# Patient Record
Sex: MALE | Race: NATIVE HAWAIIAN OR OTHER PACIFIC ISLANDER | URBAN - METROPOLITAN AREA
[De-identification: names, ages, dates, MRNs, and addresses within clinical notes are randomized per-mention and may not be internally consistent; named-entity substitution may affect disease eponyms.]

---

## 2017-05-10 PROBLEM — R20.2 PARESTHESIA OF LEFT THUMB: Status: ACTIVE | Noted: 2017-05-10

## 2017-12-11 PROCEDURE — 88305 TISSUE EXAM BY PATHOLOGIST: CPT | Performed by: INTERNAL MEDICINE

## 2024-01-01 ENCOUNTER — APPOINTMENT (OUTPATIENT)
Dept: INTERVENTIONAL RADIOLOGY/VASCULAR | Facility: HOSPITAL | Age: 54
End: 2024-01-01
Attending: INTERNAL MEDICINE
Payer: COMMERCIAL

## 2024-01-01 ENCOUNTER — APPOINTMENT (OUTPATIENT)
Dept: GENERAL RADIOLOGY | Facility: HOSPITAL | Age: 54
End: 2024-01-01
Attending: EMERGENCY MEDICINE
Payer: COMMERCIAL

## 2024-01-01 ENCOUNTER — APPOINTMENT (OUTPATIENT)
Dept: INTERVENTIONAL RADIOLOGY/VASCULAR | Facility: HOSPITAL | Age: 54
End: 2024-01-01
Attending: CLINICAL NURSE SPECIALIST
Payer: COMMERCIAL

## 2024-01-01 ENCOUNTER — APPOINTMENT (OUTPATIENT)
Dept: CV DIAGNOSTICS | Facility: HOSPITAL | Age: 54
End: 2024-01-01
Attending: INTERNAL MEDICINE
Payer: COMMERCIAL

## 2024-01-01 ENCOUNTER — APPOINTMENT (OUTPATIENT)
Dept: ULTRASOUND IMAGING | Facility: HOSPITAL | Age: 54
End: 2024-01-01
Attending: NURSE PRACTITIONER
Payer: COMMERCIAL

## 2024-01-01 ENCOUNTER — APPOINTMENT (OUTPATIENT)
Dept: ULTRASOUND IMAGING | Facility: HOSPITAL | Age: 54
End: 2024-01-01
Attending: STUDENT IN AN ORGANIZED HEALTH CARE EDUCATION/TRAINING PROGRAM
Payer: COMMERCIAL

## 2024-01-01 ENCOUNTER — APPOINTMENT (OUTPATIENT)
Dept: GENERAL RADIOLOGY | Facility: HOSPITAL | Age: 54
End: 2024-01-01
Attending: INTERNAL MEDICINE
Payer: COMMERCIAL

## 2024-01-01 ENCOUNTER — APPOINTMENT (OUTPATIENT)
Dept: CT IMAGING | Facility: HOSPITAL | Age: 54
End: 2024-01-01
Attending: Other
Payer: COMMERCIAL

## 2024-01-01 ENCOUNTER — HOSPITAL ENCOUNTER (INPATIENT)
Facility: HOSPITAL | Age: 54
LOS: 26 days | End: 2024-03-31
Attending: STUDENT IN AN ORGANIZED HEALTH CARE EDUCATION/TRAINING PROGRAM | Admitting: INTERNAL MEDICINE
Payer: COMMERCIAL

## 2024-01-01 ENCOUNTER — APPOINTMENT (OUTPATIENT)
Dept: GENERAL RADIOLOGY | Facility: HOSPITAL | Age: 54
End: 2024-01-01
Attending: CLINICAL NURSE SPECIALIST
Payer: COMMERCIAL

## 2024-01-01 ENCOUNTER — APPOINTMENT (OUTPATIENT)
Dept: CT IMAGING | Facility: HOSPITAL | Age: 54
End: 2024-01-01
Attending: INTERNAL MEDICINE
Payer: COMMERCIAL

## 2024-01-01 ENCOUNTER — APPOINTMENT (OUTPATIENT)
Dept: MRI IMAGING | Facility: HOSPITAL | Age: 54
End: 2024-01-01
Attending: INTERNAL MEDICINE
Payer: COMMERCIAL

## 2024-01-01 ENCOUNTER — APPOINTMENT (OUTPATIENT)
Dept: GENERAL RADIOLOGY | Facility: HOSPITAL | Age: 54
End: 2024-01-01
Attending: NURSE PRACTITIONER
Payer: COMMERCIAL

## 2024-01-01 ENCOUNTER — APPOINTMENT (OUTPATIENT)
Dept: GENERAL RADIOLOGY | Facility: HOSPITAL | Age: 54
End: 2024-01-01
Attending: STUDENT IN AN ORGANIZED HEALTH CARE EDUCATION/TRAINING PROGRAM
Payer: COMMERCIAL

## 2024-01-01 ENCOUNTER — APPOINTMENT (OUTPATIENT)
Dept: GENERAL RADIOLOGY | Facility: HOSPITAL | Age: 54
End: 2024-01-01
Attending: RADIOLOGY
Payer: COMMERCIAL

## 2024-01-01 ENCOUNTER — APPOINTMENT (OUTPATIENT)
Dept: ULTRASOUND IMAGING | Facility: HOSPITAL | Age: 54
End: 2024-01-01
Attending: CLINICAL NURSE SPECIALIST
Payer: COMMERCIAL

## 2024-01-01 ENCOUNTER — APPOINTMENT (OUTPATIENT)
Dept: GENERAL RADIOLOGY | Facility: HOSPITAL | Age: 54
End: 2024-01-01
Payer: COMMERCIAL

## 2024-01-01 ENCOUNTER — APPOINTMENT (OUTPATIENT)
Dept: CV DIAGNOSTICS | Facility: HOSPITAL | Age: 54
End: 2024-01-01
Attending: CLINICAL NURSE SPECIALIST
Payer: COMMERCIAL

## 2024-01-01 ENCOUNTER — APPOINTMENT (OUTPATIENT)
Dept: GENERAL RADIOLOGY | Facility: HOSPITAL | Age: 54
End: 2024-01-01
Attending: SPECIALIST
Payer: COMMERCIAL

## 2024-01-01 ENCOUNTER — APPOINTMENT (OUTPATIENT)
Dept: MRI IMAGING | Facility: HOSPITAL | Age: 54
End: 2024-01-01
Attending: Other
Payer: COMMERCIAL

## 2024-01-01 DIAGNOSIS — D50.8 OTHER IRON DEFICIENCY ANEMIA: ICD-10-CM

## 2024-01-01 DIAGNOSIS — I21.4 NSTEMI (NON-ST ELEVATED MYOCARDIAL INFARCTION) (HCC): Primary | ICD-10-CM

## 2024-01-01 LAB
ALBUMIN SERPL-MCNC: 2.6 G/DL (ref 3.2–4.8)
ALBUMIN SERPL-MCNC: 2.6 G/DL (ref 3.2–4.8)
ALBUMIN SERPL-MCNC: 2.8 G/DL (ref 3.2–4.8)
ALBUMIN SERPL-MCNC: 2.9 G/DL (ref 3.2–4.8)
ALBUMIN SERPL-MCNC: 3 G/DL (ref 3.2–4.8)
ALBUMIN SERPL-MCNC: 3.1 G/DL (ref 3.2–4.8)
ALBUMIN SERPL-MCNC: 3.2 G/DL (ref 3.2–4.8)
ALBUMIN SERPL-MCNC: 3.2 G/DL (ref 3.2–4.8)
ALBUMIN SERPL-MCNC: 3.4 G/DL (ref 3.2–4.8)
ALBUMIN SERPL-MCNC: 3.9 G/DL (ref 3.2–4.8)
ALBUMIN/GLOB SERPL: 0.8 {RATIO} (ref 1–2)
ALBUMIN/GLOB SERPL: 1.3 {RATIO} (ref 1–2)
ALBUMIN/GLOB SERPL: 1.4 {RATIO} (ref 1–2)
ALBUMIN/GLOB SERPL: 1.5 {RATIO} (ref 1–2)
ALBUMIN/GLOB SERPL: 1.7 {RATIO} (ref 1–2)
ALK PHOSPHATASE: 91 IU/L
ALP LIVER SERPL-CCNC: 129 U/L
ALP LIVER SERPL-CCNC: 134 U/L
ALP LIVER SERPL-CCNC: 137 U/L
ALP LIVER SERPL-CCNC: 137 U/L
ALP LIVER SERPL-CCNC: 53 U/L
ALP LIVER SERPL-CCNC: 59 U/L
ALP LIVER SERPL-CCNC: 69 U/L
ALP LIVER SERPL-CCNC: 73 U/L
ALP LIVER SERPL-CCNC: 73 U/L
ALP LIVER SERPL-CCNC: 78 U/L
ALP LIVER SERPL-CCNC: 80 U/L
ALP LIVER SERPL-CCNC: 85 U/L
ALP LIVER SERPL-CCNC: 87 U/L
ALP LIVER SERPL-CCNC: 93 U/L
ALP LIVER SERPL-CCNC: 97 U/L
ALT SERPL-CCNC: 11 U/L
ALT SERPL-CCNC: 12 U/L
ALT SERPL-CCNC: 12 U/L
ALT SERPL-CCNC: 17 U/L
ALT SERPL-CCNC: 18 U/L
ALT SERPL-CCNC: 19 U/L
ALT SERPL-CCNC: 40 U/L
ALT SERPL-CCNC: 56 U/L
ALT SERPL-CCNC: 57 U/L
ALT SERPL-CCNC: 57 U/L
ALT SERPL-CCNC: 69 U/L
ALT SERPL-CCNC: 78 U/L
ALT SERPL-CCNC: <7 U/L
AMMONIA PLAS-MCNC: 20 UMOL/L (ref 11–32)
AMYLASE SERPL-CCNC: 61 U/L (ref 30–118)
AMYLASE SERPL-CCNC: 63 U/L (ref 30–118)
AMYLASE SERPL-CCNC: 66 U/L (ref 30–118)
ANION GAP SERPL CALC-SCNC: 0 MMOL/L (ref 0–18)
ANION GAP SERPL CALC-SCNC: 0 MMOL/L (ref 0–18)
ANION GAP SERPL CALC-SCNC: 1 MMOL/L (ref 0–18)
ANION GAP SERPL CALC-SCNC: 1 MMOL/L (ref 0–18)
ANION GAP SERPL CALC-SCNC: 10 MMOL/L (ref 0–18)
ANION GAP SERPL CALC-SCNC: 11 MMOL/L (ref 0–18)
ANION GAP SERPL CALC-SCNC: 14 MMOL/L (ref 0–18)
ANION GAP SERPL CALC-SCNC: 2 MMOL/L (ref 0–18)
ANION GAP SERPL CALC-SCNC: 3 MMOL/L (ref 0–18)
ANION GAP SERPL CALC-SCNC: 4 MMOL/L (ref 0–18)
ANION GAP SERPL CALC-SCNC: 5 MMOL/L (ref 0–18)
ANION GAP SERPL CALC-SCNC: 6 MMOL/L (ref 0–18)
ANION GAP SERPL CALC-SCNC: 7 MMOL/L (ref 0–18)
ANION GAP SERPL CALC-SCNC: 8 MMOL/L (ref 0–18)
ANION GAP SERPL CALC-SCNC: 9 MMOL/L (ref 0–18)
ANION GAP SERPL CALC-SCNC: <0 MMOL/L (ref 0–18)
ANTIBODY SCREEN: NEGATIVE
APTT PPP: 104.5 SECONDS (ref 23.3–35.6)
APTT PPP: 32.1 SECONDS (ref 23.3–35.6)
APTT PPP: 33.1 SECONDS (ref 23.3–35.6)
APTT PPP: 33.2 SECONDS (ref 23.3–35.6)
APTT PPP: 33.6 SECONDS (ref 23.3–35.6)
APTT PPP: 34.6 SECONDS (ref 23.3–35.6)
APTT PPP: 36.5 SECONDS (ref 23.3–35.6)
APTT PPP: 37 SECONDS (ref 23.3–35.6)
APTT PPP: 39.7 SECONDS (ref 23.3–35.6)
APTT PPP: 40.3 SECONDS (ref 23.3–35.6)
APTT PPP: 42.5 SECONDS (ref 23.3–35.6)
APTT PPP: 42.8 SECONDS (ref 23.3–35.6)
APTT PPP: 42.9 SECONDS (ref 23.3–35.6)
APTT PPP: 44.1 SECONDS (ref 23.3–35.6)
APTT PPP: 49 SECONDS (ref 23.3–35.6)
APTT PPP: 51.7 SECONDS (ref 23.3–35.6)
APTT PPP: 61 SECONDS (ref 23.3–35.6)
AST SERPL-CCNC: 103 U/L (ref ?–34)
AST SERPL-CCNC: 145 U/L (ref ?–34)
AST SERPL-CCNC: 20 U/L (ref ?–34)
AST SERPL-CCNC: 26 U/L (ref ?–34)
AST SERPL-CCNC: 27 U/L (ref ?–34)
AST SERPL-CCNC: 28 U/L (ref ?–34)
AST SERPL-CCNC: 30 U/L (ref ?–34)
AST SERPL-CCNC: 35 U/L (ref ?–34)
AST SERPL-CCNC: 35 U/L (ref ?–34)
AST SERPL-CCNC: 68 U/L (ref ?–34)
AST SERPL-CCNC: 68 U/L (ref ?–34)
AST SERPL-CCNC: 89 U/L (ref ?–34)
ATRIAL RATE: 110 BPM
ATRIAL RATE: 61 BPM
ATRIAL RATE: 81 BPM
BASE EXCESS BLD CALC-SCNC: -0.1 MMOL/L (ref ?–2)
BASE EXCESS BLD CALC-SCNC: -0.5 MMOL/L (ref ?–2)
BASE EXCESS BLD CALC-SCNC: -0.7 MMOL/L (ref ?–2)
BASE EXCESS BLD CALC-SCNC: -0.8 MMOL/L (ref ?–2)
BASE EXCESS BLD CALC-SCNC: -1 MMOL/L (ref ?–2)
BASE EXCESS BLD CALC-SCNC: -1.3 MMOL/L (ref ?–2)
BASE EXCESS BLD CALC-SCNC: -1.3 MMOL/L (ref ?–2)
BASE EXCESS BLD CALC-SCNC: -1.4 MMOL/L (ref ?–2)
BASE EXCESS BLD CALC-SCNC: -1.5 MMOL/L (ref ?–2)
BASE EXCESS BLD CALC-SCNC: -1.8 MMOL/L (ref ?–2)
BASE EXCESS BLD CALC-SCNC: -1.9 MMOL/L (ref ?–2)
BASE EXCESS BLD CALC-SCNC: -2.2 MMOL/L (ref ?–2)
BASE EXCESS BLD CALC-SCNC: -2.2 MMOL/L (ref ?–2)
BASE EXCESS BLD CALC-SCNC: -2.5 MMOL/L (ref ?–2)
BASE EXCESS BLD CALC-SCNC: -2.5 MMOL/L (ref ?–2)
BASE EXCESS BLD CALC-SCNC: -2.6 MMOL/L (ref ?–2)
BASE EXCESS BLD CALC-SCNC: -2.7 MMOL/L (ref ?–2)
BASE EXCESS BLD CALC-SCNC: -2.7 MMOL/L (ref ?–2)
BASE EXCESS BLD CALC-SCNC: -2.9 MMOL/L (ref ?–2)
BASE EXCESS BLD CALC-SCNC: -3 MMOL/L (ref ?–2)
BASE EXCESS BLD CALC-SCNC: -3.3 MMOL/L (ref ?–2)
BASE EXCESS BLD CALC-SCNC: -4.6 MMOL/L (ref ?–2)
BASE EXCESS BLD CALC-SCNC: -4.7 MMOL/L (ref ?–2)
BASE EXCESS BLD CALC-SCNC: -4.9 MMOL/L (ref ?–2)
BASE EXCESS BLD CALC-SCNC: -5.2 MMOL/L (ref ?–2)
BASE EXCESS BLD CALC-SCNC: -5.8 MMOL/L (ref ?–2)
BASE EXCESS BLD CALC-SCNC: -6 MMOL/L (ref ?–2)
BASE EXCESS BLD CALC-SCNC: -6.2 MMOL/L (ref ?–2)
BASE EXCESS BLD CALC-SCNC: -6.6 MMOL/L (ref ?–2)
BASE EXCESS BLD CALC-SCNC: -6.7 MMOL/L (ref ?–2)
BASE EXCESS BLD CALC-SCNC: -7.1 MMOL/L (ref ?–2)
BASE EXCESS BLD CALC-SCNC: -7.9 MMOL/L (ref ?–2)
BASE EXCESS BLD CALC-SCNC: -7.9 MMOL/L (ref ?–2)
BASE EXCESS BLD CALC-SCNC: -8 MMOL/L (ref ?–2)
BASE EXCESS BLD CALC-SCNC: -8.3 MMOL/L (ref ?–2)
BASE EXCESS BLD CALC-SCNC: -9.9 MMOL/L (ref ?–2)
BASE EXCESS BLD CALC-SCNC: 0 MMOL/L (ref ?–2)
BASE EXCESS BLD CALC-SCNC: 0.1 MMOL/L (ref ?–2)
BASE EXCESS BLD CALC-SCNC: 0.5 MMOL/L (ref ?–2)
BASE EXCESS BLD CALC-SCNC: 0.7 MMOL/L (ref ?–2)
BASE EXCESS BLD CALC-SCNC: 0.8 MMOL/L (ref ?–2)
BASE EXCESS BLD CALC-SCNC: 1 MMOL/L (ref ?–2)
BASE EXCESS BLD CALC-SCNC: 1.2 MMOL/L (ref ?–2)
BASE EXCESS BLD CALC-SCNC: 1.8 MMOL/L (ref ?–2)
BASE EXCESS BLD CALC-SCNC: 1.9 MMOL/L (ref ?–2)
BASE EXCESS BLD CALC-SCNC: 1.9 MMOL/L (ref ?–2)
BASE EXCESS BLD CALC-SCNC: 2 MMOL/L (ref ?–2)
BASE EXCESS BLD CALC-SCNC: 2 MMOL/L (ref ?–2)
BASE EXCESS BLD CALC-SCNC: 2.4 MMOL/L (ref ?–2)
BASE EXCESS BLD CALC-SCNC: 2.5 MMOL/L (ref ?–2)
BASE EXCESS BLD CALC-SCNC: 2.7 MMOL/L (ref ?–2)
BASE EXCESS BLD CALC-SCNC: 2.7 MMOL/L (ref ?–2)
BASE EXCESS BLD CALC-SCNC: 3.1 MMOL/L (ref ?–2)
BASE EXCESS BLD CALC-SCNC: 3.3 MMOL/L (ref ?–2)
BASE EXCESS BLD CALC-SCNC: 3.6 MMOL/L (ref ?–2)
BASE EXCESS BLD CALC-SCNC: 3.8 MMOL/L (ref ?–2)
BASE EXCESS BLD CALC-SCNC: 3.8 MMOL/L (ref ?–2)
BASE EXCESS BLD CALC-SCNC: 3.9 MMOL/L (ref ?–2)
BASE EXCESS BLD CALC-SCNC: 3.9 MMOL/L (ref ?–2)
BASE EXCESS BLD CALC-SCNC: 4.1 MMOL/L (ref ?–2)
BASE EXCESS BLD CALC-SCNC: 4.1 MMOL/L (ref ?–2)
BASE EXCESS BLD CALC-SCNC: 4.4 MMOL/L (ref ?–2)
BASE EXCESS BLD CALC-SCNC: 5 MMOL/L (ref ?–2)
BASE EXCESS BLD CALC-SCNC: 5.2 MMOL/L (ref ?–2)
BASE EXCESS BLDA CALC-SCNC: -1 MMOL/L (ref ?–30)
BASE EXCESS BLDA CALC-SCNC: -5 MMOL/L (ref ?–30)
BASOPHILS # BLD AUTO: 0 X10(3) UL (ref 0–0.2)
BASOPHILS # BLD AUTO: 0.01 X10(3) UL (ref 0–0.2)
BASOPHILS # BLD AUTO: 0.02 X10(3) UL (ref 0–0.2)
BASOPHILS # BLD AUTO: 0.03 X10(3) UL (ref 0–0.2)
BASOPHILS # BLD AUTO: 0.03 X10(3) UL (ref 0–0.2)
BASOPHILS # BLD AUTO: 0.05 X10(3) UL (ref 0–0.2)
BASOPHILS # BLD AUTO: 0.06 X10(3) UL (ref 0–0.2)
BASOPHILS # BLD AUTO: 0.07 X10(3) UL (ref 0–0.2)
BASOPHILS # BLD AUTO: 0.09 X10(3) UL (ref 0–0.2)
BASOPHILS # BLD AUTO: 0.11 X10(3) UL (ref 0–0.2)
BASOPHILS # BLD: 0 X10(3) UL (ref 0–0.2)
BASOPHILS # BLD: 0.09 X10(3) UL (ref 0–0.2)
BASOPHILS # BLD: 0.41 X10(3) UL (ref 0–0.2)
BASOPHILS NFR BLD AUTO: 0 %
BASOPHILS NFR BLD AUTO: 0.1 %
BASOPHILS NFR BLD AUTO: 0.2 %
BASOPHILS NFR BLD AUTO: 0.3 %
BASOPHILS NFR BLD AUTO: 0.4 %
BASOPHILS NFR BLD AUTO: 0.8 %
BASOPHILS NFR BLD: 0 %
BASOPHILS NFR BLD: 1 %
BASOPHILS NFR BLD: 2 %
BILIRUB DIRECT SERPL-MCNC: 0.2 MG/DL (ref ?–0.3)
BILIRUB DIRECT SERPL-MCNC: 0.3 MG/DL (ref ?–0.3)
BILIRUB DIRECT SERPL-MCNC: 0.4 MG/DL (ref ?–0.3)
BILIRUB DIRECT SERPL-MCNC: 0.4 MG/DL (ref ?–0.3)
BILIRUB DIRECT SERPL-MCNC: 0.5 MG/DL (ref ?–0.3)
BILIRUB SERPL-MCNC: 0.3 MG/DL (ref 0.3–1.2)
BILIRUB SERPL-MCNC: 0.5 MG/DL (ref 0.3–1.2)
BILIRUB SERPL-MCNC: 0.6 MG/DL (ref 0.3–1.2)
BILIRUB SERPL-MCNC: 0.8 MG/DL (ref 0.3–1.2)
BILIRUB SERPL-MCNC: 0.9 MG/DL (ref 0.3–1.2)
BILIRUB SERPL-MCNC: 0.9 MG/DL (ref 0.3–1.2)
BILIRUB SERPL-MCNC: 1 MG/DL (ref 0.3–1.2)
BILIRUB SERPL-MCNC: 1.1 MG/DL (ref 0.3–1.2)
BILIRUB SERPL-MCNC: 1.1 MG/DL (ref 0.3–1.2)
BILIRUB UR QL: NEGATIVE
BLOOD TYPE BARCODE: 600
BLOOD TYPE BARCODE: 6200
BLOOD TYPE BARCODE: 6200
BLOOD TYPE BARCODE: 7300
BLOOD TYPE BARCODE: 8400
BLOOD TYPE BARCODE: 9500
BONE FRAC: 40 %
BUN BLD-MCNC: 18 MG/DL (ref 9–23)
BUN BLD-MCNC: 21 MG/DL (ref 9–23)
BUN BLD-MCNC: 22 MG/DL (ref 9–23)
BUN BLD-MCNC: 23 MG/DL (ref 9–23)
BUN BLD-MCNC: 25 MG/DL (ref 9–23)
BUN BLD-MCNC: 28 MG/DL (ref 9–23)
BUN BLD-MCNC: 30 MG/DL (ref 9–23)
BUN BLD-MCNC: 30 MG/DL (ref 9–23)
BUN BLD-MCNC: 32 MG/DL (ref 9–23)
BUN BLD-MCNC: 32 MG/DL (ref 9–23)
BUN BLD-MCNC: 34 MG/DL (ref 9–23)
BUN BLD-MCNC: 35 MG/DL (ref 9–23)
BUN BLD-MCNC: 36 MG/DL (ref 9–23)
BUN BLD-MCNC: 38 MG/DL (ref 9–23)
BUN BLD-MCNC: 40 MG/DL (ref 9–23)
BUN BLD-MCNC: 40 MG/DL (ref 9–23)
BUN BLD-MCNC: 42 MG/DL (ref 9–23)
BUN BLD-MCNC: 43 MG/DL (ref 9–23)
BUN BLD-MCNC: 44 MG/DL (ref 9–23)
BUN BLD-MCNC: 45 MG/DL (ref 9–23)
BUN BLD-MCNC: 45 MG/DL (ref 9–23)
BUN BLD-MCNC: 46 MG/DL (ref 9–23)
BUN BLD-MCNC: 47 MG/DL (ref 9–23)
BUN BLD-MCNC: 47 MG/DL (ref 9–23)
BUN BLD-MCNC: 49 MG/DL (ref 9–23)
BUN BLD-MCNC: 50 MG/DL (ref 9–23)
BUN BLD-MCNC: 51 MG/DL (ref 9–23)
BUN BLD-MCNC: 51 MG/DL (ref 9–23)
BUN BLD-MCNC: 52 MG/DL (ref 9–23)
BUN BLD-MCNC: 52 MG/DL (ref 9–23)
BUN BLD-MCNC: 53 MG/DL (ref 9–23)
BUN BLD-MCNC: 56 MG/DL (ref 9–23)
BUN BLD-MCNC: 57 MG/DL (ref 9–23)
BUN BLD-MCNC: 61 MG/DL (ref 9–23)
BUN BLD-MCNC: 62 MG/DL (ref 9–23)
BUN BLD-MCNC: 65 MG/DL (ref 9–23)
BUN/CREAT SERPL: 10 (ref 10–20)
BUN/CREAT SERPL: 10.6 (ref 10–20)
BUN/CREAT SERPL: 10.6 (ref 10–20)
BUN/CREAT SERPL: 10.8 (ref 10–20)
BUN/CREAT SERPL: 11 (ref 10–20)
BUN/CREAT SERPL: 11.2 (ref 10–20)
BUN/CREAT SERPL: 11.3 (ref 10–20)
BUN/CREAT SERPL: 11.4 (ref 10–20)
BUN/CREAT SERPL: 11.6 (ref 10–20)
BUN/CREAT SERPL: 11.6 (ref 10–20)
BUN/CREAT SERPL: 11.7 (ref 10–20)
BUN/CREAT SERPL: 11.8 (ref 10–20)
BUN/CREAT SERPL: 11.9 (ref 10–20)
BUN/CREAT SERPL: 11.9 (ref 10–20)
BUN/CREAT SERPL: 12 (ref 10–20)
BUN/CREAT SERPL: 12.2 (ref 10–20)
BUN/CREAT SERPL: 12.3 (ref 10–20)
BUN/CREAT SERPL: 12.4 (ref 10–20)
BUN/CREAT SERPL: 12.7 (ref 10–20)
BUN/CREAT SERPL: 12.8 (ref 10–20)
BUN/CREAT SERPL: 12.8 (ref 10–20)
BUN/CREAT SERPL: 13.2 (ref 10–20)
BUN/CREAT SERPL: 13.2 (ref 10–20)
BUN/CREAT SERPL: 13.3 (ref 10–20)
BUN/CREAT SERPL: 13.4 (ref 10–20)
BUN/CREAT SERPL: 13.4 (ref 10–20)
BUN/CREAT SERPL: 13.8 (ref 10–20)
BUN/CREAT SERPL: 13.9 (ref 10–20)
BUN/CREAT SERPL: 14 (ref 10–20)
BUN/CREAT SERPL: 14.3 (ref 10–20)
BUN/CREAT SERPL: 14.5 (ref 10–20)
BUN/CREAT SERPL: 14.6 (ref 10–20)
BUN/CREAT SERPL: 14.6 (ref 10–20)
BUN/CREAT SERPL: 14.9 (ref 10–20)
BUN/CREAT SERPL: 6.1 (ref 10–20)
BUN/CREAT SERPL: 6.1 (ref 10–20)
BUN/CREAT SERPL: 6.4 (ref 10–20)
BUN/CREAT SERPL: 6.4 (ref 10–20)
BUN/CREAT SERPL: 6.8 (ref 10–20)
BUN/CREAT SERPL: 6.8 (ref 10–20)
BUN/CREAT SERPL: 6.9 (ref 10–20)
BUN/CREAT SERPL: 6.9 (ref 10–20)
BUN/CREAT SERPL: 7.2 (ref 10–20)
BUN/CREAT SERPL: 7.3 (ref 10–20)
BUN/CREAT SERPL: 8.2 (ref 10–20)
BUN/CREAT SERPL: 8.6 (ref 10–20)
BUN/CREAT SERPL: 8.9 (ref 10–20)
BUN/CREAT SERPL: 9.2 (ref 10–20)
BUN/CREAT SERPL: 9.2 (ref 10–20)
BUN/CREAT SERPL: 9.5 (ref 10–20)
BUN/CREAT SERPL: 9.7 (ref 10–20)
CA-I BLD-SCNC: 0.92 MMOL/L (ref 0.95–1.32)
CA-I BLD-SCNC: 1 MMOL/L (ref 0.95–1.32)
CA-I BLD-SCNC: 1.01 MMOL/L (ref 0.95–1.32)
CA-I BLD-SCNC: 1.04 MMOL/L (ref 0.95–1.32)
CA-I BLD-SCNC: 1.04 MMOL/L (ref 0.95–1.32)
CA-I BLD-SCNC: 1.05 MMOL/L (ref 0.95–1.32)
CA-I BLD-SCNC: 1.07 MMOL/L (ref 0.95–1.32)
CA-I BLD-SCNC: 1.08 MMOL/L (ref 0.95–1.32)
CA-I BLD-SCNC: 1.09 MMOL/L (ref 0.95–1.32)
CA-I BLD-SCNC: 1.1 MMOL/L (ref 0.95–1.32)
CA-I BLD-SCNC: 1.11 MMOL/L (ref 0.95–1.32)
CA-I BLD-SCNC: 1.12 MMOL/L (ref 0.95–1.32)
CA-I BLD-SCNC: 1.13 MMOL/L (ref 0.95–1.32)
CA-I BLD-SCNC: 1.14 MMOL/L (ref 0.95–1.32)
CA-I BLD-SCNC: 1.15 MMOL/L (ref 0.95–1.32)
CA-I BLD-SCNC: 1.16 MMOL/L (ref 0.95–1.32)
CA-I BLD-SCNC: 1.16 MMOL/L (ref 0.95–1.32)
CA-I BLD-SCNC: 1.17 MMOL/L (ref 0.95–1.32)
CA-I BLD-SCNC: 1.18 MMOL/L (ref 0.95–1.32)
CA-I BLD-SCNC: 1.2 MMOL/L (ref 0.95–1.32)
CA-I BLD-SCNC: 1.2 MMOL/L (ref 0.95–1.32)
CA-I BLD-SCNC: 1.21 MMOL/L (ref 0.95–1.32)
CA-I BLD-SCNC: 1.22 MMOL/L (ref 0.95–1.32)
CA-I BLD-SCNC: 1.24 MMOL/L (ref 0.95–1.32)
CA-I BLDA-SCNC: 0.98 MMOL/L (ref 1.12–1.32)
CA-I BLDA-SCNC: 1.04 MMOL/L (ref 1.12–1.32)
CA-I BLDA-SCNC: 1.05 MMOL/L (ref 1.12–1.32)
CA-I BLDA-SCNC: 1.09 MMOL/L (ref 1.12–1.32)
CA-I BLDA-SCNC: 1.17 MMOL/L (ref 1.12–1.32)
CA-I BLDA-SCNC: 1.2 MMOL/L (ref 1.12–1.32)
CALCIUM BLD-MCNC: 7.4 MG/DL (ref 8.7–10.4)
CALCIUM BLD-MCNC: 7.6 MG/DL (ref 8.7–10.4)
CALCIUM BLD-MCNC: 7.6 MG/DL (ref 8.7–10.4)
CALCIUM BLD-MCNC: 7.7 MG/DL (ref 8.7–10.4)
CALCIUM BLD-MCNC: 7.8 MG/DL (ref 8.7–10.4)
CALCIUM BLD-MCNC: 7.8 MG/DL (ref 8.7–10.4)
CALCIUM BLD-MCNC: 7.9 MG/DL (ref 8.7–10.4)
CALCIUM BLD-MCNC: 8 MG/DL (ref 8.7–10.4)
CALCIUM BLD-MCNC: 8.1 MG/DL (ref 8.7–10.4)
CALCIUM BLD-MCNC: 8.2 MG/DL (ref 8.7–10.4)
CALCIUM BLD-MCNC: 8.3 MG/DL (ref 8.7–10.4)
CALCIUM BLD-MCNC: 8.4 MG/DL (ref 8.7–10.4)
CALCIUM BLD-MCNC: 8.5 MG/DL (ref 8.7–10.4)
CALCIUM BLD-MCNC: 8.6 MG/DL (ref 8.7–10.4)
CALCIUM BLD-MCNC: 8.7 MG/DL (ref 8.7–10.4)
CALCIUM BLD-MCNC: 8.8 MG/DL (ref 8.7–10.4)
CALCIUM BLD-MCNC: 8.9 MG/DL (ref 8.7–10.4)
CALCIUM BLD-MCNC: 8.9 MG/DL (ref 8.7–10.4)
CHLORIDE SERPL-SCNC: 102 MMOL/L (ref 98–112)
CHLORIDE SERPL-SCNC: 103 MMOL/L (ref 98–112)
CHLORIDE SERPL-SCNC: 104 MMOL/L (ref 98–112)
CHLORIDE SERPL-SCNC: 105 MMOL/L (ref 98–112)
CHLORIDE SERPL-SCNC: 106 MMOL/L (ref 98–112)
CHLORIDE SERPL-SCNC: 107 MMOL/L (ref 98–112)
CHLORIDE SERPL-SCNC: 108 MMOL/L (ref 98–112)
CHLORIDE SERPL-SCNC: 109 MMOL/L (ref 98–112)
CHLORIDE SERPL-SCNC: 110 MMOL/L (ref 98–112)
CHLORIDE SERPL-SCNC: 111 MMOL/L (ref 98–112)
CHLORIDE SERPL-SCNC: 111 MMOL/L (ref 98–112)
CHLORIDE SERPL-SCNC: 112 MMOL/L (ref 98–112)
CHLORIDE SERPL-SCNC: 113 MMOL/L (ref 98–112)
CHLORIDE SERPL-SCNC: 114 MMOL/L (ref 98–112)
CHLORIDE SERPL-SCNC: 115 MMOL/L (ref 98–112)
CHLORIDE UR-SCNC: 108 MMOL/L
CHOLEST SERPL-MCNC: 121 MG/DL (ref ?–200)
CHOLEST SERPL-MCNC: 138 MG/DL (ref ?–200)
CHOLEST SERPL-MCNC: 149 MG/DL (ref ?–200)
CK SERPL-CCNC: 28 U/L
CK SERPL-CCNC: 40 U/L
CK SERPL-CCNC: 51 U/L
CO2 BLDA-SCNC: 21 MMOL/L (ref 22–32)
CO2 BLDA-SCNC: 23 MMOL/L (ref 22–32)
CO2 BLDA-SCNC: 24 MMOL/L (ref 22–32)
CO2 BLDA-SCNC: 25 MMOL/L (ref 22–32)
CO2 BLDA-SCNC: 25 MMOL/L (ref 22–32)
CO2 BLDA-SCNC: 26 MMOL/L (ref 22–32)
CO2 SERPL-SCNC: 18 MMOL/L (ref 21–32)
CO2 SERPL-SCNC: 18 MMOL/L (ref 21–32)
CO2 SERPL-SCNC: 19 MMOL/L (ref 21–32)
CO2 SERPL-SCNC: 20 MMOL/L (ref 21–32)
CO2 SERPL-SCNC: 20 MMOL/L (ref 21–32)
CO2 SERPL-SCNC: 21 MMOL/L (ref 21–32)
CO2 SERPL-SCNC: 22 MMOL/L (ref 21–32)
CO2 SERPL-SCNC: 23 MMOL/L (ref 21–32)
CO2 SERPL-SCNC: 24 MMOL/L (ref 21–32)
CO2 SERPL-SCNC: 25 MMOL/L (ref 21–32)
CO2 SERPL-SCNC: 26 MMOL/L (ref 21–32)
CO2 SERPL-SCNC: 27 MMOL/L (ref 21–32)
CO2 SERPL-SCNC: 28 MMOL/L (ref 21–32)
COHGB MFR BLD: 2 % (ref 0–3)
COHGB MFR BLD: 2 % (ref 0–3)
COHGB MFR BLD: 2.1 % (ref 0–3)
COHGB MFR BLD: 2.2 % (ref 0–3)
COHGB MFR BLD: 2.3 % (ref 0–3)
COHGB MFR BLD: 2.4 % (ref 0–3)
COHGB MFR BLD: 2.5 % (ref 0–3)
COHGB MFR BLD: 2.6 % (ref 0–3)
COHGB MFR BLD: 2.7 % (ref 0–3)
COHGB MFR BLD: 2.8 % (ref 0–3)
COHGB MFR BLD: 2.8 % (ref 0–3)
COHGB MFR BLD: 2.9 % (ref 0–3)
COHGB MFR BLD: 3 % (ref 0–3)
COHGB MFR BLD: 3.1 % (ref 0–3)
COHGB MFR BLD: 3.1 % (ref 0–3)
COHGB MFR BLD: 3.2 % (ref 0–3)
COLOR UR: YELLOW
CREAT BLD-MCNC: 1.23 MG/DL
CREAT BLD-MCNC: 2.68 MG/DL
CREAT BLD-MCNC: 2.77 MG/DL
CREAT BLD-MCNC: 2.81 MG/DL
CREAT BLD-MCNC: 2.81 MG/DL
CREAT BLD-MCNC: 2.93 MG/DL
CREAT BLD-MCNC: 2.95 MG/DL
CREAT BLD-MCNC: 2.99 MG/DL
CREAT BLD-MCNC: 3.01 MG/DL
CREAT BLD-MCNC: 3.03 MG/DL
CREAT BLD-MCNC: 3.03 MG/DL
CREAT BLD-MCNC: 3.04 MG/DL
CREAT BLD-MCNC: 3.06 MG/DL
CREAT BLD-MCNC: 3.06 MG/DL
CREAT BLD-MCNC: 3.15 MG/DL
CREAT BLD-MCNC: 3.15 MG/DL
CREAT BLD-MCNC: 3.18 MG/DL
CREAT BLD-MCNC: 3.21 MG/DL
CREAT BLD-MCNC: 3.21 MG/DL
CREAT BLD-MCNC: 3.24 MG/DL
CREAT BLD-MCNC: 3.27 MG/DL
CREAT BLD-MCNC: 3.29 MG/DL
CREAT BLD-MCNC: 3.34 MG/DL
CREAT BLD-MCNC: 3.41 MG/DL
CREAT BLD-MCNC: 3.44 MG/DL
CREAT BLD-MCNC: 3.45 MG/DL
CREAT BLD-MCNC: 3.45 MG/DL
CREAT BLD-MCNC: 3.49 MG/DL
CREAT BLD-MCNC: 3.52 MG/DL
CREAT BLD-MCNC: 3.53 MG/DL
CREAT BLD-MCNC: 3.61 MG/DL
CREAT BLD-MCNC: 3.62 MG/DL
CREAT BLD-MCNC: 3.69 MG/DL
CREAT BLD-MCNC: 3.7 MG/DL
CREAT BLD-MCNC: 3.7 MG/DL
CREAT BLD-MCNC: 3.71 MG/DL
CREAT BLD-MCNC: 3.78 MG/DL
CREAT BLD-MCNC: 3.95 MG/DL
CREAT BLD-MCNC: 3.97 MG/DL
CREAT BLD-MCNC: 4.07 MG/DL
CREAT BLD-MCNC: 4.1 MG/DL
CREAT BLD-MCNC: 4.11 MG/DL
CREAT BLD-MCNC: 4.12 MG/DL
CREAT BLD-MCNC: 4.16 MG/DL
CREAT BLD-MCNC: 4.17 MG/DL
CREAT BLD-MCNC: 4.36 MG/DL
CREAT BLD-MCNC: 4.65 MG/DL
CREAT BLD-MCNC: 4.69 MG/DL
CREAT BLD-MCNC: 4.83 MG/DL
CREAT BLD-MCNC: 4.84 MG/DL
CREAT BLD-MCNC: 4.87 MG/DL
CREAT BLD-MCNC: 5.16 MG/DL
CREAT BLD-MCNC: 5.45 MG/DL
CREAT BLD-MCNC: 5.5 MG/DL
CREAT BLD-MCNC: 6.67 MG/DL
CREAT BLD-MCNC: 7.19 MG/DL
CREAT UR-SCNC: 52 MG/DL
D DIMER PPP FEU-MCNC: 13.83 UG/ML FEU (ref ?–0.55)
D DIMER PPP FEU-MCNC: 16.34 UG/ML FEU (ref ?–0.55)
D DIMER PPP FEU-MCNC: 18.48 UG/ML FEU (ref ?–0.55)
D DIMER PPP FEU-MCNC: >20 UG/ML FEU (ref ?–0.55)
DEPRECATED HBV CORE AB SER IA-ACNC: 1472.5 NG/ML
DEPRECATED HBV CORE AB SER IA-ACNC: 913 NG/ML
DEPRECATED RDW RBC AUTO: 42.2 FL (ref 35.1–46.3)
DEPRECATED RDW RBC AUTO: 43.9 FL (ref 35.1–46.3)
DEPRECATED RDW RBC AUTO: 44.2 FL (ref 35.1–46.3)
DEPRECATED RDW RBC AUTO: 45.1 FL (ref 35.1–46.3)
DEPRECATED RDW RBC AUTO: 45.3 FL (ref 35.1–46.3)
DEPRECATED RDW RBC AUTO: 45.4 FL (ref 35.1–46.3)
DEPRECATED RDW RBC AUTO: 45.5 FL (ref 35.1–46.3)
DEPRECATED RDW RBC AUTO: 46.2 FL (ref 35.1–46.3)
DEPRECATED RDW RBC AUTO: 46.2 FL (ref 35.1–46.3)
DEPRECATED RDW RBC AUTO: 46.3 FL (ref 35.1–46.3)
DEPRECATED RDW RBC AUTO: 46.6 FL (ref 35.1–46.3)
DEPRECATED RDW RBC AUTO: 46.8 FL (ref 35.1–46.3)
DEPRECATED RDW RBC AUTO: 47.2 FL (ref 35.1–46.3)
DEPRECATED RDW RBC AUTO: 47.2 FL (ref 35.1–46.3)
DEPRECATED RDW RBC AUTO: 47.4 FL (ref 35.1–46.3)
DEPRECATED RDW RBC AUTO: 47.5 FL (ref 35.1–46.3)
DEPRECATED RDW RBC AUTO: 47.6 FL (ref 35.1–46.3)
DEPRECATED RDW RBC AUTO: 47.7 FL (ref 35.1–46.3)
DEPRECATED RDW RBC AUTO: 47.8 FL (ref 35.1–46.3)
DEPRECATED RDW RBC AUTO: 47.9 FL (ref 35.1–46.3)
DEPRECATED RDW RBC AUTO: 48 FL (ref 35.1–46.3)
DEPRECATED RDW RBC AUTO: 48.4 FL (ref 35.1–46.3)
DEPRECATED RDW RBC AUTO: 48.5 FL (ref 35.1–46.3)
DEPRECATED RDW RBC AUTO: 48.8 FL (ref 35.1–46.3)
DEPRECATED RDW RBC AUTO: 48.9 FL (ref 35.1–46.3)
DEPRECATED RDW RBC AUTO: 48.9 FL (ref 35.1–46.3)
DEPRECATED RDW RBC AUTO: 49.2 FL (ref 35.1–46.3)
DEPRECATED RDW RBC AUTO: 49.2 FL (ref 35.1–46.3)
DEPRECATED RDW RBC AUTO: 49.3 FL (ref 35.1–46.3)
DEPRECATED RDW RBC AUTO: 50.2 FL (ref 35.1–46.3)
DEPRECATED RDW RBC AUTO: 50.4 FL (ref 35.1–46.3)
DEPRECATED RDW RBC AUTO: 50.8 FL (ref 35.1–46.3)
DEPRECATED RDW RBC AUTO: 52 FL (ref 35.1–46.3)
DEPRECATED RDW RBC AUTO: 53.2 FL (ref 35.1–46.3)
DEPRECATED RDW RBC AUTO: 53.5 FL (ref 35.1–46.3)
DEPRECATED RDW RBC AUTO: 54.5 FL (ref 35.1–46.3)
DEPRECATED RDW RBC AUTO: 54.7 FL (ref 35.1–46.3)
DEPRECATED RDW RBC AUTO: 54.9 FL (ref 35.1–46.3)
DEPRECATED RDW RBC AUTO: 55 FL (ref 35.1–46.3)
EGFRCR SERPLBLD CKD-EPI 2021: 11 ML/MIN/1.73M2 (ref 60–?)
EGFRCR SERPLBLD CKD-EPI 2021: 12 ML/MIN/1.73M2 (ref 60–?)
EGFRCR SERPLBLD CKD-EPI 2021: 12 ML/MIN/1.73M2 (ref 60–?)
EGFRCR SERPLBLD CKD-EPI 2021: 13 ML/MIN/1.73M2 (ref 60–?)
EGFRCR SERPLBLD CKD-EPI 2021: 14 ML/MIN/1.73M2 (ref 60–?)
EGFRCR SERPLBLD CKD-EPI 2021: 14 ML/MIN/1.73M2 (ref 60–?)
EGFRCR SERPLBLD CKD-EPI 2021: 15 ML/MIN/1.73M2 (ref 60–?)
EGFRCR SERPLBLD CKD-EPI 2021: 16 ML/MIN/1.73M2 (ref 60–?)
EGFRCR SERPLBLD CKD-EPI 2021: 17 ML/MIN/1.73M2 (ref 60–?)
EGFRCR SERPLBLD CKD-EPI 2021: 17 ML/MIN/1.73M2 (ref 60–?)
EGFRCR SERPLBLD CKD-EPI 2021: 18 ML/MIN/1.73M2 (ref 60–?)
EGFRCR SERPLBLD CKD-EPI 2021: 19 ML/MIN/1.73M2 (ref 60–?)
EGFRCR SERPLBLD CKD-EPI 2021: 20 ML/MIN/1.73M2 (ref 60–?)
EGFRCR SERPLBLD CKD-EPI 2021: 21 ML/MIN/1.73M2 (ref 60–?)
EGFRCR SERPLBLD CKD-EPI 2021: 22 ML/MIN/1.73M2 (ref 60–?)
EGFRCR SERPLBLD CKD-EPI 2021: 23 ML/MIN/1.73M2 (ref 60–?)
EGFRCR SERPLBLD CKD-EPI 2021: 24 ML/MIN/1.73M2 (ref 60–?)
EGFRCR SERPLBLD CKD-EPI 2021: 26 ML/MIN/1.73M2 (ref 60–?)
EGFRCR SERPLBLD CKD-EPI 2021: 27 ML/MIN/1.73M2 (ref 60–?)
EGFRCR SERPLBLD CKD-EPI 2021: 69 ML/MIN/1.73M2 (ref 60–?)
EGFRCR SERPLBLD CKD-EPI 2021: 8 ML/MIN/1.73M2 (ref 60–?)
EGFRCR SERPLBLD CKD-EPI 2021: 9 ML/MIN/1.73M2 (ref 60–?)
EOSINOPHIL # BLD AUTO: 0.03 X10(3) UL (ref 0–0.7)
EOSINOPHIL # BLD AUTO: 0.11 X10(3) UL (ref 0–0.7)
EOSINOPHIL # BLD AUTO: 0.17 X10(3) UL (ref 0–0.7)
EOSINOPHIL # BLD AUTO: 0.17 X10(3) UL (ref 0–0.7)
EOSINOPHIL # BLD AUTO: 0.31 X10(3) UL (ref 0–0.7)
EOSINOPHIL # BLD AUTO: 0.31 X10(3) UL (ref 0–0.7)
EOSINOPHIL # BLD AUTO: 0.34 X10(3) UL (ref 0–0.7)
EOSINOPHIL # BLD AUTO: 0.35 X10(3) UL (ref 0–0.7)
EOSINOPHIL # BLD AUTO: 0.37 X10(3) UL (ref 0–0.7)
EOSINOPHIL # BLD AUTO: 0.38 X10(3) UL (ref 0–0.7)
EOSINOPHIL # BLD AUTO: 0.56 X10(3) UL (ref 0–0.7)
EOSINOPHIL # BLD AUTO: 0.6 X10(3) UL (ref 0–0.7)
EOSINOPHIL # BLD AUTO: 0.63 X10(3) UL (ref 0–0.7)
EOSINOPHIL # BLD AUTO: 0.66 X10(3) UL (ref 0–0.7)
EOSINOPHIL # BLD AUTO: 1.31 X10(3) UL (ref 0–0.7)
EOSINOPHIL # BLD AUTO: 2.5 X10(3) UL (ref 0–0.7)
EOSINOPHIL # BLD: 0 X10(3) UL (ref 0–0.7)
EOSINOPHIL # BLD: 0.17 X10(3) UL (ref 0–0.7)
EOSINOPHIL # BLD: 0.84 X10(3) UL (ref 0–0.7)
EOSINOPHIL # BLD: 1.02 X10(3) UL (ref 0–0.7)
EOSINOPHIL # BLD: 1.19 X10(3) UL (ref 0–0.7)
EOSINOPHIL NFR BLD AUTO: 0.3 %
EOSINOPHIL NFR BLD AUTO: 0.8 %
EOSINOPHIL NFR BLD AUTO: 1.2 %
EOSINOPHIL NFR BLD AUTO: 2 %
EOSINOPHIL NFR BLD AUTO: 2.4 %
EOSINOPHIL NFR BLD AUTO: 4.6 %
EOSINOPHIL NFR BLD AUTO: 4.8 %
EOSINOPHIL NFR BLD AUTO: 5.2 %
EOSINOPHIL NFR BLD AUTO: 5.8 %
EOSINOPHIL NFR BLD AUTO: 6 %
EOSINOPHIL NFR BLD AUTO: 6 %
EOSINOPHIL NFR BLD AUTO: 6.2 %
EOSINOPHIL NFR BLD AUTO: 6.4 %
EOSINOPHIL NFR BLD AUTO: 6.8 %
EOSINOPHIL NFR BLD AUTO: 7.1 %
EOSINOPHIL NFR BLD AUTO: 9.4 %
EOSINOPHIL NFR BLD: 0 %
EOSINOPHIL NFR BLD: 2 %
EOSINOPHIL NFR BLD: 4 %
EOSINOPHIL NFR BLD: 5 %
EOSINOPHIL NFR BLD: 5 %
ERYTHROCYTE [DISTWIDTH] IN BLOOD BY AUTOMATED COUNT: 13 % (ref 11–15)
ERYTHROCYTE [DISTWIDTH] IN BLOOD BY AUTOMATED COUNT: 13.6 % (ref 11–15)
ERYTHROCYTE [DISTWIDTH] IN BLOOD BY AUTOMATED COUNT: 13.7 % (ref 11–15)
ERYTHROCYTE [DISTWIDTH] IN BLOOD BY AUTOMATED COUNT: 13.9 % (ref 11–15)
ERYTHROCYTE [DISTWIDTH] IN BLOOD BY AUTOMATED COUNT: 14 % (ref 11–15)
ERYTHROCYTE [DISTWIDTH] IN BLOOD BY AUTOMATED COUNT: 14 % (ref 11–15)
ERYTHROCYTE [DISTWIDTH] IN BLOOD BY AUTOMATED COUNT: 14.1 % (ref 11–15)
ERYTHROCYTE [DISTWIDTH] IN BLOOD BY AUTOMATED COUNT: 14.3 % (ref 11–15)
ERYTHROCYTE [DISTWIDTH] IN BLOOD BY AUTOMATED COUNT: 14.4 % (ref 11–15)
ERYTHROCYTE [DISTWIDTH] IN BLOOD BY AUTOMATED COUNT: 14.5 % (ref 11–15)
ERYTHROCYTE [DISTWIDTH] IN BLOOD BY AUTOMATED COUNT: 14.6 % (ref 11–15)
ERYTHROCYTE [DISTWIDTH] IN BLOOD BY AUTOMATED COUNT: 14.6 % (ref 11–15)
ERYTHROCYTE [DISTWIDTH] IN BLOOD BY AUTOMATED COUNT: 15.2 % (ref 11–15)
ERYTHROCYTE [DISTWIDTH] IN BLOOD BY AUTOMATED COUNT: 15.3 % (ref 11–15)
ERYTHROCYTE [DISTWIDTH] IN BLOOD BY AUTOMATED COUNT: 15.6 % (ref 11–15)
ERYTHROCYTE [DISTWIDTH] IN BLOOD BY AUTOMATED COUNT: 15.7 % (ref 11–15)
ERYTHROCYTE [DISTWIDTH] IN BLOOD BY AUTOMATED COUNT: 15.8 % (ref 11–15)
ERYTHROCYTE [DISTWIDTH] IN BLOOD BY AUTOMATED COUNT: 15.9 % (ref 11–15)
ERYTHROCYTE [DISTWIDTH] IN BLOOD BY AUTOMATED COUNT: 16.1 % (ref 11–15)
ERYTHROCYTE [DISTWIDTH] IN BLOOD BY AUTOMATED COUNT: 16.2 % (ref 11–15)
ERYTHROCYTE [DISTWIDTH] IN BLOOD BY AUTOMATED COUNT: 16.3 % (ref 11–15)
ERYTHROCYTE [DISTWIDTH] IN BLOOD BY AUTOMATED COUNT: 16.3 % (ref 11–15)
ERYTHROCYTE [DISTWIDTH] IN BLOOD BY AUTOMATED COUNT: 16.4 % (ref 11–15)
ERYTHROCYTE [DISTWIDTH] IN BLOOD BY AUTOMATED COUNT: 16.5 % (ref 11–15)
ERYTHROCYTE [DISTWIDTH] IN BLOOD BY AUTOMATED COUNT: 16.8 % (ref 11–15)
ERYTHROCYTE [DISTWIDTH] IN BLOOD BY AUTOMATED COUNT: 16.9 % (ref 11–15)
EST. AVERAGE GLUCOSE BLD GHB EST-MCNC: 126 MG/DL (ref 68–126)
FIBRINOGEN PPP-MCNC: 235 MG/DL (ref 180–480)
FIBRINOGEN PPP-MCNC: 240 MG/DL (ref 180–480)
FIBRINOGEN PPP-MCNC: 243 MG/DL (ref 180–480)
FIBRINOGEN PPP-MCNC: 255 MG/DL (ref 180–480)
FIBRINOGEN PPP-MCNC: 261 MG/DL (ref 180–480)
FIBRINOGEN PPP-MCNC: 265 MG/DL (ref 180–480)
FIBRINOGEN PPP-MCNC: 289 MG/DL (ref 180–480)
FIBRINOGEN PPP-MCNC: 296 MG/DL (ref 180–480)
FIBRINOGEN PPP-MCNC: 316 MG/DL (ref 180–480)
FIBRINOGEN PPP-MCNC: 331 MG/DL (ref 180–480)
FIBRINOGEN PPP-MCNC: 528 MG/DL (ref 180–480)
GLOBULIN PLAS-MCNC: 2 G/DL (ref 2.8–4.4)
GLOBULIN PLAS-MCNC: 2 G/DL (ref 2.8–4.4)
GLOBULIN PLAS-MCNC: 2.1 G/DL (ref 2.8–4.4)
GLOBULIN PLAS-MCNC: 2.2 G/DL (ref 2.8–4.4)
GLOBULIN PLAS-MCNC: 2.3 G/DL (ref 2.8–4.4)
GLOBULIN PLAS-MCNC: 2.4 G/DL (ref 2.8–4.4)
GLOBULIN PLAS-MCNC: 3.6 G/DL (ref 2.8–4.4)
GLUCOSE BLD-MCNC: 102 MG/DL (ref 70–99)
GLUCOSE BLD-MCNC: 105 MG/DL (ref 70–99)
GLUCOSE BLD-MCNC: 105 MG/DL (ref 70–99)
GLUCOSE BLD-MCNC: 112 MG/DL (ref 70–99)
GLUCOSE BLD-MCNC: 112 MG/DL (ref 70–99)
GLUCOSE BLD-MCNC: 114 MG/DL (ref 70–99)
GLUCOSE BLD-MCNC: 116 MG/DL (ref 70–99)
GLUCOSE BLD-MCNC: 119 MG/DL (ref 70–99)
GLUCOSE BLD-MCNC: 123 MG/DL (ref 70–99)
GLUCOSE BLD-MCNC: 124 MG/DL (ref 70–99)
GLUCOSE BLD-MCNC: 126 MG/DL (ref 70–99)
GLUCOSE BLD-MCNC: 127 MG/DL (ref 70–99)
GLUCOSE BLD-MCNC: 128 MG/DL (ref 70–99)
GLUCOSE BLD-MCNC: 131 MG/DL (ref 70–99)
GLUCOSE BLD-MCNC: 132 MG/DL (ref 70–99)
GLUCOSE BLD-MCNC: 133 MG/DL (ref 70–99)
GLUCOSE BLD-MCNC: 135 MG/DL (ref 70–99)
GLUCOSE BLD-MCNC: 136 MG/DL (ref 70–99)
GLUCOSE BLD-MCNC: 138 MG/DL (ref 70–99)
GLUCOSE BLD-MCNC: 138 MG/DL (ref 70–99)
GLUCOSE BLD-MCNC: 139 MG/DL (ref 70–99)
GLUCOSE BLD-MCNC: 140 MG/DL (ref 70–99)
GLUCOSE BLD-MCNC: 140 MG/DL (ref 70–99)
GLUCOSE BLD-MCNC: 141 MG/DL (ref 70–99)
GLUCOSE BLD-MCNC: 142 MG/DL (ref 70–99)
GLUCOSE BLD-MCNC: 145 MG/DL (ref 70–99)
GLUCOSE BLD-MCNC: 146 MG/DL (ref 70–99)
GLUCOSE BLD-MCNC: 151 MG/DL (ref 70–99)
GLUCOSE BLD-MCNC: 152 MG/DL (ref 70–99)
GLUCOSE BLD-MCNC: 152 MG/DL (ref 70–99)
GLUCOSE BLD-MCNC: 154 MG/DL (ref 70–99)
GLUCOSE BLD-MCNC: 155 MG/DL (ref 70–99)
GLUCOSE BLD-MCNC: 157 MG/DL (ref 70–99)
GLUCOSE BLD-MCNC: 160 MG/DL (ref 70–99)
GLUCOSE BLD-MCNC: 163 MG/DL (ref 70–99)
GLUCOSE BLD-MCNC: 167 MG/DL (ref 70–99)
GLUCOSE BLD-MCNC: 169 MG/DL (ref 70–99)
GLUCOSE BLD-MCNC: 170 MG/DL (ref 70–99)
GLUCOSE BLD-MCNC: 171 MG/DL (ref 70–99)
GLUCOSE BLD-MCNC: 187 MG/DL (ref 70–99)
GLUCOSE BLD-MCNC: 224 MG/DL (ref 70–99)
GLUCOSE BLD-MCNC: 94 MG/DL (ref 70–99)
GLUCOSE BLD-MCNC: 95 MG/DL (ref 70–99)
GLUCOSE BLD-MCNC: 95 MG/DL (ref 70–99)
GLUCOSE BLDA-MCNC: 113 MG/DL (ref 70–99)
GLUCOSE BLDA-MCNC: 165 MG/DL (ref 70–99)
GLUCOSE BLDA-MCNC: 172 MG/DL (ref 70–99)
GLUCOSE BLDA-MCNC: 184 MG/DL (ref 70–99)
GLUCOSE BLDA-MCNC: 200 MG/DL (ref 70–99)
GLUCOSE BLDA-MCNC: 212 MG/DL (ref 70–99)
GLUCOSE BLDC GLUCOMTR-MCNC: 101 MG/DL (ref 70–99)
GLUCOSE BLDC GLUCOMTR-MCNC: 103 MG/DL (ref 70–99)
GLUCOSE BLDC GLUCOMTR-MCNC: 113 MG/DL (ref 70–99)
GLUCOSE BLDC GLUCOMTR-MCNC: 114 MG/DL (ref 70–99)
GLUCOSE BLDC GLUCOMTR-MCNC: 118 MG/DL (ref 70–99)
GLUCOSE BLDC GLUCOMTR-MCNC: 120 MG/DL (ref 70–99)
GLUCOSE BLDC GLUCOMTR-MCNC: 122 MG/DL (ref 70–99)
GLUCOSE BLDC GLUCOMTR-MCNC: 123 MG/DL (ref 70–99)
GLUCOSE BLDC GLUCOMTR-MCNC: 124 MG/DL (ref 70–99)
GLUCOSE BLDC GLUCOMTR-MCNC: 124 MG/DL (ref 70–99)
GLUCOSE BLDC GLUCOMTR-MCNC: 125 MG/DL (ref 70–99)
GLUCOSE BLDC GLUCOMTR-MCNC: 126 MG/DL (ref 70–99)
GLUCOSE BLDC GLUCOMTR-MCNC: 129 MG/DL (ref 70–99)
GLUCOSE BLDC GLUCOMTR-MCNC: 130 MG/DL (ref 70–99)
GLUCOSE BLDC GLUCOMTR-MCNC: 130 MG/DL (ref 70–99)
GLUCOSE BLDC GLUCOMTR-MCNC: 133 MG/DL (ref 70–99)
GLUCOSE BLDC GLUCOMTR-MCNC: 133 MG/DL (ref 70–99)
GLUCOSE BLDC GLUCOMTR-MCNC: 134 MG/DL (ref 70–99)
GLUCOSE BLDC GLUCOMTR-MCNC: 137 MG/DL (ref 70–99)
GLUCOSE BLDC GLUCOMTR-MCNC: 137 MG/DL (ref 70–99)
GLUCOSE BLDC GLUCOMTR-MCNC: 138 MG/DL (ref 70–99)
GLUCOSE BLDC GLUCOMTR-MCNC: 139 MG/DL (ref 70–99)
GLUCOSE BLDC GLUCOMTR-MCNC: 141 MG/DL (ref 70–99)
GLUCOSE BLDC GLUCOMTR-MCNC: 143 MG/DL (ref 70–99)
GLUCOSE BLDC GLUCOMTR-MCNC: 144 MG/DL (ref 70–99)
GLUCOSE BLDC GLUCOMTR-MCNC: 145 MG/DL (ref 70–99)
GLUCOSE BLDC GLUCOMTR-MCNC: 146 MG/DL (ref 70–99)
GLUCOSE BLDC GLUCOMTR-MCNC: 146 MG/DL (ref 70–99)
GLUCOSE BLDC GLUCOMTR-MCNC: 147 MG/DL (ref 70–99)
GLUCOSE BLDC GLUCOMTR-MCNC: 149 MG/DL (ref 70–99)
GLUCOSE BLDC GLUCOMTR-MCNC: 149 MG/DL (ref 70–99)
GLUCOSE BLDC GLUCOMTR-MCNC: 150 MG/DL (ref 70–99)
GLUCOSE BLDC GLUCOMTR-MCNC: 150 MG/DL (ref 70–99)
GLUCOSE BLDC GLUCOMTR-MCNC: 152 MG/DL (ref 70–99)
GLUCOSE BLDC GLUCOMTR-MCNC: 152 MG/DL (ref 70–99)
GLUCOSE BLDC GLUCOMTR-MCNC: 155 MG/DL (ref 70–99)
GLUCOSE BLDC GLUCOMTR-MCNC: 155 MG/DL (ref 70–99)
GLUCOSE BLDC GLUCOMTR-MCNC: 156 MG/DL (ref 70–99)
GLUCOSE BLDC GLUCOMTR-MCNC: 156 MG/DL (ref 70–99)
GLUCOSE BLDC GLUCOMTR-MCNC: 159 MG/DL (ref 70–99)
GLUCOSE BLDC GLUCOMTR-MCNC: 159 MG/DL (ref 70–99)
GLUCOSE BLDC GLUCOMTR-MCNC: 161 MG/DL (ref 70–99)
GLUCOSE BLDC GLUCOMTR-MCNC: 161 MG/DL (ref 70–99)
GLUCOSE BLDC GLUCOMTR-MCNC: 163 MG/DL (ref 70–99)
GLUCOSE BLDC GLUCOMTR-MCNC: 163 MG/DL (ref 70–99)
GLUCOSE BLDC GLUCOMTR-MCNC: 165 MG/DL (ref 70–99)
GLUCOSE BLDC GLUCOMTR-MCNC: 167 MG/DL (ref 70–99)
GLUCOSE BLDC GLUCOMTR-MCNC: 167 MG/DL (ref 70–99)
GLUCOSE BLDC GLUCOMTR-MCNC: 170 MG/DL (ref 70–99)
GLUCOSE BLDC GLUCOMTR-MCNC: 170 MG/DL (ref 70–99)
GLUCOSE BLDC GLUCOMTR-MCNC: 171 MG/DL (ref 70–99)
GLUCOSE BLDC GLUCOMTR-MCNC: 171 MG/DL (ref 70–99)
GLUCOSE BLDC GLUCOMTR-MCNC: 173 MG/DL (ref 70–99)
GLUCOSE BLDC GLUCOMTR-MCNC: 175 MG/DL (ref 70–99)
GLUCOSE BLDC GLUCOMTR-MCNC: 176 MG/DL (ref 70–99)
GLUCOSE BLDC GLUCOMTR-MCNC: 176 MG/DL (ref 70–99)
GLUCOSE BLDC GLUCOMTR-MCNC: 177 MG/DL (ref 70–99)
GLUCOSE BLDC GLUCOMTR-MCNC: 177 MG/DL (ref 70–99)
GLUCOSE BLDC GLUCOMTR-MCNC: 181 MG/DL (ref 70–99)
GLUCOSE BLDC GLUCOMTR-MCNC: 182 MG/DL (ref 70–99)
GLUCOSE BLDC GLUCOMTR-MCNC: 182 MG/DL (ref 70–99)
GLUCOSE BLDC GLUCOMTR-MCNC: 188 MG/DL (ref 70–99)
GLUCOSE BLDC GLUCOMTR-MCNC: 207 MG/DL (ref 70–99)
GLUCOSE BLDC GLUCOMTR-MCNC: 208 MG/DL (ref 70–99)
GLUCOSE BLDC GLUCOMTR-MCNC: 87 MG/DL (ref 70–99)
GLUCOSE UR-MCNC: 300 MG/DL
HAPTOGLOB SERPL-MCNC: 169 MG/DL (ref 40–280)
HBA1C MFR BLD: 6 % (ref ?–5.7)
HBV CORE AB SERPL QL IA: NONREACTIVE
HBV SURFACE AB SER QL: NONREACTIVE
HBV SURFACE AB SERPL IA-ACNC: 3.84 MIU/ML
HBV SURFACE AG SER-ACNC: 0.38 [IU]/L
HBV SURFACE AG SERPL QL IA: NONREACTIVE
HCO3 BLDA-SCNC: 17.2 MEQ/L (ref 21–27)
HCO3 BLDA-SCNC: 18.4 MEQ/L (ref 21–27)
HCO3 BLDA-SCNC: 18.7 MEQ/L (ref 21–27)
HCO3 BLDA-SCNC: 18.7 MEQ/L (ref 21–27)
HCO3 BLDA-SCNC: 19.4 MEQ/L (ref 21–27)
HCO3 BLDA-SCNC: 19.7 MEQ/L (ref 21–27)
HCO3 BLDA-SCNC: 19.7 MEQ/L (ref 21–27)
HCO3 BLDA-SCNC: 20.1 MEQ/L (ref 21–27)
HCO3 BLDA-SCNC: 20.1 MEQ/L (ref 22–26)
HCO3 BLDA-SCNC: 20.4 MEQ/L (ref 21–27)
HCO3 BLDA-SCNC: 21.1 MEQ/L (ref 21–27)
HCO3 BLDA-SCNC: 21.2 MEQ/L (ref 21–27)
HCO3 BLDA-SCNC: 21.5 MEQ/L (ref 22–26)
HCO3 BLDA-SCNC: 22.2 MEQ/L (ref 22–26)
HCO3 BLDA-SCNC: 22.3 MEQ/L (ref 21–27)
HCO3 BLDA-SCNC: 22.6 MEQ/L (ref 21–27)
HCO3 BLDA-SCNC: 22.6 MEQ/L (ref 21–27)
HCO3 BLDA-SCNC: 22.8 MEQ/L (ref 21–27)
HCO3 BLDA-SCNC: 22.8 MEQ/L (ref 21–27)
HCO3 BLDA-SCNC: 22.9 MEQ/L (ref 21–27)
HCO3 BLDA-SCNC: 23.2 MEQ/L (ref 21–27)
HCO3 BLDA-SCNC: 23.4 MEQ/L (ref 21–27)
HCO3 BLDA-SCNC: 23.5 MEQ/L (ref 21–27)
HCO3 BLDA-SCNC: 23.5 MEQ/L (ref 22–26)
HCO3 BLDA-SCNC: 23.7 MEQ/L (ref 21–27)
HCO3 BLDA-SCNC: 23.8 MEQ/L (ref 21–27)
HCO3 BLDA-SCNC: 23.9 MEQ/L (ref 21–27)
HCO3 BLDA-SCNC: 24.1 MEQ/L (ref 21–27)
HCO3 BLDA-SCNC: 24.1 MEQ/L (ref 22–26)
HCO3 BLDA-SCNC: 24.4 MEQ/L (ref 21–27)
HCO3 BLDA-SCNC: 24.5 MEQ/L (ref 21–27)
HCO3 BLDA-SCNC: 24.8 MEQ/L (ref 21–27)
HCO3 BLDA-SCNC: 24.8 MEQ/L (ref 22–26)
HCO3 BLDA-SCNC: 24.9 MEQ/L (ref 21–27)
HCO3 BLDA-SCNC: 25 MEQ/L (ref 21–27)
HCO3 BLDA-SCNC: 25 MEQ/L (ref 21–27)
HCO3 BLDA-SCNC: 25.4 MEQ/L (ref 21–27)
HCO3 BLDA-SCNC: 25.7 MEQ/L (ref 21–27)
HCO3 BLDA-SCNC: 25.8 MEQ/L (ref 21–27)
HCO3 BLDA-SCNC: 25.9 MEQ/L (ref 21–27)
HCO3 BLDA-SCNC: 26.3 MEQ/L (ref 21–27)
HCO3 BLDA-SCNC: 26.3 MEQ/L (ref 21–27)
HCO3 BLDA-SCNC: 26.4 MEQ/L (ref 21–27)
HCO3 BLDA-SCNC: 26.4 MEQ/L (ref 21–27)
HCO3 BLDA-SCNC: 26.8 MEQ/L (ref 21–27)
HCO3 BLDA-SCNC: 26.9 MEQ/L (ref 21–27)
HCO3 BLDA-SCNC: 27 MEQ/L (ref 21–27)
HCO3 BLDA-SCNC: 27 MEQ/L (ref 21–27)
HCO3 BLDA-SCNC: 27.3 MEQ/L (ref 21–27)
HCO3 BLDA-SCNC: 27.7 MEQ/L (ref 21–27)
HCO3 BLDA-SCNC: 27.8 MEQ/L (ref 21–27)
HCO3 BLDA-SCNC: 27.8 MEQ/L (ref 21–27)
HCO3 BLDA-SCNC: 27.9 MEQ/L (ref 21–27)
HCO3 BLDA-SCNC: 28 MEQ/L (ref 21–27)
HCO3 BLDA-SCNC: 28.1 MEQ/L (ref 21–27)
HCO3 BLDA-SCNC: 28.4 MEQ/L (ref 21–27)
HCO3 BLDA-SCNC: 28.8 MEQ/L (ref 21–27)
HCO3 BLDA-SCNC: 29 MEQ/L (ref 21–27)
HCO3 BLDV-SCNC: 18 MEQ/L (ref 22–26)
HCO3 BLDV-SCNC: 19.3 MEQ/L (ref 22–26)
HCO3 BLDV-SCNC: 19.9 MEQ/L (ref 22–26)
HCO3 BLDV-SCNC: 20.4 MEQ/L (ref 22–26)
HCO3 BLDV-SCNC: 22.5 MEQ/L (ref 22–26)
HCO3 BLDV-SCNC: 23 MEQ/L (ref 22–26)
HCO3 BLDV-SCNC: 23.4 MEQ/L (ref 22–26)
HCO3 BLDV-SCNC: 24 MEQ/L (ref 22–26)
HCO3 BLDV-SCNC: 24 MEQ/L (ref 22–26)
HCO3 BLDV-SCNC: 24.4 MEQ/L (ref 22–26)
HCO3 BLDV-SCNC: 24.9 MEQ/L (ref 22–26)
HCO3 BLDV-SCNC: 25 MEQ/L (ref 22–26)
HCO3 BLDV-SCNC: 25.6 MEQ/L (ref 22–26)
HCO3 BLDV-SCNC: 26.7 MEQ/L (ref 22–26)
HCO3 BLDV-SCNC: 27.6 MEQ/L (ref 22–26)
HCT VFR BLD AUTO: 20.3 %
HCT VFR BLD AUTO: 20.3 %
HCT VFR BLD AUTO: 20.9 %
HCT VFR BLD AUTO: 21.2 %
HCT VFR BLD AUTO: 21.4 %
HCT VFR BLD AUTO: 21.4 %
HCT VFR BLD AUTO: 21.5 %
HCT VFR BLD AUTO: 21.8 %
HCT VFR BLD AUTO: 22.2 %
HCT VFR BLD AUTO: 22.3 %
HCT VFR BLD AUTO: 22.5 %
HCT VFR BLD AUTO: 22.6 %
HCT VFR BLD AUTO: 22.7 %
HCT VFR BLD AUTO: 23.1 %
HCT VFR BLD AUTO: 23.2 %
HCT VFR BLD AUTO: 23.2 %
HCT VFR BLD AUTO: 23.4 %
HCT VFR BLD AUTO: 23.5 %
HCT VFR BLD AUTO: 23.6 %
HCT VFR BLD AUTO: 23.6 %
HCT VFR BLD AUTO: 23.8 %
HCT VFR BLD AUTO: 23.9 %
HCT VFR BLD AUTO: 24.1 %
HCT VFR BLD AUTO: 24.1 %
HCT VFR BLD AUTO: 24.4 %
HCT VFR BLD AUTO: 24.5 %
HCT VFR BLD AUTO: 24.8 %
HCT VFR BLD AUTO: 24.8 %
HCT VFR BLD AUTO: 24.9 %
HCT VFR BLD AUTO: 25 %
HCT VFR BLD AUTO: 25.1 %
HCT VFR BLD AUTO: 25.1 %
HCT VFR BLD AUTO: 25.2 %
HCT VFR BLD AUTO: 25.2 %
HCT VFR BLD AUTO: 25.4 %
HCT VFR BLD AUTO: 25.6 %
HCT VFR BLD AUTO: 25.6 %
HCT VFR BLD AUTO: 25.8 %
HCT VFR BLD AUTO: 26.2 %
HCT VFR BLD AUTO: 26.3 %
HCT VFR BLD AUTO: 26.9 %
HCT VFR BLD AUTO: 27.4 %
HCT VFR BLD AUTO: 27.4 %
HCT VFR BLD AUTO: 27.6 %
HCT VFR BLD AUTO: 27.9 %
HCT VFR BLD AUTO: 28 %
HCT VFR BLD AUTO: 29.3 %
HCT VFR BLD AUTO: 29.8 %
HCT VFR BLD AUTO: 38.7 %
HCT VFR BLD AUTO: 42.8 %
HCT VFR BLDA CALC: 19 %
HCT VFR BLDA CALC: 21 %
HCT VFR BLDA CALC: 21 %
HCT VFR BLDA CALC: 22 %
HCT VFR BLDA CALC: 24 %
HCT VFR BLDA CALC: 24 %
HDLC SERPL-MCNC: 26 MG/DL (ref 40–59)
HDLC SERPL-MCNC: 32 MG/DL (ref 40–59)
HDLC SERPL-MCNC: 57 MG/DL (ref 40–59)
HELMET CELLS BLD QL SMEAR: PRESENT
HEPARIN AB PPP QL PL AGG: NEGATIVE
HEPARIN AB PPP QL PL AGG: NEGATIVE
HGB BLD-MCNC: 10 G/DL
HGB BLD-MCNC: 10 G/DL
HGB BLD-MCNC: 10.1 G/DL
HGB BLD-MCNC: 10.2 G/DL
HGB BLD-MCNC: 10.2 G/DL
HGB BLD-MCNC: 10.3 G/DL
HGB BLD-MCNC: 10.3 G/DL
HGB BLD-MCNC: 10.4 G/DL
HGB BLD-MCNC: 10.6 G/DL
HGB BLD-MCNC: 10.7 G/DL
HGB BLD-MCNC: 10.9 G/DL
HGB BLD-MCNC: 11 G/DL
HGB BLD-MCNC: 11.1 G/DL
HGB BLD-MCNC: 11.8 G/DL
HGB BLD-MCNC: 11.9 G/DL
HGB BLD-MCNC: 11.9 G/DL
HGB BLD-MCNC: 12.1 G/DL
HGB BLD-MCNC: 12.4 G/DL
HGB BLD-MCNC: 12.6 G/DL
HGB BLD-MCNC: 12.7 G/DL
HGB BLD-MCNC: 13 G/DL
HGB BLD-MCNC: 13.1 G/DL
HGB BLD-MCNC: 13.6 G/DL
HGB BLD-MCNC: 13.9 G/DL
HGB BLD-MCNC: 14 G/DL
HGB BLD-MCNC: 14.8 G/DL
HGB BLD-MCNC: 14.9 G/DL
HGB BLD-MCNC: 15.1 G/DL
HGB BLD-MCNC: 6.3 G/DL
HGB BLD-MCNC: 6.7 G/DL
HGB BLD-MCNC: 6.7 G/DL
HGB BLD-MCNC: 6.8 G/DL
HGB BLD-MCNC: 6.9 G/DL
HGB BLD-MCNC: 7.1 G/DL
HGB BLD-MCNC: 7.2 G/DL
HGB BLD-MCNC: 7.2 G/DL
HGB BLD-MCNC: 7.4 G/DL
HGB BLD-MCNC: 7.4 G/DL
HGB BLD-MCNC: 7.5 G/DL
HGB BLD-MCNC: 7.6 G/DL
HGB BLD-MCNC: 7.7 G/DL
HGB BLD-MCNC: 7.8 G/DL
HGB BLD-MCNC: 7.9 G/DL
HGB BLD-MCNC: 8 G/DL
HGB BLD-MCNC: 8.1 G/DL
HGB BLD-MCNC: 8.1 G/DL
HGB BLD-MCNC: 8.2 G/DL
HGB BLD-MCNC: 8.3 G/DL
HGB BLD-MCNC: 8.4 G/DL
HGB BLD-MCNC: 8.4 G/DL
HGB BLD-MCNC: 8.5 G/DL
HGB BLD-MCNC: 8.6 G/DL
HGB BLD-MCNC: 8.7 G/DL
HGB BLD-MCNC: 8.8 G/DL
HGB BLD-MCNC: 8.9 G/DL
HGB BLD-MCNC: 9 G/DL
HGB BLD-MCNC: 9.1 G/DL
HGB BLD-MCNC: 9.1 G/DL
HGB BLD-MCNC: 9.2 G/DL
HGB BLD-MCNC: 9.3 G/DL
HGB BLD-MCNC: 9.4 G/DL
HGB BLD-MCNC: 9.4 G/DL
HGB BLD-MCNC: 9.7 G/DL
HGB BLD-MCNC: 9.8 G/DL
HGB BLD-MCNC: 9.8 G/DL
HGB RETIC QN AUTO: 32.1 PG (ref 28.2–36.6)
IMM GRANULOCYTES # BLD AUTO: 0.01 X10(3) UL (ref 0–1)
IMM GRANULOCYTES # BLD AUTO: 0.01 X10(3) UL (ref 0–1)
IMM GRANULOCYTES # BLD AUTO: 0.02 X10(3) UL (ref 0–1)
IMM GRANULOCYTES # BLD AUTO: 0.02 X10(3) UL (ref 0–1)
IMM GRANULOCYTES # BLD AUTO: 0.03 X10(3) UL (ref 0–1)
IMM GRANULOCYTES # BLD AUTO: 0.04 X10(3) UL (ref 0–1)
IMM GRANULOCYTES # BLD AUTO: 0.05 X10(3) UL (ref 0–1)
IMM GRANULOCYTES # BLD AUTO: 0.09 X10(3) UL (ref 0–1)
IMM GRANULOCYTES # BLD AUTO: 0.16 X10(3) UL (ref 0–1)
IMM GRANULOCYTES # BLD AUTO: 0.22 X10(3) UL (ref 0–1)
IMM GRANULOCYTES # BLD AUTO: 0.25 X10(3) UL (ref 0–1)
IMM GRANULOCYTES # BLD AUTO: 0.28 X10(3) UL (ref 0–1)
IMM GRANULOCYTES # BLD AUTO: 0.29 X10(3) UL (ref 0–1)
IMM GRANULOCYTES # BLD AUTO: 0.35 X10(3) UL (ref 0–1)
IMM GRANULOCYTES # BLD AUTO: 0.47 X10(3) UL (ref 0–1)
IMM GRANULOCYTES # BLD AUTO: 0.49 X10(3) UL (ref 0–1)
IMM GRANULOCYTES NFR BLD: 0.1 %
IMM GRANULOCYTES NFR BLD: 0.2 %
IMM GRANULOCYTES NFR BLD: 0.3 %
IMM GRANULOCYTES NFR BLD: 0.4 %
IMM GRANULOCYTES NFR BLD: 0.7 %
IMM GRANULOCYTES NFR BLD: 1.1 %
IMM GRANULOCYTES NFR BLD: 1.4 %
IMM GRANULOCYTES NFR BLD: 1.6 %
IMM GRANULOCYTES NFR BLD: 1.8 %
IMM GRANULOCYTES NFR BLD: 1.9 %
IMM GRANULOCYTES NFR BLD: 2.1 %
IMM GRANULOCYTES NFR BLD: 2.8 %
IMM GRANULOCYTES NFR BLD: 3 %
IMM GRANULOCYTES NFR BLD: 3.7 %
IMM RETICS NFR: 0.33 RATIO (ref 0.1–0.3)
INR BLD: 1.06 (ref 0.8–1.2)
INR BLD: 1.06 (ref 0.8–1.2)
INR BLD: 1.09 (ref 0.8–1.2)
INR BLD: 1.21 (ref 0.8–1.2)
INR BLD: 1.22 (ref 0.8–1.2)
INR BLD: 1.23 (ref 0.8–1.2)
INR BLD: 1.23 (ref 0.8–1.2)
INR BLD: 1.24 (ref 0.8–1.2)
INR BLD: 1.25 (ref 0.8–1.2)
INR BLD: 1.25 (ref 0.8–1.2)
INR BLD: 1.26 (ref 0.8–1.2)
INR BLD: 1.27 (ref 0.8–1.2)
INR BLD: 1.31 (ref 0.8–1.2)
INR BLD: 1.33 (ref 0.8–1.2)
INR BLD: 1.37 (ref 0.8–1.2)
INR BLD: 1.37 (ref 0.8–1.2)
INR BLD: 2 (ref 0.8–1.2)
INSPIRATION SETTING TIME VENT: 1 SEC (ref 0.1–5)
INTESTINAL FRAC: 5 %
IRON SATN MFR SERPL: 16 %
IRON SATN MFR SERPL: 22 %
IRON SERPL-MCNC: 30 UG/DL
IRON SERPL-MCNC: 42 UG/DL
ISTAT ACTIVATED CLOTTING TIME: 152 SECONDS (ref 125–137)
ISTAT ACTIVATED CLOTTING TIME: 157 SECONDS (ref 125–137)
ISTAT ACTIVATED CLOTTING TIME: 157 SECONDS (ref 125–137)
ISTAT ACTIVATED CLOTTING TIME: 158 SECONDS (ref 125–137)
ISTAT ACTIVATED CLOTTING TIME: 168 SECONDS (ref 125–137)
ISTAT ACTIVATED CLOTTING TIME: 169 SECONDS (ref 125–137)
ISTAT ACTIVATED CLOTTING TIME: 174 SECONDS (ref 125–137)
ISTAT ACTIVATED CLOTTING TIME: 179 SECONDS (ref 125–137)
ISTAT ACTIVATED CLOTTING TIME: 185 SECONDS (ref 125–137)
ISTAT ACTIVATED CLOTTING TIME: 190 SECONDS (ref 125–137)
ISTAT ACTIVATED CLOTTING TIME: 190 SECONDS (ref 125–137)
ISTAT ACTIVATED CLOTTING TIME: 201 SECONDS (ref 125–137)
ISTAT ACTIVATED CLOTTING TIME: 212 SECONDS (ref 125–137)
ISTAT ACTIVATED CLOTTING TIME: 287 SECONDS (ref 125–137)
ISTAT ACTIVATED CLOTTING TIME: 407 SECONDS (ref 125–137)
ISTAT ACTIVATED CLOTTING TIME: 596 SECONDS (ref 125–137)
ISTAT ACTIVATED CLOTTING TIME: 601 SECONDS (ref 125–137)
KETONES UR-MCNC: 100 MG/DL
LACTATE BLD-SCNC: 0.6 MMOL/L (ref 0.5–2)
LACTATE BLD-SCNC: 0.7 MMOL/L (ref 0.5–2)
LACTATE BLD-SCNC: 0.8 MMOL/L (ref 0.5–2)
LACTATE BLD-SCNC: 0.9 MMOL/L (ref 0.5–2)
LACTATE BLD-SCNC: 1 MMOL/L (ref 0.5–2)
LACTATE BLD-SCNC: 1.2 MMOL/L (ref 0.5–2)
LACTATE BLD-SCNC: 1.3 MMOL/L (ref 0.5–2)
LACTATE BLD-SCNC: 1.3 MMOL/L (ref 0.5–2)
LACTATE BLD-SCNC: 1.5 MMOL/L (ref 0.5–2)
LACTATE BLD-SCNC: 1.6 MMOL/L (ref 0.5–2)
LACTATE BLD-SCNC: 1.7 MMOL/L (ref 0.5–2)
LACTATE BLD-SCNC: 1.8 MMOL/L (ref 0.5–2)
LACTATE BLD-SCNC: 1.9 MMOL/L (ref 0.5–2)
LACTATE BLD-SCNC: 3.2 MMOL/L (ref 0.5–2)
LACTATE SERPL-SCNC: 0.8 MMOL/L (ref 0.5–2)
LD FRACTION 1: 25 %
LD FRACTION 1: 37 %
LD FRACTION 1: 39 %
LD FRACTION 1: 40 %
LD FRACTION 2: 30 %
LD FRACTION 2: 33 %
LD FRACTION 2: 33 %
LD FRACTION 2: 35 %
LD FRACTION 3: 15 %
LD FRACTION 3: 16 %
LD FRACTION 3: 18 %
LD FRACTION 3: 22 %
LD FRACTION 4: 4 %
LD FRACTION 4: 5 %
LD FRACTION 4: 5 %
LD FRACTION 4: 8 %
LD FRACTION 5: 15 %
LD FRACTION 5: 6 %
LD FRACTION 5: 7 %
LD FRACTION 5: 7 %
LDH SERPL L TO P-CCNC: 382 U/L
LDH SERPL L TO P-CCNC: 390 U/L
LDH SERPL L TO P-CCNC: 393 U/L
LDH SERPL L TO P-CCNC: 414 U/L
LDH SERPL L TO P-CCNC: 498 U/L
LDH: 1209 IU/L
LDH: 158 IU/L
LDH: 2149 IU/L
LDH: 2245 IU/L
LDLC SERPL CALC-MCNC: 61 MG/DL (ref ?–100)
LDLC SERPL CALC-MCNC: 64 MG/DL (ref ?–100)
LDLC SERPL CALC-MCNC: 80 MG/DL (ref ?–100)
LEUKOCYTE ESTERASE UR QL STRIP.AUTO: NEGATIVE
LIPASE SERPL-CCNC: 141 U/L (ref 12–53)
LIPASE SERPL-CCNC: 23 U/L (ref 12–53)
LIPASE SERPL-CCNC: 30 U/L (ref 12–53)
LIPASE SERPL-CCNC: 34 U/L (ref 12–53)
LIVER FRAC: 56 %
LYMPHOCYTES # BLD AUTO: 0.56 X10(3) UL (ref 1–4)
LYMPHOCYTES # BLD AUTO: 0.59 X10(3) UL (ref 1–4)
LYMPHOCYTES # BLD AUTO: 0.6 X10(3) UL (ref 1–4)
LYMPHOCYTES # BLD AUTO: 0.65 X10(3) UL (ref 1–4)
LYMPHOCYTES # BLD AUTO: 0.67 X10(3) UL (ref 1–4)
LYMPHOCYTES # BLD AUTO: 0.81 X10(3) UL (ref 1–4)
LYMPHOCYTES # BLD AUTO: 0.84 X10(3) UL (ref 1–4)
LYMPHOCYTES # BLD AUTO: 0.86 X10(3) UL (ref 1–4)
LYMPHOCYTES # BLD AUTO: 0.94 X10(3) UL (ref 1–4)
LYMPHOCYTES # BLD AUTO: 1 X10(3) UL (ref 1–4)
LYMPHOCYTES # BLD AUTO: 1.04 X10(3) UL (ref 1–4)
LYMPHOCYTES # BLD AUTO: 1.06 X10(3) UL (ref 1–4)
LYMPHOCYTES # BLD AUTO: 1.19 X10(3) UL (ref 1–4)
LYMPHOCYTES # BLD AUTO: 1.31 X10(3) UL (ref 1–4)
LYMPHOCYTES # BLD AUTO: 1.66 X10(3) UL (ref 1–4)
LYMPHOCYTES # BLD AUTO: 1.95 X10(3) UL (ref 1–4)
LYMPHOCYTES NFR BLD AUTO: 11.1 %
LYMPHOCYTES NFR BLD AUTO: 11.1 %
LYMPHOCYTES NFR BLD AUTO: 11.3 %
LYMPHOCYTES NFR BLD AUTO: 11.3 %
LYMPHOCYTES NFR BLD AUTO: 11.8 %
LYMPHOCYTES NFR BLD AUTO: 27.2 %
LYMPHOCYTES NFR BLD AUTO: 4.7 %
LYMPHOCYTES NFR BLD AUTO: 6.2 %
LYMPHOCYTES NFR BLD AUTO: 6.4 %
LYMPHOCYTES NFR BLD AUTO: 7 %
LYMPHOCYTES NFR BLD AUTO: 7.3 %
LYMPHOCYTES NFR BLD AUTO: 8.1 %
LYMPHOCYTES NFR BLD AUTO: 8.2 %
LYMPHOCYTES NFR BLD AUTO: 8.3 %
LYMPHOCYTES NFR BLD AUTO: 8.7 %
LYMPHOCYTES NFR BLD AUTO: 9.4 %
LYMPHOCYTES NFR BLD: 0.22 X10(3) UL (ref 1–4)
LYMPHOCYTES NFR BLD: 0.63 X10(3) UL (ref 1–4)
LYMPHOCYTES NFR BLD: 0.77 X10(3) UL (ref 1–4)
LYMPHOCYTES NFR BLD: 0.81 X10(3) UL (ref 1–4)
LYMPHOCYTES NFR BLD: 1.19 X10(3) UL (ref 1–4)
LYMPHOCYTES NFR BLD: 2 %
LYMPHOCYTES NFR BLD: 3 %
LYMPHOCYTES NFR BLD: 4 %
LYMPHOCYTES NFR BLD: 5 %
LYMPHOCYTES NFR BLD: 9 %
MAGNESIUM SERPL-MCNC: 1.7 MG/DL (ref 1.6–2.6)
MAGNESIUM SERPL-MCNC: 1.8 MG/DL (ref 1.6–2.6)
MAGNESIUM SERPL-MCNC: 1.9 MG/DL (ref 1.6–2.6)
MAGNESIUM SERPL-MCNC: 2 MG/DL (ref 1.6–2.6)
MAGNESIUM SERPL-MCNC: 2.1 MG/DL (ref 1.6–2.6)
MAGNESIUM SERPL-MCNC: 2.2 MG/DL (ref 1.6–2.6)
MAGNESIUM SERPL-MCNC: 2.3 MG/DL (ref 1.6–2.6)
MAGNESIUM SERPL-MCNC: 2.5 MG/DL (ref 1.6–2.6)
MCH RBC QN AUTO: 28.3 PG (ref 26–34)
MCH RBC QN AUTO: 28.5 PG (ref 26–34)
MCH RBC QN AUTO: 28.6 PG (ref 26–34)
MCH RBC QN AUTO: 28.6 PG (ref 26–34)
MCH RBC QN AUTO: 28.8 PG (ref 26–34)
MCH RBC QN AUTO: 28.9 PG (ref 26–34)
MCH RBC QN AUTO: 28.9 PG (ref 26–34)
MCH RBC QN AUTO: 29 PG (ref 26–34)
MCH RBC QN AUTO: 29.1 PG (ref 26–34)
MCH RBC QN AUTO: 29.2 PG (ref 26–34)
MCH RBC QN AUTO: 29.3 PG (ref 26–34)
MCH RBC QN AUTO: 29.4 PG (ref 26–34)
MCH RBC QN AUTO: 29.4 PG (ref 26–34)
MCH RBC QN AUTO: 29.5 PG (ref 26–34)
MCH RBC QN AUTO: 29.6 PG (ref 26–34)
MCH RBC QN AUTO: 29.7 PG (ref 26–34)
MCH RBC QN AUTO: 29.8 PG (ref 26–34)
MCH RBC QN AUTO: 29.9 PG (ref 26–34)
MCH RBC QN AUTO: 30 PG (ref 26–34)
MCH RBC QN AUTO: 30.1 PG (ref 26–34)
MCH RBC QN AUTO: 30.2 PG (ref 26–34)
MCH RBC QN AUTO: 30.2 PG (ref 26–34)
MCH RBC QN AUTO: 30.3 PG (ref 26–34)
MCH RBC QN AUTO: 30.6 PG (ref 26–34)
MCH RBC QN AUTO: 30.7 PG (ref 26–34)
MCH RBC QN AUTO: 30.9 PG (ref 26–34)
MCH RBC QN AUTO: 31.1 PG (ref 26–34)
MCHC RBC AUTO-ENTMCNC: 32.1 G/DL (ref 31–37)
MCHC RBC AUTO-ENTMCNC: 32.1 G/DL (ref 31–37)
MCHC RBC AUTO-ENTMCNC: 32.3 G/DL (ref 31–37)
MCHC RBC AUTO-ENTMCNC: 32.4 G/DL (ref 31–37)
MCHC RBC AUTO-ENTMCNC: 32.4 G/DL (ref 31–37)
MCHC RBC AUTO-ENTMCNC: 32.5 G/DL (ref 31–37)
MCHC RBC AUTO-ENTMCNC: 32.6 G/DL (ref 31–37)
MCHC RBC AUTO-ENTMCNC: 32.8 G/DL (ref 31–37)
MCHC RBC AUTO-ENTMCNC: 32.8 G/DL (ref 31–37)
MCHC RBC AUTO-ENTMCNC: 32.9 G/DL (ref 31–37)
MCHC RBC AUTO-ENTMCNC: 33.1 G/DL (ref 31–37)
MCHC RBC AUTO-ENTMCNC: 33.1 G/DL (ref 31–37)
MCHC RBC AUTO-ENTMCNC: 33.2 G/DL (ref 31–37)
MCHC RBC AUTO-ENTMCNC: 33.3 G/DL (ref 31–37)
MCHC RBC AUTO-ENTMCNC: 33.3 G/DL (ref 31–37)
MCHC RBC AUTO-ENTMCNC: 33.6 G/DL (ref 31–37)
MCHC RBC AUTO-ENTMCNC: 33.7 G/DL (ref 31–37)
MCHC RBC AUTO-ENTMCNC: 33.8 G/DL (ref 31–37)
MCHC RBC AUTO-ENTMCNC: 33.8 G/DL (ref 31–37)
MCHC RBC AUTO-ENTMCNC: 33.9 G/DL (ref 31–37)
MCHC RBC AUTO-ENTMCNC: 34.1 G/DL (ref 31–37)
MCHC RBC AUTO-ENTMCNC: 34.2 G/DL (ref 31–37)
MCHC RBC AUTO-ENTMCNC: 34.2 G/DL (ref 31–37)
MCHC RBC AUTO-ENTMCNC: 34.3 G/DL (ref 31–37)
MCHC RBC AUTO-ENTMCNC: 34.4 G/DL (ref 31–37)
MCHC RBC AUTO-ENTMCNC: 34.5 G/DL (ref 31–37)
MCHC RBC AUTO-ENTMCNC: 34.5 G/DL (ref 31–37)
MCHC RBC AUTO-ENTMCNC: 34.6 G/DL (ref 31–37)
MCHC RBC AUTO-ENTMCNC: 34.8 G/DL (ref 31–37)
MCHC RBC AUTO-ENTMCNC: 34.8 G/DL (ref 31–37)
MCHC RBC AUTO-ENTMCNC: 34.9 G/DL (ref 31–37)
MCHC RBC AUTO-ENTMCNC: 34.9 G/DL (ref 31–37)
MCHC RBC AUTO-ENTMCNC: 35 G/DL (ref 31–37)
MCHC RBC AUTO-ENTMCNC: 35.5 G/DL (ref 31–37)
MCHC RBC AUTO-ENTMCNC: 35.5 G/DL (ref 31–37)
MCV RBC AUTO: 84.3 FL
MCV RBC AUTO: 84.8 FL
MCV RBC AUTO: 84.8 FL
MCV RBC AUTO: 84.9 FL
MCV RBC AUTO: 85.1 FL
MCV RBC AUTO: 85.2 FL
MCV RBC AUTO: 85.2 FL
MCV RBC AUTO: 85.4 FL
MCV RBC AUTO: 85.4 FL
MCV RBC AUTO: 85.7 FL
MCV RBC AUTO: 85.9 FL
MCV RBC AUTO: 86.1 FL
MCV RBC AUTO: 86.1 FL
MCV RBC AUTO: 86.4 FL
MCV RBC AUTO: 87 FL
MCV RBC AUTO: 87.3 FL
MCV RBC AUTO: 87.3 FL
MCV RBC AUTO: 87.5 FL
MCV RBC AUTO: 87.5 FL
MCV RBC AUTO: 88 FL
MCV RBC AUTO: 88.1 FL
MCV RBC AUTO: 88.2 FL
MCV RBC AUTO: 88.8 FL
MCV RBC AUTO: 88.9 FL
MCV RBC AUTO: 88.9 FL
MCV RBC AUTO: 89 FL
MCV RBC AUTO: 89.4 FL
MCV RBC AUTO: 89.6 FL
MCV RBC AUTO: 89.7 FL
MCV RBC AUTO: 90.1 FL
MCV RBC AUTO: 90.3 FL
MCV RBC AUTO: 90.4 FL
MCV RBC AUTO: 90.5 FL
MCV RBC AUTO: 90.7 FL
MCV RBC AUTO: 91 FL
MCV RBC AUTO: 91.2 FL
MCV RBC AUTO: 91.4 FL
MCV RBC AUTO: 91.8 FL
MCV RBC AUTO: 91.9 FL
METAMYELOCYTES # BLD: 0.17 X10(3) UL
METAMYELOCYTES # BLD: 0.33 X10(3) UL
METAMYELOCYTES NFR BLD: 2 %
METAMYELOCYTES NFR BLD: 3 %
METHGB MFR BLD: 0.6 % SAT (ref 0.4–1.5)
METHGB MFR BLD: 0.8 % SAT (ref 0.4–1.5)
METHGB MFR BLD: 0.9 % SAT (ref 0.4–1.5)
METHGB MFR BLD: 1 % SAT (ref 0.4–1.5)
METHGB MFR BLD: 1 % SAT (ref 0.4–1.5)
METHGB MFR BLD: 1.1 % SAT (ref 0.4–1.5)
METHGB MFR BLD: 1.2 % SAT (ref 0.4–1.5)
METHGB MFR BLD: 1.2 % SAT (ref 0.4–1.5)
METHGB MFR BLD: 1.3 % SAT (ref 0.4–1.5)
METHGB MFR BLD: 1.4 % SAT (ref 0.4–1.5)
METHGB MFR BLD: 1.5 % SAT (ref 0.4–1.5)
METHGB MFR BLD: 1.6 % SAT (ref 0.4–1.5)
METHGB MFR BLD: 1.7 % SAT (ref 0.4–1.5)
METHGB MFR BLD: 1.8 % SAT (ref 0.4–1.5)
METHGB MFR BLD: 1.9 % SAT (ref 0.4–1.5)
METHGB MFR BLD: 1.9 % SAT (ref 0.4–1.5)
METHGB MFR BLD: 2 % SAT (ref 0.4–1.5)
MODIFIED ALLEN TEST: POSITIVE
MODIFIED ALLEN TEST: POSITIVE
MONOCYTES # BLD AUTO: 0.48 X10(3) UL (ref 0.1–1)
MONOCYTES # BLD AUTO: 0.68 X10(3) UL (ref 0.1–1)
MONOCYTES # BLD AUTO: 0.71 X10(3) UL (ref 0.1–1)
MONOCYTES # BLD AUTO: 0.71 X10(3) UL (ref 0.1–1)
MONOCYTES # BLD AUTO: 0.81 X10(3) UL (ref 0.1–1)
MONOCYTES # BLD AUTO: 0.94 X10(3) UL (ref 0.1–1)
MONOCYTES # BLD AUTO: 1.05 X10(3) UL (ref 0.1–1)
MONOCYTES # BLD AUTO: 1.09 X10(3) UL (ref 0.1–1)
MONOCYTES # BLD AUTO: 1.12 X10(3) UL (ref 0.1–1)
MONOCYTES # BLD AUTO: 1.14 X10(3) UL (ref 0.1–1)
MONOCYTES # BLD AUTO: 1.21 X10(3) UL (ref 0.1–1)
MONOCYTES # BLD AUTO: 1.29 X10(3) UL (ref 0.1–1)
MONOCYTES # BLD AUTO: 1.32 X10(3) UL (ref 0.1–1)
MONOCYTES # BLD AUTO: 1.38 X10(3) UL (ref 0.1–1)
MONOCYTES # BLD AUTO: 1.6 X10(3) UL (ref 0.1–1)
MONOCYTES # BLD AUTO: 1.86 X10(3) UL (ref 0.1–1)
MONOCYTES # BLD: 0.2 X10(3) UL (ref 0.1–1)
MONOCYTES # BLD: 0.24 X10(3) UL (ref 0.1–1)
MONOCYTES # BLD: 0.51 X10(3) UL (ref 0.1–1)
MONOCYTES # BLD: 0.63 X10(3) UL (ref 0.1–1)
MONOCYTES # BLD: 0.66 X10(3) UL (ref 0.1–1)
MONOCYTES NFR BLD AUTO: 10.2 %
MONOCYTES NFR BLD AUTO: 10.9 %
MONOCYTES NFR BLD AUTO: 11.6 %
MONOCYTES NFR BLD AUTO: 11.6 %
MONOCYTES NFR BLD AUTO: 12 %
MONOCYTES NFR BLD AUTO: 12.5 %
MONOCYTES NFR BLD AUTO: 12.6 %
MONOCYTES NFR BLD AUTO: 13.2 %
MONOCYTES NFR BLD AUTO: 14.6 %
MONOCYTES NFR BLD AUTO: 15 %
MONOCYTES NFR BLD AUTO: 5.6 %
MONOCYTES NFR BLD AUTO: 6.4 %
MONOCYTES NFR BLD AUTO: 6.7 %
MONOCYTES NFR BLD AUTO: 7 %
MONOCYTES NFR BLD AUTO: 9.6 %
MONOCYTES NFR BLD AUTO: 9.8 %
MONOCYTES NFR BLD: 1 %
MONOCYTES NFR BLD: 1 %
MONOCYTES NFR BLD: 3 %
MONOCYTES NFR BLD: 6 %
MONOCYTES NFR BLD: 6 %
MORPHOLOGY: NORMAL
MYELOCYTES # BLD: 0.11 X10(3) UL
MYELOCYTES NFR BLD: 1 %
NEUTROPHILS # BLD AUTO: 10.99 X10 (3) UL (ref 1.5–7.7)
NEUTROPHILS # BLD AUTO: 10.99 X10(3) UL (ref 1.5–7.7)
NEUTROPHILS # BLD AUTO: 16.18 X10 (3) UL (ref 1.5–7.7)
NEUTROPHILS # BLD AUTO: 16.18 X10 (3) UL (ref 1.5–7.7)
NEUTROPHILS # BLD AUTO: 16.18 X10(3) UL (ref 1.5–7.7)
NEUTROPHILS # BLD AUTO: 17.07 X10 (3) UL (ref 1.5–7.7)
NEUTROPHILS # BLD AUTO: 18.68 X10 (3) UL (ref 1.5–7.7)
NEUTROPHILS # BLD AUTO: 20 X10 (3) UL (ref 1.5–7.7)
NEUTROPHILS # BLD AUTO: 20 X10(3) UL (ref 1.5–7.7)
NEUTROPHILS # BLD AUTO: 21.53 X10 (3) UL (ref 1.5–7.7)
NEUTROPHILS # BLD AUTO: 21.53 X10(3) UL (ref 1.5–7.7)
NEUTROPHILS # BLD AUTO: 3.93 X10 (3) UL (ref 1.5–7.7)
NEUTROPHILS # BLD AUTO: 3.93 X10(3) UL (ref 1.5–7.7)
NEUTROPHILS # BLD AUTO: 4.13 X10 (3) UL (ref 1.5–7.7)
NEUTROPHILS # BLD AUTO: 4.13 X10(3) UL (ref 1.5–7.7)
NEUTROPHILS # BLD AUTO: 4.3 X10 (3) UL (ref 1.5–7.7)
NEUTROPHILS # BLD AUTO: 4.3 X10(3) UL (ref 1.5–7.7)
NEUTROPHILS # BLD AUTO: 4.75 X10 (3) UL (ref 1.5–7.7)
NEUTROPHILS # BLD AUTO: 4.75 X10(3) UL (ref 1.5–7.7)
NEUTROPHILS # BLD AUTO: 5.72 X10 (3) UL (ref 1.5–7.7)
NEUTROPHILS # BLD AUTO: 5.72 X10(3) UL (ref 1.5–7.7)
NEUTROPHILS # BLD AUTO: 5.86 X10 (3) UL (ref 1.5–7.7)
NEUTROPHILS # BLD AUTO: 5.86 X10 (3) UL (ref 1.5–7.7)
NEUTROPHILS # BLD AUTO: 5.86 X10(3) UL (ref 1.5–7.7)
NEUTROPHILS # BLD AUTO: 5.86 X10(3) UL (ref 1.5–7.7)
NEUTROPHILS # BLD AUTO: 6.09 X10 (3) UL (ref 1.5–7.7)
NEUTROPHILS # BLD AUTO: 6.09 X10(3) UL (ref 1.5–7.7)
NEUTROPHILS # BLD AUTO: 6.14 X10 (3) UL (ref 1.5–7.7)
NEUTROPHILS # BLD AUTO: 6.14 X10(3) UL (ref 1.5–7.7)
NEUTROPHILS # BLD AUTO: 6.62 X10 (3) UL (ref 1.5–7.7)
NEUTROPHILS # BLD AUTO: 6.62 X10(3) UL (ref 1.5–7.7)
NEUTROPHILS # BLD AUTO: 6.88 X10 (3) UL (ref 1.5–7.7)
NEUTROPHILS # BLD AUTO: 7.53 X10 (3) UL (ref 1.5–7.7)
NEUTROPHILS # BLD AUTO: 7.53 X10(3) UL (ref 1.5–7.7)
NEUTROPHILS # BLD AUTO: 8.17 X10 (3) UL (ref 1.5–7.7)
NEUTROPHILS # BLD AUTO: 8.17 X10(3) UL (ref 1.5–7.7)
NEUTROPHILS # BLD AUTO: 9.11 X10 (3) UL (ref 1.5–7.7)
NEUTROPHILS NFR BLD AUTO: 60 %
NEUTROPHILS NFR BLD AUTO: 63.7 %
NEUTROPHILS NFR BLD AUTO: 65 %
NEUTROPHILS NFR BLD AUTO: 65.8 %
NEUTROPHILS NFR BLD AUTO: 69 %
NEUTROPHILS NFR BLD AUTO: 70.7 %
NEUTROPHILS NFR BLD AUTO: 72.3 %
NEUTROPHILS NFR BLD AUTO: 73.5 %
NEUTROPHILS NFR BLD AUTO: 73.7 %
NEUTROPHILS NFR BLD AUTO: 75.2 %
NEUTROPHILS NFR BLD AUTO: 77.2 %
NEUTROPHILS NFR BLD AUTO: 79.2 %
NEUTROPHILS NFR BLD AUTO: 79.3 %
NEUTROPHILS NFR BLD AUTO: 79.8 %
NEUTROPHILS NFR BLD AUTO: 81.8 %
NEUTROPHILS NFR BLD AUTO: 85.5 %
NEUTROPHILS NFR BLD: 59 %
NEUTROPHILS NFR BLD: 86 %
NEUTROPHILS NFR BLD: 87 %
NEUTROPHILS NFR BLD: 88 %
NEUTROPHILS NFR BLD: 90 %
NEUTS BAND NFR BLD: 1 %
NEUTS BAND NFR BLD: 1 %
NEUTS BAND NFR BLD: 2 %
NEUTS BAND NFR BLD: 21 %
NEUTS HYPERSEG # BLD: 17.86 X10(3) UL (ref 1.5–7.7)
NEUTS HYPERSEG # BLD: 18.99 X10(3) UL (ref 1.5–7.7)
NEUTS HYPERSEG # BLD: 21.09 X10(3) UL (ref 1.5–7.7)
NEUTS HYPERSEG # BLD: 6.8 X10(3) UL (ref 1.5–7.7)
NEUTS HYPERSEG # BLD: 9.68 X10(3) UL (ref 1.5–7.7)
NITRITE UR QL STRIP.AUTO: NEGATIVE
NONHDLC SERPL-MCNC: 123 MG/DL (ref ?–130)
NONHDLC SERPL-MCNC: 81 MG/DL (ref ?–130)
NONHDLC SERPL-MCNC: 89 MG/DL (ref ?–130)
O2 CT BLD-SCNC: 10.4 VOL% (ref 15–23)
O2 CT BLD-SCNC: 10.8 VOL% (ref 15–23)
O2 CT BLD-SCNC: 10.9 VOL% (ref 15–23)
O2 CT BLD-SCNC: 11.2 VOL% (ref 15–23)
O2 CT BLD-SCNC: 11.3 VOL% (ref 15–23)
O2 CT BLD-SCNC: 11.3 VOL% (ref 15–23)
O2 CT BLD-SCNC: 11.5 VOL% (ref 15–23)
O2 CT BLD-SCNC: 11.6 VOL% (ref 15–23)
O2 CT BLD-SCNC: 11.9 VOL% (ref 15–23)
O2 CT BLD-SCNC: 11.9 VOL% (ref 15–23)
O2 CT BLD-SCNC: 12.2 VOL% (ref 15–23)
O2 CT BLD-SCNC: 12.4 VOL% (ref 15–23)
O2 CT BLD-SCNC: 12.5 VOL% (ref 15–23)
O2 CT BLD-SCNC: 12.5 VOL% (ref 15–23)
O2 CT BLD-SCNC: 12.6 VOL% (ref 15–23)
O2 CT BLD-SCNC: 12.6 VOL% (ref 15–23)
O2 CT BLD-SCNC: 12.9 VOL% (ref 15–23)
O2 CT BLD-SCNC: 13 VOL% (ref 15–23)
O2 CT BLD-SCNC: 13.1 VOL% (ref 15–23)
O2 CT BLD-SCNC: 13.3 VOL% (ref 15–23)
O2 CT BLD-SCNC: 13.3 VOL% (ref 15–23)
O2 CT BLD-SCNC: 13.7 VOL% (ref 15–23)
O2 CT BLD-SCNC: 13.8 VOL% (ref 15–23)
O2 CT BLD-SCNC: 13.9 VOL% (ref 15–23)
O2 CT BLD-SCNC: 14 VOL% (ref 15–23)
O2 CT BLD-SCNC: 14.2 VOL% (ref 15–23)
O2 CT BLD-SCNC: 14.7 VOL% (ref 15–23)
O2 CT BLD-SCNC: 15 VOL% (ref 15–23)
O2 CT BLD-SCNC: 15.2 VOL% (ref 15–23)
O2 CT BLD-SCNC: 15.2 VOL% (ref 15–23)
O2 CT BLD-SCNC: 15.8 VOL% (ref 15–23)
O2 CT BLD-SCNC: 16.1 VOL% (ref 15–23)
O2 CT BLD-SCNC: 16.2 VOL% (ref 15–23)
O2 CT BLD-SCNC: 16.6 VOL% (ref 15–23)
O2 CT BLD-SCNC: 16.8 VOL% (ref 15–23)
O2 CT BLD-SCNC: 17.1 VOL% (ref 15–23)
O2 CT BLD-SCNC: 17.3 VOL% (ref 15–23)
O2 CT BLD-SCNC: 17.3 VOL% (ref 15–23)
O2 CT BLD-SCNC: 17.8 VOL% (ref 15–23)
O2 CT BLD-SCNC: 18.5 VOL% (ref 15–23)
O2 CT BLD-SCNC: 18.9 VOL% (ref 15–23)
O2 CT BLD-SCNC: 19 VOL% (ref 15–23)
O2 CT BLD-SCNC: 19.5 VOL% (ref 15–23)
O2 CT BLD-SCNC: 20.1 VOL% (ref 15–23)
O2 CT BLD-SCNC: 20.3 VOL% (ref 15–23)
O2/TOTAL GAS SETTING VFR VENT: 100 %
O2/TOTAL GAS SETTING VFR VENT: 30 %
O2/TOTAL GAS SETTING VFR VENT: 40 %
O2/TOTAL GAS SETTING VFR VENT: 50 %
OSMOLALITY SERPL CALC.SUM OF ELEC: 281 MOSM/KG (ref 275–295)
OSMOLALITY SERPL CALC.SUM OF ELEC: 282 MOSM/KG (ref 275–295)
OSMOLALITY SERPL CALC.SUM OF ELEC: 282 MOSM/KG (ref 275–295)
OSMOLALITY SERPL CALC.SUM OF ELEC: 283 MOSM/KG (ref 275–295)
OSMOLALITY SERPL CALC.SUM OF ELEC: 283 MOSM/KG (ref 275–295)
OSMOLALITY SERPL CALC.SUM OF ELEC: 284 MOSM/KG (ref 275–295)
OSMOLALITY SERPL CALC.SUM OF ELEC: 284 MOSM/KG (ref 275–295)
OSMOLALITY SERPL CALC.SUM OF ELEC: 286 MOSM/KG (ref 275–295)
OSMOLALITY SERPL CALC.SUM OF ELEC: 288 MOSM/KG (ref 275–295)
OSMOLALITY SERPL CALC.SUM OF ELEC: 289 MOSM/KG (ref 275–295)
OSMOLALITY SERPL CALC.SUM OF ELEC: 290 MOSM/KG (ref 275–295)
OSMOLALITY SERPL CALC.SUM OF ELEC: 291 MOSM/KG (ref 275–295)
OSMOLALITY SERPL CALC.SUM OF ELEC: 292 MOSM/KG (ref 275–295)
OSMOLALITY SERPL CALC.SUM OF ELEC: 293 MOSM/KG (ref 275–295)
OSMOLALITY SERPL CALC.SUM OF ELEC: 293 MOSM/KG (ref 275–295)
OSMOLALITY SERPL CALC.SUM OF ELEC: 294 MOSM/KG (ref 275–295)
OSMOLALITY SERPL CALC.SUM OF ELEC: 295 MOSM/KG (ref 275–295)
OSMOLALITY SERPL CALC.SUM OF ELEC: 296 MOSM/KG (ref 275–295)
OSMOLALITY SERPL CALC.SUM OF ELEC: 297 MOSM/KG (ref 275–295)
OSMOLALITY SERPL CALC.SUM OF ELEC: 298 MOSM/KG (ref 275–295)
OSMOLALITY SERPL CALC.SUM OF ELEC: 299 MOSM/KG (ref 275–295)
OSMOLALITY SERPL CALC.SUM OF ELEC: 300 MOSM/KG (ref 275–295)
OSMOLALITY SERPL CALC.SUM OF ELEC: 300 MOSM/KG (ref 275–295)
OSMOLALITY SERPL CALC.SUM OF ELEC: 301 MOSM/KG (ref 275–295)
OSMOLALITY SERPL CALC.SUM OF ELEC: 302 MOSM/KG (ref 275–295)
OSMOLALITY SERPL CALC.SUM OF ELEC: 303 MOSM/KG (ref 275–295)
OSMOLALITY SERPL CALC.SUM OF ELEC: 304 MOSM/KG (ref 275–295)
OSMOLALITY SERPL CALC.SUM OF ELEC: 305 MOSM/KG (ref 275–295)
OSMOLALITY SERPL CALC.SUM OF ELEC: 308 MOSM/KG (ref 275–295)
OXYGEN LITERS/MINUTE: 15 L/MIN
P AXIS: 57 DEGREES
P AXIS: 69 DEGREES
P AXIS: 96 DEGREES
P-R INTERVAL: 140 MS
P-R INTERVAL: 144 MS
P-R INTERVAL: 154 MS
PAPPENHEIMER BOD BLD QL SMEAR: PRESENT
PCO2 BLDA: 27 MM HG (ref 35–45)
PCO2 BLDA: 29 MM HG (ref 35–45)
PCO2 BLDA: 30 MM HG (ref 35–45)
PCO2 BLDA: 30 MM HG (ref 35–45)
PCO2 BLDA: 32 MM HG (ref 35–45)
PCO2 BLDA: 33 MM HG (ref 35–45)
PCO2 BLDA: 34 MM HG (ref 35–45)
PCO2 BLDA: 35 MM HG (ref 35–45)
PCO2 BLDA: 35.4 MMHG (ref 35–45)
PCO2 BLDA: 36 MM HG (ref 35–45)
PCO2 BLDA: 37.7 MMHG (ref 35–45)
PCO2 BLDA: 38 MM HG (ref 35–45)
PCO2 BLDA: 38.1 MMHG (ref 35–45)
PCO2 BLDA: 39 MM HG (ref 35–45)
PCO2 BLDA: 40 MM HG (ref 35–45)
PCO2 BLDA: 41 MM HG (ref 35–45)
PCO2 BLDA: 41.1 MMHG (ref 35–45)
PCO2 BLDA: 42 MM HG (ref 35–45)
PCO2 BLDA: 43 MM HG (ref 35–45)
PCO2 BLDA: 44 MM HG (ref 35–45)
PCO2 BLDA: 45 MM HG (ref 35–45)
PCO2 BLDA: 45.9 MMHG (ref 35–45)
PCO2 BLDA: 47.4 MMHG (ref 35–45)
PCO2 BLDA: 57 MM HG (ref 35–45)
PCO2 BLDA: 67 MM HG (ref 35–45)
PCO2 BLDV: 39 MM HG (ref 38–50)
PCO2 BLDV: 40 MM HG (ref 38–50)
PCO2 BLDV: 41 MM HG (ref 38–50)
PCO2 BLDV: 42 MM HG (ref 38–50)
PCO2 BLDV: 44 MM HG (ref 38–50)
PCO2 BLDV: 46 MM HG (ref 38–50)
PCO2 BLDV: 47 MM HG (ref 38–50)
PEEP SETTING VENT: 5 CM H2O
PH BLDA: 7.27 [PH] (ref 7.35–7.45)
PH BLDA: 7.28 [PH] (ref 7.35–7.45)
PH BLDA: 7.3 [PH] (ref 7.35–7.45)
PH BLDA: 7.3 [PH] (ref 7.35–7.45)
PH BLDA: 7.31 [PH] (ref 7.35–7.45)
PH BLDA: 7.32 [PH] (ref 7.35–7.45)
PH BLDA: 7.32 [PH] (ref 7.35–7.45)
PH BLDA: 7.33 [PH] (ref 7.35–7.45)
PH BLDA: 7.33 [PH] (ref 7.35–7.45)
PH BLDA: 7.34 [PH] (ref 7.35–7.45)
PH BLDA: 7.36 [PH] (ref 7.35–7.45)
PH BLDA: 7.37 [PH] (ref 7.35–7.45)
PH BLDA: 7.38 [PH] (ref 7.35–7.45)
PH BLDA: 7.4 [PH] (ref 7.35–7.45)
PH BLDA: 7.41 [PH] (ref 7.35–7.45)
PH BLDA: 7.42 [PH] (ref 7.35–7.45)
PH BLDA: 7.43 [PH] (ref 7.35–7.45)
PH BLDA: 7.43 [PH] (ref 7.35–7.45)
PH BLDA: 7.44 [PH] (ref 7.35–7.45)
PH BLDA: 7.45 [PH] (ref 7.35–7.45)
PH BLDA: 7.47 [PH] (ref 7.35–7.45)
PH BLDA: 7.47 [PH] (ref 7.35–7.45)
PH BLDA: 7.49 [PH] (ref 7.35–7.45)
PH BLDA: 7.5 [PH] (ref 7.35–7.45)
PH BLDA: 7.51 [PH] (ref 7.35–7.45)
PH BLDA: 7.53 [PH] (ref 7.35–7.45)
PH BLDV: 7.26 [PH] (ref 7.32–7.43)
PH BLDV: 7.28 [PH] (ref 7.32–7.43)
PH BLDV: 7.29 [PH] (ref 7.32–7.43)
PH BLDV: 7.32 [PH] (ref 7.32–7.43)
PH BLDV: 7.34 [PH] (ref 7.32–7.43)
PH BLDV: 7.36 [PH] (ref 7.32–7.43)
PH BLDV: 7.37 [PH] (ref 7.32–7.43)
PH BLDV: 7.38 [PH] (ref 7.32–7.43)
PH BLDV: 7.38 [PH] (ref 7.32–7.43)
PH BLDV: 7.39 [PH] (ref 7.32–7.43)
PH BLDV: 7.41 [PH] (ref 7.32–7.43)
PH BLDV: 7.44 [PH] (ref 7.32–7.43)
PH BLDV: 7.45 [PH] (ref 7.32–7.43)
PH UR: 6 [PH] (ref 5–8)
PHOSPHATE SERPL-MCNC: 2 MG/DL (ref 2.4–5.1)
PHOSPHATE SERPL-MCNC: 2.4 MG/DL (ref 2.4–5.1)
PHOSPHATE SERPL-MCNC: 2.4 MG/DL (ref 2.4–5.1)
PHOSPHATE SERPL-MCNC: 2.5 MG/DL (ref 2.4–5.1)
PHOSPHATE SERPL-MCNC: 2.5 MG/DL (ref 2.4–5.1)
PHOSPHATE SERPL-MCNC: 2.7 MG/DL (ref 2.4–5.1)
PHOSPHATE SERPL-MCNC: 2.8 MG/DL (ref 2.4–5.1)
PHOSPHATE SERPL-MCNC: 2.9 MG/DL (ref 2.4–5.1)
PHOSPHATE SERPL-MCNC: 3 MG/DL (ref 2.4–5.1)
PHOSPHATE SERPL-MCNC: 3.1 MG/DL (ref 2.4–5.1)
PHOSPHATE SERPL-MCNC: 3.2 MG/DL (ref 2.4–5.1)
PHOSPHATE SERPL-MCNC: 3.2 MG/DL (ref 2.4–5.1)
PHOSPHATE SERPL-MCNC: 3.3 MG/DL (ref 2.4–5.1)
PHOSPHATE SERPL-MCNC: 3.4 MG/DL (ref 2.4–5.1)
PHOSPHATE SERPL-MCNC: 3.5 MG/DL (ref 2.4–5.1)
PHOSPHATE SERPL-MCNC: 3.6 MG/DL (ref 2.4–5.1)
PHOSPHATE SERPL-MCNC: 3.7 MG/DL (ref 2.4–5.1)
PHOSPHATE SERPL-MCNC: 3.8 MG/DL (ref 2.4–5.1)
PHOSPHATE SERPL-MCNC: 3.8 MG/DL (ref 2.4–5.1)
PHOSPHATE SERPL-MCNC: 3.9 MG/DL (ref 2.4–5.1)
PHOSPHATE SERPL-MCNC: 4.1 MG/DL (ref 2.4–5.1)
PHOSPHATE SERPL-MCNC: 4.3 MG/DL (ref 2.4–5.1)
PHOSPHATE SERPL-MCNC: 4.4 MG/DL (ref 2.4–5.1)
PHOSPHATE SERPL-MCNC: 4.5 MG/DL (ref 2.4–5.1)
PHOSPHATE SERPL-MCNC: 4.7 MG/DL (ref 2.4–5.1)
PHOSPHATE SERPL-MCNC: 4.7 MG/DL (ref 2.4–5.1)
PHOSPHATE SERPL-MCNC: 4.8 MG/DL (ref 2.4–5.1)
PHOSPHATE SERPL-MCNC: 4.9 MG/DL (ref 2.4–5.1)
PHOSPHATE SERPL-MCNC: 5.6 MG/DL (ref 2.4–5.1)
PLATELET # BLD AUTO: 100 10(3)UL (ref 150–450)
PLATELET # BLD AUTO: 105 10(3)UL (ref 150–450)
PLATELET # BLD AUTO: 105 10(3)UL (ref 150–450)
PLATELET # BLD AUTO: 107 10(3)UL (ref 150–450)
PLATELET # BLD AUTO: 112 10(3)UL (ref 150–450)
PLATELET # BLD AUTO: 119 10(3)UL (ref 150–450)
PLATELET # BLD AUTO: 121 10(3)UL (ref 150–450)
PLATELET # BLD AUTO: 124 10(3)UL (ref 150–450)
PLATELET # BLD AUTO: 124 10(3)UL (ref 150–450)
PLATELET # BLD AUTO: 126 10(3)UL (ref 150–450)
PLATELET # BLD AUTO: 128 10(3)UL (ref 150–450)
PLATELET # BLD AUTO: 131 10(3)UL (ref 150–450)
PLATELET # BLD AUTO: 135 10(3)UL (ref 150–450)
PLATELET # BLD AUTO: 137 10(3)UL (ref 150–450)
PLATELET # BLD AUTO: 141 10(3)UL (ref 150–450)
PLATELET # BLD AUTO: 142 10(3)UL (ref 150–450)
PLATELET # BLD AUTO: 142 10(3)UL (ref 150–450)
PLATELET # BLD AUTO: 146 10(3)UL (ref 150–450)
PLATELET # BLD AUTO: 147 10(3)UL (ref 150–450)
PLATELET # BLD AUTO: 148 10(3)UL (ref 150–450)
PLATELET # BLD AUTO: 149 10(3)UL (ref 150–450)
PLATELET # BLD AUTO: 150 10(3)UL (ref 150–450)
PLATELET # BLD AUTO: 152 10(3)UL (ref 150–450)
PLATELET # BLD AUTO: 152 10(3)UL (ref 150–450)
PLATELET # BLD AUTO: 154 10(3)UL (ref 150–450)
PLATELET # BLD AUTO: 155 10(3)UL (ref 150–450)
PLATELET # BLD AUTO: 157 10(3)UL (ref 150–450)
PLATELET # BLD AUTO: 163 10(3)UL (ref 150–450)
PLATELET # BLD AUTO: 164 10(3)UL (ref 150–450)
PLATELET # BLD AUTO: 167 10(3)UL (ref 150–450)
PLATELET # BLD AUTO: 170 10(3)UL (ref 150–450)
PLATELET # BLD AUTO: 170 10(3)UL (ref 150–450)
PLATELET # BLD AUTO: 175 10(3)UL (ref 150–450)
PLATELET # BLD AUTO: 176 10(3)UL (ref 150–450)
PLATELET # BLD AUTO: 178 10(3)UL (ref 150–450)
PLATELET # BLD AUTO: 190 10(3)UL (ref 150–450)
PLATELET # BLD AUTO: 45 10(3)UL (ref 150–450)
PLATELET # BLD AUTO: 53 10(3)UL (ref 150–450)
PLATELET # BLD AUTO: 57 10(3)UL (ref 150–450)
PLATELET # BLD AUTO: 60 10(3)UL (ref 150–450)
PLATELET # BLD AUTO: 64 10(3)UL (ref 150–450)
PLATELET # BLD AUTO: 71 10(3)UL (ref 150–450)
PLATELET # BLD AUTO: 79 10(3)UL (ref 150–450)
PLATELET # BLD AUTO: 79 10(3)UL (ref 150–450)
PLATELET # BLD AUTO: 90 10(3)UL (ref 150–450)
PLATELET # BLD AUTO: 96 10(3)UL (ref 150–450)
PLATELET MORPHOLOGY: NORMAL
PLATELETS.RETICULATED NFR BLD AUTO: 10 % (ref 0–7)
PLATELETS.RETICULATED NFR BLD AUTO: 10 % (ref 0–7)
PLATELETS.RETICULATED NFR BLD AUTO: 10.4 % (ref 0–7)
PLATELETS.RETICULATED NFR BLD AUTO: 10.5 % (ref 0–7)
PLATELETS.RETICULATED NFR BLD AUTO: 10.8 % (ref 0–7)
PLATELETS.RETICULATED NFR BLD AUTO: 11 % (ref 0–7)
PLATELETS.RETICULATED NFR BLD AUTO: 11.4 % (ref 0–7)
PLATELETS.RETICULATED NFR BLD AUTO: 11.4 % (ref 0–7)
PLATELETS.RETICULATED NFR BLD AUTO: 12.3 % (ref 0–7)
PLATELETS.RETICULATED NFR BLD AUTO: 12.4 % (ref 0–7)
PLATELETS.RETICULATED NFR BLD AUTO: 13.4 % (ref 0–7)
PLATELETS.RETICULATED NFR BLD AUTO: 13.7 % (ref 0–7)
PLATELETS.RETICULATED NFR BLD AUTO: 14.2 % (ref 0–7)
PLATELETS.RETICULATED NFR BLD AUTO: 14.2 % (ref 0–7)
PLATELETS.RETICULATED NFR BLD AUTO: 8.6 % (ref 0–7)
PO2 BLDA: 100 MM HG (ref 80–100)
PO2 BLDA: 102 MM HG (ref 80–100)
PO2 BLDA: 102 MM HG (ref 80–100)
PO2 BLDA: 104 MM HG (ref 80–100)
PO2 BLDA: 104 MM HG (ref 80–100)
PO2 BLDA: 107 MM HG (ref 80–100)
PO2 BLDA: 108 MM HG (ref 80–100)
PO2 BLDA: 108 MM HG (ref 80–100)
PO2 BLDA: 114 MM HG (ref 80–100)
PO2 BLDA: 116 MM HG (ref 80–100)
PO2 BLDA: 118 MM HG (ref 80–100)
PO2 BLDA: 119 MM HG (ref 80–100)
PO2 BLDA: 119 MM HG (ref 80–100)
PO2 BLDA: 120 MM HG (ref 80–100)
PO2 BLDA: 123 MM HG (ref 80–100)
PO2 BLDA: 125 MM HG (ref 80–100)
PO2 BLDA: 128 MM HG (ref 80–100)
PO2 BLDA: 129 MM HG (ref 80–100)
PO2 BLDA: 130 MM HG (ref 80–100)
PO2 BLDA: 133 MM HG (ref 80–100)
PO2 BLDA: 136 MM HG (ref 80–100)
PO2 BLDA: 136 MM HG (ref 80–100)
PO2 BLDA: 137 MM HG (ref 80–100)
PO2 BLDA: 138 MM HG (ref 80–100)
PO2 BLDA: 139 MM HG (ref 80–100)
PO2 BLDA: 141 MM HG (ref 80–100)
PO2 BLDA: 162 MMHG (ref 80–105)
PO2 BLDA: 170 MM HG (ref 80–100)
PO2 BLDA: 204 MMHG (ref 80–105)
PO2 BLDA: 207 MM HG (ref 80–100)
PO2 BLDA: 234 MM HG (ref 80–100)
PO2 BLDA: 251 MMHG (ref 80–105)
PO2 BLDA: 306 MMHG (ref 80–105)
PO2 BLDA: 309 MM HG (ref 80–100)
PO2 BLDA: 377 MM HG (ref 80–100)
PO2 BLDA: 417 MM HG (ref 80–100)
PO2 BLDA: 418 MM HG (ref 80–100)
PO2 BLDA: 465 MM HG (ref 80–100)
PO2 BLDA: 502 MM HG (ref 80–100)
PO2 BLDA: 60 MM HG (ref 80–100)
PO2 BLDA: 61 MM HG (ref 80–100)
PO2 BLDA: 66 MM HG (ref 80–100)
PO2 BLDA: 66 MM HG (ref 80–100)
PO2 BLDA: 71 MM HG (ref 80–100)
PO2 BLDA: 72 MM HG (ref 80–100)
PO2 BLDA: 73 MM HG (ref 80–100)
PO2 BLDA: 73 MM HG (ref 80–100)
PO2 BLDA: 80 MMHG (ref 80–105)
PO2 BLDA: 81 MM HG (ref 80–100)
PO2 BLDA: 85 MM HG (ref 80–100)
PO2 BLDA: 89 MM HG (ref 80–100)
PO2 BLDA: 89 MM HG (ref 80–100)
PO2 BLDA: 91 MM HG (ref 80–100)
PO2 BLDA: 91 MMHG (ref 80–105)
PO2 BLDA: 92 MM HG (ref 80–100)
PO2 BLDA: 92 MM HG (ref 80–100)
PO2 BLDA: 93 MM HG (ref 80–100)
PO2 BLDA: 95 MM HG (ref 80–100)
PO2 BLDV: 31 MM HG (ref 35–40)
PO2 BLDV: 32 MM HG (ref 35–40)
PO2 BLDV: 33 MM HG (ref 35–40)
PO2 BLDV: 34 MM HG (ref 35–40)
PO2 BLDV: 35 MM HG (ref 35–40)
PO2 BLDV: 35 MM HG (ref 35–40)
PO2 BLDV: 36 MM HG (ref 35–40)
PO2 BLDV: 36 MM HG (ref 35–40)
PO2 BLDV: 38 MM HG (ref 35–40)
PO2 BLDV: 38 MM HG (ref 35–40)
PO2 BLDV: 39 MM HG (ref 35–40)
PO2 BLDV: 42 MM HG (ref 35–40)
PO2 BLDV: 42 MM HG (ref 35–40)
POTASSIUM BLD-SCNC: 2.7 MMOL/L (ref 3.6–5.1)
POTASSIUM BLD-SCNC: 3.4 MMOL/L (ref 3.6–5.1)
POTASSIUM BLD-SCNC: 3.5 MMOL/L (ref 3.6–5.1)
POTASSIUM BLD-SCNC: 3.5 MMOL/L (ref 3.6–5.1)
POTASSIUM BLD-SCNC: 3.6 MMOL/L (ref 3.6–5.1)
POTASSIUM BLD-SCNC: 3.7 MMOL/L (ref 3.6–5.1)
POTASSIUM BLD-SCNC: 3.7 MMOL/L (ref 3.6–5.1)
POTASSIUM BLD-SCNC: 3.8 MMOL/L (ref 3.6–5.1)
POTASSIUM BLD-SCNC: 3.8 MMOL/L (ref 3.6–5.1)
POTASSIUM BLD-SCNC: 3.9 MMOL/L (ref 3.6–5.1)
POTASSIUM BLD-SCNC: 4.1 MMOL/L (ref 3.6–5.1)
POTASSIUM BLD-SCNC: 4.2 MMOL/L (ref 3.6–5.1)
POTASSIUM BLD-SCNC: 4.3 MMOL/L (ref 3.6–5.1)
POTASSIUM BLD-SCNC: 4.4 MMOL/L (ref 3.6–5.1)
POTASSIUM BLD-SCNC: 4.5 MMOL/L (ref 3.6–5.1)
POTASSIUM BLD-SCNC: 4.5 MMOL/L (ref 3.6–5.1)
POTASSIUM BLD-SCNC: 4.6 MMOL/L (ref 3.6–5.1)
POTASSIUM BLD-SCNC: 4.6 MMOL/L (ref 3.6–5.1)
POTASSIUM BLD-SCNC: 4.7 MMOL/L (ref 3.6–5.1)
POTASSIUM BLD-SCNC: 4.9 MMOL/L (ref 3.6–5.1)
POTASSIUM BLD-SCNC: 4.9 MMOL/L (ref 3.6–5.1)
POTASSIUM BLD-SCNC: 5.1 MMOL/L (ref 3.6–5.1)
POTASSIUM BLD-SCNC: 5.2 MMOL/L (ref 3.6–5.1)
POTASSIUM BLD-SCNC: 5.2 MMOL/L (ref 3.6–5.1)
POTASSIUM BLD-SCNC: 5.5 MMOL/L (ref 3.6–5.1)
POTASSIUM BLD-SCNC: 5.8 MMOL/L (ref 3.6–5.1)
POTASSIUM BLD-SCNC: 5.8 MMOL/L (ref 3.6–5.1)
POTASSIUM SERPL-SCNC: 3.3 MMOL/L (ref 3.5–5.1)
POTASSIUM SERPL-SCNC: 3.5 MMOL/L (ref 3.5–5.1)
POTASSIUM SERPL-SCNC: 3.6 MMOL/L (ref 3.5–5.1)
POTASSIUM SERPL-SCNC: 3.7 MMOL/L (ref 3.5–5.1)
POTASSIUM SERPL-SCNC: 3.8 MMOL/L (ref 3.5–5.1)
POTASSIUM SERPL-SCNC: 3.9 MMOL/L (ref 3.5–5.1)
POTASSIUM SERPL-SCNC: 4 MMOL/L (ref 3.5–5.1)
POTASSIUM SERPL-SCNC: 4.1 MMOL/L (ref 3.5–5.1)
POTASSIUM SERPL-SCNC: 4.3 MMOL/L (ref 3.5–5.1)
POTASSIUM SERPL-SCNC: 4.4 MMOL/L (ref 3.5–5.1)
POTASSIUM SERPL-SCNC: 4.5 MMOL/L (ref 3.5–5.1)
POTASSIUM SERPL-SCNC: 4.6 MMOL/L (ref 3.5–5.1)
POTASSIUM SERPL-SCNC: 4.7 MMOL/L (ref 3.5–5.1)
POTASSIUM SERPL-SCNC: 4.8 MMOL/L (ref 3.5–5.1)
POTASSIUM SERPL-SCNC: 4.9 MMOL/L (ref 3.5–5.1)
POTASSIUM SERPL-SCNC: 4.9 MMOL/L (ref 3.5–5.1)
POTASSIUM SERPL-SCNC: 5 MMOL/L (ref 3.5–5.1)
POTASSIUM SERPL-SCNC: 5.1 MMOL/L (ref 3.5–5.1)
POTASSIUM SERPL-SCNC: 5.2 MMOL/L (ref 3.5–5.1)
POTASSIUM SERPL-SCNC: 5.3 MMOL/L (ref 3.5–5.1)
POTASSIUM SERPL-SCNC: 5.4 MMOL/L (ref 3.5–5.1)
PRESSURE SUPPORT SETTING VENT: 8 CM H2O
PROT SERPL-MCNC: 4.6 G/DL (ref 5.7–8.2)
PROT SERPL-MCNC: 5 G/DL (ref 5.7–8.2)
PROT SERPL-MCNC: 5 G/DL (ref 5.7–8.2)
PROT SERPL-MCNC: 5.1 G/DL (ref 5.7–8.2)
PROT SERPL-MCNC: 5.2 G/DL (ref 5.7–8.2)
PROT SERPL-MCNC: 5.3 G/DL (ref 5.7–8.2)
PROT SERPL-MCNC: 5.3 G/DL (ref 5.7–8.2)
PROT SERPL-MCNC: 5.5 G/DL (ref 5.7–8.2)
PROT SERPL-MCNC: 5.6 G/DL (ref 5.7–8.2)
PROT SERPL-MCNC: 5.7 G/DL (ref 5.7–8.2)
PROT SERPL-MCNC: 6.2 G/DL (ref 5.7–8.2)
PROT SERPL-MCNC: 6.5 G/DL (ref 5.7–8.2)
PROT SERPL-MCNC: 6.5 G/DL (ref 5.7–8.2)
PROT UR-MCNC: 100 MG/DL
PROT UR-MCNC: 215.2 MG/DL (ref ?–14)
PROTHROMBIN TIME: 14.4 SECONDS (ref 11.6–14.8)
PROTHROMBIN TIME: 14.5 SECONDS (ref 11.6–14.8)
PROTHROMBIN TIME: 14.8 SECONDS (ref 11.6–14.8)
PROTHROMBIN TIME: 16.1 SECONDS (ref 11.6–14.8)
PROTHROMBIN TIME: 16.2 SECONDS (ref 11.6–14.8)
PROTHROMBIN TIME: 16.3 SECONDS (ref 11.6–14.8)
PROTHROMBIN TIME: 16.3 SECONDS (ref 11.6–14.8)
PROTHROMBIN TIME: 16.4 SECONDS (ref 11.6–14.8)
PROTHROMBIN TIME: 16.5 SECONDS (ref 11.6–14.8)
PROTHROMBIN TIME: 16.5 SECONDS (ref 11.6–14.8)
PROTHROMBIN TIME: 16.7 SECONDS (ref 11.6–14.8)
PROTHROMBIN TIME: 17.1 SECONDS (ref 11.6–14.8)
PROTHROMBIN TIME: 17.3 SECONDS (ref 11.6–14.8)
PROTHROMBIN TIME: 17.7 SECONDS (ref 11.6–14.8)
PROTHROMBIN TIME: 17.7 SECONDS (ref 11.6–14.8)
PROTHROMBIN TIME: 23.9 SECONDS (ref 11.6–14.8)
PUNCTURE CHARGE: NO
PUNCTURE CHARGE: YES
Q-T INTERVAL: 338 MS
Q-T INTERVAL: 370 MS
Q-T INTERVAL: 370 MS
Q-T INTERVAL: 436 MS
Q-T INTERVAL: 448 MS
QRS DURATION: 108 MS
QRS DURATION: 114 MS
QRS DURATION: 118 MS
QRS DURATION: 124 MS
QRS DURATION: 86 MS
QTC CALCULATION (BEZET): 447 MS
QTC CALCULATION (BEZET): 450 MS
QTC CALCULATION (BEZET): 450 MS
QTC CALCULATION (BEZET): 457 MS
QTC CALCULATION (BEZET): 506 MS
R AXIS: 114 DEGREES
R AXIS: 23 DEGREES
R AXIS: 75 DEGREES
R AXIS: 83 DEGREES
R AXIS: 95 DEGREES
RBC # BLD AUTO: 2.32 X10(6)UL
RBC # BLD AUTO: 2.43 X10(6)UL
RBC # BLD AUTO: 2.44 X10(6)UL
RBC # BLD AUTO: 2.45 X10(6)UL
RBC # BLD AUTO: 2.46 X10(6)UL
RBC # BLD AUTO: 2.46 X10(6)UL
RBC # BLD AUTO: 2.49 X10(6)UL
RBC # BLD AUTO: 2.5 X10(6)UL
RBC # BLD AUTO: 2.55 X10(6)UL
RBC # BLD AUTO: 2.55 X10(6)UL
RBC # BLD AUTO: 2.57 X10(6)UL
RBC # BLD AUTO: 2.59 X10(6)UL
RBC # BLD AUTO: 2.61 X10(6)UL
RBC # BLD AUTO: 2.64 X10(6)UL
RBC # BLD AUTO: 2.66 X10(6)UL
RBC # BLD AUTO: 2.7 X10(6)UL
RBC # BLD AUTO: 2.71 X10(6)UL
RBC # BLD AUTO: 2.73 X10(6)UL
RBC # BLD AUTO: 2.74 X10(6)UL
RBC # BLD AUTO: 2.77 X10(6)UL
RBC # BLD AUTO: 2.8 X10(6)UL
RBC # BLD AUTO: 2.8 X10(6)UL
RBC # BLD AUTO: 2.81 X10(6)UL
RBC # BLD AUTO: 2.84 X10(6)UL
RBC # BLD AUTO: 2.85 X10(6)UL
RBC # BLD AUTO: 2.86 X10(6)UL
RBC # BLD AUTO: 2.87 X10(6)UL
RBC # BLD AUTO: 2.88 X10(6)UL
RBC # BLD AUTO: 2.88 X10(6)UL
RBC # BLD AUTO: 2.93 X10(6)UL
RBC # BLD AUTO: 2.96 X10(6)UL
RBC # BLD AUTO: 2.96 X10(6)UL
RBC # BLD AUTO: 3.08 X10(6)UL
RBC # BLD AUTO: 3.08 X10(6)UL
RBC # BLD AUTO: 3.11 X10(6)UL
RBC # BLD AUTO: 3.25 X10(6)UL
RBC # BLD AUTO: 3.26 X10(6)UL
RBC # BLD AUTO: 3.3 X10(6)UL
RBC # BLD AUTO: 3.44 X10(6)UL
RBC # BLD AUTO: 4.36 X10(6)UL
RBC # BLD AUTO: 4.85 X10(6)UL
RESP RATE: 10 BPM
RESP RATE: 12 BPM
RESP RATE: 14 BPM
RESP RATE: 15 BPM
RESP RATE: 19 BPM
RETICS # AUTO: 90.6 X10(3) UL (ref 22.5–147.5)
RETICS/RBC NFR AUTO: 3.6 %
RH BLOOD TYPE: POSITIVE
SAO2 % BLDA: 100 % (ref 92–100)
SAO2 % BLDA: 95 % (ref 92–100)
SAO2 % BLDA: 95.9 % (ref 94–100)
SAO2 % BLDA: 96.4 % (ref 94–100)
SAO2 % BLDA: 96.5 % (ref 94–100)
SAO2 % BLDA: 96.6 % (ref 94–100)
SAO2 % BLDA: 97 % (ref 92–100)
SAO2 % BLDA: 97.9 % (ref 94–100)
SAO2 % BLDA: 98 % (ref 94–100)
SAO2 % BLDA: 98.2 % (ref 94–100)
SAO2 % BLDA: 98.4 % (ref 94–100)
SAO2 % BLDA: 98.4 % (ref 94–100)
SAO2 % BLDA: 98.6 % (ref 94–100)
SAO2 % BLDA: 98.7 % (ref 94–100)
SAO2 % BLDA: 98.8 % (ref 94–100)
SAO2 % BLDA: 98.9 % (ref 94–100)
SAO2 % BLDA: 99 % (ref 92–100)
SAO2 % BLDA: >99 % (ref 94–100)
SAO2 % BLDV: 62.1 % (ref 60–85)
SAO2 % BLDV: 65.6 % (ref 60–85)
SAO2 % BLDV: 66.1 % (ref 60–85)
SAO2 % BLDV: 67 % (ref 60–85)
SAO2 % BLDV: 67.6 % (ref 60–85)
SAO2 % BLDV: 68.2 % (ref 60–85)
SAO2 % BLDV: 69.4 % (ref 60–85)
SAO2 % BLDV: 69.5 % (ref 60–85)
SAO2 % BLDV: 70.8 % (ref 60–85)
SAO2 % BLDV: 73.8 % (ref 60–85)
SAO2 % BLDV: 76.4 % (ref 60–85)
SAO2 % BLDV: 77 % (ref 60–85)
SAO2 % BLDV: 78.4 % (ref 60–85)
SAO2 % BLDV: 79.9 % (ref 60–85)
SAO2 % BLDV: 81.1 % (ref 60–85)
SARS-COV-2 RNA RESP QL NAA+PROBE: NOT DETECTED
SODIUM BLD-SCNC: 130 MMOL/L (ref 135–145)
SODIUM BLD-SCNC: 130 MMOL/L (ref 135–145)
SODIUM BLD-SCNC: 131 MMOL/L (ref 135–145)
SODIUM BLD-SCNC: 132 MMOL/L (ref 135–145)
SODIUM BLD-SCNC: 133 MMOL/L (ref 135–145)
SODIUM BLD-SCNC: 134 MMOL/L (ref 135–145)
SODIUM BLD-SCNC: 135 MMOL/L (ref 135–145)
SODIUM BLD-SCNC: 136 MMOL/L (ref 135–145)
SODIUM BLD-SCNC: 136 MMOL/L (ref 135–145)
SODIUM BLD-SCNC: 137 MMOL/L (ref 135–145)
SODIUM BLD-SCNC: 140 MMOL/L (ref 135–145)
SODIUM BLD-SCNC: 141 MMOL/L (ref 135–145)
SODIUM BLDA-SCNC: 131 MMOL/L (ref 136–145)
SODIUM BLDA-SCNC: 132 MMOL/L (ref 136–145)
SODIUM BLDA-SCNC: 133 MMOL/L (ref 136–145)
SODIUM BLDA-SCNC: 134 MMOL/L (ref 136–145)
SODIUM BLDA-SCNC: 136 MMOL/L (ref 136–145)
SODIUM BLDA-SCNC: 136 MMOL/L (ref 136–145)
SODIUM BLDA-SCNC: 4.2 MMOL/L (ref 3.6–5.1)
SODIUM BLDA-SCNC: 4.4 MMOL/L (ref 3.6–5.1)
SODIUM BLDA-SCNC: 5 MMOL/L (ref 3.6–5.1)
SODIUM BLDA-SCNC: 5.6 MMOL/L (ref 3.6–5.1)
SODIUM BLDA-SCNC: 6.3 MMOL/L (ref 3.6–5.1)
SODIUM BLDA-SCNC: 6.5 MMOL/L (ref 3.6–5.1)
SODIUM SERPL-SCNC: 133 MMOL/L (ref 136–145)
SODIUM SERPL-SCNC: 134 MMOL/L (ref 136–145)
SODIUM SERPL-SCNC: 135 MMOL/L (ref 136–145)
SODIUM SERPL-SCNC: 136 MMOL/L (ref 136–145)
SODIUM SERPL-SCNC: 137 MMOL/L (ref 136–145)
SODIUM SERPL-SCNC: 138 MMOL/L (ref 136–145)
SODIUM SERPL-SCNC: 139 MMOL/L (ref 136–145)
SODIUM SERPL-SCNC: 140 MMOL/L (ref 136–145)
SODIUM SERPL-SCNC: 91 MMOL/L
SP GR UR STRIP: >1.03 (ref 1–1.03)
SPECIMEN VOL 24H UR: 400 ML
SPECIMEN VOL 24H UR: 500 ML
SPECIMEN VOL 24H UR: 520 ML
SPECIMEN VOL 24H UR: 530 ML
SPECIMEN VOL 24H UR: 550 ML
SPECIMEN VOL 24H UR: 600 ML
SPECIMEN VOL 24H UR: 600 ML
SPECIMEN VOL 24H UR: 650 ML
SPECIMEN VOL 24H UR: 750 ML
T AXIS: -25 DEGREES
T AXIS: 14 DEGREES
T AXIS: 167 DEGREES
T AXIS: 44 DEGREES
T AXIS: 83 DEGREES
T4 SERPL-MCNC: 4.6 UG/DL
TIBC SERPL-MCNC: 191 UG/DL (ref 250–425)
TIBC SERPL-MCNC: 192 UG/DL (ref 250–425)
TOTAL CELLS COUNTED BLD: 100
TRANSFERRIN SERPL-MCNC: 128 MG/DL (ref 215–365)
TRANSFERRIN SERPL-MCNC: 129 MG/DL (ref 215–365)
TRIGL SERPL-MCNC: 163 MG/DL (ref 30–149)
TRIGL SERPL-MCNC: 181 MG/DL (ref 30–149)
TRIGL SERPL-MCNC: 233 MG/DL (ref 30–149)
TRIGL SERPL-MCNC: 253 MG/DL (ref 30–149)
TRIGL SERPL-MCNC: 261 MG/DL (ref 30–149)
TRIGL SERPL-MCNC: 82 MG/DL (ref 30–149)
TRIGL SERPL-MCNC: 92 MG/DL (ref 30–149)
TRIGL SERPL-MCNC: 98 MG/DL (ref 30–149)
TROPONIN I SERPL HS-MCNC: 116 NG/L
TROPONIN I SERPL HS-MCNC: 121 NG/L
TROPONIN I SERPL HS-MCNC: 219 NG/L
TROPONIN I SERPL HS-MCNC: ABNORMAL NG/L
UNIT VOLUME: 102 ML
UNIT VOLUME: 185 ML
UNIT VOLUME: 203 ML
UNIT VOLUME: 206 ML
UNIT VOLUME: 206 ML
UNIT VOLUME: 284 ML
UNIT VOLUME: 284 ML
UNIT VOLUME: 312 ML
UNIT VOLUME: 318 ML
UNIT VOLUME: 322 ML
UNIT VOLUME: 350 ML
URATE SERPL-MCNC: 3.3 MG/DL
UROBILINOGEN UR STRIP-ACNC: NORMAL
VENTRICULAR RATE: 110 BPM
VENTRICULAR RATE: 61 BPM
VENTRICULAR RATE: 81 BPM
VENTRICULAR RATE: 88 BPM
VENTRICULAR RATE: 89 BPM
VLDLC SERPL CALC-MCNC: 14 MG/DL (ref 0–30)
VLDLC SERPL CALC-MCNC: 24 MG/DL (ref 0–30)
VLDLC SERPL CALC-MCNC: 41 MG/DL (ref 0–30)
WBC # BLD AUTO: 10 X10(3) UL (ref 4–11)
WBC # BLD AUTO: 10.3 X10(3) UL (ref 4–11)
WBC # BLD AUTO: 10.4 X10(3) UL (ref 4–11)
WBC # BLD AUTO: 11 X10(3) UL (ref 4–11)
WBC # BLD AUTO: 13 X10(3) UL (ref 4–11)
WBC # BLD AUTO: 13.4 X10(3) UL (ref 4–11)
WBC # BLD AUTO: 14.1 X10(3) UL (ref 4–11)
WBC # BLD AUTO: 15.1 X10(3) UL (ref 4–11)
WBC # BLD AUTO: 15.6 X10(3) UL (ref 4–11)
WBC # BLD AUTO: 16.1 X10(3) UL (ref 4–11)
WBC # BLD AUTO: 16.6 X10(3) UL (ref 4–11)
WBC # BLD AUTO: 16.7 X10(3) UL (ref 4–11)
WBC # BLD AUTO: 16.8 X10(3) UL (ref 4–11)
WBC # BLD AUTO: 17.1 X10(3) UL (ref 4–11)
WBC # BLD AUTO: 17.4 X10(3) UL (ref 4–11)
WBC # BLD AUTO: 17.5 X10(3) UL (ref 4–11)
WBC # BLD AUTO: 17.8 X10(3) UL (ref 4–11)
WBC # BLD AUTO: 18.5 X10(3) UL (ref 4–11)
WBC # BLD AUTO: 18.5 X10(3) UL (ref 4–11)
WBC # BLD AUTO: 18.8 X10(3) UL (ref 4–11)
WBC # BLD AUTO: 18.8 X10(3) UL (ref 4–11)
WBC # BLD AUTO: 18.9 X10(3) UL (ref 4–11)
WBC # BLD AUTO: 20.3 X10(3) UL (ref 4–11)
WBC # BLD AUTO: 20.3 X10(3) UL (ref 4–11)
WBC # BLD AUTO: 20.4 X10(3) UL (ref 4–11)
WBC # BLD AUTO: 20.9 X10(3) UL (ref 4–11)
WBC # BLD AUTO: 21.1 X10(3) UL (ref 4–11)
WBC # BLD AUTO: 21.2 X10(3) UL (ref 4–11)
WBC # BLD AUTO: 21.4 X10(3) UL (ref 4–11)
WBC # BLD AUTO: 23.7 X10(3) UL (ref 4–11)
WBC # BLD AUTO: 25.2 X10(3) UL (ref 4–11)
WBC # BLD AUTO: 26.6 X10(3) UL (ref 4–11)
WBC # BLD AUTO: 5.7 X10(3) UL (ref 4–11)
WBC # BLD AUTO: 5.9 X10(3) UL (ref 4–11)
WBC # BLD AUTO: 6.5 X10(3) UL (ref 4–11)
WBC # BLD AUTO: 7.2 X10(3) UL (ref 4–11)
WBC # BLD AUTO: 7.2 X10(3) UL (ref 4–11)
WBC # BLD AUTO: 7.5 X10(3) UL (ref 4–11)
WBC # BLD AUTO: 8.3 X10(3) UL (ref 4–11)
WBC # BLD AUTO: 8.5 X10(3) UL (ref 4–11)
WBC # BLD AUTO: 8.6 X10(3) UL (ref 4–11)
WBC # BLD AUTO: 9 X10(3) UL (ref 4–11)
WBC # BLD AUTO: 9.2 X10(3) UL (ref 4–11)
WBC # BLD AUTO: 9.3 X10(3) UL (ref 4–11)

## 2024-01-01 PROCEDURE — 4A023N7 MEASUREMENT OF CARDIAC SAMPLING AND PRESSURE, LEFT HEART, PERCUTANEOUS APPROACH: ICD-10-PCS | Performed by: INTERNAL MEDICINE

## 2024-01-01 PROCEDURE — BD12ZZZ FLUOROSCOPY OF STOMACH: ICD-10-PCS | Performed by: STUDENT IN AN ORGANIZED HEALTH CARE EDUCATION/TRAINING PROGRAM

## 2024-01-01 PROCEDURE — 4A03353 MEASUREMENT OF ARTERIAL FLOW, PULMONARY, PERCUTANEOUS APPROACH: ICD-10-PCS | Performed by: INTERNAL MEDICINE

## 2024-01-01 PROCEDURE — 02723ZZ DILATION OF CORONARY ARTERY, THREE ARTERIES, PERCUTANEOUS APPROACH: ICD-10-PCS | Performed by: INTERNAL MEDICINE

## 2024-01-01 PROCEDURE — 4A023N6 MEASUREMENT OF CARDIAC SAMPLING AND PRESSURE, RIGHT HEART, PERCUTANEOUS APPROACH: ICD-10-PCS | Performed by: INTERNAL MEDICINE

## 2024-01-01 PROCEDURE — 93307 TTE W/O DOPPLER COMPLETE: CPT | Performed by: INTERNAL MEDICINE

## 2024-01-01 PROCEDURE — 02Q40ZZ REPAIR CORONARY VEIN, OPEN APPROACH: ICD-10-PCS | Performed by: THORACIC SURGERY (CARDIOTHORACIC VASCULAR SURGERY)

## 2024-01-01 PROCEDURE — 93308 TTE F-UP OR LMTD: CPT | Performed by: INTERNAL MEDICINE

## 2024-01-01 PROCEDURE — 71045 X-RAY EXAM CHEST 1 VIEW: CPT | Performed by: INTERNAL MEDICINE

## 2024-01-01 PROCEDURE — 30233M1 TRANSFUSION OF NONAUTOLOGOUS PLASMA CRYOPRECIPITATE INTO PERIPHERAL VEIN, PERCUTANEOUS APPROACH: ICD-10-PCS | Performed by: HOSPITALIST

## 2024-01-01 PROCEDURE — 70450 CT HEAD/BRAIN W/O DYE: CPT | Performed by: OTHER

## 2024-01-01 PROCEDURE — 70450 CT HEAD/BRAIN W/O DYE: CPT | Performed by: INTERNAL MEDICINE

## 2024-01-01 PROCEDURE — 93880 EXTRACRANIAL BILAT STUDY: CPT | Performed by: CLINICAL NURSE SPECIALIST

## 2024-01-01 PROCEDURE — 0JH63XZ INSERTION OF TUNNELED VASCULAR ACCESS DEVICE INTO CHEST SUBCUTANEOUS TISSUE AND FASCIA, PERCUTANEOUS APPROACH: ICD-10-PCS | Performed by: STUDENT IN AN ORGANIZED HEALTH CARE EDUCATION/TRAINING PROGRAM

## 2024-01-01 PROCEDURE — 02PYX3Z REMOVAL OF INFUSION DEVICE FROM GREAT VESSEL, EXTERNAL APPROACH: ICD-10-PCS | Performed by: STUDENT IN AN ORGANIZED HEALTH CARE EDUCATION/TRAINING PROGRAM

## 2024-01-01 PROCEDURE — 76775 US EXAM ABDO BACK WALL LIM: CPT | Performed by: STUDENT IN AN ORGANIZED HEALTH CARE EDUCATION/TRAINING PROGRAM

## 2024-01-01 PROCEDURE — 5A1221Z PERFORMANCE OF CARDIAC OUTPUT, CONTINUOUS: ICD-10-PCS | Performed by: THORACIC SURGERY (CARDIOTHORACIC VASCULAR SURGERY)

## 2024-01-01 PROCEDURE — 71045 X-RAY EXAM CHEST 1 VIEW: CPT | Performed by: CLINICAL NURSE SPECIALIST

## 2024-01-01 PROCEDURE — B2111ZZ FLUOROSCOPY OF MULTIPLE CORONARY ARTERIES USING LOW OSMOLAR CONTRAST: ICD-10-PCS | Performed by: INTERNAL MEDICINE

## 2024-01-01 PROCEDURE — 70547 MR ANGIOGRAPHY NECK W/O DYE: CPT | Performed by: INTERNAL MEDICINE

## 2024-01-01 PROCEDURE — 99233 SBSQ HOSP IP/OBS HIGH 50: CPT | Performed by: OTHER

## 2024-01-01 PROCEDURE — 02WAXRZ REVISION OF SHORT-TERM EXTERNAL HEART ASSIST SYSTEM IN HEART, EXTERNAL APPROACH: ICD-10-PCS | Performed by: INTERNAL MEDICINE

## 2024-01-01 PROCEDURE — 02100Z9 BYPASS CORONARY ARTERY, ONE ARTERY FROM LEFT INTERNAL MAMMARY, OPEN APPROACH: ICD-10-PCS | Performed by: THORACIC SURGERY (CARDIOTHORACIC VASCULAR SURGERY)

## 2024-01-01 PROCEDURE — 06BQ4ZZ EXCISION OF LEFT SAPHENOUS VEIN, PERCUTANEOUS ENDOSCOPIC APPROACH: ICD-10-PCS | Performed by: THORACIC SURGERY (CARDIOTHORACIC VASCULAR SURGERY)

## 2024-01-01 PROCEDURE — 021109W BYPASS CORONARY ARTERY, TWO ARTERIES FROM AORTA WITH AUTOLOGOUS VENOUS TISSUE, OPEN APPROACH: ICD-10-PCS | Performed by: THORACIC SURGERY (CARDIOTHORACIC VASCULAR SURGERY)

## 2024-01-01 PROCEDURE — 02HV33Z INSERTION OF INFUSION DEVICE INTO SUPERIOR VENA CAVA, PERCUTANEOUS APPROACH: ICD-10-PCS | Performed by: STUDENT IN AN ORGANIZED HEALTH CARE EDUCATION/TRAINING PROGRAM

## 2024-01-01 PROCEDURE — 93931 UPPER EXTREMITY STUDY: CPT | Performed by: NURSE PRACTITIONER

## 2024-01-01 PROCEDURE — 02HV33Z INSERTION OF INFUSION DEVICE INTO SUPERIOR VENA CAVA, PERCUTANEOUS APPROACH: ICD-10-PCS | Performed by: RADIOLOGY

## 2024-01-01 PROCEDURE — 0B110F4 BYPASS TRACHEA TO CUTANEOUS WITH TRACHEOSTOMY DEVICE, OPEN APPROACH: ICD-10-PCS | Performed by: SPECIALIST

## 2024-01-01 PROCEDURE — B246ZZ4 ULTRASONOGRAPHY OF RIGHT AND LEFT HEART, TRANSESOPHAGEAL: ICD-10-PCS | Performed by: THORACIC SURGERY (CARDIOTHORACIC VASCULAR SURGERY)

## 2024-01-01 PROCEDURE — 99232 SBSQ HOSP IP/OBS MODERATE 35: CPT | Performed by: OTHER

## 2024-01-01 PROCEDURE — 71045 X-RAY EXAM CHEST 1 VIEW: CPT

## 2024-01-01 PROCEDURE — 71045 X-RAY EXAM CHEST 1 VIEW: CPT | Performed by: SPECIALIST

## 2024-01-01 PROCEDURE — 02HA3RZ INSERTION OF SHORT-TERM EXTERNAL HEART ASSIST SYSTEM INTO HEART, PERCUTANEOUS APPROACH: ICD-10-PCS | Performed by: INTERNAL MEDICINE

## 2024-01-01 PROCEDURE — 99291 CRITICAL CARE FIRST HOUR: CPT | Performed by: INTERNAL MEDICINE

## 2024-01-01 PROCEDURE — 99232 SBSQ HOSP IP/OBS MODERATE 35: CPT | Performed by: STUDENT IN AN ORGANIZED HEALTH CARE EDUCATION/TRAINING PROGRAM

## 2024-01-01 PROCEDURE — 71045 X-RAY EXAM CHEST 1 VIEW: CPT | Performed by: NURSE PRACTITIONER

## 2024-01-01 PROCEDURE — 5A2204Z RESTORATION OF CARDIAC RHYTHM, SINGLE: ICD-10-PCS | Performed by: THORACIC SURGERY (CARDIOTHORACIC VASCULAR SURGERY)

## 2024-01-01 PROCEDURE — 5A1D90Z PERFORMANCE OF URINARY FILTRATION, CONTINUOUS, GREATER THAN 18 HOURS PER DAY: ICD-10-PCS | Performed by: INTERNAL MEDICINE

## 2024-01-01 PROCEDURE — 30233R1 TRANSFUSION OF NONAUTOLOGOUS PLATELETS INTO PERIPHERAL VEIN, PERCUTANEOUS APPROACH: ICD-10-PCS | Performed by: HOSPITALIST

## 2024-01-01 PROCEDURE — B518ZZA FLUOROSCOPY OF SUPERIOR VENA CAVA, GUIDANCE: ICD-10-PCS | Performed by: STUDENT IN AN ORGANIZED HEALTH CARE EDUCATION/TRAINING PROGRAM

## 2024-01-01 PROCEDURE — 71045 X-RAY EXAM CHEST 1 VIEW: CPT | Performed by: EMERGENCY MEDICINE

## 2024-01-01 PROCEDURE — 71045 X-RAY EXAM CHEST 1 VIEW: CPT | Performed by: STUDENT IN AN ORGANIZED HEALTH CARE EDUCATION/TRAINING PROGRAM

## 2024-01-01 PROCEDURE — 36620 INSERTION CATHETER ARTERY: CPT

## 2024-01-01 PROCEDURE — B548ZZA ULTRASONOGRAPHY OF SUPERIOR VENA CAVA, GUIDANCE: ICD-10-PCS | Performed by: STUDENT IN AN ORGANIZED HEALTH CARE EDUCATION/TRAINING PROGRAM

## 2024-01-01 PROCEDURE — 30233N1 TRANSFUSION OF NONAUTOLOGOUS RED BLOOD CELLS INTO PERIPHERAL VEIN, PERCUTANEOUS APPROACH: ICD-10-PCS | Performed by: HOSPITALIST

## 2024-01-01 PROCEDURE — B547ZZA ULTRASONOGRAPHY OF LEFT SUBCLAVIAN VEIN, GUIDANCE: ICD-10-PCS | Performed by: HOSPITALIST

## 2024-01-01 PROCEDURE — 99223 1ST HOSP IP/OBS HIGH 75: CPT | Performed by: STUDENT IN AN ORGANIZED HEALTH CARE EDUCATION/TRAINING PROGRAM

## 2024-01-01 PROCEDURE — 99291 CRITICAL CARE FIRST HOUR: CPT | Performed by: OTHER

## 2024-01-01 PROCEDURE — 70551 MRI BRAIN STEM W/O DYE: CPT | Performed by: INTERNAL MEDICINE

## 2024-01-01 PROCEDURE — 93306 TTE W/DOPPLER COMPLETE: CPT | Performed by: INTERNAL MEDICINE

## 2024-01-01 PROCEDURE — 4A033BC MEASUREMENT OF ARTERIAL PRESSURE, CORONARY, PERCUTANEOUS APPROACH: ICD-10-PCS | Performed by: INTERNAL MEDICINE

## 2024-01-01 PROCEDURE — 71045 X-RAY EXAM CHEST 1 VIEW: CPT | Performed by: RADIOLOGY

## 2024-01-01 PROCEDURE — B2151ZZ FLUOROSCOPY OF LEFT HEART USING LOW OSMOLAR CONTRAST: ICD-10-PCS | Performed by: INTERNAL MEDICINE

## 2024-01-01 PROCEDURE — 02PA3RZ REMOVAL OF SHORT-TERM EXTERNAL HEART ASSIST SYSTEM FROM HEART, PERCUTANEOUS APPROACH: ICD-10-PCS | Performed by: INTERNAL MEDICINE

## 2024-01-01 PROCEDURE — 72141 MRI NECK SPINE W/O DYE: CPT | Performed by: INTERNAL MEDICINE

## 2024-01-01 PROCEDURE — 0BH17EZ INSERTION OF ENDOTRACHEAL AIRWAY INTO TRACHEA, VIA NATURAL OR ARTIFICIAL OPENING: ICD-10-PCS | Performed by: STUDENT IN AN ORGANIZED HEALTH CARE EDUCATION/TRAINING PROGRAM

## 2024-01-01 PROCEDURE — 70544 MR ANGIOGRAPHY HEAD W/O DYE: CPT | Performed by: INTERNAL MEDICINE

## 2024-01-01 PROCEDURE — B548ZZA ULTRASONOGRAPHY OF SUPERIOR VENA CAVA, GUIDANCE: ICD-10-PCS | Performed by: RADIOLOGY

## 2024-01-01 PROCEDURE — 74150 CT ABDOMEN W/O CONTRAST: CPT | Performed by: INTERNAL MEDICINE

## 2024-01-01 PROCEDURE — 0DH63UZ INSERTION OF FEEDING DEVICE INTO STOMACH, PERCUTANEOUS APPROACH: ICD-10-PCS | Performed by: STUDENT IN AN ORGANIZED HEALTH CARE EDUCATION/TRAINING PROGRAM

## 2024-01-01 PROCEDURE — 05H633Z INSERTION OF INFUSION DEVICE INTO LEFT SUBCLAVIAN VEIN, PERCUTANEOUS APPROACH: ICD-10-PCS | Performed by: HOSPITALIST

## 2024-01-01 PROCEDURE — 30233K1 TRANSFUSION OF NONAUTOLOGOUS FROZEN PLASMA INTO PERIPHERAL VEIN, PERCUTANEOUS APPROACH: ICD-10-PCS | Performed by: HOSPITALIST

## 2024-01-01 PROCEDURE — 5A1955Z RESPIRATORY VENTILATION, GREATER THAN 96 CONSECUTIVE HOURS: ICD-10-PCS | Performed by: STUDENT IN AN ORGANIZED HEALTH CARE EDUCATION/TRAINING PROGRAM

## 2024-01-01 PROCEDURE — 99233 SBSQ HOSP IP/OBS HIGH 50: CPT | Performed by: STUDENT IN AN ORGANIZED HEALTH CARE EDUCATION/TRAINING PROGRAM

## 2024-01-01 PROCEDURE — 99223 1ST HOSP IP/OBS HIGH 75: CPT | Performed by: INTERNAL MEDICINE

## 2024-01-01 PROCEDURE — 5A0221D ASSISTANCE WITH CARDIAC OUTPUT USING IMPELLER PUMP, CONTINUOUS: ICD-10-PCS | Performed by: INTERNAL MEDICINE

## 2024-01-01 PROCEDURE — 72146 MRI CHEST SPINE W/O DYE: CPT | Performed by: INTERNAL MEDICINE

## 2024-01-01 RX ORDER — HEPARIN SODIUM AND DEXTROSE 10000; 5 [USP'U]/100ML; G/100ML
500 INJECTION INTRAVENOUS CONTINUOUS
Status: DISCONTINUED | OUTPATIENT
Start: 2024-01-01 | End: 2024-01-01

## 2024-01-01 RX ORDER — FLUCONAZOLE 2 MG/ML
400 INJECTION, SOLUTION INTRAVENOUS EVERY 24 HOURS
Status: DISCONTINUED | OUTPATIENT
Start: 2024-01-01 | End: 2024-01-01

## 2024-01-01 RX ORDER — METOPROLOL TARTRATE 1 MG/ML
5 INJECTION, SOLUTION INTRAVENOUS ONCE
Status: COMPLETED | OUTPATIENT
Start: 2024-01-01 | End: 2024-01-01

## 2024-01-01 RX ORDER — FUROSEMIDE 10 MG/ML
80 INJECTION INTRAMUSCULAR; INTRAVENOUS ONCE
Status: COMPLETED | OUTPATIENT
Start: 2024-01-01 | End: 2024-01-01

## 2024-01-01 RX ORDER — LORAZEPAM 2 MG/ML
1 INJECTION INTRAMUSCULAR EVERY 4 HOURS PRN
Status: DISCONTINUED | OUTPATIENT
Start: 2024-01-01 | End: 2024-01-01

## 2024-01-01 RX ORDER — KETOROLAC TROMETHAMINE 30 MG/ML
30 INJECTION, SOLUTION INTRAMUSCULAR; INTRAVENOUS EVERY 6 HOURS PRN
Status: DISCONTINUED | OUTPATIENT
Start: 2024-01-01 | End: 2024-01-01

## 2024-01-01 RX ORDER — ALBUTEROL SULFATE 2.5 MG/3ML
2.5 SOLUTION RESPIRATORY (INHALATION) EVERY 4 HOURS
Status: DISCONTINUED | OUTPATIENT
Start: 2024-01-01 | End: 2024-04-02

## 2024-01-01 RX ORDER — PROCHLORPERAZINE EDISYLATE 5 MG/ML
5 INJECTION INTRAMUSCULAR; INTRAVENOUS EVERY 8 HOURS PRN
Status: DISCONTINUED | OUTPATIENT
Start: 2024-01-01 | End: 2024-01-01 | Stop reason: ALTCHOICE

## 2024-01-01 RX ORDER — ASPIRIN 81 MG/1
324 TABLET, CHEWABLE ORAL ONCE
Status: DISCONTINUED | OUTPATIENT
Start: 2024-01-01 | End: 2024-01-01

## 2024-01-01 RX ORDER — FAMOTIDINE 10 MG/ML
20 INJECTION, SOLUTION INTRAVENOUS DAILY
Status: DISCONTINUED | OUTPATIENT
Start: 2024-01-01 | End: 2024-01-01

## 2024-01-01 RX ORDER — ATORVASTATIN CALCIUM 80 MG/1
80 TABLET, FILM COATED ORAL ONCE
Status: COMPLETED | OUTPATIENT
Start: 2024-01-01 | End: 2024-01-01

## 2024-01-01 RX ORDER — LABETALOL HYDROCHLORIDE 5 MG/ML
10 INJECTION, SOLUTION INTRAVENOUS ONCE
Status: COMPLETED | OUTPATIENT
Start: 2024-01-01 | End: 2024-01-01

## 2024-01-01 RX ORDER — HEPARIN SODIUM 1000 [USP'U]/ML
10 INJECTION, SOLUTION INTRAVENOUS; SUBCUTANEOUS ONCE
Status: COMPLETED | OUTPATIENT
Start: 2024-01-01 | End: 2024-01-01

## 2024-01-01 RX ORDER — ONDANSETRON 4 MG/1
4 TABLET, ORALLY DISINTEGRATING ORAL EVERY 6 HOURS PRN
Status: DISCONTINUED | OUTPATIENT
Start: 2024-01-01 | End: 2024-01-01

## 2024-01-01 RX ORDER — MAGNESIUM SULFATE HEPTAHYDRATE 40 MG/ML
2 INJECTION, SOLUTION INTRAVENOUS ONCE
Status: COMPLETED | OUTPATIENT
Start: 2024-01-01 | End: 2024-01-01

## 2024-01-01 RX ORDER — MINERAL OIL AND PETROLATUM 150; 830 MG/G; MG/G
OINTMENT OPHTHALMIC 3 TIMES DAILY
Status: DISCONTINUED | OUTPATIENT
Start: 2024-01-01 | End: 2024-01-01

## 2024-01-01 RX ORDER — MAGNESIUM SULFATE HEPTAHYDRATE 40 MG/ML
2 INJECTION, SOLUTION INTRAVENOUS AS NEEDED
Status: DISCONTINUED | OUTPATIENT
Start: 2024-01-01 | End: 2024-01-01

## 2024-01-01 RX ORDER — FUROSEMIDE 10 MG/ML
20 INJECTION INTRAMUSCULAR; INTRAVENOUS ONCE
Status: COMPLETED | OUTPATIENT
Start: 2024-01-01 | End: 2024-01-01

## 2024-01-01 RX ORDER — SODIUM BICARBONATE 650 MG/1
325 TABLET ORAL AS NEEDED
Status: DISCONTINUED | OUTPATIENT
Start: 2024-01-01 | End: 2024-01-01

## 2024-01-01 RX ORDER — FUROSEMIDE 10 MG/ML
40 INJECTION INTRAMUSCULAR; INTRAVENOUS EVERY 8 HOURS PRN
Status: DISCONTINUED | OUTPATIENT
Start: 2024-01-01 | End: 2024-01-01

## 2024-01-01 RX ORDER — AMIODARONE HYDROCHLORIDE 200 MG/1
200 TABLET ORAL 2 TIMES DAILY WITH MEALS
Status: DISCONTINUED | OUTPATIENT
Start: 2024-01-01 | End: 2024-01-01

## 2024-01-01 RX ORDER — CEFAZOLIN SODIUM/WATER 2 G/20 ML
2 SYRINGE (ML) INTRAVENOUS
Status: COMPLETED | OUTPATIENT
Start: 2024-01-01 | End: 2024-01-01

## 2024-01-01 RX ORDER — LORAZEPAM 2 MG/ML
1 INJECTION INTRAMUSCULAR ONCE
Status: COMPLETED | OUTPATIENT
Start: 2024-01-01 | End: 2024-01-01

## 2024-01-01 RX ORDER — DEXMEDETOMIDINE HYDROCHLORIDE 4 UG/ML
INJECTION, SOLUTION INTRAVENOUS CONTINUOUS
Status: DISCONTINUED | OUTPATIENT
Start: 2024-01-01 | End: 2024-01-01

## 2024-01-01 RX ORDER — PROCHLORPERAZINE EDISYLATE 5 MG/ML
5 INJECTION INTRAMUSCULAR; INTRAVENOUS EVERY 8 HOURS PRN
Status: DISCONTINUED | OUTPATIENT
Start: 2024-01-01 | End: 2024-01-01

## 2024-01-01 RX ORDER — FAMOTIDINE 10 MG/ML
20 INJECTION, SOLUTION INTRAVENOUS 2 TIMES DAILY
Status: DISCONTINUED | OUTPATIENT
Start: 2024-01-01 | End: 2024-01-01

## 2024-01-01 RX ORDER — ACETAMINOPHEN 10 MG/ML
1000 INJECTION, SOLUTION INTRAVENOUS EVERY 6 HOURS PRN
Status: DISCONTINUED | OUTPATIENT
Start: 2024-01-01 | End: 2024-01-01

## 2024-01-01 RX ORDER — CEFAZOLIN SODIUM/WATER 2 G/20 ML
2 SYRINGE (ML) INTRAVENOUS EVERY 8 HOURS
Status: DISCONTINUED | OUTPATIENT
Start: 2024-01-01 | End: 2024-01-01

## 2024-01-01 RX ORDER — HYDRALAZINE HYDROCHLORIDE 20 MG/ML
10 INJECTION INTRAMUSCULAR; INTRAVENOUS EVERY 2 HOUR PRN
Status: DISCONTINUED | OUTPATIENT
Start: 2024-01-01 | End: 2024-01-01

## 2024-01-01 RX ORDER — SODIUM CHLORIDE 9 MG/ML
INJECTION, SOLUTION INTRAVENOUS ONCE
Status: COMPLETED | OUTPATIENT
Start: 2024-01-01 | End: 2024-01-01

## 2024-01-01 RX ORDER — SENNOSIDES 8.8 MG/5ML
10 LIQUID ORAL NIGHTLY PRN
Status: DISCONTINUED | OUTPATIENT
Start: 2024-01-01 | End: 2024-01-01

## 2024-01-01 RX ORDER — VANCOMYCIN HYDROCHLORIDE 1 G/20ML
INJECTION, POWDER, LYOPHILIZED, FOR SOLUTION INTRAVENOUS AS NEEDED
Status: DISCONTINUED | OUTPATIENT
Start: 2024-01-01 | End: 2024-01-01 | Stop reason: HOSPADM

## 2024-01-01 RX ORDER — PROCHLORPERAZINE 25 MG
25 SUPPOSITORY, RECTAL RECTAL EVERY 6 HOURS PRN
Status: DISCONTINUED | OUTPATIENT
Start: 2024-01-01 | End: 2024-01-01

## 2024-01-01 RX ORDER — POLYETHYLENE GLYCOL 3350 17 G/17G
17 POWDER, FOR SOLUTION ORAL DAILY PRN
Status: DISCONTINUED | OUTPATIENT
Start: 2024-01-01 | End: 2024-01-01

## 2024-01-01 RX ORDER — SODIUM CHLORIDE 9 MG/ML
INJECTION, SOLUTION INTRAVENOUS CONTINUOUS
Status: DISCONTINUED | OUTPATIENT
Start: 2024-01-01 | End: 2024-01-01

## 2024-01-01 RX ORDER — HYDROCODONE BITARTRATE AND ACETAMINOPHEN 5; 325 MG/1; MG/1
2 TABLET ORAL EVERY 4 HOURS PRN
Status: DISCONTINUED | OUTPATIENT
Start: 2024-01-01 | End: 2024-01-01

## 2024-01-01 RX ORDER — ASPIRIN 81 MG/1
81 TABLET, CHEWABLE ORAL DAILY
Status: DISCONTINUED | OUTPATIENT
Start: 2024-01-01 | End: 2024-01-01

## 2024-01-01 RX ORDER — LEVOFLOXACIN 5 MG/ML
750 INJECTION, SOLUTION INTRAVENOUS EVERY 24 HOURS
Status: DISCONTINUED | OUTPATIENT
Start: 2024-01-01 | End: 2024-04-02

## 2024-01-01 RX ORDER — AMIODARONE HYDROCHLORIDE 150 MG/3ML
INJECTION, SOLUTION INTRAVENOUS
Status: COMPLETED
Start: 2024-01-01 | End: 2024-01-01

## 2024-01-01 RX ORDER — SODIUM CHLORIDE 0.9 % (FLUSH) 0.9 %
10 SYRINGE (ML) INJECTION AS NEEDED
Status: DISCONTINUED | OUTPATIENT
Start: 2024-01-01 | End: 2024-04-02

## 2024-01-01 RX ORDER — DIAZEPAM 5 MG/ML
INJECTION, SOLUTION INTRAMUSCULAR; INTRAVENOUS
Status: COMPLETED
Start: 2024-01-01 | End: 2024-01-01

## 2024-01-01 RX ORDER — ACETAMINOPHEN 325 MG/1
650 TABLET ORAL EVERY 4 HOURS PRN
Status: DISCONTINUED | OUTPATIENT
Start: 2024-01-01 | End: 2024-01-01

## 2024-01-01 RX ORDER — BUPIVACAINE HYDROCHLORIDE 5 MG/ML
INJECTION, SOLUTION EPIDURAL; INTRACAUDAL AS NEEDED
Status: DISCONTINUED | OUTPATIENT
Start: 2024-01-01 | End: 2024-01-01 | Stop reason: HOSPADM

## 2024-01-01 RX ORDER — LIDOCAINE HYDROCHLORIDE 20 MG/ML
INJECTION, SOLUTION EPIDURAL; INFILTRATION; INTRACAUDAL; PERINEURAL
Status: DISCONTINUED
Start: 2024-01-01 | End: 2024-01-01 | Stop reason: WASHOUT

## 2024-01-01 RX ORDER — ALBUMIN, HUMAN INJ 5% 5 %
12.5 SOLUTION INTRAVENOUS ONCE AS NEEDED
Status: DISCONTINUED | OUTPATIENT
Start: 2024-01-01 | End: 2024-01-01

## 2024-01-01 RX ORDER — POTASSIUM CHLORIDE 29.8 MG/ML
40 INJECTION INTRAVENOUS AS NEEDED
Status: DISCONTINUED | OUTPATIENT
Start: 2024-01-01 | End: 2024-01-01

## 2024-01-01 RX ORDER — CHLORHEXIDINE GLUCONATE ORAL RINSE 1.2 MG/ML
15 SOLUTION DENTAL
Status: DISCONTINUED | OUTPATIENT
Start: 2024-01-01 | End: 2024-01-01

## 2024-01-01 RX ORDER — LIDOCAINE HYDROCHLORIDE 20 MG/ML
INJECTION, SOLUTION EPIDURAL; INFILTRATION; INTRACAUDAL; PERINEURAL
Status: COMPLETED
Start: 2024-01-01 | End: 2024-01-01

## 2024-01-01 RX ORDER — HYDRALAZINE HYDROCHLORIDE 20 MG/ML
10 INJECTION INTRAMUSCULAR; INTRAVENOUS ONCE
Status: COMPLETED | OUTPATIENT
Start: 2024-01-01 | End: 2024-01-01

## 2024-01-01 RX ORDER — ONDANSETRON 2 MG/ML
4 INJECTION INTRAMUSCULAR; INTRAVENOUS EVERY 6 HOURS PRN
Status: DISCONTINUED | OUTPATIENT
Start: 2024-01-01 | End: 2024-01-01

## 2024-01-01 RX ORDER — DIAZEPAM 5 MG/ML
5 INJECTION, SOLUTION INTRAMUSCULAR; INTRAVENOUS EVERY 6 HOURS PRN
Status: DISCONTINUED | OUTPATIENT
Start: 2024-01-01 | End: 2024-01-01

## 2024-01-01 RX ORDER — LIDOCAINE HYDROCHLORIDE 10 MG/ML
10 INJECTION, SOLUTION EPIDURAL; INFILTRATION; INTRACAUDAL; PERINEURAL ONCE
Status: COMPLETED | OUTPATIENT
Start: 2024-01-01 | End: 2024-01-01

## 2024-01-01 RX ORDER — PROCHLORPERAZINE MALEATE 10 MG
10 TABLET ORAL EVERY 4 HOURS PRN
Status: DISCONTINUED | OUTPATIENT
Start: 2024-01-01 | End: 2024-01-01

## 2024-01-01 RX ORDER — HEPARIN SODIUM (PORCINE) LOCK FLUSH IV SOLN 100 UNIT/ML 100 UNIT/ML
1.5 SOLUTION INTRAVENOUS ONCE
Status: COMPLETED | OUTPATIENT
Start: 2024-01-01 | End: 2024-01-01

## 2024-01-01 RX ORDER — AMIODARONE HYDROCHLORIDE 200 MG/1
200 TABLET ORAL DAILY
Status: DISCONTINUED | OUTPATIENT
Start: 2024-04-01 | End: 2024-01-01

## 2024-01-01 RX ORDER — CLOPIDOGREL BISULFATE 75 MG/1
75 TABLET ORAL DAILY
Status: DISCONTINUED | OUTPATIENT
Start: 2024-01-01 | End: 2024-01-01

## 2024-01-01 RX ORDER — HEPARIN SODIUM AND DEXTROSE 10000; 5 [USP'U]/100ML; G/100ML
INJECTION INTRAVENOUS
Status: COMPLETED
Start: 2024-01-01 | End: 2024-01-01

## 2024-01-01 RX ORDER — DIAZEPAM 5 MG/ML
10 INJECTION, SOLUTION INTRAMUSCULAR; INTRAVENOUS ONCE
Status: COMPLETED | OUTPATIENT
Start: 2024-01-01 | End: 2024-01-01

## 2024-01-01 RX ORDER — SODIUM CHLORIDE 9 MG/ML
INJECTION, SOLUTION INTRAVENOUS ONCE
Status: DISCONTINUED | OUTPATIENT
Start: 2024-01-01 | End: 2024-01-01

## 2024-01-01 RX ORDER — DEXTROSE AND SODIUM CHLORIDE 5; .9 G/100ML; G/100ML
INJECTION, SOLUTION INTRAVENOUS CONTINUOUS
Status: DISCONTINUED | OUTPATIENT
Start: 2024-01-01 | End: 2024-01-01

## 2024-01-01 RX ORDER — VANCOMYCIN HYDROCHLORIDE
1500 EVERY 12 HOURS
Status: DISCONTINUED | OUTPATIENT
Start: 2024-01-01 | End: 2024-04-02

## 2024-01-01 RX ORDER — ASPIRIN 300 MG/1
300 SUPPOSITORY RECTAL DAILY
Status: DISCONTINUED | OUTPATIENT
Start: 2024-01-01 | End: 2024-01-01

## 2024-01-01 RX ORDER — HEPARIN SODIUM 5000 [USP'U]/ML
5000 INJECTION, SOLUTION INTRAVENOUS; SUBCUTANEOUS EVERY 12 HOURS SCHEDULED
Status: DISCONTINUED | OUTPATIENT
Start: 2024-01-01 | End: 2024-01-01

## 2024-01-01 RX ORDER — DEXTROSE MONOHYDRATE 25 G/50ML
50 INJECTION, SOLUTION INTRAVENOUS
Status: DISCONTINUED | OUTPATIENT
Start: 2024-01-01 | End: 2024-01-01

## 2024-01-01 RX ORDER — MORPHINE SULFATE 2 MG/ML
2 INJECTION, SOLUTION INTRAMUSCULAR; INTRAVENOUS EVERY 2 HOUR PRN
Status: DISCONTINUED | OUTPATIENT
Start: 2024-01-01 | End: 2024-01-01

## 2024-01-01 RX ORDER — HEPARIN SODIUM 1000 [USP'U]/ML
1.5 INJECTION, SOLUTION INTRAVENOUS; SUBCUTANEOUS AS NEEDED
Status: DISCONTINUED | OUTPATIENT
Start: 2024-01-01 | End: 2024-01-01

## 2024-01-01 RX ORDER — CEFAZOLIN SODIUM/WATER 2 G/20 ML
SYRINGE (ML) INTRAVENOUS
Status: DISCONTINUED
Start: 2024-01-01 | End: 2024-01-01 | Stop reason: WASHOUT

## 2024-01-01 RX ORDER — ACETAMINOPHEN 160 MG/5ML
650 SOLUTION ORAL EVERY 6 HOURS SCHEDULED
Status: DISCONTINUED | OUTPATIENT
Start: 2024-01-01 | End: 2024-01-01

## 2024-01-01 RX ORDER — BISACODYL 10 MG
10 SUPPOSITORY, RECTAL RECTAL
Status: DISCONTINUED | OUTPATIENT
Start: 2024-01-01 | End: 2024-01-01

## 2024-01-01 RX ORDER — MAGNESIUM SULFATE 1 G/100ML
1 INJECTION INTRAVENOUS ONCE
Status: COMPLETED | OUTPATIENT
Start: 2024-01-01 | End: 2024-01-01

## 2024-01-01 RX ORDER — ASCORBIC ACID 500 MG
500 TABLET ORAL DAILY
Status: DISCONTINUED | OUTPATIENT
Start: 2024-01-01 | End: 2024-01-01

## 2024-01-01 RX ORDER — LABETALOL HYDROCHLORIDE 5 MG/ML
10 INJECTION, SOLUTION INTRAVENOUS EVERY 10 MIN PRN
Status: DISCONTINUED | OUTPATIENT
Start: 2024-01-01 | End: 2024-01-01

## 2024-01-01 RX ORDER — POTASSIUM CHLORIDE 14.9 MG/ML
20 INJECTION INTRAVENOUS ONCE
Status: COMPLETED | OUTPATIENT
Start: 2024-01-01 | End: 2024-01-01

## 2024-01-01 RX ORDER — ENEMA 19; 7 G/133ML; G/133ML
1 ENEMA RECTAL ONCE AS NEEDED
Status: DISCONTINUED | OUTPATIENT
Start: 2024-01-01 | End: 2024-01-01

## 2024-01-01 RX ORDER — DOBUTAMINE HYDROCHLORIDE 200 MG/100ML
INJECTION INTRAVENOUS CONTINUOUS PRN
Status: DISCONTINUED | OUTPATIENT
Start: 2024-01-01 | End: 2024-01-01

## 2024-01-01 RX ORDER — SENNOSIDES 8.8 MG/5ML
5 LIQUID ORAL 2 TIMES DAILY
Status: DISCONTINUED | OUTPATIENT
Start: 2024-01-01 | End: 2024-01-01

## 2024-01-01 RX ORDER — HEPARIN SODIUM 1000 [USP'U]/ML
INJECTION, SOLUTION INTRAVENOUS; SUBCUTANEOUS
Status: COMPLETED
Start: 2024-01-01 | End: 2024-01-01

## 2024-01-01 RX ORDER — ACETAMINOPHEN 650 MG/1
650 SUPPOSITORY RECTAL EVERY 4 HOURS PRN
Status: DISCONTINUED | OUTPATIENT
Start: 2024-01-01 | End: 2024-01-01

## 2024-01-01 RX ORDER — ATORVASTATIN CALCIUM 40 MG/1
40 TABLET, FILM COATED ORAL NIGHTLY
Status: DISCONTINUED | OUTPATIENT
Start: 2024-01-01 | End: 2024-01-01

## 2024-01-01 RX ORDER — HYDROCODONE BITARTRATE AND ACETAMINOPHEN 5; 325 MG/1; MG/1
1 TABLET ORAL EVERY 4 HOURS PRN
Status: DISCONTINUED | OUTPATIENT
Start: 2024-01-01 | End: 2024-01-01

## 2024-01-01 RX ORDER — GLYCOPYRROLATE 0.2 MG/ML
0.2 INJECTION, SOLUTION INTRAMUSCULAR; INTRAVENOUS
Status: DISCONTINUED | OUTPATIENT
Start: 2024-01-01 | End: 2024-01-01

## 2024-01-01 RX ORDER — ALBUMIN, HUMAN INJ 5% 5 %
12.5 SOLUTION INTRAVENOUS ONCE
Status: COMPLETED | OUTPATIENT
Start: 2024-01-01 | End: 2024-01-01

## 2024-01-01 RX ORDER — MILRINONE LACTATE 0.2 MG/ML
INJECTION, SOLUTION INTRAVENOUS AS NEEDED
Status: DISCONTINUED | OUTPATIENT
Start: 2024-01-01 | End: 2024-01-01

## 2024-01-01 RX ORDER — METOCLOPRAMIDE HYDROCHLORIDE 5 MG/ML
5 INJECTION INTRAMUSCULAR; INTRAVENOUS EVERY 6 HOURS
Status: DISCONTINUED | OUTPATIENT
Start: 2024-01-01 | End: 2024-01-01

## 2024-01-01 RX ORDER — DIAZEPAM 5 MG/ML
2.5 INJECTION, SOLUTION INTRAMUSCULAR; INTRAVENOUS EVERY 4 HOURS PRN
Status: DISCONTINUED | OUTPATIENT
Start: 2024-01-01 | End: 2024-01-01

## 2024-01-01 RX ORDER — ASCORBIC ACID 500 MG
500 TABLET ORAL 3 TIMES DAILY
Status: DISCONTINUED | OUTPATIENT
Start: 2024-01-01 | End: 2024-01-01

## 2024-01-01 RX ORDER — ACETAMINOPHEN 160 MG/5ML
650 SOLUTION ORAL EVERY 4 HOURS PRN
Status: DISCONTINUED | OUTPATIENT
Start: 2024-01-01 | End: 2024-01-01

## 2024-01-01 RX ORDER — DOXEPIN HYDROCHLORIDE 50 MG/1
1 CAPSULE ORAL DAILY
Status: DISCONTINUED | OUTPATIENT
Start: 2024-01-01 | End: 2024-01-01

## 2024-01-01 RX ORDER — MORPHINE SULFATE 4 MG/ML
8 INJECTION, SOLUTION INTRAMUSCULAR; INTRAVENOUS EVERY 2 HOUR PRN
Status: DISCONTINUED | OUTPATIENT
Start: 2024-01-01 | End: 2024-01-01

## 2024-01-01 RX ORDER — MORPHINE SULFATE 2 MG/ML
1 INJECTION, SOLUTION INTRAMUSCULAR; INTRAVENOUS ONCE
Status: COMPLETED | OUTPATIENT
Start: 2024-01-01 | End: 2024-01-01

## 2024-01-01 RX ORDER — AMIODARONE HYDROCHLORIDE 200 MG/1
200 TABLET ORAL 3 TIMES DAILY
Status: DISCONTINUED | OUTPATIENT
Start: 2024-01-01 | End: 2024-01-01

## 2024-01-01 RX ORDER — LORAZEPAM 2 MG/ML
0.5 INJECTION INTRAMUSCULAR EVERY 4 HOURS PRN
Status: DISCONTINUED | OUTPATIENT
Start: 2024-01-01 | End: 2024-01-01

## 2024-01-01 RX ORDER — ASPIRIN 300 MG/1
300 SUPPOSITORY RECTAL ONCE
Status: DISCONTINUED | OUTPATIENT
Start: 2024-01-01 | End: 2024-01-01

## 2024-01-01 RX ORDER — ATROPINE SULFATE 10 MG/ML
2 SOLUTION/ DROPS OPHTHALMIC EVERY 2 HOUR PRN
Status: DISCONTINUED | OUTPATIENT
Start: 2024-01-01 | End: 2024-01-01

## 2024-01-01 RX ORDER — MAGNESIUM SULFATE 1 G/100ML
1 INJECTION INTRAVENOUS AS NEEDED
Status: DISCONTINUED | OUTPATIENT
Start: 2024-01-01 | End: 2024-01-01

## 2024-01-01 RX ORDER — MORPHINE SULFATE 2 MG/ML
INJECTION, SOLUTION INTRAMUSCULAR; INTRAVENOUS
Status: COMPLETED
Start: 2024-01-01 | End: 2024-01-01

## 2024-01-01 RX ORDER — LIDOCAINE HYDROCHLORIDE 20 MG/ML
INJECTION, SOLUTION INFILTRATION; PERINEURAL
Status: COMPLETED
Start: 2024-01-01 | End: 2024-01-01

## 2024-01-01 RX ORDER — MIDAZOLAM HYDROCHLORIDE 1 MG/ML
4 INJECTION INTRAMUSCULAR; INTRAVENOUS ONCE
Status: COMPLETED | OUTPATIENT
Start: 2024-01-01 | End: 2024-01-01

## 2024-01-01 RX ORDER — MIDAZOLAM HYDROCHLORIDE 1 MG/ML
INJECTION INTRAMUSCULAR; INTRAVENOUS
Status: COMPLETED
Start: 2024-01-01 | End: 2024-01-01

## 2024-01-01 RX ORDER — DIAZEPAM 5 MG/ML
5 INJECTION, SOLUTION INTRAMUSCULAR; INTRAVENOUS ONCE
Status: COMPLETED | OUTPATIENT
Start: 2024-01-01 | End: 2024-01-01

## 2024-01-01 RX ORDER — HYDRALAZINE HYDROCHLORIDE 25 MG/1
25 TABLET, FILM COATED ORAL EVERY 8 HOURS SCHEDULED
Status: DISCONTINUED | OUTPATIENT
Start: 2024-01-01 | End: 2024-01-01

## 2024-01-01 RX ORDER — ASPIRIN 81 MG/1
81 TABLET ORAL DAILY
Status: DISCONTINUED | OUTPATIENT
Start: 2024-01-01 | End: 2024-01-01

## 2024-01-01 RX ORDER — ACETAMINOPHEN 160 MG/5ML
650 SOLUTION ORAL EVERY 6 HOURS PRN
Status: DISCONTINUED | OUTPATIENT
Start: 2024-01-01 | End: 2024-01-01

## 2024-01-01 RX ORDER — CEFAZOLIN SODIUM 1 G/3ML
INJECTION, POWDER, FOR SOLUTION INTRAMUSCULAR; INTRAVENOUS AS NEEDED
Status: DISCONTINUED | OUTPATIENT
Start: 2024-01-01 | End: 2024-01-01 | Stop reason: HOSPADM

## 2024-01-01 RX ORDER — NICOTINE POLACRILEX 4 MG
15 LOZENGE BUCCAL
Status: DISCONTINUED | OUTPATIENT
Start: 2024-01-01 | End: 2024-01-01

## 2024-01-01 RX ORDER — MORPHINE SULFATE 4 MG/ML
4 INJECTION, SOLUTION INTRAMUSCULAR; INTRAVENOUS EVERY 2 HOUR PRN
Status: DISCONTINUED | OUTPATIENT
Start: 2024-01-01 | End: 2024-01-01

## 2024-01-01 RX ORDER — LORAZEPAM 2 MG/ML
2 INJECTION INTRAMUSCULAR EVERY 4 HOURS PRN
Status: DISCONTINUED | OUTPATIENT
Start: 2024-01-01 | End: 2024-01-01

## 2024-01-01 RX ORDER — METOCLOPRAMIDE HYDROCHLORIDE 5 MG/ML
10 INJECTION INTRAMUSCULAR; INTRAVENOUS EVERY 6 HOURS
Status: DISCONTINUED | OUTPATIENT
Start: 2024-01-01 | End: 2024-01-01

## 2024-01-01 RX ORDER — POTASSIUM CHLORIDE 14.9 MG/ML
20 INJECTION INTRAVENOUS AS NEEDED
Status: DISCONTINUED | OUTPATIENT
Start: 2024-01-01 | End: 2024-01-01

## 2024-01-01 RX ORDER — HYDRALAZINE HYDROCHLORIDE 20 MG/ML
10 INJECTION INTRAMUSCULAR; INTRAVENOUS ONCE
Status: DISCONTINUED | OUTPATIENT
Start: 2024-01-01 | End: 2024-01-01

## 2024-01-01 RX ORDER — MELATONIN
3 NIGHTLY PRN
Status: DISCONTINUED | OUTPATIENT
Start: 2024-01-01 | End: 2024-01-01

## 2024-01-01 RX ORDER — FUROSEMIDE 40 MG/1
40 TABLET ORAL EVERY 8 HOURS PRN
Status: DISCONTINUED | OUTPATIENT
Start: 2024-01-01 | End: 2024-01-01

## 2024-01-01 RX ORDER — NICOTINE POLACRILEX 4 MG
30 LOZENGE BUCCAL
Status: DISCONTINUED | OUTPATIENT
Start: 2024-01-01 | End: 2024-01-01

## 2024-01-01 RX ORDER — FUROSEMIDE 10 MG/ML
20 INJECTION INTRAMUSCULAR; INTRAVENOUS ONCE AS NEEDED
Status: ACTIVE | OUTPATIENT
Start: 2024-01-01 | End: 2024-01-01

## 2024-01-01 RX ORDER — CHLORHEXIDINE GLUCONATE ORAL RINSE 1.2 MG/ML
15 SOLUTION DENTAL 2 TIMES DAILY
Status: DISCONTINUED | OUTPATIENT
Start: 2024-01-01 | End: 2024-01-01

## 2024-01-01 RX ORDER — NITROGLYCERIN 20 MG/100ML
INJECTION INTRAVENOUS CONTINUOUS PRN
Status: DISCONTINUED | OUTPATIENT
Start: 2024-01-01 | End: 2024-01-01

## 2024-01-01 RX ORDER — MORPHINE SULFATE 2 MG/ML
1 INJECTION, SOLUTION INTRAMUSCULAR; INTRAVENOUS EVERY 4 HOURS PRN
Status: DISCONTINUED | OUTPATIENT
Start: 2024-01-01 | End: 2024-01-01

## 2024-01-01 RX ORDER — MIDAZOLAM HYDROCHLORIDE 1 MG/ML
1 INJECTION INTRAMUSCULAR; INTRAVENOUS ONCE
Status: COMPLETED | OUTPATIENT
Start: 2024-01-01 | End: 2024-01-01

## 2024-01-01 RX ORDER — ASPIRIN 81 MG/1
324 TABLET, CHEWABLE ORAL ONCE
Status: COMPLETED | OUTPATIENT
Start: 2024-01-01 | End: 2024-01-01

## 2024-01-01 RX ORDER — METOPROLOL TARTRATE 50 MG/1
50 TABLET, FILM COATED ORAL
Status: DISCONTINUED | OUTPATIENT
Start: 2024-01-01 | End: 2024-01-01

## 2024-01-01 RX ORDER — HEPARIN SODIUM AND DEXTROSE 10000; 5 [USP'U]/100ML; G/100ML
INJECTION INTRAVENOUS CONTINUOUS
Status: DISCONTINUED | OUTPATIENT
Start: 2024-01-01 | End: 2024-01-01

## 2024-01-01 RX ORDER — DIGOXIN 0.25 MG/ML
125 INJECTION INTRAMUSCULAR; INTRAVENOUS ONCE
Status: COMPLETED | OUTPATIENT
Start: 2024-01-01 | End: 2024-01-01

## 2024-01-01 RX ORDER — ACETAMINOPHEN 10 MG/ML
1000 INJECTION, SOLUTION INTRAVENOUS EVERY 8 HOURS
Qty: 300 ML | Refills: 0 | Status: COMPLETED | OUTPATIENT
Start: 2024-01-01 | End: 2024-01-01

## 2024-01-01 RX ORDER — HEPARIN SODIUM (PORCINE) LOCK FLUSH IV SOLN 100 UNIT/ML 100 UNIT/ML
1.5 SOLUTION INTRAVENOUS ONCE
Status: DISCONTINUED | OUTPATIENT
Start: 2024-01-01 | End: 2024-01-01

## 2024-01-01 RX ORDER — LABETALOL HYDROCHLORIDE 5 MG/ML
INJECTION, SOLUTION INTRAVENOUS
Status: COMPLETED
Start: 2024-01-01 | End: 2024-01-01

## 2024-01-01 RX ORDER — FUROSEMIDE 10 MG/ML
80 INJECTION INTRAMUSCULAR; INTRAVENOUS ONCE
Status: CANCELLED | OUTPATIENT
Start: 2024-01-01 | End: 2024-01-01

## 2024-01-01 RX ORDER — ACETAMINOPHEN 10 MG/ML
1000 INJECTION, SOLUTION INTRAVENOUS EVERY 8 HOURS
Status: DISPENSED | OUTPATIENT
Start: 2024-01-01 | End: 2024-01-01

## 2024-01-01 RX ORDER — FAMOTIDINE 20 MG/1
20 TABLET, FILM COATED ORAL DAILY
Status: DISCONTINUED | OUTPATIENT
Start: 2024-01-01 | End: 2024-01-01

## 2024-01-01 RX ORDER — MORPHINE SULFATE 2 MG/ML
2 INJECTION, SOLUTION INTRAMUSCULAR; INTRAVENOUS
Status: DISCONTINUED | OUTPATIENT
Start: 2024-01-01 | End: 2024-01-01

## 2024-01-01 RX ORDER — DIAZEPAM 5 MG/ML
INJECTION, SOLUTION INTRAMUSCULAR; INTRAVENOUS
Status: DISPENSED
Start: 2024-01-01 | End: 2024-01-01

## 2024-01-01 RX ORDER — SENNOSIDES 8.6 MG
17.2 TABLET ORAL NIGHTLY PRN
Status: DISCONTINUED | OUTPATIENT
Start: 2024-01-01 | End: 2024-01-01

## 2024-03-05 ENCOUNTER — APPOINTMENT (OUTPATIENT)
Dept: CV DIAGNOSTICS | Facility: HOSPITAL | Age: 54
End: 2024-03-05
Attending: INTERNAL MEDICINE
Payer: COMMERCIAL

## 2024-03-05 ENCOUNTER — HOSPITAL ENCOUNTER (INPATIENT)
Facility: HOSPITAL | Age: 54
End: 2024-03-05
Attending: STUDENT IN AN ORGANIZED HEALTH CARE EDUCATION/TRAINING PROGRAM
Payer: COMMERCIAL

## 2024-03-05 ENCOUNTER — APPOINTMENT (OUTPATIENT)
Dept: GENERAL RADIOLOGY | Facility: HOSPITAL | Age: 54
End: 2024-03-05
Attending: INTERNAL MEDICINE
Payer: COMMERCIAL

## 2024-03-05 ENCOUNTER — APPOINTMENT (OUTPATIENT)
Dept: ULTRASOUND IMAGING | Facility: HOSPITAL | Age: 54
End: 2024-03-05
Attending: CLINICAL NURSE SPECIALIST
Payer: COMMERCIAL

## 2024-03-05 ENCOUNTER — APPOINTMENT (OUTPATIENT)
Dept: INTERVENTIONAL RADIOLOGY/VASCULAR | Facility: HOSPITAL | Age: 54
End: 2024-03-05
Attending: INTERNAL MEDICINE
Payer: COMMERCIAL

## 2024-03-05 PROBLEM — I21.4 NSTEMI (NON-ST ELEVATED MYOCARDIAL INFARCTION) (HCC): Status: ACTIVE | Noted: 2024-03-05

## 2024-03-05 PROBLEM — R07.9 CHEST PAIN: Status: ACTIVE | Noted: 2024-03-05

## 2024-03-05 PROBLEM — R07.9 CHEST PAIN: Status: ACTIVE | Noted: 2024-01-01

## 2024-03-05 PROBLEM — I21.4 NSTEMI (NON-ST ELEVATED MYOCARDIAL INFARCTION) (HCC): Status: ACTIVE | Noted: 2024-01-01

## 2024-03-05 NOTE — CONSULTS
Critical Care H&P/Consult     NAME: Cristiano Obregon - [facility]: 216[facility] - MRN: [id number] - Age: 53 year old - :  1970    Date of Admission: 3/5/2024  7:14 AM  Admission Diagnosis: NSTEMI (non-ST elevated myocardial infarction) (HCC) [I21.4]      Assessment/Plan:  Acute respiratory failure - intubated for airway protection following impella placement  - Cont mechanical ventilation  - Fent/propofol for sedation  - ABG q4h   NSTEMI - with multivessel CAD and poor peripheral perfusion c/b severely elevated intracardiac pressures, placed on impella for mortality reduction  - Heparin gtt  - Mgmt per cardiology  - Plan for thoracic sx consult and CABG in next 24-48 hours  - TTE pending now  Metabolic acidosis - d/t cardiac issues above and poor perfusion  - Bicarb gtt  - CMP tomorrow AM  FEN/GI  - TF  Tobacco abuse  - Cessation strongly advised  Proph  - Heparin gtt  - Pepcid  Dispo  - Critically ill; full code  - ICU level of care; we will follow with you    Discussed at length with patient / family.  Discussed with hospitalist, cardiology    Critical Care Time: 45 Minutes    DO [facility]  Pulmonary and Critical Care Medicine      History of Present Illness:   Cristiano Obregon is a 53 year old with history of hyperlipidemia, tobacco abuse presents to hospital after acute chest pain during exercise.  Found to have anterior ST depressions and diagnosed with NSTEMI.  Angiogram shows multivessel disease and during angiogram noted to have poor forward perfusion - impella pump was placed and decision made to evaluate for possible CABG in next 24-48 hours.  He was intubated during angiogram and remains hemodynamically stable.    History reviewed. No pertinent past medical history.  Past Surgical History:   Procedure Laterality Date    COLONOSCOPY N/A 2017    Procedure: COLONOSCOPY, POSSIBLE BIOPSY, POSSIBLE POLYPECTOMY 89395;  Surgeon: Byron Plaza MD;  [place] [facility] [place]  [facility]     Social History:  Social History     Tobacco Use    Smoking status: Every Day     Types: Cigarettes    Smokeless tobacco: Never    Tobacco comments:     occ/5 cigs daily\   Substance Use Topics    Alcohol use: No     Alcohol/week: 0.0 standard drinks of alcohol     Family History:  has no family status information on file.       Allergies:No Known Allergies  No current outpatient medications on file.   [COMPLETED] midazolam (Versed) 2 MG/2ML injection        [COMPLETED] fentaNYL (Sublimaze) 50 mcg/mL injection        [COMPLETED] aspirin chewable tab 324 mg  324 mg Oral Once    [COMPLETED] cangrelor (Kengreal) 50 MG injection        [COMPLETED] amiodarone (Cordarone) 150 MG/3ML injection        [COMPLETED] propofol (Diprivan) 10 MG/ML injection        [COMPLETED] heparin (Porcine) 25,000 Units/250mL infusion premix        [COMPLETED] midazolam (Versed) 2 MG/2ML injection        [COMPLETED] midazolam (Versed) 2 MG/2ML injection        [COMPLETED] heparin (Porcine) 1000 UNIT/ML injection        [COMPLETED] iohexol (Omnipaque) 300 MG/ML injection 150 mL  150 mL Intravenous ONCE PRN    cangrelor (Kengreal) 50 mg in sodium chloride 0.9% 250 mL IVPB - BRIDGING/MAINTENANCE dose  0.75 mcg/kg/min Intravenous Continuous    [COMPLETED] midazolam (Versed) 2 MG/2ML injection        [COMPLETED] lidocaine PF (Xylocaine-MPF) 2 % injection        [COMPLETED] heparin in sodium chloride 0.9% (Porcine) 2 Units/mL flush bag premix        [COMPLETED] heparin in sodium chloride 0.9% (Porcine) 2 Units/mL flush bag premix        sodium bicarbonate 25mEq in dextrose 5% 1000mL IMPELLA purge solution  3-30 mL/hr Intravenous Continuous    heparin (Porcine) 25,000 Units/250mL infusion premix  50-1,000 Units/hr Intravenous Continuous    fentaNYL (Sublimaze) 50 mcg/mL injection 25 mcg  25 mcg Intravenous Q30 Min PRN    Or    fentaNYL (Sublimaze) 50 mcg/mL injection 50 mcg  50 mcg Intravenous Q30 Min PRN    acetaminophen (Tylenol) tab  650 mg  650 mg Oral Q4H PRN    Or    acetaminophen (Tylenol) 160 MG/5ML oral liquid 650 mg  650 mg Oral Q4H PRN    Or    acetaminophen (Tylenol) rectal suppository 650 mg  650 mg Rectal Q4H PRN    Or    acetaminophen (Ofirmev) 10 mg/mL infusion premix 1,000 mg  1,000 mg Intravenous Q6H PRN    polyethylene glycol (PEG 3350) (Miralax) 17 g oral packet 17 g  17 g Oral Daily PRN    senna (Senokot) 8.8 MG/5ML oral syrup 17.6 mg  10 mL Oral Nightly PRN    bisacodyl (Dulcolax) 10 MG rectal suppository 10 mg  10 mg Rectal Daily PRN    fleet enema (Fleet) 7-19 GM/118ML rectal enema 133 mL  1 enema Rectal Once PRN    chlorhexidine gluconate (Peridex) 0.12 % oral solution 15 mL  15 mL Mouth/Throat BID@0800,2000    propofol (Diprivan) 10 mg/mL infusion premix  5-50 mcg/kg/min Intravenous Continuous    famotidine (Pepcid) 20 mg/2mL injection 20 mg  20 mg Intravenous BID      cangrelor (Kengreal) 50 mg in sodium chloride 0.9% 250 mL IVPB - BRIDGING/MAINTENANCE dose 0.75 mcg/kg/min (03/05/24 1156)    sodium bicarbonate 25mEq in dextrose 5% 1000mL IMPELLA purge solution 10 mL/hr (03/05/24 1117)    heparin 700 Units/hr (03/05/24 1318)    propofol 50 mcg/kg/min (03/05/24 1142)       Review of systems:   12 point review of systems performed and is negative aside from what is noted above    Objective:  Intake/Output Summary (Last 24 hours) at 3/5/2024 1341  Last data filed at 3/5/2024 1214  Gross per 24 hour   Intake --   Output 10 ml   Net -10 ml   Vent Mode: VC/AC  FiO2 (%):  [40 %-50 %] 40 %  S RR:  [12] 12  S VT:  [600 mL] 600 mL  PEEP/CPAP (cm H2O):  [5 cm H20] 5 cm H20  MAP (cm H2O):  [8-9] 8  BP (!) 127/107 (BP Location: Right arm)   Pulse 60   Temp 96.8 °F (36 °C) (Temporal)   Resp 12   Ht 5' 7\" (1.702 m)   Wt 175 lb 7.8 oz (79.6 kg)   SpO2 100%   BMI 27.49 kg/m²   Physical Exam:   General: intubated, sedated   HEENT: Normocephalic, without obvious abnormality, atraumatic. Moist oral mucosa   Neck: supple without  mass   Lungs: clear b/l, intubated   Chest wall: No tenderness or deformity.   Heart: Regular rate and rhythm, normal S1S2, no murmur.   Abdomen: soft, non-tender, non-distended, positive BS.   Extremity: No clubbing or cyanosis. no edema, pulses 2+ bilateral LE   Skin: No new rashes or lesions.   Neuro: intact, no focal deficits    Recent Labs   Lab 03/05/24  0723   RBC 4.85   HGB 14.9   HCT 42.8   MCV 88.2   MCH 30.7   MCHC 34.8   RDW 13.0   NEPRELIM 4.30   WBC 7.2   .0     Recent Labs   Lab 03/05/24  0723   *   BUN 18   CREATSERUM 1.23   CA 8.6*   ALB 3.9      K 3.6      CO2 18.0*   ALKPHO 53   AST 35*   ALT 40   BILT 0.6   TP 6.2     Recent Labs   Lab 03/05/24  1225   ABGPHT 7.45   QPCGIC3Q 32*   ATXKQ8M 207*   ABGHCO3 24.1   SITE Arterial Line   THGB 14.0     Recent Labs   Lab 03/05/24  0723   *   BUN 18   CREATSERUM 1.23   CA 8.6*      K 3.6      CO2 18.0*     No results for input(s): \"TROP\", \"CK\" in the last 168 hours.    Imaging: I independently visualized all relevant chest imaging in PACS, agree with radiology interpretation except where noted.

## 2024-03-05 NOTE — PROCEDURES
[facility]  part of [facility]    Cardiac Cath Procedure Note    Cristiano CHOCO Moralezmesfin Patient Status:  Emergency    1970 MRN [id number]   Location [facility] EMERGENCY DEPARTMENT Attending Guerrero Hyman*   Hosp Day # 0 PCP Isauro Pina MD       Cardiologist: Carlitos Sandoval MD  Primary Proceduralist: Carlitos Sandoval MD  Procedure Performed: LHC, COR, LV, and angioplasty circumflex, OM, Impella placement  Date of Procedure: 3/5/2024   Indication: ST elevation MI    Summary of procedure:    Successful restoration of flow down large heavily thrombosed circumflex and OM1, culprit for presentation.  Severe multivessel disease including chronically occluded large RCA, 95% sequential LAD stenoses.    Severely elevated LVEDP, severe refractory chest pain therefore Impella placed, intubated by anesthesia.    CV surgery consulted while on the table, we decided together to restore flow, support hemodynamically and consider bypass surgery in the next 48 hours.      Recommendations:  Q 4 expanded abg  Place radial a-line  Formal echo in the ICU  CV surgery to formally evaluate this afternoon      Left Ventriculography and hemodynamics:   LV EF not injected due to elevated EDP  LV EDP 45 mmHg  No gradient across aortic valve      Coronary Angiography  RCA: Codominant, 100% occluded in the proximal segment just after a large marginal to the free wall of the RV.  Marginal supplies collateral to the AV groove circumflex.    Left main:  Free of obstructive disease    Left anterior descendin% proximal and mid vessel stenoses, remainder the LAD is free of obstructive disease, supplies multiple diagonals which are non-obstructive    Circumflex: 100% occluded in the proximal segment.        Circumflex intervention  Lesion Characteristics-not torturous, not calcified.  Type non-C lesion.  Pre-intervention stenosis 100%, Post intervention stenosis 0%.  Pre SMOOTH 0, Post SMOOTH 3.      Guide  Catheter: EBU 3.75  Wire: Maryam dumas via 2 mm balloon catheter  Pre-dil: 2 x 12 mm balloon throughout the AV groove circumflex and OM1 with restoration of SMOOTH-3 flow  Cangrelor bolus and drip given, heparin given      Impella Placement: Severely elevated EDP, refractory chest pain therefore Impella placed for LV decompression, asked anesthesia to intubate for deeper sedation.  Right CFA angiogram to assess vessel size, adequate for CP impella  6 fr sheath used to place two perclose in 'preclose' technique  Upsized to 14 fr  Pigtail placed across LV  0.18 impella wire placed into LV  ACT confirmed >250  Impella placed into LV under fluoroscopic guidance  Excellent flow and impella wave form  Placed 23 cm 6 Barbadian sheath for single access and reassessment of coronaries.    RCA intervention  JR4 guide  Maryam black probe proximal cap without any success confirming       Description of Procedure:   After written informed consent was obtained from the patient, patient was brought to the cardiac catheterization laboratory.  Patient was prepped and draped in the usual sterile fashion. Lidocaine 1% was used to infiltrate the right groin for local anesthesia and a 6 Barbadian introducer sheath was inserted into the right femoral artery via ultrasound guidance and micropuncture kit.      Selective coronary angiography performed with JR4 catheter for RCA and JL4 catheter for LCA.  Angiography performed in standard projections.      6 Ukrainian JR4 catheter placed in LV for hemodynamics.    Selective right femoral angiogram done assess anatomy for closure.      Specimen sent to: No specimen collected  Estimated blood loss: 10 cc  Closure: Impella repositioning sheath sutured in place      IV was maintained by RN and moderate conscious sedation of versed and fentanyl was given.  Patient was assessed and monitoring of oxygen, heart rate and blood pressure by nurse and myself during the exam 90 minutes.      Carlitos Sandoval,  MD  03/05/24

## 2024-03-05 NOTE — ED PROVIDER NOTES
Patient Seen in: [facility] Emergency Department      History     Chief Complaint   Patient presents with    Chest Pain Angina     Stated Complaint: CP    Subjective:   HPI    53M hx of HLD, Tobacco use presenting with chest pain. Brought in by EMS. Was at the gym using the rowing machine, then developed severe left sided chest pain. EMS was called, pre-hospital ECG with evidence of possible inferior MI. Was hypotensive, relatively bradycardic - improved with IVF. On arrival he is lethargic, arousable to voice, endorsing ongoing chest pain.      Objective:   History reviewed. No pertinent past medical history.           Past Surgical History:   Procedure Laterality Date    COLONOSCOPY N/A 12/9/2017    Procedure: COLONOSCOPY, POSSIBLE BIOPSY, POSSIBLE POLYPECTOMY 97352;  Surgeon: Byron Plaza MD;  [place] [facility] [place], [facility]                Social History     Socioeconomic History    Marital status:    Tobacco Use    Smoking status: Every Day     Types: Cigarettes    Smokeless tobacco: Never    Tobacco comments:     occ/5 cigs daily\   Vaping Use    Vaping Use: Never used   Substance and Sexual Activity    Alcohol use: No     Alcohol/week: 0.0 standard drinks of alcohol    Drug use: No              Review of Systems    Positive for stated complaint: CP  Other systems are as noted in HPI.  Constitutional and vital signs reviewed.      All other systems reviewed and negative except as noted above.    Physical Exam     ED Triage Vitals [03/05/24 0715]   /83   Pulse 63   Resp 20   Temp    Temp src    SpO2 99 %   O2 Device None (Room air)       Current:/83   Pulse 63   Resp 20   Ht 170.2 cm (5' 7\")   Wt 72.6 kg   SpO2 99%   BMI 25.06 kg/m²         Physical Exam    Constitutional: pale, ill-appearing  HENT: mmm, no lesions,  Neck: normal range of motion, no tenderness, supple.  Eyes: PERRL, EOMI, conjunctiva normal, no discharge. Sclera anicteric.  Cardiovascular: rr no  murmur  -1+ pulses in b/l DP/Pts and b/l radials  Respiratory: Normal breath sounds, no respiratory distress, no wheezing, no chest tenderness.  GI: Bowel sounds normal, Soft, no tenderness, no masses, no pulsatile masses.  : No CVA tenderness.  Skin: cool peripheries  Musculoskeletal: Intact distal pulses, no edema, no tenderness, no cyanosis, no clubbing. Good range of motion in all major joints. No tenderness to palpation or major deformities noted. Back- No tenderness.  Neurologic: lethargic, arousable to voice, moving all four ext  Psych: Calm, cooperative, nl affect        ED Course     Labs Reviewed   POCT GLUCOSE - Abnormal; Notable for the following components:       Result Value    POC Glucose  208 (*)     All other components within normal limits   CBC WITH DIFFERENTIAL WITH PLATELET    Narrative:     The following orders were created for panel order CBC With Differential With Platelet.  Procedure                               Abnormality         Status                     ---------                               -----------         ------                     CBC W/ DIFFERENTIAL[[id number]]                              In process                   Please view results for these tests on the individual orders.   COMP METABOLIC PANEL (14)   TROPONIN I HIGH SENSITIVITY   RAINBOW DRAW LAVENDER   RAINBOW DRAW LIGHT GREEN   RAINBOW DRAW BLUE   RAINBOW DRAW GOLD   RAINBOW DRAW LAVENDER   RAINBOW DRAW LIGHT GREEN   RAINBOW DRAW BLUE   RAINBOW DRAW GOLD   CBC W/ DIFFERENTIAL     EKG    Rate, intervals and axes as noted on EKG Report.  Rate: 61  Rhythm: Sinus Rhythm  Reading: NSR, normal axis Anterior ST depressions V3-V5, TWI AVL, no clear inferior STEVO.                           MDM      53M hx as above, presenting with chest pain. On arrival he is ill-appearing, with borderline hemodynamics. ECG w/ anterior ST changes, not meeting strict stemi criteria.  -Suspicion is for early inferior MI - patient is  ill-appearing with poor peripheral perfusion, and in need of emergent catheterization. Cardiology evaluated patient at the bedside and he was dispositioned to the Cath lab. Hospitalist made aware.     -Bedside echocardiogram w/o obvious RWMA, appearance of preserved EF, no pericardial effusion, no enlargement of aortic outflow tract    I spent a total of 39 minutes of critical care time in obtaining history, performing a physical exam, bedside monitoring of interventions, collecting and interpreting tests and discussion with consultants but not including time spent performing procedures.                             Medical Decision Making      Disposition and Plan     Clinical Impression:  1. NSTEMI (non-ST elevated myocardial infarction) (HCC)         Disposition:  Send to or/cath/gi  3/5/2024  7:35 am    Follow-up:  No follow-up provider specified.        Medications Prescribed:  Current Discharge Medication List

## 2024-03-05 NOTE — ED INITIAL ASSESSMENT (HPI)
Pt arrived via EMS from the gym, per EMS pt was working out at the gym and started to have chest pain, Pt then pass out.  Pt is very lethargic and diatoric.

## 2024-03-05 NOTE — CONSULTS
[facility]  Cardiology Consultation    Cristiano Obregon Patient Status:  Emergency    1970 MRN [id number]   Location [facility] EMERGENCY DEPARTMENT Attending Guerrero Hyman*   Hosp Day # 0 PCP Isauro Pina MD     Date of Admission:  3/5/2024  Date of Consult:  3/5/2024  Reason for Consultation:   Chest discomfort    History of Present Illness:   Patient is a 53-year-old male with a history of hyperlipidemia and tobacco use who presents with acute onset chest discomfort.  Earlier this morning he was using his rowing machine when he developed chest pain, subsequently brought to the hospital where symptoms continue to persist.  No other associated symptoms however difficult to obtain full history as patient not fully responsive.    ECG-sinus rhythm, IVCD, LVH, anterolateral ST depression    Cardiac history:  HLD  Prediabetes, A1c 6.2  Tobacco use    Past Medical History  History reviewed. No pertinent past medical history.    Past Surgical History  Past Surgical History:   Procedure Laterality Date    COLONOSCOPY N/A 2017    Procedure: COLONOSCOPY, POSSIBLE BIOPSY, POSSIBLE POLYPECTOMY 37929;  Surgeon: Byron Plaza MD;  [place] [facility] [place] [facility]     Family History  No family history of heart disease.    Social History  He currently works in human resources department.  He smokes half pack per day.  No alcohol use.  Pediatric History   Patient Parents    Not on file     Other Topics Concern    Not on file   Social History Narrative    Not on file         Current Medications:  Current Facility-Administered Medications   Medication Dose Route Frequency    aspirin chewable tab 324 mg  324 mg Oral Once     (Not in a hospital admission)    Allergies  No Known Allergies    Review of Systems:     14 point review of systems completed and negative except as noted.    Physical Exam:   Patient Vitals for the past 24 hrs:   BP Pulse Resp SpO2 Height Weight    03/05/24 0722 -- -- -- -- 5' 7\" (1.702 m) 160 lb (72.6 kg)   03/05/24 0715 108/83 63 20 99 % -- --     Scheduled Meds:    aspirin  324 mg Oral Once     General: Alert and oriented x 3.  Moderate distress.  HEENT: Normocephalic, anicteric sclera, neck supple, no thyromegaly or adenopathy.  Neck: No JVD, carotids 2+, no bruits.  Cardiac: Regular rate and rhythm. S1, S2 normal. No murmur, pericardial rub, S3, or extra cardiac sounds.  Lungs: Clear without wheezes, rales, rhonchi or dullness.  Normal excursions and effort.  Abdomen: Soft, non-tender. No organosplenomegally, mass or rebound, BS-present.  Extremities: Without clubbing or cyanosis. No edema.    Neurologic: Alert and oriented, normal affect. No focal defects  Skin: Warm and dry.     Results:   Laboratory Data:  Lab Results   Component Value Date    CREATSERUM 0.87 10/06/2021    BUN 18.0 10/06/2021     10/06/2021    K 4.87 10/06/2021     10/06/2021    CO2 26.9 10/06/2021     (H) 10/06/2021    CA 9.8 10/06/2021    ALT 41 10/06/2021    PSA 1.95 10/06/2021         No results for input(s): \"GLU\", \"BUN\", \"CREATSERUM\", \"GFRAA\", \"GFRNAA\", \"CA\", \"NA\", \"K\", \"CL\", \"CO2\" in the last 168 hours.  No results for input(s): \"RBC\", \"HGB\", \"HCT\", \"MCV\", \"MCH\", \"MCHC\", \"RDW\", \"NEPRELIM\", \"WBC\", \"PLT\" in the last 168 hours.    No results for input(s): \"BNPML\" in the last 168 hours.    No results for input(s): \"TROP\", \"CK\" in the last 168 hours.    Imaging:  No results found.    Impression:   NSTEMI  - Acute onset chest discomfort while using the rowing machine, with persistent symptoms at least moderate distress on exam  - Twelve-lead ECG with significant anterolateral ST depression, full blood panel pending  - Received aspirin 325 mg once, will need to increase atorvastatin to high intensity  - Recommend invasive coronary angiogram +/- PCI, patient was agreeable to proceed today and going to cath lab now  - Bedside ultrasound demonstrates normal LV  function, full transthoracic echocardiogram after catheterization  - Further recommendations pending above testing    HLD  - LDL 81, triglycerides 82 on 12/22/2023  - Currently taking atorvastatin 20 mg daily, will increase to high intensity    Thank you for allowing me to participate in the care of your patient.    Gurjit Elliott MD  [facility]  3/5/2024

## 2024-03-05 NOTE — DIETARY NOTE
ADULT NUTRITION INITIAL ASSESSMENT    Pt is at high nutrition risk.  Pt does not meet malnutrition criteria.      RECOMMENDATIONS TO MD: See Nutrition Intervention for EN specifics . Received MD consult to initiate and manage tube feedings. See below for recommendations.    ADMITTING DIAGNOSIS:  NSTEMI (non-ST elevated myocardial infarction) (HCC) [I21.4]  PERTINENT PAST MEDICAL HISTORY: History reviewed. No pertinent past medical history.    PATIENT STATUS: Initial 03/05/24: Pt admit for NSTEMI. No PMH. Received consult to start TF. Discussed with RN via phone call and secure chat. Pt was brought to ED after developing chest pain while working out at the gym. Cardiac alert was called and pt was emergently taken to cath lab s/p balloon. Pending CV sx eval with possible need for CABG. Per RN earliest sx time would be Thursday. No pressors at this time. Sedated on Propofol at 21.8ml/hr (providing 575 kcals from lipids). RN states OG tube is placed, awaiting for CXR placement confirmation. RN states pt will likely remain intubated and sedated until time of CABG. Chart reviewed, no weight loss. Stable since 2016. NPO since arriving today. Will order appropriate labs for tomorrow.     FOOD/NUTRITION RELATED HISTORY:  Appetite: NPO  Intake: NPO  Intake Meeting Needs: NPO and TF will meet needs once at goal rate  Percent Meals Eaten (last 6 days)       None            Food Allergies: No Known Food Allergies (NKFA)  Cultural/Ethnic/Synagogue Preferences: Not Obtained    GASTROINTESTINAL: no GI issues reported per pt/chart; OGT    MEDICATIONS: reviewed   chlorhexidine gluconate  15 mL Mouth/Throat BID@0800,2000    famotidine  20 mg Intravenous BID      cangrelor (Kengreal) 50 mg in sodium chloride 0.9% 250 mL IVPB - BRIDGING/MAINTENANCE dose 0.75 mcg/kg/min (03/05/24 1156)    sodium bicarbonate 25mEq in dextrose 5% 1000mL IMPELLA purge solution 10 mL/hr (03/05/24 1117)    heparin 600 Units/hr (03/05/24 1410)    propofol 50  mcg/kg/min (03/05/24 1142)    fentanyl 25 mcg/hr (03/05/24 1444)       LABS: reviewed- labs ordered for tomorrow  Recent Labs     03/05/24  0723   *   BUN 18   CREATSERUM 1.23   CA 8.6*      K 3.6      CO2 18.0*   OSMOCALC 297*     NUTRITION RELATED PHYSICAL FINDINGS:  - Nutrition Focused Physical Exam (NFPE):  unable to properly assess today as pt intubated and sedated, just back from procedure today. Will re-attempt as able.  - Fluid Accumulation: none  See RN documentation for details  - Skin Integrity: intact See RN documentation for details    ANTHROPOMETRICS:  HT: 170.2 cm (5' 7\")  WT: 79.6 kg (175 lb 7.8 oz)   BMI: Body mass index is 27.49 kg/m².  BMI CLASSIFICATION: 25-29.9 kg/m2 - overweight  IBW: 148 lbs        118% IBW  Usual Body Wt: ~175 lbs per EMR      100% UBW    WEIGHT HISTORY: Weight stable since 2016 per EMR  Patient Weight(s) for the past 336 hrs:   Weight   03/05/24 1324 79.6 kg (175 lb 7.8 oz)   03/05/24 0722 72.6 kg (160 lb)     Wt Readings from Last 10 Encounters:   03/05/24 79.6 kg (175 lb 7.8 oz)   10/06/21 80.5 kg (177 lb 6.4 oz)   09/09/20 78.3 kg (172 lb 9.6 oz)   12/19/19 78.4 kg (172 lb 12.8 oz)   03/04/19 81.2 kg (179 lb)   12/18/17 79 kg (174 lb 3.2 oz)   12/09/17 77.1 kg (170 lb)   10/02/17 80.6 kg (177 lb 12.8 oz)   09/15/17 79.4 kg (175 lb)   08/28/17 80.6 kg (177 lb 12.8 oz)     NUTRITION DIAGNOSIS/PROBLEM:   Inadequate oral intake related to Decreased ability to consume sufficient energy  as evidenced by NPO status    NUTRITION INTERVENTION:     NUTRITION PRESCRIPTION:   Estimated Nutrition needs: --dosing wt of 79.6 kg - wt taken on 3/5  Calories: ~9505-7523 calories/day (22-26 calories per kg Dosing wt) [place] = 1533 kcal/day  Protein:  g protein/day (1.2-1.4 g protein/kg Dosing wt)  Fluid Needs: ~1ml/kcal    - Diet:       Procedures    NPO      - Enteral Nutrition: Promote at 20 ml/hr advance by 10ml q 10 hours to goal rate of 40ml/hr via OG tube  (880 kcal). Based on average 22 hour infusion time. Give prosource 1 pkt TID (120 kcal, 33g protein 135ml fluid),  Goal rate + Prosource + Propofol provides 1575 kcal, 88 grams protein, 873ml total free water.   Water flushes 50ml q 4 hours. With propofol this meets ~90% of minimal energy needs and ~92% of minimum protein needs.   Total free fluid daily = 1173ml    - RD Care Plan:  initiated EN support  - Meals and snacks: NPO  - Medical Food Supplements- NPO. Rational/use of oral supplements discussed.  - Vitamin and mineral supplements: none  - Feeding assistance: NPO  - Nutrition education: not appropriate at this time     - Coordination of nutrition care: collaboration with other providers  - Discharge and transfer of nutrition care to new setting or provider: monitor plans    MONITOR AND EVALUATE/NUTRITION GOALS:  - Food and Nutrient Intake:      Monitor: for PO initiation  - Food and Nutrient Administration:      Monitor: enteral nutrition initiation, tolerance to enteral nutrition, for enteral nutrition adjustment, and propofol rate  - Anthropometric Measurement:    Monitor weight  - Nutrition Goals:      maintain wt within 5%, TF meet >80% of goal within first week , labs within acceptable limits, and support body systems    DIETITIAN FOLLOW UP: RD to follow and monitor nutrition status       Mayela Hallman RD, LDN  Clinical Dietitian  P: [phone]

## 2024-03-05 NOTE — H&P
[facility] Hospitalist H&P       CC: chest pain     PCP: Isauro Pina MD    History of Present Illness:   This is a 53-year-old male with a history of hyperlipidemia, tobacco use, who presents to the hospital for evaluation of acute chest pain.  Majority of history is taken by chart review as patient is currently intubated.  It appears that earlier this morning the patient was exercising on his rowing machine when he developed chest pain.  In the emergency room his symptoms persisted.  He had an EKG which showed signs of acute MI.  Cardiac alert was called and patient was emergently taken to the cardiac Cath Lab.  Postcardiac cath he is now in the ICU on the ventilator, with balloon pump, and pending CV surgery evaluation.  He did have his RCA ballooned, see cath report for further details.  Currently he is not requiring vasopressors.    Review of Systems  Comprehensive ROS reviewed and negative except for what's stated above.     PMH  Hyperlipidemia    PSH  Past Surgical History:   Procedure Laterality Date    COLONOSCOPY N/A 12/9/2017    Procedure: COLONOSCOPY, POSSIBLE BIOPSY, POSSIBLE POLYPECTOMY [id number];  Surgeon: Byron Plaza MD;  Location: [facility]      ALL:  No Known Allergies     Home Medications:  Reviewed     Soc Hx  Social History     Tobacco Use    Smoking status: Every Day     Types: Cigarettes    Smokeless tobacco: Never    Tobacco comments:     occ/5 cigs daily\   Substance Use Topics    Alcohol use: No     Alcohol/week: 0.0 standard drinks of alcohol      Fam Hx  History reviewed. No pertinent family history.    OBJECTIVE:  /83   Pulse 63   Resp 20   Ht 5' 7\" (1.702 m)   Wt 160 lb (72.6 kg)   SpO2 99%   BMI 25.06 kg/m²   General: intubated, sedated  HEENT: ET tube intact  Lungs: clear to ausculation bilaterally  Heart: Regular rate and rhythm  Abdomen: soft  Extremities: No edema  Skin: normal color    Diagnostic Data:    CBC/Chem  Recent Labs   Lab  03/05/24  0723   WBC 7.2   HGB 14.9   MCV 88.2   .0       Recent Labs   Lab 03/05/24  0723      K 3.6      CO2 18.0*   BUN 18   CREATSERUM 1.23   *   CA 8.6*       Recent Labs   Lab 03/05/24  0723   ALT 40   AST 35*   ALB 3.9     ASSESSMENT / PLAN:   This is a 53-year-old male with a history of hyperlipidemia, tobacco use, who presents to the hospital for evaluation of acute chest pain.     STEMI  -taken to cardiac cath this AM  -Multivessel disease including chronically occluded large RCA, 95% sequential LAD stenosis  -flow restored to heavily thrombosed circumflex and OM1  -obtain TTE  -balloon pump per cardiology  -CV surgery consult  -heparin drip   -vent management and sedation per critical care, consulted     Acute hypoxic respiratory failure  -currently on vent, monitor oxygenation   -critical care consulted    DVT prophylaxis: on heparin drip   Code status: FULL    Disposition: ICU    Cayetano Hernandez DO  [facility]

## 2024-03-05 NOTE — PROGRESS NOTES
Dr. Kirk of Pul modified ventilator settings, verbally notified RN. RN notified RT who will modify orders.

## 2024-03-05 NOTE — RESPIRATORY THERAPY NOTE
Pt was intubated in Cath lab by Anesthesiologist. Pt was then placed on the following vent settings per Anesthesiologist:   03/05/24 0805   Vent Information   Vent Mode VC/AC   Settings   FiO2 (%) 50 %   Resp Rate (Set) 12   Vt (Set, mL) 600 mL   Waveform Decelerating ramp   PEEP/CPAP (cm H2O) 5 cm H20     FiO2 weaned to 40% per abg results. Pt was transported to ICU from Cath lab, pt tolerated well, no complications observed. ETT remains secured at 22cm at the lip. RT to continue to monitor.

## 2024-03-06 ENCOUNTER — APPOINTMENT (OUTPATIENT)
Dept: INTERVENTIONAL RADIOLOGY/VASCULAR | Facility: HOSPITAL | Age: 54
End: 2024-03-06
Attending: INTERNAL MEDICINE
Payer: COMMERCIAL

## 2024-03-06 ENCOUNTER — APPOINTMENT (OUTPATIENT)
Dept: ULTRASOUND IMAGING | Facility: HOSPITAL | Age: 54
End: 2024-03-06
Attending: STUDENT IN AN ORGANIZED HEALTH CARE EDUCATION/TRAINING PROGRAM
Payer: COMMERCIAL

## 2024-03-06 ENCOUNTER — APPOINTMENT (OUTPATIENT)
Dept: CV DIAGNOSTICS | Facility: HOSPITAL | Age: 54
End: 2024-03-06
Attending: INTERNAL MEDICINE
Payer: COMMERCIAL

## 2024-03-06 ENCOUNTER — APPOINTMENT (OUTPATIENT)
Dept: GENERAL RADIOLOGY | Facility: HOSPITAL | Age: 54
End: 2024-03-06
Attending: INTERNAL MEDICINE
Payer: COMMERCIAL

## 2024-03-06 ENCOUNTER — APPOINTMENT (OUTPATIENT)
Dept: CV DIAGNOSTICS | Facility: HOSPITAL | Age: 54
End: 2024-03-06
Attending: CLINICAL NURSE SPECIALIST
Payer: COMMERCIAL

## 2024-03-06 NOTE — PLAN OF CARE
To ED via EMS from gym, Pt developed chest pain and passed out, lethargic and diaphoretic in ED. EKG showed NSTEMI     3/5: To cath lab, no stent placement, but Angioplasty performed on suspected culprit artery. CTS consulted for MVD while in procedure, agree w/ restoring flow and supporting hemodynamically first, potential CABG Thursday 3/7 (AM Echo ordered to assess LV function pre-op). Arrived to PCCU w/ Impella in place and intubated. Rt radial A-Line inserted & [place] placed. Cangrelor, Heparin, Propofol & Fentanyl gtt infusing. POCT ACT q2, ABG q4. At end of shift, Impella migrated into Aorta. MD notified, Cath team coming in to assess/replace device.     Maintained patient safety.   Problem: Safety Risk - Non-Violent Restraints  Goal: Patient will remain free from self-harm  Description: INTERVENTIONS:  - Apply the least restrictive restraint to prevent harm  - Notify patient and family of reasons restraints applied  - Assess for any contributing factors to confusion (electrolyte disturbances, delirium, medications)  - Discontinue any unnecessary medical devices as soon as possible  - Assess the patient's physical comfort, circulation, skin condition, hydration, nutrition and elimination needs   - Reorient and redirection as needed  - Assess for the need to continue restraints  Outcome: Progressing     Problem: Patient Centered Care  Goal: Patient preferences are identified and integrated in the patient's plan of care  Description: Interventions:  - What would you like us to know as we care for you?   - Provide timely, complete, and accurate information to patient/family  - Incorporate patient and family knowledge, values, beliefs, and cultural backgrounds into the planning and delivery of care  - Encourage patient/family to participate in care and decision-making at the level they choose  - Honor patient and family perspectives and choices  Outcome: Progressing     Problem: Patient/Family Goals  Goal:  Patient/Family Long Term Goal  Description: Patient's Long Term Goal:     Interventions:  -   - See additional Care Plan goals for specific interventions  Outcome: Progressing  Goal: Patient/Family Short Term Goal  Description: Patient's Short Term Goal:     Interventions:   -   - See additional Care Plan goals for specific interventions  Outcome: Progressing     Problem: RESPIRATORY - ADULT  Goal: Achieves optimal ventilation and oxygenation  Description: INTERVENTIONS:  - Assess for changes in respiratory status  - Assess for changes in mentation and behavior  - Position to facilitate oxygenation and minimize respiratory effort  - Oxygen supplementation based on oxygen saturation or ABGs  - Provide Smoking Cessation handout, if applicable  - Encourage broncho-pulmonary hygiene including cough, deep breathe, Incentive Spirometry  - Assess the need for suctioning and perform as needed  - Assess and instruct to report SOB or any respiratory difficulty  - Respiratory Therapy support as indicated  - Manage/alleviate anxiety  - Monitor for signs/symptoms of CO2 retention  Outcome: Progressing     Problem: CARDIOVASCULAR - ADULT  Goal: Maintains optimal cardiac output and hemodynamic stability  Description: INTERVENTIONS:  - Monitor vital signs, rhythm, and trends  - Monitor for bleeding, hypotension and signs of decreased cardiac output  - Evaluate effectiveness of vasoactive medications to optimize hemodynamic stability  - Monitor arterial and/or venous puncture sites for bleeding and/or hematoma  - Assess quality of pulses, skin color and temperature  - Assess for signs of decreased coronary artery perfusion - ex. Angina  - Evaluate fluid balance, assess for edema, trend weights  Outcome: Progressing  Goal: Absence of cardiac arrhythmias or at baseline  Description: INTERVENTIONS:  - Continuous cardiac monitoring, monitor vital signs, obtain 12 lead EKG if indicated  - Evaluate effectiveness of antiarrhythmic and  heart rate control medications as ordered  - Initiate emergency measures for life threatening arrhythmias  - Monitor electrolytes and administer replacement therapy as ordered  Outcome: Progressing

## 2024-03-06 NOTE — PLAN OF CARE
Problem: RESPIRATORY - ADULT  Goal: Achieves optimal ventilation and oxygenation  Description: INTERVENTIONS:  - Assess for changes in respiratory status  - Assess for changes in mentation and behavior  - Position to facilitate oxygenation and minimize respiratory effort  - Oxygen supplementation based on oxygen saturation or ABGs  - Provide Smoking Cessation handout, if applicable  - Encourage broncho-pulmonary hygiene including cough, deep breathe, Incentive Spirometry  - Assess the need for suctioning and perform as needed  - Assess and instruct to report SOB or any respiratory difficulty  - Respiratory Therapy support as indicated  - Manage/alleviate anxiety  - Monitor for signs/symptoms of CO2 retention  Outcome: Progressing     Pt received intubated with ETT size 7.0 at 22 cm at the lip. MD Kirk adjusted vent settings earlier today by weaning Vt to 400 ml and FiO2 to 30%. ABG Q4. Pt went to CathLab at 20:00, tolerated well during transport and procedure.   Vent settings and readings as follow:       03/05/24 2045   Vent Information   Interface Invasive   Vent Type AV   Vent plugged into main power? Yes   Vent ID    Vent Mode VC/AC   Settings   FiO2 (%) 30 %   Resp Rate (Set) 12   Vt (Set, mL) 400 mL   Waveform Decelerating ramp   PEEP/CPAP (cm H2O) 5 cm H20   Peak Flow LPM 60   Trigger Sensitivity Flow (L/min) 2 L/min   Trigger Sensitivity Pressure (cm H2O) 3 cm H2O   Humidification Heater   H2O Bag Level 1/2 Full   Readings   Total RR 13   Minute Ventilation (L/min) 5.3 L/min   Expiratory Tidal Volume 390 mL   PIP Observed (cm H2O) 18 cm H2O   MAP (cm H2O) 7   PEEP Low (cm H2O) 2 cm H2O   I/E Ratio 1:4.9   Plateau Pressure (cm H2O) 13 cm H2O   Static Compliance (L/cm H2O) 50   Dynamic Compliance (L/cm H2O) 27 L/cm H2O   Airway Resistance 10.9   Alarms   High RR 40   Insp Pressure High (cm H2O) 50 cm H2O   Insp Pressure Low (cm H2O) 8 cm H2O   MV High (L/min) 20 L/min   MV Low (L/min) 3 L/min   Apnea  Interval (sec) 20 seconds   ETT   Placement Date: 03/05/24   Airway Size: 7 mm  Placed By: Anesthesiologist   Secured at (cm) 22 cm   Suctioned? Y   Measured From Lips   Secured Location Right   Secured by Commercial tube gunderson   Req'd equipment at bedside Bag mask

## 2024-03-06 NOTE — RESPIRATORY THERAPY NOTE
Pt received on full support, tolerating well. Minimal secretions, Q4 ABG's done no changes made         03/06/24 0417   Vent Information   Interface Invasive   Vent Type AV   Vent plugged into main power? Yes   Vent ID    Vent Mode VC/AC   Settings   FiO2 (%) 30 %   Resp Rate (Set) 12   Vt (Set, mL) 400 mL   Waveform Decelerating ramp   PEEP/CPAP (cm H2O) 5 cm H20   Peak Flow LPM 60   Trigger Sensitivity Flow (L/min) 2 L/min   Humidification Heater   H2O Bag Level 1/2 Full   Heater Temperature 98.6 °F (37 °C)   Readings   Total RR 18   Minute Ventilation (L/min) 7 L/min   Expiratory Tidal Volume 400 mL   PIP Observed (cm H2O) 16 cm H2O   MAP (cm H2O) 7   I/E Ratio 1:4.6   Plateau Pressure (cm H2O) 12 cm H2O   Static Compliance (L/cm H2O) 48   Dynamic Compliance (L/cm H2O) 39 L/cm H2O

## 2024-03-06 NOTE — PROCEDURES
[facility]  part of [facility]    Cardiac Catheterization Note    Primary Proceduralist: Arsenio Diego MD  Procedure Performed: Impella repositioning   Date of Procedure: 3/5/2024   Indication: CAD  Pre Operative Diagnosis: CAD  Post Operative Diagnosis: CAD  Estimated blood loss: None  Specimens: None    Consent obtained from the patient and documented in the paper chart, unless verbally obtained in an emergency setting.  The benefits and risk of the procedure, including but not limited to infection, bleeding, myocardial infarction, stroke, vascular injury, emergency surgery, renal failure requiring dialysis and death were discussed with the patient. These complications occur in approximately 1-2% of elective procedures, but the risk may be significantly elevated in the setting of acute coronary syndrome, electrical or hemodynamic instability, multivessel disease, reduced LVEF or . The patient consented to any additional procedures performed emergently in order to address a complication or prevent medical deterioration. Viable alternatives were explained to the patient, including continuing medical therapy, with the risks incurred along that course.      Procedure/Technique:    Intervention:  Impella was repositioned under fluoroscopic guidance.     After several attempts, the Impella was able to be advanced into the LV    Hemodynamics were reviewed. CO of 3.5 L on P9. Good waveforms confirmed with pulsatility.    Monitored sedation administered by the cath lab RN, and supervised throughout the procedure by myself. Total time 5 minutes.         A/P:  Impella repositioned under fluorscopy.  TTE tomorrow       Arsenio Diego MD  Interventional Cardiology  [facility]

## 2024-03-06 NOTE — PLAN OF CARE
Impella remains in place after arrival from Cath Lab with no changes. Urine output decreased to 0 during the night. MELISSA LYNNE notified and orders rec'd as per her note. Total of 18cc urine output for my 12hrs. STAT CBC WNL. Awaiting AM BMP as it needed a redraw R/T hemolysis as per lab. BP remaining in the 90's-100's with MAPs 70's currently. Restraints remain in place for tube/line safety.    Problem: Safety Risk - Non-Violent Restraints  Goal: Patient will remain free from self-harm  Description: INTERVENTIONS:  - Apply the least restrictive restraint to prevent harm  - Notify patient and family of reasons restraints applied  - Assess for any contributing factors to confusion (electrolyte disturbances, delirium, medications)  - Discontinue any unnecessary medical devices as soon as possible  - Assess the patient's physical comfort, circulation, skin condition, hydration, nutrition and elimination needs   - Reorient and redirection as needed  - Assess for the need to continue restraints  Outcome: Not Progressing     Problem: RESPIRATORY - ADULT  Goal: Achieves optimal ventilation and oxygenation  Description: INTERVENTIONS:  - Assess for changes in respiratory status  - Assess for changes in mentation and behavior  - Position to facilitate oxygenation and minimize respiratory effort  - Oxygen supplementation based on oxygen saturation or ABGs  - Provide Smoking Cessation handout, if applicable  - Encourage broncho-pulmonary hygiene including cough, deep breathe, Incentive Spirometry  - Assess the need for suctioning and perform as needed  - Assess and instruct to report SOB or any respiratory difficulty  - Respiratory Therapy support as indicated  - Manage/alleviate anxiety  - Monitor for signs/symptoms of CO2 retention  Outcome: Progressing     Problem: CARDIOVASCULAR - ADULT  Goal: Maintains optimal cardiac output and hemodynamic stability  Description: INTERVENTIONS:  - Monitor vital signs, rhythm, and trends  -  Monitor for bleeding, hypotension and signs of decreased cardiac output  - Evaluate effectiveness of vasoactive medications to optimize hemodynamic stability  - Monitor arterial and/or venous puncture sites for bleeding and/or hematoma  - Assess quality of pulses, skin color and temperature  - Assess for signs of decreased coronary artery perfusion - ex. Angina  - Evaluate fluid balance, assess for edema, trend weights  Outcome: Progressing  Goal: Absence of cardiac arrhythmias or at baseline  Description: INTERVENTIONS:  - Continuous cardiac monitoring, monitor vital signs, obtain 12 lead EKG if indicated  - Evaluate effectiveness of antiarrhythmic and heart rate control medications as ordered  - Initiate emergency measures for life threatening arrhythmias  - Monitor electrolytes and administer replacement therapy as ordered  Outcome: Progressing

## 2024-03-06 NOTE — PROGRESS NOTES
Cristiano Estebanadrien Patient Status:  Inpatient    1970 MRN [id number]   Location [facility] 2W/SW Attending Cayetano Hernandez,    Hosp Day # 1 PCP Isauro Pina MD     Critical Care Progress Note      Assessment/Plan:  Acute respiratory failure - intubated in cath lab due to severity of cardiac status, no underlying respiratory disease by my read  - Continue mechanical ventilation until definitive surgical plan is determined  - Hold on weaning at this time  - Prop/Fent  NSTEMI - diagnosed with new multivessel CAD s/p angioplasty and cardiogenic shock requiring impella placement.  Impella repositioned overnight d/t migration to aorta  - Heparin gtt w/ bicarb purge solution  - TTE this AM to evaluate impella placement  - Thoracic surgery evaluation pending  Acute renal failure - suspect from ATN and cardiogenic shock with hypotension. No increase in UOP after 500cc bolus  - Appears dry on exam and with negative filling pressures; repeat 500cc bolus  - Start NS 0.9 @83/hr now  - Renal US  - Check urine lytes  - If UOP not improved later this AM will give larger lasix challenge   FEN/GI  - TF  Tobacco Abuse  - Strongly advise cessation  Proph  - Heparin gtt  - Pepcid  Dispo  - Criticall ill; at risk for worsening - continue ICU level of care  - We will follow along with you      Critical Care Time: 45 minutes    Danial Kirk DO  [facility]  Pulmonary & Critical Care Medicine      Subjective:  Taken back to cath lab overnight for repositioning of impella pump.  New hypotension and renal failure developing overnight    Objective:    Medications:   chlorhexidine gluconate  15 mL Mouth/Throat BID@0800,2000    famotidine  20 mg Intravenous BID       Vent Mode: VC/AC  FiO2 (%):  [30 %-50 %] 30 %  S RR:  [12] 12  S VT:  [400 mL-600 mL] 400 mL  PEEP/CPAP (cm H2O):  [5 cm H20] 5 cm H20  MAP (cm H2O):  [7-9] 7    Intake/Output Summary (Last 24 hours) at 3/6/2024 0781  Last data filed at 3/6/2024  0720  Gross per 24 hour   Intake 1865.78 ml   Output 178 ml   Net 1687.78 ml       /87 (BP Location: Right arm)   Pulse 75   Temp 98.2 °F (36.8 °C) (Temporal)   Resp 18   Ht 5' 7\" (1.702 m)   Wt 180 lb 1.9 oz (81.7 kg)   SpO2 98%   BMI 28.21 kg/m²   Physical Exam:   General: intubated, sedated   HEENT: lips, mucosa, tongue normal. Mallampati 3   Lungs: clear b/l   Chest wall: No tenderness or deformity.   Heart: Regular rate and rhythm, normal S1S2, no murmur.   Abdomen: soft, non-tender, non-distended, positive BS.   Extremity: No clubbing or cyanosis no edema, impella pump in place   Skin: No rashes or lesions.    Recent Labs   Lab 03/06/24  0220   RBC 4.36   HGB 13.0   HCT 38.7*   MCV 88.8   MCH 29.8   MCHC 33.6   RDW 13.6   NEPRELIM 10.99*   WBC 13.4*   .0     Recent Labs   Lab 03/05/24  0723 03/05/24  2102 03/06/24  0555 03/06/24  0651   * 105* 112*  --    BUN 18 30*  --  40*   CREATSERUM 1.23 2.68* 4.07*  --    CA 8.6* 8.4* 8.5*  --    ALB 3.9  --   --   --     140 138  --    K 3.6 3.7  --  5.3*    111 110  --    CO2 18.0* 23.0 21.0  --    ALKPHO 53  --   --   --    AST 35*  --   --   --    ALT 40  --   --   --    BILT 0.6  --   --   --    TP 6.2  --   --   --      Recent Labs   Lab 03/06/24  0407   ABGPHT 7.37   NKBTLE5J 39   CQYHI5Z 137*   ABGHCO3 22.9   SITE Arterial Line   THGB 12.4*     No results for input(s): \"BNP\" in the last 168 hours.  No results for input(s): \"TROP\", \"CK\" in the last 168 hours.    Imaging: I independently visualized all relevant chest imaging in PACS, agree with radiology interpretation except where noted.     with patient/given to family

## 2024-03-06 NOTE — PROGRESS NOTES
[facility] Hospitalist Progress Note     CC: Hospital Follow up    PCP: Isauro Pina MD       Assessment/Plan:   This is a 53-year-old male with a history of hyperlipidemia, tobacco use, who presents to the hospital for evaluation of acute chest pain.      STEMI  -Multivessel disease including chronically occluded large RCA, 95% sequential LAD stenosis on cath 3/5  -flow restored to heavily thrombosed circumflex and OM1  -balloon pump per cardiology  -taken to cath again emergently this AM for right heart cath to accurately obtain hemodynamics. Worsening renal function noted.   -plan for IV hydration with normal saline infusion   -monitor I/O very closely   -heparin drip   -cardiology, nephrology, CV surgery and critical care on consult   -vent management and sedation per critical care    Acute kidney injury  -suspect cardiogenic shock  -component of ATI  -obtain urine studies  -monitor I/O closely  -at high risk of requiring dialysis  -consulted nephrology this AM  -renal ultrasound  -repeat BMP this afternoon     Hematuria  -monitor coags  -monitor hematuria  -could be from trauma from lujan itself  -on heparin drip, would still continue      Acute hypoxic respiratory failure  -currently on vent, monitor oxygenation   -critical care consulted  -continue full vent support today      DVT prophylaxis: on heparin drip   Code status: FULL     Disposition: ICU    Critical care time 35 minutes     Cayetano Hernandez DO  [facility] Hospitalist        Subjective:     Intubated, sedated     OBJECTIVE:    Blood pressure 109/89, pulse 69, temperature 97.3 °F (36.3 °C), temperature source Temporal, resp. rate 17, height 5' 7\" (1.702 m), weight 180 lb 1.9 oz (81.7 kg), SpO2 100%.    Temp:  [96.8 °F (36 °C)-98.4 °F (36.9 °C)] 97.3 °F (36.3 °C)  Pulse:  [55-77] 69  Resp:  [0-20] 17  BP: (100-136)/() 109/89  SpO2:  [98 %-100 %] 100 %  AO: ()/() 116/66  FiO2 (%):  [30 %-40 %] 30  %      Intake/Output:    Intake/Output Summary (Last 24 hours) at 3/6/2024 1054  Last data filed at 3/6/2024 0822  Gross per 24 hour   Intake 2365.78 ml   Output 178 ml   Net 2187.78 ml       Last 3 Weights   03/06/24 0500 180 lb 1.9 oz (81.7 kg)   03/05/24 1324 175 lb 7.8 oz (79.6 kg)   03/05/24 0722 160 lb (72.6 kg)   10/06/21 1541 177 lb 6.4 oz (80.5 kg)   09/09/20 0929 172 lb 9.6 oz (78.3 kg)       /89 (BP Location: Left arm)   Pulse 69   Temp 97.3 °F (36.3 °C) (Temporal)   Resp 17   Ht 5' 7\" (1.702 m)   Wt 180 lb 1.9 oz (81.7 kg)   SpO2 100%   BMI 28.21 kg/m²   General: intubated, sedated  HEENT: ET tube intact  Lungs: clear to ausculation bilaterally  Heart: Regular rate and rhythm  Abdomen: soft  Extremities: No edema  Skin: normal color    Data Review:       Labs:     Recent Labs   Lab 03/05/24  0723 03/06/24  0220   RBC 4.85 4.36   HGB 14.9 13.0   HCT 42.8 38.7*   MCV 88.2 88.8   MCH 30.7 29.8   MCHC 34.8 33.6   RDW 13.0 13.6   NEPRELIM 4.30 10.99*   WBC 7.2 13.4*   .0 155.0         Recent Labs   Lab 03/05/24 0723 03/05/24 2102 03/06/24  0555 03/06/24  0651   * 105* 112*  --    BUN 18 30*  --  40*   CREATSERUM 1.23 2.68* 4.07*  --    EGFRCR 69 27* 16*  --    CA 8.6* 8.4* 8.5*  --     140 138  --    K 3.6 3.7  --  5.3*    111 110  --    CO2 18.0* 23.0 21.0  --        Recent Labs   Lab 03/05/24 0723   ALT 40   AST 35*   ALB 3.9         Imaging:  XR CHEST AP PORTABLE  (CPT=71045)    Addendum Date: 3/6/2024    ADDENDUM:  Enteric tube remains positioned in the distal esophagus approximately 3 cm above the expected location of the GE junction   Dictated by (CST): Reyes Rodriguez MD on 3/06/2024 at 7:45 AM     Finalized by (CST): Reyes Rodriguez MD on 3/06/2024 at 7:46 AM             Result Date: 3/6/2024  PROCEDURE: XR CHEST AP PORTABLE  (CPT=71045) TIME: 613  COMPARISON: [facility], XR CHEST AP PORTABLE (CPT=71045), 3/05/2024, 9:26 AM.  [facility]  [facility], XR CHEST AP PORTABLE (CPT=71045), 3/05/2024, 6:37 PM.  INDICATIONS: Follow up chest pain.  TECHNIQUE:   Single view.   Findings and impression:  Impella was advanced further into the left ventricle  ETT 5.5 cm above the pham  Stable normal heart size.  Stable borderline vascular congestion but there is no edema  Minimal basilar atelectasis.  No consolidation  No pneumothorax or significant effusion     Dictated by (CST): Reyes Rodriguez MD on 3/06/2024 at 7:41 AM     Finalized by (CST): Reyes Rodriguez MD on 3/06/2024 at 7:44 AM          XR CHEST AP PORTABLE  (CPT=71045)    Result Date: 3/5/2024  CONCLUSION:   Nasogastric tube with tip within the distal esophagus and side hole within the mid esophagus.  Advancement recommended.  Remainder of the lines and tubes are otherwise unchanged.    Dictated by (CST): Henri Melgoza MD on 3/05/2024 at 7:30 PM     Finalized by (CST): Henri Melgoza MD on 3/05/2024 at 7:32 PM          XR CHEST AP PORTABLE  (CPT=71045)    Result Date: 3/5/2024  CONCLUSION:  1. Pigtail of Impella device is probably in the LVOT, and the outlet pump is probably in the aortic arch. 2. High position of ET tube approximately 6 cm above pham. 3. High position of nasogastric tube with tip in the distal esophagus just above GE junction.     Dictated by (CST): Fabian Gay MD on 3/05/2024 at 6:54 PM     Finalized by (CST): Fabian Gay MD on 3/05/2024 at 7:07 PM          XR CHEST AP PORTABLE  (CPT=71045)    Result Date: 3/5/2024  CONCLUSION:  1. NG tube terminates at the GE junction should be advanced further into the stomach by at least 10 cm. 2. Normal heart size with mild interstitial edema but improved somewhat since previous study.  No large airspace consolidation or pleural effusion.  No pneumothorax.  ET tube in the trachea the level the clavicles.    Dictated by (CST): Moisés Delgadillo MD on 3/05/2024 at 3:48 PM     Finalized by (CST): Moisés Delgadillo MD on 3/05/2024 at 3:50 PM          US  CAROTID DOPPLER BILAT - DIAG IMG (CPT=93880)    Result Date: 3/5/2024  CONCLUSION:   Distal left internal carotid artery not visualized.  Otherwise no evidence of bilateral hemodynamically significant stenosis.  Nonvisualization right vertebral artery.  Antegrade flow left vertebral artery.    Dictated by (CST): Jcarlos Harmon MD on 3/05/2024 at 2:31 PM     Finalized by (CST): Jcarlos Harmon MD on 3/05/2024 at 2:35 PM          XR CHEST AP PORTABLE  (CPT=71045)    Result Date: 3/5/2024  CONCLUSION:  1. Normal heart size with mild to moderate pulmonary congestive change suggesting acute cardiac failure/fluid overload.  Correlate clinically.  ET tube in the trachea the level of the clavicles.  No pneumothorax or large airspace consolidation.    Dictated by (CST): Moisés Delgadillo MD on 3/05/2024 at 9:47 AM     Finalized by (CST): Moisés Delgadillo MD on 3/05/2024 at 9:50 AM             Meds:      chlorhexidine gluconate  15 mL Mouth/Throat BID@0800,2000    famotidine  20 mg Intravenous BID      norepinephrine      sodium chloride 83 mL/hr at 03/06/24 0854    cangrelor (Kengreal) 50 mg in sodium chloride 0.9% 250 mL IVPB - BRIDGING/MAINTENANCE dose 0.75 mcg/kg/min (03/06/24 0400)    sodium bicarbonate 25mEq in dextrose 5% 1000mL IMPELLA purge solution 10 mL/hr (03/05/24 1117)    heparin 600 Units/hr (03/06/24 0400)    propofol 50 mcg/kg/min (03/06/24 0643)    fentanyl 50 mcg/hr (03/06/24 0400)    dextrose 10%       fentaNYL **OR** fentaNYL, acetaminophen **OR** acetaminophen **OR** acetaminophen **OR** acetaminophen, polyethylene glycol (PEG 3350), senna, bisacodyl, fleet enema, fentaNYL (Sublimaze) **OR** fentaNYL (Sublimaze), fentanyl, dextrose 10%, lipase-protease-amylase (Lip-Prot-Amyl) **AND** sodium bicarbonate, furosemide

## 2024-03-06 NOTE — CONSULTS
[facility]  part of [facility]    Report of Consultation    Cristiano Obregon Patient Status:  Inpatient    1970 MRN [id number]   Location [facility] 2W/SW Attending Cayetano Hernandez, DO   Hosp Day # 1 PCP Isauro Pina MD     Date of Admission:  3/5/2024  Date of Consult:  3/5/2024    Reason for Consultation:  ST elevation myocardial infarction    History of Present Illness:  Cristiano Obregon is a a(n) 53 year old male.  History from chart review and discussion with family.  Patient was at [facility] this morning when after part of the workout, he became obtunded, short of breath and slightly unresponsive.  Brought to the emergency room and found to have a ST ovation myocardial infarction cardiac alert was activated.  Patient is a cardiac catheterization lab found to have a flush occlusion 100% of the circumflex artery as well as some disease in his left anterior descending artery and right coronary artery.  Balloon angioplasty and thrombectomy was performed of the circumflex artery improving to flow, Impella was placed and we were asked evaluate for surgical revascularization options.    Per the patient's , he takes approximately 16 over-the-counter capsules of fish oil a day.  He smokes marijuana daily and smokes about a half a pack of cigarettes per day.  Otherwise no significant medical history.  No significant surgical history.    At time my evaluation, he is intubated with an Impella in place through the right femoral artery.  He is on no inotropes or vasopressors.  Heart rate is in the 80s, systolic blood pressures in the 110s to 120s.    History:  History reviewed. No pertinent past medical history.  Past Surgical History:   Procedure Laterality Date    COLONOSCOPY N/A 2017    Procedure: COLONOSCOPY, POSSIBLE BIOPSY, POSSIBLE POLYPECTOMY 85388;  Surgeon: Byron Plaza MD;  Location: [facility]     History reviewed. No pertinent family  history.   reports that he has been smoking cigarettes. He has never used smokeless tobacco. He reports that he does not drink alcohol and does not use drugs.    Allergies:  No Known Allergies    Medications:    Current Facility-Administered Medications:     norepinephrine (Levophed) 4 mg/250mL infusion premix, 0.5-30 mcg/min, Intravenous, Continuous    cangrelor (Kengreal) 50 mg in sodium chloride 0.9% 250 mL IVPB - BRIDGING/MAINTENANCE dose, 0.75 mcg/kg/min, Intravenous, Continuous    sodium bicarbonate 25mEq in dextrose 5% 1000mL IMPELLA purge solution, 3-30 mL/hr, Intravenous, Continuous    heparin (Porcine) 25,000 Units/250mL infusion premix, 50-1,000 Units/hr, Intravenous, Continuous    fentaNYL (Sublimaze) 50 mcg/mL injection 25 mcg, 25 mcg, Intravenous, Q30 Min PRN **OR** fentaNYL (Sublimaze) 50 mcg/mL injection 50 mcg, 50 mcg, Intravenous, Q30 Min PRN    acetaminophen (Tylenol) tab 650 mg, 650 mg, Oral, Q4H PRN **OR** acetaminophen (Tylenol) 160 MG/5ML oral liquid 650 mg, 650 mg, Oral, Q4H PRN **OR** acetaminophen (Tylenol) rectal suppository 650 mg, 650 mg, Rectal, Q4H PRN **OR** acetaminophen (Ofirmev) 10 mg/mL infusion premix 1,000 mg, 1,000 mg, Intravenous, Q6H PRN    polyethylene glycol (PEG 3350) (Miralax) 17 g oral packet 17 g, 17 g, Oral, Daily PRN    senna (Senokot) 8.8 MG/5ML oral syrup 17.6 mg, 10 mL, Oral, Nightly PRN    bisacodyl (Dulcolax) 10 MG rectal suppository 10 mg, 10 mg, Rectal, Daily PRN    fleet enema (Fleet) 7-19 GM/118ML rectal enema 133 mL, 1 enema, Rectal, Once PRN    chlorhexidine gluconate (Peridex) 0.12 % oral solution 15 mL, 15 mL, Mouth/Throat, BID@0800,2000    propofol (Diprivan) 10 mg/mL infusion premix, 5-50 mcg/kg/min, Intravenous, Continuous    famotidine (Pepcid) 20 mg/2mL injection 20 mg, 20 mg, Intravenous, BID    fentaNYL (Sublimaze) 25 mcg BOLUS FROM BAG infusion, 25 mcg, Intravenous, Q30 Min PRN **OR** fentaNYL (Sublimaze) 50 mcg BOLUS FROM BAG infusion, 50 mcg,  Intravenous, Q30 Min PRN    fentaNYL in sodium chloride 0.9% (Sublimaze) 1000 mcg/100mL infusion premix,  mcg/hr, Intravenous, Continuous PRN    dextrose 10% infusion (TPN no rate), , Intravenous, Continuous PRN    pancrelipase (Lip-Prot-Amyl) (Zenpep) DR particles cap 10,000 Units, 10,000 Units, Per G Tube, PRN **AND** sodium bicarbonate tab 325 mg, 325 mg, Oral, PRN    furosemide (Lasix) 10 mg/mL injection 20 mg, 20 mg, Intravenous, Once PRN    Review of Systems:  Review of systems not obtained due to patient factors.  Intubated, Impella in place    Physical Exam:  Blood pressure 110/87, pulse 75, temperature 98.2 °F (36.8 °C), temperature source Temporal, resp. rate 18, height 5' 7\" (1.702 m), weight 180 lb 1.9 oz (81.7 kg), SpO2 98%.    GENERAL: No acute distress.  Debated, sedated  VITAL SIGNS: See above.  HEENT: Trachea midline.  No jugular venous distention.  No carotid bruit.  HEART: Regular rate and rhythm, Impella hum auscultated  LUNGS: Clear to auscultation bilaterally, mechanical breath sounds   ABDOMEN: Soft, nontender nondistended.  VASCULAR: Palpable radial artery pulses 2+ bilateral.  SKIN: No rash, no excessive bruising, petechiae, or purpura.  NEUROLOGIC: Sedated      Laboratory Data:  Angiography personally reviewed  Echocardiogram reviewed, ejection fraction 50 to 55%, Impella in place no wall motion normality.  Troponin less than 200 on admission.  Repeat is pending.    Impression and Plan:  Patient Active Problem List   Diagnosis    Hypercholesteremia    Floaters in visual field, bilateral    Acne, unspecified acne type    Eczema, unspecified type    Paresthesia of left thumb    Chest pain    NSTEMI (non-ST elevated myocardial infarction) (HCC)       53-year-old male with STEMI status post balloon angioplasty and Impella placement    Will follow clinical course closely determine timing of revascularization, onset of symptoms was approximately 6 AM and flow was reestablished around 9 to  10 AM today.  Plan to repeat echocardiogram tomorrow to determine function as well as monitor his troponin and other laboratory studies.      I will discussion with the family and the patient's  at the bedside.  Using pictures, I explained the nature his heart disease.  I explained his current disease.  I explained treatement options of medical management, high risk PCI and surgical revascularization.  I explained the operation, median sternotomy, use of cardiopulmonary bypass machine, and cardiac arrest.  I explained the bypass operation, conduit selection, long term patency rates of mammary artery and reverse saphenous vein.  We discussed benefits. We discussed risks including but not limited to: bleeding, coagulopathy, transfusion (to which he agrees to accept after discussing risks of transfusion), infection, sternal dehiscence, prolonged ventilation, myocardial infarction, stroke, arrhythmia, atrial fibrillation, kidney injury, dialysis, multisystem organ dysfunction, imponderables and death.      Richie Llanes MD  Cardiothoracic Surgery  [facility]      Time spent on counseling/coordination of care:  77741- 80 min    Total time spent with patient:  1 Hour    Richie Llanes MD  3/6/2024  7:54 AM

## 2024-03-06 NOTE — PLAN OF CARE
Problem: Safety Risk - Non-Violent Restraints  Goal: Patient will remain free from self-harm  Description: INTERVENTIONS:  - Apply the least restrictive restraint to prevent harm  - Notify patient and family of reasons restraints applied  - Assess for any contributing factors to confusion (electrolyte disturbances, delirium, medications)  - Discontinue any unnecessary medical devices as soon as possible  - Assess the patient's physical comfort, circulation, skin condition, hydration, nutrition and elimination needs   - Reorient and redirection as needed  - Assess for the need to continue restraints  3/6/2024 0120 by Mikayla Workman, RN  Outcome: Not Progressing  3/6/2024 0119 by Mikayla Workman, RN  Outcome: Not Progressing

## 2024-03-06 NOTE — PROGRESS NOTES
CV Surgery    Patient seen and examined.  Impella remains in place, became an uric overnight with no urine output throughout the evening.  Creatinine today is 4.  Troponin is over 125,000, unmeasurable.    Discussed with cardiology.  At this time appears to be in cardiogenic shock with evidence of end organ dysfunction and acute renal failure.  Not a candidate for surgical intervention due to extremely high risk of mortality with open heart surgery given active issues.  Recommend evaluation for advanced PCI and cardiac monitoring.  Discussed with cardiology team this morning.  Will follow closely.    Richie Llanes MD  Cardiothoracic Surgery  [facility]

## 2024-03-06 NOTE — CM/SW NOTE
Cristiano is currently intubated with and impalla heart pump.  Plan will be revascularization with CABG vs PCI once renal function improves.    CM/SW will follow and assist with dc planning needs.    / to remain available for support and/or discharge planning.      Mary Jane Infante MBA BSN RN CRRN   RN Case Manager  [phone]

## 2024-03-06 NOTE — DIETARY NOTE
ADULT NUTRITION INITIAL BRIEF UPDATE    Pt is at high nutrition risk.  Pt does not meet malnutrition criteria.      RECOMMENDATIONS TO MD: See Nutrition Intervention for EN specifics . Received MD consult to initiate and manage tube feedings yesterday 3/5. Pt with new developments, TF not appropriate at this time. Discussed with RN. Hold off until more metabolically/hemodynamically stable.     ADMITTING DIAGNOSIS:  NSTEMI (non-ST elevated myocardial infarction) ([facility]) [I21.4]  PERTINENT PAST MEDICAL HISTORY: History reviewed. No pertinent past medical history.    PATIENT STATUS: Initial 03/05/24: Pt admit for NSTEMI. No PMH. Received consult to start TF. Discussed with RN via phone call and secure chat. Pt was brought to ED after developing chest pain while working out at the gym. Cardiac alert was called and pt was emergently taken to cath lab s/p balloon. Pending CV sx eval with possible need for CABG. Per RN earliest sx time would be Thursday. No pressors at this time. Sedated on Propofol at 21.8ml/hr (providing 575 kcals from lipids). RN states OG tube is placed, awaiting for CXR placement confirmation. RN states pt will likely remain intubated and sedated until time of CABG. Chart reviewed, no weight loss. Stable since 2016. NPO since arriving today. Will order appropriate labs for tomorrow.    3/6 Brief update: RN reports issues with placing OG yesterday. TF never started. Plans for swan and cath today. Went back to cath lab for impella repositioning last night. Pt is anuric now. New labs ordered d/t hemolysis. New hypotension and renal failure developing overnight per MD chart notes. K elevated. Pt not appropriate for TF at this time. Will hold off and DC orders until more metabolically stable. Discussed with RN. Propofol running at 21.8ml/hr providing 575 kcal from lipids. IVF started. No pressors running.     GASTROINTESTINAL: no GI issues reported per pt/chart, anuric    MEDICATIONS: reviewed   sodium  chloride  500 mL Intravenous Once    chlorhexidine gluconate  15 mL Mouth/Throat BID@0800,2000    famotidine  20 mg Intravenous BID      norepinephrine      sodium chloride 83 mL/hr at 03/06/24 0854    cangrelor (Kengreal) 50 mg in sodium chloride 0.9% 250 mL IVPB - BRIDGING/MAINTENANCE dose 0.75 mcg/kg/min (03/06/24 0400)    sodium bicarbonate 25mEq in dextrose 5% 1000mL IMPELLA purge solution 10 mL/hr (03/05/24 1117)    heparin 600 Units/hr (03/06/24 0400)    propofol 50 mcg/kg/min (03/06/24 0643)    fentanyl 50 mcg/hr (03/06/24 0400)    dextrose 10%         LABS: reviewed- labs ordered. Awaiting new draw d/t hemolysis  Recent Labs     03/05/24  0723 03/05/24 2102 03/06/24  0555 03/06/24  0651   * 105* 112*  --    BUN 18 30*  --  40*   CREATSERUM 1.23 2.68* 4.07*  --    CA 8.6* 8.4* 8.5*  --    MG  --   --  1.8  --     140 138  --    K 3.6 3.7  --  5.3*    111 110  --    CO2 18.0* 23.0 21.0  --    PHOS  --   --  4.3  --    OSMOCALC 297* 297*  --   --      NUTRITION RELATED PHYSICAL FINDINGS:  - Nutrition Focused Physical Exam (NFPE): unable to properly assess today as pt intubated and sedated, just back from procedure today. Will re-attempt as able.  - Fluid Accumulation: none  See RN documentation for details  - Skin Integrity: intact See RN documentation for details    ANTHROPOMETRICS:  HT: 170.2 cm (5' 7\")  WT: 81.7 kg (180 lb 1.9 oz)   BMI: Body mass index is 28.21 kg/m².  BMI CLASSIFICATION: 25-29.9 kg/m2 - overweight  IBW: 148 lbs        118% IBW  Usual Body Wt: ~175 lbs per EMR      100% UBW    MONITOR AND EVALUATE/NUTRITION GOALS:  - Food and Nutrient Intake:      Monitor: for PO initiation  - Food and Nutrient Administration:      Monitor: need for temporary enteral nutrition and propofol rate  - Anthropometric Measurement:    Monitor weight  - Nutrition Goals:      maintain wt within 5%, labs within acceptable limits, and support body systems, and need for temporary EN support      DIETITIAN FOLLOW UP: RD to follow and monitor nutrition status       Mayela Hallman RD, LDN  Clinical Dietitian  P: [phone]

## 2024-03-06 NOTE — PLAN OF CARE
Notified by RN around approx 1800 of diminished UOP with impella. Assessed at bedside, UOP 5mL over 90 minute period. Previously adequate UOP. Impella with stable waveforms, no alarms per RN, vitals stable.     Discussed with Dr. Cano at bedside. Ordered IV lasix 20mg x 1 dose with additional PRN dose to be given 6 hours later if UOP diminished again. Repeat BMP 2200.     Notified by [facility] APN that a page was received from RN stating that impella rep confirmed with RN that impella is located in the aorta. Returned to bedside where it was noticed that impella waveform is now dampened and alarming. Discussed with Dr. Sandoval via phone, stat CXR ordered which confirms in aorta. Picture sent to Lori.     Plan:  -stat return to cath lab to attempt to advance the impella or replace if needed  -Nursing supervisor notified, cath lab team contacted by her  -Discussed with Dr. Sandoval and Dr. Brandie Garza, APRN  03/05/24  7:09 PM  [phone] [place]  [phone] rosalba

## 2024-03-06 NOTE — PLAN OF CARE
Problem: RESPIRATORY - ADULT  Goal: Achieves optimal ventilation and oxygenation  Description: INTERVENTIONS:  - Assess for changes in respiratory status  - Assess for changes in mentation and behavior  - Position to facilitate oxygenation and minimize respiratory effort  - Oxygen supplementation based on oxygen saturation or ABGs  - Provide Smoking Cessation handout, if applicable  - Encourage broncho-pulmonary hygiene including cough, deep breathe, Incentive Spirometry  - Assess the need for suctioning and perform as needed  - Assess and instruct to report SOB or any respiratory difficulty  - Respiratory Therapy support as indicated  - Manage/alleviate anxiety  - Monitor for signs/symptoms of CO2 retention  Outcome: Progressing     Patient remains on Full vent support. Transported to and from cath lab for RHC. No SBT was done this today d/t cardiac instability. Moderate amount of thick secretions noted via ETT. No other changes made this shift.    ETT: 7.0 @ 22  AC/VC RR 12 /  / +5 / 30%

## 2024-03-06 NOTE — PLAN OF CARE
CVP 3-4 on return back to the room with marginal bp. Intermittent suction alarms, although appears to have resoled. Scant UO. Reduce Impella to P8. Give 500ml NS over 30 minutes and then continue NS at 100ml/hr. Reviewed with RN and Dr. Cano.     Nichole Randhawa, APRN  3/6/2024  12:44 PM  [phone]  [facility] [phone]

## 2024-03-06 NOTE — PROCEDURES
[facility]  part of [facility]    Cardiac Cath Procedure Note  Cristiano Obregon Patient Status:  Inpatient    1970 MRN [id number]   Location [facility] 2W/SW Attending Cayetano Hernandez DO   Hosp Day # 1 PCP Isauro Pina MD       Cardiologist: Zander Cano DO  Primary Proceduralist: Zander Cano DO  Procedure Performed: RHC  Date of Procedure: 3/6/2024   Indication:  cardiogenic shock, acute myocardial infarction    Summary of findings: Normal right heart catheterization      Hemodynamics:   RIGHT HEART CATHETERIZATION HEMODYNAMICS:  Right Atrial Pressure: 9/7/6 mmHg  Right Ventricular Pressure: 27/5/7 mmHg  Pulmonary Artery Pressure: 26/10/17  mmHg  Wedge Pressure: 13/13/11 mmHg  Cardiac output   Caitlyn CO: 4.5 L/min; Caitlyn CI: 2.3 L/min/m²  Transpulmonary Gradient (PAmean - Wedge mean): 6 mmHg  Pulmonary Vascular Resistance (PA-wedge/CO): 1.3 Wood units  Ao saturation 98%  PA saturation 65%  Hgb 12.6  Heart Rate 69      Assessment:  Normal filling pressures    Recommendations:  - Transfer patient back to ICU with [place]-Ophelia catheter in place  - Give IV 250cc fluid bolus and continue normal saline infusion  - monitor renal function, revascularization with CABG vs PCI once renal function improves       Description of Procedure:   After written informed consent was obtained from the patient, patient was brought to the cardiac catheterization laboratory.  Patient was prepped and draped in the usual sterile fashion. Lidocaine 1% was used to infiltrate the right IJ.  Under ultrasound guidance, micropuncture needle and 5 fr sheath was introduced into the right IJ vein.  8.5 fr sheath placed, CCO swan advanced to RA, RV, PA and wedge position obtaining pressures as described above. Catheter was locked in place at 55cm      Specimen sent to: No specimen collected  Estimated blood loss: 10 cc  Closure:  [place] sutured at 55cm      IV was maintained by RN and sedation with propofol and  fentanyl was continued from ICU.      Zander Cano,   03/06/24

## 2024-03-06 NOTE — PLAN OF CARE
Progress Note  Cristianosneha Estebanadrien Patient Status:  Inpatient    1970 MRN [id number]   Location [facility] 2W/SW Attending Cayetano Hernandez, DO   Hosp Day # 1 PCP Isauro Pina MD       Paged by Primary RN-Ronnie    Primary RN stated pt with only 4 ml of urine output after receiving a 500 ml bolus of 0.9 NS. RN reported urine is brown and hazy. Creatinine also rising-was 1.23 with am labs and is now 2.68.       Assessment/Plan:    - Plan of care discussed with on call cardiologist (Dr. Elliott)  - No change to plan of care for now. Pt to be reassessed by cardiology in am.       ADRIENNE Harrell  [facility] [place]  3/6/2024  12:56 AM      0215:  RNRonnie reported pt becoming hypotensive. RN reported SBP was 120s and is dropping in the mid 90s with MAP in the 70s. Stat CBC ordered. Advised RN to start pressors if SBP < 90.

## 2024-03-06 NOTE — CONSULTS
NEPHROLOGY CONSULT NOTE     DATE: 3/6/2024    Requesting Physician: Dr. Hernandez     Reason for Consult: NORMA     HISTORY OF PRESENT ILLNESS: Cristiano Obregon is a 53 year old male with PMH sig for HLD, tobacco use. Presented with chest pain and was admitted for NSTEMI.  Patient underwent cardiac cath 3/5/24 and found to have severe multivessel disease. Impella was placed. CV surgery evaluated the patient and was deemed not a surgical candidate at this time time due to concerns for cardiogenic shock and NORMA. This AM patient underwent RHC, which showed normal filling pressures.  Patient was started IVFs. Nephrology is consulted for NORMA with creatinine worsening from 1.23 to 4.07 today. UOP has also been decreased. Patient remains in the ICU, intubated and sedated.     MEDICAL HISTORY:   HLD  Tobacco use       SURGICAL HISTORY:   Past Surgical History:   Procedure Laterality Date    COLONOSCOPY N/A 12/9/2017    Procedure: COLONOSCOPY, POSSIBLE BIOPSY, POSSIBLE POLYPECTOMY 69235;  Surgeon: Byron Plaza MD;  [place] [facility]       FAMILY HISTORY: History reviewed. No pertinent family history.    SOCIAL HISTORY:   Social History     Socioeconomic History    Marital status:      Spouse name: Not on file    Number of children: Not on file    Years of education: Not on file    Highest education level: Not on file   Occupational History    Not on file   Tobacco Use    Smoking status: Every Day     Types: Cigarettes    Smokeless tobacco: Never    Tobacco comments:     occ/5 cigs daily\   Vaping Use    Vaping Use: Never used   Substance and Sexual Activity    Alcohol use: No     Alcohol/week: 0.0 standard drinks of alcohol    Drug use: No    Sexual activity: Not on file   Other Topics Concern    Not on file   Social History Narrative    Not on file     Social Determinants of Health     Financial Resource Strain: Not on file   Food Insecurity: Unknown (3/5/2024)    Food Insecurity     Food  Insecurity: Patient unable to answer   Transportation Needs: Unknown (3/5/2024)    Transportation Needs     Lack of Transportation: Patient unable to answer   Physical Activity: Not on file   Stress: Not on file   Social Connections: Not on file   Housing Stability: Unknown (3/5/2024)    Housing Stability     Housing Instability: Patient unable to answer     Housing Instability Emergency: Not on file       MEDICATIONS:   Current Facility-Administered Medications   Medication Dose Route Frequency    norepinephrine (Levophed) 4 mg/250mL infusion premix  0.5-30 mcg/min Intravenous Continuous    sodium chloride 0.9% infusion   Intravenous Continuous    sodium chloride 0.9 % IV bolus 500 mL  500 mL Intravenous Once    cangrelor (Kengreal) 50 mg in sodium chloride 0.9% 250 mL IVPB - BRIDGING/MAINTENANCE dose  0.75 mcg/kg/min Intravenous Continuous    sodium bicarbonate 25mEq in dextrose 5% 1000mL IMPELLA purge solution  3-30 mL/hr Intravenous Continuous    heparin (Porcine) 25,000 Units/250mL infusion premix  50-1,000 Units/hr Intravenous Continuous    fentaNYL (Sublimaze) 50 mcg/mL injection 25 mcg  25 mcg Intravenous Q30 Min PRN    Or    fentaNYL (Sublimaze) 50 mcg/mL injection 50 mcg  50 mcg Intravenous Q30 Min PRN    acetaminophen (Tylenol) tab 650 mg  650 mg Oral Q4H PRN    Or    acetaminophen (Tylenol) 160 MG/5ML oral liquid 650 mg  650 mg Oral Q4H PRN    Or    acetaminophen (Tylenol) rectal suppository 650 mg  650 mg Rectal Q4H PRN    Or    acetaminophen (Ofirmev) 10 mg/mL infusion premix 1,000 mg  1,000 mg Intravenous Q6H PRN    polyethylene glycol (PEG 3350) (Miralax) 17 g oral packet 17 g  17 g Oral Daily PRN    senna (Senokot) 8.8 MG/5ML oral syrup 17.6 mg  10 mL Oral Nightly PRN    bisacodyl (Dulcolax) 10 MG rectal suppository 10 mg  10 mg Rectal Daily PRN    fleet enema (Fleet) 7-19 GM/118ML rectal enema 133 mL  1 enema Rectal Once PRN    chlorhexidine gluconate (Peridex) 0.12 % oral solution 15 mL  15 mL  Mouth/Throat BID@0800,2000    propofol (Diprivan) 10 mg/mL infusion premix  5-50 mcg/kg/min Intravenous Continuous    famotidine (Pepcid) 20 mg/2mL injection 20 mg  20 mg Intravenous BID    fentaNYL (Sublimaze) 25 mcg BOLUS FROM BAG infusion  25 mcg Intravenous Q30 Min PRN    Or    fentaNYL (Sublimaze) 50 mcg BOLUS FROM BAG infusion  50 mcg Intravenous Q30 Min PRN    fentaNYL in sodium chloride 0.9% (Sublimaze) 1000 mcg/100mL infusion premix   mcg/hr Intravenous Continuous PRN    dextrose 10% infusion (TPN no rate)   Intravenous Continuous PRN    pancrelipase (Lip-Prot-Amyl) (Zenpep) DR particles cap 10,000 Units  10,000 Units Per G Tube PRN    And    sodium bicarbonate tab 325 mg  325 mg Oral PRN    furosemide (Lasix) 10 mg/mL injection 20 mg  20 mg Intravenous Once PRN         ALLERGIES: No Known Allergies    REVIEW OF SYSTEMS:  Unable to obtain as patient currently intubated / sedated and critically ill      PHYSICAL EXAM:  /84 (BP Location: Left arm)   Pulse 73   Temp 97.3 °F (36.3 °C) (Temporal)   Resp 17   Ht 5' 7\" (1.702 m)   Wt 180 lb 1.9 oz (81.7 kg)   SpO2 98%   BMI 28.21 kg/m²   GEN: Intubated, sedated   HEENT: NCAT, ETT in place   CHEST: clear mechanical breath sounds   CARDIAC: S1S2 normal  ABD: soft, NT/ND  : lujan in place   EXT:  no lower ext edema  NEURO: sedated       LABS:  Recent Labs   Lab 03/05/24 0723 03/05/24 2102 03/06/24  0555 03/06/24  0651   * 105* 112*  --    BUN 18 30*  --  40*   CREATSERUM 1.23 2.68* 4.07*  --    EGFRCR 69 27* 16*  --    CA 8.6* 8.4* 8.5*  --     140 138  --    K 3.6 3.7  --  5.3*    111 110  --    CO2 18.0* 23.0 21.0  --      Recent Labs   Lab 03/05/24  0723 03/06/24  0220   RBC 4.85 4.36   HGB 14.9 13.0   HCT 42.8 38.7*   MCV 88.2 88.8   MCH 30.7 29.8   MCHC 34.8 33.6   RDW 13.0 13.6   NEPRELIM 4.30 10.99*   WBC 7.2 13.4*   .0 155.0           INTAKE/OUTPUT:    Intake/Output Summary (Last 24 hours) at 3/6/2024  0910  Last data filed at 3/6/2024 0822  Gross per 24 hour   Intake 2365.78 ml   Output 178 ml   Net 2187.78 ml         IMAGING:  XR CHEST AP PORTABLE  (CPT=71045)    Addendum Date: 3/6/2024    ADDENDUM:  Enteric tube remains positioned in the distal esophagus approximately 3 cm above the expected location of the GE junction   Dictated by (CST): Reyes Rodriguez MD on 3/06/2024 at 7:45 AM     Finalized by (CST): Reyes Rodriguez MD on 3/06/2024 at 7:46 AM             Result Date: 3/6/2024  PROCEDURE: XR CHEST AP PORTABLE  (CPT=71045) TIME: 613  COMPARISON: [facility], XR CHEST AP PORTABLE (CPT=71045), 3/05/2024, 9:26 AM.  [facility], XR CHEST AP PORTABLE (CPT=71045), 3/05/2024, 6:37 PM.  INDICATIONS: Follow up chest pain.  TECHNIQUE:   Single view.   Findings and impression:  Impella was advanced further into the left ventricle  ETT 5.5 cm above the pham  Stable normal heart size.  Stable borderline vascular congestion but there is no edema  Minimal basilar atelectasis.  No consolidation  No pneumothorax or significant effusion     Dictated by (CST): Reyes Rodriguez MD on 3/06/2024 at 7:41 AM     Finalized by (CST): Reyes Rodriguez MD on 3/06/2024 at 7:44 AM          XR CHEST AP PORTABLE  (CPT=71045)    Result Date: 3/5/2024  CONCLUSION:   Nasogastric tube with tip within the distal esophagus and side hole within the mid esophagus.  Advancement recommended.  Remainder of the lines and tubes are otherwise unchanged.    Dictated by (CST): Henri Melgoza MD on 3/05/2024 at 7:30 PM     Finalized by (CST): Henri Melgoza MD on 3/05/2024 at 7:32 PM          XR CHEST AP PORTABLE  (CPT=71045)    Result Date: 3/5/2024  CONCLUSION:  1. Pigtail of Impella device is probably in the LVOT, and the outlet pump is probably in the aortic arch. 2. High position of ET tube approximately 6 cm above pham. 3. High position of nasogastric tube with tip in the distal esophagus just above GE junction.     Dictated by  (CST): Fabian Gay MD on 3/05/2024 at 6:54 PM     Finalized by (CST): Fabian Gay MD on 3/05/2024 at 7:07 PM          XR CHEST AP PORTABLE  (CPT=71045)    Result Date: 3/5/2024  CONCLUSION:  1. NG tube terminates at the GE junction should be advanced further into the stomach by at least 10 cm. 2. Normal heart size with mild interstitial edema but improved somewhat since previous study.  No large airspace consolidation or pleural effusion.  No pneumothorax.  ET tube in the trachea the level the clavicles.    Dictated by (CST): Moisés Delgadillo MD on 3/05/2024 at 3:48 PM     Finalized by (CST): Moisés Delgadillo MD on 3/05/2024 at 3:50 PM          US CAROTID DOPPLER BILAT - DIAG IMG (CPT=93880)    Result Date: 3/5/2024  CONCLUSION:   Distal left internal carotid artery not visualized.  Otherwise no evidence of bilateral hemodynamically significant stenosis.  Nonvisualization right vertebral artery.  Antegrade flow left vertebral artery.    Dictated by (CST): Jcarlos Harmon MD on 3/05/2024 at 2:31 PM     Finalized by (CST): Jcarlos Harmon MD on 3/05/2024 at 2:35 PM          XR CHEST AP PORTABLE  (CPT=71045)    Result Date: 3/5/2024  CONCLUSION:  1. Normal heart size with mild to moderate pulmonary congestive change suggesting acute cardiac failure/fluid overload.  Correlate clinically.  ET tube in the trachea the level of the clavicles.  No pneumothorax or large airspace consolidation.    Dictated by (CST): Moisés Delgadillo MD on 3/05/2024 at 9:47 AM     Finalized by (CST): Moisés Delgadillo MD on 3/05/2024 at 9:50 AM              ASSESSMENT AND PLAN:   This is a 53 year old male with PMH sig for HLD, tobacco use. Presented with chest pain and was admitted for NSTEMI.  Nephrology is consulted for NORMA     Oliguric NORMA  - creatinine worsening from 1.23 to 4.07 today  - suspect due to cardiogenic shock / ATN   - obtain urine lytes   - await results of renal US   - RHC showed normal filling pressures - agree with IVFs.   Also received 80 IV lasix challenge per ICU given declining UOP.    - maintain adequate renal perfusion   - may need CRRT if renal fxn continues to decline / UOP does not improve.   - await repeat BMP results   - follow renal fxn and I/Os   - renally dose meds     Hyperkalemia   - due to NORMA, s/p IV lasix as above   - await repeat labs     NSTEMI   - per cards / CV surgery     Acute respiratory failure  - vent management per ICU      Called patient's  / POA -Reyes to provide update, however received busy signal. Will try to reach  at a later time.     ADDENDUM: spoke to  /POA - he is agreeable to CRRT when indicated.      Dw Dr. Hernandez and RN     Thank you for the consult and allowing us to participate in the care of Cristiano WILHELM Mindi.  We will continue to follow.    MD Estela Saini - Nephrology  3/6/2024

## 2024-03-07 ENCOUNTER — APPOINTMENT (OUTPATIENT)
Dept: INTERVENTIONAL RADIOLOGY/VASCULAR | Facility: HOSPITAL | Age: 54
End: 2024-03-07
Attending: INTERNAL MEDICINE
Payer: COMMERCIAL

## 2024-03-07 ENCOUNTER — APPOINTMENT (OUTPATIENT)
Dept: GENERAL RADIOLOGY | Facility: HOSPITAL | Age: 54
End: 2024-03-07
Attending: RADIOLOGY
Payer: COMMERCIAL

## 2024-03-07 ENCOUNTER — APPOINTMENT (OUTPATIENT)
Dept: CV DIAGNOSTICS | Facility: HOSPITAL | Age: 54
End: 2024-03-07
Attending: INTERNAL MEDICINE
Payer: COMMERCIAL

## 2024-03-07 NOTE — PROGRESS NOTES
[facility]  Cardiology Progress Note    Cristiano CHOCO Moralezmesfin Patient Status:  Inpatient    1970 MRN [id number]   Location [facility] 2W/SW Attending Cayetano Hernandez, DO   Hosp Day # 2 PCP Isauro Pina MD     Subjective     Intubated, Impella P8.  Sedated.    Objective:   Patient Vitals for the past 24 hrs:   BP Temp Temp src Pulse Resp SpO2 Weight   24 1000 -- 98.4 °F (36.9 °C) -- -- -- -- --   24 0900 105/68 98.1 °F (36.7 °C) -- 73 15 99 % --   24 0800 103/78 98.7 °F (37.1 °C) -- 75 15 99 % --   24 0700 107/73 99.1 °F (37.3 °C) Pulmonary Ar 76 15 98 % --   24 0600 107/70 99.3 °F (37.4 °C) Pulmonary Ar 80 15 99 % 183 lb 3.2 oz (83.1 kg)   24 0500 98/75 99.2 °F (37.3 °C) Pulmonary Ar 74 14 98 % --   24 0441 -- -- -- 74 15 99 % --   24 0400 95/73 99.1 °F (37.3 °C) Pulmonary Ar 74 13 98 % --   24 0300 97/68 99.1 °F (37.3 °C) Pulmonary Ar 72 14 99 % --   24 0200 99/70 99.1 °F (37.3 °C) Pulmonary Ar 72 14 99 % --   24 0100 93/70 99.2 °F (37.3 °C) Pulmonary Ar 75 14 99 % --   24 0039 -- -- -- 75 14 99 % --   24 0000 95/67 99.3 °F (37.4 °C) Pulmonary Ar 76 15 99 % --   24 2300 103/64 99.6 °F (37.6 °C) Pulmonary Ar 77 16 98 % --   24 2200 100/71 99.7 °F (37.6 °C) Pulmonary Ar 77 15 99 % --   24 2100 101/68 99.9 °F (37.7 °C) Pulmonary Ar 75 15 99 % --   24 101/75 99.9 °F (37.7 °C) Pulmonary Ar 72 15 99 % --   24 1900 107/75 -- -- 71 16 99 % --   24 1800 107/73 -- -- 74 15 99 % --   24 1700 97/78 99.4 °F (37.4 °C) Temporal 73 15 99 % --   24 1600 101/73 -- -- 71 15 99 % --   24 1500 110/74 -- -- 68 15 100 % --   24 1400 100/70 -- -- 70 15 100 % --   24 1300 122/78 -- -- 60 12 100 % --   24 1200 99/78 97.8 °F (36.6 °C) Temporal 63 14 100 % --     Intake/Output:   Last 3 shifts:   Intake/Output                   24 0700 - 24  0659 03/06/24 0700 - 03/07/24 0659 03/07/24 0700 - 03/08/24 0659       Intake    P.O.  0  0  --    P.O. 0 0 --    I.V.  1810  5162  53.3    I.V. 823.2 2537.6 --    Volume (mL) Cangrelor 328.1 563.7 --    Volume (mL) Heparin 73.4 221.7 --    Volume (mL) Propofol 445.7 530.6 --    Volume (mL) Fentanyl 12.6 100.4 --    Amount given (ml) 11 102.6 53.3    Volume (mL) Impella Non-Heparin   (sodium bicarbonate 25mEq in dextrose 5% 1000mL IMPELLA purge solution) 116 105.4 --    Volume (mL) (sodium chloride 0.9 % IV bolus 500 mL) -- 500 --    Volume (mL) (sodium chloride 0.9 % IV bolus 500 mL) -- 500 --    NG/GT  --  0  --    Intake (mL) (NG/OG Tube Nasogastric Right nostril) -- 0 --    Total Intake 1810 5162 53.3       Output    Urine  178  40  --    Output (mL) (Urethral Catheter Latex) 178 40 --    Emesis/NG output  --  0  --    Output (mL) (NG/OG Tube Nasogastric Right nostril) -- 0 --    Total Output 178 40 --       Net I/O     1632 5122 53.3             Vent Settings: Vent Mode: VC/AC  FiO2 (%):  [30 %] 30 %  S RR:  [12] 12  S VT:  [400 mL-500 mL] 400 mL  PEEP/CPAP (cm H2O):  [5 cm H20] 5 cm H20  MAP (cm H2O):  [6-7] 7    Hemodynamic parameters (last 24 hours): PAP: (24-34)/(11-16) 32/14  CO:  [4.5 L/min-7.1 L/min] 5.6 L/min  CI:  [2.3 L/min/m2-3.7 L/min/m2] 2.9 L/min/m2    Scheduled Meds:    famotidine  20 mg Intravenous Daily    chlorhexidine gluconate  15 mL Mouth/Throat BID@0800,2000       Continuous Infusions:    NxStage Pure Flow 400 CRRT/PIRRT fluid 5000mL      norepinephrine      sodium chloride 50 mL/hr at 03/07/24 0611    cangrelor (K[place]real) 50 mg in sodium chloride 0.9% 250 mL IVPB - BRIDGING/MAINTENANCE dose 0.75 mcg/kg/min (03/06/24 1950)    sodium bicarbonate 25mEq in dextrose 5% 1000mL IMPELLA purge solution 10 mL/hr (03/05/24 1117)    heparin 700 Units/hr (03/06/24 2309)    propofol 50 mcg/kg/min (03/07/24 0734)    fentanyl 50 mcg/hr (03/06/24 1450)    dextrose 10%         Results:     Lab Results    Component Value Date    WBC 8.6 03/07/2024    HGB 9.8 (L) 03/07/2024    HCT 29.8 (L) 03/07/2024    PLT 96.0 (L) 03/07/2024    CREATSERUM 6.67 (H) 03/07/2024    BUN 49 (H) 03/07/2024     03/07/2024    K 5.2 (H) 03/07/2024     (H) 03/07/2024    CO2 19.0 (L) 03/07/2024    GLU 94 03/07/2024    CA 7.7 (L) 03/07/2024    ALB 2.9 (L) 03/07/2024    ALKPHO 53 03/05/2024    BILT 0.6 03/05/2024    TP 6.2 03/05/2024    AST 35 (H) 03/05/2024    ALT 40 03/05/2024    PTT 61.0 (H) 03/07/2024    INR 1.06 03/07/2024    PSA 1.95 10/06/2021    MG 1.8 03/07/2024    PHOS 4.9 03/07/2024       Recent Labs   Lab 03/06/24  1204 03/06/24  1808 03/07/24  0458   * 95 94   BUN 43* 47* 49*   CREATSERUM 4.84* 5.45* 6.67*   CA 8.1* 7.8* 7.7*    140 140   K 5.1 5.1 5.2*    114* 115*   CO2 21.0 20.0* 19.0*     Recent Labs   Lab 03/05/24  0723 03/06/24  0220 03/07/24  0458   RBC 4.85 4.36 3.26*   HGB 14.9 13.0 9.8*   HCT 42.8 38.7* 29.8*   MCV 88.2 88.8 91.4   MCH 30.7 29.8 30.1   MCHC 34.8 33.6 32.9   RDW 13.0 13.6 14.1   NEPRELIM 4.30 10.99* 6.62   WBC 7.2 13.4* 8.6   .0 155.0 96.0*       No results for input(s): \"BNPML\" in the last 168 hours.    No results for input(s): \"TROP\", \"CK\" in the last 168 hours.    XR CHEST AP PORTABLE  (CPT=71045)    Addendum Date: 3/6/2024    ADDENDUM:  Enteric tube remains positioned in the distal esophagus approximately 3 cm above the expected location of the GE junction   Dictated by (CST): Reyes Rodriguez MD on 3/06/2024 at 7:45 AM     Finalized by (CST): Reyes Rodriguez MD on 3/06/2024 at 7:46 AM             Result Date: 3/6/2024  PROCEDURE: XR CHEST AP PORTABLE  (CPT=71045) TIME: 613  COMPARISON: [facility], XR CHEST AP PORTABLE (CPT=71045), 3/05/2024, 9:26 AM.  [facility], XR CHEST AP PORTABLE (CPT=71045), 3/05/2024, 6:37 PM.  INDICATIONS: Follow up chest pain.  TECHNIQUE:   Single view.   Findings and impression:  Impella was advanced further  into the left ventricle  ETT 5.5 cm above the pham  Stable normal heart size.  Stable borderline vascular congestion but there is no edema  Minimal basilar atelectasis.  No consolidation  No pneumothorax or significant effusion     Dictated by (CST): Reyes Rodriguez MD on 3/06/2024 at 7:41 AM     Finalized by (CST): Reyes Rodriguez MD on 3/06/2024 at 7:44 AM          XR CHEST AP PORTABLE  (CPT=71045)    Result Date: 3/5/2024  CONCLUSION:   Nasogastric tube with tip within the distal esophagus and side hole within the mid esophagus.  Advancement recommended.  Remainder of the lines and tubes are otherwise unchanged.    Dictated by (CST): Henri Melgoza MD on 3/05/2024 at 7:30 PM     Finalized by (CST): Henri Melgoza MD on 3/05/2024 at 7:32 PM          XR CHEST AP PORTABLE  (CPT=71045)    Result Date: 3/5/2024  CONCLUSION:  1. Pigtail of Impella device is probably in the LVOT, and the outlet pump is probably in the aortic arch. 2. High position of ET tube approximately 6 cm above pham. 3. High position of nasogastric tube with tip in the distal esophagus just above GE junction.     Dictated by (CST): Fabian Gay MD on 3/05/2024 at 6:54 PM     Finalized by (CST): Fabian Gay MD on 3/05/2024 at 7:07 PM          XR CHEST AP PORTABLE  (CPT=71045)    Result Date: 3/5/2024  CONCLUSION:  1. NG tube terminates at the GE junction should be advanced further into the stomach by at least 10 cm. 2. Normal heart size with mild interstitial edema but improved somewhat since previous study.  No large airspace consolidation or pleural effusion.  No pneumothorax.  ET tube in the trachea the level the clavicles.    Dictated by (CST): Moisés Delgadillo MD on 3/05/2024 at 3:48 PM     Finalized by (CST): Moisés Delgadillo MD on 3/05/2024 at 3:50 PM          US CAROTID DOPPLER BILAT - DIAG IMG (CPT=93880)    Result Date: 3/5/2024  CONCLUSION:   Distal left internal carotid artery not visualized.  Otherwise no evidence of bilateral  hemodynamically significant stenosis.  Nonvisualization right vertebral artery.  Antegrade flow left vertebral artery.    Dictated by (CST): Jcarlos Harmon MD on 3/05/2024 at 2:31 PM     Finalized by (CST): Jcarlos Harmon MD on 3/05/2024 at 2:35 PM          XR CHEST AP PORTABLE  (CPT=71045)    Result Date: 3/5/2024  CONCLUSION:  1. Normal heart size with mild to moderate pulmonary congestive change suggesting acute cardiac failure/fluid overload.  Correlate clinically.  ET tube in the trachea the level of the clavicles.  No pneumothorax or large airspace consolidation.    Dictated by (CST): Moisés Delgadillo MD on 3/05/2024 at 9:47 AM     Finalized by (CST): Moisés Delgadillo MD on 3/05/2024 at 9:50 AM             CARD ECHO 2D W/O DOPPLER (CPT=93307)    Result Date: 3/6/2024  Transthoracic Echocardiogram Name:Cristiano Obregon Date:    2024    :     1970    Ht:     (67in)     BP: MRN:     [id number]       Age:     53years       Wt:     (180lb)    HR: Loc:     EM          Gndr:    M             BSA:    1.93m^2 Sonographer: BELKIS Cedeno RDCS Ordering:    Carlitos Sandoval Consulting:  Janet Velazco ---------------------------------------------------------------------------- Procedure information:  A transthoracic echocardiogram, limited study was performed. Additional evaluation included M-mode and limited 2D.  Image quality was adequate. ECG rhythm:   Normal sinus ---------------------------------------------------------------------------- Conclusions: Left ventricle: The cavity size was normal. Wall thickness was normal. Systolic function was mildly reduced. The estimated ejection fraction was 40-45%, by biplane method of disks. Impella catheter noted in the left ventricle, placement measures 3.70cm from the aortic valve. * ---------------------------------------------------------------------------- * Findings: Left ventricle:  The cavity size was normal. Wall thickness was normal. Systolic function was  mildly reduced. The estimated ejection fraction was 40-45%, by biplane method of disks. Impella catheter noted in the left ventricle, placement measures 3.70cm from the aortic valve. Left atrium:  The atrium was normal in size. Right ventricle:  The cavity size was normal. Wall thickness was normal. Systolic function was normal. Right atrium:  The atrium was normal in size. Mitral valve:  The valve was structurally normal. Leaflet separation was normal. Aortic valve:  The valve was structurally normal. The valve was trileaflet. Cusp separation was normal. Tricuspid valve:  The valve is structurally normal. Leaflet separation was normal. Pulmonic valve:   The valve is structurally normal. Cusp separation was normal. Pericardium:   There was no pericardial effusion. Aorta: Aortic root: The aortic root was normal-sized. Pulmonary arteries: The main pulmonary artery was normal-sized. Systemic veins: Inferior vena cava: The IVC was normal-sized. ---------------------------------------------------------------------------- Measurements  Left ventricle                     Value        Ref  LV end-diastolic volume, 1-p       82    ml     69 - 185  A4C  LV ejection fraction, 1-p A4C  (L) 39    %      46 - 74  Stroke volume, 1-p A4C             32    ml     --------  LV end-diastolic volume/bsa,       43    ml/m^2 37 - 93  1-p A4C  Stroke volume/bsa, 1-p A4C         17    ml/m^2 --------  LV end-diastolic volume, 2-p       81    ml     62 - 150  LV end-systolic volume, 2-p        49    ml     21 - 61  LV ejection fraction, 2-p      (L) 40    %      52 - 72  Stroke volume, 2-p                 33    ml     --------  LV end-diastolic volume/bsa,       42    ml/m^2 34 - 74  2-p  LV end-systolic volume/bsa,        25    ml/m^2 11 - 31  2-p  Stroke volume/bsa, 2-p             16.9  ml/m^2 --------  Right ventricle                    Value        Ref  TAPSE, MM                          2.29  cm     >=1.70  RV s', lateral                      16.3  cm/sec >=9.5 Legend: (L)  and  (H)  liza values outside specified reference range. ---------------------------------------------------------------------------- Prepared and electronically signed by Andrea Villeda 2024 11:13     CARD ECHO 2D W/O DOPPLER (CPT=93307)    Result Date: 3/5/2024  Transthoracic Echocardiogram Name:Cristiano Obregon Date: 2024 :  1970 Ht:  (67in)  BP: 107 / 84 MRN:  [id number]    Age:  53years    Wt:  (160lb) HR: 75bpm Loc:  [facility]       Gndr: M          BSA: 1.84m^2 Sonographer: Angelica Ordering:    Carlitos Sandoval Consulting:  Danial Kirk ---------------------------------------------------------------------------- History/Indications:   Coronary artery disease. Impella placement. ---------------------------------------------------------------------------- Procedure information:  A transthoracic echocardiogram, limited study was performed. Additional evaluation included M-mode and limited 2D.  Patient status:  Inpatient.  Location:  Bedside.    Comparison was made to the study of 2024.    This was a STAT study. Transthoracic echocardiography for diagnosis, ventricular function evaluation, and post intervention evaluation. Image quality was adequate. ECG rhythm:   Frequent ectopies ---------------------------------------------------------------------------- Conclusions: 1. Left ventricle: The cavity size was normal. Wall thickness was normal.    Systolic function was at the lower limits of normal. The estimated    ejection fraction was 50-55%, by visual assessment. Unable to assess LV    diastolic function. Impella catheter noted in the left ventricle,    placement measures 3.50cm from the aortic valve. 2. Left atrium: The atrium was mildly dilated. 3. Right atrium: The atrium was mildly dilated. Impressions:  This study is compared with previous dated 2024: Compared to the previous study, these findings are new. Interval placement of impella. *  ---------------------------------------------------------------------------- * Findings: Left ventricle:  The cavity size was normal. Wall thickness was normal. Systolic function was at the lower limits of normal. The estimated ejection fraction was 50-55%, by visual assessment. Impella catheter noted in the left ventricle, placement measures 3.50cm from the aortic valve. Unable to assess LV diastolic function. Left atrium:  The atrium was mildly dilated. Right ventricle:  The cavity size was normal. Systolic function was normal. Right atrium:  The atrium was mildly dilated. Mitral valve:  The valve was structurally normal. Leaflet separation was normal. Aortic valve:   The valve was trileaflet.   Cusp separation was normal. Tricuspid valve:  The valve is structurally normal. Leaflet separation was normal. Pulmonic valve:   Not well visualized. Pericardium:   There was no pericardial effusion. Pulmonary arteries: Systolic pressure could not be accurately estimated. ---------------------------------------------------------------------------- Measurements  Left ventricle               Value    Ref  IVS thickness, ED, PLAX      1.0   cm 0.6 - 1.0  LV ID, ED, PLAX              4.7   cm 4.2 - 5.8  LV ID, ES, PLAX              3.2   cm 2.5 - 4.0  LV PW thickness, ED, PLAX    0.8   cm 0.6 - 1.0  IVS/LV PW ratio, ED, PLAX    1.25     ---------  LV PW/LV ID ratio, ED, PLAX  0.17     ---------  LV ejection fraction         60    %  52 - 72 Legend: (L)  and  (H)  liza values outside specified reference range. ---------------------------------------------------------------------------- Prepared and electronically signed by Carlitos Sandoval 2024 11:32     CARD ECHO LIMITED (CPT=93308)    Result Date: 3/5/2024  Transthoracic Echocardiogram Name:Cristiano Obregon Date:    2024    :     1970    Ht:     (67in)     BP: MRN:     [id number]       Age:     53years       Wt:     (160lb)    HR: Loc:     [facility]          Gndr:     M             BSA:    1.84m^2 Sonographer: ADEEL Wyman Ordering:    Carlitos Sandoval Consulting:  Richie Llanes ---------------------------------------------------------------------------- History/Indications:   Chest pain.  Coronary artery disease. ---------------------------------------------------------------------------- Procedure information:  A transthoracic echocardiogram, limited study was performed. Additional evaluation included M-mode and limited 2D.  Patient status:  Inpatient.  Location:  Catheterization laboratory.    This was a STAT study. Transthoracic echocardiography for diagnosis and ventricular function evaluation. Image quality was adequate. ---------------------------------------------------------------------------- Conclusions: 1. Left ventricle: Systolic function was mildly reduced. The estimated    ejection fraction was 45-50%, by visual assessment. Unable to assess LV    diastolic function. 2. Left atrium: The atrium was dilated. 3. Right atrium: The atrium was mildly dilated. 4. Aortic valve: There was mild regurgitation. 5. Mitral valve: There was mild regurgitation. 6. Limited study in cath lab, grossly ejection fraction 45-50%. Wire present    in the left ventricle. Impressions:  No previous study was available for comparison. * ---------------------------------------------------------------------------- * Findings: Left ventricle:  Systolic function was mildly reduced. The estimated ejection fraction was 45-50%, by visual assessment. Unable to assess LV diastolic function. Left atrium:  The atrium was dilated. Right ventricle:  The cavity size was normal. Systolic function was normal. Right atrium:  The atrium was mildly dilated. Mitral valve:  The valve was structurally normal. Leaflet separation was normal.  Doppler:  Transvalvular velocity was within the normal range. There was no evidence for stenosis. There was mild regurgitation. Aortic valve:  Not well visualized.  Doppler:  There  was mild regurgitation. Tricuspid valve:  The valve is structurally normal. Leaflet separation was normal.  Doppler:  Transvalvular velocity was within the normal range. There was no evidence for stenosis. There was mild regurgitation. Pulmonic valve:   Not visualized. Pericardium:   There was no pericardial effusion. Pulmonary arteries: Systolic pressure could not be accurately estimated. Systemic veins:  Not visualized. ---------------------------------------------------------------------------- Prepared and electronically signed by Carlitos Sandoval 03/05/2024 09:46        Exam:     General: Sedated.  HEENT: Normocephalic, anicteric sclera, neck supple, no thyromegaly or adenopathy.  Neck: No JVD, carotids 2+, no bruits.  Cardiac: Regular rate and rhythm. S1, S2 normal. No murmur, pericardial rub, S3, or extra cardiac sounds.  Lungs: Clear without wheezes, rales, rhonchi or dullness.  Normal excursions and effort.  Abdomen: Soft, non-tender. No organosplenomegally, mass or rebound, BS-present.  Extremities: Without clubbing or cyanosis. No edema.    Neurologic: Sedated.  Skin: Warm and dry.     Assessment and Plan:   NSTEMI, multivessel CAD s/p balloon angioplasty proximal circumflex 3/5/2024  - S/p placement of Impella, currently on P8  - S/p RHC on 3/6/2024 with normal hemodynamics, low to normal filling pressure; continue low-dose IV fluids  - TTE with EF 40-45%, no valvular issues  - Aspirin 81 mg per rectum, IV cangrelor, IV heparin  - Initial plan for CABG however currently on hold given renal failure, eventual plan for either multivessel PCI versus CABG depending on progress over the weekend    NORMA  - Creatinine up to 6.7, anuric, likely secondary to cardiogenic shock  - Plan for CRRT today for line placement    Gurjit Elliott MD  [facility] [place]  3/7/2024

## 2024-03-07 NOTE — PLAN OF CARE
HD cath placed, CRRT started, pt moving all extremities during sedation vacation and opens eyes, weaned prop to 40, planning to either do CABG or another stent Monday depending on patient condition      Problem: Safety Risk - Non-Violent Restraints  Goal: Patient will remain free from self-harm  Description: INTERVENTIONS:  - Apply the least restrictive restraint to prevent harm  - Notify patient and family of reasons restraints applied  - Assess for any contributing factors to confusion (electrolyte disturbances, delirium, medications)  - Discontinue any unnecessary medical devices as soon as possible  - Assess the patient's physical comfort, circulation, skin condition, hydration, nutrition and elimination needs   - Reorient and redirection as needed  - Assess for the need to continue restraints  Outcome: Progressing     Problem: Patient Centered Care  Goal: Patient preferences are identified and integrated in the patient's plan of care  Description: Interventions:  - What would you like us to know as we care for you?   - Provide timely, complete, and accurate information to patient/family  - Incorporate patient and family knowledge, values, beliefs, and cultural backgrounds into the planning and delivery of care  - Encourage patient/family to participate in care and decision-making at the level they choose  - Honor patient and family perspectives and choices  Outcome: Progressing    Problem: CARDIOVASCULAR - ADULT  Goal: Maintains optimal cardiac output and hemodynamic stability  Description: INTERVENTIONS:  - Monitor vital signs, rhythm, and trends  - Monitor for bleeding, hypotension and signs of decreased cardiac output  - Evaluate effectiveness of vasoactive medications to optimize hemodynamic stability  - Monitor arterial and/or venous puncture sites for bleeding and/or hematoma  - Assess quality of pulses, skin color and temperature  - Assess for signs of decreased coronary artery perfusion - ex. Angina  -  Evaluate fluid balance, assess for edema, trend weights  Outcome: Progressing

## 2024-03-07 NOTE — PROGRESS NOTES
Cristiano T Mindi Patient Status:  Inpatient    1970 MRN [id number]   Location [facility] 2W/SW Attending Cayetano Hernandez,    Hosp Day # 2 PCP Isauro Pina MD     Critical Care Progress Note      Assessment/Plan:  Acute respiratory failure - intubated in cath lab due to severity of cardiac status, no underlying respiratory disease by my read  - Continue mechanical ventilation until definitive surgical plan is determined  - Hold on weaning at this time  - Prop/Fent  NSTEMI - diagnosed with new multivessel CAD s/p angioplasty and cardiogenic shock requiring impella placement.  Impella repositioned overnight d/t migration to aorta  - Heparin gtt w/ bicarb purge solution  - TTE pending  - CABG vs PCI  Cardiogenic shock - d/t ACS. Echo with worsening EF  - RHC with normal filling pressures and low CVP  - BP stable; pressors as needed to maintain MAP >65  - Continue IVF  Acute renal failure - suspect from ATN and cardiogenic shock with hypotension. No increase in UOP after 500cc bolus  - Decrease IVF to 50  - Start CRRT today d/t worsening renal function  - Renal US normal  FEN/GI  - TF  Tobacco Abuse  - Strongly advise cessation  Proph  - Heparin gtt  - Pepcid  Dispo  - Criticall ill; at risk for worsening - continue ICU level of care  - We will follow along with you      Critical Care Time: 45 minutes    Danial Kirk DO  [facility]  Pulmonary & Critical Care Medicine      Subjective:  No overnight changes, remains on impella    Objective:    Medications:   famotidine  20 mg Intravenous Daily    chlorhexidine gluconate  15 mL Mouth/Throat BID@0800,2000       Vent Mode: VC/AC  FiO2 (%):  [30 %] 30 %  S RR:  [12] 12  S VT:  [400 mL-500 mL] 400 mL  PEEP/CPAP (cm H2O):  [5 cm H20] 5 cm H20  MAP (cm H2O):  [6-7] 6    Intake/Output Summary (Last 24 hours) at 3/7/2024 0759  Last data filed at 3/7/2024 0722  Gross per 24 hour   Intake 5159.5 ml   Output 40 ml   Net 5119.5 ml       /73  (BP Location: Left arm)   Pulse 76   Temp 99.1 °F (37.3 °C) (Pulmonary Artery)   Resp 15   Ht 5' 7\" (1.702 m)   Wt 183 lb 3.2 oz (83.1 kg)   SpO2 98%   BMI 28.69 kg/m²   Physical Exam:   General: intubated, sedated   HEENT: lips, mucosa, tongue normal. Mallampati 3   Lungs: clear b/l   Chest wall: No tenderness or deformity.   Heart: Regular rate and rhythm, normal S1S2, no murmur.   Abdomen: soft, non-tender, non-distended, positive BS.   Extremity: No clubbing or cyanosis no edema, impella pump in place   Skin: No rashes or lesions.    Recent Labs   Lab 03/07/24  0458   RBC 3.26*   HGB 9.8*   HCT 29.8*   MCV 91.4   MCH 30.1   MCHC 32.9   RDW 14.1   NEPRELIM 6.62   WBC 8.6   PLT 96.0*     Recent Labs   Lab 03/05/24  0723 03/05/24  2102 03/06/24  1204 03/06/24  1808 03/07/24  0458   *   < > 105* 95 94   BUN 18   < > 43* 47* 49*   CREATSERUM 1.23   < > 4.84* 5.45* 6.67*   CA 8.6*   < > 8.1* 7.8* 7.7*   ALB 3.9  --   --   --  2.9*      < > 139 140 140   K 3.6   < > 5.1 5.1 5.2*      < > 112 114* 115*   CO2 18.0*   < > 21.0 20.0* 19.0*   ALKPHO 53  --   --   --   --    AST 35*  --   --   --   --    ALT 40  --   --   --   --    BILT 0.6  --   --   --   --    TP 6.2  --   --   --   --     < > = values in this interval not displayed.     Recent Labs   Lab 03/06/24  2355 03/07/24  0359   ABGPHT 7.33* 7.31*   IBCEVO9C 39 39   LOKJD5U 129* 120*   ABGHCO3 21.1 20.1*   SITE Arterial Line Arterial Line   THGB 10.0*  --      No results for input(s): \"BNP\" in the last 168 hours.  No results for input(s): \"TROP\", \"CK\" in the last 168 hours.    Imaging: I independently visualized all relevant chest imaging in PACS, agree with radiology interpretation except where noted.

## 2024-03-07 NOTE — PROGRESS NOTES
NEPHROLOGY DAILY PROGRESS NOTE       SUBJECTIVE:  Remains intubated, sedated   Anuric overnight despite receiving IVFs       OBJECTIVE:    Total Intake/Output:    Intake/Output Summary (Last 24 hours) at 3/7/2024 0809  Last data filed at 3/7/2024 0722  Gross per 24 hour   Intake 5159.5 ml   Output 40 ml   Net 5119.5 ml       PHYSICAL EXAM:  /73 (BP Location: Left arm)   Pulse 76   Temp 99.1 °F (37.3 °C) (Pulmonary Artery)   Resp 15   Ht 5' 7\" (1.702 m)   Wt 183 lb 3.2 oz (83.1 kg)   SpO2 98%   BMI 28.69 kg/m²   GEN: Intubated, sedated   HEENT: ETT in place   CHEST: coarse mechanical breath sounds   CARDIAC: S1S2 normal  ABD: soft, NT/ND  : lujan in place   EXT:  no lower ext edema  NEURO: sedated       CURRENT MEDICATIONS:  Current Facility-Administered Medications   Medication Dose Route Frequency    norepinephrine (Levophed) 4 mg/250mL infusion premix  0.5-30 mcg/min Intravenous Continuous    sodium chloride 0.9% infusion   Intravenous Continuous    famotidine (Pepcid) 20 mg/2mL injection 20 mg  20 mg Intravenous Daily    cangrelor (Kengreal) 50 mg in sodium chloride 0.9% 250 mL IVPB - BRIDGING/MAINTENANCE dose  0.75 mcg/kg/min Intravenous Continuous    sodium bicarbonate 25mEq in dextrose 5% 1000mL IMPELLA purge solution  3-30 mL/hr Intravenous Continuous    heparin (Porcine) 25,000 Units/250mL infusion premix  50-1,000 Units/hr Intravenous Continuous    fentaNYL (Sublimaze) 50 mcg/mL injection 25 mcg  25 mcg Intravenous Q30 Min PRN    Or    fentaNYL (Sublimaze) 50 mcg/mL injection 50 mcg  50 mcg Intravenous Q30 Min PRN    acetaminophen (Tylenol) tab 650 mg  650 mg Oral Q4H PRN    Or    acetaminophen (Tylenol) 160 MG/5ML oral liquid 650 mg  650 mg Oral Q4H PRN    Or    acetaminophen (Tylenol) rectal suppository 650 mg  650 mg Rectal Q4H PRN    Or    acetaminophen (Ofirmev) 10 mg/mL infusion premix 1,000 mg  1,000 mg Intravenous Q6H PRN    polyethylene glycol (PEG 3350) (Miralax) 17 g oral packet 17  g  17 g Oral Daily PRN    senna (Senokot) 8.8 MG/5ML oral syrup 17.6 mg  10 mL Oral Nightly PRN    bisacodyl (Dulcolax) 10 MG rectal suppository 10 mg  10 mg Rectal Daily PRN    fleet enema (Fleet) 7-19 GM/118ML rectal enema 133 mL  1 enema Rectal Once PRN    chlorhexidine gluconate (Peridex) 0.12 % oral solution 15 mL  15 mL Mouth/Throat BID@0800,2000    propofol (Diprivan) 10 mg/mL infusion premix  5-50 mcg/kg/min Intravenous Continuous    fentaNYL (Sublimaze) 25 mcg BOLUS FROM BAG infusion  25 mcg Intravenous Q30 Min PRN    Or    fentaNYL (Sublimaze) 50 mcg BOLUS FROM BAG infusion  50 mcg Intravenous Q30 Min PRN    fentaNYL in sodium chloride 0.9% (Sublimaze) 1000 mcg/100mL infusion premix   mcg/hr Intravenous Continuous PRN    dextrose 10% infusion (TPN no rate)   Intravenous Continuous PRN    pancrelipase (Lip-Prot-Amyl) (Zenpep) DR particles cap 10,000 Units  10,000 Units Per G Tube PRN    And    sodium bicarbonate tab 325 mg  325 mg Oral PRN       LABS:  Patient Labs Reviewed in Detail. Pertinent Labs as follows:  Recent Labs   Lab 03/06/24  1204 03/06/24  1808 03/07/24  0458   * 95 94   BUN 43* 47* 49*   CREATSERUM 4.84* 5.45* 6.67*   EGFRCR 13* 12* 9*   CA 8.1* 7.8* 7.7*    140 140   K 5.1 5.1 5.2*    114* 115*   CO2 21.0 20.0* 19.0*     Recent Labs   Lab 03/05/24  0723 03/06/24  0220 03/07/24  0458   RBC 4.85 4.36 3.26*   HGB 14.9 13.0 9.8*   HCT 42.8 38.7* 29.8*   MCV 88.2 88.8 91.4   MCH 30.7 29.8 30.1   MCHC 34.8 33.6 32.9   RDW 13.0 13.6 14.1   NEPRELIM 4.30 10.99* 6.62   WBC 7.2 13.4* 8.6   .0 155.0 96.0*         IMAGING:  XR CHEST AP PORTABLE  (CPT=71045)    Addendum Date: 3/6/2024    ADDENDUM:  Enteric tube remains positioned in the distal esophagus approximately 3 cm above the expected location of the GE junction   Dictated by (CST): Reyes Rodriguez MD on 3/06/2024 at 7:45 AM     Finalized by (CST): Reyes Rodriguez MD on 3/06/2024 at 7:46 AM             Result Date:  3/6/2024  PROCEDURE: XR CHEST AP PORTABLE  (CPT=71045) TIME: 613  COMPARISON: [facility], XR CHEST AP PORTABLE (CPT=71045), 3/05/2024, 9:26 AM.  [facility], XR CHEST AP PORTABLE (CPT=71045), 3/05/2024, 6:37 PM.  INDICATIONS: Follow up chest pain.  TECHNIQUE:   Single view.   Findings and impression:  Impella was advanced further into the left ventricle  ETT 5.5 cm above the pham  Stable normal heart size.  Stable borderline vascular congestion but there is no edema  Minimal basilar atelectasis.  No consolidation  No pneumothorax or significant effusion     Dictated by (CST): Reyes Rodriguez MD on 3/06/2024 at 7:41 AM     Finalized by (CST): Reyes Rodriguez MD on 3/06/2024 at 7:44 AM          XR CHEST AP PORTABLE  (CPT=71045)    Result Date: 3/5/2024  CONCLUSION:   Nasogastric tube with tip within the distal esophagus and side hole within the mid esophagus.  Advancement recommended.  Remainder of the lines and tubes are otherwise unchanged.    Dictated by (CST): Henri Melgoza MD on 3/05/2024 at 7:30 PM     Finalized by (CST): Henri Melgoza MD on 3/05/2024 at 7:32 PM          XR CHEST AP PORTABLE  (CPT=71045)    Result Date: 3/5/2024  CONCLUSION:  1. Pigtail of Impella device is probably in the LVOT, and the outlet pump is probably in the aortic arch. 2. High position of ET tube approximately 6 cm above pham. 3. High position of nasogastric tube with tip in the distal esophagus just above GE junction.     Dictated by (CST): Fabian Gay MD on 3/05/2024 at 6:54 PM     Finalized by (CST): Fabian Gay MD on 3/05/2024 at 7:07 PM          XR CHEST AP PORTABLE  (CPT=71045)    Result Date: 3/5/2024  CONCLUSION:  1. NG tube terminates at the GE junction should be advanced further into the stomach by at least 10 cm. 2. Normal heart size with mild interstitial edema but improved somewhat since previous study.  No large airspace consolidation or pleural effusion.  No pneumothorax.  ET tube in  the trachea the level the clavicles.    Dictated by (CST): Moisés Delgadillo MD on 3/05/2024 at 3:48 PM     Finalized by (CST): Moisés Delgadillo MD on 3/05/2024 at 3:50 PM          US CAROTID DOPPLER BILAT - DIAG IMG (CPT=93880)    Result Date: 3/5/2024  CONCLUSION:   Distal left internal carotid artery not visualized.  Otherwise no evidence of bilateral hemodynamically significant stenosis.  Nonvisualization right vertebral artery.  Antegrade flow left vertebral artery.    Dictated by (CST): Jcarlos Harmon MD on 3/05/2024 at 2:31 PM     Finalized by (CST): Jcarlos Harmon MD on 3/05/2024 at 2:35 PM          XR CHEST AP PORTABLE  (CPT=71045)    Result Date: 3/5/2024  CONCLUSION:  1. Normal heart size with mild to moderate pulmonary congestive change suggesting acute cardiac failure/fluid overload.  Correlate clinically.  ET tube in the trachea the level of the clavicles.  No pneumothorax or large airspace consolidation.    Dictated by (CST): Moisés Delgadillo MD on 3/05/2024 at 9:47 AM     Finalized by (CST): Moisés Delgadillo MD on 3/05/2024 at 9:50 AM            ASSESSMENT AND PLAN:   This is a 53 year old male with PMH sig for HLD, tobacco use. Presented with chest pain and was admitted for NSTEMI.  Nephrology is consulted for NORAM      Anuric NORMA  - creatinine worsening from 1.23 on admission to 6.67 today  - renal US neg for hydronephrosis    - UA: + 100 protein, 6-10 RBCs, + ketones, few squamous epi cells  - unable to obtain urine lytes   - suspect due to cardiogenic shock / ATN.  No improvement with IVFs or lasix challenge.  - maintenance IVFs reduced to 50/hr.  Consider stopping IVFs if there is no longer concern for volume depletion   - Start CVVH today. Risks and benefits were discussed with CALVIN Chambers who is agreeable to CRRT.  Appreciate IR help with line placement.  - Keep patient net even today    - renal panel and Mg to be checked 2 hours after the start of CRRT, followed by every 12 hours   - monitor for  renal recovery.   - follow renal fxn and I/Os   - renally dose meds      Hyperkalemia   - 2K bags with CRRT  - trend K     Metabolic acidosis  - due to NORMA  - should improve with CRRT      NSTEMI   - per cards / CV surgery      Acute respiratory failure  - vent management per ICU      Dw Dr. Kirk and RN   CALVIN Chambers updated via telephone     We will continue to follow Cristiano Obregon    Critical care time > 35 mins     Janet Velazco MD  [facility] - Nephrology

## 2024-03-07 NOTE — PROGRESS NOTES
[facility] Hospitalist Progress Note     CC: Hospital Follow up    PCP: Isauro Pina MD       Assessment/Plan:   This is a 53-year-old male with a history of hyperlipidemia, tobacco use, who presents to the hospital for evaluation of acute chest pain.      STEMI  Cardiogenic shock   -Multivessel disease including chronically occluded large RCA, 95% sequential LAD stenosis on cath 3/5  -flow restored to heavily thrombosed circumflex and OM1  -balloon pump per cardiology  -taken to cath again emergently on 3/6/24, for right heart cath to accurately obtain hemodynamics. Worsening renal function noted.   -trialed IV hydration, renal function worsening  -plan to start CRRT today. IR to place dialysis line. Patient anuric.   -heparin drip   -IV cangrelor  -aspirin 81mg  -cardiology, nephrology, CV surgery and critical care on consult   -vent management and sedation per critical care    Acute kidney injury  -suspect cardiogenic shock  -component of ATI  -start CRRT today     Hematuria  -now anuric   -monitor coags  -monitor hematuria  -could be from trauma from lujan itself  -on heparin drip, would still continue      Acute hypoxic respiratory failure  -currently on vent, monitor oxygenation   -critical care consulted  -continue full vent support   -maintaining FiO2 30%     DVT prophylaxis: on heparin drip   Code status: FULL     Disposition: ICU     Asrar David SIMON  [facility] Hospitalist        Subjective:     Intubated, sedated     OBJECTIVE:    Blood pressure 114/84, pulse 79, temperature 98.8 °F (37.1 °C), resp. rate 15, height 5' 7\" (1.702 m), weight 183 lb 3.2 oz (83.1 kg), SpO2 98%.    Temp:  [98.1 °F (36.7 °C)-99.9 °F (37.7 °C)] 98.8 °F (37.1 °C)  Pulse:  [60-80] 79  Resp:  [12-16] 15  BP: ()/(64-84) 114/84  SpO2:  [98 %-100 %] 98 %  AO: ()/(56-76) 122/70  FiO2 (%):  [30 %] 30 %      Intake/Output:    Intake/Output Summary (Last 24 hours) at 3/7/2024 1214  Last data filed at  3/7/2024 0722  Gross per 24 hour   Intake 4659.5 ml   Output 40 ml   Net 4619.5 ml       Last 3 Weights   03/07/24 0600 183 lb 3.2 oz (83.1 kg)   03/06/24 0500 180 lb 1.9 oz (81.7 kg)   03/05/24 1324 175 lb 7.8 oz (79.6 kg)   03/05/24 0722 160 lb (72.6 kg)   10/06/21 1541 177 lb 6.4 oz (80.5 kg)   09/09/20 0929 172 lb 9.6 oz (78.3 kg)       /84   Pulse 79   Temp 98.8 °F (37.1 °C)   Resp 15   Ht 5' 7\" (1.702 m)   Wt 183 lb 3.2 oz (83.1 kg)   SpO2 98%   BMI 28.69 kg/m²   General: intubated, sedated  HEENT: ET tube intact  Lungs: clear to ausculation bilaterally  Heart: Regular rate and rhythm  Abdomen: soft  Extremities: No edema  Skin: normal color    Data Review:       Labs:     Recent Labs   Lab 03/05/24  0723 03/06/24  0220 03/07/24  0458   RBC 4.85 4.36 3.26*   HGB 14.9 13.0 9.8*   HCT 42.8 38.7* 29.8*   MCV 88.2 88.8 91.4   MCH 30.7 29.8 30.1   MCHC 34.8 33.6 32.9   RDW 13.0 13.6 14.1   NEPRELIM 4.30 10.99* 6.62   WBC 7.2 13.4* 8.6   .0 155.0 96.0*         Recent Labs   Lab 03/06/24  1204 03/06/24  1808 03/07/24  0458   * 95 94   BUN 43* 47* 49*   CREATSERUM 4.84* 5.45* 6.67*   EGFRCR 13* 12* 9*   CA 8.1* 7.8* 7.7*    140 140   K 5.1 5.1 5.2*    114* 115*   CO2 21.0 20.0* 19.0*       Recent Labs   Lab 03/05/24  0723 03/07/24  0458   ALT 40  --    AST 35*  --    ALB 3.9 2.9*         Imaging:  XR CHEST AP PORTABLE  (CPT=71045)    Result Date: 3/7/2024  PROCEDURE: XR CHEST AP PORTABLE  (CPT=71045) TIME: 1058  COMPARISON: [facility], XR CHEST AP PORTABLE (CPT=71045), 3/06/2024, 6:13 AM.  INDICATIONS: Verify Temporary Hemodialysis Catheter placement.  TECHNIQUE:   Single view.   Findings and impression:  Right IJ dialysis catheter in the lower SVC.  No pneumothorax  Interval PA catheter in the left inter lobar artery  Stable ETT and Impella.  Enteric tube remains in the lower esophagus  Increasing vascular congestion with mild basilar predominant edema      Dictated by (CST): Reyes Rodriguez MD on 3/07/2024 at 11:51 AM     Finalized by (CST): Reyes Rodriguez MD on 3/07/2024 at 11:52 AM          XR CHEST AP PORTABLE  (CPT=71045)    Addendum Date: 3/6/2024    ADDENDUM:  Enteric tube remains positioned in the distal esophagus approximately 3 cm above the expected location of the GE junction   Dictated by (CST): Reyes Rodriguez MD on 3/06/2024 at 7:45 AM     Finalized by (CST): Reyes Rodriguez MD on 3/06/2024 at 7:46 AM             Result Date: 3/6/2024  PROCEDURE: XR CHEST AP PORTABLE  (CPT=71045) TIME: 613  COMPARISON: [facility], XR CHEST AP PORTABLE (CPT=71045), 3/05/2024, 9:26 AM.  [facility], XR CHEST AP PORTABLE (CPT=71045), 3/05/2024, 6:37 PM.  INDICATIONS: Follow up chest pain.  TECHNIQUE:   Single view.   Findings and impression:  Impella was advanced further into the left ventricle  ETT 5.5 cm above the pham  Stable normal heart size.  Stable borderline vascular congestion but there is no edema  Minimal basilar atelectasis.  No consolidation  No pneumothorax or significant effusion     Dictated by (CST): Reyes Rodriguez MD on 3/06/2024 at 7:41 AM     Finalized by (CST): Reyes Rodriguez MD on 3/06/2024 at 7:44 AM          XR CHEST AP PORTABLE  (CPT=71045)    Result Date: 3/5/2024  CONCLUSION:   Nasogastric tube with tip within the distal esophagus and side hole within the mid esophagus.  Advancement recommended.  Remainder of the lines and tubes are otherwise unchanged.    Dictated by (CST): Henri Melgoza MD on 3/05/2024 at 7:30 PM     Finalized by (CST): Henri Melgoza MD on 3/05/2024 at 7:32 PM          XR CHEST AP PORTABLE  (CPT=71045)    Result Date: 3/5/2024  CONCLUSION:  1. Pigtail of Impella device is probably in the LVOT, and the outlet pump is probably in the aortic arch. 2. High position of ET tube approximately 6 cm above pham. 3. High position of nasogastric tube with tip in the distal esophagus just above GE junction.      Dictated by (CST): Fabian Gay MD on 3/05/2024 at 6:54 PM     Finalized by (CST): Fabian Gay MD on 3/05/2024 at 7:07 PM          XR CHEST AP PORTABLE  (CPT=71045)    Result Date: 3/5/2024  CONCLUSION:  1. NG tube terminates at the GE junction should be advanced further into the stomach by at least 10 cm. 2. Normal heart size with mild interstitial edema but improved somewhat since previous study.  No large airspace consolidation or pleural effusion.  No pneumothorax.  ET tube in the trachea the level the clavicles.    Dictated by (CST): Moisés Delgadillo MD on 3/05/2024 at 3:48 PM     Finalized by (CST): Moisés Delgadillo MD on 3/05/2024 at 3:50 PM          US CAROTID DOPPLER BILAT - DIAG IMG (CPT=93880)    Result Date: 3/5/2024  CONCLUSION:   Distal left internal carotid artery not visualized.  Otherwise no evidence of bilateral hemodynamically significant stenosis.  Nonvisualization right vertebral artery.  Antegrade flow left vertebral artery.    Dictated by (CST): Jcarlos Harmon MD on 3/05/2024 at 2:31 PM     Finalized by (CST): Jcarlos Harmon MD on 3/05/2024 at 2:35 PM          XR CHEST AP PORTABLE  (CPT=71045)    Result Date: 3/5/2024  CONCLUSION:  1. Normal heart size with mild to moderate pulmonary congestive change suggesting acute cardiac failure/fluid overload.  Correlate clinically.  ET tube in the trachea the level of the clavicles.  No pneumothorax or large airspace consolidation.    Dictated by (CST): Moisés Delgadillo MD on 3/05/2024 at 9:47 AM     Finalized by (CST): Moisés Delgadillo MD on 3/05/2024 at 9:50 AM             Meds:      aspirin  300 mg Rectal Daily    famotidine  20 mg Intravenous Daily    chlorhexidine gluconate  15 mL Mouth/Throat BID@0800,2000      NxStage Pure Flow 400 CRRT/PIRRT fluid 5000mL      norepinephrine      sodium chloride 50 mL/hr at 03/07/24 0611    cangrelor (Kengreal) 50 mg in sodium chloride 0.9% 250 mL IVPB - BRIDGING/MAINTENANCE dose 0.75 mcg/kg/min (03/07/24  1050)    sodium bicarbonate 25mEq in dextrose 5% 1000mL IMPELLA purge solution 10 mL/hr (03/07/24 1138)    heparin 700 Units/hr (03/06/24 2309)    propofol 50 mcg/kg/min (03/07/24 1149)    fentanyl 25 mcg/hr (03/07/24 1151)    dextrose 10%       heparin, fentaNYL **OR** fentaNYL, acetaminophen **OR** acetaminophen **OR** acetaminophen **OR** acetaminophen, polyethylene glycol (PEG 3350), senna, bisacodyl, fleet enema, fentaNYL (Sublimaze) **OR** fentaNYL (Sublimaze), fentanyl, dextrose 10%, lipase-protease-amylase (Lip-Prot-Amyl) **AND** sodium bicarbonate

## 2024-03-07 NOTE — PROGRESS NOTES
Temporary Hemodialysis Catheter placement preformed in Room 216. Consent signed and timeout preformed. Ultrasound guidance used. 8 ml of lidocaine injected into site of injection. Patient currently sedated with propofol and fentanyl gtt. Catheter inserted by MD. Vitals signs stable during and post procedure. Catheter sutured into place. Dressed with bio patch and sobravue dressing. STAT X-ray ordered. Report given to ICU PATEL Rojo.

## 2024-03-07 NOTE — PLAN OF CARE
Problem: RESPIRATORY - ADULT  Goal: Achieves optimal ventilation and oxygenation  Description: INTERVENTIONS:  - Assess for changes in respiratory status  - Assess for changes in mentation and behavior  - Position to facilitate oxygenation and minimize respiratory effort  - Oxygen supplementation based on oxygen saturation or ABGs  - Provide Smoking Cessation handout, if applicable  - Encourage broncho-pulmonary hygiene including cough, deep breathe, Incentive Spirometry  - Assess the need for suctioning and perform as needed  - Assess and instruct to report SOB or any respiratory difficulty  - Respiratory Therapy support as indicated  - Manage/alleviate anxiety  - Monitor for signs/symptoms of CO2 retention  Outcome: Progressing   Patient received intubated and on ventilator A/C 12/400/+5/30%. Bilateral breath sounds auscultated. ABG's drawn Q4, results are below. Patient is not a candidate for SBT due to hemodynamic instability. Suction provided when indicated. No acute events during the daytime. RT will continue to monitor.    Component      Latest Ref Rng 3/7/2024  7:53 AM 3/7/2024  10:17 AM 3/7/2024  12:23 PM 3/7/2024  3:45 PM   SAMPLE SITE Arterial Line   Arterial Line  Arterial Line    ABG PH      7.35 - 7.45  7.30 (L)   7.30 (L)  7.31 (L)    ABG PCO2      35 - 45 mm Hg 39   36  34 (L)    ABG PO2      80 - 100 mm Hg 130 (H)   128 (H)  102 (H)    ABG HCO3      21.0 - 27.0 mEq/L 19.7 (L)   18.7 (L)  18.4 (L)    Blood Gas Base Excess      -2.0 - 2.0 mmol/L -6.7 (L)  -6.0 (L)  -8.0 (L)  -8.3 (L)    POTASSIUM BLOOD GAS      3.6 - 5.1 mmol/L 5.1   5.5 (H)  5.2 (H)    SODIUM BLOOD GAS      135 - 145 mmol/L 135   134 (L)  135    Lactic Acid (Blood Gas)      0.5 - 2.0 mmol/L 0.6   0.6  0.6    IONIZED CALCIUM      0.95 - 1.32 mmol/L 1.15   1.15  1.12    TOTAL HEMOGLOBIN      13.0 - 17.5 g/dL 10.2 (L)   9.7 (L)  10.1 (L)    ABG O2 SATURATION      94.0 - 100.0 % >99.0   >99.0  >99.0    CARBOXYHEMOGLOBIN      0.0 - 3.0  % 2.6   2.4  2.4    METHEMOGLOBIN      0.4 - 1.5 % SAT 1.7 (H)   1.9 (H)  1.6 (H)    Oxygen Content      15.0 - 23.0 Vol% 13.9 (L)   13.3 (L)  13.7 (L)    Oxygen Delivery Device Vent  Vent  Vent  Vent    Blood Gas Vent Mode A/C  A/C  A/C  A/C    Blood Gas Respiration Rate      BPM 12  12  12  12    TIDAL VOLUME      mL 400.0  400.0  400.0  400.0    FiO2 30.00  30.00  30.00  40.00    PEEP      cm H2O 5.0  5.0  5.0  5.0       Legend:  (L) Low  (H) High

## 2024-03-07 NOTE — PLAN OF CARE
Pt remains intubated and sedated with no change in vent settings overnight. Restraints for safety. Impella remains in place with no alarms encountered overnight, CVP mean running at 4-7 mmHg during the shift which [facility] is aware with no new orders besides IVF maintenance. [place] in place- Hemodynamically stable with no pressors required. OG to LIS. Still has no urine output- Kidney function continues to decline-Recent Creatinine level relayed to Dr Velazco-repeat labs still pending.No s/sx of pain/discomfort. Bed is locked and in lowest position.      Problem: Safety Risk - Non-Violent Restraints  Goal: Patient will remain free from self-harm  Description: INTERVENTIONS:  - Apply the least restrictive restraint to prevent harm  - Notify patient and family of reasons restraints applied  - Assess for any contributing factors to confusion (electrolyte disturbances, delirium, medications)  - Discontinue any unnecessary medical devices as soon as possible  - Assess the patient's physical comfort, circulation, skin condition, hydration, nutrition and elimination needs   - Reorient and redirection as needed  - Assess for the need to continue restraints  Outcome: Progressing     Problem: Patient Centered Care  Goal: Patient preferences are identified and integrated in the patient's plan of care  Description: Interventions:  - What would you like us to know as we care for you?   - Provide timely, complete, and accurate information to patient/family  - Incorporate patient and family knowledge, values, beliefs, and cultural backgrounds into the planning and delivery of care  - Encourage patient/family to participate in care and decision-making at the level they choose  - Honor patient and family perspectives and choices  Outcome: Progressing     Problem: Patient/Family Goals  Goal: Patient/Family Long Term Goal  Description: Patient's Long Term Goal:     Interventions:  -   - See additional Care Plan goals for specific  interventions  Outcome: Not Progressing  Goal: Patient/Family Short Term Goal  Description: Patient's Short Term Goal:     Interventions:   -   - See additional Care Plan goals for specific interventions  Outcome: Progressing     Problem: RESPIRATORY - ADULT  Goal: Achieves optimal ventilation and oxygenation  Description: INTERVENTIONS:  - Assess for changes in respiratory status  - Assess for changes in mentation and behavior  - Position to facilitate oxygenation and minimize respiratory effort  - Oxygen supplementation based on oxygen saturation or ABGs  - Provide Smoking Cessation handout, if applicable  - Encourage broncho-pulmonary hygiene including cough, deep breathe, Incentive Spirometry  - Assess the need for suctioning and perform as needed  - Assess and instruct to report SOB or any respiratory difficulty  - Respiratory Therapy support as indicated  - Manage/alleviate anxiety  - Monitor for signs/symptoms of CO2 retention  Outcome: Progressing     Problem: CARDIOVASCULAR - ADULT  Goal: Maintains optimal cardiac output and hemodynamic stability  Description: INTERVENTIONS:  - Monitor vital signs, rhythm, and trends  - Monitor for bleeding, hypotension and signs of decreased cardiac output  - Evaluate effectiveness of vasoactive medications to optimize hemodynamic stability  - Monitor arterial and/or venous puncture sites for bleeding and/or hematoma  - Assess quality of pulses, skin color and temperature  - Assess for signs of decreased coronary artery perfusion - ex. Angina  - Evaluate fluid balance, assess for edema, trend weights  Outcome: Progressing  Goal: Absence of cardiac arrhythmias or at baseline  Description: INTERVENTIONS:  - Continuous cardiac monitoring, monitor vital signs, obtain 12 lead EKG if indicated  - Evaluate effectiveness of antiarrhythmic and heart rate control medications as ordered  - Initiate emergency measures for life threatening arrhythmias  - Monitor electrolytes and  administer replacement therapy as ordered  Outcome: Not Progressing

## 2024-03-07 NOTE — PLAN OF CARE
Renal function worsening, Nephrology consulted for NORMA, possibly due to cardiogenic shock w/ evidence of end organ dysfunction. Scant to no urine output despite Lasix challenge.. fluids adjusted, Kidney US completed. [place] & Single lumen introducer inserted for improved cardiac monitoring. No CABG tomorrow, CTS leaning towards advanced PCI. Remains sedated & intubated. Maintained patient safety.   Problem: Safety Risk - Non-Violent Restraints  Goal: Patient will remain free from self-harm  Description: INTERVENTIONS:  - Apply the least restrictive restraint to prevent harm  - Notify patient and family of reasons restraints applied  - Assess for any contributing factors to confusion (electrolyte disturbances, delirium, medications)  - Discontinue any unnecessary medical devices as soon as possible  - Assess the patient's physical comfort, circulation, skin condition, hydration, nutrition and elimination needs   - Reorient and redirection as needed  - Assess for the need to continue restraints  Outcome: Progressing     Problem: Patient Centered Care  Goal: Patient preferences are identified and integrated in the patient's plan of care  Description: Interventions:  - What would you like us to know as we care for you?   - Provide timely, complete, and accurate information to patient/family  - Incorporate patient and family knowledge, values, beliefs, and cultural backgrounds into the planning and delivery of care  - Encourage patient/family to participate in care and decision-making at the level they choose  - Honor patient and family perspectives and choices  Outcome: Progressing     Problem: Patient/Family Goals  Goal: Patient/Family Long Term Goal  Description: Patient's Long Term Goal:     Interventions:  -   - See additional Care Plan goals for specific interventions  Outcome: Progressing  Goal: Patient/Family Short Term Goal  Description: Patient's Short Term Goal:     Interventions:   -   - See additional Care  Plan goals for specific interventions  Outcome: Progressing     Problem: RESPIRATORY - ADULT  Goal: Achieves optimal ventilation and oxygenation  Description: INTERVENTIONS:  - Assess for changes in respiratory status  - Assess for changes in mentation and behavior  - Position to facilitate oxygenation and minimize respiratory effort  - Oxygen supplementation based on oxygen saturation or ABGs  - Provide Smoking Cessation handout, if applicable  - Encourage broncho-pulmonary hygiene including cough, deep breathe, Incentive Spirometry  - Assess the need for suctioning and perform as needed  - Assess and instruct to report SOB or any respiratory difficulty  - Respiratory Therapy support as indicated  - Manage/alleviate anxiety  - Monitor for signs/symptoms of CO2 retention  Outcome: Progressing     Problem: CARDIOVASCULAR - ADULT  Goal: Maintains optimal cardiac output and hemodynamic stability  Description: INTERVENTIONS:  - Monitor vital signs, rhythm, and trends  - Monitor for bleeding, hypotension and signs of decreased cardiac output  - Evaluate effectiveness of vasoactive medications to optimize hemodynamic stability  - Monitor arterial and/or venous puncture sites for bleeding and/or hematoma  - Assess quality of pulses, skin color and temperature  - Assess for signs of decreased coronary artery perfusion - ex. Angina  - Evaluate fluid balance, assess for edema, trend weights  Outcome: Progressing  Goal: Absence of cardiac arrhythmias or at baseline  Description: INTERVENTIONS:  - Continuous cardiac monitoring, monitor vital signs, obtain 12 lead EKG if indicated  - Evaluate effectiveness of antiarrhythmic and heart rate control medications as ordered  - Initiate emergency measures for life threatening arrhythmias  - Monitor electrolytes and administer replacement therapy as ordered  Outcome: Progressing

## 2024-03-07 NOTE — PROCEDURES
[facility]  part of [facility]  Procedure Note    Cristiano Obregon Patient Status:  Inpatient    1970 MRN [id number]   Location [facility] 2W/SW Attending Cayetano Hernandez, DO   Hosp Day # 2 PCP Isauro Pina MD     Procedure: Temporary dialysis catheter insertion    Pre-Procedure Diagnosis: NSTEMI (non-ST elevated myocardial infarction) (HCC) [I21.4]      Post-Procedure Diagnosis: NSTEMI (non-ST elevated myocardial infarction) (HCC) [I21.4]    Anesthesia:  Local    Findings:  Right internal jugular vein accessed under ultrasound guidance.  15 cm temporary hemodialysis catheter placed.    Specimens: None.    Blood Loss: 5 mL      Complications:  None    Plan: Chest x-ray to confirm catheter position.    NIKI TAPIA MD  3/7/2024

## 2024-03-07 NOTE — RESPIRATORY THERAPY NOTE
03/07/24 0631   Settings   FiO2 (%) 30 %   Resp Rate (Set) 12   Vt (Set, mL) 400 mL   Waveform Decelerating ramp   PEEP/CPAP (cm H2O) 5 cm H20      Pt received on full support on the above settings. ETT 7.0 secured 22 @ lips. Bilateral coarse breath sounds . Suction provided as needed. Patient sensitive to suctioning as patient went into A-fib during suctioning. Patient has blood tinged/brown secretions from left nare and thick yellow secretions from ETT in-line suction.  No acute respiratory events overnight.     -Riri Lowe RCP

## 2024-03-07 NOTE — PROGRESS NOTES
03/06/24 2024   Settings   FiO2 (%) 30 %   Resp Rate (Set) 12   Vt (Set, mL) 500 mL   Waveform Decelerating ramp   PEEP/CPAP (cm H2O) 5 cm H20     Pt received on full support on the above settings. ETT 7.0 secured 22 @ lips. Bilateral BS auscultated. Suction provided as needed. No acute events on shift.

## 2024-03-08 ENCOUNTER — APPOINTMENT (OUTPATIENT)
Dept: CV DIAGNOSTICS | Facility: HOSPITAL | Age: 54
End: 2024-03-08
Attending: INTERNAL MEDICINE
Payer: COMMERCIAL

## 2024-03-08 ENCOUNTER — APPOINTMENT (OUTPATIENT)
Dept: GENERAL RADIOLOGY | Facility: HOSPITAL | Age: 54
End: 2024-03-08
Attending: STUDENT IN AN ORGANIZED HEALTH CARE EDUCATION/TRAINING PROGRAM
Payer: COMMERCIAL

## 2024-03-08 NOTE — PROGRESS NOTES
Cristiano Moralezmesfin Patient Status:  Inpatient    1970 MRN [id number]   Location [facility] 2W/SW Attending Cayetano Hernandez DO   Hosp Day # 3 PCP Isauro Pina MD     Critical Care Progress Note      Assessment/Plan:  Acute respiratory failure - intubated in cath lab due to severity of cardiac status, no underlying respiratory disease by my read  - Continue mechanical ventilation until definitive surgical plan is determined  - Hold on weaning at this time  - Prop/Fent  NSTEMI - diagnosed with new multivessel CAD s/p angioplasty and cardiogenic shock requiring impella placement.  Impella repositioned overnight d/t migration to aorta  - Heparin gtt w/ bicarb purge solution  - Repeat TTE with EF 45-50% and proper placement of impella catheter  - CABG vs PCI; decision to be made based on progress over the weekend  Cardiogenic shock - d/t ACS. Echo with worsening EF  - RHC with normal filling pressures and low CVP  - BP stable; pressors as needed to maintain MAP >65  - Continue impella; presently on P8 w/ stable hemodynamics - can likely be weaned  - Continue IVF  - Check sputum culture given new thick yellow secretions  - Add zosyn for infection coverage  Acute renal failure - suspect from ATN and cardiogenic shock with hypotension. No increase in UOP after 500cc bolus  - Decrease IVF to 50  - Cont CRRT; nephrology following  - Renal US normal  Thrombocytopenia, anemia - d/t impella  - Transfuse for Plt <50  - Keep Hb >8  - No need for HIPA or ADEOLA  FEN/GI  - TF  Tobacco Abuse  - Strongly advise cessation  Proph  - Heparin gtt  - Pepcid  Dispo  - Criticall ill; at risk for worsening - continue ICU level of care  - We will follow along with you      Critical Care Time: 45 minutes    Danial Kirk DO  [facility]  Pulmonary & Critical Care Medicine      Subjective:  Remains on impella and CRRT.  No overnight changes    Objective:    Medications:   aspirin  300 mg Rectal Daily    pantoprazole   40 mg Intravenous Daily    chlorhexidine gluconate  15 mL Mouth/Throat BID@0800,2000       Vent Mode: VC/AC  FiO2 (%):  [30 %] 30 %  S RR:  [12-15] 15  S VT:  [400 mL] 400 mL  PEEP/CPAP (cm H2O):  [5 cm H20] 5 cm H20  MAP (cm H2O):  [7] 7    Intake/Output Summary (Last 24 hours) at 3/8/2024 0756  Last data filed at 3/8/2024 0721  Gross per 24 hour   Intake 2790.5 ml   Output 1553 ml   Net 1237.5 ml       BP (!) 132/97 (BP Location: Left arm)   Pulse 89   Temp 99.7 °F (37.6 °C) (Temporal)   Resp 17   Ht 5' 7\" (1.702 m)   Wt 183 lb 3.2 oz (83.1 kg)   SpO2 99%   BMI 28.69 kg/m²   Physical Exam:   General: intubated, sedated   HEENT: lips, mucosa, tongue normal. Mallampati 3   Lungs: clear b/l   Chest wall: No tenderness or deformity.   Heart: Regular rate and rhythm, normal S1S2, no murmur.   Abdomen: soft, non-tender, non-distended, positive BS.   Extremity: No clubbing or cyanosis no edema, impella pump in place   Skin: No rashes or lesions.    Recent Labs   Lab 03/08/24  0428   RBC 3.11*   HGB 9.3*   HCT 27.9*   MCV 89.7   MCH 29.9   MCHC 33.3   RDW 14.5   NEPRELIM 5.72   WBC 7.2   PLT 60.0*     Recent Labs   Lab 03/05/24  0723 03/05/24  2102 03/07/24  0458 03/07/24  1717 03/08/24  0428   *   < > 94 95 102*   BUN 18   < > 49* 52* 38*   CREATSERUM 1.23   < > 6.67* 7.19* 5.50*   CA 8.6*   < > 7.7* 8.0* 7.9*   ALB 3.9  --  2.9* 2.9* 2.8*      < > 140 138 140   K 3.6   < > 5.2* 5.0 4.4      < > 115* 113* 111   CO2 18.0*   < > 19.0* 18.0* 21.0   ALKPHO 53  --   --   --   --    AST 35*  --   --   --   --    ALT 40  --   --   --   --    BILT 0.6  --   --   --   --    TP 6.2  --   --   --   --     < > = values in this interval not displayed.     Recent Labs   Lab 03/08/24  0409   ABGPHT 7.34*   SXWGFE1P 27*   YFZYM4A 107*   ABGHCO3 17.2*   SITE Arterial Line   THGB 10.0*     No results for input(s): \"BNP\" in the last 168 hours.  No results for input(s): \"TROP\", \"CK\" in the last 168  hours.    Imaging: I independently visualized all relevant chest imaging in PACS, agree with radiology interpretation except where noted.

## 2024-03-08 NOTE — PLAN OF CARE
Pt remains intubated and sedated-recent ABG results relayed to Dr Banuelos with no new orders made. Restraints for safety. Impella remains in place with P8 flow control and with no alarms noted overnight, site with no bleeding noted. [place] in place with cardiac index above 2. VSS. OG to LIS with no output. On CRRT and tolerating fluid removal goal of net even with no pressors required. FC with diminished uop. Creatinine level went down to 5.5 from 7.19 this AM.     Problem: Safety Risk - Non-Violent Restraints  Goal: Patient will remain free from self-harm  Description: INTERVENTIONS:  - Apply the least restrictive restraint to prevent harm  - Notify patient and family of reasons restraints applied  - Assess for any contributing factors to confusion (electrolyte disturbances, delirium, medications)  - Discontinue any unnecessary medical devices as soon as possible  - Assess the patient's physical comfort, circulation, skin condition, hydration, nutrition and elimination needs   - Reorient and redirection as needed  - Assess for the need to continue restraints  Outcome: Progressing     Problem: Patient Centered Care  Goal: Patient preferences are identified and integrated in the patient's plan of care  Description: Interventions:  - What would you like us to know as we care for you?   - Provide timely, complete, and accurate information to patient/family  - Incorporate patient and family knowledge, values, beliefs, and cultural backgrounds into the planning and delivery of care  - Encourage patient/family to participate in care and decision-making at the level they choose  - Honor patient and family perspectives and choices  Outcome: Not Progressing     Problem: Patient/Family Goals  Goal: Patient/Family Long Term Goal  Description: Patient's Long Term Goal:     Interventions:  -   - See additional Care Plan goals for specific interventions  Outcome: Not Progressing  Goal: Patient/Family Short Term Goal  Description:  Patient's Short Term Goal:     Interventions:   -   - See additional Care Plan goals for specific interventions  Outcome: Progressing     Problem: RESPIRATORY - ADULT  Goal: Achieves optimal ventilation and oxygenation  Description: INTERVENTIONS:  - Assess for changes in respiratory status  - Assess for changes in mentation and behavior  - Position to facilitate oxygenation and minimize respiratory effort  - Oxygen supplementation based on oxygen saturation or ABGs  - Provide Smoking Cessation handout, if applicable  - Encourage broncho-pulmonary hygiene including cough, deep breathe, Incentive Spirometry  - Assess the need for suctioning and perform as needed  - Assess and instruct to report SOB or any respiratory difficulty  - Respiratory Therapy support as indicated  - Manage/alleviate anxiety  - Monitor for signs/symptoms of CO2 retention  Outcome: Progressing     Problem: CARDIOVASCULAR - ADULT  Goal: Maintains optimal cardiac output and hemodynamic stability  Description: INTERVENTIONS:  - Monitor vital signs, rhythm, and trends  - Monitor for bleeding, hypotension and signs of decreased cardiac output  - Evaluate effectiveness of vasoactive medications to optimize hemodynamic stability  - Monitor arterial and/or venous puncture sites for bleeding and/or hematoma  - Assess quality of pulses, skin color and temperature  - Assess for signs of decreased coronary artery perfusion - ex. Angina  - Evaluate fluid balance, assess for edema, trend weights  Outcome: Progressing  Goal: Absence of cardiac arrhythmias or at baseline  Description: INTERVENTIONS:  - Continuous cardiac monitoring, monitor vital signs, obtain 12 lead EKG if indicated  - Evaluate effectiveness of antiarrhythmic and heart rate control medications as ordered  - Initiate emergency measures for life threatening arrhythmias  - Monitor electrolytes and administer replacement therapy as ordered  Outcome: Progressing     Problem: Delirium  Goal:  Minimize duration of delirium  Description: Interventions:  - Encourage use of hearing aids, eye glasses  - Promote highest level of mobility daily  - Provide frequent reorientation  - Promote wakefulness i.e. lights on, blinds open  - Promote sleep, encourage patient's normal rest cycle i.e. lights off, TV off, minimize noise and interruptions  - Encourage family to assist in orientation and promotion of home routines  Outcome: Progressing

## 2024-03-08 NOTE — PROGRESS NOTES
[facility] Note:  Renal Dose Adjustment (CRRT)    Cristiano Obregon has been prescribed piperacillin/tazobactam (ZOSYN) every 8 hours.  Pharmacy has been asked to review medications for appropriateness for CRRT.    Renal Replacement fluid rate is 20-35 mL/kg/hr, or 2.5 L/hr.     Pharmacy will adjust the medications to the following doses:  piperacillin/tazobactam (ZOSYN) 3.375 g every 8 hours.  Pharmacy will also continue to assess the replacement rate and make any dose changes accordingly.    Thank you,  Kathy Banuelos, PharmD  3/8/2024 9:17 AM

## 2024-03-08 NOTE — PROGRESS NOTES
NEPHROLOGY DAILY PROGRESS NOTE       SUBJECTIVE:  Intubated, sedated   Tolerating CRRT          OBJECTIVE:    Total Intake/Output:    Intake/Output Summary (Last 24 hours) at 3/8/2024 0909  Last data filed at 3/8/2024 0800  Gross per 24 hour   Intake 2891 ml   Output 1699 ml   Net 1192 ml       PHYSICAL EXAM:  /86 (BP Location: Radial)   Pulse 82   Temp 99.7 °F (37.6 °C) (Pulmonary Artery)   Resp 11   Ht 5' 7\" (1.702 m)   Wt 183 lb 3.2 oz (83.1 kg)   SpO2 98%   BMI 28.69 kg/m²   GEN: Intubated, sedated   HEENT: ETT in place   CHEST: mechanical breath sounds   CARDIAC: S1S2 normal  ABD: soft, NT/ND  : lujan in place   EXT:  no lower ext edema  NEURO: sedated   ACCESS: RIJ temp HD cath (3/7)      CURRENT MEDICATIONS:     piperacillin-tazobactam  3.375 g Intravenous Q8H    aspirin  300 mg Rectal Daily    pantoprazole  40 mg Intravenous Daily    chlorhexidine gluconate  15 mL Mouth/Throat BID@0800,2000         LABS:  Patient Labs Reviewed in Detail. Pertinent Labs as follows:  Recent Labs   Lab 03/07/24 0458 03/07/24  1717 03/08/24  0428   GLU 94 95 102*   BUN 49* 52* 38*   CREATSERUM 6.67* 7.19* 5.50*   EGFRCR 9* 8* 11*   CA 7.7* 8.0* 7.9*    138 140   K 5.2* 5.0 4.4   * 113* 111   CO2 19.0* 18.0* 21.0     Recent Labs   Lab 03/06/24  0220 03/07/24 0458 03/08/24  0428   RBC 4.36 3.26* 3.11*   HGB 13.0 9.8* 9.3*   HCT 38.7* 29.8* 27.9*   MCV 88.8 91.4 89.7   MCH 29.8 30.1 29.9   MCHC 33.6 32.9 33.3   RDW 13.6 14.1 14.5   NEPRELIM 10.99* 6.62 5.72   WBC 13.4* 8.6 7.2   .0 96.0* 60.0*         IMAGING:  IR CENTRAL VENOUS ACCESS    Result Date: 3/7/2024  CONCLUSION: Placement of non-tunneled central venous catheter via right internal jugular vein at bedside.  The catheter is ready for use.    Dictated by (CST): Guille Chan MD on 3/07/2024 at 12:46 PM     Finalized by (CST): Guille Chan MD on 3/07/2024 at 12:47 PM          XR CHEST AP PORTABLE  (CPT=71045)    Result Date:  3/7/2024  PROCEDURE: XR CHEST AP PORTABLE  (CPT=71045) TIME: 1058  COMPARISON: [facility], XR CHEST AP PORTABLE (CPT=71045), 3/06/2024, 6:13 AM.  INDICATIONS: Verify Temporary Hemodialysis Catheter placement.  TECHNIQUE:   Single view.   Findings and impression:  Right IJ dialysis catheter in the lower SVC.  No pneumothorax  Interval PA catheter in the left inter lobar artery  Stable ETT and Impella.  Enteric tube remains in the lower esophagus  Increasing vascular congestion with mild basilar predominant edema     Dictated by (CST): Reyes Rodriguez MD on 3/07/2024 at 11:51 AM     Finalized by (CST): Reyes Rodriguez MD on 3/07/2024 at 11:52 AM            ASSESSMENT AND PLAN:   This is a 53 year old male with PMH sig for HLD, tobacco use. Presented with chest pain and was admitted for NSTEMI.  Nephrology is consulted for NORMA      Anuric NORMA  - creatinine 1.23 on admission, worsened to 7.19 (3/7)  - renal US neg for hydronephrosis    - UA: + 100 protein, 6-10 RBCs, + ketones, few squamous epi cells  - unable to obtain urine lytes   - suspect due to cardiogenic shock / ATN.  No improvement with IVFs or lasix challenge.  - maintenance IVFs reduced to 50/hr.  Consider stopping IVFs if there is no longer concern for volume depletion or if TF to be started.  - initiated on CVVH on 3/7 via RIJ temp HD cath   - continue CRRT, keep net even   - renal panel and Mg to be checked every 12 hours   - monitor for renal recovery.   - follow renal fxn and I/Os   - renally dose meds      Hyperkalemia   - resolved with CRRT   - change to 4K bags     Metabolic acidosis  - due to NORMA  - resolved with CRRT      NSTEMI   - per cards / CV surgery      Acute respiratory failure  - vent management per ICU      Dw PATEL Chambers updated at bedside     We will continue to follow Cristiano Obregon    Critical care time > 35 mins     Janet Velazco MD  [facility] - Nephrology

## 2024-03-08 NOTE — PROGRESS NOTES
[facility] Hospitalist Progress Note     CC: Hospital Follow up    PCP: Isauro Pina MD       Assessment/Plan:   This is a 53-year-old male with a history of hyperlipidemia, tobacco use, who presents to the hospital for evaluation of acute chest pain.      STEMI  Cardiogenic shock   -Multivessel disease including chronically occluded large RCA, 95% sequential LAD stenosis on cath 3/5  -flow restored to heavily thrombosed circumflex and OM1  -balloon pump per cardiology  -taken to cath again emergently on 3/6/24, for right heart cath to accurately obtain hemodynamics. Worsening renal function noted.   -trialed IV hydration, renal function worsening----started on CRRT on 3/7  -heparin drip   -IV cangrelor  -aspirin 81mg  -cardiology, nephrology, CV surgery and critical care on consult   -vent management and sedation per critical care    Acute kidney injury  -suspect cardiogenic shock  -component of ATI  -start CRRT on 3/7. Patient is toleratring     Hematuria, resolved  -yellow urine noted today, minimal, but not bloody      Acute hypoxic respiratory failure  -currently on vent, monitor oxygenation   -critical care consulted  -continue full vent support   -maintaining FiO2 30%     DVT prophylaxis: on heparin drip   Code status: FULL    Discussed with friend and  bedside      Disposition: ICU     [facility]   [facility] Hospitalist        Subjective:     Intubated, sedated     OBJECTIVE:    Blood pressure 127/78, pulse 94, temperature 100.1 °F (37.8 °C), temperature source Pulmonary Artery, resp. rate 22, height 5' 7\" (1.702 m), weight 185 lb 13.6 oz (84.3 kg), SpO2 94%.    Temp:  [98.3 °F (36.8 °C)-100.1 °F (37.8 °C)] 100.1 °F (37.8 °C)  Pulse:  [65-94] 94  Resp:  [11-23] 22  BP: (113-132)/(78-97) 127/78  SpO2:  [94 %-100 %] 94 %  AO: (120-154)/(68-87) 144/75  FiO2 (%):  [30 %] 30 %      Intake/Output:    Intake/Output Summary (Last 24 hours) at 3/8/2024 1232  Last data filed at  3/8/2024 1212  Gross per 24 hour   Intake 3454.85 ml   Output 2332 ml   Net 1122.85 ml       Last 3 Weights   03/08/24 0600 185 lb 13.6 oz (84.3 kg)   03/07/24 0600 183 lb 3.2 oz (83.1 kg)   03/06/24 0500 180 lb 1.9 oz (81.7 kg)   03/05/24 1324 175 lb 7.8 oz (79.6 kg)   03/05/24 0722 160 lb (72.6 kg)   10/06/21 1541 177 lb 6.4 oz (80.5 kg)   09/09/20 0929 172 lb 9.6 oz (78.3 kg)       /78 (BP Location: Radial)   Pulse 94   Temp 100.1 °F (37.8 °C) (Pulmonary Artery)   Resp 22   Ht 5' 7\" (1.702 m)   Wt 185 lb 13.6 oz (84.3 kg)   SpO2 94%   BMI 29.11 kg/m²   General: intubated, sedated  HEENT: ET tube intact  Lungs: clear to ausculation bilaterally  Heart: Regular rate and rhythm  Abdomen: soft  Extremities: No edema  Skin: normal color    Data Review:       Labs:     Recent Labs   Lab 03/06/24  0220 03/07/24 0458 03/08/24 0428   RBC 4.36 3.26* 3.11*   HGB 13.0 9.8* 9.3*   HCT 38.7* 29.8* 27.9*   MCV 88.8 91.4 89.7   MCH 29.8 30.1 29.9   MCHC 33.6 32.9 33.3   RDW 13.6 14.1 14.5   NEPRELIM 10.99* 6.62 5.72   WBC 13.4* 8.6 7.2   .0 96.0* 60.0*         Recent Labs   Lab 03/07/24 0458 03/07/24  1717 03/08/24  0428   GLU 94 95 102*   BUN 49* 52* 38*   CREATSERUM 6.67* 7.19* 5.50*   EGFRCR 9* 8* 11*   CA 7.7* 8.0* 7.9*    138 140   K 5.2* 5.0 4.4   * 113* 111   CO2 19.0* 18.0* 21.0       Recent Labs   Lab 03/05/24  0723 03/07/24  0458 03/07/24  1717 03/08/24  0428   ALT 40  --   --   --    AST 35*  --   --   --    ALB 3.9 2.9* 2.9* 2.8*         Imaging:  IR CENTRAL VENOUS ACCESS    Result Date: 3/7/2024  CONCLUSION: Placement of non-tunneled central venous catheter via right internal jugular vein at bedside.  The catheter is ready for use.    Dictated by (CST): Guille Chan MD on 3/07/2024 at 12:46 PM     Finalized by (CST): Guille Chan MD on 3/07/2024 at 12:47 PM          XR CHEST AP PORTABLE  (CPT=71045)    Result Date: 3/7/2024  PROCEDURE: XR CHEST AP PORTABLE  (CPT=71045)  TIME: 1058  COMPARISON: [facility], XR CHEST AP PORTABLE (CPT=71045), 3/06/2024, 6:13 AM.  INDICATIONS: Verify Temporary Hemodialysis Catheter placement.  TECHNIQUE:   Single view.   Findings and impression:  Right IJ dialysis catheter in the lower SVC.  No pneumothorax  Interval PA catheter in the left inter lobar artery  Stable ETT and Impella.  Enteric tube remains in the lower esophagus  Increasing vascular congestion with mild basilar predominant edema     Dictated by (CST): Reyes Rodriguez MD on 3/07/2024 at 11:51 AM     Finalized by (CST): Reyes Rodriguez MD on 3/07/2024 at 11:52 AM          XR CHEST AP PORTABLE  (CPT=71045)    Addendum Date: 3/6/2024    ADDENDUM:  Enteric tube remains positioned in the distal esophagus approximately 3 cm above the expected location of the GE junction   Dictated by (CST): Reyes Rodriguez MD on 3/06/2024 at 7:45 AM     Finalized by (CST): Reyes Rodriguez MD on 3/06/2024 at 7:46 AM             Result Date: 3/6/2024  PROCEDURE: XR CHEST AP PORTABLE  (CPT=71045) TIME: 613  COMPARISON: [facility], XR CHEST AP PORTABLE (CPT=71045), 3/05/2024, 9:26 AM.  [facility], XR CHEST AP PORTABLE (CPT=71045), 3/05/2024, 6:37 PM.  INDICATIONS: Follow up chest pain.  TECHNIQUE:   Single view.   Findings and impression:  Impella was advanced further into the left ventricle  ETT 5.5 cm above the pham  Stable normal heart size.  Stable borderline vascular congestion but there is no edema  Minimal basilar atelectasis.  No consolidation  No pneumothorax or significant effusion     Dictated by (CST): Reyes Rodriguez MD on 3/06/2024 at 7:41 AM     Finalized by (CST): Reyes Rodriguez MD on 3/06/2024 at 7:44 AM          XR CHEST AP PORTABLE  (CPT=71045)    Result Date: 3/5/2024  CONCLUSION:   Nasogastric tube with tip within the distal esophagus and side hole within the mid esophagus.  Advancement recommended.  Remainder of the lines and tubes are otherwise  unchanged.    Dictated by (CST): Henri Melgoza MD on 3/05/2024 at 7:30 PM     Finalized by (CST): Henri Melgoza MD on 3/05/2024 at 7:32 PM          XR CHEST AP PORTABLE  (CPT=71045)    Result Date: 3/5/2024  CONCLUSION:  1. Pigtail of Impella device is probably in the LVOT, and the outlet pump is probably in the aortic arch. 2. High position of ET tube approximately 6 cm above pham. 3. High position of nasogastric tube with tip in the distal esophagus just above GE junction.     Dictated by (CST): Fabian Gay MD on 3/05/2024 at 6:54 PM     Finalized by (CST): Fabian Gay MD on 3/05/2024 at 7:07 PM          XR CHEST AP PORTABLE  (CPT=71045)    Result Date: 3/5/2024  CONCLUSION:  1. NG tube terminates at the GE junction should be advanced further into the stomach by at least 10 cm. 2. Normal heart size with mild interstitial edema but improved somewhat since previous study.  No large airspace consolidation or pleural effusion.  No pneumothorax.  ET tube in the trachea the level the clavicles.    Dictated by (CST): Moisés Delgadillo MD on 3/05/2024 at 3:48 PM     Finalized by (CST): Moisés Delgadillo MD on 3/05/2024 at 3:50 PM          US CAROTID DOPPLER BILAT - DIAG IMG (CPT=93880)    Result Date: 3/5/2024  CONCLUSION:   Distal left internal carotid artery not visualized.  Otherwise no evidence of bilateral hemodynamically significant stenosis.  Nonvisualization right vertebral artery.  Antegrade flow left vertebral artery.    Dictated by (CST): Jcarlos Harmon MD on 3/05/2024 at 2:31 PM     Finalized by (CST): Jcarlos Harmon MD on 3/05/2024 at 2:35 PM             Meds:      piperacillin-tazobactam  3.375 g Intravenous Q8H    aspirin  300 mg Rectal Daily    pantoprazole  40 mg Intravenous Daily    chlorhexidine gluconate  15 mL Mouth/Throat BID@0800,2000      NxStage Pure Flow 401 CRRT/PIRRT fluid 5000mL 2.5 L/hr (03/08/24 1003)    norepinephrine      sodium chloride 50 mL/hr at 03/08/24 0249    cangrelor  ([place]) 50 mg in sodium chloride 0.9% 250 mL IVPB - BRIDGING/MAINTENANCE dose 0.75 mcg/kg/min (03/08/24 0051)    sodium bicarbonate 25mEq in dextrose 5% 1000mL IMPELLA purge solution 10 mL/hr (03/07/24 1138)    heparin 500 Units/hr (03/08/24 1212)    propofol 60 mcg/kg/min (03/08/24 1210)    fentanyl 50 mcg/hr (03/08/24 0507)    dextrose 10%       heparin, fentaNYL **OR** fentaNYL, acetaminophen **OR** acetaminophen **OR** acetaminophen **OR** acetaminophen, polyethylene glycol (PEG 3350), senna, bisacodyl, fleet enema, fentaNYL (Sublimaze) **OR** fentaNYL (Sublimaze), fentanyl, dextrose 10%, lipase-protease-amylase (Lip-Prot-Amyl) **AND** sodium bicarbonate

## 2024-03-08 NOTE — PROGRESS NOTES
[facility]  Cardiology Progress Note    Cristiano Obregon Patient Status:  Inpatient    1970 MRN [id number]   Location [facility] 2W/SW Attending Cayetano Hernandez, DO   Hosp Day # 3 PCP Isauro Pina MD     Subjective     No issues overnight.  Impella remains in, good hemodynamics.    Objective:   Patient Vitals for the past 24 hrs:   BP Temp Temp src Pulse Resp SpO2 Weight   24 0900 127/78 99.9 °F (37.7 °C) Pulmonary Ar 78 14 99 % --   24 0821 -- -- -- 77 16 99 % --   24 0800 127/86 99.7 °F (37.6 °C) Pulmonary Ar 82 11 98 % --   24 0700 (!) 132/97 99.7 °F (37.6 °C) Temporal 89 17 99 % --   24 0600 (!) 114/93 99.4 °F (37.4 °C) Temporal 73 16 100 % 185 lb 13.6 oz (84.3 kg)   24 0500 (!) 123/94 99 °F (37.2 °C) Temporal 65 16 100 % --   24 0400 (!) 114/91 98.5 °F (36.9 °C) Pulmonary Ar 74 15 99 % --   24 0300 (!) 116/91 98.5 °F (36.9 °C) Pulmonary Ar 74 15 99 % --   24 0200 (!) 115/92 98.3 °F (36.8 °C) Pulmonary Ar 73 15 100 % --   24 0100 115/89 98.3 °F (36.8 °C) Pulmonary Ar 76 15 99 % --   24 0000 124/90 98.5 °F (36.9 °C) Pulmonary Ar 77 15 100 % --   24 2300 113/90 98.5 °F (36.9 °C) Pulmonary Ar 77 16 99 % --   24 2200 116/84 98.4 °F (36.9 °C) Pulmonary Ar 79 15 99 % --   24 2100 120/83 98.9 °F (37.2 °C) Pulmonary Ar 79 15 99 % --   24 2000 123/88 99.3 °F (37.4 °C) Pulmonary Ar 75 17 99 % --   24 1900 116/87 99.3 °F (37.4 °C) -- 74 15 100 % --   24 1800 114/83 98.3 °F (36.8 °C) -- 76 16 100 % --   24 1700 119/84 98.3 °F (36.8 °C) -- 77 17 100 % --   24 1600 118/85 98.8 °F (37.1 °C) -- 76 16 99 % --   24 1539 -- -- -- 79 18 99 % --   24 1500 117/83 98.6 °F (37 °C) -- 79 19 98 % --   24 1400 120/80 98.7 °F (37.1 °C) -- 74 19 96 % --   24 1300 127/82 98.7 °F (37.1 °C) -- 87 23 98 % --   24 1200 115/82 99.1 °F (37.3 °C) -- 84 16 98 % --    03/07/24 1108 114/84 98.8 °F (37.1 °C) -- 79 15 98 % --   03/07/24 1000 108/79 98.4 °F (36.9 °C) -- 77 15 99 % --     Intake/Output:   Last 3 shifts:   Intake/Output                   03/06/24 0700 - 03/07/24 0659 03/07/24 0700 - 03/08/24 0659 03/08/24 0700 - 03/09/24 0659       Intake    P.O.  0  0  --    P.O. 0 0 --    I.V.  5162  2659.2  393.6    I.V. 2537.6 195.3 115.8    Volume (mL) Cangrelor 563.7 387 43.9    Volume (mL) Heparin 221.7 142.5 18.3    Volume (mL) Propofol 530.6 451.6 62.4    Volume (mL) Fentanyl 100.4 -- 8.8    Amount given (ml) 102.6 82.3 45.8    Volume (mL) Impella Non-Heparin   (sodium bicarbonate 25mEq in dextrose 5% 1000mL IMPELLA purge solution) 105.4 119.5 32.7    Volume (mL) (sodium chloride 0.9% infusion) -- 1281 66    Volume (mL) (sodium chloride 0.9 % IV bolus 500 mL) 500 -- --    Volume (mL) (sodium chloride 0.9 % IV bolus 500 mL) 500 -- --    NG/GT  0  0  0    Intake (mL) (NG/OG Tube Nasogastric Right nostril) 0 0 0    Total Intake 5162 2659.2 393.6       Output    Urine  40  130  5    Output (mL) (Urethral Catheter Latex) 40 130 5    Emesis/NG output  0  0  0    Output (mL) (NG/OG Tube Nasogastric Right nostril) 0 0 0    Other  --  1423  437    Actual UF removed (History Screen) -- 1423 437    Total Output 40 1553 442       Net I/O     5122 1106.2 -48.4             Vent Settings: Vent Mode: VC/AC  FiO2 (%):  [30 %] 30 %  S RR:  [12-15] 15  S VT:  [400 mL] 400 mL  PEEP/CPAP (cm H2O):  [5 cm H20] 5 cm H20  MAP (cm H2O):  [7-8] 8    Hemodynamic parameters (last 24 hours): PAP: (33-45)/(15-21) 42/19  CO:  [5.4 L/min-7.2 L/min] 6.8 L/min  CI:  [2.8 L/min/m2-3.7 L/min/m2] 3.5 L/min/m2    Scheduled Meds:    piperacillin-tazobactam  3.375 g Intravenous Q8H    aspirin  300 mg Rectal Daily    pantoprazole  40 mg Intravenous Daily    chlorhexidine gluconate  15 mL Mouth/Throat BID@0800,2000       Continuous Infusions:    NxStage Pure Flow 401 CRRT/PIRRT fluid 5000mL      norepinephrine       sodium chloride 50 mL/hr at 03/08/24 0249    cangrelor (Kengreal) 50 mg in sodium chloride 0.9% 250 mL IVPB - BRIDGING/MAINTENANCE dose 0.75 mcg/kg/min (03/08/24 0051)    sodium bicarbonate 25mEq in dextrose 5% 1000mL IMPELLA purge solution 10 mL/hr (03/07/24 1138)    heparin 600 Units/hr (03/07/24 1311)    propofol 40 mcg/kg/min (03/08/24 0645)    fentanyl 50 mcg/hr (03/08/24 0507)    dextrose 10%         Results:     Lab Results   Component Value Date    WBC 7.2 03/08/2024    HGB 9.3 (L) 03/08/2024    HCT 27.9 (L) 03/08/2024    PLT 60.0 (L) 03/08/2024    CREATSERUM 5.50 (H) 03/08/2024    BUN 38 (H) 03/08/2024     03/08/2024    K 4.4 03/08/2024     03/08/2024    CO2 21.0 03/08/2024     (H) 03/08/2024    CA 7.9 (L) 03/08/2024    ALB 2.8 (L) 03/08/2024    ALKPHO 53 03/05/2024    BILT 0.6 03/05/2024    TP 6.2 03/05/2024    AST 35 (H) 03/05/2024    ALT 40 03/05/2024    PTT 61.0 (H) 03/07/2024    INR 1.06 03/07/2024    PSA 1.95 10/06/2021    MG 2.0 03/08/2024    PHOS 4.8 03/08/2024       Recent Labs   Lab 03/07/24  0458 03/07/24  1717 03/08/24  0428   GLU 94 95 102*   BUN 49* 52* 38*   CREATSERUM 6.67* 7.19* 5.50*   CA 7.7* 8.0* 7.9*    138 140   K 5.2* 5.0 4.4   * 113* 111   CO2 19.0* 18.0* 21.0     Recent Labs   Lab 03/06/24  0220 03/07/24  0458 03/08/24  0428   RBC 4.36 3.26* 3.11*   HGB 13.0 9.8* 9.3*   HCT 38.7* 29.8* 27.9*   MCV 88.8 91.4 89.7   MCH 29.8 30.1 29.9   MCHC 33.6 32.9 33.3   RDW 13.6 14.1 14.5   NEPRELIM 10.99* 6.62 5.72   WBC 13.4* 8.6 7.2   .0 96.0* 60.0*       No results for input(s): \"BNPML\" in the last 168 hours.    No results for input(s): \"TROP\", \"CK\" in the last 168 hours.    IR CENTRAL VENOUS ACCESS    Result Date: 3/7/2024  CONCLUSION: Placement of non-tunneled central venous catheter via right internal jugular vein at bedside.  The catheter is ready for use.    Dictated by (CST): Guille Chan MD on 3/07/2024 at 12:46 PM     Finalized by (CST):  Guille Chan MD on 3/07/2024 at 12:47 PM          XR CHEST AP PORTABLE  (CPT=71045)    Result Date: 3/7/2024  PROCEDURE: XR CHEST AP PORTABLE  (CPT=71045) TIME: 1058  COMPARISON: [facility], XR CHEST AP PORTABLE (CPT=71045), 3/06/2024, 6:13 AM.  INDICATIONS: Verify Temporary Hemodialysis Catheter placement.  TECHNIQUE:   Single view.   Findings and impression:  Right IJ dialysis catheter in the lower SVC.  No pneumothorax  Interval PA catheter in the left inter lobar artery  Stable ETT and Impella.  Enteric tube remains in the lower esophagus  Increasing vascular congestion with mild basilar predominant edema     Dictated by (CST): Reyes Rodriguez MD on 3/07/2024 at 11:51 AM     Finalized by (CST): Reyes Rodriguez MD on 3/07/2024 at 11:52 AM          XR CHEST AP PORTABLE  (CPT=71045)    Addendum Date: 3/6/2024    ADDENDUM:  Enteric tube remains positioned in the distal esophagus approximately 3 cm above the expected location of the GE junction   Dictated by (CST): Reyes Rodriguez MD on 3/06/2024 at 7:45 AM     Finalized by (CST): Reyes Rodriguez MD on 3/06/2024 at 7:46 AM             Result Date: 3/6/2024  PROCEDURE: XR CHEST AP PORTABLE  (CPT=71045) TIME: 613  COMPARISON: [facility], XR CHEST AP PORTABLE (CPT=71045), 3/05/2024, 9:26 AM.  [facility], XR CHEST AP PORTABLE (CPT=71045), 3/05/2024, 6:37 PM.  INDICATIONS: Follow up chest pain.  TECHNIQUE:   Single view.   Findings and impression:  Impella was advanced further into the left ventricle  ETT 5.5 cm above the pham  Stable normal heart size.  Stable borderline vascular congestion but there is no edema  Minimal basilar atelectasis.  No consolidation  No pneumothorax or significant effusion     Dictated by (CST): Reyes Rodriguez MD on 3/06/2024 at 7:41 AM     Finalized by (CST): Reyes Rodriguez MD on 3/06/2024 at 7:44 AM          XR CHEST AP PORTABLE  (CPT=71045)    Result Date: 3/5/2024  CONCLUSION:   Nasogastric tube  with tip within the distal esophagus and side hole within the mid esophagus.  Advancement recommended.  Remainder of the lines and tubes are otherwise unchanged.    Dictated by (CST): Henri Melgoza MD on 3/05/2024 at 7:30 PM     Finalized by (CST): Henri Melgoza MD on 3/05/2024 at 7:32 PM          XR CHEST AP PORTABLE  (CPT=71045)    Result Date: 3/5/2024  CONCLUSION:  1. Pigtail of Impella device is probably in the LVOT, and the outlet pump is probably in the aortic arch. 2. High position of ET tube approximately 6 cm above pham. 3. High position of nasogastric tube with tip in the distal esophagus just above GE junction.     Dictated by (CST): Fabian Gay MD on 3/05/2024 at 6:54 PM     Finalized by (CST): Fabian Gay MD on 3/05/2024 at 7:07 PM          XR CHEST AP PORTABLE  (CPT=71045)    Result Date: 3/5/2024  CONCLUSION:  1. NG tube terminates at the GE junction should be advanced further into the stomach by at least 10 cm. 2. Normal heart size with mild interstitial edema but improved somewhat since previous study.  No large airspace consolidation or pleural effusion.  No pneumothorax.  ET tube in the trachea the level the clavicles.    Dictated by (CST): Moisés Delgadillo MD on 3/05/2024 at 3:48 PM     Finalized by (CST): Moisés Delgadillo MD on 3/05/2024 at 3:50 PM          US CAROTID DOPPLER BILAT - DIAG IMG (CPT=93880)    Result Date: 3/5/2024  CONCLUSION:   Distal left internal carotid artery not visualized.  Otherwise no evidence of bilateral hemodynamically significant stenosis.  Nonvisualization right vertebral artery.  Antegrade flow left vertebral artery.    Dictated by (CST): Jcarlos Harmon MD on 3/05/2024 at 2:31 PM     Finalized by (CST): Jcarlos Harmon MD on 3/05/2024 at 2:35 PM             CARD ECHO 2D W/O DOPPLER (CPT=93307)    Result Date: 3/7/2024  Transthoracic Echocardiogram Name:Cristiano Obregon Date: 2024 :  1970 Ht:  (67in)  BP: 105 /  MRN:  [id number]    Age:   53years    Wt:  (183lb) HR: Loc:  [facility]       Gndr: M          BSA: 1.95m^2 Sonographer: Glenna DENIS Ordering:    Carlitos Sandoval Consulting:  Janet Velazco ---------------------------------------------------------------------------- History/Indications:   Impella Placement. ---------------------------------------------------------------------------- Procedure information:  A transthoracic echocardiogram, limited study was performed. Additional evaluation included M-mode and limited 2D.  Patient status:  Inpatient.  Location:  Bedside.    Comparison was made to the study of 03/06/2024.    This was a routine study. Transthoracic echocardiography for diagnosis. Image quality was adequate. The study was technically limited due to restricted patient mobility and patient supine. ---------------------------------------------------------------------------- Conclusions: Left ventricle: The cavity size was normal. Wall thickness was mildly increased. Systolic function was mildly reduced. The estimated ejection fraction was 45-50%, by biplane method of disks. Unable to assess LV diastolic function. Impressions:  No significant change since prior study. * ---------------------------------------------------------------------------- * Findings: Left ventricle:  The cavity size was normal. Wall thickness was mildly increased. Systolic function was mildly reduced. The estimated ejection fraction was 45-50%, by biplane method of disks. Unable to assess LV diastolic function. Pericardium:   There was no pericardial effusion. Pleura:  No evidence of pleural fluid accumulation. ---------------------------------------------------------------------------- Measurements  Left ventricle         Value        Ref       03/06/2024  IVS thickness, ED, (H) 1.3   cm     0.6 - 1.0 ----------  PLAX  LV ID, ED, PLAX        4.4   cm     4.2 - 5.8 ----------  LV ID, ES, PLAX        3.3   cm     2.5 - 4.0 ----------  LV PW thickness,   (H) 1.4   cm     0.6  - 1.0 ----------  ED, PLAX  IVS/LV PW ratio,       0.93         --------- ----------  ED, PLAX  LV PW/LV ID ratio,     0.32         --------- ----------  ED, PLAX  LV ejection        (L) 50    %      52 - 72   ----------  fraction  LV end-diastolic       90    ml     69 - 185  82  volume, 1-p A4C  LV ejection            46    %      46 - 74   39  fraction, 1-p A4C  Stroke volume, 1-p     41    ml     --------- 32  A4C  LV end-diastolic       46    ml/m^2 37 - 93   43  volume/bsa, 1-p  A4C  Stroke volume/bsa,     21    ml/m^2 --------- 17  1-p A4C  LV end-diastolic       79    ml     62 - 150  81  volume, 2-p  LV end-systolic        45    ml     21 - 61   49  volume, 2-p  LV ejection        (L) 43    %      52 - 72   40  fraction, 2-p  Stroke volume, 2-p     34    ml     --------- 33  LV end-diastolic       40    ml/m^2 34 - 74   42  volume/bsa, 2-p  LV end-systolic        23    ml/m^2 11 - 31   25  volume/bsa, 2-p  Stroke volume/bsa,     17.2  ml/m^2 --------- 16.9  2-p Legend: (L)  and  (H)  liza values outside specified reference range. ---------------------------------------------------------------------------- Prepared and electronically signed by Carlitos Sandoval 2024 12:43     CARD ECHO 2D W/O DOPPLER (CPT=93307)    Result Date: 3/6/2024  Transthoracic Echocardiogram Name:Cristiano Obregon Date:    2024    :     1970    Ht:     (67in)     BP: MRN:     [id number]       Age:     53years       Wt:     (180lb)    HR: Loc:     [facility]          Gndr:    M             BSA:    1.93m^2 Sonographer: Pao DENIS [facility] Ordering:    Carlitos Sandoval Consulting:  Janet Velazco ---------------------------------------------------------------------------- Procedure information:  A transthoracic echocardiogram, limited study was performed. Additional evaluation included M-mode and limited 2D.  Image quality was adequate. ECG rhythm:   Normal sinus ----------------------------------------------------------------------------  Conclusions: Left ventricle: The cavity size was normal. Wall thickness was normal. Systolic function was mildly reduced. The estimated ejection fraction was 40-45%, by biplane method of disks. Impella catheter noted in the left ventricle, placement measures 3.70cm from the aortic valve. * ---------------------------------------------------------------------------- * Findings: Left ventricle:  The cavity size was normal. Wall thickness was normal. Systolic function was mildly reduced. The estimated ejection fraction was 40-45%, by biplane method of disks. Impella catheter noted in the left ventricle, placement measures 3.70cm from the aortic valve. Left atrium:  The atrium was normal in size. Right ventricle:  The cavity size was normal. Wall thickness was normal. Systolic function was normal. Right atrium:  The atrium was normal in size. Mitral valve:  The valve was structurally normal. Leaflet separation was normal. Aortic valve:  The valve was structurally normal. The valve was trileaflet. Cusp separation was normal. Tricuspid valve:  The valve is structurally normal. Leaflet separation was normal. Pulmonic valve:   The valve is structurally normal. Cusp separation was normal. Pericardium:   There was no pericardial effusion. Aorta: Aortic root: The aortic root was normal-sized. Pulmonary arteries: The main pulmonary artery was normal-sized. Systemic veins: Inferior vena cava: The IVC was normal-sized. ---------------------------------------------------------------------------- Measurements  Left ventricle                     Value        Ref  LV end-diastolic volume, 1-p       82    ml     69 - 185  A4C  LV ejection fraction, 1-p A4C  (L) 39    %      46 - 74  Stroke volume, 1-p A4C             32    ml     --------  LV end-diastolic volume/bsa,       43    ml/m^2 37 - 93  1-p A4C  Stroke volume/bsa, 1-p A4C         17    ml/m^2 --------  LV end-diastolic volume, 2-p       81    ml     62 - 150  LV end-systolic  volume, 2-p        49    ml     21 - 61  LV ejection fraction, 2-p      (L) 40    %      52 - 72  Stroke volume, 2-p                 33    ml     --------  LV end-diastolic volume/bsa,       42    ml/m^2 34 - 74  2-p  LV end-systolic volume/bsa,        25    ml/m^2 11 - 31  2-p  Stroke volume/bsa, 2-p             16.9  ml/m^2 --------  Right ventricle                    Value        Ref  TAPSE, MM                          2.29  cm     >=1.70  RV s', lateral                     16.3  cm/sec >=9.5 Legend: (L)  and  (H)  liza values outside specified reference range. ---------------------------------------------------------------------------- Prepared and electronically signed by Andrea Villeda 2024 11:13     CARD ECHO 2D W/O DOPPLER (CPT=93307)    Result Date: 3/5/2024  Transthoracic Echocardiogram Name:Cristiano Obregon Date: 2024 :  1970 Ht:  (67in)  BP: 107 / 84 MRN:  [id number]    Age:  53years    Wt:  (160lb) HR: 75bpm Loc:  [facility]       Gndr: M          BSA: 1.84m^2 Sonographer: Angelica Ordering:    Carlitos Sandoval Consulting:  Danial Kirk ---------------------------------------------------------------------------- History/Indications:   Coronary artery disease. Impella placement. ---------------------------------------------------------------------------- Procedure information:  A transthoracic echocardiogram, limited study was performed. Additional evaluation included M-mode and limited 2D.  Patient status:  Inpatient.  Location:  Bedside.    Comparison was made to the study of 2024.    This was a STAT study. Transthoracic echocardiography for diagnosis, ventricular function evaluation, and post intervention evaluation. Image quality was adequate. ECG rhythm:   Frequent ectopies ---------------------------------------------------------------------------- Conclusions: 1. Left ventricle: The cavity size was normal. Wall thickness was normal.    Systolic function was at the lower limits of  normal. The estimated    ejection fraction was 50-55%, by visual assessment. Unable to assess LV    diastolic function. Impella catheter noted in the left ventricle,    placement measures 3.50cm from the aortic valve. 2. Left atrium: The atrium was mildly dilated. 3. Right atrium: The atrium was mildly dilated. Impressions:  This study is compared with previous dated 03/05/2024: Compared to the previous study, these findings are new. Interval placement of impella. * ---------------------------------------------------------------------------- * Findings: Left ventricle:  The cavity size was normal. Wall thickness was normal. Systolic function was at the lower limits of normal. The estimated ejection fraction was 50-55%, by visual assessment. Impella catheter noted in the left ventricle, placement measures 3.50cm from the aortic valve. Unable to assess LV diastolic function. Left atrium:  The atrium was mildly dilated. Right ventricle:  The cavity size was normal. Systolic function was normal. Right atrium:  The atrium was mildly dilated. Mitral valve:  The valve was structurally normal. Leaflet separation was normal. Aortic valve:   The valve was trileaflet.   Cusp separation was normal. Tricuspid valve:  The valve is structurally normal. Leaflet separation was normal. Pulmonic valve:   Not well visualized. Pericardium:   There was no pericardial effusion. Pulmonary arteries: Systolic pressure could not be accurately estimated. ---------------------------------------------------------------------------- Measurements  Left ventricle               Value    Ref  IVS thickness, ED, PLAX      1.0   cm 0.6 - 1.0  LV ID, ED, PLAX              4.7   cm 4.2 - 5.8  LV ID, ES, PLAX              3.2   cm 2.5 - 4.0  LV PW thickness, ED, PLAX    0.8   cm 0.6 - 1.0  IVS/LV PW ratio, ED, PLAX    1.25     ---------  LV PW/LV ID ratio, ED, PLAX  0.17     ---------  LV ejection fraction         60    %  52 - 72 Legend: (L)  and  (H)   liza values outside specified reference range. ---------------------------------------------------------------------------- Prepared and electronically signed by Carlitos Sandoval 2024 11:32     CARD ECHO LIMITED (CPT=93308)    Result Date: 3/5/2024  Transthoracic Echocardiogram Name:Cristiano Obregon Date:    2024    :     1970    Ht:     (67in)     BP: MRN:     [id number]       Age:     53years       Wt:     (160lb)    HR: Loc:     [facility]          Gndr:    M             BSA:    1.84m^2 Sonographer: ADEEL Wyman Ordering:    Carlitos Sandoval Consulting:  Richie Llanes ---------------------------------------------------------------------------- History/Indications:   Chest pain.  Coronary artery disease. ---------------------------------------------------------------------------- Procedure information:  A transthoracic echocardiogram, limited study was performed. Additional evaluation included M-mode and limited 2D.  Patient status:  Inpatient.  Location:  Catheterization laboratory.    This was a STAT study. Transthoracic echocardiography for diagnosis and ventricular function evaluation. Image quality was adequate. ---------------------------------------------------------------------------- Conclusions: 1. Left ventricle: Systolic function was mildly reduced. The estimated    ejection fraction was 45-50%, by visual assessment. Unable to assess LV    diastolic function. 2. Left atrium: The atrium was dilated. 3. Right atrium: The atrium was mildly dilated. 4. Aortic valve: There was mild regurgitation. 5. Mitral valve: There was mild regurgitation. 6. Limited study in cath lab, grossly ejection fraction 45-50%. Wire present    in the left ventricle. Impressions:  No previous study was available for comparison. * ---------------------------------------------------------------------------- * Findings: Left ventricle:  Systolic function was mildly reduced. The estimated ejection fraction was 45-50%, by visual  assessment. Unable to assess LV diastolic function. Left atrium:  The atrium was dilated. Right ventricle:  The cavity size was normal. Systolic function was normal. Right atrium:  The atrium was mildly dilated. Mitral valve:  The valve was structurally normal. Leaflet separation was normal.  Doppler:  Transvalvular velocity was within the normal range. There was no evidence for stenosis. There was mild regurgitation. Aortic valve:  Not well visualized.  Doppler:  There was mild regurgitation. Tricuspid valve:  The valve is structurally normal. Leaflet separation was normal.  Doppler:  Transvalvular velocity was within the normal range. There was no evidence for stenosis. There was mild regurgitation. Pulmonic valve:   Not visualized. Pericardium:   There was no pericardial effusion. Pulmonary arteries: Systolic pressure could not be accurately estimated. Systemic veins:  Not visualized. ---------------------------------------------------------------------------- Prepared and electronically signed by Carlitos Sandoval 03/05/2024 09:46        Exam:     General: Sedated.  HEENT: Normocephalic, anicteric sclera, neck supple, no thyromegaly or adenopathy.  Neck: No JVD, carotids 2+, no bruits.  Cardiac: Regular rate and rhythm. S1, S2 normal. No murmur, pericardial rub, S3, or extra cardiac sounds.  Lungs: Clear without wheezes, rales, rhonchi or dullness.  Normal excursions and effort.  Abdomen: Soft, non-tender. No organosplenomegally, mass or rebound, BS-present.  Extremities: Without clubbing or cyanosis. No edema.    Neurologic: Sedated.  Skin: Warm and dry.     Assessment and Plan:    NSTEMI, multivessel CAD s/p balloon angioplasty proximal circumflex 3/5/2024  - S/p placement of Impella, currently on P8  - S/p RHC on 3/6/2024 with normal hemodynamics, low to normal filling pressure; on low-dose IV fluids  - TTE with EF 40-45%, no valvular issues  - Aspirin 81 mg per rectum, IV cangrelor, IV heparin  - Initial plan for  CABG however currently on hold given renal failure, eventual plan for either multivessel PCI versus CABG depending on progress over the weekend     NORMA  - Minimal urine output last 24 hours  - Continue CRRT, continue to monitor for improvement    Gurjit Elliott MD  [facility] [place]  3/8/2024

## 2024-03-08 NOTE — PLAN OF CARE
Problem: RESPIRATORY - ADULT  Goal: Achieves optimal ventilation and oxygenation  Description: INTERVENTIONS:  - Assess for changes in respiratory status  - Assess for changes in mentation and behavior  - Position to facilitate oxygenation and minimize respiratory effort  - Oxygen supplementation based on oxygen saturation or ABGs  - Provide Smoking Cessation handout, if applicable  - Encourage broncho-pulmonary hygiene including cough, deep breathe, Incentive Spirometry  - Assess the need for suctioning and perform as needed  - Assess and instruct to report SOB or any respiratory difficulty  - Respiratory Therapy support as indicated  - Manage/alleviate anxiety  - Monitor for signs/symptoms of CO2 retention  Outcome: Progressing   Patient received intubated and on ventilator A/C 15/400/+5/30%. Bilateral breath sounds auscultated. ABG's drawn Q4, results are below. Patient is not a candidate for SBT due to hemodynamic instability. Suction provided when indicated. No acute events during the daytime. RT will continue to monitor.      Component      Latest Ref Rng 3/8/2024  8:15 AM 3/8/2024  11:19 AM 3/8/2024  3:55 PM   SAMPLE SITE Arterial Line  Arterial Line  Arterial Line    ABG PH      7.35 - 7.45  7.36  7.37  7.41    ABG PCO2      35 - 45 mm Hg 40  41  39    ABG PO2      80 - 100 mm Hg 85  89  93    ABG HCO3      21.0 - 27.0 mEq/L 22.9  23.7  25.0    Blood Gas Base Excess      -2.0 - 2.0 mmol/L -2.6 (L)  -1.5  0.1    POTASSIUM BLOOD GAS      3.6 - 5.1 mmol/L 4.2  4.3  4.4    SODIUM BLOOD GAS      135 - 145 mmol/L 135  132 (L)  134 (L)    Lactic Acid (Blood Gas)      0.5 - 2.0 mmol/L 0.8  0.9  0.7    IONIZED CALCIUM      0.95 - 1.32 mmol/L 1.15  1.18  1.11    TOTAL HEMOGLOBIN      13.0 - 17.5 g/dL 9.3 (L)  10.3 (L)  8.3 (L)    ABG O2 SATURATION      94.0 - 100.0 % 98.4  98.4  >99.0    CARBOXYHEMOGLOBIN      0.0 - 3.0 % 2.3  2.1  2.2    METHEMOGLOBIN      0.4 - 1.5 % SAT 1.7 (H)  1.6 (H)  1.8 (H)    Oxygen  Content      15.0 - 23.0 Vol% 12.5 (L)  13.9 (L)  11.3 (L)       Legend:  (L) Low  (H) High

## 2024-03-08 NOTE — DIETARY NOTE
ADULT NUTRITION UPDATE NOTE:     Pt is at high nutrition risk.  Pt does not meet malnutrition criteria.      RECOMMENDATIONS TO MD: Unable to obtain safe feeding access- temporary parenteral nutrition to start tomorrow (RN call for PN-late order thus will need to start tomorrow)     ADMITTING DIAGNOSIS:  NSTEMI (non-ST elevated myocardial infarction) (HCC) [I21.4]  PERTINENT PAST MEDICAL HISTORY: History reviewed. No pertinent past medical history.    PATIENT STATUS: Initial 03/05/24: Pt admit for NSTEMI. No PMH. Received consult to start TF. Discussed with RN via phone call and secure chat. Pt was brought to ED after developing chest pain while working out at the gym. Cardiac alert was called and pt was emergently taken to cath lab s/p balloon. Pending CV sx eval with possible need for CABG. Per RN earliest sx time would be Thursday. No pressors at this time. Sedated on Propofol at 21.8ml/hr (providing 575 kcals from lipids). RN states OG tube is placed, awaiting for CXR placement confirmation. RN states pt will likely remain intubated and sedated until time of CABG. Chart reviewed, no weight loss. Stable since 2016. NPO since arriving today. Will order appropriate labs for tomorrow.    3/6 Brief update: RN reports issues with placing OG yesterday. TF never started. Plans for swan and cath today. Went back to cath lab for impella repositioning last night. Pt is anuric now. New labs ordered d/t hemolysis. New hypotension and renal failure developing overnight per MD chart notes. K elevated. Pt not appropriate for TF at this time. Will hold off and DC orders until more metabolically stable. Discussed with RN. Propofol running at 21.8ml/hr providing 575 kcal from lipids. IVF started. No pressors running.     3/8 UPDATE: Remains intubated and sedated on Propofol currently 24 ml/hr: 633 kcals/lipids. Discussed starting EN support at rounds and orders received.  RN attempted feeding tube placement  but repeated inability  to obtain appropriate placement. RN discussed with MD and decision to start Parenteral Nutrition - tomorrow d/t late orders. CRRT started 3/7. NPO day#3. Central line access available for CPN. Plan to provide CPN in minimal volume.     GASTROINTESTINAL:No BM documented. @ risk for constipation d/t opioid infusion. Bowel regime in place. OG tube removed this pm.     MEDICATIONS: reviewed. Continuous:    NxStage Pure Flow 401 CRRT/PIRRT fluid 5000mL 2.5 L/hr (03/08/24 1003)    norepinephrine      sodium chloride 50 mL/hr at 03/08/24 0249    cangrelor (Kengreal) 50 mg in sodium chloride 0.9% 250 mL IVPB - BRIDGING/MAINTENANCE dose 0.75 mcg/kg/min (03/08/24 0051)    sodium bicarbonate 25mEq in dextrose 5% 1000mL IMPELLA purge solution 10 mL/hr (03/07/24 1138)    heparin 500 Units/hr (03/08/24 1321)    propofol 55 mcg/kg/min (03/08/24 1308)    fentanyl 50 mcg/hr (03/08/24 1317)    dextrose 10%     Scheduled noted.   Prn: heparin, fentaNYL **OR** fentaNYL, acetaminophen **OR** acetaminophen **OR** acetaminophen **OR** acetaminophen, polyethylene glycol (PEG 3350), senna, bisacodyl, fleet enema, fentaNYL (Sublimaze) **OR** fentaNYL (Sublimaze), fentanyl, dextrose 10%, lipase-protease-amylase (Lip-Prot-Amyl) **AND** sodium bicarbonate      LABS: reviewed- NORMA on CRRT. Q 12 hr renal panel and Mg.   Recent Labs     03/07/24  0458 03/07/24  1717 03/08/24  0428   GLU 94 95 102*   BUN 49* 52* 38*   CREATSERUM 6.67* 7.19* 5.50*   CA 7.7* 8.0* 7.9*   MG 1.8 1.7 2.0    138 140   K 5.2* 5.0 4.4   * 113* 111   CO2 19.0* 18.0* 21.0   PHOS 4.9 5.6* 4.8   OSMOCALC 303* 300* 299*       NUTRITION RELATED PHYSICAL FINDINGS:  - Nutrition Focused Physical Exam (NFPE): to be obtained.   - Fluid Accumulation: generalized edema.  See RN documentation for details  - Skin Integrity: intact See RN documentation for details    ANTHROPOMETRICS:  HT: 170.2 cm (5' 7\")  WT: 180 lbs  BMI: Body mass index is 29.21 kg/m².  BMI CLASSIFICATION:  25-29.9 kg/m2 - overweight  IBW: 148 lbs        118% IBW on admit.   Usual Body Wt: ~175 lbs per EMR      100% UBW    NUTRITION PRESCRIPTION:   Estimated Nutrition needs: --dosing wt of 79.6 kg - wt taken on 3/5  Calories: ~8700-5931 calories/day (22-26 calories per kg Dosing wt) [place] = 1533 kcal/day  Protein: 100-167  g protein/day (>1.5-2.5 g protein/kg IBW while on CRRT)  Fluid Needs: ~1ml/kcal- adjust per oliguric NORMA now (Current IVF: 0.9NaCL @ 50 ml/hr) likely rx minimum volume/CPN.   MONITOR AND EVALUATE/NUTRITION GOALS:  - Food and Nutrient Intake:      Monitor: for PO initiation post extubation  - Food and Nutrient Administration:      Monitor: CPN start 3/9/24  - Anthropometric Measurement:    Monitor weight  - Nutrition Goals:      Allow weight loss d/t fluid loss, labs within acceptable limits,  support body systems, and CPN to meet >80% nutrition needs within first 5-7 days critical illness.     DIETITIAN FOLLOW UP: RD to follow and monitor nutrition status, daily CPN ordering.        Merari Burdick RD, LDN, [facility] ([id number])

## 2024-03-08 NOTE — RESPIRATORY THERAPY NOTE
Patient received on ventilator 15/400/+5/30%. Patient requires chronic ventilator support and is not a candidate for SBT. Bilateral breath sounds auscultated. Suction provided when indicated. No acute events. RT will continue to monitor.    RESPIRATORY THERAPY MECHANICAL VENTILATION PROGRESS NOTE    Ventilator Weaning:  Patient meets criteria for weaning? no Weaning was attempted no.

## 2024-03-09 NOTE — PLAN OF CARE
Cristiano follows simple commands with sedation lowered and becomes easily agitated - light sedation maintained for Impella/line safety and no weaning trial today. Maintaining O2 sats above 95% on 30% FiO2, ABG results improved today. Low grade temp and purulent secretions thru ETT, Zosyn added. Impella remains at P8 - trial on P6 midday - back up to P8 early evening due to decreasing BP, decreasing cardiac power output. Per cards plan for Impella to remain in place over the weekend with CRRT and plan for cardiac intervention to be decided by hopefully Monday. Plts below 50 at evening blood draw - gave 1u Plts. CRRT running well, fluid removal goal net zero attained this shift. Continued difficulty with placing feeding tube - will not advance past GE junction - plan for TPN through the weekend for nutrition. Slight position changes provided every 2 hrs, no new skin issues identified this shift.  and multiple friends/family members visited today - updated on condition and plan of care.    Problem: Safety Risk - Non-Violent Restraints  Goal: Patient will remain free from self-harm  Description: INTERVENTIONS:  - Apply the least restrictive restraint to prevent harm  - Notify patient and family of reasons restraints applied  - Assess for any contributing factors to confusion (electrolyte disturbances, delirium, medications)  - Discontinue any unnecessary medical devices as soon as possible  - Assess the patient's physical comfort, circulation, skin condition, hydration, nutrition and elimination needs   - Reorient and redirection as needed  - Assess for the need to continue restraints  3/8/2024 1840 by Mayela Ch, RN  Outcome: Progressing  3/8/2024 1840 by Mayela Ch, RN  Outcome: Progressing     Problem: RESPIRATORY - ADULT  Goal: Achieves optimal ventilation and oxygenation  Description: INTERVENTIONS:  - Assess for changes in respiratory status  - Assess for changes in mentation and behavior  -  Position to facilitate oxygenation and minimize respiratory effort  - Oxygen supplementation based on oxygen saturation or ABGs  - Provide Smoking Cessation handout, if applicable  - Encourage broncho-pulmonary hygiene including cough, deep breathe, Incentive Spirometry  - Assess the need for suctioning and perform as needed  - Assess and instruct to report SOB or any respiratory difficulty  - Respiratory Therapy support as indicated  - Manage/alleviate anxiety  - Monitor for signs/symptoms of CO2 retention  3/8/2024 1840 by Mayela Ch, RN  Outcome: Progressing  3/8/2024 1840 by Mayela Ch, RN  Outcome: Progressing     Problem: CARDIOVASCULAR - ADULT  Goal: Maintains optimal cardiac output and hemodynamic stability  Description: INTERVENTIONS:  - Monitor vital signs, rhythm, and trends  - Monitor for bleeding, hypotension and signs of decreased cardiac output  - Evaluate effectiveness of vasoactive medications to optimize hemodynamic stability  - Monitor arterial and/or venous puncture sites for bleeding and/or hematoma  - Assess quality of pulses, skin color and temperature  - Assess for signs of decreased coronary artery perfusion - ex. Angina  - Evaluate fluid balance, assess for edema, trend weights  3/8/2024 1840 by Mayela Ch, RN  Outcome: Progressing  3/8/2024 1840 by Mayela Ch, RN  Outcome: Progressing  Goal: Absence of cardiac arrhythmias or at baseline  Description: INTERVENTIONS:  - Continuous cardiac monitoring, monitor vital signs, obtain 12 lead EKG if indicated  - Evaluate effectiveness of antiarrhythmic and heart rate control medications as ordered  - Initiate emergency measures for life threatening arrhythmias  - Monitor electrolytes and administer replacement therapy as ordered  Outcome: Progressing     Problem: METABOLIC/FLUID AND ELECTROLYTES - ADULT  Goal: Hemodynamic stability and optimal renal function maintained  Description: INTERVENTIONS:  - Monitor labs and  assess for signs and symptoms of volume excess or deficit  - Monitor intake, output and patient weight  - Monitor urine specific gravity, serum osmolarity and serum sodium as indicated or ordered  - Monitor response to interventions for patient's volume status, including labs, urine output, blood pressure (other measures as available)  - Encourage oral intake as appropriate  - Instruct patient on fluid and nutrition restrictions as appropriate  Outcome: Progressing     Problem: SKIN/TISSUE INTEGRITY - ADULT  Goal: Skin integrity remains intact  Description: INTERVENTIONS  - Assess and document risk factors for pressure ulcer development  - Assess and document skin integrity  - Monitor for areas of redness and/or skin breakdown  - Initiate interventions, skin care algorithm/standards of care as needed  Outcome: Progressing  Goal: Incision(s), wounds(s) or drain site(s) healing without S/S of infection  Description: INTERVENTIONS:  - Assess and document risk factors for pressure ulcer development  - Assess and document skin integrity  - Assess and document dressing/incision, wound bed, drain sites and surrounding tissue  - Implement wound care per orders  - Initiate isolation precautions as appropriate  - Initiate Pressure Ulcer prevention bundle as indicated  Outcome: Progressing  Goal: Oral mucous membranes remain intact  Description: INTERVENTIONS  - Assess oral mucosa and hygiene practices  - Implement preventative oral hygiene regimen  - Implement oral medicated treatments as ordered  Outcome: Progressing     Problem: NEUROLOGICAL - ADULT  Goal: Achieves stable or improved neurological status  Description: INTERVENTIONS  - Assess for and report changes in neurological status  - Initiate measures to prevent increased intracranial pressure  - Maintain blood pressure and fluid volume within ordered parameters to optimize cerebral perfusion and minimize risk of hemorrhage  - Monitor temperature, glucose, and sodium.  Initiate appropriate interventions as ordered  Outcome: Progressing

## 2024-03-09 NOTE — PROGRESS NOTES
[facility]  Cardiology Progress Note    Cristiano Obregon Patient Status:  Inpatient    1970 MRN [id number]   Location [facility] 2W/SW Attending Rick Mendenhall MD   Hosp Day # 4 PCP Isauro Pina MD     Subjective     No issues overnight.    Objective:   Patient Vitals for the past 24 hrs:   BP Temp Temp src Pulse Resp SpO2 Weight   24 0600 92/70 98.7 °F (37.1 °C) Pulmonary Ar 69 15 100 % --   24 0500 102/71 99.4 °F (37.4 °C) Pulmonary Ar 71 15 100 % 188 lb 15 oz (85.7 kg)   24 0400 107/79 99.2 °F (37.3 °C) Pulmonary Ar 73 15 100 % --   24 0300 (!) 87/68 98.8 °F (37.1 °C) Pulmonary Ar 69 15 100 % --   24 0200 99/76 99 °F (37.2 °C) Pulmonary Ar 69 20 100 % --   24 0100 91/68 98.6 °F (37 °C) Pulmonary Ar 68 15 100 % --   24 0000 102/56 98.6 °F (37 °C) Pulmonary Ar 68 15 100 % --   24 2330 96/70 -- -- 67 15 100 % --   24 2300 (!) 88/70 98.6 °F (37 °C) Pulmonary Ar 64 15 100 % --   24 2200 90/66 99 °F (37.2 °C) Pulmonary Ar 70 15 100 % --   24 2100 -- 99.4 °F (37.4 °C) Pulmonary Ar 71 15 100 % --   24 2000 -- 99.5 °F (37.5 °C) Pulmonary Ar 70 15 100 % --   24 1900 -- 99.3 °F (37.4 °C) Temporal 68 15 100 % --   24 185 -- 99.1 °F (37.3 °C) Temporal 70 15 100 % --   24 180 -- 99.3 °F (37.4 °C) Temporal 71 15 100 % --   24 1800 -- 99.5 °F (37.5 °C) Temporal 73 15 100 % --   24 1752 -- 99.5 °F (37.5 °C) Temporal -- -- -- --   24 1700 -- 99.7 °F (37.6 °C) Temporal 74 14 100 % --   24 1628 -- -- -- 66 15 100 % --   24 1600 -- 99.3 °F (37.4 °C) Pulmonary Ar 77 15 100 % --   24 1500 -- 99.6 °F (37.6 °C) Pulmonary Ar 71 15 92 % --   24 1400 -- 99.7 °F (37.6 °C) Pulmonary Ar 81 15 100 % --   24 1300 -- 99.8 °F (37.7 °C) Pulmonary Ar 87 14 99 % --   24 1200 -- (!) 100.5 °F (38.1 °C) Pulmonary Ar 108 12 96 % --   24 1116 -- -- -- 94 22 94 %  --   03/08/24 1100 -- 100.1 °F (37.8 °C) Pulmonary Ar 84 16 99 % --   03/08/24 1000 -- 100 °F (37.8 °C) Pulmonary Ar 79 15 99 % --   03/08/24 0900 127/78 99.9 °F (37.7 °C) Pulmonary Ar 78 14 99 % --   03/08/24 0821 -- -- -- 77 16 99 % --   03/08/24 0800 127/86 99.7 °F (37.6 °C) Pulmonary Ar 82 11 98 % --   03/08/24 0700 (!) 132/97 99.7 °F (37.6 °C) Temporal 89 17 99 % --     Intake/Output:   Last 3 shifts:   Intake/Output                   03/07/24 0700 - 03/08/24 0659 03/08/24 0700 - 03/09/24 0659 03/09/24 0700 - 03/10/24 0659       Intake    P.O.  0  0  --    P.O. 0 0 --    I.V.  2659.2  3292.2  --    I.V. 195.3 2097.6 --    Volume (mL) Cangrelor 387 188.3 --    Volume (mL) Heparin 142.5 64.5 --    Volume (mL) Propofol 451.6 454.7 --    Volume (mL) Fentanyl -- 57.6 --    Amount given (ml) 82.3 111.8 --    Volume (mL) Impella Non-Heparin   (sodium bicarbonate 25mEq in dextrose 5% 1000mL IMPELLA purge solution) 119.5 251.7 --    Volume (mL) (sodium chloride 0.9% infusion) 1281 66 --    Blood  --  200  --    Volume (Transfuse platelets) -- 200 --    NG/GT  0  0  --    Intake (mL) ([REMOVED] NG/OG Tube Nasogastric Right nostril) 0 0 --    Total Intake 2659.2 3492.2 --       Output    Urine  130  13  --    Output (mL) (Urethral Catheter Latex) 130 13 --    Emesis/NG output  0  0  --    Output (mL) ([REMOVED] NG/OG Tube Nasogastric Right nostril) 0 0 --    Other  1423  3010  --    Actual UF removed (History Screen) 1423 3010 --    Total Output 1553 3023 --       Net I/O     1106.2 469.2 --             Vent Settings: Vent Mode: VC/AC  FiO2 (%):  [30 %] 30 %  S RR:  [15] 15  S VT:  [400 mL] 400 mL  PEEP/CPAP (cm H2O):  [5 cm H20] 5 cm H20  MAP (cm H2O):  [8-9] 9    Hemodynamic parameters (last 24 hours): PAP: (31-45)/(10-21) 33/11  CO:  [5.3 L/min-7.6 L/min] 6.4 L/min  CI:  [2.3 L/min/m2-3.9 L/min/m2] 2.3 L/min/m2    Scheduled Meds:    piperacillin-tazobactam  3.375 g Intravenous Q8H    aspirin  300 mg Rectal Daily     pantoprazole  40 mg Intravenous Daily    chlorhexidine gluconate  15 mL Mouth/Throat BID@0800,2000       Continuous Infusions:    NxStage Pure Flow 401 CRRT/PIRRT fluid 5000mL 2.5 L/hr (03/09/24 0422)    norepinephrine      sodium chloride 50 mL/hr at 03/09/24 0000    cangrelor ([place]) 50 mg in sodium chloride 0.9% 250 mL IVPB - BRIDGING/MAINTENANCE dose 0.75 mcg/kg/min (03/09/24 0400)    sodium bicarbonate 25mEq in dextrose 5% 1000mL IMPELLA purge solution 10 mL/hr (03/07/24 1138)    heparin 300 Units/hr (03/09/24 0405)    propofol 50 mcg/kg/min (03/09/24 0446)    fentanyl 50 mcg/hr (03/09/24 0400)    dextrose 10%         Results:     Lab Results   Component Value Date    WBC 6.5 03/09/2024    HGB 8.8 (L) 03/09/2024    HCT 25.6 (L) 03/09/2024    PLT 64.0 (L) 03/09/2024    CREATSERUM 4.10 (H) 03/09/2024    BUN 28 (H) 03/09/2024     (L) 03/09/2024    K 4.6 03/09/2024     03/09/2024    CO2 25.0 03/09/2024     (H) 03/09/2024    CA 8.1 (L) 03/09/2024    ALB 3.1 (L) 03/09/2024    ALKPHO 53 03/05/2024    BILT 0.6 03/05/2024    TP 6.2 03/05/2024    AST 35 (H) 03/05/2024    ALT 40 03/05/2024    PTT 61.0 (H) 03/07/2024    INR 1.06 03/07/2024    PSA 1.95 10/06/2021    MG 1.8 03/09/2024    PHOS 3.2 03/09/2024       Recent Labs   Lab 03/08/24  0428 03/08/24  1641 03/09/24  0450   * 112* 116*   BUN 38* 32* 28*   CREATSERUM 5.50* 4.69* 4.10*   CA 7.9* 7.8* 8.1*    136 135*   K 4.4 4.6 4.6    108 109   CO2 21.0 23.0 25.0     Recent Labs   Lab 03/07/24 0458 03/08/24 0428 03/08/24  1702 03/09/24  0450   RBC 3.26* 3.11* 3.08* 2.88*   HGB 9.8* 9.3* 9.3* 8.8*   HCT 29.8* 27.9* 27.6* 25.6*   MCV 91.4 89.7 89.6 88.9   MCH 30.1 29.9 30.2 30.6   MCHC 32.9 33.3 33.7 34.4   RDW 14.1 14.5 14.6 14.6   NEPRELIM 6.62 5.72  --  4.75   WBC 8.6 7.2 7.5 6.5   PLT 96.0* 60.0* 53.0* 64.0*       No results for input(s): \"BNPML\" in the last 168 hours.    No results for input(s): \"TROP\", \"CK\" in the last 168  hours.    XR CHEST AP PORTABLE  (CPT=71045)    Result Date: 3/8/2024  CONCLUSION:  1. New enteric tube tip appears to terminate about the gastroesophageal junction.  Recommend advancing at least 9.0 cm distally into the stomach. 2. Additional lines/tubes in stable customary positioning. 3. Development of subsegmental atelectasis in the lower right lung.   Results of this examination were discussed with the patient's nurse, Mayela Ch, by Dr. Edwards at 12:40 on 03/08/2024.  Dictated by (CST): Bc Edwards MD on 3/08/2024 at 12:36 PM     Finalized by (CST): Bc Edwards MD on 3/08/2024 at 12:42 PM          IR CENTRAL VENOUS ACCESS    Result Date: 3/7/2024  CONCLUSION: Placement of non-tunneled central venous catheter via right internal jugular vein at bedside.  The catheter is ready for use.    Dictated by (CST): Guille Chan MD on 3/07/2024 at 12:46 PM     Finalized by (CST): Guille Chan MD on 3/07/2024 at 12:47 PM          XR CHEST AP PORTABLE  (CPT=71045)    Result Date: 3/7/2024  PROCEDURE: XR CHEST AP PORTABLE  (CPT=71045) TIME: 1058  COMPARISON: [facility], XR CHEST AP PORTABLE (CPT=71045), 3/06/2024, 6:13 AM.  INDICATIONS: Verify Temporary Hemodialysis Catheter placement.  TECHNIQUE:   Single view.   Findings and impression:  Right IJ dialysis catheter in the lower SVC.  No pneumothorax  Interval PA catheter in the left inter lobar artery  Stable ETT and Impella.  Enteric tube remains in the lower esophagus  Increasing vascular congestion with mild basilar predominant edema     Dictated by (CST): Reyes Rodriguez MD on 3/07/2024 at 11:51 AM     Finalized by (CST): Reyes Rodriguez MD on 3/07/2024 at 11:52 AM          XR CHEST AP PORTABLE  (CPT=71045)    Addendum Date: 3/6/2024    ADDENDUM:  Enteric tube remains positioned in the distal esophagus approximately 3 cm above the expected location of the GE junction   Dictated by (CST): Reyes Rodriguez MD on 3/06/2024 at 7:45 AM      Finalized by (CST): Reyes Rodriguez MD on 3/06/2024 at 7:46 AM             Result Date: 3/6/2024  PROCEDURE: XR CHEST AP PORTABLE  (CPT=71045) TIME: 613  COMPARISON: [facility], XR CHEST AP PORTABLE (CPT=71045), 3/05/2024, 9:26 AM.  [facility], XR CHEST AP PORTABLE (CPT=71045), 3/05/2024, 6:37 PM.  INDICATIONS: Follow up chest pain.  TECHNIQUE:   Single view.   Findings and impression:  Impella was advanced further into the left ventricle  ETT 5.5 cm above the pham  Stable normal heart size.  Stable borderline vascular congestion but there is no edema  Minimal basilar atelectasis.  No consolidation  No pneumothorax or significant effusion     Dictated by (CST): Reyes Rodriguez MD on 3/06/2024 at 7:41 AM     Finalized by (CST): Reyes Rodriguez MD on 3/06/2024 at 7:44 AM             CARD ECHO 2D W/O DOPPLER (CPT=93307)    Result Date: 3/8/2024  Transthoracic Echocardiogram Name:Cristiano Obregon Date: 2024 :  1970 Ht:  (67in)  BP: 127 / 78 MRN:  [id number]    Age:  53years    Wt:  (185lb) HR: 75bpm Loc:  [facility]       Gndr: M          BSA: 1.96m^2 Sonographer: Manuel Ordering:    Carlitos Sandoval Consulting:  Janet Velazco ---------------------------------------------------------------------------- History/Indications:  Impella placement. ---------------------------------------------------------------------------- Procedure information:  A transthoracic echocardiogram, limited study was performed. Additional evaluation included M-mode and limited 2D.  Patient status:  Inpatient.  Location:  U    This was a routine study. Transthoracic echocardiography for diagnosis, ventricular function evaluation, and post closure device deployment evaluation. Image quality was adequate. ECG rhythm:   Normal sinus ---------------------------------------------------------------------------- Conclusions: 1. Left ventricle: The cavity size was normal. Wall thickness was normal.    Systolic  function was normal. The estimated ejection fraction was 50-55%,    by visual assessment. Hypokinesis of the anterolateral wall. Unable to    assess LV diastolic function. Impella catheter noted in the left    ventricle, placement measures 3.50cm from the aortic valve. 2. Right ventricle: Pacer wire noted in the right ventricle. 3. Right atrium: Pacer wire noted in right atrium. * ---------------------------------------------------------------------------- * Findings: Left ventricle:  The cavity size was normal. Wall thickness was normal. Systolic function was normal. The estimated ejection fraction was 50-55%, by visual assessment. Impella catheter noted in the left ventricle, placement measures 3.50cm from the aortic valve.  Regional wall motion abnormalities:  Hypokinesis of the anterolateral wall. Unable to assess LV diastolic function. Right ventricle:  The cavity size was normal. Pacer wire noted in the right ventricle. Systolic function was normal. Right atrium:  Pacer wire noted in right atrium. Mitral valve:  The valve was structurally normal. Leaflet separation was normal. Aortic valve:   The valve was trileaflet. Tricuspid valve:  The valve is structurally normal. Leaflet separation was normal. Pulmonic valve:   Not visualized. Pericardium:   There was no pericardial effusion. Pulmonary arteries: Systolic pressure could not be accurately estimated. Systemic veins:  Central venous respirophasic diameter changes are blunted (< 50%). Inferior vena cava: The IVC was dilated. ---------------------------------------------------------------------------- Measurements  Left ventricle         Value        Ref       03/07/2024  IVS thickness, ED,     1.0   cm     0.6 - 1.0 1.3  PLAX  LV ID, ED, PLAX        5.1   cm     4.2 - 5.8 4.4  LV ID, ES, PLAX        3.8   cm     2.5 - 4.0 3.3  LV PW thickness,       0.9   cm     0.6 - 1.0 1.4  ED, PLAX  IVS/LV PW ratio,       1.11         --------- 0.93  ED, PLAX  LV PW/LV ID  ratio,     0.18         --------- 0.32  ED, LUDMILAX  LV ejection        (L) 50    %      52 - 72   50  fraction  Inferior vena cava     Value        Ref       2024  ID                 (H) 2.5   cm     <=2.1     ----------  Right ventricle        Value        Ref       2024  TAPSE, 2D              2.39  cm     >=1.70    ----------  TAPSE, MM              2.39  cm     >=1.70    ----------  RV s', lateral         18.8  cm/sec >=9.5     ---------- Legend: (L)  and  (H)  liza values outside specified reference range. ---------------------------------------------------------------------------- Prepared and electronically signed by Gurjit Elliott 2024 17:42     CARD ECHO 2D W/O DOPPLER (CPT=93307)    Result Date: 3/7/2024  Transthoracic Echocardiogram Name:Cristiano Obregon Date: 2024 :  1970 Ht:  (67in)  BP: 105 / 68 MRN:  [id number]    Age:  53years    Wt:  (183lb) HR: Loc:  [facility]       Gndr: M          BSA: 1.95m^2 Sonographer: Glenna DENIS Ordering:    Carlitos Sandoval Consulting:  Janet Velazco ---------------------------------------------------------------------------- History/Indications:   Impella Placement. ---------------------------------------------------------------------------- Procedure information:  A transthoracic echocardiogram, limited study was performed. Additional evaluation included M-mode and limited 2D.  Patient status:  Inpatient.  Location:  Bedside.    Comparison was made to the study of 2024.    This was a routine study. Transthoracic echocardiography for diagnosis. Image quality was adequate. The study was technically limited due to restricted patient mobility and patient supine. ---------------------------------------------------------------------------- Conclusions: Left ventricle: The cavity size was normal. Wall thickness was mildly increased. Systolic function was mildly reduced. The estimated ejection fraction was 45-50%, by biplane method of disks. Unable  to assess LV diastolic function. Impressions:  No significant change since prior study. * ---------------------------------------------------------------------------- * Findings: Left ventricle:  The cavity size was normal. Wall thickness was mildly increased. Systolic function was mildly reduced. The estimated ejection fraction was 45-50%, by biplane method of disks. Unable to assess LV diastolic function. Pericardium:   There was no pericardial effusion. Pleura:  No evidence of pleural fluid accumulation. ---------------------------------------------------------------------------- Measurements  Left ventricle         Value        Ref       03/06/2024  IVS thickness, ED, (H) 1.3   cm     0.6 - 1.0 ----------  PLAX  LV ID, ED, PLAX        4.4   cm     4.2 - 5.8 ----------  LV ID, ES, PLAX        3.3   cm     2.5 - 4.0 ----------  LV PW thickness,   (H) 1.4   cm     0.6 - 1.0 ----------  ED, PLAX  IVS/LV PW ratio,       0.93         --------- ----------  ED, PLAX  LV PW/LV ID ratio,     0.32         --------- ----------  ED, PLAX  LV ejection        (L) 50    %      52 - 72   ----------  fraction  LV end-diastolic       90    ml     69 - 185  82  volume, 1-p A4C  LV ejection            46    %      46 - 74   39  fraction, 1-p A4C  Stroke volume, 1-p     41    ml     --------- 32  A4C  LV end-diastolic       46    ml/m^2 37 - 93   43  volume/bsa, 1-p  A4C  Stroke volume/bsa,     21    ml/m^2 --------- 17  1-p A4C  LV end-diastolic       79    ml     62 - 150  81  volume, 2-p  LV end-systolic        45    ml     21 - 61   49  volume, 2-p  LV ejection        (L) 43    %      52 - 72   40  fraction, 2-p  Stroke volume, 2-p     34    ml     --------- 33  LV end-diastolic       40    ml/m^2 34 - 74   42  volume/bsa, 2-p  LV end-systolic        23    ml/m^2 11 - 31   25  volume/bsa, 2-p  Stroke volume/bsa,     17.2  ml/m^2 --------- 16.9  2-p Legend: (L)  and  (H)  liza values outside specified reference range.  ---------------------------------------------------------------------------- Prepared and electronically signed by Carlitos Sandoval 2024 12:43     CARD ECHO 2D W/O DOPPLER (CPT=93307)    Result Date: 3/6/2024  Transthoracic Echocardiogram Name:Cristiano Obregon Date:    2024    :     1970    Ht:     (67in)     BP: MRN:     [id number]       Age:     53years       Wt:     (180lb)    HR: Loc:     [facility]          Gndr:    M             BSA:    1.93m^2 Sonographer: BELKIS Cedeno RDCS Ordering:    Carlitos Sandoval Consulting:  Janet Velazco ---------------------------------------------------------------------------- Procedure information:  A transthoracic echocardiogram, limited study was performed. Additional evaluation included M-mode and limited 2D.  Image quality was adequate. ECG rhythm:   Normal sinus ---------------------------------------------------------------------------- Conclusions: Left ventricle: The cavity size was normal. Wall thickness was normal. Systolic function was mildly reduced. The estimated ejection fraction was 40-45%, by biplane method of disks. Impella catheter noted in the left ventricle, placement measures 3.70cm from the aortic valve. * ---------------------------------------------------------------------------- * Findings: Left ventricle:  The cavity size was normal. Wall thickness was normal. Systolic function was mildly reduced. The estimated ejection fraction was 40-45%, by biplane method of disks. Impella catheter noted in the left ventricle, placement measures 3.70cm from the aortic valve. Left atrium:  The atrium was normal in size. Right ventricle:  The cavity size was normal. Wall thickness was normal. Systolic function was normal. Right atrium:  The atrium was normal in size. Mitral valve:  The valve was structurally normal. Leaflet separation was normal. Aortic valve:  The valve was structurally normal. The valve was trileaflet. Cusp separation was normal. Tricuspid valve:   The valve is structurally normal. Leaflet separation was normal. Pulmonic valve:   The valve is structurally normal. Cusp separation was normal. Pericardium:   There was no pericardial effusion. Aorta: Aortic root: The aortic root was normal-sized. Pulmonary arteries: The main pulmonary artery was normal-sized. Systemic veins: Inferior vena cava: The IVC was normal-sized. ---------------------------------------------------------------------------- Measurements  Left ventricle                     Value        Ref  LV end-diastolic volume, 1-p       82    ml     69 - 185  A4C  LV ejection fraction, 1-p A4C  (L) 39    %      46 - 74  Stroke volume, 1-p A4C             32    ml     --------  LV end-diastolic volume/bsa,       43    ml/m^2 37 - 93  1-p A4C  Stroke volume/bsa, 1-p A4C         17    ml/m^2 --------  LV end-diastolic volume, 2-p       81    ml     62 - 150  LV end-systolic volume, 2-p        49    ml     21 - 61  LV ejection fraction, 2-p      (L) 40    %      52 - 72  Stroke volume, 2-p                 33    ml     --------  LV end-diastolic volume/bsa,       42    ml/m^2 34 - 74  2-p  LV end-systolic volume/bsa,        25    ml/m^2 11 - 31  2-p  Stroke volume/bsa, 2-p             16.9  ml/m^2 --------  Right ventricle                    Value        Ref  TAPSE, MM                          2.29  cm     >=1.70  RV s', lateral                     16.3  cm/sec >=9.5 Legend: (L)  and  (H)  liza values outside specified reference range. ---------------------------------------------------------------------------- Prepared and electronically signed by Andrea Villeda 2024 11:13     CARD ECHO 2D W/O DOPPLER (CPT=93307)    Result Date: 3/5/2024  Transthoracic Echocardiogram Name:Cristiano Obregon Date: 2024 :  1970 Ht:  (67in)  BP: 107 / 84 MRN:  [id number]    Age:  53years    Wt:  (160lb) HR: 75bpm Loc:  [facility]       Gndr: M          BSA: 1.84m^2 Sonographer: Angelica Ordering:    Carlitos Sandoval  Consulting:  Danial Kirk ---------------------------------------------------------------------------- History/Indications:   Coronary artery disease. Impella placement. ---------------------------------------------------------------------------- Procedure information:  A transthoracic echocardiogram, limited study was performed. Additional evaluation included M-mode and limited 2D.  Patient status:  Inpatient.  Location:  Bedside.    Comparison was made to the study of 03/05/2024.    This was a STAT study. Transthoracic echocardiography for diagnosis, ventricular function evaluation, and post intervention evaluation. Image quality was adequate. ECG rhythm:   Frequent ectopies ---------------------------------------------------------------------------- Conclusions: 1. Left ventricle: The cavity size was normal. Wall thickness was normal.    Systolic function was at the lower limits of normal. The estimated    ejection fraction was 50-55%, by visual assessment. Unable to assess LV    diastolic function. Impella catheter noted in the left ventricle,    placement measures 3.50cm from the aortic valve. 2. Left atrium: The atrium was mildly dilated. 3. Right atrium: The atrium was mildly dilated. Impressions:  This study is compared with previous dated 03/05/2024: Compared to the previous study, these findings are new. Interval placement of impella. * ---------------------------------------------------------------------------- * Findings: Left ventricle:  The cavity size was normal. Wall thickness was normal. Systolic function was at the lower limits of normal. The estimated ejection fraction was 50-55%, by visual assessment. Impella catheter noted in the left ventricle, placement measures 3.50cm from the aortic valve. Unable to assess LV diastolic function. Left atrium:  The atrium was mildly dilated. Right ventricle:  The cavity size was normal. Systolic function was normal. Right atrium:  The atrium was mildly  dilated. Mitral valve:  The valve was structurally normal. Leaflet separation was normal. Aortic valve:   The valve was trileaflet.   Cusp separation was normal. Tricuspid valve:  The valve is structurally normal. Leaflet separation was normal. Pulmonic valve:   Not well visualized. Pericardium:   There was no pericardial effusion. Pulmonary arteries: Systolic pressure could not be accurately estimated. ---------------------------------------------------------------------------- Measurements  Left ventricle               Value    Ref  IVS thickness, ED, PLAX      1.0   cm 0.6 - 1.0  LV ID, ED, PLAX              4.7   cm 4.2 - 5.8  LV ID, ES, PLAX              3.2   cm 2.5 - 4.0  LV PW thickness, ED, PLAX    0.8   cm 0.6 - 1.0  IVS/LV PW ratio, ED, PLAX    1.25     ---------  LV PW/LV ID ratio, ED, PLAX  0.17     ---------  LV ejection fraction         60    %  52 - 72 Legend: (L)  and  (H)  liza values outside specified reference range. ---------------------------------------------------------------------------- Prepared and electronically signed by Carlitos Sandoval 2024 11:32     CARD ECHO LIMITED (CPT=93308)    Result Date: 3/5/2024  Transthoracic Echocardiogram Name:Cristiano Obregon Date:    2024    :     1970    Ht:     (67in)     BP: MRN:     [id number]       Age:     53years       Wt:     (160lb)    HR: Loc:     [facility]          Gndr:    M             BSA:    1.84m^2 Sonographer: ADEEL Wyman Ordering:    Carlitos Sandoval Consulting:  Richie Llanes ---------------------------------------------------------------------------- History/Indications:   Chest pain.  Coronary artery disease. ---------------------------------------------------------------------------- Procedure information:  A transthoracic echocardiogram, limited study was performed. Additional evaluation included M-mode and limited 2D.  Patient status:  Inpatient.  Location:  Catheterization laboratory.    This was a STAT study. Transthoracic  echocardiography for diagnosis and ventricular function evaluation. Image quality was adequate. ---------------------------------------------------------------------------- Conclusions: 1. Left ventricle: Systolic function was mildly reduced. The estimated    ejection fraction was 45-50%, by visual assessment. Unable to assess LV    diastolic function. 2. Left atrium: The atrium was dilated. 3. Right atrium: The atrium was mildly dilated. 4. Aortic valve: There was mild regurgitation. 5. Mitral valve: There was mild regurgitation. 6. Limited study in cath lab, grossly ejection fraction 45-50%. Wire present    in the left ventricle. Impressions:  No previous study was available for comparison. * ---------------------------------------------------------------------------- * Findings: Left ventricle:  Systolic function was mildly reduced. The estimated ejection fraction was 45-50%, by visual assessment. Unable to assess LV diastolic function. Left atrium:  The atrium was dilated. Right ventricle:  The cavity size was normal. Systolic function was normal. Right atrium:  The atrium was mildly dilated. Mitral valve:  The valve was structurally normal. Leaflet separation was normal.  Doppler:  Transvalvular velocity was within the normal range. There was no evidence for stenosis. There was mild regurgitation. Aortic valve:  Not well visualized.  Doppler:  There was mild regurgitation. Tricuspid valve:  The valve is structurally normal. Leaflet separation was normal.  Doppler:  Transvalvular velocity was within the normal range. There was no evidence for stenosis. There was mild regurgitation. Pulmonic valve:   Not visualized. Pericardium:   There was no pericardial effusion. Pulmonary arteries: Systolic pressure could not be accurately estimated. Systemic veins:  Not visualized. ---------------------------------------------------------------------------- Prepared and electronically signed by Carlitos Sandoval 03/05/2024 09:46         Exam:     General: Sedated.  HEENT: Normocephalic, anicteric sclera, neck supple, no thyromegaly or adenopathy.  Neck: No JVD, carotids 2+, no bruits.  Cardiac: Regular rate and rhythm. S1, S2 normal. No murmur, pericardial rub, S3, or extra cardiac sounds.  Lungs: Clear without wheezes, rales, rhonchi or dullness.  Normal excursions and effort.  Abdomen: Soft, non-tender. No organosplenomegally, mass or rebound, BS-present.  Extremities: Without clubbing or cyanosis. No edema.    Neurologic: Sedated.  Skin: Warm and dry    Assessment and Plan:   NSTEMI, multivessel CAD s/p balloon angioplasty proximal circumflex 3/5/2024  - S/p placement of Impella, currently on P8  - S/p RHC on 3/6/2024 with normal hemodynamics, low to normal filling pressure; on low-dose IV fluids  - TTE with EF 40-45%, no valvular issues  - Aspirin 81 mg per rectum, IV cangrelor, IV heparin  - Initial plan for CABG however currently on hold given renal failure, eventual plan for either multivessel PCI versus CABG depending on progress over the weekend     NORMA  - Minimal urine output last 24 hours  - Continue CRRT, continue to monitor for improvement    Gurjit Elliott MD  [facility]  3/9/2024

## 2024-03-09 NOTE — DIETARY NOTE
BRIEF DIETITIAN UPDATE NOTE     Consult for TPN received on 3/8. Discussed with RN-  no available central line to use. Discussed with Dr. Mendenhall via [employer]. Ok to start PPN via peripheral line today. Relayed to RN. Orders entered and labs ordered. Pt is oliguric. IVF will need to DC when PPN starts tonight at 2100. On CRRT. Volume of PPN kept minimal.     Patient Weight(s) for the past 336 hrs:   Weight   03/09/24 0500 85.7 kg (188 lb 15 oz)   03/08/24 0600 84.3 kg (185 lb 13.6 oz)   03/07/24 0600 83.1 kg (183 lb 3.2 oz)   03/06/24 0500 81.7 kg (180 lb 1.9 oz)   03/05/24 1324 79.6 kg (175 lb 7.8 oz)   03/05/24 0722 72.6 kg (160 lb)        Wt Readings from Last 6 Encounters:   03/09/24 85.7 kg (188 lb 15 oz)   10/06/21 80.5 kg (177 lb 6.4 oz)   09/09/20 78.3 kg (172 lb 9.6 oz)   12/19/19 78.4 kg (172 lb 12.8 oz)   03/04/19 81.2 kg (179 lb)   12/18/17 79 kg (174 lb 3.2 oz)      Meds: Reviewed. On levo  On propofol at 21.8ml/hr providing 575 kcal from lipids  IVF 0.9%NaCl running at 50ml/hr   dextrose 10%      adult 3 in 1 TPN      NxStage Pure Flow 401 CRRT/PIRRT fluid 5000mL 2.5 L/hr (03/09/24 0422)    norepinephrine 5 mcg/min (03/09/24 1300)    sodium chloride 50 mL/hr at 03/09/24 1300    cangrelor (Kengreal) 50 mg in sodium chloride 0.9% 250 mL IVPB - BRIDGING/MAINTENANCE dose 0.75 mcg/kg/min (03/09/24 1300)    sodium bicarbonate 25mEq in dextrose 5% 1000mL IMPELLA purge solution 10 mL/hr (03/07/24 1138)    heparin 300 Units/hr (03/09/24 1300)    propofol 50 mcg/kg/min (03/09/24 1310)    fentanyl 75 mcg/hr (03/09/24 1300)    dextrose 10%       NUTRITION PRESCRIPTION:   Estimated Nutrition needs: --dosing wt of 79.6 kg - wt taken on 3/5  Calories: ~7419-6231 calories/day (22-26 calories per kg Dosing wt) [place] = 1533 kcal/day  Protein: 100-167  g protein/day (>1.5-2.5 g protein/kg IBW while on CRRT)    -Parenteral Nutrition: 1200ml volume. 33g protein (132 kcal), ~58g dextrose (200 kcal). No lipids added  to bag as pt receiving adequate lipids from propofol (575 kcal). Provides a total of 907 kcal and meets ~59% of total estimated energy needs and 33% of estimated protein needs.     Will monitor need for adjustment as appropriate and transition to TPN via central line if/when able.       F/u per protocol or as appropriate.       Mayela Hallman RD, LDN  Clinical Dietitian  P: [phone]

## 2024-03-09 NOTE — RESPIRATORY THERAPY NOTE
Problem: RESPIRATORY - ADULT  Goal: Achieves optimal ventilation and oxygenation  Description: INTERVENTIONS:  - Assess for changes in respiratory status  - Assess for changes in mentation and behavior  - Position to facilitate oxygenation and minimize respiratory effort  - Oxygen supplementation based on oxygen saturation or ABGs  - Provide Smoking Cessation handout, if applicable  - Encourage broncho-pulmonary hygiene including cough, deep breathe, Incentive Spirometry  - Assess the need for suctioning and perform as needed  - Assess and instruct to report SOB or any respiratory difficulty  - Respiratory Therapy support as indicated  - Manage/alleviate anxiety  - Monitor for signs/symptoms of CO2 retention  Outcome: Progressing    Patient received on intubated on full support:    AC/VC 15/400/+5/30%    Bilateral BS auscultated, suction provided as needed.Q4 ABG and Q12 VBG done. No changes made overnight. Will continue to monitor.

## 2024-03-09 NOTE — PROGRESS NOTES
Pulmonary Progress Note      NAME: Cristiano Obregon - ROOM: 216/216-A - MRN: [id number] - Age: 53 year old - : 1970    Assessment/Plan:  Acute respiratory failure - intubated in cath lab due to severity of cardiac status, no underlying respiratory disease by my read  - On vent support.  Good airway pressures, minimal O2 needs  - Wean when clinically appropriate.  Still on Impella and CRRT  - Prop/Fent - daily sedation holidays  NSTEMI - diagnosed with new multivessel CAD s/p angioplasty and cardiogenic shock requiring impella placement.   - cangrelor  - heparin gtt  - Echo with good heart function and RHC with good filling pressures  - CABG vs PCI; decision to be made based on progress over the weekend  Cardiogenic shock - d/t ACS.  - EF was down with anterior wall hypokinesis.  Now improving EF on Echo and off pressors but impella still helping  - RHC with normal filling pressures and low CVP with Impella in place  - BP stable; pressors as needed to maintain MAP >65  - Continue impella; wean per cardiology  Pneumonia - HCAP at high risk for GNR.  Infiltrate in RML  - sputum cx pending  - Add zosyn for infection coverage  Acute renal failure - suspect from ATN and cardiogenic shock with hypotension. No increase in UOP after 500cc bolus  - IVF per renal  - Cont CRRT; nephrology following  Thrombocytopenia, anemia - d/t impella  - Transfuse for Plt <50  - Keep Hb >8  - No need for HIPA or ADEOLA  FEN/GI  - TF  Tobacco Abuse  - Strongly advise cessation  Proph  - Heparin gtt  - Pepcid  Dispo  - Criticall ill; at risk for worsening - continue ICU level of care  - Discussed with his  at bedside and ICU team  - crit care time 40 min      Arvin Blanco M.D.  Pulmonary and Critical Care Medicine  [facility]      Subjective:  No acute events overnight. Off pressors now but still needing all other support    Medications:   piperacillin-tazobactam  3.375 g Intravenous Q8H    aspirin  300 mg Rectal  Daily    pantoprazole  40 mg Intravenous Daily    chlorhexidine gluconate  15 mL Mouth/Throat BID@0800,2000     Intake/Output Summary (Last 24 hours) at 3/9/2024 0825  Last data filed at 3/9/2024 0802  Gross per 24 hour   Intake 3382.75 ml   Output 3089 ml   Net 293.75 ml       Physical Exam:  BP (!) 86/64 (BP Location: Left arm)   Pulse 60   Temp 98.7 °F (37.1 °C) (Pulmonary Artery)   Resp 15   Ht 5' 7\" (1.702 m)   Wt 188 lb 15 oz (85.7 kg)   SpO2 100%   BMI 29.59 kg/m²   Physical Exam:   General: alert, cooperative, no respiratory distress.   HEENT: Normocephalic atraumatic.Lips, mucosa, and tongue normal.  No thrush noted.   Neck: supple without mass   Lungs: clear   Chest wall: No tenderness or deformity.   Heart: Regular rate and rhythm, normal S1S2, no murmur.   Abdomen: soft, non-tender, non-distended, positive BS.   Extremity: no edema   Skin: no new rash   Neuro: sedated    Recent Labs   Lab 03/09/24  0450   RBC 2.88*   HGB 8.8*   HCT 25.6*   MCV 88.9   MCH 30.6   MCHC 34.4   RDW 14.6   NEPRELIM 4.75   WBC 6.5   PLT 64.0*     Recent Labs   Lab 03/05/24  0723 03/05/24  2102 03/08/24  0428 03/08/24  1641 03/09/24  0450   *   < > 102* 112* 116*   BUN 18   < > 38* 32* 28*   CREATSERUM 1.23   < > 5.50* 4.69* 4.10*   CA 8.6*   < > 7.9* 7.8* 8.1*   ALB 3.9   < > 2.8* 2.9* 3.1*      < > 140 136 135*   K 3.6   < > 4.4 4.6 4.6      < > 111 108 109   CO2 18.0*   < > 21.0 23.0 25.0   ALKPHO 53  --   --   --   --    AST 35*  --   --   --   --    ALT 40  --   --   --   --    BILT 0.6  --   --   --   --    TP 6.2  --   --   --   --     < > = values in this interval not displayed.       Imaging: I independently visualized all relevant chest imaging in PACS, agree with radiology interpretation except where noted.

## 2024-03-09 NOTE — PLAN OF CARE
Continues with CRRT and Impella @ P8.  between 0.8-1.1.  BP remains stable with Systolic 90's-100's.  Pulling to maintain Net 0 with CRRT. Total of 3cc urine output for 12hrs.  Restraints for Tube/Line safety.     Problem: RESPIRATORY - ADULT  Goal: Achieves optimal ventilation and oxygenation  Description: INTERVENTIONS:  - Assess for changes in respiratory status  - Assess for changes in mentation and behavior  - Position to facilitate oxygenation and minimize respiratory effort  - Oxygen supplementation based on oxygen saturation or ABGs  - Provide Smoking Cessation handout, if applicable  - Encourage broncho-pulmonary hygiene including cough, deep breathe, Incentive Spirometry  - Assess the need for suctioning and perform as needed  - Assess and instruct to report SOB or any respiratory difficulty  - Respiratory Therapy support as indicated  - Manage/alleviate anxiety  - Monitor for signs/symptoms of CO2 retention  Outcome: Progressing     Problem: CARDIOVASCULAR - ADULT  Goal: Absence of cardiac arrhythmias or at baseline  Description: INTERVENTIONS:  - Continuous cardiac monitoring, monitor vital signs, obtain 12 lead EKG if indicated  - Evaluate effectiveness of antiarrhythmic and heart rate control medications as ordered  - Initiate emergency measures for life threatening arrhythmias  - Monitor electrolytes and administer replacement therapy as ordered  Outcome: Progressing     Problem: SKIN/TISSUE INTEGRITY - ADULT  Goal: Skin integrity remains intact  Description: INTERVENTIONS  - Assess and document risk factors for pressure ulcer development  - Assess and document skin integrity  - Monitor for areas of redness and/or skin breakdown  - Initiate interventions, skin care algorithm/standards of care as needed  Outcome: Progressing  Goal: Incision(s), wounds(s) or drain site(s) healing without S/S of infection  Description: INTERVENTIONS:  - Assess and document risk factors for pressure ulcer  development  - Assess and document skin integrity  - Assess and document dressing/incision, wound bed, drain sites and surrounding tissue  - Implement wound care per orders  - Initiate isolation precautions as appropriate  - Initiate Pressure Ulcer prevention bundle as indicated  Outcome: Progressing  Goal: Oral mucous membranes remain intact  Description: INTERVENTIONS  - Assess oral mucosa and hygiene practices  - Implement preventative oral hygiene regimen  - Implement oral medicated treatments as ordered  Outcome: Progressing     Problem: Safety Risk - Non-Violent Restraints  Goal: Patient will remain free from self-harm  Description: INTERVENTIONS:  - Apply the least restrictive restraint to prevent harm  - Notify patient and family of reasons restraints applied  - Assess for any contributing factors to confusion (electrolyte disturbances, delirium, medications)  - Discontinue any unnecessary medical devices as soon as possible  - Assess the patient's physical comfort, circulation, skin condition, hydration, nutrition and elimination needs   - Reorient and redirection as needed  - Assess for the need to continue restraints  Outcome: Not Progressing     Problem: CARDIOVASCULAR - ADULT  Goal: Maintains optimal cardiac output and hemodynamic stability  Description: INTERVENTIONS:  - Monitor vital signs, rhythm, and trends  - Monitor for bleeding, hypotension and signs of decreased cardiac output  - Evaluate effectiveness of vasoactive medications to optimize hemodynamic stability  - Monitor arterial and/or venous puncture sites for bleeding and/or hematoma  - Assess quality of pulses, skin color and temperature  - Assess for signs of decreased coronary artery perfusion - ex. Angina  - Evaluate fluid balance, assess for edema, trend weights  Outcome: Not Progressing     Problem: METABOLIC/FLUID AND ELECTROLYTES - ADULT  Goal: Hemodynamic stability and optimal renal function maintained  Description:  INTERVENTIONS:  - Monitor labs and assess for signs and symptoms of volume excess or deficit  - Monitor intake, output and patient weight  - Monitor urine specific gravity, serum osmolarity and serum sodium as indicated or ordered  - Monitor response to interventions for patient's volume status, including labs, urine output, blood pressure (other measures as available)  - Encourage oral intake as appropriate  - Instruct patient on fluid and nutrition restrictions as appropriate  Outcome: Not Progressing     Problem: NEUROLOGICAL - ADULT  Goal: Achieves stable or improved neurological status  Description: INTERVENTIONS  - Assess for and report changes in neurological status  - Initiate measures to prevent increased intracranial pressure  - Maintain blood pressure and fluid volume within ordered parameters to optimize cerebral perfusion and minimize risk of hemorrhage  - Monitor temperature, glucose, and sodium. Initiate appropriate interventions as ordered  Outcome: Not Progressing

## 2024-03-09 NOTE — PROGRESS NOTES
[facility] Hospitalist Progress Note     CC: Hospital Follow up    PCP: Isauro Pina MD       Assessment/Plan:   This is a 53-year-old male with a history of hyperlipidemia, tobacco use, who presents to the hospital for evaluation of acute chest pain.      STEMI  Cardiogenic shock   -Multivessel disease including chronically occluded large RCA, 95% sequential LAD stenosis on cath 3/5  -flow restored to heavily thrombosed circumflex and OM1  -balloon pump per cardiology  -taken to cath again emergently on 3/6/24, for right heart cath to accurately obtain hemodynamics. Worsening renal function noted.   -trialed IV hydration, renal function worsening----started on CRRT on 3/7  -heparin drip   -IV cangrelor  -aspirin 81mg  -cardiology, nephrology, CV surgery and critical care on consult   -vent management and sedation per critical care    Acute kidney injury  -suspect cardiogenic shock  -component of ATI  -decreased urine output   -start CRRT on 3/7. Patient is toleratring     Hematuria, resolved  -yellow urine noted today, minimal, but not bloody      Acute hypoxic respiratory failure  -currently on vent, monitor oxygenation   -critical care consulted  -continue full vent support   -maintaining FiO2 30%     DVT prophylaxis: on heparin drip   Code status: FULL    Discussed with  bedside      Disposition: ICU     Rick Mendenhall MD  [facility] Hospitalist        Subjective:     Intubated, sedated    at bedside     OBJECTIVE:    Blood pressure 100/68, pulse 60, temperature 98.6 °F (37 °C), temperature source Pulmonary Artery, resp. rate 15, height 5' 7\" (1.702 m), weight 188 lb 15 oz (85.7 kg), SpO2 97%.    Temp:  [98.6 °F (37 °C)-100.2 °F (37.9 °C)] 98.6 °F (37 °C)  Pulse:  [60-87] 60  Resp:  [11-20] 15  BP: ()/(56-79) 100/68  SpO2:  [90 %-100 %] 97 %  AO: ()/(50-69) 110/63  FiO2 (%):  [30 %] 30 %      Intake/Output:    Intake/Output Summary (Last 24 hours) at 3/9/2024  1250  Last data filed at 3/9/2024 1200  Gross per 24 hour   Intake 3463.74 ml   Output 3080 ml   Net 383.74 ml       Last 3 Weights   03/09/24 0500 188 lb 15 oz (85.7 kg)   03/08/24 0600 185 lb 13.6 oz (84.3 kg)   03/07/24 0600 183 lb 3.2 oz (83.1 kg)   03/06/24 0500 180 lb 1.9 oz (81.7 kg)   03/05/24 1324 175 lb 7.8 oz (79.6 kg)   03/05/24 0722 160 lb (72.6 kg)   10/06/21 1541 177 lb 6.4 oz (80.5 kg)   09/09/20 0929 172 lb 9.6 oz (78.3 kg)       /68 (BP Location: Left arm)   Pulse 60   Temp 98.6 °F (37 °C) (Pulmonary Artery)   Resp 15   Ht 5' 7\" (1.702 m)   Wt 188 lb 15 oz (85.7 kg)   SpO2 97%   BMI 29.59 kg/m²   General: intubated, sedated  HEENT: ET tube intact  Lungs: mechanical breath sounds   Heart: Regular rate and rhythm  Abdomen: soft  Extremities: No edema  Skin: normal color    Data Review:       Labs:     Recent Labs   Lab 03/07/24 0458 03/08/24 0428 03/08/24  1702 03/09/24  0450   RBC 3.26* 3.11* 3.08* 2.88*   HGB 9.8* 9.3* 9.3* 8.8*   HCT 29.8* 27.9* 27.6* 25.6*   MCV 91.4 89.7 89.6 88.9   MCH 30.1 29.9 30.2 30.6   MCHC 32.9 33.3 33.7 34.4   RDW 14.1 14.5 14.6 14.6   NEPRELIM 6.62 5.72  --  4.75   WBC 8.6 7.2 7.5 6.5   PLT 96.0* 60.0* 53.0* 64.0*         Recent Labs   Lab 03/08/24  0428 03/08/24  1641 03/09/24  0450   * 112* 116*   BUN 38* 32* 28*   CREATSERUM 5.50* 4.69* 4.10*   EGFRCR 11* 14* 16*   CA 7.9* 7.8* 8.1*    136 135*   K 4.4 4.6 4.6    108 109   CO2 21.0 23.0 25.0       Recent Labs   Lab 03/05/24  0723 03/07/24  0458 03/07/24  1717 03/08/24  0428 03/08/24  1641 03/09/24  0450   ALT 40  --   --   --   --   --    AST 35*  --   --   --   --   --    ALB 3.9 2.9* 2.9* 2.8* 2.9* 3.1*         Imaging:  XR CHEST AP PORTABLE  (CPT=71045)    Result Date: 3/8/2024  CONCLUSION:  1. New enteric tube tip appears to terminate about the gastroesophageal junction.  Recommend advancing at least 9.0 cm distally into the stomach. 2. Additional lines/tubes in stable customary  positioning. 3. Development of subsegmental atelectasis in the lower right lung.   Results of this examination were discussed with the patient's nurse, Mayela Ch, by Dr. Edwards at 12:40 on 03/08/2024.  Dictated by (CST): Bc Edwards MD on 3/08/2024 at 12:36 PM     Finalized by (CST): Bc Edwards MD on 3/08/2024 at 12:42 PM          IR CENTRAL VENOUS ACCESS    Result Date: 3/7/2024  CONCLUSION: Placement of non-tunneled central venous catheter via right internal jugular vein at bedside.  The catheter is ready for use.    Dictated by (CST): Guille Chan MD on 3/07/2024 at 12:46 PM     Finalized by (CST): Guille Chan MD on 3/07/2024 at 12:47 PM          XR CHEST AP PORTABLE  (CPT=71045)    Result Date: 3/7/2024  PROCEDURE: XR CHEST AP PORTABLE  (CPT=71045) TIME: 1058  COMPARISON: [facility], XR CHEST AP PORTABLE (CPT=71045), 3/06/2024, 6:13 AM.  INDICATIONS: Verify Temporary Hemodialysis Catheter placement.  TECHNIQUE:   Single view.   Findings and impression:  Right IJ dialysis catheter in the lower SVC.  No pneumothorax  Interval PA catheter in the left inter lobar artery  Stable ETT and Impella.  Enteric tube remains in the lower esophagus  Increasing vascular congestion with mild basilar predominant edema     Dictated by (CST): Reyes Rodriguez MD on 3/07/2024 at 11:51 AM     Finalized by (CST): Reyes Rodriguez MD on 3/07/2024 at 11:52 AM             Meds:      piperacillin-tazobactam  3.375 g Intravenous Q8H    aspirin  300 mg Rectal Daily    pantoprazole  40 mg Intravenous Daily    chlorhexidine gluconate  15 mL Mouth/Throat BID@0800,2000      NxStage Pure Flow 401 CRRT/PIRRT fluid 5000mL 2.5 L/hr (03/09/24 0422)    norepinephrine 2.5 mcg/min (03/09/24 1200)    sodium chloride 50 mL/hr at 03/09/24 1200    cangrelor (Kengreal) 50 mg in sodium chloride 0.9% 250 mL IVPB - BRIDGING/MAINTENANCE dose 0.75 mcg/kg/min (03/09/24 1200)    sodium bicarbonate 25mEq in dextrose 5%  1000mL IMPELLA purge solution 10 mL/hr (03/07/24 1138)    heparin 300 Units/hr (03/09/24 1200)    propofol 50 mcg/kg/min (03/09/24 1200)    fentanyl 75 mcg/hr (03/09/24 1200)    dextrose 10%       heparin, fentaNYL **OR** fentaNYL, acetaminophen **OR** acetaminophen **OR** acetaminophen **OR** acetaminophen, polyethylene glycol (PEG 3350), senna, bisacodyl, fleet enema, fentaNYL (Sublimaze) **OR** fentaNYL (Sublimaze), fentanyl, dextrose 10%, lipase-protease-amylase (Lip-Prot-Amyl) **AND** sodium bicarbonate

## 2024-03-09 NOTE — RESPIRATORY THERAPY NOTE
Patient received on full vent support with the following settings  AC 15/400/30%/+5. Bilateral bs heard on auscultation. Patient not appropriate for SBT. Suction provided as needed.

## 2024-03-09 NOTE — PROGRESS NOTES
NEPHROLOGY DAILY PROGRESS NOTE       SUBJECTIVE:  Intubated, sedated   Tolerating CRRT, no acute issues.       OBJECTIVE:    Total Intake/Output:    Intake/Output Summary (Last 24 hours) at 3/9/2024 1140  Last data filed at 3/9/2024 1100  Gross per 24 hour   Intake 3500.2 ml   Output 3119 ml   Net 381.2 ml       PHYSICAL EXAM:  BP (!) 87/68 (BP Location: Left arm)   Pulse 70   Temp 99 °F (37.2 °C) (Pulmonary Artery)   Resp 15   Ht 5' 7\" (1.702 m)   Wt 188 lb 15 oz (85.7 kg)   SpO2 99%   BMI 29.59 kg/m²   GEN: Intubated, sedated   HEENT: ETT in place   CHEST: mechanical breath sounds   CARDIAC: S1S2 normal  ABD: soft, NT/ND  : lujan in place   EXT:  no lower ext edema  NEURO: sedated   ACCESS: RIJ temp HD cath (3/7)      CURRENT MEDICATIONS:     piperacillin-tazobactam  3.375 g Intravenous Q8H    aspirin  300 mg Rectal Daily    pantoprazole  40 mg Intravenous Daily    chlorhexidine gluconate  15 mL Mouth/Throat BID@0800,2000         LABS:  Patient Labs Reviewed in Detail. Pertinent Labs as follows:  Recent Labs   Lab 03/08/24  0428 03/08/24  1641 03/09/24  0450   * 112* 116*   BUN 38* 32* 28*   CREATSERUM 5.50* 4.69* 4.10*   EGFRCR 11* 14* 16*   CA 7.9* 7.8* 8.1*    136 135*   K 4.4 4.6 4.6    108 109   CO2 21.0 23.0 25.0     Recent Labs   Lab 03/07/24  0458 03/08/24  0428 03/08/24  1702 03/09/24  0450   RBC 3.26* 3.11* 3.08* 2.88*   HGB 9.8* 9.3* 9.3* 8.8*   HCT 29.8* 27.9* 27.6* 25.6*   MCV 91.4 89.7 89.6 88.9   MCH 30.1 29.9 30.2 30.6   MCHC 32.9 33.3 33.7 34.4   RDW 14.1 14.5 14.6 14.6   NEPRELIM 6.62 5.72  --  4.75   WBC 8.6 7.2 7.5 6.5   PLT 96.0* 60.0* 53.0* 64.0*         IMAGING:  XR CHEST AP PORTABLE  (CPT=71045)    Result Date: 3/8/2024  CONCLUSION:  1. New enteric tube tip appears to terminate about the gastroesophageal junction.  Recommend advancing at least 9.0 cm distally into the stomach. 2. Additional lines/tubes in stable customary positioning. 3. Development of  subsegmental atelectasis in the lower right lung.   Results of this examination were discussed with the patient's nurse, Mayela Ch, by Dr. Edwards at 12:40 on 03/08/2024.  Dictated by (CST): Bc Edwards MD on 3/08/2024 at 12:36 PM     Finalized by (CST): Bc Edwards MD on 3/08/2024 at 12:42 PM            ASSESSMENT AND PLAN:   This is a 53 year old male with PMH sig for HLD, tobacco use. Presented with chest pain and was admitted for NSTEMI.  Nephrology is consulted for NORMA      Anuric NORMA  - creatinine 1.23 on admission, worsened to 7.19 (3/7)  - renal US neg for hydronephrosis    - UA: + 100 protein, 6-10 RBCs, + ketones, few squamous epi cells  - unable to obtain urine lytes   - suspect due to cardiogenic shock / ATN. No improvement with IVFs or lasix challenge.  - initiated on CVVH on 3/7 via RIJ temp HD cath   - Continue CRRT, keep net even   - monitor for renal recovery.   - follow renal fxn and I/Os   - renally dose meds      Hyperkalemia   - manage with CRRT    Metabolic acidosis  - Manage with CRRT        Olayinka RN       We will continue to follow Cristiano Obregon    Critical care time > 35 mins     DO Estela Garcia [facility] - Nephrology

## 2024-03-10 NOTE — RESPIRATORY THERAPY NOTE
RESPIRATORY THERAPY MECHANICAL VENTILATION PROGRESS NOTE      Patient received on ventilator 15/400/+5/30%.  Patient requires chronic ventilator support and is not a candidate for SBT. Bilateral breath sounds auscultated. Suction provided when indicated. No acute events. RT will continue to monitor.    Problem: RESPIRATORY - ADULT  Goal: Achieves optimal ventilation and oxygenation  Description: INTERVENTIONS:  - Assess for changes in respiratory status  - Assess for changes in mentation and behavior  - Position to facilitate oxygenation and minimize respiratory effort  - Oxygen supplementation based on oxygen saturation or ABGs  - Provide Smoking Cessation handout, if applicable  - Encourage broncho-pulmonary hygiene including cough, deep breathe, Incentive Spirometry  - Assess the need for suctioning and perform as needed  - Assess and instruct to report SOB or any respiratory difficulty  - Respiratory Therapy support as indicated  - Manage/alleviate anxiety  - Monitor for signs/symptoms of CO2 retention  Outcome: Progressing

## 2024-03-10 NOTE — PLAN OF CARE
Patient intubated and sedated.  Tremors observed when agitating patient, PRN valium given with appropriate response.  Levophed still on for hemodynamic stability.  Unable to do weaning trial.  Patient makes small amounts of urine, 7cc this shift.  X1 unit PRBC's given.  ACT's high, heparin titrated off per impella protocol, cards aware.  Impella in place, no issues.  Plan for high risk PCI vs CABG tomorrow.  Family aware of plan of care, verbalized understanding.  Problem: Safety Risk - Non-Violent Restraints  Goal: Patient will remain free from self-harm  Description: INTERVENTIONS:  - Apply the least restrictive restraint to prevent harm  - Notify patient and family of reasons restraints applied  - Assess for any contributing factors to confusion (electrolyte disturbances, delirium, medications)  - Discontinue any unnecessary medical devices as soon as possible  - Assess the patient's physical comfort, circulation, skin condition, hydration, nutrition and elimination needs   - Reorient and redirection as needed  - Assess for the need to continue restraints  Outcome: Progressing     Problem: RESPIRATORY - ADULT  Goal: Achieves optimal ventilation and oxygenation  Description: INTERVENTIONS:  - Assess for changes in respiratory status  - Assess for changes in mentation and behavior  - Position to facilitate oxygenation and minimize respiratory effort  - Oxygen supplementation based on oxygen saturation or ABGs  - Provide Smoking Cessation handout, if applicable  - Encourage broncho-pulmonary hygiene including cough, deep breathe, Incentive Spirometry  - Assess the need for suctioning and perform as needed  - Assess and instruct to report SOB or any respiratory difficulty  - Respiratory Therapy support as indicated  - Manage/alleviate anxiety  - Monitor for signs/symptoms of CO2 retention  Outcome: Progressing     Problem: CARDIOVASCULAR - ADULT  Goal: Maintains optimal cardiac output and hemodynamic  stability  Description: INTERVENTIONS:  - Monitor vital signs, rhythm, and trends  - Monitor for bleeding, hypotension and signs of decreased cardiac output  - Evaluate effectiveness of vasoactive medications to optimize hemodynamic stability  - Monitor arterial and/or venous puncture sites for bleeding and/or hematoma  - Assess quality of pulses, skin color and temperature  - Assess for signs of decreased coronary artery perfusion - ex. Angina  - Evaluate fluid balance, assess for edema, trend weights  Outcome: Progressing  Goal: Absence of cardiac arrhythmias or at baseline  Description: INTERVENTIONS:  - Continuous cardiac monitoring, monitor vital signs, obtain 12 lead EKG if indicated  - Evaluate effectiveness of antiarrhythmic and heart rate control medications as ordered  - Initiate emergency measures for life threatening arrhythmias  - Monitor electrolytes and administer replacement therapy as ordered  Outcome: Progressing     Problem: METABOLIC/FLUID AND ELECTROLYTES - ADULT  Goal: Hemodynamic stability and optimal renal function maintained  Description: INTERVENTIONS:  - Monitor labs and assess for signs and symptoms of volume excess or deficit  - Monitor intake, output and patient weight  - Monitor urine specific gravity, serum osmolarity and serum sodium as indicated or ordered  - Monitor response to interventions for patient's volume status, including labs, urine output, blood pressure (other measures as available)  - Encourage oral intake as appropriate  - Instruct patient on fluid and nutrition restrictions as appropriate  Outcome: Progressing     Problem: SKIN/TISSUE INTEGRITY - ADULT  Goal: Skin integrity remains intact  Description: INTERVENTIONS  - Assess and document risk factors for pressure ulcer development  - Assess and document skin integrity  - Monitor for areas of redness and/or skin breakdown  - Initiate interventions, skin care algorithm/standards of care as needed  Outcome:  Progressing  Goal: Incision(s), wounds(s) or drain site(s) healing without S/S of infection  Description: INTERVENTIONS:  - Assess and document risk factors for pressure ulcer development  - Assess and document skin integrity  - Assess and document dressing/incision, wound bed, drain sites and surrounding tissue  - Implement wound care per orders  - Initiate isolation precautions as appropriate  - Initiate Pressure Ulcer prevention bundle as indicated  Outcome: Progressing  Goal: Oral mucous membranes remain intact  Description: INTERVENTIONS  - Assess oral mucosa and hygiene practices  - Implement preventative oral hygiene regimen  - Implement oral medicated treatments as ordered  Outcome: Progressing     Problem: NEUROLOGICAL - ADULT  Goal: Achieves stable or improved neurological status  Description: INTERVENTIONS  - Assess for and report changes in neurological status  - Initiate measures to prevent increased intracranial pressure  - Maintain blood pressure and fluid volume within ordered parameters to optimize cerebral perfusion and minimize risk of hemorrhage  - Monitor temperature, glucose, and sodium. Initiate appropriate interventions as ordered  Outcome: Progressing     Problem: Delirium  Goal: Minimize duration of delirium  Description: Interventions:  - Encourage use of hearing aids, eye glasses  - Promote highest level of mobility daily  - Provide frequent reorientation  - Promote wakefulness i.e. lights on, blinds open  - Promote sleep, encourage patient's normal rest cycle i.e. lights off, TV off, minimize noise and interruptions  - Encourage family to assist in orientation and promotion of home routines  Outcome: Progressing

## 2024-03-10 NOTE — PROGRESS NOTES
[facility]  Cardiology Progress Note    Cristiano Obregon Patient Status:  Inpatient    1970 MRN [id number]   Location [facility] 2W/SW Attending Rick Mendenhall MD   Hosp Day # 5 PCP Isauro Pina MD     Subjective     Vitals remain labile, on levo 5 and P8 Impella.    Objective:   Patient Vitals for the past 24 hrs:   BP Temp Temp src Pulse Resp SpO2   03/10/24 0600 102/69 98.6 °F (37 °C) Pulmonary Ar 56 15 100 %   03/10/24 0500 111/78 98.4 °F (36.9 °C) Pulmonary Ar 55 10 100 %   03/10/24 0400 95/68 98.1 °F (36.7 °C) Pulmonary Ar 52 15 100 %   03/10/24 0300 97/66 97.9 °F (36.6 °C) Pulmonary Ar 53 15 --   03/10/24 0100 106/67 98 °F (36.7 °C) Pulmonary Ar 52 15 100 %   03/10/24 0000 102/62 97.8 °F (36.6 °C) Pulmonary Ar 52 15 99 %   24 2300 93/57 97.9 °F (36.6 °C) Pulmonary Ar 53 15 98 %   24 2200 105/71 99.5 °F (37.5 °C) Pulmonary Ar 86 14 99 %   24 2100 96/61 98.5 °F (36.9 °C) Pulmonary Ar 54 14 100 %   24 2000 96/60 99 °F (37.2 °C) Pulmonary Ar 58 15 100 %   24 1900 108/73 99.2 °F (37.3 °C) Pulmonary Ar 78 15 99 %   24 1800 103/72 98.6 °F (37 °C) Pulmonary Ar 53 15 100 %   24 1700 103/68 98.7 °F (37.1 °C) Pulmonary Ar 53 15 100 %   24 1600 103/74 98.9 °F (37.2 °C) Pulmonary Ar 53 15 100 %   24 1500 105/69 98.5 °F (36.9 °C) Pulmonary Ar 54 15 100 %   24 1400 (!) 89/60 98.5 °F (36.9 °C) Pulmonary Ar 56 15 100 %   24 1300 109/77 98.3 °F (36.8 °C) Pulmonary Ar 54 15 100 %   24 1210 -- -- -- 60 15 98 %   24 1200 100/68 98.6 °F (37 °C) Pulmonary Ar 60 15 97 %   24 1100 (!) 87/68 99 °F (37.2 °C) Pulmonary Ar 70 15 99 %   24 1000 101/67 100 °F (37.8 °C) Pulmonary Ar 79 15 100 %   24 0900 107/77 100.2 °F (37.9 °C) Pulmonary Ar 77 11 90 %   24 0800 (!) 86/64 98.7 °F (37.1 °C) Pulmonary Ar 60 15 100 %   24 0700 (!) 87/68 98.6 °F (37 °C) Pulmonary Ar 65 15 100 %      Intake/Output:   Last 3 shifts:   Intake/Output                   03/08/24 0700 - 03/09/24 0659 03/09/24 0700 - 03/10/24 0659 03/10/24 0700 - 03/11/24 0659       Intake    P.O.  0  0  --    P.O. 0 0 --    I.V.  3292.2  2811.3  --    I.V. 2097.6 258.7 --    Volume (mL) Cangrelor 188.3 342.4 --    Volume (mL) Heparin 64.5 53.5 --    Volume (mL) Propofol 454.7 498.5 --    Volume (mL) Fentanyl 57.6 163.5 --    Volume (mL) Norepinephrine -- 274.7 --    Amount given (ml) 111.8 154 --    Volume (mL) Impella Non-Heparin   (sodium bicarbonate 25mEq in dextrose 5% 1000mL IMPELLA purge solution) 251.7 252.1 --    Volume (mL) (sodium chloride 0.9% infusion) 66 814 --    Blood  200  --  --    Volume (Transfuse platelets) 200 -- --    NG/GT  0  --  --    Intake (mL) ([REMOVED] NG/OG Tube Nasogastric Right nostril) 0 -- --    IV PIGGYBACK  --  347  --    Volume (mL) (piperacillin-tazobactam (Zosyn) 3.375 g in dextrose 5% 100 mL IVPB-ADDV) -- 297 --    Volume (mL) (magnesium sulfate in sterile water for injection 2 g/50mL IVPB premix 2 g) -- 50 --    TPN  --  355  --    Volume Infused  (mL) -- 355 --    Total Intake 3492.2 3513.3 --       Output    Urine  13  9  --    Output (mL) (Urethral Catheter Latex) 13 9 --    Emesis/NG output  0  100  --    Emesis -- 100 --    Output (mL) ([REMOVED] NG/OG Tube Nasogastric Right nostril) 0 -- --    Other  3370  2946  --    Actual UF removed (History Screen) 3370 2946 --    Total Output 3383 3055 --       Net I/O     109.2 458.3 --             Vent Settings: Vent Mode: VC/AC  FiO2 (%):  [30 %] 30 %  S RR:  [15] 15  S VT:  [370 mL-400 mL] 370 mL  PEEP/CPAP (cm H2O):  [5 cm H20] 5 cm H20  MAP (cm H2O):  [7-8] 7    Hemodynamic parameters (last 24 hours): PAP: (32-55)/(11-28) 34/13  CO:  [5.3 L/min-7.9 L/min] 6.1 L/min  CI:  [2.7 L/min/m2-4.1 L/min/m2] 3.2 L/min/m2    Scheduled Meds:    piperacillin-tazobactam  3.375 g Intravenous Q8H    aspirin  300 mg Rectal Daily    pantoprazole  40 mg  Intravenous Daily    chlorhexidine gluconate  15 mL Mouth/Throat BID@0800,2000       Continuous Infusions:    dextrose 10%      adult 3 in 1 TPN 50 mL/hr at 03/10/24 0600    NxStage Pure Flow 401 CRRT/PIRRT fluid 5000mL 2.5 L/hr (03/09/24 0422)    norepinephrine 3 mcg/min (03/10/24 0600)    cangrelor (Kengreal) 50 mg in sodium chloride 0.9% 250 mL IVPB - BRIDGING/MAINTENANCE dose 0.75 mcg/kg/min (03/10/24 0600)    sodium bicarbonate 25mEq in dextrose 5% 1000mL IMPELLA purge solution 10 mL/hr (03/07/24 1138)    heparin 200 Units/hr (03/10/24 0600)    propofol 50 mcg/kg/min (03/10/24 0600)    fentanyl 75 mcg/hr (03/10/24 0600)    dextrose 10%         Results:     Lab Results   Component Value Date    WBC 5.9 03/10/2024    HGB 7.7 (L) 03/10/2024    HCT 23.2 (L) 03/10/2024    PLT 57.0 (L) 03/10/2024    CREATSERUM 3.45 (H) 03/10/2024    CREATSERUM 3.45 (H) 03/10/2024    BUN 21 03/10/2024    BUN 21 03/10/2024     (L) 03/10/2024     (L) 03/10/2024    K 4.7 03/10/2024    K 4.7 03/10/2024     03/10/2024     03/10/2024    CO2 25.0 03/10/2024    CO2 25.0 03/10/2024     (H) 03/10/2024     (H) 03/10/2024    CA 8.4 (L) 03/10/2024    CA 8.4 (L) 03/10/2024    ALB 2.9 (L) 03/10/2024    ALB 2.9 (L) 03/10/2024    ALKPHO 87 03/10/2024    BILT 0.8 03/10/2024    TP 5.2 (L) 03/10/2024     (H) 03/10/2024    ALT 56 (H) 03/10/2024    PTT 61.0 (H) 03/07/2024    INR 1.06 03/07/2024    PSA 1.95 10/06/2021    MG 2.5 03/10/2024    PHOS 3.6 03/10/2024    PHOS 3.6 03/10/2024       Recent Labs   Lab 03/09/24  0450 03/09/24  1719 03/10/24  0405   * 123* 142*  142*   BUN 28* 23 21  21   CREATSERUM 4.10* 3.61* 3.45*  3.45*   CA 8.1* 8.0* 8.4*  8.4*   * 137 134*  134*   K 4.6 4.8 4.7  4.7    106 105  105   CO2 25.0 25.0 25.0  25.0     Recent Labs   Lab 03/08/24  0428 03/08/24  1702 03/09/24  0450 03/10/24  0405   RBC 3.11* 3.08* 2.88* 2.57*   HGB 9.3* 9.3* 8.8* 7.7*   HCT 27.9*  27.6* 25.6* 23.2*   MCV 89.7 89.6 88.9 90.3   MCH 29.9 30.2 30.6 30.0   MCHC 33.3 33.7 34.4 33.2   RDW 14.5 14.6 14.6 14.4   NEPRELIM 5.72  --  4.75 4.13   WBC 7.2 7.5 6.5 5.9   PLT 60.0* 53.0* 64.0* 57.0*       No results for input(s): \"BNPML\" in the last 168 hours.    No results for input(s): \"TROP\", \"CK\" in the last 168 hours.    XR CHEST AP PORTABLE  (CPT=71045)    Result Date: 3/8/2024  CONCLUSION:  1. New enteric tube tip appears to terminate about the gastroesophageal junction.  Recommend advancing at least 9.0 cm distally into the stomach. 2. Additional lines/tubes in stable customary positioning. 3. Development of subsegmental atelectasis in the lower right lung.   Results of this examination were discussed with the patient's nurse, Mayela Ch, by Dr. Edwards at 12:40 on 03/08/2024.  Dictated by (CST): Bc Edwards MD on 3/08/2024 at 12:36 PM     Finalized by (CST): Bc Edwards MD on 3/08/2024 at 12:42 PM          IR CENTRAL VENOUS ACCESS    Result Date: 3/7/2024  CONCLUSION: Placement of non-tunneled central venous catheter via right internal jugular vein at bedside.  The catheter is ready for use.    Dictated by (CST): Guille Chan MD on 3/07/2024 at 12:46 PM     Finalized by (CST): Guille Chan MD on 3/07/2024 at 12:47 PM          XR CHEST AP PORTABLE  (CPT=71045)    Result Date: 3/7/2024  PROCEDURE: XR CHEST AP PORTABLE  (CPT=71045) TIME: 1058  COMPARISON: [facility], XR CHEST AP PORTABLE (CPT=71045), 3/06/2024, 6:13 AM.  INDICATIONS: Verify Temporary Hemodialysis Catheter placement.  TECHNIQUE:   Single view.   Findings and impression:  Right IJ dialysis catheter in the lower SVC.  No pneumothorax  Interval PA catheter in the left inter lobar artery  Stable ETT and Impella.  Enteric tube remains in the lower esophagus  Increasing vascular congestion with mild basilar predominant edema     Dictated by (CST): Reyes Rodriguez MD on 3/07/2024 at 11:51 AM      Finalized by (CST): Reyes Rodriguez MD on 3/07/2024 at 11:52 AM             CARD ECHO 2D W/O DOPPLER (CPT=93307)    Result Date: 3/8/2024  Transthoracic Echocardiogram Name:Cristiano Obregon Date: 2024 :  1970 Ht:  (67in)  BP: 127 / 78 MRN:  [id number]    Age:  53years    Wt:  (185lb) HR: 75bpm Loc:  [facility]       Gndr: M          BSA: 1.96m^2 Sonographer: Manuel Ordering:    Carlitos Sandoval Consulting:  Janet Velazco ---------------------------------------------------------------------------- History/Indications:  Impella placement. ---------------------------------------------------------------------------- Procedure information:  A transthoracic echocardiogram, limited study was performed. Additional evaluation included M-mode and limited 2D.  Patient status:  Inpatient.  Location:  [facility]    This was a routine study. Transthoracic echocardiography for diagnosis, ventricular function evaluation, and post closure device deployment evaluation. Image quality was adequate. ECG rhythm:   Normal sinus ---------------------------------------------------------------------------- Conclusions: 1. Left ventricle: The cavity size was normal. Wall thickness was normal.    Systolic function was normal. The estimated ejection fraction was 50-55%,    by visual assessment. Hypokinesis of the anterolateral wall. Unable to    assess LV diastolic function. Impella catheter noted in the left    ventricle, placement measures 3.50cm from the aortic valve. 2. Right ventricle: Pacer wire noted in the right ventricle. 3. Right atrium: Pacer wire noted in right atrium. * ---------------------------------------------------------------------------- * Findings: Left ventricle:  The cavity size was normal. Wall thickness was normal. Systolic function was normal. The estimated ejection fraction was 50-55%, by visual assessment. Impella catheter noted in the left ventricle, placement measures 3.50cm from the aortic valve.  Regional wall  motion abnormalities:  Hypokinesis of the anterolateral wall. Unable to assess LV diastolic function. Right ventricle:  The cavity size was normal. Pacer wire noted in the right ventricle. Systolic function was normal. Right atrium:  Pacer wire noted in right atrium. Mitral valve:  The valve was structurally normal. Leaflet separation was normal. Aortic valve:   The valve was trileaflet. Tricuspid valve:  The valve is structurally normal. Leaflet separation was normal. Pulmonic valve:   Not visualized. Pericardium:   There was no pericardial effusion. Pulmonary arteries: Systolic pressure could not be accurately estimated. Systemic veins:  Central venous respirophasic diameter changes are blunted (< 50%). Inferior vena cava: The IVC was dilated. ---------------------------------------------------------------------------- Measurements  Left ventricle         Value        Ref       03/07/2024  IVS thickness, ED,     1.0   cm     0.6 - 1.0 1.3  PLAX  LV ID, ED, PLAX        5.1   cm     4.2 - 5.8 4.4  LV ID, ES, PLAX        3.8   cm     2.5 - 4.0 3.3  LV PW thickness,       0.9   cm     0.6 - 1.0 1.4  ED, PLAX  IVS/LV PW ratio,       1.11         --------- 0.93  ED, PLAX  LV PW/LV ID ratio,     0.18         --------- 0.32  ED, PLAX  LV ejection        (L) 50    %      52 - 72   50  fraction  Inferior vena cava     Value        Ref       03/07/2024  ID                 (H) 2.5   cm     <=2.1     ----------  Right ventricle        Value        Ref       03/07/2024  TAPSE, 2D              2.39  cm     >=1.70    ----------  TAPSE, MM              2.39  cm     >=1.70    ----------  RV s', lateral         18.8  cm/sec >=9.5     ---------- Legend: (L)  and  (H)  liza values outside specified reference range. ---------------------------------------------------------------------------- Prepared and electronically signed by Gurjit Elliott 03/08/2024 17:42     CARD ECHO 2D W/O DOPPLER (CPT=93307)    Result Date:  3/7/2024  Transthoracic Echocardiogram Name:Cristiano Obregon Date: 2024 :  1970 Ht:  (67in)  BP: 105 / 68 MRN:  [id number]    Age:  53years    Wt:  (183lb) HR: Loc:  [facility]       Gndr: M          BSA: 1.95m^2 Sonographer: Glenna DENIS Ordering:    Carlitos Sandoval Consulting:  Janet Velazco ---------------------------------------------------------------------------- History/Indications:   Impella Placement. ---------------------------------------------------------------------------- Procedure information:  A transthoracic echocardiogram, limited study was performed. Additional evaluation included M-mode and limited 2D.  Patient status:  Inpatient.  Location:  Bedside.    Comparison was made to the study of 2024.    This was a routine study. Transthoracic echocardiography for diagnosis. Image quality was adequate. The study was technically limited due to restricted patient mobility and patient supine. ---------------------------------------------------------------------------- Conclusions: Left ventricle: The cavity size was normal. Wall thickness was mildly increased. Systolic function was mildly reduced. The estimated ejection fraction was 45-50%, by biplane method of disks. Unable to assess LV diastolic function. Impressions:  No significant change since prior study. * ---------------------------------------------------------------------------- * Findings: Left ventricle:  The cavity size was normal. Wall thickness was mildly increased. Systolic function was mildly reduced. The estimated ejection fraction was 45-50%, by biplane method of disks. Unable to assess LV diastolic function. Pericardium:   There was no pericardial effusion. Pleura:  No evidence of pleural fluid accumulation. ---------------------------------------------------------------------------- Measurements  Left ventricle         Value        Ref       2024  IVS thickness, ED, (H) 1.3   cm     0.6 - 1.0 ----------  PLAX  LV ID,  ED, PLAX        4.4   cm     4.2 - 5.8 ----------  LV ID, ES, PLAX        3.3   cm     2.5 - 4.0 ----------  LV PW thickness,   (H) 1.4   cm     0.6 - 1.0 ----------  ED, PLAX  IVS/LV PW ratio,       0.93         --------- ----------  ED, PLAX  LV PW/LV ID ratio,     0.32         --------- ----------  ED, PLAX  LV ejection        (L) 50    %      52 - 72   ----------  fraction  LV end-diastolic       90    ml     69 - 185  82  volume, 1-p A4C  LV ejection            46    %      46 - 74   39  fraction, 1-p A4C  Stroke volume, 1-p     41    ml     --------- 32  A4C  LV end-diastolic       46    ml/m^2 37 - 93   43  volume/bsa, 1-p  A4C  Stroke volume/bsa,     21    ml/m^2 --------- 17  1-p A4C  LV end-diastolic       79    ml     62 - 150  81  volume, 2-p  LV end-systolic        45    ml     21 - 61   49  volume, 2-p  LV ejection        (L) 43    %      52 - 72   40  fraction, 2-p  Stroke volume, 2-p     34    ml     --------- 33  LV end-diastolic       40    ml/m^2 34 - 74   42  volume/bsa, 2-p  LV end-systolic        23    ml/m^2 11 - 31   25  volume/bsa, 2-p  Stroke volume/bsa,     17.2  ml/m^2 --------- 16.9  2-p Legend: (L)  and  (H)  liza values outside specified reference range. ---------------------------------------------------------------------------- Prepared and electronically signed by Carlitos Sandoval/07/2024 12:43     CARD ECHO 2D W/O DOPPLER (CPT=93307)    Result Date: 3/6/2024  Transthoracic Echocardiogram Name:Cristiano Obregon Date:    2024    :     1970    Ht:     (67in)     BP: MRN:     [id number]       Age:     53years       Wt:     (180lb)    HR: Loc:     [facility]          Gndr:    M             BSA:    1.93m^2 Sonographer: BELKIS Cedeno RDCS Ordering:    Carlitos Sandoval Consulting:  Janet Velazco ---------------------------------------------------------------------------- Procedure information:  A transthoracic echocardiogram, limited study was performed. Additional evaluation included M-mode  and limited 2D.  Image quality was adequate. ECG rhythm:   Normal sinus ---------------------------------------------------------------------------- Conclusions: Left ventricle: The cavity size was normal. Wall thickness was normal. Systolic function was mildly reduced. The estimated ejection fraction was 40-45%, by biplane method of disks. Impella catheter noted in the left ventricle, placement measures 3.70cm from the aortic valve. * ---------------------------------------------------------------------------- * Findings: Left ventricle:  The cavity size was normal. Wall thickness was normal. Systolic function was mildly reduced. The estimated ejection fraction was 40-45%, by biplane method of disks. Impella catheter noted in the left ventricle, placement measures 3.70cm from the aortic valve. Left atrium:  The atrium was normal in size. Right ventricle:  The cavity size was normal. Wall thickness was normal. Systolic function was normal. Right atrium:  The atrium was normal in size. Mitral valve:  The valve was structurally normal. Leaflet separation was normal. Aortic valve:  The valve was structurally normal. The valve was trileaflet. Cusp separation was normal. Tricuspid valve:  The valve is structurally normal. Leaflet separation was normal. Pulmonic valve:   The valve is structurally normal. Cusp separation was normal. Pericardium:   There was no pericardial effusion. Aorta: Aortic root: The aortic root was normal-sized. Pulmonary arteries: The main pulmonary artery was normal-sized. Systemic veins: Inferior vena cava: The IVC was normal-sized. ---------------------------------------------------------------------------- Measurements  Left ventricle                     Value        Ref  LV end-diastolic volume, 1-p       82    ml     69 - 185  A4C  LV ejection fraction, 1-p A4C  (L) 39    %      46 - 74  Stroke volume, 1-p A4C             32    ml     --------  LV end-diastolic volume/bsa,       43    ml/m^2  37 - 93  1-p A4C  Stroke volume/bsa, 1-p A4C         17    ml/m^2 --------  LV end-diastolic volume, 2-p       81    ml     62 - 150  LV end-systolic volume, 2-p        49    ml     21 - 61  LV ejection fraction, 2-p      (L) 40    %      52 - 72  Stroke volume, 2-p                 33    ml     --------  LV end-diastolic volume/bsa,       42    ml/m^2 34 - 74  2-p  LV end-systolic volume/bsa,        25    ml/m^2 11 - 31  2-p  Stroke volume/bsa, 2-p             16.9  ml/m^2 --------  Right ventricle                    Value        Ref  TAPSE, MM                          2.29  cm     >=1.70  RV s', lateral                     16.3  cm/sec >=9.5 Legend: (L)  and  (H)  liza values outside specified reference range. ---------------------------------------------------------------------------- Prepared and electronically signed by Andrea Villeda 2024 11:13     CARD ECHO 2D W/O DOPPLER (CPT=93307)    Result Date: 3/5/2024  Transthoracic Echocardiogram Name:Cristiano Obregon Date: 2024 :  1970 Ht:  (67in)  BP: 107 / 84 MRN:  [id number]    Age:  53years    Wt:  (160lb) HR: 75bpm Loc:  [facility]       Gndr: M          BSA: 1.84m^2 Sonographer: Angelica Ordering:    Carlitos Sandoval Consulting:  Danial Kirk ---------------------------------------------------------------------------- History/Indications:   Coronary artery disease. Impella placement. ---------------------------------------------------------------------------- Procedure information:  A transthoracic echocardiogram, limited study was performed. Additional evaluation included M-mode and limited 2D.  Patient status:  Inpatient.  Location:  Bedside.    Comparison was made to the study of 2024.    This was a STAT study. Transthoracic echocardiography for diagnosis, ventricular function evaluation, and post intervention evaluation. Image quality was adequate. ECG rhythm:   Frequent ectopies  ---------------------------------------------------------------------------- Conclusions: 1. Left ventricle: The cavity size was normal. Wall thickness was normal.    Systolic function was at the lower limits of normal. The estimated    ejection fraction was 50-55%, by visual assessment. Unable to assess LV    diastolic function. Impella catheter noted in the left ventricle,    placement measures 3.50cm from the aortic valve. 2. Left atrium: The atrium was mildly dilated. 3. Right atrium: The atrium was mildly dilated. Impressions:  This study is compared with previous dated 03/05/2024: Compared to the previous study, these findings are new. Interval placement of impella. * ---------------------------------------------------------------------------- * Findings: Left ventricle:  The cavity size was normal. Wall thickness was normal. Systolic function was at the lower limits of normal. The estimated ejection fraction was 50-55%, by visual assessment. Impella catheter noted in the left ventricle, placement measures 3.50cm from the aortic valve. Unable to assess LV diastolic function. Left atrium:  The atrium was mildly dilated. Right ventricle:  The cavity size was normal. Systolic function was normal. Right atrium:  The atrium was mildly dilated. Mitral valve:  The valve was structurally normal. Leaflet separation was normal. Aortic valve:   The valve was trileaflet.   Cusp separation was normal. Tricuspid valve:  The valve is structurally normal. Leaflet separation was normal. Pulmonic valve:   Not well visualized. Pericardium:   There was no pericardial effusion. Pulmonary arteries: Systolic pressure could not be accurately estimated. ---------------------------------------------------------------------------- Measurements  Left ventricle               Value    Ref  IVS thickness, ED, PLAX      1.0   cm 0.6 - 1.0  LV ID, ED, PLAX              4.7   cm 4.2 - 5.8  LV ID, ES, PLAX              3.2   cm 2.5 - 4.0  LV PW  thickness, ED, PLAX    0.8   cm 0.6 - 1.0  IVS/LV PW ratio, ED, PLAX    1.25     ---------  LV PW/LV ID ratio, ED, PLAX  0.17     ---------  LV ejection fraction         60    %  52 - 72 Legend: (L)  and  (H)  liza values outside specified reference range. ---------------------------------------------------------------------------- Prepared and electronically signed by Carlitos Sanodval 2024 11:32     CARD ECHO LIMITED (CPT=93308)    Result Date: 3/5/2024  Transthoracic Echocardiogram Name:Cristiano Obregon Date:    2024    :     1970    Ht:     (67in)     BP: MRN:     [id number]       Age:     53years       Wt:     (160lb)    HR: Loc:     [facility]          Gndr:    M             BSA:    1.84m^2 Sonographer: ADEEL Wyman Ordering:    Carlitos Sandoval Consulting:  Richie Llanes ---------------------------------------------------------------------------- History/Indications:   Chest pain.  Coronary artery disease. ---------------------------------------------------------------------------- Procedure information:  A transthoracic echocardiogram, limited study was performed. Additional evaluation included M-mode and limited 2D.  Patient status:  Inpatient.  Location:  Catheterization laboratory.    This was a STAT study. Transthoracic echocardiography for diagnosis and ventricular function evaluation. Image quality was adequate. ---------------------------------------------------------------------------- Conclusions: 1. Left ventricle: Systolic function was mildly reduced. The estimated    ejection fraction was 45-50%, by visual assessment. Unable to assess LV    diastolic function. 2. Left atrium: The atrium was dilated. 3. Right atrium: The atrium was mildly dilated. 4. Aortic valve: There was mild regurgitation. 5. Mitral valve: There was mild regurgitation. 6. Limited study in cath lab, grossly ejection fraction 45-50%. Wire present    in the left ventricle. Impressions:  No previous study was available for  comparison. * ---------------------------------------------------------------------------- * Findings: Left ventricle:  Systolic function was mildly reduced. The estimated ejection fraction was 45-50%, by visual assessment. Unable to assess LV diastolic function. Left atrium:  The atrium was dilated. Right ventricle:  The cavity size was normal. Systolic function was normal. Right atrium:  The atrium was mildly dilated. Mitral valve:  The valve was structurally normal. Leaflet separation was normal.  Doppler:  Transvalvular velocity was within the normal range. There was no evidence for stenosis. There was mild regurgitation. Aortic valve:  Not well visualized.  Doppler:  There was mild regurgitation. Tricuspid valve:  The valve is structurally normal. Leaflet separation was normal.  Doppler:  Transvalvular velocity was within the normal range. There was no evidence for stenosis. There was mild regurgitation. Pulmonic valve:   Not visualized. Pericardium:   There was no pericardial effusion. Pulmonary arteries: Systolic pressure could not be accurately estimated. Systemic veins:  Not visualized. ---------------------------------------------------------------------------- Prepared and electronically signed by Carlitos Sandoval 03/05/2024 09:46        Exam:     General: Sedated.  HEENT: Normocephalic, anicteric sclera, neck supple, no thyromegaly or adenopathy.  Neck: No JVD, carotids 2+, no bruits.  Cardiac: Regular rate and rhythm. S1, S2 normal. No murmur, pericardial rub, S3, or extra cardiac sounds.  Lungs: Clear without wheezes, rales, rhonchi or dullness.  Normal excursions and effort.  Abdomen: Soft, non-tender. No organosplenomegally, mass or rebound, BS-present.  Extremities: Without clubbing or cyanosis. No edema.    Neurologic: Sedated.  Skin: Warm and dry    Assessment and Plan:   NSTEMI, multivessel CAD s/p balloon angioplasty proximal circumflex 3/5/2024  - S/p placement of Impella, currently on P8  - S/p RHC  on 3/6/2024 with normal hemodynamics, low to normal filling pressure  - TTE with EF 40-45%, no valvular issues  - Aspirin 81 mg per rectum, IV cangrelor, IV heparin  - Initial plan for CABG however currently on hold given renal failure, eventual plan for either multivessel PCI versus CABG depending on progress over the weekend     NORMA  - Remains anuric  - Continue CRRT, continue to monitor for improvement    Gurjit Elliott MD  [facility]  3/10/2024

## 2024-03-10 NOTE — SPIRITUAL CARE NOTE
Spiritual Care Visit Note    Patient Name: Cristiano Obregon Date of Spiritual Care Visit: 03/10/24   : 1970 Primary Dx: NSTEMI (non-ST elevated myocardial infarction) (HCC)       Referred By: Referral From: Nurse    Spiritual Care Taxonomy:    Intended Effects: Build relationship of care and support    Methods: Collaborate with care team member;Demonstrate acceptance;Encourage sharing of feelings;Explore alexandro and values;Explore presence of God;Offer spiritual/Restorationism support;Offer emotional support    Interventions: Acknowledge current situation;Active listening;Ask guided questions;Ask guided questions about alexandro;Ask questions to bring forth feelings;Discuss concerns;Discuss coping mechanism with someone;Explain  role;Discuss plan of care;Identify supportive relationship(s);[place]    Visit Type/Summary:     - Spiritual Care: Responded to a request for spiritual care and assessed the patient for spiritual care needs. Responded to a request via the on call phone Consulted with RN prior to visit. Offered empathic listening and emotional support. Provided information regarding how to contact Spiritual Care and left a Spiritual Care information card. Provided support for Patient's spiritual/Restorationism requests. Offered prayer.  remains available for follow up.    Spiritual Care support can be requested via an Epic consult. For urgent/immediate needs, please contact the On Call  at: [place]: ext [phone]    Rev. Lee Ortiz MDiv

## 2024-03-10 NOTE — PROGRESS NOTES
Pt received on current settings, 15/400/+5/30%. No vent settings changed throughout shift, No SBT.Tolerating current settings.    03/10/24 1521   Vent Information   Interface Invasive   Vent Type AV   Vent plugged into main power? Yes   Vent Mode VC/AC   Settings   FiO2 (%) 30 %   Resp Rate (Set) 15   Vt (Set, mL) 400 mL   Waveform Decelerating ramp   PEEP/CPAP (cm H2O) 5 cm H20   PEEP Low (cm H2O) 2 cm H2O   Peak Flow LPM 60   Trigger Sensitivity Flow (L/min) 2 L/min

## 2024-03-10 NOTE — PLAN OF CARE
Unable to attempt sedation vacation of any amount, was difficult to sedate with periods of non-purposeful movement and fighting the ventilator. Utilized Propofol and Fentanyl infusions with x1 IV valium.    Labile BP/PAP/CVP to any stimulation, including touch. Stiff muscle tension with shivering like activity caused sudden spike in all numbers, sedation requirements as above, restraints maintained.    Continues on CRRT w/ net 0 goal, Impella at P8 with no suction alarms throughout shift but with low LV pressures (100/-40 's from [id number]),  0.6-1.1, no oozing from sites, and CCO via [place].    Current drips:  Levo (titrating to BP)  Heparin & Cangrelor (therapeutic ACT throughout shift)  TPN started  Propofol & Fentanyl    Total urine output for shift = 9 ml      Problem: Safety Risk - Non-Violent Restraints  Goal: Patient will remain free from self-harm  Description: INTERVENTIONS:  - Apply the least restrictive restraint to prevent harm  - Notify patient and family of reasons restraints applied  - Assess for any contributing factors to confusion (electrolyte disturbances, delirium, medications)  - Discontinue any unnecessary medical devices as soon as possible  - Assess the patient's physical comfort, circulation, skin condition, hydration, nutrition and elimination needs   - Reorient and redirection as needed  - Assess for the need to continue restraints  Outcome: Progressing     Problem: Patient Centered Care  Goal: Patient preferences are identified and integrated in the patient's plan of care  Description: Interventions:  - What would you like us to know as we care for you? From home with   - Provide timely, complete, and accurate information to patient/family  - Incorporate patient and family knowledge, values, beliefs, and cultural backgrounds into the planning and delivery of care  - Encourage patient/family to participate in care and decision-making at the level they choose  - [place] patient and  family perspectives and choices  Outcome: Progressing     Problem: Patient/Family Goals  Goal: Patient/Family Long Term Goal  Description: Patient's Long Term Goal: discharge home with family, full recovery    Interventions:  - continue treatment of heart and kidneys  - See additional Care Plan goals for specific interventions  Outcome: Progressing  Goal: Patient/Family Short Term Goal  Description: Patient's Short Term Goal: have angioplasty or open heart - per     Interventions:   - continue present management of Impella until Monday  - re-evaluate cardiac goals of care pending renal function/labs  - See additional Care Plan goals for specific interventions  Outcome: Progressing     Problem: RESPIRATORY - ADULT  Goal: Achieves optimal ventilation and oxygenation  Description: INTERVENTIONS:  - Assess for changes in respiratory status  - Assess for changes in mentation and behavior  - Position to facilitate oxygenation and minimize respiratory effort  - Oxygen supplementation based on oxygen saturation or ABGs  - Provide Smoking Cessation handout, if applicable  - Encourage broncho-pulmonary hygiene including cough, deep breathe, Incentive Spirometry  - Assess the need for suctioning and perform as needed  - Assess and instruct to report SOB or any respiratory difficulty  - Respiratory Therapy support as indicated  - Manage/alleviate anxiety  - Monitor for signs/symptoms of CO2 retention  Outcome: Progressing     Problem: CARDIOVASCULAR - ADULT  Goal: Maintains optimal cardiac output and hemodynamic stability  Description: INTERVENTIONS:  - Monitor vital signs, rhythm, and trends  - Monitor for bleeding, hypotension and signs of decreased cardiac output  - Evaluate effectiveness of vasoactive medications to optimize hemodynamic stability  - Monitor arterial and/or venous puncture sites for bleeding and/or hematoma  - Assess quality of pulses, skin color and temperature  - Assess for signs of decreased  coronary artery perfusion - ex. Angina  - Evaluate fluid balance, assess for edema, trend weights  Outcome: Progressing  Goal: Absence of cardiac arrhythmias or at baseline  Description: INTERVENTIONS:  - Continuous cardiac monitoring, monitor vital signs, obtain 12 lead EKG if indicated  - Evaluate effectiveness of antiarrhythmic and heart rate control medications as ordered  - Initiate emergency measures for life threatening arrhythmias  - Monitor electrolytes and administer replacement therapy as ordered  Outcome: Progressing     Problem: Delirium  Goal: Minimize duration of delirium  Description: Interventions:  - Encourage use of hearing aids, eye glasses  - Promote highest level of mobility daily  - Provide frequent reorientation  - Promote wakefulness i.e. lights on, blinds open  - Promote sleep, encourage patient's normal rest cycle i.e. lights off, TV off, minimize noise and interruptions  - Encourage family to assist in orientation and promotion of home routines  Outcome: Progressing     Problem: METABOLIC/FLUID AND ELECTROLYTES - ADULT  Goal: Hemodynamic stability and optimal renal function maintained  Description: INTERVENTIONS:  - Monitor labs and assess for signs and symptoms of volume excess or deficit  - Monitor intake, output and patient weight  - Monitor urine specific gravity, serum osmolarity and serum sodium as indicated or ordered  - Monitor response to interventions for patient's volume status, including labs, urine output, blood pressure (other measures as available)  - Encourage oral intake as appropriate  - Instruct patient on fluid and nutrition restrictions as appropriate  Outcome: Progressing     Problem: SKIN/TISSUE INTEGRITY - ADULT  Goal: Skin integrity remains intact  Description: INTERVENTIONS  - Assess and document risk factors for pressure ulcer development  - Assess and document skin integrity  - Monitor for areas of redness and/or skin breakdown  - Initiate interventions, skin  care algorithm/standards of care as needed  Outcome: Progressing  Goal: Incision(s), wounds(s) or drain site(s) healing without S/S of infection  Description: INTERVENTIONS:  - Assess and document risk factors for pressure ulcer development  - Assess and document skin integrity  - Assess and document dressing/incision, wound bed, drain sites and surrounding tissue  - Implement wound care per orders  - Initiate isolation precautions as appropriate  - Initiate Pressure Ulcer prevention bundle as indicated  Outcome: Progressing  Goal: Oral mucous membranes remain intact  Description: INTERVENTIONS  - Assess oral mucosa and hygiene practices  - Implement preventative oral hygiene regimen  - Implement oral medicated treatments as ordered  Outcome: Progressing     Problem: NEUROLOGICAL - ADULT  Goal: Achieves stable or improved neurological status  Description: INTERVENTIONS  - Assess for and report changes in neurological status  - Initiate measures to prevent increased intracranial pressure  - Maintain blood pressure and fluid volume within ordered parameters to optimize cerebral perfusion and minimize risk of hemorrhage  - Monitor temperature, glucose, and sodium. Initiate appropriate interventions as ordered  Outcome: Progressing

## 2024-03-10 NOTE — PROGRESS NOTES
[facility] Hospitalist Progress Note     CC: Hospital Follow up    PCP: Isauro Pina MD       Assessment/Plan:   This is a 53-year-old male with a history of hyperlipidemia, tobacco use, who presents to the hospital for evaluation of acute chest pain.      STEMI  Cardiogenic shock   -Multivessel disease including chronically occluded large RCA, 95% sequential LAD stenosis on cath 3/5  -flow restored to heavily thrombosed circumflex and OM1  -balloon pump per cardiology  -taken to cath again emergently on 3/6/24, for right heart cath to accurately obtain hemodynamics. Worsening renal function noted.   -trialed IV hydration, renal function worsening----started on CRRT on 3/7  -heparin drip   -IV cangrelor  -aspirin 81mg  -cardiology, nephrology, CV surgery and critical care on consult   -vent management and sedation per critical care    Acute kidney injury  Anuric   -suspect cardiogenic shock  -component of ATI  -decreased urine output   -start CRRT on 3/7. Patient is toleratring     Hematuria, resolved  -yellow urine noted today, minimal, but not bloody      Acute hypoxic respiratory failure  -currently on vent, monitor oxygenation   -critical care consulted  -continue full vent support   -maintaining FiO2 30%     Diet: PPN to TPN   DVT prophylaxis: on heparin drip   Code status: FULL    Discussed with  and brother at bedside      Disposition: ICU     Rick Mendenhall MD  [facility] Hospitalist        Subjective:     Intubated, sedated    and brother at bedside     OBJECTIVE:    Blood pressure 95/63, pulse 66, temperature 99.3 °F (37.4 °C), temperature source Pulmonary Artery, resp. rate 15, height 5' 7\" (1.702 m), weight 188 lb 15 oz (85.7 kg), SpO2 100%.    Temp:  [97.8 °F (36.6 °C)-99.5 °F (37.5 °C)] 99.3 °F (37.4 °C)  Pulse:  [52-86] 66  Resp:  [10-15] 15  BP: ()/(57-78) 95/63  SpO2:  [97 %-100 %] 100 %  AO: ()/(50-69) 105/58  FiO2 (%):  [30 %] 30  %      Intake/Output:    Intake/Output Summary (Last 24 hours) at 3/10/2024 1001  Last data filed at 3/10/2024 0901  Gross per 24 hour   Intake 3520.38 ml   Output 3167 ml   Net 353.38 ml       Last 3 Weights   03/09/24 0500 188 lb 15 oz (85.7 kg)   03/08/24 0600 185 lb 13.6 oz (84.3 kg)   03/07/24 0600 183 lb 3.2 oz (83.1 kg)   03/06/24 0500 180 lb 1.9 oz (81.7 kg)   03/05/24 1324 175 lb 7.8 oz (79.6 kg)   03/05/24 0722 160 lb (72.6 kg)   10/06/21 1541 177 lb 6.4 oz (80.5 kg)   09/09/20 0929 172 lb 9.6 oz (78.3 kg)       BP 95/63 (BP Location: Left arm)   Pulse 66   Temp 99.3 °F (37.4 °C) (Pulmonary Artery)   Resp 15   Ht 5' 7\" (1.702 m)   Wt 188 lb 15 oz (85.7 kg)   SpO2 100%   BMI 29.59 kg/m²   General: intubated, sedated  HEENT: ET tube intact  Lungs: mechanical breath sounds   Heart: Regular rate and rhythm  Abdomen: soft  Extremities: No edema  Skin: normal color    Data Review:       Labs:     Recent Labs   Lab 03/08/24  0428 03/08/24  1702 03/09/24  0450 03/10/24  0405   RBC 3.11* 3.08* 2.88* 2.57*   HGB 9.3* 9.3* 8.8* 7.7*   HCT 27.9* 27.6* 25.6* 23.2*   MCV 89.7 89.6 88.9 90.3   MCH 29.9 30.2 30.6 30.0   MCHC 33.3 33.7 34.4 33.2   RDW 14.5 14.6 14.6 14.4   NEPRELIM 5.72  --  4.75 4.13   WBC 7.2 7.5 6.5 5.9   PLT 60.0* 53.0* 64.0* 57.0*         Recent Labs   Lab 03/09/24  0450 03/09/24  1719 03/10/24  0405   * 123* 142*  142*   BUN 28* 23 21  21   CREATSERUM 4.10* 3.61* 3.45*  3.45*   EGFRCR 16* 19* 20*  20*   CA 8.1* 8.0* 8.4*  8.4*   * 137 134*  134*   K 4.6 4.8 4.7  4.7    106 105  105   CO2 25.0 25.0 25.0  25.0       Recent Labs   Lab 03/05/24  0723 03/07/24 0458 03/08/24  0428 03/08/24  1641 03/09/24  0450 03/09/24  1719 03/10/24  0405   ALT 40  --   --   --   --   --  56*   AST 35*  --   --   --   --   --  145*   ALB 3.9   < > 2.8* 2.9* 3.1* 3.0* 2.9*  2.9*    < > = values in this interval not displayed.         Imaging:  XR CHEST AP PORTABLE  (CPT=71045)    Result  Date: 3/8/2024  CONCLUSION:  1. New enteric tube tip appears to terminate about the gastroesophageal junction.  Recommend advancing at least 9.0 cm distally into the stomach. 2. Additional lines/tubes in stable customary positioning. 3. Development of subsegmental atelectasis in the lower right lung.   Results of this examination were discussed with the patient's nurse, Mayela Ch, by Dr. Edwards at 12:40 on 03/08/2024.  Dictated by (CST): Bc Edwards MD on 3/08/2024 at 12:36 PM     Finalized by (CST): Bc Edwards MD on 3/08/2024 at 12:42 PM          IR CENTRAL VENOUS ACCESS    Result Date: 3/7/2024  CONCLUSION: Placement of non-tunneled central venous catheter via right internal jugular vein at bedside.  The catheter is ready for use.    Dictated by (CST): Guille Chan MD on 3/07/2024 at 12:46 PM     Finalized by (CST): Guille Chan MD on 3/07/2024 at 12:47 PM          XR CHEST AP PORTABLE  (CPT=71045)    Result Date: 3/7/2024  PROCEDURE: XR CHEST AP PORTABLE  (CPT=71045) TIME: 1058  COMPARISON: [facility], XR CHEST AP PORTABLE (CPT=71045), 3/06/2024, 6:13 AM.  INDICATIONS: Verify Temporary Hemodialysis Catheter placement.  TECHNIQUE:   Single view.   Findings and impression:  Right IJ dialysis catheter in the lower SVC.  No pneumothorax  Interval PA catheter in the left inter lobar artery  Stable ETT and Impella.  Enteric tube remains in the lower esophagus  Increasing vascular congestion with mild basilar predominant edema     Dictated by (CST): Reyes Rodriguez MD on 3/07/2024 at 11:51 AM     Finalized by (CST): Reyes Rodriguez MD on 3/07/2024 at 11:52 AM             Meds:      piperacillin-tazobactam  3.375 g Intravenous Q8H    aspirin  300 mg Rectal Daily    pantoprazole  40 mg Intravenous Daily    chlorhexidine gluconate  15 mL Mouth/Throat BID@0800,2000      dextrose 10%      adult 3 in 1 TPN 50 mL/hr at 03/10/24 0900    NxStage Pure Flow 401 CRRT/PIRRT fluid 5000mL  2.5 L/hr (03/09/24 0422)    norepinephrine 3 mcg/min (03/10/24 0900)    cangrelor (Kengreal) 50 mg in sodium chloride 0.9% 250 mL IVPB - BRIDGING/MAINTENANCE dose 0.75 mcg/kg/min (03/10/24 0900)    sodium bicarbonate 25mEq in dextrose 5% 1000mL IMPELLA purge solution 10 mL/hr (03/07/24 1138)    heparin 200 Units/hr (03/10/24 0900)    propofol 50 mcg/kg/min (03/10/24 0900)    fentanyl 75 mcg/hr (03/10/24 0901)    dextrose 10%       diazepam, dextrose 10%, heparin, fentaNYL **OR** fentaNYL, acetaminophen **OR** acetaminophen **OR** acetaminophen **OR** acetaminophen, polyethylene glycol (PEG 3350), senna, bisacodyl, fleet enema, fentaNYL (Sublimaze) **OR** fentaNYL (Sublimaze), fentanyl, dextrose 10%, lipase-protease-amylase (Lip-Prot-Amyl) **AND** sodium bicarbonate

## 2024-03-10 NOTE — PROGRESS NOTES
NEPHROLOGY DAILY PROGRESS NOTE       SUBJECTIVE:  Intubated, sedated   Tolerating CRRT, net even currrently      OBJECTIVE:    Total Intake/Output:    Intake/Output Summary (Last 24 hours) at 3/10/2024 1126  Last data filed at 3/10/2024 1100  Gross per 24 hour   Intake 3732.35 ml   Output 3098 ml   Net 634.35 ml       PHYSICAL EXAM:  /78 (BP Location: Left arm)   Pulse 56   Temp 99 °F (37.2 °C) (Pulmonary Artery)   Resp 17   Ht 5' 7\" (1.702 m)   Wt 188 lb 15 oz (85.7 kg)   SpO2 100%   BMI 29.59 kg/m²   GEN: Intubated, sedated   HEENT: ETT in place   CHEST: mechanical breath sounds   CARDIAC: S1S2 normal  ABD: soft, NT/ND  : lujan in place   EXT:  no lower ext edema  NEURO: sedated   ACCESS: RIJ temp HD cath (3/7)      CURRENT MEDICATIONS:     piperacillin-tazobactam  3.375 g Intravenous Q8H    aspirin  300 mg Rectal Daily    pantoprazole  40 mg Intravenous Daily    chlorhexidine gluconate  15 mL Mouth/Throat BID@0800,2000         LABS:  Patient Labs Reviewed in Detail. Pertinent Labs as follows:  Recent Labs   Lab 03/09/24  0450 03/09/24  1719 03/10/24  0405   * 123* 142*  142*   BUN 28* 23 21  21   CREATSERUM 4.10* 3.61* 3.45*  3.45*   EGFRCR 16* 19* 20*  20*   CA 8.1* 8.0* 8.4*  8.4*   * 137 134*  134*   K 4.6 4.8 4.7  4.7    106 105  105   CO2 25.0 25.0 25.0  25.0     Recent Labs   Lab 03/08/24  0428 03/08/24  1702 03/09/24  0450 03/10/24  0405   RBC 3.11* 3.08* 2.88* 2.57*   HGB 9.3* 9.3* 8.8* 7.7*   HCT 27.9* 27.6* 25.6* 23.2*   MCV 89.7 89.6 88.9 90.3   MCH 29.9 30.2 30.6 30.0   MCHC 33.3 33.7 34.4 33.2   RDW 14.5 14.6 14.6 14.4   NEPRELIM 5.72  --  4.75 4.13   WBC 7.2 7.5 6.5 5.9   PLT 60.0* 53.0* 64.0* 57.0*         IMAGING:  XR CHEST AP PORTABLE  (CPT=71045)    Result Date: 3/8/2024  CONCLUSION:  1. New enteric tube tip appears to terminate about the gastroesophageal junction.  Recommend advancing at least 9.0 cm distally into the stomach. 2. Additional lines/tubes  in stable customary positioning. 3. Development of subsegmental atelectasis in the lower right lung.   Results of this examination were discussed with the patient's nurse, Mayela Ch, by Dr. Ewdards at 12:40 on 03/08/2024.  Dictated by (CST): Bc Edwards MD on 3/08/2024 at 12:36 PM     Finalized by (CST): Bc Edwards MD on 3/08/2024 at 12:42 PM            ASSESSMENT AND PLAN:   This is a 53 year old male with PMH sig for HLD, tobacco use. Presented with chest pain and was admitted for NSTEMI.  Nephrology is consulted for NORMA      Anuric NORMA  - creatinine 1.23 on admission, worsened to 7.19 (3/7)  - renal US neg for hydronephrosis    - UA: + 100 protein, 6-10 RBCs, + ketones, few squamous epi cells  - suspect due to cardiogenic shock / ATN  - initiated on CVVH on 3/7 via RIJ temp HD cath   - continue CRRT, keep net even   - monitor for renal recovery.   - follow renal fxn and I/Os   - renally dose meds      Hyperkalemia   - manage with CRRT    Metabolic acidosis  - Manage with CRRT        Dw RN       We will continue to follow Cristiano Obregon    Critical care time > 35 mins     DO Júnior GarciaLee's Summit [facility] - [facility]phrology

## 2024-03-10 NOTE — PROGRESS NOTES
Cristiano Estebanadrien Patient Status:  Inpatient    1970 MRN [id number]   Location [facility] 2W/SW Attending Rick Mendenhall MD   Hosp Day # 5 PCP Isauro Pina MD     Critical Care Progress Note      Assessment/Plan:  Acute respiratory failure - intubated in cath lab due to severity of cardiac status, no underlying respiratory disease by my read  - On vent support.  Good airway pressures, minimal O2 needs  - Wean when clinically appropriate.  Still on Impella and CRRT  - very agitated when lowering sedation - needingprn valium  - give degree of support - holding daily awakening trials  NSTEMI - diagnosed with new multivessel CAD s/p angioplasty and cardiogenic shock requiring impella placement.   - cangrelor  - heparin gtt  - Echo with good heart function and RHC with good filling pressures  - CABG vs PCI; decision to be made based on progress over the weekend  Cardiogenic shock - d/t ACS.  - EF was down with anterior wall hypokinesis.  Now improving EF on Echo and off pressors but impella still helping. On low dose of levo  - RHC with normal filling pressures and low CVP with Impella in place  - BP stable; pressors as needed to maintain MAP >65  - Continue impella; wean per cardiology  Pneumonia - HCAP at high risk for GNR.  Infiltrate in RML  - sputum cx pending  - Add zosyn for infection coverage  Acute renal failure - suspect from ATN and cardiogenic shock with hypotension. No increase in UOP after 500cc bolus  - IVF per renal  - Cont CRRT; nephrology following  Thrombocytopenia, anemia - d/t impella  - Transfuse for Plt <50  - Keep Hb >8  - No need for HIPA or ADEOLA  FEN/GI  - TF  Tobacco Abuse  - Strongly advise cessation  Proph  - Heparin gtt  - Pepcid  Dispo  - Criticall ill; at risk for worsening - continue ICU level of care  - Discussed with his  and brothers at bedside and ICU team  - crit care time 40 min    Arvin Blanco M.D.  Pulmonary and Critical Care Medicine  [facility]     Subjective:  Overall no chnges - still needing lots and lots of support    Objective:    Medications:   piperacillin-tazobactam  3.375 g Intravenous Q8H    aspirin  300 mg Rectal Daily    pantoprazole  40 mg Intravenous Daily    chlorhexidine gluconate  15 mL Mouth/Throat BID@0800,2000       Vent Mode: VC/AC  FiO2 (%):  [30 %] 30 %  S RR:  [15] 15  S VT:  [370 mL-400 mL] 400 mL  PEEP/CPAP (cm H2O):  [5 cm H20] 5 cm H20  MAP (cm H2O):  [7] 7    Intake/Output Summary (Last 24 hours) at 3/10/2024 0838  Last data filed at 3/10/2024 0800  Gross per 24 hour   Intake 3625.9 ml   Output 3296 ml   Net 329.9 ml       /76 (BP Location: Left arm)   Pulse 52   Temp 98.5 °F (36.9 °C) (Pulmonary Artery)   Resp 15   Ht 5' 7\" (1.702 m)   Wt 188 lb 15 oz (85.7 kg)   SpO2 100%   BMI 29.59 kg/m²   Physical Exam:   General: critical   HEENT: lips, mucosa, tongue normal.    Lungs: clear   Chest wall: No tenderness or deformity.   Heart: Regular rate and rhythm, normal S1S2, no  murmur.   Abdomen: soft, non-tender, non-distended, positive BS.   Extremity: No clubbing or cyanosis + edema.   Skin: No rashes or lesions.    Recent Labs   Lab 03/10/24  0405   RBC 2.57*   HGB 7.7*   HCT 23.2*   MCV 90.3   MCH 30.0   MCHC 33.2   RDW 14.4   NEPRELIM 4.13   WBC 5.9   PLT 57.0*     Recent Labs   Lab 03/05/24  0723 03/05/24  2102 03/09/24  0450 03/09/24  1719 03/10/24  0405   *   < > 116* 123* 142*  142*   BUN 18   < > 28* 23 21  21   CREATSERUM 1.23   < > 4.10* 3.61* 3.45*  3.45*   CA 8.6*   < > 8.1* 8.0* 8.4*  8.4*   ALB 3.9   < > 3.1* 3.0* 2.9*  2.9*      < > 135* 137 134*  134*   K 3.6   < > 4.6 4.8 4.7  4.7      < > 109 106 105  105   CO2 18.0*   < > 25.0 25.0 25.0  25.0   ALKPHO 53  --   --   --  87   AST 35*  --   --   --  145*   ALT 40  --   --   --  56*   BILT 0.6  --   --   --  0.8   TP 6.2  --   --   --  5.2*    < > = values in this interval not displayed.     Recent Labs   Lab  03/09/24  2352   ABGPHT 7.38   JHXRJU2J 43   EPBHR0O 118*   ABGHCO3 24.9   SITE Arterial Line   THGB 13.6     No results for input(s): \"BNP\" in the last 168 hours.  No results for input(s): \"TROP\", \"CK\" in the last 168 hours.    Imaging: I independently visualized all relevant chest imaging in PACS, agree with radiology interpretation except where noted.

## 2024-03-10 NOTE — PLAN OF CARE
Received patient intubated and sedated.  All lines and tubes in place, no complications so far.  Impella in place, Mode P8.  Red[place] in place, no urine output this shift.  Patient has bloody secretions orally with clots observed,  thick tan secretions noted, patient bites down hard whenever agitated.  Sedation medications up titrated to keep patient calm.  Plan to monitor patient until Monday.  Family at bedside made aware of plan of care.    Problem: Safety Risk - Non-Violent Restraints  Goal: Patient will remain free from self-harm  Description: INTERVENTIONS:  - Apply the least restrictive restraint to prevent harm  - Notify patient and family of reasons restraints applied  - Assess for any contributing factors to confusion (electrolyte disturbances, delirium, medications)  - Discontinue any unnecessary medical devices as soon as possible  - Assess the patient's physical comfort, circulation, skin condition, hydration, nutrition and elimination needs   - Reorient and redirection as needed  - Assess for the need to continue restraints  Outcome: Progressing     Problem: RESPIRATORY - ADULT  Goal: Achieves optimal ventilation and oxygenation  Description: INTERVENTIONS:  - Assess for changes in respiratory status  - Assess for changes in mentation and behavior  - Position to facilitate oxygenation and minimize respiratory effort  - Oxygen supplementation based on oxygen saturation or ABGs  - Provide Smoking Cessation handout, if applicable  - Encourage broncho-pulmonary hygiene including cough, deep breathe, Incentive Spirometry  - Assess the need for suctioning and perform as needed  - Assess and instruct to report SOB or any respiratory difficulty  - Respiratory Therapy support as indicated  - Manage/alleviate anxiety  - Monitor for signs/symptoms of CO2 retention  Outcome: Progressing     Problem: CARDIOVASCULAR - ADULT  Goal: Maintains optimal cardiac output and hemodynamic stability  Description:  INTERVENTIONS:  - Monitor vital signs, rhythm, and trends  - Monitor for bleeding, hypotension and signs of decreased cardiac output  - Evaluate effectiveness of vasoactive medications to optimize hemodynamic stability  - Monitor arterial and/or venous puncture sites for bleeding and/or hematoma  - Assess quality of pulses, skin color and temperature  - Assess for signs of decreased coronary artery perfusion - ex. Angina  - Evaluate fluid balance, assess for edema, trend weights  Outcome: Progressing  Goal: Absence of cardiac arrhythmias or at baseline  Description: INTERVENTIONS:  - Continuous cardiac monitoring, monitor vital signs, obtain 12 lead EKG if indicated  - Evaluate effectiveness of antiarrhythmic and heart rate control medications as ordered  - Initiate emergency measures for life threatening arrhythmias  - Monitor electrolytes and administer replacement therapy as ordered  Outcome: Progressing     Problem: METABOLIC/FLUID AND ELECTROLYTES - ADULT  Goal: Hemodynamic stability and optimal renal function maintained  Description: INTERVENTIONS:  - Monitor labs and assess for signs and symptoms of volume excess or deficit  - Monitor intake, output and patient weight  - Monitor urine specific gravity, serum osmolarity and serum sodium as indicated or ordered  - Monitor response to interventions for patient's volume status, including labs, urine output, blood pressure (other measures as available)  - Encourage oral intake as appropriate  - Instruct patient on fluid and nutrition restrictions as appropriate  Outcome: Progressing     Problem: SKIN/TISSUE INTEGRITY - ADULT  Goal: Skin integrity remains intact  Description: INTERVENTIONS  - Assess and document risk factors for pressure ulcer development  - Assess and document skin integrity  - Monitor for areas of redness and/or skin breakdown  - Initiate interventions, skin care algorithm/standards of care as needed  Outcome: Progressing  Goal: Incision(s),  wounds(s) or drain site(s) healing without S/S of infection  Description: INTERVENTIONS:  - Assess and document risk factors for pressure ulcer development  - Assess and document skin integrity  - Assess and document dressing/incision, wound bed, drain sites and surrounding tissue  - Implement wound care per orders  - Initiate isolation precautions as appropriate  - Initiate Pressure Ulcer prevention bundle as indicated  Outcome: Progressing  Goal: Oral mucous membranes remain intact  Description: INTERVENTIONS  - Assess oral mucosa and hygiene practices  - Implement preventative oral hygiene regimen  - Implement oral medicated treatments as ordered  Outcome: Progressing     Problem: NEUROLOGICAL - ADULT  Goal: Achieves stable or improved neurological status  Description: INTERVENTIONS  - Assess for and report changes in neurological status  - Initiate measures to prevent increased intracranial pressure  - Maintain blood pressure and fluid volume within ordered parameters to optimize cerebral perfusion and minimize risk of hemorrhage  - Monitor temperature, glucose, and sodium. Initiate appropriate interventions as ordered  Outcome: Progressing     Problem: Delirium  Goal: Minimize duration of delirium  Description: Interventions:  - Encourage use of hearing aids, eye glasses  - Promote highest level of mobility daily  - Provide frequent reorientation  - Promote wakefulness i.e. lights on, blinds open  - Promote sleep, encourage patient's normal rest cycle i.e. lights off, TV off, minimize noise and interruptions  - Encourage family to assist in orientation and promotion of home routines  Outcome: Progressing

## 2024-03-10 NOTE — PROGRESS NOTES
Notified by Aaron pt was given IV Valium for sedation. RN states pt hypotensive with last BP 96/55 with MAP of 67. Pt currently on Levophed at 7 mcg/min. Advised RN to monitor for now and titrate Levophed as appropriate.

## 2024-03-11 ENCOUNTER — APPOINTMENT (OUTPATIENT)
Dept: GENERAL RADIOLOGY | Facility: HOSPITAL | Age: 54
End: 2024-03-11
Attending: INTERNAL MEDICINE
Payer: COMMERCIAL

## 2024-03-11 ENCOUNTER — APPOINTMENT (OUTPATIENT)
Dept: CV DIAGNOSTICS | Facility: HOSPITAL | Age: 54
End: 2024-03-11
Attending: INTERNAL MEDICINE
Payer: COMMERCIAL

## 2024-03-11 NOTE — PROGRESS NOTES
[facility] - CARDIOLOGY PROGRESS NOTE  Cristiano Obregon Patient Status:  Inpatient    1970 MRN [id number]   Location [facility] 2W/SW Attending Rick Mendenhall MD   Hosp Day # 6 PCP Isauro Pina MD     Assessment:    NSTEMI, multivessel CAD s/p balloon angioplasty proximal circumflex 3/5/2024  -Severe three-vessel CAD.  - S/p placement of Impella, currently on P8  - S/p RHC on 3/6/2024 with normal hemodynamics, low to normal filling pressure  - TTE with EF now up to 50 to 55%, no valvular issues  - Aspirin 81 mg per rectum, IV cangrelor, IV heparin  -Still essentially an uric, receiving CRRT.      Plan:    Attempt to wean Impella today.  Monitor hemodynamics closely.  If patient tolerates this, we will plan on pulling the Impella tomorrow, and replacing it with a balloon pump.    If patient tolerates this hemodynamically, tentative plan is for CABG on 3/14/2024.  If patient does not tolerate withdrawal of hemodynamic support, would likely need transfer to a tertiary care center.      Subjective:  No chest pain or shortness of breath.    Objective:  /76 (BP Location: Radial)   Pulse 78   Temp 100.3 °F (37.9 °C) (Pulmonary Artery)   Resp 15   Ht 170.2 cm (5' 7\")   Wt 192 lb 10.9 oz (87.4 kg)   SpO2 100%   BMI 30.18 kg/m²     Temp (24hrs), Av.5 °F (37.5 °C), Min:98.3 °F (36.8 °C), Max:100.6 °F (38.1 °C)      Intake/Output:    Intake/Output Summary (Last 24 hours) at 3/11/2024 1411  Last data filed at 3/11/2024 1300  Gross per 24 hour   Intake 4071.75 ml   Output 4000 ml   Net 71.75 ml       Wt Readings from Last 2 Encounters:   24 192 lb 10.9 oz (87.4 kg)   10/06/21 177 lb 6.4 oz (80.5 kg)       Physical Exam:    General: Alert and oriented x 3,  No apparent distress.  HEENT: No focal deficits.  Neck: No JVD, carotids 2+ no bruits.  Cardiac: Regular rate and rhythm, S1, S2  normal, no murmur  Lungs: Clear without wheezes, rales, rhonchi.  Normal excursions and effort.  Abdomen: Soft, non-tender.   Extremities: Without clubbing, cyanosis or edema.  Peripheral pulses are 2+.  Skin: Warm and dry.     Labs:  Lab Results   Component Value Date    WBC 5.7 03/11/2024    HGB 8.1 03/11/2024    HCT 25.0 03/11/2024    PLT 45.0 03/11/2024     Lab Results   Component Value Date    INR 1.06 03/07/2024     Lab Results   Component Value Date     03/11/2024     03/11/2024    K 4.6 03/11/2024    K 4.6 03/11/2024     03/11/2024     03/11/2024    CO2 26.0 03/11/2024    CO2 26.0 03/11/2024    BUN 23 03/11/2024    BUN 23 03/11/2024    CREATSERUM 3.15 03/11/2024    CREATSERUM 3.15 03/11/2024     03/11/2024     03/11/2024    MG 2.0 03/11/2024    CA 8.5 03/11/2024    CA 8.5 03/11/2024    ALB 2.9 03/11/2024        No results found for: \"TROP\", \"CKMB\"     Medications:     cefepime  1 g Intravenous Q12H    aspirin  300 mg Rectal Daily    pantoprazole  40 mg Intravenous Daily    chlorhexidine gluconate  15 mL Mouth/Throat BID@0800,2000      adult 3 in 1 TPN      adult 3 in 1 TPN 41.7 mL/hr at 03/10/24 2112    dextrose 10%      NxStage Pure Flow 401 CRRT/PIRRT fluid 5000mL 2.5 L/hr (03/11/24 1338)    norepinephrine Stopped (03/11/24 0625)    cangrelor (Kengreal) 50 mg in sodium chloride 0.9% 250 mL IVPB - BRIDGING/MAINTENANCE dose 0.75 mcg/kg/min (03/11/24 0607)    sodium bicarbonate 25mEq in dextrose 5% 1000mL IMPELLA purge solution 10 mL/hr (03/10/24 1705)    heparin Stopped (03/10/24 1224)    propofol 50 mcg/kg/min (03/11/24 1114)    fentanyl 75 mcg/hr (03/11/24 0949)       Ruben Ramirez MD  3/11/2024  2:11 PM

## 2024-03-11 NOTE — PLAN OF CARE
Cristiano responds to voice and tactile stimuli with sedation lowered slightly. No weaning trial today given level of support. Synchronous with vent and maintaining O2 sats above 95% on 30% FiO2. Impella weaned to P4 at 1500, pt tolerating well with improvement in BP and stable hemodynamics thus far. TMax 100.6 today, BC x 2 sent per pulm. Tolerating CRRT well, able to meet net even goal this shift. Making scant amounts of brown, hazy urine (12cc this shift). Pending overnight course, plan is to exchange Impella for iABP in the AM, with tentative plans for CABG on Thursday.  and multiple family members present today during physician rounding, and were kept updated on condition and plan of care.    Problem: RESPIRATORY - ADULT  Goal: Achieves optimal ventilation and oxygenation  Description: INTERVENTIONS:  - Assess for changes in respiratory status  - Assess for changes in mentation and behavior  - Position to facilitate oxygenation and minimize respiratory effort  - Oxygen supplementation based on oxygen saturation or ABGs  - Provide Smoking Cessation handout, if applicable  - Encourage broncho-pulmonary hygiene including cough, deep breathe, Incentive Spirometry  - Assess the need for suctioning and perform as needed  - Assess and instruct to report SOB or any respiratory difficulty  - Respiratory Therapy support as indicated  - Manage/alleviate anxiety  - Monitor for signs/symptoms of CO2 retention  Outcome: Progressing     Problem: CARDIOVASCULAR - ADULT  Goal: Maintains optimal cardiac output and hemodynamic stability  Description: INTERVENTIONS:  - Monitor vital signs, rhythm, and trends  - Monitor for bleeding, hypotension and signs of decreased cardiac output  - Evaluate effectiveness of vasoactive medications to optimize hemodynamic stability  - Monitor arterial and/or venous puncture sites for bleeding and/or hematoma  - Assess quality of pulses, skin color and temperature  - Assess for signs of  decreased coronary artery perfusion - ex. Angina  - Evaluate fluid balance, assess for edema, trend weights  Outcome: Progressing  Goal: Absence of cardiac arrhythmias or at baseline  Description: INTERVENTIONS:  - Continuous cardiac monitoring, monitor vital signs, obtain 12 lead EKG if indicated  - Evaluate effectiveness of antiarrhythmic and heart rate control medications as ordered  - Initiate emergency measures for life threatening arrhythmias  - Monitor electrolytes and administer replacement therapy as ordered  Outcome: Progressing     Problem: METABOLIC/FLUID AND ELECTROLYTES - ADULT  Goal: Hemodynamic stability and optimal renal function maintained  Description: INTERVENTIONS:  - Monitor labs and assess for signs and symptoms of volume excess or deficit  - Monitor intake, output and patient weight  - Monitor urine specific gravity, serum osmolarity and serum sodium as indicated or ordered  - Monitor response to interventions for patient's volume status, including labs, urine output, blood pressure (other measures as available)  - Encourage oral intake as appropriate  - Instruct patient on fluid and nutrition restrictions as appropriate  Outcome: Progressing     Problem: SKIN/TISSUE INTEGRITY - ADULT  Goal: Skin integrity remains intact  Description: INTERVENTIONS  - Assess and document risk factors for pressure ulcer development  - Assess and document skin integrity  - Monitor for areas of redness and/or skin breakdown  - Initiate interventions, skin care algorithm/standards of care as needed  Outcome: Progressing  Goal: Incision(s), wounds(s) or drain site(s) healing without S/S of infection  Description: INTERVENTIONS:  - Assess and document risk factors for pressure ulcer development  - Assess and document skin integrity  - Assess and document dressing/incision, wound bed, drain sites and surrounding tissue  - Implement wound care per orders  - Initiate isolation precautions as appropriate  - Initiate  Pressure Ulcer prevention bundle as indicated  Outcome: Progressing  Goal: Oral mucous membranes remain intact  Description: INTERVENTIONS  - Assess oral mucosa and hygiene practices  - Implement preventative oral hygiene regimen  - Implement oral medicated treatments as ordered  Outcome: Progressing     Problem: NEUROLOGICAL - ADULT  Goal: Achieves stable or improved neurological status  Description: INTERVENTIONS  - Assess for and report changes in neurological status  - Initiate measures to prevent increased intracranial pressure  - Maintain blood pressure and fluid volume within ordered parameters to optimize cerebral perfusion and minimize risk of hemorrhage  - Monitor temperature, glucose, and sodium. Initiate appropriate interventions as ordered  Outcome: Progressing

## 2024-03-11 NOTE — PROGRESS NOTES
CV Surgery    Patient seen, examined  Discussed with Dr. Ramirez and patient seen with Dr. Ramirez  Results of turn down echo reviewed    PLAN:  Remove impella and transition to IABP  Tentatively planning for surgery Thursday    Richie Llanes MD  Cardiothoracic Surgery  [facility]

## 2024-03-11 NOTE — PLAN OF CARE
Pt is sedated and does not demonstrate reaching for ETT while restraints off for patient care. No evidence of injury to BUE as a result of restraint use.    Problem: Safety Risk - Non-Violent Restraints  Goal: Patient will remain free from self-harm  Description: INTERVENTIONS:  - Apply the least restrictive restraint to prevent harm  - Notify patient and family of reasons restraints applied  - Assess for any contributing factors to confusion (electrolyte disturbances, delirium, medications)  - Discontinue any unnecessary medical devices as soon as possible  - Assess the patient's physical comfort, circulation, skin condition, hydration, nutrition and elimination needs   - Reorient and redirection as needed  - Assess for the need to continue restraints  Outcome: Completed

## 2024-03-11 NOTE — PLAN OF CARE
Pt still remains on CRRT and impella. Goal of net zero, tolerating well. Pt is currently intubated and has moments of biting down on tongue, moderate bleeding noted through oral suctioning, moderate thick tan secretions are noted through inline endotracheal. Tolerating current ventilator settings @ 30% fio2. [place] and Impella are in within their respective measurements. TPN initiated overnight, remains on propofol, cangrelor, levophed, fentanyl, bicarb (impella purge). Minimal incidences of bleeding excluding oral. Hgb stable today. Remains off heparin due to ACTs theraputic with drip off. Bilateral upper extremity soft restraints are used for safety due to poor safety awareness. Pt is currently sedated at this time.  of pt has arrived and is aware of plan of care at this time.    UOP ([id number])  5cc    Problem: Patient Centered Care  Goal: Patient preferences are identified and integrated in the patient's plan of care  Description: Interventions:  - What would you like us to know as we care for you?  - Provide timely, complete, and accurate information to patient/family  - Incorporate patient and family knowledge, values, beliefs, and cultural backgrounds into the planning and delivery of care  - Encourage patient/family to participate in care and decision-making at the level they choose  - Honor patient and family perspectives and choices  Outcome: Progressing     Problem: Patient/Family Goals  Goal: Patient/Family Long Term Goal  Description: Patient's Long Term Goal: Improved cardiac function; Stent vs CABG    Interventions:  - Wean pressors as tolerated  - Remove impella as appropriate  - Trend labs; ACTs  - Coordinate interventions with CT and Cardio team  - See additional Care Plan goals for specific interventions  Outcome: Progressing  Goal: Patient/Family Short Term Goal  Description: Patient's Short Term Goal: Removal of CRRT    Interventions:   - Trend renal function  - Monitor urinary output  -  Communicate with primary and renal team  - See additional Care Plan goals for specific interventions  Outcome: Progressing     Problem: RESPIRATORY - ADULT  Goal: Achieves optimal ventilation and oxygenation  Description: INTERVENTIONS:  - Assess for changes in respiratory status  - Assess for changes in mentation and behavior  - Position to facilitate oxygenation and minimize respiratory effort  - Oxygen supplementation based on oxygen saturation or ABGs  - Provide Smoking Cessation handout, if applicable  - Encourage broncho-pulmonary hygiene including cough, deep breathe, Incentive Spirometry  - Assess the need for suctioning and perform as needed  - Assess and instruct to report SOB or any respiratory difficulty  - Respiratory Therapy support as indicated  - Manage/alleviate anxiety  - Monitor for signs/symptoms of CO2 retention  Outcome: Progressing     Problem: CARDIOVASCULAR - ADULT  Goal: Maintains optimal cardiac output and hemodynamic stability  Description: INTERVENTIONS:  - Monitor vital signs, rhythm, and trends  - Monitor for bleeding, hypotension and signs of decreased cardiac output  - Evaluate effectiveness of vasoactive medications to optimize hemodynamic stability  - Monitor arterial and/or venous puncture sites for bleeding and/or hematoma  - Assess quality of pulses, skin color and temperature  - Assess for signs of decreased coronary artery perfusion - ex. Angina  - Evaluate fluid balance, assess for edema, trend weights  Outcome: Progressing  Goal: Absence of cardiac arrhythmias or at baseline  Description: INTERVENTIONS:  - Continuous cardiac monitoring, monitor vital signs, obtain 12 lead EKG if indicated  - Evaluate effectiveness of antiarrhythmic and heart rate control medications as ordered  - Initiate emergency measures for life threatening arrhythmias  - Monitor electrolytes and administer replacement therapy as ordered  Outcome: Progressing     Problem: Delirium  Goal: Minimize  duration of delirium  Description: Interventions:  - Encourage use of hearing aids, eye glasses  - Promote highest level of mobility daily  - Provide frequent reorientation  - Promote wakefulness i.e. lights on, blinds open  - Promote sleep, encourage patient's normal rest cycle i.e. lights off, TV off, minimize noise and interruptions  - Encourage family to assist in orientation and promotion of home routines  Outcome: Progressing     Problem: METABOLIC/FLUID AND ELECTROLYTES - ADULT  Goal: Hemodynamic stability and optimal renal function maintained  Description: INTERVENTIONS:  - Monitor labs and assess for signs and symptoms of volume excess or deficit  - Monitor intake, output and patient weight  - Monitor urine specific gravity, serum osmolarity and serum sodium as indicated or ordered  - Monitor response to interventions for patient's volume status, including labs, urine output, blood pressure (other measures as available)  - Encourage oral intake as appropriate  - Instruct patient on fluid and nutrition restrictions as appropriate  Outcome: Progressing     Problem: SKIN/TISSUE INTEGRITY - ADULT  Goal: Skin integrity remains intact  Description: INTERVENTIONS  - Assess and document risk factors for pressure ulcer development  - Assess and document skin integrity  - Monitor for areas of redness and/or skin breakdown  - Initiate interventions, skin care algorithm/standards of care as needed  Outcome: Progressing  Goal: Incision(s), wounds(s) or drain site(s) healing without S/S of infection  Description: INTERVENTIONS:  - Assess and document risk factors for pressure ulcer development  - Assess and document skin integrity  - Assess and document dressing/incision, wound bed, drain sites and surrounding tissue  - Implement wound care per orders  - Initiate isolation precautions as appropriate  - Initiate Pressure Ulcer prevention bundle as indicated  Outcome: Progressing  Goal: Oral mucous membranes remain  intact  Description: INTERVENTIONS  - Assess oral mucosa and hygiene practices  - Implement preventative oral hygiene regimen  - Implement oral medicated treatments as ordered  Outcome: Progressing     Problem: NEUROLOGICAL - ADULT  Goal: Achieves stable or improved neurological status  Description: INTERVENTIONS  - Assess for and report changes in neurological status  - Initiate measures to prevent increased intracranial pressure  - Maintain blood pressure and fluid volume within ordered parameters to optimize cerebral perfusion and minimize risk of hemorrhage  - Monitor temperature, glucose, and sodium. Initiate appropriate interventions as ordered  Outcome: Progressing     Problem: Safety Risk - Non-Violent Restraints  Goal: Patient will remain free from self-harm  Description: INTERVENTIONS:  - Apply the least restrictive restraint to prevent harm  - Notify patient and family of reasons restraints applied  - Assess for any contributing factors to confusion (electrolyte disturbances, delirium, medications)  - Discontinue any unnecessary medical devices as soon as possible  - Assess the patient's physical comfort, circulation, skin condition, hydration, nutrition and elimination needs   - Reorient and redirection as needed  - Assess for the need to continue restraints  3/11/2024 0445 by Raul Schwab, RN  Outcome: Not Progressing  3/10/2024 2239 by Raul Schwab, RN  Outcome: Not Progressing

## 2024-03-11 NOTE — PROGRESS NOTES
Critical Care Progress Note     Assessment / Plan:  Acute respiratory failure - intubated in cath lab  - continue mechanical ventilation with full support, PEEP 5, FiO2 30%, mechanics are good  - fentanyl and propofol for analgesia and sedation, fentanyl pushes as needed  - hold on SAT/SBT while impella in place pending definitive management of multivessel cAD  NSTEMI - diagnosed with new multivessel CAD s/p angioplasty and cardiogenic shock requiring impella placement.   - ASA and cangrelor  - heparin gtt  - repeat echo with good LV function and RHC with good filling pressures  - CABG vs PCI per cardiology and cardiac surgery  Cardiogenic shock - due to acute coronary syndrome, EF was down and has now improved and repeat echo  - continue impella per cardiology  - vasopressors as needed, currently off  Pneumonia - HCAP at high risk for GNR, fever and RML infiltrate  - sputum cultures with klebsiella and MSSA  - continue cefepime (3/10-)  - follow up blood cultures  Acute renal failure - suspect from ATN and cardiogenic shock  - CRRT per nephrology  Thrombocytopenia, anemia - due to impella and CRRT, low 4T score  - transfuse for plt <50  - keep Hb >8  FEN/GI  - TPN  Tobacco Abuse  - Strongly advise cessation  Proph  - Heparin gtt  - Pepcid  Dispo  - ICU, will follow    Discussed with family at bedside and RN    Critical care time: 40 minutes    Farooq Olvera MD  Pulmonary & Critical Care Medicine  [facility]      Subjective:  No acute events overnight  Remains intubated with impella and CRRT ongoing    Objective:  Vitals:    03/11/24 0400 03/11/24 0500 03/11/24 0600 03/11/24 0700   BP: 101/76 101/70 101/69 101/73   BP Location: Left arm Left arm Left arm Left arm   Pulse: 58 57 60 74   Resp: 15 15 15 13   Temp: 98.7 °F (37.1 °C) 98.8 °F (37.1 °C) 98.9 °F (37.2 °C) 99.9 °F (37.7 °C)   TempSrc: Pulmonary Artery Pulmonary Artery Pulmonary Artery Pulmonary Artery   SpO2: 100% 100% 100% 100%   Weight:        Height:         Physical Exam:  General: laying in bed intubated  Respiratory: clear bilaterally, compliant with ventilator  Cardiovascular: regular rate and rhythm, impella hum, no appreciable murmurs  Abdomen: soft, NTND  Extremities: no LE edema  Mental status: sedated, arouses to touch    Medications:  Reviewed in EMR    Lab Data:  Reviewed in EMR    Imaging:  I independently visualized all relevant chest imaging in PACS and agree with radiology interpretation except where noted.

## 2024-03-11 NOTE — CARDIAC REHAB
Order received and Chart review completed, followed by assessment as to appropriateness of patient to receive Cardiac Rehab Education. Patient is not appropriate for rehab education intervention at this time.  Will continue to monitor patient needs regarding during this admission.

## 2024-03-11 NOTE — PLAN OF CARE
Problem: RESPIRATORY - ADULT  Goal: Achieves optimal ventilation and oxygenation  Description: INTERVENTIONS:  - Assess for changes in respiratory status  - Assess for changes in mentation and behavior  - Position to facilitate oxygenation and minimize respiratory effort  - Oxygen supplementation based on oxygen saturation or ABGs  - Provide Smoking Cessation handout, if applicable  - Encourage broncho-pulmonary hygiene including cough, deep breathe, Incentive Spirometry  - Assess the need for suctioning and perform as needed  - Assess and instruct to report SOB or any respiratory difficulty  - Respiratory Therapy support as indicated  - Manage/alleviate anxiety  - Monitor for signs/symptoms of CO2 retention  Outcome: Progressing     Patient received with full vent support of AC/VC 15, , PEEP +5, FIO2 30%, malini. Breath sound auscultated and suction as needed.    1200: ABG drawn, results are below,    ABG pH 7.43     ABG pCO2 45 mm Hg    ABG PO2 100 mm Hg    ABG HCO3 28.8 High  mEq/L    Blood Gas Base Excess 5.0 High  mmol/L    Potassium Blood Gas 4.6 mmol/L    Sodium Blood Gas 132 Low  mmol/L    Lactic Acid (Blood Gas) 0.6 mmol/L    Ionized Calcium 1.20 mmol/L    Total Hemoglobin 8.5 Low  g/dL    ABG O2 Saturation >99.0 %    Carboxyhemoglobin 2.7 %    Methemoglobin 1.4 % SAT    Oxygen Content 11.6 Low  Vol%    Oxygen Delivery Device Vent    Blood Gas Vent Mode A/C    Blood Gas Respiration Rate 15 BPM    Tidal Volume 400.0 mL    FIO2 30.00    PEEP 5.0      PT. Sedated, resting,no other events/changes on this shift, RT will continue to monitor.

## 2024-03-11 NOTE — RESPIRATORY THERAPY NOTE
Problem: RESPIRATORY - ADULT  Goal: Achieves optimal ventilation and oxygenation  Description: INTERVENTIONS:  - Assess for changes in respiratory status  - Assess for changes in mentation and behavior  - Position to facilitate oxygenation and minimize respiratory effort  - Oxygen supplementation based on oxygen saturation or ABGs  - Provide Smoking Cessation handout, if applicable  - Encourage broncho-pulmonary hygiene including cough, deep breathe, Incentive Spirometry  - Assess the need for suctioning and perform as needed  - Assess and instruct to report SOB or any respiratory difficulty  - Respiratory Therapy support as indicated  - Manage/alleviate anxiety  - Monitor for signs/symptoms of CO2 retention  Outcome: Progressing    Patient received intubated on full support:    AC/VC 15/400/+5/30%  Bilateral BS auscultated, suction provided as needed. ABG and VBG drawn (see results). No changes made overnight. Will continue to monitor.

## 2024-03-11 NOTE — PROGRESS NOTES
[facility] Hospitalist Progress Note     CC: Hospital Follow up    PCP: Isauro Pina MD       Assessment/Plan:   This is a 53-year-old male with a history of hyperlipidemia, tobacco use, who presents to the hospital for evaluation of acute chest pain.  STEMI with cardiogenic shock requiring Impella placement.  Continuing ventilation support with CRRT.  Awaiting timing for further intervention.     STEMI  Cardiogenic shock   -Multivessel disease including chronically occluded large RCA, 95% sequential LAD stenosis on cath 3/5  -flow restored to heavily thrombosed circumflex and OM1  -balloon pump per cardiology  -taken to cath again emergently on 3/6/24, for right heart cath to accurately obtain hemodynamics. Worsening renal function noted.   -trialed IV hydration, renal function worsening----started on CRRT on 3/7  -heparin drip   -IV cangrelor  -aspirin 81mg  -cardiology, nephrology, CV surgery and critical care on consult   -vent management and sedation per critical care    Acute kidney injury  Anuric   -suspect cardiogenic shock  -component of ATI  -decreased urine output   -start CRRT on 3/7. Patient is toleratring   -Off pressor support    Hematuria, resolved  -yellow urine noted, minimal, but not bloody      Acute hypoxic respiratory failure  -currently on vent, monitor oxygenation   -critical care consulted  -continue full vent support   -maintaining FiO2 30%    HCAP  -Low-grade fevers  -Sputum with Klebsiella and staph  -Continue cefepime     Diet: PPN to TPN   DVT prophylaxis: on heparin drip   Code status: FULL    Discussed with  at bedside   Discussed with RN     Disposition: ICU     Rick Mendenhall MD  [facility] Hospitalist        Subjective:     Intubated, sedated    and at bedside     OBJECTIVE:    Blood pressure 110/76, pulse 67, temperature 99.5 °F (37.5 °C), temperature source Pulmonary Artery, resp. rate 15, height 5' 7\" (1.702 m), weight 192 lb 10.9 oz (87.4  kg), SpO2 100%.    Temp:  [98.3 °F (36.8 °C)-100.6 °F (38.1 °C)] 99.5 °F (37.5 °C)  Pulse:  [54-83] 67  Resp:  [0-18] 15  BP: ()/(64-88) 110/76  SpO2:  [98 %-100 %] 100 %  AO: ()/(51-85) 102/57  FiO2 (%):  [30 %] 30 %      Intake/Output:    Intake/Output Summary (Last 24 hours) at 3/11/2024 1136  Last data filed at 3/11/2024 1100  Gross per 24 hour   Intake 4112.45 ml   Output 4243 ml   Net -130.55 ml       Last 3 Weights   03/11/24 1000 192 lb 10.9 oz (87.4 kg)   03/09/24 0500 188 lb 15 oz (85.7 kg)   03/08/24 0600 185 lb 13.6 oz (84.3 kg)   03/07/24 0600 183 lb 3.2 oz (83.1 kg)   03/06/24 0500 180 lb 1.9 oz (81.7 kg)   03/05/24 1324 175 lb 7.8 oz (79.6 kg)   03/05/24 0722 160 lb (72.6 kg)   10/06/21 1541 177 lb 6.4 oz (80.5 kg)   09/09/20 0929 172 lb 9.6 oz (78.3 kg)       /76 (BP Location: Radial)   Pulse 67   Temp 99.5 °F (37.5 °C) (Pulmonary Artery)   Resp 15   Ht 5' 7\" (1.702 m)   Wt 192 lb 10.9 oz (87.4 kg)   SpO2 100%   BMI 30.18 kg/m²   General: intubated, sedated  HEENT: Cortis, ET tube intact  Lungs: mechanical breath sounds   Heart: Regular rate and rhythm  Abdomen: soft  Extremities: mild upper ext edema  Skin: normal color    Data Review:       Labs:     Recent Labs   Lab 03/09/24  0450 03/10/24  0405 03/11/24  0405   RBC 2.88* 2.57* 2.74*   HGB 8.8* 7.7* 8.1*   HCT 25.6* 23.2* 25.0*   MCV 88.9 90.3 91.2   MCH 30.6 30.0 29.6   MCHC 34.4 33.2 32.4   RDW 14.6 14.4 14.0   NEPRELIM 4.75 4.13 3.93   WBC 6.5 5.9 5.7   PLT 64.0* 57.0* 45.0*         Recent Labs   Lab 03/10/24  1519 03/10/24  1802 03/11/24  0405   * 133* 142*  142*   BUN 22 21 23  23   CREATSERUM 3.21* 3.27* 3.15*  3.15*   EGFRCR 22* 21* 22*  22*   CA 8.2* 8.4* 8.5*  8.5*   * 133* 133*  133*   K 4.8 4.7 4.6  4.6    107 107  107   CO2 26.0 27.0 26.0  26.0       Recent Labs   Lab 03/05/24  0723 03/07/24  0458 03/09/24  0450 03/09/24  1719 03/10/24  0405 03/10/24  1802 03/11/24  0405   ALT 40   --   --   --  56*  --   --    AST 35*  --   --   --  145*  --   --    ALB 3.9   < > 3.1* 3.0* 2.9*  2.9* 2.9* 2.9*    < > = values in this interval not displayed.         Imaging:  XR CHEST AP PORTABLE  (CPT=71045)    Result Date: 3/11/2024  CONCLUSION:  1. Heart size upper limits of normal to mildly enlarged, and there is mild interstitial edema suggesting mild cardiac failure/fluid overload.  ET tube in the trachea at the level of the clavicles.  Impella device in profile with the left ventricle.  [place]-Ophelia catheter in the descending left pulmonary artery.  No pneumothorax.  Small right pleural effusion suggested.    Dictated by (CST): Moisés Delgadillo MD on 3/11/2024 at 8:29 AM     Finalized by (CST): Moisés Delgadillo MD on 3/11/2024 at 8:32 AM          XR CHEST AP PORTABLE  (CPT=71045)    Result Date: 3/8/2024  CONCLUSION:  1. New enteric tube tip appears to terminate about the gastroesophageal junction.  Recommend advancing at least 9.0 cm distally into the stomach. 2. Additional lines/tubes in stable customary positioning. 3. Development of subsegmental atelectasis in the lower right lung.   Results of this examination were discussed with the patient's nurse, Mayela Ch, by Dr. Edwards at 12:40 on 03/08/2024.  Dictated by (CST): Bc Edwards MD on 3/08/2024 at 12:36 PM     Finalized by (CST): Bc Edwards MD on 3/08/2024 at 12:42 PM             Meds:      cefepime  1 g Intravenous Q12H    aspirin  300 mg Rectal Daily    pantoprazole  40 mg Intravenous Daily    chlorhexidine gluconate  15 mL Mouth/Throat BID@0800,2000      adult 3 in 1 TPN      adult 3 in 1 TPN 41.7 mL/hr at 03/10/24 2112    dextrose 10%      NxStage Pure Flow 401 CRRT/PIRRT fluid 5000mL 2.5 L/hr (03/11/24 0236)    norepinephrine Stopped (03/11/24 0625)    cangrelor (Kengreal) 50 mg in sodium chloride 0.9% 250 mL IVPB - BRIDGING/MAINTENANCE dose 0.75 mcg/kg/min (03/11/24 0607)    sodium bicarbonate 25mEq in dextrose 5% 1000mL  IMPELLA purge solution 10 mL/hr (03/10/24 1705)    heparin Stopped (03/10/24 1224)    propofol 50 mcg/kg/min (03/11/24 1114)    fentanyl 75 mcg/hr (03/11/24 0949)     diazepam, dextrose 10%, heparin, fentaNYL **OR** fentaNYL, acetaminophen **OR** acetaminophen **OR** acetaminophen **OR** acetaminophen, polyethylene glycol (PEG 3350), senna, bisacodyl, fleet enema, fentaNYL (Sublimaze) **OR** fentaNYL (Sublimaze), fentanyl

## 2024-03-12 NOTE — SPIRITUAL CARE NOTE
Spiritual Care Visit Note    Patient Name: Cristiano Obregon Date of Spiritual Care Visit: 24   : 1970 Primary Dx: NSTEMI (non-ST elevated myocardial infarction) (HCC)       Referred By: Referral From: Nurse;Family    Spiritual Care Taxonomy:    Intended Effects: Demonstrate caring and concern    Methods: Encourage self care;Encourage story-telling;Offer support    Interventions: Acknowledge current situation;Active listening;Prayer for healing;Provide hospitality    Visit Type/Summary:    Writer met with pt  and close friend at bedside to introduce self and provide support.  shared about how the pt cared for him and the ways he is hoping to care for him. He shared about their support systems and how many people have been able to visit and who are praying for pt. , Reyes, requested prayer and additional support whenever it is possible. He shared about the care plan and the treatments expected for pt. Writer offered prayer for pt kidney function and the family's overall comfort and pt progress. Reyes and Harry were very receptive to support and welcome continued support.     Spiritual Care support can be requested via an Epic consult. For urgent/immediate needs, please contact the On Call  at: [place]: ext [phone]    Kyeona Buck MA, MA   Chaplain Resident  [facility], Spiritual Care Department   On-Call  via EPIC consult

## 2024-03-12 NOTE — CM/SW NOTE
CM reached out to spouse Reyes via phone and he was not available.  CM will follow-up after surgery to share home health protocol.    Will require both PT and OT evaluations after extubation due to period of time that Cristiano has been intubated.    CM/SW will follow and assist with dc planning needs.    Mary Jane Infante MBA BSN RN CRRN   RN Case Manager  [phone]

## 2024-03-12 NOTE — PLAN OF CARE
Increased Impella to p8, will keep impella in use until CABG Thursday, consent for CABG on chart, pt shivering maintained well with fentanyl boluses from bag, Tmax 100.6, IV tylenol given       Problem: RESPIRATORY - ADULT  Goal: Achieves optimal ventilation and oxygenation  Description: INTERVENTIONS:  - Assess for changes in respiratory status  - Assess for changes in mentation and behavior  - Position to facilitate oxygenation and minimize respiratory effort  - Oxygen supplementation based on oxygen saturation or ABGs  - Provide Smoking Cessation handout, if applicable  - Encourage broncho-pulmonary hygiene including cough, deep breathe, Incentive Spirometry  - Assess the need for suctioning and perform as needed  - Assess and instruct to report SOB or any respiratory difficulty  - Respiratory Therapy support as indicated  - Manage/alleviate anxiety  - Monitor for signs/symptoms of CO2 retention  Outcome: Progressing     Problem: CARDIOVASCULAR - ADULT  Goal: Maintains optimal cardiac output and hemodynamic stability  Description: INTERVENTIONS:  - Monitor vital signs, rhythm, and trends  - Monitor for bleeding, hypotension and signs of decreased cardiac output  - Evaluate effectiveness of vasoactive medications to optimize hemodynamic stability  - Monitor arterial and/or venous puncture sites for bleeding and/or hematoma  - Assess quality of pulses, skin color and temperature  - Assess for signs of decreased coronary artery perfusion - ex. Angina  - Evaluate fluid balance, assess for edema, trend weights  Outcome: Progressing     Problem: CARDIOVASCULAR - ADULT  Goal: Absence of cardiac arrhythmias or at baseline  Description: INTERVENTIONS:  - Continuous cardiac monitoring, monitor vital signs, obtain 12 lead EKG if indicated  - Evaluate effectiveness of antiarrhythmic and heart rate control medications as ordered  - Initiate emergency measures for life threatening arrhythmias  - Monitor electrolytes and  administer replacement therapy as ordered  Outcome: Progressing

## 2024-03-12 NOTE — PROGRESS NOTES
NEPHROLOGY DAILY PROGRESS NOTE       SUBJECTIVE:  CRRT on going    at bedside     OBJECTIVE:    Total Intake/Output:    Intake/Output Summary (Last 24 hours) at 3/12/2024 0925  Last data filed at 3/12/2024 0900  Gross per 24 hour   Intake 3408.82 ml   Output 3558 ml   Net -149.18 ml       PHYSICAL EXAM:  /74 (BP Location: Left arm)   Pulse 80   Temp 99.9 °F (37.7 °C) (Pulmonary Artery)   Resp 15   Ht 5' 7\" (1.702 m)   Wt 192 lb 10.9 oz (87.4 kg)   SpO2 100%   BMI 30.18 kg/m²   GEN: Intubated, sedated   HEENT: ETT in place   CHEST: mechanical breath sounds   CARDIAC: S1S2 normal  ABD: soft, NT/ND  : lujan in place   EXT:  trace lower ext edema  NEURO: sedated   ACCESS: RIJ temp HD cath (3/7)      CURRENT MEDICATIONS:     mineral oil-white petrolatum   Both Eyes TID    cefepime  1 g Intravenous Q12H    aspirin  300 mg Rectal Daily    pantoprazole  40 mg Intravenous Daily    chlorhexidine gluconate  15 mL Mouth/Throat BID@0800,2000         LABS:  Patient Labs Reviewed in Detail. Pertinent Labs as follows:  Recent Labs   Lab 03/11/24  0405 03/11/24  1700 03/12/24  0539   *  142* 128* 138*  138*   BUN 23  23 25* 28*  28*   CREATSERUM 3.15*  3.15* 3.04* 3.06*  3.06*   EGFRCR 22*  22* 23* 23*  23*   CA 8.5*  8.5* 8.5* 8.9  8.9   *  133* 136 133*  133*   K 4.6  4.6 4.8 5.0  5.0     107 105 108  108   CO2 26.0  26.0 26.0 26.0  26.0     Recent Labs   Lab 03/10/24  0405 03/11/24  0405 03/12/24  0539   RBC 2.57* 2.74* 2.81*   HGB 7.7* 8.1* 8.3*   HCT 23.2* 25.0* 25.2*   MCV 90.3 91.2 89.7   MCH 30.0 29.6 29.5   MCHC 33.2 32.4 32.9   RDW 14.4 14.0 14.1   NEPRELIM 4.13 3.93 6.09   WBC 5.9 5.7 8.3   PLT 57.0* 45.0* 71.0*         IMAGING:  XR CHEST AP PORTABLE  (CPT=71045)    Result Date: 3/12/2024  CONCLUSION:  1. Cardiomegaly pulmonary venous distention. 2. Support tubes and catheters are satisfactory.  No pneumothorax 3. Mild perihilar and basilar congestive changes  worse on the right has progressed.  4. Small right-sided effusion.    Dictated by (CST): Wily Stephenson MD on 3/12/2024 at 8:51 AM     Finalized by (CST): Wily Stephenson MD on 3/12/2024 at 8:56 AM          XR CHEST AP PORTABLE  (CPT=71045)    Result Date: 3/11/2024  CONCLUSION:  1. Heart size upper limits of normal to mildly enlarged, and there is mild interstitial edema suggesting mild cardiac failure/fluid overload.  ET tube in the trachea at the level of the clavicles.  Impella device in profile with the left ventricle.  [place]-Ophelia catheter in the descending left pulmonary artery.  No pneumothorax.  Small right pleural effusion suggested.    Dictated by (CST): Moisés Delgadillo MD on 3/11/2024 at 8:29 AM     Finalized by (CST): Moisés Delgadillo MD on 3/11/2024 at 8:32 AM            ASSESSMENT AND PLAN:   This is a 53 year old male with PMH sig for HLD, tobacco use. Presented with chest pain and was admitted for NSTEMI.  Nephrology is consulted for NORMA      Anuric NORMA  - creatinine 1.23 on admission, worsened to 7.19 (3/7)  - renal US neg for hydronephrosis    - UA: + 100 protein, 6-10 RBCs, + ketones, few squamous epi cells.   - suspect due to cardiogenic shock / ATN  - initiated on CVVH on 3/7 via RIJ temp HD cath   - continue CRRT, keep net even   - monitor for renal recovery.   - follow renal fxn and I/Os   - renally dose meds      Hyperkalemia   - K rising to 5.0 today, change to 2K bags  - Adjust K in TPN     Metabolic acidosis  - resolved with CRRT     Hyponatremia  - mildly low, will monitor      NSTEMI   - per cards / CV surgery      Acute respiratory failure  - vent management per ICU       Dw RN     We will continue to follow Cristiano Obregon    Critical care time > 35 mins     Janet Velazco MD  [facility] - Nephrology

## 2024-03-12 NOTE — PLAN OF CARE
Problem: CARDIOVASCULAR - ADULT  Goal: Maintains optimal cardiac output and hemodynamic stability  Description: INTERVENTIONS:  - Monitor vital signs, rhythm, and trends  - Monitor for bleeding, hypotension and signs of decreased cardiac output  - Evaluate effectiveness of vasoactive medications to optimize hemodynamic stability  - Monitor arterial and/or venous puncture sites for bleeding and/or hematoma  - Assess quality of pulses, skin color and temperature  - Assess for signs of decreased coronary artery perfusion - ex. Angina  - Evaluate fluid balance, assess for edema, trend weights  Outcome: Progressing  Goal: Absence of cardiac arrhythmias or at baseline  Description: INTERVENTIONS:  - Continuous cardiac monitoring, monitor vital signs, obtain 12 lead EKG if indicated  - Evaluate effectiveness of antiarrhythmic and heart rate control medications as ordered  - Initiate emergency measures for life threatening arrhythmias  - Monitor electrolytes and administer replacement therapy as ordered  Outcome: Progressing     Problem: METABOLIC/FLUID AND ELECTROLYTES - ADULT  Goal: Hemodynamic stability and optimal renal function maintained  Description: INTERVENTIONS:  - Monitor labs and assess for signs and symptoms of volume excess or deficit  - Monitor intake, output and patient weight  - Monitor urine specific gravity, serum osmolarity and serum sodium as indicated or ordered  - Monitor response to interventions for patient's volume status, including labs, urine output, blood pressure (other measures as available)  - Encourage oral intake as appropriate  - Instruct patient on fluid and nutrition restrictions as appropriate  Outcome: Not Progressing     Problem: SKIN/TISSUE INTEGRITY - ADULT  Goal: Skin integrity remains intact  Description: INTERVENTIONS  - Assess and document risk factors for pressure ulcer development  - Assess and document skin integrity  - Monitor for areas of redness and/or skin breakdown  -  Initiate interventions, skin care algorithm/standards of care as needed  Outcome: Progressing  Goal: Oral mucous membranes remain intact  Description: INTERVENTIONS  - Assess oral mucosa and hygiene practices  - Implement preventative oral hygiene regimen  - Implement oral medicated treatments as ordered  Outcome: Progressing

## 2024-03-12 NOTE — RESPIRATORY THERAPY NOTE
Pt received last night at 1900 on VC/AC ventilation, rate 15, Vt 400, PEEP +5, FiO2 30% tolerating well. ETT 7.0, 22 at the lips. Bilat BS auscultated, pt suctioned as needed with small amounts of thick white secretions overnight. No changes were made to the vent overnight. RT will continue to monitor.

## 2024-03-12 NOTE — PROGRESS NOTES
Critical Care Progress Note     Assessment / Plan:  Acute respiratory failure - intubated in cath lab  - continue mechanical ventilation with full support, PEEP 5, FiO2 30%, mechanics are good  - ABG q12 hours  - fentanyl and propofol for analgesia and sedation, fentanyl pushes as needed  - hold SBT pending IABP placement and CABG thursday  NSTEMI - diagnosed with new multivessel CAD s/p angioplasty and cardiogenic shock requiring impella placement.   - ASA and cangrelor  - heparin gtt  - repeat echo with good LV function and RHC with good filling pressures  - plans to exchange impella for IABP today to bridge to CABG Thursday  - cardiology and cardiac surgery following  Cardiogenic shock - due to acute coronary syndrome, EF was down and has now improved on repeat echo  - continue impella, weaning per cardiology  - vasopressors as needed, currently off  Pneumonia - HCAP at high risk for GNR, fever and RML infiltrate  - sputum cultures with klebsiella and MSSA  - MRSA swab was negative  - continue cefepime (3/10-)  - follow up blood cultures  Acute renal failure - suspect from ATN and cardiogenic shock  - CRRT per nephrology  Thrombocytopenia, anemia - due to impella and CRRT, low probability for HIT by 4T score  - transfuse for plt <50  - keep Hb >8  FEN/GI  - TPN  Tobacco Abuse  - strongly advise cessation  Proph  - heparin gtt  - pepcid  Dispo  - ICU, will follow    Discussed with family at bedside and RN    Critical care time: 38 minutes    Farooq Olvera MD  Pulmonary & Critical Care Medicine  [facility]      Subjective:  Low grade fever yesterday evening  No acute events overnight  Weaning impella    Objective:  Vitals:    03/12/24 0451 03/12/24 0500 03/12/24 0600 03/12/24 0700   BP:   102/69    BP Location:   Left arm    Pulse: 90 83 81 71   Resp: 20 16 16 16   Temp:   99.8 °F (37.7 °C) 99.9 °F (37.7 °C)   TempSrc:   Pulmonary Artery Pulmonary Artery   SpO2: 98% 100% 100% 100%   Weight:        Height:         Physical Exam:  General: laying in bed intubated  Respiratory: clear bilaterally, compliant with ventilator  Cardiovascular: regular rate and rhythm, impella hum, no appreciable murmurs  Abdomen: soft, NTND  Extremities: no LE edema  Mental status: sedated    Medications:  Reviewed in EMR    Lab Data:  Reviewed in EMR    Imaging:  I independently visualized all relevant chest imaging in PACS and agree with radiology interpretation except where noted.

## 2024-03-12 NOTE — PROGRESS NOTES
Misc. Note    Pt tentatively scheduled for CABG on 3/14 w/Dr. Llanes. Pt is currently intubated & sedated w/Impella and CRRT in progress. Met w/patient's , Reyes, at bedside. Written and verbal info provided to him regarding letitia op expectations. Discussed STS risk score. Consents obtained from Reyes. Will enter orders once surgery date confirmed; likely tomorrow. All questions answered.

## 2024-03-12 NOTE — PROGRESS NOTES
Despite earlier bolus of Fentanyl via the CADD pump, pt is stacking his breaths on the ventilator, trembling and moving his arms to center.SBP is 140's on the arterial line.Will give prn valium to help relax pt

## 2024-03-12 NOTE — PROGRESS NOTES
[facility] Hospitalist Progress Note     CC: Hospital Follow up    PCP: Isauro Pina MD       Assessment/Plan:   This is a 53-year-old male with a history of hyperlipidemia, tobacco use, who presents to the hospital for evaluation of acute chest pain.  STEMI with cardiogenic shock requiring Impella placement.  Continuing ventilation support with CRRT.  Awaiting timing for further intervention with CABG likely on Thursday.  Plans to transition from Impella to IABP today.  Will consider transfer to tertiary center if needed.     STEMI  Cardiogenic shock   -Multivessel disease including chronically occluded large RCA, 95% sequential LAD stenosis on cath 3/5  -flow restored to heavily thrombosed circumflex and OM1  -balloon pump per cardiology  -taken to cath again emergently on 3/6/24, for right heart cath to accurately obtain hemodynamics. Worsening renal function noted.   -trialed IV hydration, renal function worsening----started on CRRT on 3/7  -heparin drip   -IV cangrelor  -aspirin 81mg  -cardiology, nephrology, CV surgery and critical care on consult   -vent management and sedation per critical care    Acute kidney injury  Anuric   -suspect cardiogenic shock  -component of ATI  -decreased urine output   -start CRRT on 3/7. Patient is toleratring   -Off pressor support    Hematuria, resolved  -yellow urine noted, minimal, but not bloody      Acute hypoxic respiratory failure  -currently on vent, monitor oxygenation   -critical care consulted  -continue full vent support   -maintaining FiO2 30%    HCAP  -Low-grade fevers  -Sputum with Klebsiella and staph  -Continue cefepime     Diet: PPN to TPN   DVT prophylaxis: on heparin drip   Code status: FULL    Discussed with  at bedside   Discussed with RN     Disposition: ICU     Rick Mendenhall MD  [facility] Hospitalist        Subjective:     Intubated, sedated    and at bedside     OBJECTIVE:    Blood pressure 100/61, pulse 78,  temperature 99.7 °F (37.6 °C), resp. rate 16, height 5' 7\" (1.702 m), weight 186 lb 4.6 oz (84.5 kg), SpO2 100%.    Temp:  [98.6 °F (37 °C)-100.7 °F (38.2 °C)] 99.7 °F (37.6 °C)  Pulse:  [] 78  Resp:  [15-20] 16  BP: ()/(60-74) 100/61  SpO2:  [98 %-100 %] 100 %  AO: ()/(53-72) 113/62  FiO2 (%):  [30 %] 30 %      Intake/Output:    Intake/Output Summary (Last 24 hours) at 3/12/2024 1058  Last data filed at 3/12/2024 0900  Gross per 24 hour   Intake 3032.45 ml   Output 3415 ml   Net -382.55 ml       Last 3 Weights   03/12/24 1000 186 lb 4.6 oz (84.5 kg)   03/11/24 1000 192 lb 10.9 oz (87.4 kg)   03/09/24 0500 188 lb 15 oz (85.7 kg)   03/08/24 0600 185 lb 13.6 oz (84.3 kg)   03/07/24 0600 183 lb 3.2 oz (83.1 kg)   03/06/24 0500 180 lb 1.9 oz (81.7 kg)   03/05/24 1324 175 lb 7.8 oz (79.6 kg)   03/05/24 0722 160 lb (72.6 kg)   10/06/21 1541 177 lb 6.4 oz (80.5 kg)   09/09/20 0929 172 lb 9.6 oz (78.3 kg)       /61   Pulse 78   Temp 99.7 °F (37.6 °C)   Resp 16   Ht 5' 7\" (1.702 m)   Wt 186 lb 4.6 oz (84.5 kg)   SpO2 100%   BMI 29.18 kg/m²   General: intubated, sedated  HEENT: Cortis, ET tube intact  Lungs: mechanical breath sounds   Heart: Regular rate and rhythm, S1, S2   Abdomen: soft  Extremities: mild upper/lower ext edema  Skin: normal color    Data Review:       Labs:     Recent Labs   Lab 03/10/24  0405 03/11/24  0405 03/12/24  0539   RBC 2.57* 2.74* 2.81*   HGB 7.7* 8.1* 8.3*   HCT 23.2* 25.0* 25.2*   MCV 90.3 91.2 89.7   MCH 30.0 29.6 29.5   MCHC 33.2 32.4 32.9   RDW 14.4 14.0 14.1   NEPRELIM 4.13 3.93 6.09   WBC 5.9 5.7 8.3   PLT 57.0* 45.0* 71.0*         Recent Labs   Lab 03/11/24  0405 03/11/24  1700 03/12/24  0539   *  142* 128* 138*  138*   BUN 23  23 25* 28*  28*   CREATSERUM 3.15*  3.15* 3.04* 3.06*  3.06*   EGFRCR 22*  22* 23* 23*  23*   CA 8.5*  8.5* 8.5* 8.9  8.9   *  133* 136 133*  133*   K 4.6  4.6 4.8 5.0  5.0     107 105 108  108   CO2  26.0  26.0 26.0 26.0  26.0       Recent Labs   Lab 03/10/24  0405 03/10/24  1802 03/11/24  0405 03/11/24  1700 03/12/24  0539   ALT 56*  --   --   --  78*   *  --   --   --  103*   ALB 2.9*  2.9* 2.9* 2.9* 3.0* 3.1*  3.1*         Imaging:  XR CHEST AP PORTABLE  (CPT=71045)    Result Date: 3/12/2024  CONCLUSION:  1. Cardiomegaly pulmonary venous distention. 2. Support tubes and catheters are satisfactory.  No pneumothorax 3. Mild perihilar and basilar congestive changes worse on the right has progressed.  4. Small right-sided effusion.    Dictated by (CST): Wily Stephenson MD on 3/12/2024 at 8:51 AM     Finalized by (CST): Wily Stephenson MD on 3/12/2024 at 8:56 AM          XR CHEST AP PORTABLE  (CPT=71045)    Result Date: 3/11/2024  CONCLUSION:  1. Heart size upper limits of normal to mildly enlarged, and there is mild interstitial edema suggesting mild cardiac failure/fluid overload.  ET tube in the trachea at the level of the clavicles.  Impella device in profile with the left ventricle.  [place]-Ophelia catheter in the descending left pulmonary artery.  No pneumothorax.  Small right pleural effusion suggested.    Dictated by (CST): Moisés Delgadillo MD on 3/11/2024 at 8:29 AM     Finalized by (CST): Moisés Delgadillo MD on 3/11/2024 at 8:32 AM             Meds:      mineral oil-white petrolatum   Both Eyes TID    cefepime  1 g Intravenous Q12H    aspirin  300 mg Rectal Daily    pantoprazole  40 mg Intravenous Daily    chlorhexidine gluconate  15 mL Mouth/Throat BID@0800,2000      NxStage Pure Flow 400 CRRT/PIRRT fluid 5000mL      adult 3 in 1 TPN      adult 3 in 1 TPN 50 mL/hr at 03/11/24 2140    dextrose 10%      norepinephrine Stopped (03/11/24 0625)    cangrelor ([place]) 50 mg in sodium chloride 0.9% 250 mL IVPB - BRIDGING/MAINTENANCE dose 0.75 mcg/kg/min (03/11/24 2203)    sodium bicarbonate 25mEq in dextrose 5% 1000mL IMPELLA purge solution 10 mL/hr (03/10/24 1705)    heparin Stopped (03/10/24 1224)     propofol 50 mcg/kg/min (03/12/24 0823)    fentanyl 75 mcg/hr (03/12/24 0722)     diazepam, dextrose 10%, heparin, fentaNYL **OR** fentaNYL, acetaminophen **OR** acetaminophen **OR** acetaminophen **OR** acetaminophen, polyethylene glycol (PEG 3350), senna, bisacodyl, fleet enema, fentaNYL (Sublimaze) **OR** fentaNYL (Sublimaze), fentanyl

## 2024-03-12 NOTE — PLAN OF CARE
Problem: RESPIRATORY - ADULT  Goal: Achieves optimal ventilation and oxygenation  Description: INTERVENTIONS:  - Assess for changes in respiratory status  - Assess for changes in mentation and behavior  - Position to facilitate oxygenation and minimize respiratory effort  - Oxygen supplementation based on oxygen saturation or ABGs  - Provide Smoking Cessation handout, if applicable  - Encourage broncho-pulmonary hygiene including cough, deep breathe, Incentive Spirometry  - Assess the need for suctioning and perform as needed  - Assess and instruct to report SOB or any respiratory difficulty  - Respiratory Therapy support as indicated  - Manage/alleviate anxiety  - Monitor for signs/symptoms of CO2 retention  Outcome: Progressing       Received patient on vent settings of AC/VC 15, , PEEP +5, FIO2 30%, ETT 7/22 @ the lip, ABG drawn at noon, results in the computer, pt with CRRT and impella, suctioned small amount of white thick secretions through ET and little bloody secretions from his mouth. No acute events/changes on this shift.       03/12/24 1715   Vent Information   Vent Mode VC/AC   Settings   FiO2 (%) 30 %   Resp Rate (Set) 15   Vt (Set, mL) 400 mL   Waveform Decelerating ramp   PEEP/CPAP (cm H2O) 5 cm H20   Peak Flow LPM 70   Trigger Sensitivity Flow (L/min) 2 L/min   Trigger Sensitivity Pressure (cm H2O) 3 cm H2O   Humidification Heater   H2O Bag Level 1/2 Full   Heater Temperature 98.6 °F (37 °C)   Readings   Total RR 15   Minute Ventilation (L/min) 6.2 L/min   Expiratory Tidal Volume 410 mL   PIP Observed (cm H2O) 25 cm H2O   MAP (cm H2O) 8   PEEP Low (cm H2O) 2 cm H2O   I/E Ratio 1:6.1   Plateau Pressure (cm H2O) 15 cm H2O   Static Compliance (L/cm H2O) 41   Dynamic Compliance (L/cm H2O) 18 L/cm H2O   Airway Resistance 21.5   Alarms   High RR 40   Insp Pressure High (cm H2O) 40 cm H2O   Insp Pressure Low (cm H2O) 8 cm H2O   MV High (L/min) 20 L/min   MV Low (L/min) 3 L/min   Apnea Interval (sec)  20 seconds   Apnea Rate 15   Apnea Volume (mL) 400 mL

## 2024-03-13 NOTE — PLAN OF CARE
Performed a neurological exam on patient during sedation vacation. Turned off propofol for 3 hrs from 1400 to 1700. Family at bedside. Pt has a cough, gag, and pupillary response.  Moves all extremities. Will respond to voice and tracks voices but does not follow commands.     Problem: NEUROLOGICAL - ADULT  Goal: Achieves stable or improved neurological status  Description: INTERVENTIONS  - Assess for and report changes in neurological status  - Initiate measures to prevent increased intracranial pressure  - Maintain blood pressure and fluid volume within ordered parameters to optimize cerebral perfusion and minimize risk of hemorrhage  - Monitor temperature, glucose, and sodium. Initiate appropriate interventions as ordered  Outcome: Progressing

## 2024-03-13 NOTE — SPIRITUAL CARE NOTE
Spiritual Care Visit Note    Patient Name: Cristiano Obregon Date of Spiritual Care Visit: 24   : 1970 Primary Dx: NSTEMI (non-ST elevated myocardial infarction) (HCC)       Referred By: Referral From: Nurse;Family    Spiritual Care Taxonomy:    Intended Effects: Demonstrate caring and concern    Methods: Offer support    Interventions: Active listening;Provide hospitality    Visit Type/Summary:    Writer met with family (cousins) at bedside to offered continued support. They shared they were doing well and hoping to have him wake up more on the wakening trial. Writer offered continued support which they appreciated and welcomed.    Spiritual Care support can be requested via an Epic consult. For urgent/immediate needs, please contact the On Call  at: [place]: ext [phone]    Keyona Buck MA, MA   Chaplain Resident  [facility], Spiritual Care Department   On-Call  via EPIC consult

## 2024-03-13 NOTE — PLAN OF CARE
This shift Cristiano switched into a different heart rhythm, a-fib, but his rate stayed less than 100bpm except when he was \"irritated\". Coughing and oral suctioning \"irritate\" him.     The Impella was maintained overnight per orders to keep it at P8 and the CRRT was maintained overnight but balanced with Cristiano's BP's.      This a.m. Reyes (spouse) was at bedside to hear bedside shift report (BSSR) and hear from Dr. Llanes and Dr. Camarena. The artial fibrillation (a-fib) was explained at that time for Reyes.    Problem: RESPIRATORY - ADULT  Goal: Achieves optimal ventilation and oxygenation  Description: INTERVENTIONS:  - Assess for changes in respiratory status  - Assess for changes in mentation and behavior  - Position to facilitate oxygenation and minimize respiratory effort  - Oxygen supplementation based on oxygen saturation or ABGs  - Provide Smoking Cessation handout, if applicable  - Encourage broncho-pulmonary hygiene including cough, deep breathe, Incentive Spirometry  - Assess the need for suctioning and perform as needed  - Assess and instruct to report SOB or any respiratory difficulty  - Respiratory Therapy support as indicated  - Manage/alleviate anxiety  - Monitor for signs/symptoms of CO2 retention  Outcome: Not Progressing     Problem: CARDIOVASCULAR - ADULT  Goal: Maintains optimal cardiac output and hemodynamic stability  Description: INTERVENTIONS:  - Monitor vital signs, rhythm, and trends  - Monitor for bleeding, hypotension and signs of decreased cardiac output  - Evaluate effectiveness of vasoactive medications to optimize hemodynamic stability  - Monitor arterial and/or venous puncture sites for bleeding and/or hematoma  - Assess quality of pulses, skin color and temperature  - Assess for signs of decreased coronary artery perfusion - ex. Angina  - Evaluate fluid balance, assess for edema, trend weights  Outcome: Progressing  Goal: Absence of cardiac arrhythmias or at baseline  Description:  INTERVENTIONS:  - Continuous cardiac monitoring, monitor vital signs, obtain 12 lead EKG if indicated  - Evaluate effectiveness of antiarrhythmic and heart rate control medications as ordered  - Initiate emergency measures for life threatening arrhythmias  - Monitor electrolytes and administer replacement therapy as ordered  Outcome: Not Progressing     Problem: METABOLIC/FLUID AND ELECTROLYTES - ADULT  Goal: Hemodynamic stability and optimal renal function maintained  Description: INTERVENTIONS:  - Monitor labs and assess for signs and symptoms of volume excess or deficit  - Monitor intake, output and patient weight  - Monitor urine specific gravity, serum osmolarity and serum sodium as indicated or ordered  - Monitor response to interventions for patient's volume status, including labs, urine output, blood pressure (other measures as available)  - Encourage oral intake as appropriate  - Instruct patient on fluid and nutrition restrictions as appropriate  Outcome: Progressing     Problem: SKIN/TISSUE INTEGRITY - ADULT  Goal: Skin integrity remains intact  Description: INTERVENTIONS  - Assess and document risk factors for pressure ulcer development  - Assess and document skin integrity  - Monitor for areas of redness and/or skin breakdown  - Initiate interventions, skin care algorithm/standards of care as needed  Outcome: Progressing  Goal: Oral mucous membranes remain intact  Description: INTERVENTIONS  - Assess oral mucosa and hygiene practices  - Implement preventative oral hygiene regimen  - Implement oral medicated treatments as ordered  Outcome: Not Progressing

## 2024-03-13 NOTE — PLAN OF CARE
Cristiano CHOCO Mindi Patient Status:  Inpatient    1970 MRN [id number]   Location [facility] 2W/SW Attending Bud Camarena MD   Hosp Day # 7 PCP Isauro Pina MD     Cardiology Nocturnal APN Plan of Care     Page Received:  RN [place]    Patient went into controlled rate AF.      Assessment/Plan:   AF  - Rate controlled  - Heparin for impella on hold since 3/10/24 due to high ACT  - Epistaxis when on heparin IV along with oral bleeding  - On cangrelor gtt  - Reviewed case with Dr. Crenshaw who is on call. Will not start heparin for AF at this time. MD rounding in am to address.        ADRIENNE Becerra  [facility]  3/12/2024  9:12 PM

## 2024-03-13 NOTE — PLAN OF CARE
Orders to stop cangrelor at 5 am and stop crrt at 6 am before CABG, 1 unit PRBC, sedation vacation, pt moves all extremities and tracks voice, does not follow commands       Problem: RESPIRATORY - ADULT  Goal: Achieves optimal ventilation and oxygenation  Description: INTERVENTIONS:  - Assess for changes in respiratory status  - Assess for changes in mentation and behavior  - Position to facilitate oxygenation and minimize respiratory effort  - Oxygen supplementation based on oxygen saturation or ABGs  - Provide Smoking Cessation handout, if applicable  - Encourage broncho-pulmonary hygiene including cough, deep breathe, Incentive Spirometry  - Assess the need for suctioning and perform as needed  - Assess and instruct to report SOB or any respiratory difficulty  - Respiratory Therapy support as indicated  - Manage/alleviate anxiety  - Monitor for signs/symptoms of CO2 retention  Outcome: Progressing     Problem: NEUROLOGICAL - ADULT  Goal: Achieves stable or improved neurological status  Description: INTERVENTIONS  - Assess for and report changes in neurological status  - Initiate measures to prevent increased intracranial pressure  - Maintain blood pressure and fluid volume within ordered parameters to optimize cerebral perfusion and minimize risk of hemorrhage  - Monitor temperature, glucose, and sodium. Initiate appropriate interventions as ordered  3/13/2024 1858 by Darrel Wick, RN  Outcome: Progressing  3/13/2024 1801 by Darrel Wick, RN  Outcome: Progressing

## 2024-03-13 NOTE — PROGRESS NOTES
Progress Note  Cristiano [facility]COO Estebanadrien Patient Status:  Inpatient    1970 MRN [id number]   Location [facility] 2W/SW Attending Bud Camarena MD   Hosp Day # 8 PCP Isauro Pina MD     Subjective:  Intubated and sedated    Objective:  BP 90/59 (BP Location: Left arm)   Pulse 88   Temp 99.2 °F (37.3 °C) (Pulmonary Artery)   Resp 16   Ht 5' 7\" (1.702 m)   Wt 186 lb 4.6 oz (84.5 kg)   SpO2 100%   BMI 29.18 kg/m²     Telemetry: atrial fibrillation, HR 68      Intake/Output:    Intake/Output Summary (Last 24 hours) at 3/13/2024 1242  Last data filed at 3/13/2024 1200  Gross per 24 hour   Intake 3689.48 ml   Output 2531 ml   Net 1158.48 ml       Last 3 Weights   24 1000 186 lb 4.6 oz (84.5 kg)   24 1000 192 lb 10.9 oz (87.4 kg)   24 0500 188 lb 15 oz (85.7 kg)   24 0600 185 lb 13.6 oz (84.3 kg)   24 0600 183 lb 3.2 oz (83.1 kg)   24 0500 180 lb 1.9 oz (81.7 kg)   24 1324 175 lb 7.8 oz (79.6 kg)   24 0722 160 lb (72.6 kg)   10/06/21 1541 177 lb 6.4 oz (80.5 kg)   20 0929 172 lb 9.6 oz (78.3 kg)       Labs:  Recent Labs   Lab 24  0539 24  1639 24  0504   *  138* 124* 127*  127*   BUN 28*  28* 30* 34*  34*   CREATSERUM 3.06*  3.06* 3.01* 3.03*  3.03*   EGFRCR 23*  23* 24* 24*  24*   CA 8.9  8.9 8.8 8.6*  8.6*   *  133* 134* 138  138   K 5.0  5.0 4.3 4.5  4.5     108 106 105  105   CO2 26.0  26.0 25.0 26.0  26.0     Recent Labs   Lab 24  0405 24  0539 24  0504   RBC 2.74* 2.81* 2.66*   HGB 8.1* 8.3* 7.9*   HCT 25.0* 25.2* 23.4*   MCV 91.2 89.7 88.0   MCH 29.6 29.5 29.7   MCHC 32.4 32.9 33.8   RDW 14.0 14.1 14.1   NEPRELIM 3.93 6.09 5.86   WBC 5.7 8.3 9.0   PLT 45.0* 71.0* 79.0*         Recent Labs   Lab 24  1204   TROPHS 1,140,512*     Lab Results   Component Value Date    INR 1.09 2024    INR 1.06 2024       Diagnostics:       ROS  Unable to assess    Physical  Exam:    Physical Exam  Constitutional:       Comments: Sedated and interrogated   Cardiovascular:      Rate and Rhythm: Normal rate. Rhythm irregularly irregular.      Pulses:           Dorsalis pedis pulses are 2+ on the right side and 2+ on the left side.      Heart sounds: S1 normal and S2 normal.      No friction rub. No gallop.      Comments: Mild edema to BLE  Pulmonary:      Breath sounds: Normal breath sounds.      Comments: intubated  Abdominal:      General: Abdomen is flat.      Palpations: Abdomen is soft.         Medications:   sodium chloride   Intravenous Once    mupirocin  1 Application Nasal Now then every 0500    [START ON 3/14/2024] ceFAZolin  2 g Intravenous On Call    mineral oil-white petrolatum   Both Eyes TID    cefepime  1 g Intravenous Q12H    aspirin  300 mg Rectal Daily    pantoprazole  40 mg Intravenous Daily    chlorhexidine gluconate  15 mL Mouth/Throat BID@0800,2000      adult 3 in 1 TPN      NxStage Pure Flow 400 CRRT/PIRRT fluid 5000mL 2.5 L/hr (03/13/24 0734)    adult 3 in 1 TPN 50 mL/hr at 03/12/24 2155    dextrose 10%      cangrelor (Kengreal) 50 mg in sodium chloride 0.9% 250 mL IVPB - BRIDGING/MAINTENANCE dose 0.75 mcg/kg/min (03/13/24 0823)    sodium bicarbonate 25mEq in dextrose 5% 1000mL IMPELLA purge solution 13 mL/hr (03/12/24 1209)    propofol 45 mcg/kg/min (03/13/24 1200)    fentanyl 75 mcg/hr (03/13/24 0822)       Assessment/Plan:    NSTEMI  Multivessel CAD s/p balloon angioplasty proximal circumflex 3/5/24  -s/p placement of impella   - s/p RHC on 3/6/2024 with normal hemodynamics, low to normal filling pressure  - Echo from 3/11 with LVEF 50-55% with regional wall motion abnormalities   - on ASA rectal, IV cangrelor,   - heparin has been on hold with  and platelet count of 79  - CI of 3.5  - hgb of 7.9- received 1unit of bowel movement    NORMA  --anuric  - on CRRT since 3/7    Acute hypoxic respiratory failure  - currently on vent support  -Pulmonary managing      Plan:  - Continue on impella support at P8  - plan for CABG tomorrow    ADRIENNE Olmstead  [facility]  3/13/2024  12:42 PM    Cardiologist Addendum:  Cristiano Obregon was seen and examined independently and I agree with the above documentation provided by BALJIT Goff.  Stable hemodynamics, Hgb low with no obvious source of bleed. Need to consider hemolysis from impella vs DIC. Heparin off given thrombocytopenia, ACT at goal. Recommend DIC panel, HIT panel pending. Planning for CABG tomorrow. [place] and invasive BP reviewed.    L3    Zander Cano DO  [facility]   Interventional Cardiac and Vascular Services    03/13/24  2:07 PM

## 2024-03-13 NOTE — PROGRESS NOTES
Pt newly in A-fib, confirmed with EKG. Informed [place] APRN on-call. Pt's rate is controlled (when he isn't agitated) and ACT is 184. Discussing with on-call APRN best plan of care. Meanwhile will minimize stimulation to Cristiano.

## 2024-03-13 NOTE — PROGRESS NOTES
NEPHROLOGY DAILY PROGRESS NOTE       SUBJECTIVE:  Remains anuric  CRRT on going   Went into afib overnight   and cousin at bedside     OBJECTIVE:    Total Intake/Output:    Intake/Output Summary (Last 24 hours) at 3/13/2024 0831  Last data filed at 3/13/2024 0822  Gross per 24 hour   Intake 3208.3 ml   Output 2331 ml   Net 877.3 ml       PHYSICAL EXAM:  BP (!) 87/67 (BP Location: Left arm)   Pulse 94   Temp 99.5 °F (37.5 °C) (Pulmonary Artery)   Resp 15   Ht 5' 7\" (1.702 m)   Wt 186 lb 4.6 oz (84.5 kg)   SpO2 100%   BMI 29.18 kg/m²   GEN: Intubated, sedated   HEENT: ETT in place   CHEST: mechanical breath sounds   CARDIAC: S1S2 normal  ABD: soft, NT/ND  : lujan in place   EXT:  trace upper and lower ext edema noted   NEURO: sedated   ACCESS: RIJ temp HD cath (3/7)      CURRENT MEDICATIONS:     sodium chloride   Intravenous Once    mineral oil-white petrolatum   Both Eyes TID    cefepime  1 g Intravenous Q12H    aspirin  300 mg Rectal Daily    pantoprazole  40 mg Intravenous Daily    chlorhexidine gluconate  15 mL Mouth/Throat BID@0800,2000         LABS:  Patient Labs Reviewed in Detail. Pertinent Labs as follows:  Recent Labs   Lab 03/12/24  0539 03/12/24  1639 03/13/24  0504   *  138* 124* 127*  127*   BUN 28*  28* 30* 34*  34*   CREATSERUM 3.06*  3.06* 3.01* 3.03*  3.03*   EGFRCR 23*  23* 24* 24*  24*   CA 8.9  8.9 8.8 8.6*  8.6*   *  133* 134* 138  138   K 5.0  5.0 4.3 4.5  4.5     108 106 105  105   CO2 26.0  26.0 25.0 26.0  26.0     Recent Labs   Lab 03/11/24  0405 03/12/24  0539 03/13/24  0504   RBC 2.74* 2.81* 2.66*   HGB 8.1* 8.3* 7.9*   HCT 25.0* 25.2* 23.4*   MCV 91.2 89.7 88.0   MCH 29.6 29.5 29.7   MCHC 32.4 32.9 33.8   RDW 14.0 14.1 14.1   NEPRELIM 3.93 6.09 5.86   WBC 5.7 8.3 9.0   PLT 45.0* 71.0* 79.0*         IMAGING:  XR CHEST AP PORTABLE  (CPT=71045)    Result Date: 3/12/2024  CONCLUSION:  1. Cardiomegaly pulmonary venous distention. 2. Support  tubes and catheters are satisfactory.  No pneumothorax 3. Mild perihilar and basilar congestive changes worse on the right has progressed.  4. Small right-sided effusion.    Dictated by (CST): Wily Stephenson MD on 3/12/2024 at 8:51 AM     Finalized by (CST): Wily Stephenson MD on 3/12/2024 at 8:56 AM            ASSESSMENT AND PLAN:   This is a 53 year old male with PMH sig for HLD, tobacco use. Presented with chest pain and was admitted for NSTEMI.  Nephrology is consulted for NORMA      Anuric NORMA  - creatinine 1.23 on admission, worsened to 7.19 (3/7)  - renal US neg for hydronephrosis    - UA: + 100 protein, 6-10 RBCs, + ketones, few squamous epi cells.   - suspect due to cardiogenic shock / ATN  - initiated on CVVH on 3/7 via RIJ temp HD cath   - continue CRRT, keep net even   - renal panel and Mg every 12 hours    - CRRT to be stopped prior to OR tomorrow AM with plans to resume post OR    - monitor for renal recovery.   - follow renal fxn and I/Os   - renally dose meds      Hyperkalemia   - managed with CRRT      Metabolic acidosis  - resolved with CRRT     Hyponatremia  - resolved     NSTEMI   - per cards / CV surgery      Acute respiratory failure  - vent management per ICU      Anemia   - blood transfusions per primary / ICU       Olayinka RN    updated at bedside      ADDENDUM: evening CRRT labs reviewed. K 3.7, will change to 4K bags. Repeat labs in AM. Olayinka RN.    We will continue to follow Cristiano Obregon    Critical care time > 35 mins     Janet Velazco MD  [facility] - Nephrology

## 2024-03-13 NOTE — PROGRESS NOTES
Misc. Note    Misc. Note    Pt seen at bedside this AM w/CV Surgeon: Dr. Llanes. Patient's , Reyes, also at bedside. Pt converted to AF w/rate controlled early this morning. Including MAZE & KIN CLIP to consent: discussed w/Reyes-new consent signed. He has no further questions. Plan of care discussed w/PATEL and Reyes. Transfusing one unit PRBCs HIPA sent=negative. Cangrelor to be stopped at tomorrow at 5am and TPN at 6am. Orders entered. ACT=174.

## 2024-03-13 NOTE — PROGRESS NOTES
Received call from Representative from [facility], checking on Impella function and answering questions about Impella function. Was assured that the [phone] is staffed 24/7.

## 2024-03-13 NOTE — PROGRESS NOTES
Critical Care Progress Note     Assessment / Plan:  Acute respiratory failure - intubated in cath lab  - continue mechanical ventilation with full support, PEEP 5, FiO2 30%, mechanics are good  - ABG q12 hours  - fentanyl and propofol for analgesia and sedation, fentanyl pushes as needed  - hold SBT pending CABG thursday  NSTEMI - diagnosed with new multivessel CAD s/p angioplasty and cardiogenic shock requiring impella placement.   - ASA and cangrelor  - heparin gtt, has been held due to high ACT per cardiology  - repeat echo with good LV function and RHC with good filling pressures  - plan is now to continue impella to bridge to CABG tomorrow  - cardiology and cardiac surgery following  Cardiogenic shock - due to acute coronary syndrome, EF was down and has now improved on repeat echo  - continue impella, weaning per cardiology  - vasopressors as needed, currently off  Afib  - rates controlled  - per cardiology  Pneumonia - HCAP at high risk for GNR, fever and RML infiltrate  - fever curve improved  - sputum cultures with klebsiella and MSSA  - MRSA swab was negative  - continue cefepime (3/10-)  - blood cultures NGTD  Acute renal failure - suspect from ATN and cardiogenic shock  - CRRT per nephrology  Thrombocytopenia, anemia - due to impella and CRRT, low probability for HIT by 4T score  - transfuse for plt <50  - keep Hb >8  FEN/GI  - TPN  Tobacco Abuse  - strongly advise cessation  Proph  - heparin gtt  - pepcid  Dispo  - ICU, will follow    Discussed with family at bedside and RN    Critical care time: 36 minutes    Farooq Olvera MD  Pulmonary & Critical Care Medicine  [facility] beds [facility]      Subjective:  Afib with controlled rates overnight  Remains intubated with impella    Objective:  Vitals:    03/13/24 0500 03/13/24 0530 03/13/24 0600 03/13/24 0700   BP:   (!) 87/67    BP Location:   Left arm    Pulse: 99 92 94 94   Resp: 21 15 15 15   Temp:  99.9 °F (37.7 °C) 99.5 °F (37.5 °C) 99.5 °F (37.5 °C)    TempSrc:  Pulmonary Artery Pulmonary Artery Pulmonary Artery   SpO2: 100% 100% 100% 100%   Weight:       Height:         Physical Exam:  General: laying in bed intubated  Respiratory: clear bilaterally, compliant with ventilator  Cardiovascular: regular rate and rhythm, impella hum, no appreciable murmurs  Abdomen: soft, NTND  Extremities: no LE edema  Mental status: sedated    Medications:  Reviewed in EMR    Lab Data:  Reviewed in EMR    Imaging:  I independently visualized all relevant chest imaging in PACS and agree with radiology interpretation except where noted.

## 2024-03-13 NOTE — PROGRESS NOTES
[facility] Hospitalist Progress Note     CC: Hospital Follow up    PCP: Isauro Pina MD       Assessment/Plan:   Mr. Obregon is a 53-year-old male with a history of hyperlipidemia, tobacco use, who presents to the hospital for evaluation of acute chest pain.  STEMI with cardiogenic shock requiring Impella placement.  Continuing ventilation support with CRRT.  Planning CABG on Thursday      STEMI  Cardiogenic shock   -Multivessel disease including chronically occluded large RCA, 95% sequential LAD stenosis on cath 3/5  -flow restored to heavily thrombosed circumflex and OM1  -Impella/balloon pump per cardiology  -taken to cath again emergently on 3/6/24, for right heart cath to accurately obtain hemodynamics. Worsening renal function noted.   -trialed IV hydration, renal function worsening----started on CRRT on 3/7  -IV cangrelor  -aspirin 81mg  -cardiology, nephrology, CV surgery and critical care on consult   -vent management and sedation per critical care  -CABG planned for 3/14    Acute kidney injury  Anuric   -suspect cardiogenic shock  -component of ATI  -decreased urine output   -start CRRT on 3/7. Patient is toleratring   -Off pressor support    Hematuria, resolved  -yellow urine noted, minimal, but not bloody      Acute hypoxic respiratory failure  -currently on vent, monitor oxygenation   -critical care consulted  -continue full vent support   -maintaining FiO2 30%    HCAP  -Low-grade fevers  -Sputum with Klebsiella and staph  -Continue cefepime     Diet: PPN to TPN   DVT prophylaxis: on heparin drip   Code status: FULL    Discussed with  at bedside   Discussed with RN     Disposition: ICU     Bud Camarena MD  [facility] Hospitalist        Subjective:     Intubated, sedated    and at bedside     OBJECTIVE:    Blood pressure (!) 87/67, pulse 94, temperature 99.5 °F (37.5 °C), temperature source Pulmonary Artery, resp. rate 15, height 5' 7\" (1.702 m), weight 186 lb 4.6 oz  (84.5 kg), SpO2 100%.    Temp:  [98.9 °F (37.2 °C)-100.6 °F (38.1 °C)] 99.5 °F (37.5 °C)  Pulse:  [] 94  Resp:  [15-21] 15  BP: ()/(61-83) 87/67  SpO2:  [97 %-100 %] 100 %  AO: ()/(58-95) 105/66  FiO2 (%):  [30 %] 30 %      Intake/Output:    Intake/Output Summary (Last 24 hours) at 3/13/2024 0756  Last data filed at 3/13/2024 0752  Gross per 24 hour   Intake 3307.25 ml   Output 2467 ml   Net 840.25 ml       Last 3 Weights   03/12/24 1000 186 lb 4.6 oz (84.5 kg)   03/11/24 1000 192 lb 10.9 oz (87.4 kg)   03/09/24 0500 188 lb 15 oz (85.7 kg)   03/08/24 0600 185 lb 13.6 oz (84.3 kg)   03/07/24 0600 183 lb 3.2 oz (83.1 kg)   03/06/24 0500 180 lb 1.9 oz (81.7 kg)   03/05/24 1324 175 lb 7.8 oz (79.6 kg)   03/05/24 0722 160 lb (72.6 kg)   10/06/21 1541 177 lb 6.4 oz (80.5 kg)   09/09/20 0929 172 lb 9.6 oz (78.3 kg)       BP (!) 87/67 (BP Location: Left arm)   Pulse 94   Temp 99.5 °F (37.5 °C) (Pulmonary Artery)   Resp 15   Ht 5' 7\" (1.702 m)   Wt 186 lb 4.6 oz (84.5 kg)   SpO2 100%   BMI 29.18 kg/m²   General: intubated, sedated  HEENT: Cortis, ET tube intact  Lungs: mechanical breath sounds   Heart: Regular rate and rhythm, S1, S2   Abdomen: soft  Extremities: mild upper/lower ext edema  Skin: normal color    Data Review:       Labs:     Recent Labs   Lab 03/11/24  0405 03/12/24  0539 03/13/24  0504   RBC 2.74* 2.81* 2.66*   HGB 8.1* 8.3* 7.9*   HCT 25.0* 25.2* 23.4*   MCV 91.2 89.7 88.0   MCH 29.6 29.5 29.7   MCHC 32.4 32.9 33.8   RDW 14.0 14.1 14.1   NEPRELIM 3.93 6.09 5.86   WBC 5.7 8.3 9.0   PLT 45.0* 71.0* 79.0*         Recent Labs   Lab 03/12/24  0539 03/12/24  1639 03/13/24  0504   *  138* 124* 127*  127*   BUN 28*  28* 30* 34*  34*   CREATSERUM 3.06*  3.06* 3.01* 3.03*  3.03*   EGFRCR 23*  23* 24* 24*  24*   CA 8.9  8.9 8.8 8.6*  8.6*   *  133* 134* 138  138   K 5.0  5.0 4.3 4.5  4.5     108 106 105  105   CO2 26.0  26.0 25.0 26.0  26.0       Recent  Labs   Lab 03/10/24  0405 03/10/24  1802 03/11/24  0405 03/11/24  1700 03/12/24  0539 03/12/24  1639 03/13/24  0504   ALT 56*  --   --   --  78*  --  69*   *  --   --   --  103*  --  89*   ALB 2.9*  2.9*   < > 2.9* 3.0* 3.1*  3.1* 3.1* 2.9*  2.9*    < > = values in this interval not displayed.         Imaging:  XR CHEST AP PORTABLE  (CPT=71045)    Result Date: 3/12/2024  CONCLUSION:  1. Cardiomegaly pulmonary venous distention. 2. Support tubes and catheters are satisfactory.  No pneumothorax 3. Mild perihilar and basilar congestive changes worse on the right has progressed.  4. Small right-sided effusion.    Dictated by (CST): Wily Stephenson MD on 3/12/2024 at 8:51 AM     Finalized by (CST): Wily Stephenson MD on 3/12/2024 at 8:56 AM          XR CHEST AP PORTABLE  (CPT=71045)    Result Date: 3/11/2024  CONCLUSION:  1. Heart size upper limits of normal to mildly enlarged, and there is mild interstitial edema suggesting mild cardiac failure/fluid overload.  ET tube in the trachea at the level of the clavicles.  Impella device in profile with the left ventricle.  Ellsworth-Ophelia catheter in the descending left pulmonary artery.  No pneumothorax.  Small right pleural effusion suggested.    Dictated by (CST): Moisés Delgadillo MD on 3/11/2024 at 8:29 AM     Finalized by (CST): Moisés Delgadillo MD on 3/11/2024 at 8:32 AM             Meds:      sodium chloride   Intravenous Once    mineral oil-white petrolatum   Both Eyes TID    cefepime  1 g Intravenous Q12H    aspirin  300 mg Rectal Daily    pantoprazole  40 mg Intravenous Daily    chlorhexidine gluconate  15 mL Mouth/Throat BID@0800,2000      NxStage Pure Flow 400 CRRT/PIRRT fluid 5000mL 2.5 L/hr (03/13/24 0734)    adult 3 in 1 TPN 50 mL/hr at 03/12/24 2155    dextrose 10%      norepinephrine Stopped (03/11/24 0625)    cangrelor (Kengreal) 50 mg in sodium chloride 0.9% 250 mL IVPB - BRIDGING/MAINTENANCE dose 0.75 mcg/kg/min (03/13/24 4062)    sodium bicarbonate  25mEq in dextrose 5% 1000mL IMPELLA purge solution 13 mL/hr (03/12/24 1209)    propofol 50 mcg/kg/min (03/13/24 6393)    fentanyl 75 mcg/hr (03/12/24 4736)     diazepam, dextrose 10%, heparin, fentaNYL **OR** fentaNYL, acetaminophen **OR** acetaminophen **OR** acetaminophen **OR** acetaminophen, polyethylene glycol (PEG 3350), senna, bisacodyl, fleet enema, fentaNYL (Sublimaze) **OR** fentaNYL (Sublimaze), fentanyl

## 2024-03-13 NOTE — PROGRESS NOTES
CV surgery progress note    Patient seen and examined.  On slight sedation holiday today, patient moves all extremities and appears to track to voice, but per nursing, does not appear to be following commands reliably.    Impella remains at P8, hemodynamics appropriate with index over 2, PA pressures in the low 40s.  No inotropes or vasopressors.  CRRT continues    Plan:  Proceed with surgical coronary vascularization tomorrow at 7 AM.  Stop cangrelor 2 hours prior to surgery, 5 AM  Stop CVVH 6 AM, on-call to the operating room.  Will plan to resume postoperatively  1 unit of blood today given hemoglobin less than 8.  Will keep Impella and at this time.  Will reassess for Impella continuation versus discontinuation in the operating room after bypass versus transitioning to balloon pump versus no need for further mechanical support.    The patient's partner at bedside, I again discussed the operation, median sternotomy, use of cardiopulmonary bypass machine, and cardiac arrest.  I explained the bypass operation, conduit selection, long term patency rates of mammary artery and reverse saphenous vein.  We discussed benefits. We discussed risks including but not limited to: bleeding, coagulopathy, transfusion (to which he agrees to accept after discussing risks of transfusion), infection, sternal dehiscence, prolonged ventilation, myocardial infarction, stroke, arrhythmia, atrial fibrillation, kidney injury, dialysis, multisystem organ dysfunction, imponderables and death.  Patient is a high risk candidate given his presentation and hospital course however I believe this represents the best option for treatment of his acute disease.    Questions answered to the patient's partner's satisfaction.    Richie Llanes MD  Cardiothoracic Surgery  [facility]

## 2024-03-13 NOTE — PROGRESS NOTES
03/13/24 0348   Vent Information   $ RT Standby Charge (per 15 min) 1   $ Vent Care / Non-Invasive Subsequent Day Yes   Is this patient on Chronic Ventilation? No   Vent Initiation Date 03/05/24   Ventilation Day(s) 9   Interface Invasive   Vent Type AV   Vent plugged into main power? Yes   Vent ID [id number]   Vent Mode VC/AC   Settings   FiO2 (%) 30 %   Resp Rate (Set) 15   Vt (Set, mL) 400 mL   Waveform Decelerating ramp   PEEP/CPAP (cm H2O) 5 cm H20   Peak Flow LPM 60   Trigger Sensitivity Flow (L/min) 2 L/min   Humidification Heater   H2O Bag Level 1/4 Full   Heater Temperature 98.6 °F (37 °C)   Readings   Total RR 15   Minute Ventilation (L/min) 6.1 L/min   Expiratory Tidal Volume 410 mL   PIP Observed (cm H2O) 21 cm H2O   MAP (cm H2O) 8   I/E Ratio 1:6.2   Plateau Pressure (cm H2O) 13 cm H2O   Alarms   High RR 40   Insp Pressure High (cm H2O) 40 cm H2O   Insp Pressure Low (cm H2O) 8 cm H2O   MV High (L/min) 20 L/min   MV Low (L/min) 3 L/min   Apnea Interval (sec) 20 seconds   ETT   Placement Date: 03/05/24   Airway Size: 7 mm  Placed By: Anesthesiologist   Secured at (cm) 22 cm   Suctioned? N   Measured From Lips   Secured Location Center   Secured by Commercial tube gunderson   Site Condition Atraumatic   Req'd equipment at bedside Bag mask     ABGs done this shift and resulted in EPIC. No events this shift

## 2024-03-14 NOTE — PLAN OF CARE
Received patient intubated and sedated.  Moves all extremities, unable to follow commands.  Scheduled surgical procedure moved to tomorrow, family aware.  CRRT in place, net even for goal.  [place] in place, little to no urine this shift.  Plan for surgery tomorrow.  Problem: RESPIRATORY - ADULT  Goal: Achieves optimal ventilation and oxygenation  Description: INTERVENTIONS:  - Assess for changes in respiratory status  - Assess for changes in mentation and behavior  - Position to facilitate oxygenation and minimize respiratory effort  - Oxygen supplementation based on oxygen saturation or ABGs  - Provide Smoking Cessation handout, if applicable  - Encourage broncho-pulmonary hygiene including cough, deep breathe, Incentive Spirometry  - Assess the need for suctioning and perform as needed  - Assess and instruct to report SOB or any respiratory difficulty  - Respiratory Therapy support as indicated  - Manage/alleviate anxiety  - Monitor for signs/symptoms of CO2 retention  Outcome: Progressing     Problem: CARDIOVASCULAR - ADULT  Goal: Maintains optimal cardiac output and hemodynamic stability  Description: INTERVENTIONS:  - Monitor vital signs, rhythm, and trends  - Monitor for bleeding, hypotension and signs of decreased cardiac output  - Evaluate effectiveness of vasoactive medications to optimize hemodynamic stability  - Monitor arterial and/or venous puncture sites for bleeding and/or hematoma  - Assess quality of pulses, skin color and temperature  - Assess for signs of decreased coronary artery perfusion - ex. Angina  - Evaluate fluid balance, assess for edema, trend weights  Outcome: Progressing  Goal: Absence of cardiac arrhythmias or at baseline  Description: INTERVENTIONS:  - Continuous cardiac monitoring, monitor vital signs, obtain 12 lead EKG if indicated  - Evaluate effectiveness of antiarrhythmic and heart rate control medications as ordered  - Initiate emergency measures for life threatening  arrhythmias  - Monitor electrolytes and administer replacement therapy as ordered  Outcome: Progressing     Problem: METABOLIC/FLUID AND ELECTROLYTES - ADULT  Goal: Hemodynamic stability and optimal renal function maintained  Description: INTERVENTIONS:  - Monitor labs and assess for signs and symptoms of volume excess or deficit  - Monitor intake, output and patient weight  - Monitor urine specific gravity, serum osmolarity and serum sodium as indicated or ordered  - Monitor response to interventions for patient's volume status, including labs, urine output, blood pressure (other measures as available)  - Encourage oral intake as appropriate  - Instruct patient on fluid and nutrition restrictions as appropriate  Outcome: Progressing     Problem: SKIN/TISSUE INTEGRITY - ADULT  Goal: Skin integrity remains intact  Description: INTERVENTIONS  - Assess and document risk factors for pressure ulcer development  - Assess and document skin integrity  - Monitor for areas of redness and/or skin breakdown  - Initiate interventions, skin care algorithm/standards of care as needed  Outcome: Progressing  Goal: Incision(s), wounds(s) or drain site(s) healing without S/S of infection  Description: INTERVENTIONS:  - Assess and document risk factors for pressure ulcer development  - Assess and document skin integrity  - Assess and document dressing/incision, wound bed, drain sites and surrounding tissue  - Implement wound care per orders  - Initiate isolation precautions as appropriate  - Initiate Pressure Ulcer prevention bundle as indicated  Outcome: Progressing  Goal: Oral mucous membranes remain intact  Description: INTERVENTIONS  - Assess oral mucosa and hygiene practices  - Implement preventative oral hygiene regimen  - Implement oral medicated treatments as ordered  Outcome: Progressing     Problem: NEUROLOGICAL - ADULT  Goal: Achieves stable or improved neurological status  Description: INTERVENTIONS  - Assess for and report  changes in neurological status  - Initiate measures to prevent increased intracranial pressure  - Maintain blood pressure and fluid volume within ordered parameters to optimize cerebral perfusion and minimize risk of hemorrhage  - Monitor temperature, glucose, and sodium. Initiate appropriate interventions as ordered  Outcome: Progressing     Problem: Delirium  Goal: Minimize duration of delirium  Description: Interventions:  - Encourage use of hearing aids, eye glasses  - Promote highest level of mobility daily  - Provide frequent reorientation  - Promote wakefulness i.e. lights on, blinds open  - Promote sleep, encourage patient's normal rest cycle i.e. lights off, TV off, minimize noise and interruptions  - Encourage family to assist in orientation and promotion of home routines  Outcome: Progressing

## 2024-03-14 NOTE — CM/SW NOTE
Surgery postponed until 3/15/24 as physician is not available today for procedure.    Once surgery is completed, CM will begin HH referrals.  Has home support.    / to remain available for support and/or discharge planning.      Mary Jane GAYTANA BSN RN CRRN   RN Case Manager  [phone]

## 2024-03-14 NOTE — CONSULTS
[facility]  part of [facility] Infectious Disease Consult    Cristiano Obregon Patient Status:  Inpatient    1970 MRN [id number]   Location [facility] 2W/SW Attending Bud Camarena MD   Hosp Day # 9 PCP Isauro Pina MD     Reason for Consultation:  To help with infection and treatment, Requested by Dr. Camarena,     History of Present Illness:  Cristiano Obregon is a 53-year-old male admitted to [facility] on 3/05 with Acute Chest pain,   Significant hx of   - Tobacco use,  Who came to ER for Chest pain that developed during work out, got diagnosed with acute MI. He had emergent Cath found to have multiple occluded vasculature, S/P Impella/Balloon pump, got intubated post Cath, now in ICU.  Plan is for CABG, hopefully in am,   Patient had fevers starting from 3/10, Infection work up with Sputum cul growing Kleb and MSSA, Started on IV Cefepime,   Multiple lines in place, including Trialysis, [place], Groin line,   ID is now asked to help with infection and treatment,     History:  History reviewed. No pertinent past medical history.  Past Surgical History:   Procedure Laterality Date    COLONOSCOPY N/A 2017    Procedure: COLONOSCOPY, POSSIBLE BIOPSY, POSSIBLE POLYPECTOMY 67704;  Surgeon: Byron Plaza MD;  [place] [facility] [place] [facility]     History reviewed. No pertinent family history.   reports that he has been smoking cigarettes. He has never used smokeless tobacco. He reports that he does not drink alcohol and does not use drugs.    Allergies:  No Known Allergies    Medications:    Current Facility-Administered Medications:     cangrelor (Kengreal) 50 mg in sodium chloride 0.9% 250 mL IVPB - BRIDGING/MAINTENANCE dose, 0.75 mcg/kg/min, Intravenous, Continuous    sodium chloride 0.9% infusion, , Intravenous, Once    ceFAZolin (Ancef) 2 g in 20mL IV syringe premix, 2 g, Intravenous, On Call    adult 3 in 1 TPN, , Intravenous, Continuous TPN    NxStage Pure Flow 401  CRRT/PIRRT fluid 5000mL, 2.5 L/hr, CRRT, Continuous **AND** CRRT Replacement Fluid 400, , , Until Discontinued    mineral oil-white petrolatum (Artificial Tears) 83-15 % ophthalmic ointment, , Both Eyes, TID    diazepam (Valium) 5 mg/mL injection 2.5 mg, 2.5 mg, Intravenous, Q4H PRN    ceFEPIme (Maxipime) 1 g in sodium chloride 0.9% 100 mL IVPB-MBP, 1 g, Intravenous, Q12H    dextrose 10% infusion (TPN no rate), , Intravenous, Continuous PRN    heparin (Porcine) 1000 UNIT/ML injection 1,500 Units, 1.5 mL, Intracatheter, PRN    aspirin 300 MG rectal suppository 300 mg, 300 mg, Rectal, Daily    pantoprazole (Protonix) 40 mg in sodium chloride 0.9% PF 10 mL IV push, 40 mg, Intravenous, Daily    sodium bicarbonate 25mEq in dextrose 5% 1000mL IMPELLA purge solution, 3-30 mL/hr, Intravenous, Continuous    fentaNYL (Sublimaze) 50 mcg/mL injection 25 mcg, 25 mcg, Intravenous, Q30 Min PRN **OR** fentaNYL (Sublimaze) 50 mcg/mL injection 50 mcg, 50 mcg, Intravenous, Q30 Min PRN    acetaminophen (Tylenol) tab 650 mg, 650 mg, Oral, Q4H PRN **OR** acetaminophen (Tylenol) 160 MG/5ML oral liquid 650 mg, 650 mg, Oral, Q4H PRN **OR** acetaminophen (Tylenol) rectal suppository 650 mg, 650 mg, Rectal, Q4H PRN **OR** acetaminophen (Ofirmev) 10 mg/mL infusion premix 1,000 mg, 1,000 mg, Intravenous, Q6H PRN    polyethylene glycol (PEG 3350) (Miralax) 17 g oral packet 17 g, 17 g, Oral, Daily PRN    senna (Senokot) 8.8 MG/5ML oral syrup 17.6 mg, 10 mL, Oral, Nightly PRN    bisacodyl (Dulcolax) 10 MG rectal suppository 10 mg, 10 mg, Rectal, Daily PRN    fleet enema (Fleet) 7-19 GM/118ML rectal enema 133 mL, 1 enema, Rectal, Once PRN    chlorhexidine gluconate (Peridex) 0.12 % oral solution 15 mL, 15 mL, Mouth/Throat, BID@0800,2000    propofol (Diprivan) 10 mg/mL infusion premix, 5-50 mcg/kg/min, Intravenous, Continuous    fentaNYL (Sublimaze) 25 mcg BOLUS FROM BAG infusion, 25 mcg, Intravenous, Q30 Min PRN **OR** fentaNYL (Sublimaze) 50 mcg  BOLUS FROM BAG infusion, 50 mcg, Intravenous, Q30 Min PRN    fentaNYL in sodium chloride 0.9% (Sublimaze) 1000 mcg/100mL infusion premix,  mcg/hr, Intravenous, Continuous PRN    Review of Systems:   Constitutional: Negative for anorexia, chills, fatigue, fevers, malaise, night sweats and weight loss.  Eyes: Negative for visual disturbance, irritation and redness.  Ears, nose, mouth, throat, and face: Negative for hearing loss, tinnitus, nasal congestion, snoring, sore throat, hoarseness and voice change.  Respiratory: Negative for cough, sputum, hemoptysis, chest pain, wheezing, dyspnea on exertion, or stridor.  Cardiovascular: Negative for chest pain, palpitations, irregular heart beats, syncope, fatigue, orthopnea, paroxysmal nocturnal dyspnea, lower extremity edema.  Gastrointestinal: Negative for dysphagia, odynophagia, reflux symptoms, nausea, vomiting, change in bowel habits, diarrhea, constipation and abdominal pain.  Integument/breast: Negative for rash, skin lesions, and pruritus.  Hematologic/lymphatic: Negative for easy bruising, bleeding, and lymphadenopathy.  Musculoskeletal: Negative for myalgias, arthralgias, muscle weakness.  Neurological: Negative for headaches, dizziness, seizures, memory problems, trouble swallowing, speech problems, gait problems and weakness.  Behavioral/Psych: Negative for active tobacco use.  Endocrine: No history of of diabetes, thyroid disorder.  Unable to obtain,     Vital signs in last 24 hours:  Patient Vitals for the past 24 hrs:   BP Temp Temp src Pulse Resp SpO2 Weight   03/14/24 1100 122/81 99.5 °F (37.5 °C) Pulmonary Ar 114 14 99 % --   03/14/24 1000 96/73 99.3 °F (37.4 °C) Pulmonary Ar 117 18 98 % --   03/14/24 0900 103/69 99.3 °F (37.4 °C) Pulmonary Ar (!) 124 19 98 % --   03/14/24 0800 104/72 98.9 °F (37.2 °C) Pulmonary Ar 119 18 97 % --   03/14/24 0700 127/90 100.2 °F (37.9 °C) Pulmonary Ar 114 13 97 % --   03/14/24 0600 131/81 100.1 °F (37.8 °C) Pulmonary  Ar (!) 121 22 98 % 184 lb 15.5 oz (83.9 kg)   03/14/24 0500 123/82 100.2 °F (37.9 °C) Pulmonary Ar 120 25 94 % --   03/14/24 0400 101/73 99.3 °F (37.4 °C) Pulmonary Ar 96 17 97 % --   03/14/24 0300 111/76 98.8 °F (37.1 °C) Pulmonary Ar 91 17 91 % --   03/14/24 0200 90/71 98.9 °F (37.2 °C) Pulmonary Ar 118 18 95 % --   03/14/24 0100 99/79 99.4 °F (37.4 °C) Pulmonary Ar (!) 121 18 96 % --   03/14/24 0000 105/79 99.7 °F (37.6 °C) Pulmonary Ar 117 20 96 % --   03/13/24 2300 104/78 100.3 °F (37.9 °C) Pulmonary Ar 94 18 95 % --   03/13/24 2200 119/75 (!) 100.6 °F (38.1 °C) Pulmonary Ar 118 19 94 % --   03/13/24 2100 100/69 100.4 °F (38 °C) Pulmonary Ar 119 20 94 % --   03/13/24 2000 113/86 100.1 °F (37.8 °C) Pulmonary Ar 116 18 92 % --   03/13/24 1900 -- 100 °F (37.8 °C) Pulmonary Ar 108 20 97 % --   03/13/24 1800 116/69 99.3 °F (37.4 °C) -- 101 16 96 % --   03/13/24 1700 -- 98.7 °F (37.1 °C) -- 113 17 98 % --   03/13/24 1600 123/87 98.8 °F (37.1 °C) -- 100 17 96 % --   03/13/24 1500 -- 99.5 °F (37.5 °C) -- 93 14 98 % --   03/13/24 1400 105/82 100.1 °F (37.8 °C) -- 87 14 99 % --   03/13/24 1330 -- -- -- 83 15 99 % --   03/13/24 1300 -- 98.2 °F (36.8 °C) -- 85 15 99 % --   03/13/24 1200 90/59 99.2 °F (37.3 °C) Pulmonary Ar 88 16 100 % --   03/13/24 1130 -- -- -- 87 15 -- --       Physical Exam:   General: alert, cooperative, oriented.  Intubated,    Head: Normocephalic, without obvious abnormality, atraumatic.   Eyes: Conjunctivae/corneas clear.  No scleral icterus.  No conjunctival     hemorrhage.   Nose: Nares normal.   Throat: Lips, mucosa, and tongue normal.  No thrush noted.   Neck: Soft, supple neck; trachea midline, no adenopathy, no thyromegaly.   Lungs: CTAB, normal and equal bilateral chest rise   Chest wall: No tenderness or deformity.   Heart: Regular rate and rhythm, normal S1S2, no murmur.   Abdomen: soft, non-tender, non-distended, positive BS.   Extremity: no edema, no cyanosis, Trialysis, [place], Groin line,     Skin: No rashes or lesions.   Neurological: Alert, interactive, no focal deficits    Labs:  Lab Results   Component Value Date    WBC 10.4 03/14/2024    HGB 7.8 03/14/2024    HCT 23.6 03/14/2024    PLT 90.0 03/14/2024    CREATSERUM 3.18 03/14/2024    CREATSERUM 3.18 03/14/2024    CREATSERUM 3.18 03/14/2024    BUN 36 03/14/2024    BUN 36 03/14/2024    BUN 36 03/14/2024     03/14/2024     03/14/2024     03/14/2024    K 3.7 03/14/2024    K 3.7 03/14/2024    K 3.7 03/14/2024     03/14/2024     03/14/2024     03/14/2024    CO2 26.0 03/14/2024    CO2 26.0 03/14/2024    CO2 26.0 03/14/2024     03/14/2024     03/14/2024     03/14/2024    CA 8.6 03/14/2024    CA 8.6 03/14/2024    CA 8.6 03/14/2024    ALB 2.9 03/14/2024    ALB 2.9 03/14/2024    ALB 2.9 03/14/2024    ALKPHO 137 03/14/2024    ALKPHO 137 03/14/2024    BILT 0.5 03/14/2024    BILT 0.5 03/14/2024    TP 6.5 03/14/2024    TP 6.5 03/14/2024    AST 68 03/14/2024    AST 68 03/14/2024    ALT 57 03/14/2024    ALT 57 03/14/2024    PTT 42.9 03/13/2024    INR 1.06 03/13/2024    DDIMER >20.00 03/13/2024    MG 2.0 03/14/2024    PHOS 2.9 03/14/2024       Radiology:  CXR- 1. Cardiomegaly pulmonary venous distention.   2. Support tubes and catheters are satisfactory.  No pneumothorax   3. Mild perihilar and basilar congestive changes worse on the right has progressed.    4. Small right-sided effusion.       Cultures:  Susceptibility data from last 90 days.  Collected Specimen Info Organism Cefazolin Cefepime Ceftriaxone Ciprofloxacin Clindamycin Erythromycin Gentamicin Levofloxacin Meropenem Oxacillin Piperacillin/Tazobactam Trimethoprim/Sulfamethoxazole Vancomycin   03/08/24 Other from Endotracheal aspirate Klebsiella (Enterobacter) aerogenes  R  S  S  S    S  S  S   S  S      Staphylococcus aureus S     S  S  S  S   S   S  S       Assessment and Plan:    1.   Fevers: Multifactorial,   - Sec to HAP vs drop of Hgb and  multiple transfusions,   - UA was bland on admission, will repeat,   - Sputum cul with Kleb and MSSA,   - Blood cul are NGTD  - CXR with bilateral Congestion, no obvious consolidation, thick sputum in Etr earlier, now clearing up,   - Lines are clean, has Groin line, Trialysis Catheter and [place],   - MRSA Carriage is negative,   - On IV Cefepime, will change to IV Zosyn, add Fluconazole,     2.    Hx of Acute MI with Cardiogenic Shock: Got intubated post Cath,   - Plan for CABG, Blood cul drawn on 3/11 are NGTD,  - Follow Blood cul from 3/14.     3.     Disposition: in house, A middle aged male with Acute MI, Cardiogenic shock, with persistent Fevers since 3/10,   - Follow Pending Cul,   - UA and cul,   - DC IV Cefepime, start IV Zosyn to cover anaerobes better for aspiration component, pharm to dose,   - Fluconazole IV for now, if cul stay negative, will dc it,   - Supportive care,     Discussed with PCP, RN, all questions answered, further recommendations to follow, Thanks,     Thank you for consulting [facility] for Cristiano Obregon.  If you have any questions or concerns please call [facility] Infectious Disease at [phone].     Derick Webb MD  3/14/2024  11:25 AM

## 2024-03-14 NOTE — PROGRESS NOTES
[facility] Hospitalist Progress Note     CC: Hospital Follow up    PCP: Isauro Pina MD       Assessment/Plan:   Mr. Obregon is a 53-year-old male with a history of hyperlipidemia, tobacco use, who presents to the hospital for evaluation of acute chest pain.  STEMI with cardiogenic shock requiring Impella placement.  Continuing ventilation support with CRRT.  Planning CABG now on 3/15     STEMI  Cardiogenic shock   -Multivessel disease including chronically occluded large RCA, 95% sequential LAD stenosis on cath 3/5  -flow restored to heavily thrombosed circumflex and OM1  -Impella/balloon pump per cardiology  -taken to cath again emergently on 3/6/24, for right heart cath to accurately obtain hemodynamics. Worsening renal function noted.   -trialed IV hydration, renal function worsening----started on CRRT on 3/7  -IV cangrelor  -aspirin 81mg  -cardiology, nephrology, CV surgery and critical care on consult   -vent management and sedation per critical care  -CABG planned for 3/14    Acute kidney injury  Anuric   -suspect cardiogenic shock  -component of ATI  -decreased urine output   -start CRRT on 3/7. Patient is toleratring   -Off pressor support    HCAP  -Low-grade fevers  -Sputum with Klebsiella and staph  -Continue cefepime  -persistent fever, repeat BC, ID consulted    Hematuria, resolved  -yellow urine noted, minimal, but not bloody      Acute hypoxic respiratory failure  -currently on vent, monitor oxygenation   -critical care consulted  -continue full vent support   -maintaining FiO2 30%       Diet: PPN to TPN   DVT prophylaxis: on heparin drip   Code status: FULL    Discussed with  at bedside   Discussed with RN     Disposition: ICU     Bud Camarena MD  [facility] Hospitalist        Subjective:     Intubated, sedated    and at bedside     OBJECTIVE:    Blood pressure 103/69, pulse (!) 124, temperature 99.3 °F (37.4 °C), temperature source Pulmonary Artery, resp. rate  19, height 5' 7\" (1.702 m), weight 184 lb 15.5 oz (83.9 kg), SpO2 98%.    Temp:  [98.2 °F (36.8 °C)-100.6 °F (38.1 °C)] 99.3 °F (37.4 °C)  Pulse:  [] 124  Resp:  [13-25] 19  BP: ()/(59-90) 103/69  SpO2:  [91 %-100 %] 98 %  AO: ()/(57-88) 113/70  FiO2 (%):  [30 %] 30 %      Intake/Output:    Intake/Output Summary (Last 24 hours) at 3/14/2024 0923  Last data filed at 3/14/2024 0900  Gross per 24 hour   Intake 3379.2 ml   Output 2702 ml   Net 677.2 ml       Last 3 Weights   03/14/24 0600 184 lb 15.5 oz (83.9 kg)   03/12/24 1000 186 lb 4.6 oz (84.5 kg)   03/11/24 1000 192 lb 10.9 oz (87.4 kg)   03/09/24 0500 188 lb 15 oz (85.7 kg)   03/08/24 0600 185 lb 13.6 oz (84.3 kg)   03/07/24 0600 183 lb 3.2 oz (83.1 kg)   03/06/24 0500 180 lb 1.9 oz (81.7 kg)   03/05/24 1324 175 lb 7.8 oz (79.6 kg)   03/05/24 0722 160 lb (72.6 kg)   10/06/21 1541 177 lb 6.4 oz (80.5 kg)   09/09/20 0929 172 lb 9.6 oz (78.3 kg)       /69 (BP Location: Left arm)   Pulse (!) 124   Temp 99.3 °F (37.4 °C) (Pulmonary Artery)   Resp 19   Ht 5' 7\" (1.702 m)   Wt 184 lb 15.5 oz (83.9 kg)   SpO2 98%   BMI 28.97 kg/m²   General: intubated, sedated  HEENT: [place], ET tube intact  Lungs: mechanical breath sounds   Heart: Regular rate and rhythm, S1, S2   Abdomen: soft  Extremities: mild upper/lower ext edema  Skin: normal color    Data Review:       Labs:     Recent Labs   Lab 03/13/24  0504 03/13/24  1452 03/14/24  0408   RBC 2.66* 2.86* 2.64*   HGB 7.9* 8.9* 7.8*   HCT 23.4* 25.4* 23.6*   MCV 88.0 88.8 89.4   MCH 29.7 31.1 29.5   MCHC 33.8 35.0 33.1   RDW 14.1 14.0 13.9   NEPRELIM 5.86 5.86 7.53   WBC 9.0 9.2 10.4   PLT 79.0* 79.0* 90.0*         Recent Labs   Lab 03/13/24  0504 03/13/24  1744 03/14/24  0408   *  127* 170* 136*  136*  136*   BUN 34*  34* 28* 36*  36*  36*   CREATSERUM 3.03*  3.03* 2.95* 3.18*  3.18*  3.18*   EGFRCR 24*  24* 24* 22*  22*  22*   CA 8.6*  8.6* 8.3* 8.6*  8.6*  8.6*      138 136 136  136  136   K 4.5  4.5 3.7 3.7  3.7  3.7     105 106 104  104  104   CO2 26.0  26.0 22.0 26.0  26.0  26.0       Recent Labs   Lab 03/10/24  0405 03/10/24  1802 03/12/24  0539 03/12/24  1639 03/13/24  0504 03/13/24  1744 03/14/24  0408   ALT 56*  --  78*  --  69*  --  57*  57*   *  --  103*  --  89*  --  68*  68*   ALB 2.9*  2.9*   < > 3.1*  3.1* 3.1* 2.9*  2.9* 2.9* 2.9*  2.9*  2.9*    < > = values in this interval not displayed.         Imaging:  XR CHEST AP PORTABLE  (CPT=71045)    Result Date: 3/12/2024  CONCLUSION:  1. Cardiomegaly pulmonary venous distention. 2. Support tubes and catheters are satisfactory.  No pneumothorax 3. Mild perihilar and basilar congestive changes worse on the right has progressed.  4. Small right-sided effusion.    Dictated by (CST): Wily Stephenson MD on 3/12/2024 at 8:51 AM     Finalized by (CST): Wily Stephenson MD on 3/12/2024 at 8:56 AM             Meds:      sodium chloride   Intravenous Once    ceFAZolin  2 g Intravenous On Call    mineral oil-white petrolatum   Both Eyes TID    cefepime  1 g Intravenous Q12H    aspirin  300 mg Rectal Daily    pantoprazole  40 mg Intravenous Daily    chlorhexidine gluconate  15 mL Mouth/Throat BID@0800,2000      adult 3 in 1 TPN 50 mL/hr at 03/14/24 0900    NxStage Pure Flow 401 CRRT/PIRRT fluid 5000mL 2.5 L/hr (03/13/24 2143)    dextrose 10%      sodium bicarbonate 25mEq in dextrose 5% 1000mL IMPELLA purge solution 10 mL/hr (03/13/24 1750)    propofol 35 mcg/kg/min (03/14/24 0906)    fentanyl 50 mcg/hr (03/14/24 0900)     diazepam, dextrose 10%, heparin, fentaNYL **OR** fentaNYL, acetaminophen **OR** acetaminophen **OR** acetaminophen **OR** acetaminophen, polyethylene glycol (PEG 3350), senna, bisacodyl, fleet enema, fentaNYL (Sublimaze) **OR** fentaNYL (Sublimaze), fentanyl

## 2024-03-14 NOTE — PLAN OF CARE
Remains sedated on Propofol and Fentanyl, requiring prn valium and prn fentanyl boluses for agitation (causes uncontrolled coughing and fighting of the ventilator). Is able to turn his head to voice and open eyes to voice while on current level of sedation. Unable to follow commands with extremities but moves all to pain. Sedation not lowered d/t difficulty w/ ventilator compliance.    Vent settings unchanged, copious oral and inline secretions tonight.  Remains in afib w/ rates 100-120 at rest and elevated to 140-150's with periods of agitation.  CRRT continued overnight, goal net 0 - - actual net shift total +84 ml    Impella remains at P8 overnight, plans for CABG in am. Suction alarms with low LV pressures. - - addendum: per Dr. Llanes, lower to P6 at 0200 d/t suction alarms and negative LV diastolic pressures    Bathed w/ CHG at 0500, skin intact, sacrum visualized. Poorly tolerated bath/turn - extended rest time required between movement/turns.    Total shift U/O = 14 ml    Drips:  Propofol 50  Fentanyl 5  TPN 50  Cangrelor 0.75 (maintenance) - ACT's remain therapeutic off heparin      Plan:   Cangrelor & TPN & CRRT off prior to surgery  To OR for CABG Thursday around 1100        Problem: Patient Centered Care  Goal: Patient preferences are identified and integrated in the patient's plan of care  Description: Interventions:  - What would you like us to know as we care for you? From home with partner  - Provide timely, complete, and accurate information to patient/family  - Incorporate patient and family knowledge, values, beliefs, and cultural backgrounds into the planning and delivery of care  - Encourage patient/family to participate in care and decision-making at the level they choose  - Honor patient and family perspectives and choices  Outcome: Progressing     Problem: Patient/Family Goals  Goal: Patient/Family Long Term Goal  Description: Patient's Long Term Goal: discharge post-MI    Interventions:  -  plan for PCI vs CABG  - impella vs IABP vs inotrope support  - See additional Care Plan goals for specific interventions  Outcome: Progressing  Goal: Patient/Family Short Term Goal  Description: Patient's Short Term Goal: stable overnight with pre-op CABG prep    Interventions:   - pre-op preparations for CABG/MAZE/KIN clip  - See additional Care Plan goals for specific interventions  Outcome: Progressing     Problem: RESPIRATORY - ADULT  Goal: Achieves optimal ventilation and oxygenation  Description: INTERVENTIONS:  - Assess for changes in respiratory status  - Assess for changes in mentation and behavior  - Position to facilitate oxygenation and minimize respiratory effort  - Oxygen supplementation based on oxygen saturation or ABGs  - Provide Smoking Cessation handout, if applicable  - Encourage broncho-pulmonary hygiene including cough, deep breathe, Incentive Spirometry  - Assess the need for suctioning and perform as needed  - Assess and instruct to report SOB or any respiratory difficulty  - Respiratory Therapy support as indicated  - Manage/alleviate anxiety  - Monitor for signs/symptoms of CO2 retention  Outcome: Progressing     Problem: CARDIOVASCULAR - ADULT  Goal: Maintains optimal cardiac output and hemodynamic stability  Description: INTERVENTIONS:  - Monitor vital signs, rhythm, and trends  - Monitor for bleeding, hypotension and signs of decreased cardiac output  - Evaluate effectiveness of vasoactive medications to optimize hemodynamic stability  - Monitor arterial and/or venous puncture sites for bleeding and/or hematoma  - Assess quality of pulses, skin color and temperature  - Assess for signs of decreased coronary artery perfusion - ex. Angina  - Evaluate fluid balance, assess for edema, trend weights  Outcome: Progressing  Goal: Absence of cardiac arrhythmias or at baseline  Description: INTERVENTIONS:  - Continuous cardiac monitoring, monitor vital signs, obtain 12 lead EKG if indicated  -  Evaluate effectiveness of antiarrhythmic and heart rate control medications as ordered  - Initiate emergency measures for life threatening arrhythmias  - Monitor electrolytes and administer replacement therapy as ordered  Outcome: Progressing     Problem: Delirium  Goal: Minimize duration of delirium  Description: Interventions:  - Encourage use of hearing aids, eye glasses  - Promote highest level of mobility daily  - Provide frequent reorientation  - Promote wakefulness i.e. lights on, blinds open  - Promote sleep, encourage patient's normal rest cycle i.e. lights off, TV off, minimize noise and interruptions  - Encourage family to assist in orientation and promotion of home routines  Outcome: Progressing     Problem: METABOLIC/FLUID AND ELECTROLYTES - ADULT  Goal: Hemodynamic stability and optimal renal function maintained  Description: INTERVENTIONS:  - Monitor labs and assess for signs and symptoms of volume excess or deficit  - Monitor intake, output and patient weight  - Monitor urine specific gravity, serum osmolarity and serum sodium as indicated or ordered  - Monitor response to interventions for patient's volume status, including labs, urine output, blood pressure (other measures as available)  - Encourage oral intake as appropriate  - Instruct patient on fluid and nutrition restrictions as appropriate  Outcome: Progressing     Problem: SKIN/TISSUE INTEGRITY - ADULT  Goal: Skin integrity remains intact  Description: INTERVENTIONS  - Assess and document risk factors for pressure ulcer development  - Assess and document skin integrity  - Monitor for areas of redness and/or skin breakdown  - Initiate interventions, skin care algorithm/standards of care as needed  Outcome: Progressing  Goal: Incision(s), wounds(s) or drain site(s) healing without S/S of infection  Description: INTERVENTIONS:  - Assess and document risk factors for pressure ulcer development  - Assess and document skin integrity  - Assess and  document dressing/incision, wound bed, drain sites and surrounding tissue  - Implement wound care per orders  - Initiate isolation precautions as appropriate  - Initiate Pressure Ulcer prevention bundle as indicated  Outcome: Progressing  Goal: Oral mucous membranes remain intact  Description: INTERVENTIONS  - Assess oral mucosa and hygiene practices  - Implement preventative oral hygiene regimen  - Implement oral medicated treatments as ordered  Outcome: Progressing     Problem: NEUROLOGICAL - ADULT  Goal: Achieves stable or improved neurological status  Description: INTERVENTIONS  - Assess for and report changes in neurological status  - Initiate measures to prevent increased intracranial pressure  - Maintain blood pressure and fluid volume within ordered parameters to optimize cerebral perfusion and minimize risk of hemorrhage  - Monitor temperature, glucose, and sodium. Initiate appropriate interventions as ordered  Outcome: Progressing

## 2024-03-14 NOTE — PROGRESS NOTES
Progress Note  Cristianosneha Obregon Patient Status:  Inpatient    1970 MRN [id number]   Location [facility] 2W/SW Attending Bud Camarena MD   Hosp Day # 9 PCP Isauro Pina MD     Subjective:  Intubated and sedated family at bedside no events overnight.  Occasional Impella suction alarms.    Objective:  /69 (BP Location: Left arm)   Pulse (!) 124   Temp 99.3 °F (37.4 °C) (Pulmonary Artery)   Resp 19   Ht 5' 7\" (1.702 m)   Wt 184 lb 15.5 oz (83.9 kg)   SpO2 98%   BMI 28.97 kg/m²     Telemetry: atrial fibrillation,  bpm.      Intake/Output:    Intake/Output Summary (Last 24 hours) at 3/14/2024 0909  Last data filed at 3/14/2024 0900  Gross per 24 hour   Intake 3379.2 ml   Output 2702 ml   Net 677.2 ml       Last 3 Weights   24 0600 184 lb 15.5 oz (83.9 kg)   24 1000 186 lb 4.6 oz (84.5 kg)   24 1000 192 lb 10.9 oz (87.4 kg)   24 0500 188 lb 15 oz (85.7 kg)   24 0600 185 lb 13.6 oz (84.3 kg)   24 0600 183 lb 3.2 oz (83.1 kg)   24 0500 180 lb 1.9 oz (81.7 kg)   24 1324 175 lb 7.8 oz (79.6 kg)   24 0722 160 lb (72.6 kg)   10/06/21 1541 177 lb 6.4 oz (80.5 kg)   20 0929 172 lb 9.6 oz (78.3 kg)       Labs:  Recent Labs   Lab 24  0504 24  1744 24  0408   *  127* 170* 136*  136*  136*   BUN 34*  34* 28* 36*  36*  36*   CREATSERUM 3.03*  3.03* 2.95* 3.18*  3.18*  3.18*   EGFRCR 24*  24* 24* 22*  22*  22*   CA 8.6*  8.6* 8.3* 8.6*  8.6*  8.6*     138 136 136  136  136   K 4.5  4.5 3.7 3.7  3.7  3.7     105 106 104  104  104   CO2 26.0  26.0 22.0 26.0  26.0  26.0     Recent Labs   Lab 24  0504 24  1452 24  0408   RBC 2.66* 2.86* 2.64*   HGB 7.9* 8.9* 7.8*   HCT 23.4* 25.4* 23.6*   MCV 88.0 88.8 89.4   MCH 29.7 31.1 29.5   MCHC 33.8 35.0 33.1   RDW 14.1 14.0 13.9   NEPRELIM 5.86 5.86 7.53   WBC 9.0 9.2 10.4   PLT 79.0* 79.0* 90.0*         No results  for input(s): \"TROP\", \"TROPHS\", \"CK\" in the last 168 hours.    Lab Results   Component Value Date    INR 1.06 03/13/2024    INR 1.09 03/13/2024    INR 1.06 03/07/2024       Diagnostics:       ROS  Unable to assess    Physical Exam:    Physical Exam  Constitutional:       Comments: Sedated and interrogated   Cardiovascular:      Rate and Rhythm: Normal rate. Rhythm irregularly irregular.      Pulses:           Dorsalis pedis pulses are 2+ on the right side and 2+ on the left side.      Heart sounds: S1 normal and S2 normal.      No friction rub. No gallop.      Comments: Mild edema to BLE  Pulmonary:      Breath sounds: Normal breath sounds.      Comments: intubated  Abdominal:      General: Abdomen is flat.      Palpations: Abdomen is soft.         Medications:   sodium chloride   Intravenous Once    ceFAZolin  2 g Intravenous On Call    mineral oil-white petrolatum   Both Eyes TID    cefepime  1 g Intravenous Q12H    aspirin  300 mg Rectal Daily    pantoprazole  40 mg Intravenous Daily    chlorhexidine gluconate  15 mL Mouth/Throat BID@0800,2000      adult 3 in 1 TPN 50 mL/hr at 03/14/24 0900    NxStage Pure Flow 401 CRRT/PIRRT fluid 5000mL 2.5 L/hr (03/13/24 2143)    dextrose 10%      sodium bicarbonate 25mEq in dextrose 5% 1000mL IMPELLA purge solution 10 mL/hr (03/13/24 1750)    propofol 35 mcg/kg/min (03/14/24 0906)    fentanyl 50 mcg/hr (03/14/24 0900)       Assessment/Plan:    NSTEMI  Multivessel CAD s/p balloon angioplasty proximal circumflex 3/5/24  -s/p placement of impella currently on P6.  - s/p RHC on 3/6/2024 with normal hemodynamics, low to normal filling pressure  - Echo from 3/11 with LVEF 50-55% with regional wall motion abnormalities   - on ASA rectal, IV cangrelor,   - Has been bridged with cangrelor.  Planning to go for bypass today.  - CI of 3.8    NORMA  --anuric  - on CRRT since 3/7 urology is following.  Plan to resume after the operating room.    Acute hypoxic respiratory failure  - currently  on vent support  -Pulmonary managing       Plan:  - Continue on impella support at P6  - plan for CABG later today        Critical care time 35 minutes.  Sagar Mullen MD.

## 2024-03-14 NOTE — SPIRITUAL CARE NOTE
Spiritual Care Visit Note    Patient Name: Cristiano Obregon Date of Spiritual Care Visit: 24   : 1970 Primary Dx: NSTEMI (non-ST elevated myocardial infarction) (HCC)       Referred By: Referral From:     Spiritual Care Taxonomy:    Intended Effects: Build relationship of care and support    Methods: Accompany someone in their spiritual/Cheondoism practice outside your alexandro tradition;Encourage sharing of feelings;Offer spiritual/Cheondoism support    Interventions: Acknowledge current situation;Active listening;Ask guided questions;Ask guided questions about cultural and Cheondoism values;Identify supportive relationship(s);[place];Prayer for healing;Share words of hope and inspiration    Visit Type/Summary:     - Spiritual Care: Responded to a request for spiritual care and assessed the patient for spiritual care needs. Patient and family expressed appreciation for  visit. Provided support for Patient's spiritual/Cheondoism requests. Offered prayer.  remains available for follow up.    Spiritual Care support can be requested via an Epic consult. For urgent/immediate needs, please contact the On Call  at: [place]: ext [phone]    Rev Rosalie Blevins MDiv

## 2024-03-14 NOTE — SPIRITUAL CARE NOTE
Spiritual Care Visit Note    Patient Name: Cristiano Obregon Date of Spiritual Care Visit: 24   : 1970 Primary Dx: NSTEMI (non-ST elevated myocardial infarction) (HCC)       Referred By: Referral From:     Spiritual Care Taxonomy:    Intended Effects: Convey a calming presence    Methods: Offer spiritual/Anglican support    Interventions: Silent prayer    Visit Type/Summary:     - Spiritual Care: Responded to a request for spiritual care and assessed the patient for spiritual care needs. Visited to provide prayer prior to patient surgery tomorrow. Patient family already gone for the evening.  will attempt visit in morning prior to surgery.  remains available as needed for follow up.    Spiritual Care support can be requested via an Epic consult. For urgent/immediate needs, please contact the On Call  at: [place]: ext [phone]    Rev Rosalie Blevins MDiv

## 2024-03-14 NOTE — RESPIRATORY THERAPY NOTE
Pt received last night at 1900 on VC/AC ventilation, rate 15, Vt 400, PEEP +5, FiO2 30% tolerating well. ETT 7.0, 22 at the lips. Bilat Bs auscultated, pt suctioned as needed with small to large amounts of thin white secretions overnight. No changes made to the vent overnight. RT will continue to monitor.

## 2024-03-14 NOTE — PROGRESS NOTES
NEPHROLOGY DAILY PROGRESS NOTE       SUBJECTIVE:  Tolerating CRRT   at bedside  OR planned later today     OBJECTIVE:    Total Intake/Output:    Intake/Output Summary (Last 24 hours) at 3/14/2024 0855  Last data filed at 3/14/2024 0800  Gross per 24 hour   Intake 3371.9 ml   Output 2673 ml   Net 698.9 ml       PHYSICAL EXAM:  /72 (BP Location: Left arm)   Pulse 119   Temp 98.9 °F (37.2 °C) (Pulmonary Artery)   Resp 18   Ht 5' 7\" (1.702 m)   Wt 184 lb 15.5 oz (83.9 kg)   SpO2 97%   BMI 28.97 kg/m²   GEN: Intubated, sedated   HEENT: ETT in place   CHEST: mechanical breath sounds   CARDIAC: S1S2 normal  ABD: soft, NT/ND  : lujan in place   EXT:  trace upper and lower ext edema noted   NEURO: sedated   ACCESS: RIJ temp HD cath (3/7)      CURRENT MEDICATIONS:     sodium chloride   Intravenous Once    ceFAZolin  2 g Intravenous On Call    mineral oil-white petrolatum   Both Eyes TID    cefepime  1 g Intravenous Q12H    aspirin  300 mg Rectal Daily    pantoprazole  40 mg Intravenous Daily    chlorhexidine gluconate  15 mL Mouth/Throat BID@0800,2000         LABS:  Patient Labs Reviewed in Detail. Pertinent Labs as follows:  Recent Labs   Lab 03/13/24  0504 03/13/24  1744 03/14/24  0408   *  127* 170* 136*  136*  136*   BUN 34*  34* 28* 36*  36*  36*   CREATSERUM 3.03*  3.03* 2.95* 3.18*  3.18*  3.18*   EGFRCR 24*  24* 24* 22*  22*  22*   CA 8.6*  8.6* 8.3* 8.6*  8.6*  8.6*     138 136 136  136  136   K 4.5  4.5 3.7 3.7  3.7  3.7     105 106 104  104  104   CO2 26.0  26.0 22.0 26.0  26.0  26.0     Recent Labs   Lab 03/13/24  0504 03/13/24  1452 03/14/24  0408   RBC 2.66* 2.86* 2.64*   HGB 7.9* 8.9* 7.8*   HCT 23.4* 25.4* 23.6*   MCV 88.0 88.8 89.4   MCH 29.7 31.1 29.5   MCHC 33.8 35.0 33.1   RDW 14.1 14.0 13.9   NEPRELIM 5.86 5.86 7.53   WBC 9.0 9.2 10.4   PLT 79.0* 79.0* 90.0*         IMAGING:  No results found.     ASSESSMENT AND PLAN:   This is a 53 year  old male with PMH sig for HLD, tobacco use. Presented with chest pain and was admitted for NSTEMI.  Nephrology is consulted for NORMA      Anuric NORMA  - creatinine 1.23 on admission, worsened to 7.19 (3/7)  - renal US neg for hydronephrosis    - UA: + 100 protein, 6-10 RBCs, + ketones, few squamous epi cells.   - suspect due to cardiogenic shock / ATN  - initiated on CVVH on 3/7 via RIJ temp HD cath   - continue CRRT  - CRRT to be stopped prior to OR and then resumed post OR.  Renal panel and Mg level to be checked 2 hours after restarting CRRT  - monitor for renal recovery.   - follow renal fxn and I/Os   - renally dose meds      Hyperkalemia   - managed with CRRT      Metabolic acidosis  - resolved with CRRT     Hyponatremia  - resolved     NSTEMI   - per cards / CV surgery      Acute respiratory failure  - vent management per ICU      Anemia   - blood transfusions per primary / ICU       Dw RN    updated at bedside      We will continue to follow Cristiano Obregon    Critical care time > 35 mins     Janet Velazco MD  [facility] - Nephrology

## 2024-03-14 NOTE — PROGRESS NOTES
Misc. Note    Surgery postponed until tomorrow @ 7am. Spoke with patient's , Reyes, who is aware. Will resume Cangrelor and TPN and stop tomorrow prior to OR. All questions answered. Cardiology also aware.

## 2024-03-14 NOTE — PROGRESS NOTES
Received intubated pt on the following vent settings:   03/13/24 1614   Vent Information   Vent Mode VC/AC   Settings   FiO2 (%) 30 %   Resp Rate (Set) 15   Vt (Set, mL) 400 mL   Waveform Decelerating ramp   PEEP/CPAP (cm H2O) 5 cm H20     No changes made. No SBT performed d/t planned procedure tomorrow. Pt synchronous w/ full vent support settings. Suction provided as needed. ETT remains secured at 22cm at the lip. RT to continue to monitor.

## 2024-03-14 NOTE — PROGRESS NOTES
[facility] Pulmonary, Critical Care and Sleep    Cristianosneha Estebanadrien Patient Status:  Inpatient    1970 MRN [id number]   Location [facility] 2W/SW Attending Bud Camarena MD   Hosp Day # 9 PCP Isauro Pina MD     Date of Admission: 3/5/2024  7:14 AM    Admission Diagnosis: NSTEMI (non-ST elevated myocardial infarction) ([facility]) [I21.4]    S: On impella, CVVHD. Off pressors. On vent.     Scheduled Medications:     sodium chloride   Intravenous Once    ceFAZolin  2 g Intravenous On Call    mineral oil-white petrolatum   Both Eyes TID    cefepime  1 g Intravenous Q12H    aspirin  300 mg Rectal Daily    pantoprazole  40 mg Intravenous Daily    chlorhexidine gluconate  15 mL Mouth/Throat BID@0800,       Infusing Medications:     adult 3 in 1 TPN 50 mL/hr at 24 0800    NxStage Pure Flow 401 CRRT/PIRRT fluid 5000mL 2.5 L/hr (24)    dextrose 10%      sodium bicarbonate 25mEq in dextrose 5% 1000mL IMPELLA purge solution 10 mL/hr (24 1750)    propofol 35 mcg/kg/min (24 0800)    fentanyl 50 mcg/hr (24 0800)       PRN Medications:  diazepam, dextrose 10%, heparin, fentaNYL **OR** fentaNYL, acetaminophen **OR** acetaminophen **OR** acetaminophen **OR** acetaminophen, polyethylene glycol (PEG 3350), senna, bisacodyl, fleet enema, fentaNYL (Sublimaze) **OR** fentaNYL (Sublimaze), fentanyl    OBJECTIVE:  /72 (BP Location: Left arm)   Pulse 119   Temp 98.9 °F (37.2 °C) (Pulmonary Artery)   Resp 18   Ht 170.2 cm (5' 7\")   Wt 184 lb 15.5 oz (83.9 kg)   SpO2 97%   BMI 28.97 kg/m²    Temp (24hrs), Av.6 °F (37.6 °C), Min:98.2 °F (36.8 °C), Max:100.6 °F (38.1 °C)      Vent Mode: VC/AC  FiO2 (%):  [30 %] 30 %  S RR:  [15] 15  S VT:  [400 mL] 400 mL  PEEP/CPAP (cm H2O):  [5 cm H20] 5 cm H20  MAP (cm H2O):  [7-8] 8      Wt Readings from Last 3 Encounters:   24 184 lb 15.5 oz (83.9 kg)   10/06/21 177 lb 6.4 oz (80.5 kg)   20 172 lb 9.6 oz (78.3 kg)       I/O last  3 completed shifts:  In: 5043.7 [I.V.:2642.5; Blood:331; IV PIGGYBACK:373]  Out: 4074 [Urine:38; Other:4036]  I/O this shift:  In: 211.8 [I.V.:117.8]  Out: 154 [Other:154]     General: intubated and NAD, anasarca.   Neuro: Sedated.   HEENT: PERRL  Neck : No LAD  CV: RRR, nl S1, S2, no S4, S3 or murmur.   Lungs: Clear bilaterally.   Abd: Nontender, non distended.    Ext: No edema.   Skin: No rashes.     Recent Labs   Lab 03/13/24  0504 03/13/24  1452 03/14/24  0408   WBC 9.0 9.2 10.4   HGB 7.9* 8.9* 7.8*   HCT 23.4* 25.4* 23.6*   PLT 79.0* 79.0* 90.0*     Recent Labs   Lab 03/12/24  0539 03/12/24  1639 03/13/24  0504 03/13/24  1744 03/14/24  0408   *  138*   < > 127*  127* 170* 136*  136*  136*   BUN 28*  28*   < > 34*  34* 28* 36*  36*  36*   CREATSERUM 3.06*  3.06*   < > 3.03*  3.03* 2.95* 3.18*  3.18*  3.18*   CA 8.9  8.9   < > 8.6*  8.6* 8.3* 8.6*  8.6*  8.6*   *  133*   < > 138  138 136 136  136  136   K 5.0  5.0   < > 4.5  4.5 3.7 3.7  3.7  3.7     108   < > 105  105 106 104  104  104   CO2 26.0  26.0   < > 26.0  26.0 22.0 26.0  26.0  26.0   *  --  89*  --  68*  68*   ALT 78*  --  69*  --  57*  57*   ALB 3.1*  3.1*   < > 2.9*  2.9* 2.9* 2.9*  2.9*  2.9*    < > = values in this interval not displayed.     Recent Labs   Lab 03/13/24  1041 03/13/24  1452   INR 1.09 1.06   PTT 42.5* 42.9*     Recent Labs   Lab 03/13/24  2324   ABGPHT 7.47*   ALMXLO2U 38   FPWLL5Q 60*   ABGHCO3 27.8*   SITE Arterial Line   THGB 8.3*       COVID-19 Lab Results    COVID-19  Lab Results   Component Value Date    COVID19 Not Detected 03/05/2024       Pro-Calcitonin  No results for input(s): \"PCT\" in the last 168 hours.    Cardiac  No results for input(s): \"TROP\", \"PBNP\" in the last 168 hours.    Creatinine Kinase  No results for input(s): \"CK\" in the last 168 hours.    Inflammatory Markers  Recent Labs   Lab 03/13/24  1452   DDIMER >20.00*       Imaging:   CXR  3/12/24:    Chest images personally reviewed.   Assessment/Plan   Acute respiratory failure - intubated in cath lab  - continue mechanical ventilation with full support, PEEP 5, FiO2 30%, mechanics are good  - Foloowing ABG.   - fentanyl and propofol for analgesia and sedation, fentanyl pushes as needed  - hold SBT pending CABG today.   NSTEMI - diagnosed with new multivessel CAD s/p angioplasty and cardiogenic shock requiring impella placement.   - ASA and cangrelor  - heparin gtt, has been held due to high ACT per cardiology  - repeat echo with good LV function and RHC with good filling pressures  - plan is now to continue impella to bridge to CABG today.   - cardiology and cardiac surgery following  Cardiogenic shock - due to acute coronary syndrome, EF was down and has now improved on repeat echo  - continue impella, weaning per cardiology  - vasopressors as needed, currently off  Afib  - rates controlled  - per cardiology  Pneumonia - HCAP at high risk for GNR, fever and RML infiltrate  - fever curve improved  - sputum cultures with klebsiella and MSSA  - MRSA swab was negative  - continue cefepime (3/10-)  - blood cultures NGTD  - ID eval  Acute renal failure - suspect from ATN and cardiogenic shock  - CRRT per nephrology  Thrombocytopenia, anemia - due to impella and CRRT, low probability for HIT by 4T score  - transfuse for plt <50  - keep Hb >8  FEN/GI  - TPN  Tobacco Abuse  - strongly advise cessation  Proph  - heparin gtt  - pepcid  Dispo  - ICU, will follow  Critical Care Time greater than: 35 minutes    My best regards,         Juaquin Banuelos MD  [facility] Pulmonary, Critical Care and Sleep Medicine

## 2024-03-15 ENCOUNTER — ANESTHESIA EVENT (OUTPATIENT)
Dept: SURGERY | Facility: HOSPITAL | Age: 54
End: 2024-03-15
Payer: COMMERCIAL

## 2024-03-15 ENCOUNTER — ANESTHESIA (OUTPATIENT)
Dept: SURGERY | Facility: HOSPITAL | Age: 54
End: 2024-03-15
Payer: COMMERCIAL

## 2024-03-15 PROCEDURE — 93312 ECHO TRANSESOPHAGEAL: CPT | Performed by: ANESTHESIOLOGY

## 2024-03-15 PROCEDURE — 36430 TRANSFUSION BLD/BLD COMPNT: CPT | Performed by: ANESTHESIOLOGY

## 2024-03-15 RX ORDER — PHENYLEPHRINE HCL 10 MG/ML
VIAL (ML) INJECTION AS NEEDED
Status: DISCONTINUED | OUTPATIENT
Start: 2024-03-15 | End: 2024-03-15 | Stop reason: SURG

## 2024-03-15 RX ORDER — CALCIUM CHLORIDE 100 MG/ML
INJECTION INTRAVENOUS; INTRAVENTRICULAR AS NEEDED
Status: DISCONTINUED | OUTPATIENT
Start: 2024-03-15 | End: 2024-03-15 | Stop reason: SURG

## 2024-03-15 RX ORDER — DOBUTAMINE HYDROCHLORIDE 200 MG/100ML
INJECTION INTRAVENOUS CONTINUOUS PRN
Status: DISCONTINUED | OUTPATIENT
Start: 2024-03-15 | End: 2024-03-15 | Stop reason: SURG

## 2024-03-15 RX ORDER — EPHEDRINE SULFATE 50 MG/ML
INJECTION INTRAVENOUS AS NEEDED
Status: DISCONTINUED | OUTPATIENT
Start: 2024-03-15 | End: 2024-03-15 | Stop reason: SURG

## 2024-03-15 RX ORDER — DEXMEDETOMIDINE HYDROCHLORIDE 4 UG/ML
INJECTION, SOLUTION INTRAVENOUS CONTINUOUS PRN
Status: DISCONTINUED | OUTPATIENT
Start: 2024-03-15 | End: 2024-03-15 | Stop reason: SURG

## 2024-03-15 RX ORDER — SODIUM CHLORIDE 9 MG/ML
INJECTION, SOLUTION INTRAVENOUS CONTINUOUS PRN
Status: DISCONTINUED | OUTPATIENT
Start: 2024-03-15 | End: 2024-03-15 | Stop reason: SURG

## 2024-03-15 RX ORDER — HEPARIN SODIUM 1000 [USP'U]/ML
INJECTION, SOLUTION INTRAVENOUS; SUBCUTANEOUS AS NEEDED
Status: DISCONTINUED | OUTPATIENT
Start: 2024-03-15 | End: 2024-03-15 | Stop reason: SURG

## 2024-03-15 RX ORDER — PROTAMINE SULFATE 10 MG/ML
INJECTION, SOLUTION INTRAVENOUS AS NEEDED
Status: DISCONTINUED | OUTPATIENT
Start: 2024-03-15 | End: 2024-03-15 | Stop reason: SURG

## 2024-03-15 RX ORDER — ROCURONIUM BROMIDE 10 MG/ML
INJECTION, SOLUTION INTRAVENOUS AS NEEDED
Status: DISCONTINUED | OUTPATIENT
Start: 2024-03-15 | End: 2024-03-15 | Stop reason: SURG

## 2024-03-15 RX ORDER — SODIUM CHLORIDE, SODIUM LACTATE, POTASSIUM CHLORIDE, CALCIUM CHLORIDE 600; 310; 30; 20 MG/100ML; MG/100ML; MG/100ML; MG/100ML
INJECTION, SOLUTION INTRAVENOUS CONTINUOUS PRN
Status: DISCONTINUED | OUTPATIENT
Start: 2024-03-15 | End: 2024-03-15

## 2024-03-15 RX ORDER — LIDOCAINE HYDROCHLORIDE 10 MG/ML
INJECTION, SOLUTION EPIDURAL; INFILTRATION; INTRACAUDAL; PERINEURAL AS NEEDED
Status: DISCONTINUED | OUTPATIENT
Start: 2024-03-15 | End: 2024-03-15 | Stop reason: SURG

## 2024-03-15 RX ORDER — FUROSEMIDE 10 MG/ML
INJECTION INTRAMUSCULAR; INTRAVENOUS AS NEEDED
Status: DISCONTINUED | OUTPATIENT
Start: 2024-03-15 | End: 2024-03-15 | Stop reason: SURG

## 2024-03-15 RX ADMIN — PHENYLEPHRINE HCL 100 MCG: 10 MG/ML VIAL (ML) INJECTION at 12:03:00

## 2024-03-15 RX ADMIN — PHENYLEPHRINE HCL 50 MCG: 10 MG/ML VIAL (ML) INJECTION at 09:28:00

## 2024-03-15 RX ADMIN — DOBUTAMINE HYDROCHLORIDE 2 MCG/KG/MIN: 200 INJECTION INTRAVENOUS at 14:26:00

## 2024-03-15 RX ADMIN — PHENYLEPHRINE HCL 50 MCG: 10 MG/ML VIAL (ML) INJECTION at 09:15:00

## 2024-03-15 RX ADMIN — PHENYLEPHRINE HCL 100 MCG: 10 MG/ML VIAL (ML) INJECTION at 12:26:00

## 2024-03-15 RX ADMIN — DOBUTAMINE HYDROCHLORIDE 2 MCG/KG/MIN: 200 INJECTION INTRAVENOUS at 11:33:00

## 2024-03-15 RX ADMIN — SODIUM CHLORIDE: 9 INJECTION, SOLUTION INTRAVENOUS at 07:10:00

## 2024-03-15 RX ADMIN — DOBUTAMINE HYDROCHLORIDE 1 MCG/KG/MIN: 200 INJECTION INTRAVENOUS at 08:44:00

## 2024-03-15 RX ADMIN — PHENYLEPHRINE HCL 100 MCG: 10 MG/ML VIAL (ML) INJECTION at 11:46:00

## 2024-03-15 RX ADMIN — PHENYLEPHRINE HCL 50 MCG: 10 MG/ML VIAL (ML) INJECTION at 09:35:00

## 2024-03-15 RX ADMIN — ROCURONIUM BROMIDE 50 MG: 10 INJECTION, SOLUTION INTRAVENOUS at 09:53:00

## 2024-03-15 RX ADMIN — PHENYLEPHRINE HCL 50 MCG: 10 MG/ML VIAL (ML) INJECTION at 09:31:00

## 2024-03-15 RX ADMIN — ROCURONIUM BROMIDE 50 MG: 10 INJECTION, SOLUTION INTRAVENOUS at 13:22:00

## 2024-03-15 RX ADMIN — PHENYLEPHRINE HCL 50 MCG: 10 MG/ML VIAL (ML) INJECTION at 14:31:00

## 2024-03-15 RX ADMIN — PHENYLEPHRINE HCL 100 MCG: 10 MG/ML VIAL (ML) INJECTION at 12:45:00

## 2024-03-15 RX ADMIN — PHENYLEPHRINE HCL 50 MCG: 10 MG/ML VIAL (ML) INJECTION at 09:27:00

## 2024-03-15 RX ADMIN — LIDOCAINE HYDROCHLORIDE 50 MG: 10 INJECTION, SOLUTION EPIDURAL; INFILTRATION; INTRACAUDAL; PERINEURAL at 12:46:00

## 2024-03-15 RX ADMIN — SODIUM CHLORIDE: 9 INJECTION, SOLUTION INTRAVENOUS at 08:39:00

## 2024-03-15 RX ADMIN — PHENYLEPHRINE HCL 50 MCG: 10 MG/ML VIAL (ML) INJECTION at 09:24:00

## 2024-03-15 RX ADMIN — Medication 5 ML: at 14:38:00

## 2024-03-15 RX ADMIN — DOBUTAMINE HYDROCHLORIDE 1 MCG/KG/MIN: 200 INJECTION INTRAVENOUS at 12:21:00

## 2024-03-15 RX ADMIN — PHENYLEPHRINE HCL 100 MCG: 10 MG/ML VIAL (ML) INJECTION at 13:44:00

## 2024-03-15 RX ADMIN — PHENYLEPHRINE HCL 50 MCG: 10 MG/ML VIAL (ML) INJECTION at 14:27:00

## 2024-03-15 RX ADMIN — CALCIUM CHLORIDE 0.3 G: 100 INJECTION INTRAVENOUS; INTRAVENTRICULAR at 11:48:00

## 2024-03-15 RX ADMIN — FUROSEMIDE 10 MG: 10 INJECTION INTRAMUSCULAR; INTRAVENOUS at 12:49:00

## 2024-03-15 RX ADMIN — PHENYLEPHRINE HCL 100 MCG: 10 MG/ML VIAL (ML) INJECTION at 11:49:00

## 2024-03-15 RX ADMIN — PHENYLEPHRINE HCL 50 MCG: 10 MG/ML VIAL (ML) INJECTION at 12:11:00

## 2024-03-15 RX ADMIN — CEFAZOLIN SODIUM/WATER 2 G: 2 G/20 ML SYRINGE (ML) INTRAVENOUS at 13:50:00

## 2024-03-15 RX ADMIN — Medication 5 ML: at 13:04:00

## 2024-03-15 RX ADMIN — EPHEDRINE SULFATE 5 MG: 50 INJECTION INTRAVENOUS at 11:41:00

## 2024-03-15 RX ADMIN — PHENYLEPHRINE HCL 50 MCG: 10 MG/ML VIAL (ML) INJECTION at 09:19:00

## 2024-03-15 RX ADMIN — PHENYLEPHRINE HCL 50 MCG: 10 MG/ML VIAL (ML) INJECTION at 09:39:00

## 2024-03-15 RX ADMIN — CALCIUM CHLORIDE 0.1 G: 100 INJECTION INTRAVENOUS; INTRAVENTRICULAR at 12:03:00

## 2024-03-15 RX ADMIN — SODIUM CHLORIDE: 9 INJECTION, SOLUTION INTRAVENOUS at 14:52:00

## 2024-03-15 RX ADMIN — Medication 5 ML: at 13:07:00

## 2024-03-15 RX ADMIN — PHENYLEPHRINE HCL 100 MCG: 10 MG/ML VIAL (ML) INJECTION at 11:47:00

## 2024-03-15 RX ADMIN — CEFAZOLIN SODIUM/WATER 2 G: 2 G/20 ML SYRINGE (ML) INTRAVENOUS at 07:50:00

## 2024-03-15 RX ADMIN — PHENYLEPHRINE HCL 100 MCG: 10 MG/ML VIAL (ML) INJECTION at 12:16:00

## 2024-03-15 RX ADMIN — FUROSEMIDE 10 MG: 10 INJECTION INTRAMUSCULAR; INTRAVENOUS at 12:38:00

## 2024-03-15 RX ADMIN — SODIUM CHLORIDE: 9 INJECTION, SOLUTION INTRAVENOUS at 10:20:00

## 2024-03-15 RX ADMIN — PHENYLEPHRINE HCL 100 MCG: 10 MG/ML VIAL (ML) INJECTION at 08:32:00

## 2024-03-15 RX ADMIN — PHENYLEPHRINE HCL 100 MCG: 10 MG/ML VIAL (ML) INJECTION at 13:06:00

## 2024-03-15 RX ADMIN — ROCURONIUM BROMIDE 100 MG: 10 INJECTION, SOLUTION INTRAVENOUS at 07:11:00

## 2024-03-15 RX ADMIN — SODIUM CHLORIDE: 9 INJECTION, SOLUTION INTRAVENOUS at 10:21:00

## 2024-03-15 RX ADMIN — PHENYLEPHRINE HCL 200 MCG: 10 MG/ML VIAL (ML) INJECTION at 09:50:00

## 2024-03-15 RX ADMIN — CALCIUM CHLORIDE 0.2 G: 100 INJECTION INTRAVENOUS; INTRAVENTRICULAR at 13:34:00

## 2024-03-15 RX ADMIN — EPHEDRINE SULFATE 5 MG: 50 INJECTION INTRAVENOUS at 14:12:00

## 2024-03-15 RX ADMIN — DOBUTAMINE HYDROCHLORIDE 1 MCG/KG/MIN: 200 INJECTION INTRAVENOUS at 11:21:00

## 2024-03-15 RX ADMIN — PHENYLEPHRINE HCL 100 MCG: 10 MG/ML VIAL (ML) INJECTION at 09:06:00

## 2024-03-15 RX ADMIN — PHENYLEPHRINE HCL 100 MCG: 10 MG/ML VIAL (ML) INJECTION at 11:56:00

## 2024-03-15 RX ADMIN — CALCIUM CHLORIDE 0.1 G: 100 INJECTION INTRAVENOUS; INTRAVENTRICULAR at 11:55:00

## 2024-03-15 RX ADMIN — PROTAMINE SULFATE 50 MG: 10 INJECTION, SOLUTION INTRAVENOUS at 12:15:00

## 2024-03-15 RX ADMIN — CALCIUM CHLORIDE 0.1 G: 100 INJECTION INTRAVENOUS; INTRAVENTRICULAR at 12:08:00

## 2024-03-15 RX ADMIN — HEPARIN SODIUM 30000 UNITS: 1000 INJECTION, SOLUTION INTRAVENOUS; SUBCUTANEOUS at 09:16:00

## 2024-03-15 RX ADMIN — Medication 5 ML: at 14:31:00

## 2024-03-15 RX ADMIN — PROTAMINE SULFATE 300 MG: 10 INJECTION, SOLUTION INTRAVENOUS at 11:43:00

## 2024-03-15 RX ADMIN — DOBUTAMINE HYDROCHLORIDE 1 MCG/KG/MIN: 200 INJECTION INTRAVENOUS at 15:09:00

## 2024-03-15 RX ADMIN — DEXMEDETOMIDINE HYDROCHLORIDE 0.8 MCG/KG/HR: 4 INJECTION, SOLUTION INTRAVENOUS at 07:34:00

## 2024-03-15 RX ADMIN — PHENYLEPHRINE HCL 100 MCG: 10 MG/ML VIAL (ML) INJECTION at 09:48:00

## 2024-03-15 RX ADMIN — PHENYLEPHRINE HCL 100 MCG: 10 MG/ML VIAL (ML) INJECTION at 14:17:00

## 2024-03-15 RX ADMIN — Medication 5 ML: at 13:23:00

## 2024-03-15 RX ADMIN — Medication 5 ML: at 13:29:00

## 2024-03-15 RX ADMIN — CALCIUM CHLORIDE 0.1 G: 100 INJECTION INTRAVENOUS; INTRAVENTRICULAR at 12:11:00

## 2024-03-15 RX ADMIN — PHENYLEPHRINE HCL 50 MCG: 10 MG/ML VIAL (ML) INJECTION at 09:25:00

## 2024-03-15 RX ADMIN — PHENYLEPHRINE HCL 200 MCG: 10 MG/ML VIAL (ML) INJECTION at 09:51:00

## 2024-03-15 RX ADMIN — PHENYLEPHRINE HCL 50 MCG: 10 MG/ML VIAL (ML) INJECTION at 09:47:00

## 2024-03-15 RX ADMIN — SODIUM CHLORIDE: 9 INJECTION, SOLUTION INTRAVENOUS at 13:25:00

## 2024-03-15 RX ADMIN — CALCIUM CHLORIDE 0.1 G: 100 INJECTION INTRAVENOUS; INTRAVENTRICULAR at 12:30:00

## 2024-03-15 RX ADMIN — PHENYLEPHRINE HCL 50 MCG: 10 MG/ML VIAL (ML) INJECTION at 09:43:00

## 2024-03-15 RX ADMIN — ROCURONIUM BROMIDE 50 MG: 10 INJECTION, SOLUTION INTRAVENOUS at 11:09:00

## 2024-03-15 RX ADMIN — EPHEDRINE SULFATE 5 MG: 50 INJECTION INTRAVENOUS at 13:29:00

## 2024-03-15 NOTE — DIETARY NOTE
ADULT NUTRITION UPDATE NOTE:     Pt is at high nutrition risk.  Pt does not meet malnutrition criteria.      RECOMMENDATIONS TO MD: See Nutrition Intervention for TPN specifics.       ADMITTING DIAGNOSIS:  NSTEMI (non-ST elevated myocardial infarction) (HCC) [I21.4]  PERTINENT PAST MEDICAL HISTORY: History reviewed. No pertinent past medical history.    PATIENT STATUS: Initial 03/05/24: Pt admit for NSTEMI. No PMH. Received consult to start TF. Discussed with RN via phone call and secure chat. Pt was brought to ED after developing chest pain while working out at the gym. Cardiac alert was called and pt was emergently taken to cath lab s/p balloon. Pending CV sx eval with possible need for CABG. Per RN earliest sx time would be Thursday. No pressors at this time. Sedated on Propofol at 21.8ml/hr (providing 575 kcals from lipids). RN states OG tube is placed, awaiting for CXR placement confirmation. RN states pt will likely remain intubated and sedated until time of CABG. Chart reviewed, no weight loss. Stable since 2016. NPO since arriving today. Will order appropriate labs for tomorrow.    3/6 Brief update: RN reports issues with placing OG yesterday. TF never started. Plans for swan and cath today. Went back to cath lab for impella repositioning last night. Pt is anuric now. New labs ordered d/t hemolysis. New hypotension and renal failure developing overnight per MD chart notes. K elevated. Pt not appropriate for TF at this time. Will hold off and DC orders until more metabolically stable. Discussed with RN. Propofol running at 21.8ml/hr providing 575 kcal from lipids. IVF started. No pressors running.     3/8 UPDATE: Remains intubated and sedated on Propofol currently 24 ml/hr: 633 kcals/lipids. Discussed starting EN support at rounds and orders received.  RN attempted feeding tube placement  but repeated inability to obtain appropriate placement. RN discussed with MD and decision to start Parenteral Nutrition -  tomorrow d/t late orders. CRRT started 3/7. NPO day#3. Central line access available for CPN. Plan to provide CPN in minimal volume.      3/09/2024 Update: Consult for TPN received on 3/8. Discussed with RN- no available central line to use. Discussed with Dr. Mendenhall via [employer]. Ok to start PPN via peripheral line today. Relayed to RN. Orders entered and labs ordered. Pt is oliguric. IVF will need to DC when PPN starts tonight at 2100. On CRRT. Volume of PPN kept minimal.      3/14/2024 Update:  Remains Intubated, sedated on Propofol @ 15.2 ml/hr ( providing 401 lipid kcal). Tolerating TPN well . Blood sugar acceptable,  U/O diminished, anuric.  Cabg rescheduled for tomorrow 3/15. Plan to stop CRRT and TPN an hour prior to OR. K is down trending. Discussion with Nephrologist re: addition of 10-20 meq K in TPN. Will continue to exclude Lipid emulsion in TPN d/t current propofol.        GASTROINTESTINAL:No BM documented. @ risk for constipation d/t opioid infusion. Bowel regime in place. OG tube removed this pm.   : U/O 17 ml ( 0 ml/kg/hr) anuric.    MEDICATIONS: reviewed. Continuous:    cangrelor ([place]) 50 mg in sodium chloride 0.9% 250 mL IVPB - BRIDGING/MAINTENANCE dose 0.75 mcg/kg/min (03/14/24 1800)    adult 3 in 1 TPN      adult 3 in 1 TPN 50 mL/hr at 03/14/24 1800    NxStage Pure Flow 401 CRRT/PIRRT fluid 5000mL 2.5 L/hr (03/14/24 0954)    dextrose 10%      sodium bicarbonate 25mEq in dextrose 5% 1000mL IMPELLA purge solution 10 mL/hr (03/13/24 1750)    propofol 35 mcg/kg/min (03/14/24 1800)    fentanyl 50 mcg/hr (03/14/24 1800)   Scheduled noted.   Prn: diazepam, dextrose 10%, heparin, fentaNYL **OR** fentaNYL, acetaminophen **OR** acetaminophen **OR** acetaminophen **OR** acetaminophen, polyethylene glycol (PEG 3350), senna, bisacodyl, fleet enema, fentaNYL (Sublimaze) **OR** fentaNYL (Sublimaze), fentanyl      LABS: reviewed- NORMA on CRRT. Q 12 hr renal panel and Mg.   Recent Labs      03/13/24  0504 03/13/24  1744 03/14/24  0408   *  127* 170* 136*  136*  136*   BUN 34*  34* 28* 36*  36*  36*   CREATSERUM 3.03*  3.03* 2.95* 3.18*  3.18*  3.18*   CA 8.6*  8.6* 8.3* 8.6*  8.6*  8.6*   MG 1.8 1.8 2.0     138 136 136  136  136   K 4.5  4.5 3.7 3.7  3.7  3.7     105 106 104  104  104   CO2 26.0  26.0 22.0 26.0  26.0  26.0   PHOS 3.3 2.9 2.9   OSMOCALC 295  295 291 292  292  292       NUTRITION RELATED PHYSICAL FINDINGS:  - Nutrition Focused Physical Exam (NFPE): to be obtained.   - Fluid Accumulation: Non pitting edema on bilateral hands, L E , facial, scrotum, generalized edema.  See RN documentation for details  - Skin Integrity: at risk. See RN documentation for details    ANTHROPOMETRICS:  HT: 170.2 cm (5' 7\")  WT: 184 lbs ( 3/14)   BMI: Body mass index is 29.21 kg/m².  BMI CLASSIFICATION: 25-29.9 kg/m2 - overweight  IBW: 148 lbs        118% IBW on admit.   Usual Body Wt: ~175 lbs per EMR      100% UBW    NUTRITION DIAGNOSIS/PROBLEM:   Inadequate oral intake related to Decreased ability to consume sufficient energy  as evidenced by NPO status.    Nutrition Diagnosis Progress: Improvement (unresolved) , Nutrition Support Therapy (NST) --TPN    NUTRITION INTERVENTION:     NUTRITION PRESCRIPTION:   Estimated Nutrition needs: --dosing wt of 79.6 kg - wt taken on 3/5  Calories: ~6688-0211 calories/day (22-26 calories per kg Dosing wt)   [place] = 1533 kcal/day  Protein: 100-167  g protein/day (>1.5-2.5 g protein/kg IBW while on CRRT)  Fluid Needs: ~1ml/kcal- adjust per renal tolerance, CRRT    Diet: NPO  - Parenteral Nutrition: RD ordered the following  TPN 1200 ml, 95 g Protein, 650 kcal dextrose & 0 kcal lipid. 1030 TPN kcal  + 401 propofol kcal (total of 1431 kcal/day)   Additives and electrolytes ordered.   Met 93% of kcal and 95% of Protein needs. Will Increase PN as abran to reach full nutrition.   TPN infusing via central line ( single lumen Jugular  Right)     - RD Care Plan:  initiated short term Nutrition Support Therapy (NST)   - Meals and snacks: NPO  - Medical Food Supplements- NPO.   - Vitamin and mineral supplements: none  - Feeding assistance: NPO  - Nutrition education: not appropriate at this time   - Coordination of nutrition care: collaboration with other providers  - Discharge and transfer of nutrition care to new setting or provider: monitor plans      MONITOR AND EVALUATE/NUTRITION GOALS:  - Food and Nutrient Intake:    Monitor: for PO initiation post extubation as safe  - Food and Nutrient Administration:    Monitor: CPN start 3/9/24  - Anthropometric Measurement:    Monitor weight  - Nutrition Goals:    Allow weight loss d/t fluid loss, labs within acceptable limits,  support body systems, and CPN to meet >80% nutrition needs within first 5-7 days critical illness.       DIETITIAN FOLLOW UP: RD to follow and monitor nutrition status, daily CPN ordering.        Jaja Saldana RD, LDN, [facility]  Clinical Dietitian  [phone]

## 2024-03-15 NOTE — OPERATIVE REPORT
Operative Note    Patient Name: Cristiano Obregon    Preoperative Diagnosis: Atherosclerosis of native coronary artery of native heart with unstable angina pectoris (HCC) [I25.110]    ST elevation myocardial infarction  Cardiogenic shock  Acute kidney failure  Multivessel coronary artery disease  History of tobacco abuse  Presence of Impella device  Paroxysmal atrial fibrillation  Thrombocytopenia  Anemia  Preoperative cangrelor usage    Postoperative Diagnosis: Atherosclerosis of native coronary artery of native heart with unstable angina pectoris (HCC) [I25.110]    ST elevation myocardial infarction  Cardiogenic shock  Acute kidney failure  Multivessel coronary artery disease  History of tobacco abuse  Presence of Impella device  Thrombocytopenia  Anemia  Profound coagulopathy  Paroxysmal atrial fibrillation  Preoperative cangrelor usage    Surgeon(s) and Role:     * Richie Llanes MD - Primary     Assistant: Surgical Assistant.: Abiodun Gray RSA    Procedures:   Median sternotomy  Urgent coronary artery bypass grafting x 3  Left internal mammary artery to left anterior descending artery  Reverse saphenous vein graft to lateral obtuse marginal artery  Reverse saphenous vein graft to posterior descending artery  Endoscopic vein harvesting, greater saphenous vein, left lower extremity  Synchronized cardioversion  Drainage of right-sided pleural effusion    Indication:  Patient is a 53-year-old male who presented to the hospital in acute ST elevation myocardial infarction.  Found have an occluded right coronary artery chronic in an acutely occluded circumflex artery with a 70% stenosis in the left anterior descending artery.  The circumflex artery was treated with balloon angioplasty and placement of Impella device.  After the angiography, he was intubated and ultimately required dialysis due to acute renal failure.  He has been maintained on Impella and intubation but has remained hemodynamically stable.  He is  taken to the operating today for surgical coronary revascularization.  Long discussion with patient's family and partner regarding alternatives, benefits and risks.     Surgical Findings:   Left anterior descending artery 1.5 mm in size, thin-walled and sclerotic  Obtuse marginal artery target is a 2 mm target, thick walled target but is a good target  Posterior descending artery target is a 1 mm target, very thin-walled and sclerotic vessel in general.  Vein quality is good, 4 mm diameter on average  Mammary quality is good, 1.5 mm vessel with excellent pulsatile flow  Changes of acute infarction in the lateral wall visible grossly  Complete echocardiogram with ejection fraction around 50% with improved contractility of the lateral left ventricle wall..  Initially, there was hypokinesis of the anterior wall and septum and significance hypokinesis/akinesis of the lateral wall.    Description of procedure:  Patient was taken to the operating room.  Anesthesia was induced.  Central line, swan, radial arterial line, lujan and transesophageal echo were placed.  Patient was then positioned, prepped, draped in the usual sterile fashion.  Arms were tucked at sides, pressure points were padded and wrist was in a neutral, thumbs up position.  Timeout then performed verifying patient, procedure, beta-blocker, antibiotic.  Skin incision made and midline sternotomy performed.  Pericardium entered.  Synchronized cardioversion performed on the heart and history immediately restored normal sinus rhythm.  The left internal mammary artery was harvested in a pedicled fashion from the underside of the left endothoracic fascia.  Simultaneously, the greater saphenous vein was harvested in an endoscopic fashion from the left lower extremity.  Systemic heparin was given.  The distal branches of the mammary artery beyond its bifurcation were clipped and the mammary artery transected.  There was excellent pulsatile flow.  The distal ends  were clipped, the pedicle was wrapped in papaverine gauze and placed in the left chest.  Sternal retractor was then placed.  Pericardium entered and pericardial well created.  The greater saphenous vein was then prepared for bypass by tying the branches and reinforcing with clips.  Patient was cannulated in the distal ascending aorta and right atrial appendage with a three-stage venous cannula.  Impella device was at P6.  After confirming ACT over 480, cardiopulmonary bypass commenced.  Distal anastomotic sites were inspected for feasibility.  A antegrade needle was placed in the proximal ascending aorta.  Retrograde catheter placed in the coronary sinus.  The Impella device was then placed in standby mode.  Aorta was palpated and the driveline above the motor housing unit was able to be palpated.  The cross-clamp with 2 soft insert was then placed across the aorta and across the driveline, above the motor housing.  Cardioplegia was given antegrade Impella device was at P2.  With this, there was excellent arrest of the heart after 300 mL of antegrade cardioplegia in the left ventricle remained empty.  We then gave retrograde cardioplegia.  Antegrade and cardioplegia were given at regular 15 point and will stop the operation in similar fashion.  Otherwise, the Impella was in surgical standby mode throughout the operation.  Cold slush applied to the heart and patient was cooled 32 °C.  A notch was made in the pericardium mammary pedicle later in the operative case.  Phrenic nerve was identified and spared.  Heart gently rotated to expose the posterior descending artery.  Arteriotomy was made and a 1 to 1.5 mm vessel.  Is thin-walled and very sclerotic but a 1 mm probe passed easily proximal and distal.  End-to-side anastomosis then made with reverse saphenous vein and a 7-0 Prolene.  Prior to tying down, the 1 mm probe passed easily proximal distal.  After tying down there is excellent antegrade and retrograde flow.   Vein graft is then sized to the ascending aorta, transected and spatulated and anastomosed to the aorta in end-to-side fashion with 6-0 Prolene suture.  Heart then was very gently rotated to expose the lateral obtuse marginal artery.  Mid to distal portion of the lateral obtuse marginal artery was visualized with 1.52 mm vessel and an arteriotomy was made here.  1 mm probe passed easily proximal and distal.  End-to-side anastomosis then made with reverse evidence vein and a 7-0 Prolene.  Prior to tying down, the anastomosis probed and patent proximal and distal.  After tying down there is excellent antegrade and retrograde flow.  Of note there is evidence of infarction in this area with patches of white myocardium.  Vein graft then sized to the ascending aorta, transected and spatulated anastomosed to the aorta and inside fashion.  Mammary pedicle then brought out of the left chest.  Is sized for length to the left anterior descending artery.  Heart gently rotated to expose left anterior descending artery.  Arteriotomy was made and a 1.5 to 2 mm vessel in the mid to distal portion beyond areas of calcification.  A 1 mm probe passed easily proximal distal.  The distal end of the mammary pedicle was then transected,'s partially skeletonized and spatulated.  It is then anastomose to the left anterior sending artery with an 8-0 Prolene suture.  Prior to tying down, a 1 mm probe passed easily proximal and distal left anterior descending artery.  After tying down, the bulldog was removed.  The anastomosis hemostatic and Doppler exam confirms antegrade and retrograde flow in the left anterior descending artery.  The pedicle secured to the heart with 6-0 Prolene x 2.  [place] cardioplegia given retrograde and antegrade.  Cross-clamp then gently removed.  Aorta appeared intact without evidence of dissection or bruising.  Impella resumed around P2.  Heart was defibrillated twice to normal sinus rhythm.  Biventricular pacing  wires placed x 1 and externalized.  After fully rewarming, patient was fully weaned off cardiopulmonary bypass slowly and without event.  Echocardiogram demonstrated improving contractility lateral wall the left ventricle.  Protamine was then given.  Patient was fully decannulated.  There was profound coagulopathy with bleeding profusely from the bone and all raw surfaces and surgical sites, however anastomoses appeared hemostatic.  Many blood products were transfused to assist with the coagulopathy.  After multiple rounds of packing and hemostasis of the bone, raw surfaces and closer inspection of the aorta, the coagulopathy did improve.  Ultimately, hemostasis not felt to be adequate enough for closure.  Chest tubes in place, 32 French right angle chest tube in the left wall space and a 32 French straight chest to the pericardial space.  Pericardium reapproximated to the aorta.  The sternum was then closed multiple single stainless steel wires reinforced with sternal plates and sternal lock blue system.  The wound was then closed in 2 layers, leaving the skin layer undone we performed bleeding trials to assess for hemostasis.  Fortunately, will continue to fail bleeding trials were not able to make times after 2 to 3 minutes after period of 20 minutes.  Therefore elected to reinspect.  Wires and plates were removed.  Inspection again demonstrated hemostasis adequate within the pericardial space however continued raw surface bleeding especially from the bone and now from the wire sites.  This was controlled with suture ligation and coag coagulation as needed.  At this point, the hemostasis appeared significantly improved.  Therefore the patient was reclosed, reapproximate the pericardium and aorta and closing the chest with single interrupted wires x 8.  The wound was then closed in multiple layers after copious irrigation.  We then performed bleeding trials the patient passed appropriately.  Sterile dressings  applied patient was then taken to the ICU in critical condition with the Impella at P6, dobutamine at 2 and low-dose Levophed.  Echocardiogram findings as above.  Sponge, needle and instrument counts were all correct at the end of the case.    Anesthesia: Cardiac    Complications: None    Implants: None    Specimen: None    Drains: 32 [place] chest tubes x 2    Condition: Critical to CVICU    Estimated Blood Loss: 1 L    Richie Llanes MD

## 2024-03-15 NOTE — DIETARY NOTE
Follow up  Nutrition Note         3/15/2024:  NPO. Pt at OR for CABG.   TPN and CRRT held an hour prior to surgery.   Per discussion with RN and APN, plan to continue to hold TPN post op.     Will monitor clinical status  for safe resumption of Nutrition Support therapy ( NST) -- TPN vs TF vs Oral diet (if extubated in future).       RD will continue to follow up.       Jaja Saldana RD, LDN, [facility]  Clinical Dietitian  [phone]  3/15/2024

## 2024-03-15 NOTE — PROGRESS NOTES
Received intubated pt on the following vent settings:   03/14/24 1724   Vent Information   Vent Mode VC/AC   Settings   FiO2 (%) 30 %   Resp Rate (Set) 15   Vt (Set, mL) 400 mL   Waveform Decelerating ramp   PEEP/CPAP (cm H2O) 5 cm H20     No changes made. No acute events observed. BS auscultated b/l, sx provided as needed. ABG analyzed as scheduled. ETT remains secured at 22cm at the lip. RT to continue to monitor.

## 2024-03-15 NOTE — ANESTHESIA PROCEDURE NOTES
Arterial Line    Date/Time: 3/15/2024 7:20 AM    Performed by: Soumya Sullivan MD  Authorized by: Soumya Sullivan MD    General Information and Staff    Procedure Start:  3/15/2024 7:20 AM  Procedure End:  3/15/2024 7:22 AM  Anesthesiologist:  Soumya Sullivan MD  Performed By:  Anesthesiologist  Patient Location:  OR  Indication: continuous blood pressure monitoring and blood sampling needed    Site Identification: real time ultrasound guided and surface landmarks    Preanesthetic Checklist: 2 patient identifiers, IV checked, risks and benefits discussed, monitors and equipment checked, pre-op evaluation, timeout performed, anesthesia consent and sterile technique used    Procedure Details    Catheter Size:  20 G  Catheter Length:  1 and 3/4 inch  Catheter Type:  Arrow  Seldinger Technique?: Yes    Laterality:  Left  Site:  Radial artery  Site Prep: chlorhexidine    Line Secured:  Wrist Brace, tape and Tegaderm    Assessment    Events: patient tolerated procedure well with no complications      Medications  3/15/2024 7:20 AM      Additional Comments    A-line placed easily first attempt, no hematoma

## 2024-03-15 NOTE — PLAN OF CARE
Cangrelor stopped at 0500  Additional surgical scrub with CHG done at 0515  TPN stopped at 0600  CRRT stopped and blood returned at 0600      To surgery at 0700

## 2024-03-15 NOTE — PLAN OF CARE
Remains sedated w/ response to voice and pain. Vent settings unchanged. Impella continued at P6, no suction alarms at this time. CRRT continued throughout night, net goal 0.  Plan for open heart surgery this am, will turn off CRRT, TPN, and Cangrelor prior.    Around 0200 - BP dropped, only preceding change was increase in afib rate to 130's. Cardiology APN Elder notified, received order for Digoxin 125 mcg. Rates improved 100-120, but BP did not. MAP maintained >60 (per APN's goals). CRRT rate decreased. No further intervention at this time. BP fluctuating between  systolic, MAP around 70.      Problem: Patient Centered Care  Goal: Patient preferences are identified and integrated in the patient's plan of care  Description: Interventions:  - What would you like us to know as we care for you? From home with spouse  - Provide timely, complete, and accurate information to patient/family  - Incorporate patient and family knowledge, values, beliefs, and cultural backgrounds into the planning and delivery of care  - Encourage patient/family to participate in care and decision-making at the level they choose  - Honor patient and family perspectives and choices  Outcome: Progressing     Problem: Patient/Family Goals  Goal: Patient/Family Long Term Goal  Description: Patient's Long Term Goal: discharge planning when CABG completed    Interventions:  - CPM  - See additional Care Plan goals for specific interventions  Outcome: Progressing  Goal: Patient/Family Short Term Goal  Description: Patient's Short Term Goal: have CABG on Friday morning    Interventions:   - discuss plans with CV surgeon  - See additional Care Plan goals for specific interventions  Outcome: Progressing     Problem: RESPIRATORY - ADULT  Goal: Achieves optimal ventilation and oxygenation  Description: INTERVENTIONS:  - Assess for changes in respiratory status  - Assess for changes in mentation and behavior  - Position to facilitate oxygenation and  minimize respiratory effort  - Oxygen supplementation based on oxygen saturation or ABGs  - Provide Smoking Cessation handout, if applicable  - Encourage broncho-pulmonary hygiene including cough, deep breathe, Incentive Spirometry  - Assess the need for suctioning and perform as needed  - Assess and instruct to report SOB or any respiratory difficulty  - Respiratory Therapy support as indicated  - Manage/alleviate anxiety  - Monitor for signs/symptoms of CO2 retention  Outcome: Progressing     Problem: CARDIOVASCULAR - ADULT  Goal: Maintains optimal cardiac output and hemodynamic stability  Description: INTERVENTIONS:  - Monitor vital signs, rhythm, and trends  - Monitor for bleeding, hypotension and signs of decreased cardiac output  - Evaluate effectiveness of vasoactive medications to optimize hemodynamic stability  - Monitor arterial and/or venous puncture sites for bleeding and/or hematoma  - Assess quality of pulses, skin color and temperature  - Assess for signs of decreased coronary artery perfusion - ex. Angina  - Evaluate fluid balance, assess for edema, trend weights  Outcome: Progressing  Goal: Absence of cardiac arrhythmias or at baseline  Description: INTERVENTIONS:  - Continuous cardiac monitoring, monitor vital signs, obtain 12 lead EKG if indicated  - Evaluate effectiveness of antiarrhythmic and heart rate control medications as ordered  - Initiate emergency measures for life threatening arrhythmias  - Monitor electrolytes and administer replacement therapy as ordered  Outcome: Progressing     Problem: Delirium  Goal: Minimize duration of delirium  Description: Interventions:  - Encourage use of hearing aids, eye glasses  - Promote highest level of mobility daily  - Provide frequent reorientation  - Promote wakefulness i.e. lights on, blinds open  - Promote sleep, encourage patient's normal rest cycle i.e. lights off, TV off, minimize noise and interruptions  - Encourage family to assist in  orientation and promotion of home routines  Outcome: Progressing     Problem: METABOLIC/FLUID AND ELECTROLYTES - ADULT  Goal: Hemodynamic stability and optimal renal function maintained  Description: INTERVENTIONS:  - Monitor labs and assess for signs and symptoms of volume excess or deficit  - Monitor intake, output and patient weight  - Monitor urine specific gravity, serum osmolarity and serum sodium as indicated or ordered  - Monitor response to interventions for patient's volume status, including labs, urine output, blood pressure (other measures as available)  - Encourage oral intake as appropriate  - Instruct patient on fluid and nutrition restrictions as appropriate  Outcome: Progressing     Problem: SKIN/TISSUE INTEGRITY - ADULT  Goal: Skin integrity remains intact  Description: INTERVENTIONS  - Assess and document risk factors for pressure ulcer development  - Assess and document skin integrity  - Monitor for areas of redness and/or skin breakdown  - Initiate interventions, skin care algorithm/standards of care as needed  Outcome: Progressing  Goal: Incision(s), wounds(s) or drain site(s) healing without S/S of infection  Description: INTERVENTIONS:  - Assess and document risk factors for pressure ulcer development  - Assess and document skin integrity  - Assess and document dressing/incision, wound bed, drain sites and surrounding tissue  - Implement wound care per orders  - Initiate isolation precautions as appropriate  - Initiate Pressure Ulcer prevention bundle as indicated  Outcome: Progressing  Goal: Oral mucous membranes remain intact  Description: INTERVENTIONS  - Assess oral mucosa and hygiene practices  - Implement preventative oral hygiene regimen  - Implement oral medicated treatments as ordered  Outcome: Progressing     Problem: NEUROLOGICAL - ADULT  Goal: Achieves stable or improved neurological status  Description: INTERVENTIONS  - Assess for and report changes in neurological status  -  Initiate measures to prevent increased intracranial pressure  - Maintain blood pressure and fluid volume within ordered parameters to optimize cerebral perfusion and minimize risk of hemorrhage  - Monitor temperature, glucose, and sodium. Initiate appropriate interventions as ordered  Outcome: Progressing

## 2024-03-15 NOTE — PROGRESS NOTES
[facility]     CC: Hospital Follow up    PCP: Isauro Pina MD       Assessment/Plan:   Mr. Obregon is a 53-year-old male with a history of hyperlipidemia, tobacco use, who presents to the hospital for evaluation of acute chest pain.  STEMI with cardiogenic shock requiring Impella placement.  Continuing ventilation support with CRRT.  S/p CABG on 3/15     STEMI  Cardiogenic shock   -Multivessel disease including chronically occluded large RCA, 95% sequential LAD stenosis on cath 3/5  -flow restored to heavily thrombosed circumflex and OM1  -Impella cardiology  -taken to cath again emergently on 3/6/24, for right heart cath to accurately obtain hemodynamics. Worsening renal function noted.   -trialed IV hydration, renal function worsening----started on CRRT on 3/7  -cardiology, nephrology, CV surgery and critical care on consult   -vent management and sedation per critical care  -s/p CABG on 3/15  -post CABG protocol per Cardiology, CV surgery, planning to resume CRRT now, s/p 18 units of blood products in OR    Acute kidney injury  Anuric   -suspect cardiogenic shock  -component of ATI  -decreased urine output   -start CRRT on 3/7.   -resume CRRT, post CABG now, s/p 18 units of blood products in OR    HCAP  Fevers  -Low-grade fevers  -Sputum with Klebsiella and staph  -Continue cefepime-> Changed to zosyn per ID  - fluconazole added per ID  -persistent fever, repeat BC 3/14 ->NGTD, ID consulteded    Hematuria, resolved  -yellow urine noted, minimal, but not bloody      Acute hypoxic respiratory failure  -currently on vent, monitor oxygenation   -critical care consulted  -continue full vent support   -keep intubated immediately post op       Diet: PPN to TPN   DVT prophylaxis: per CV surgery  Code status: FULL    Discussed with  at bedside   Discussed with RN     Disposition: ICU     Bud Palmer [facility] Hospitalist        Subjective:     Intubated,  sedated   Seen immediately post op    OBJECTIVE:    Blood pressure 103/83, pulse 111, temperature 100 °F (37.8 °C), temperature source Pulmonary Artery, resp. rate 19, height 5' 7\" (1.702 m), weight 188 lb 4.4 oz (85.4 kg), SpO2 96%.    Temp:  [98.9 °F (37.2 °C)-100.4 °F (38 °C)] 100 °F (37.8 °C)  Pulse:  [] 111  Resp:  [17-27] 19  BP: ()/(67-89) 103/83  SpO2:  [95 %-100 %] 96 %  AO: ()/(63-81) 100/74  FiO2 (%):  [30 %] 30 %      Intake/Output:    Intake/Output Summary (Last 24 hours) at 3/15/2024 1556  Last data filed at 3/15/2024 1452  Gross per 24 hour   Intake 57488.3 ml   Output 4798 ml   Net 6447.3 ml       Last 3 Weights   03/15/24 0600 188 lb 4.4 oz (85.4 kg)   03/14/24 0600 184 lb 15.5 oz (83.9 kg)   03/12/24 1000 186 lb 4.6 oz (84.5 kg)   03/11/24 1000 192 lb 10.9 oz (87.4 kg)   03/09/24 0500 188 lb 15 oz (85.7 kg)   03/08/24 0600 185 lb 13.6 oz (84.3 kg)   03/07/24 0600 183 lb 3.2 oz (83.1 kg)   03/06/24 0500 180 lb 1.9 oz (81.7 kg)   03/05/24 1324 175 lb 7.8 oz (79.6 kg)   03/05/24 0722 160 lb (72.6 kg)   10/06/21 1541 177 lb 6.4 oz (80.5 kg)   09/09/20 0929 172 lb 9.6 oz (78.3 kg)       /83 (BP Location: Left arm)   Pulse 111   Temp 100 °F (37.8 °C) (Pulmonary Artery)   Resp 19   Ht 5' 7\" (1.702 m)   Wt 188 lb 4.4 oz (85.4 kg)   SpO2 96%   BMI 29.49 kg/m²   General: intubated, sedated  HEENT: Cortis, ET tube intact  Lungs: mechanical breath sounds   Heart: Regular rate and rhythm, S1, S2   Abdomen: soft  Extremities: mild upper/lower ext edema  Skin: normal color    Data Review:       Labs:     Recent Labs   Lab 03/13/24  1452 03/14/24  0408 03/15/24  0423 03/15/24  1256 03/15/24  1521   RBC 2.86* 2.64* 2.64*  --   --    HGB 8.9* 7.8* 7.7*  --   --    HCT 25.4* 23.6* 23.5*  --   --    MCV 88.8 89.4 89.0  --   --    MCH 31.1 29.5 29.2  --   --    MCHC 35.0 33.1 32.8  --   --    RDW 14.0 13.9 13.7  --   --    NEPRELIM 5.86 7.53 6.14  --   --    WBC 9.2 10.4 9.3  --   --     PLT 79.0* 90.0* 119.0* 163.0 154.0         Recent Labs   Lab 03/14/24  0408 03/14/24  1812 03/15/24  0423   *  136*  136* 132* 126*   BUN 36*  36*  36* 35* 38*   CREATSERUM 3.18*  3.18*  3.18* 2.93* 2.99*   EGFRCR 22*  22*  22* 24* 24*   CA 8.6*  8.6*  8.6* 8.4* 8.5*     136  136 137 137   K 3.7  3.7  3.7 3.8 3.9     104  104 105 105   CO2 26.0  26.0  26.0 25.0 26.0       Recent Labs   Lab 03/10/24  0405 03/10/24  1802 03/12/24  0539 03/12/24  1639 03/13/24  0504 03/13/24  1744 03/14/24 0408 03/14/24  1812 03/15/24  0423   ALT 56*  --  78*  --  69*  --  57*  57*  --   --    *  --  103*  --  89*  --  68*  68*  --   --    ALB 2.9*  2.9*   < > 3.1*  3.1*   < > 2.9*  2.9* 2.9* 2.9*  2.9*  2.9* 2.9* 2.9*    < > = values in this interval not displayed.         Imaging:  No results found.      Meds:      norepinephrine        famotidine  20 mg Oral BID    Or    famotidine  20 mg Intravenous BID    [START ON 3/16/2024] metoprolol tartrate  25 mg Oral 2x Daily(Beta Blocker)    mupirocin  1 Application Nasal BID    chlorhexidine gluconate  15 mL Mouth/Throat BID    [START ON 3/16/2024] multivitamin  1 tablet Oral Daily    [START ON 3/16/2024] ascorbic acid  500 mg Oral TID    acetaminophen  1,000 mg Intravenous Q8H    [START ON 3/16/2024] heparin  5,000 Units Subcutaneous 2 times per day    [START ON 3/16/2024] clopidogrel  75 mg Oral Daily    [START ON 3/16/2024] aspirin  81 mg Oral Daily    metoclopramide  10 mg Intravenous Q6H    ceFAZolin  2 g Intravenous Q8H    piperacillin-tazobactam  3.375 g Intravenous Q8H    fluconazole  400 mg Intravenous Q24H    [MAR Hold] sodium chloride   Intravenous Once    [MAR Hold] mineral oil-white petrolatum   Both Eyes TID      insulin regular 4 Units/hr (03/15/24 1443)    dextrose 5%-sodium chloride 0.9%      DOBUTamine      nitroGLYCERIN in dextrose 5%      norepinephrine      dexmedetomidine      insulin regular      propofol       adult 3 in 1 TPN Stopped (03/15/24 0556)    sodium chloride 10 mL/hr at 03/14/24 2047    NxStage Pure Flow 401 CRRT/PIRRT fluid 5000mL 2.5 L/hr (03/14/24 2135)    dextrose 10%      sodium bicarbonate 25mEq in dextrose 5% 1000mL IMPELLA purge solution 10 mL/hr (03/13/24 1750)    propofol 50 mcg/kg/min (03/15/24 1220)    fentanyl Stopped (03/15/24 0925)     norepinephrine, melatonin, polyethylene glycol (PEG 3350), sennosides, bisacodyl, ondansetron, prochlorperazine, albumin human, DOBUTamine, nitroGLYCERIN in dextrose 5%, norepinephrine, milrinone in dextrose 5%, potassium chloride **OR** potassium chloride, magnesium sulfate in dextrose 5%, magnesium sulfate in sterile water for injection, acetaminophen **OR** HYDROcodone-acetaminophen **OR** HYDROcodone-acetaminophen, morphINE **OR** morphINE **OR** morphINE, glucose **OR** glucose **OR** glucose-vitamin C **OR** dextrose **OR** glucose **OR** glucose **OR** glucose-vitamin C, [MAR Hold] diazepam, dextrose 10%, [MAR Hold] heparin, [MAR Hold] acetaminophen **OR** [MAR Hold] acetaminophen **OR** [MAR Hold] acetaminophen **OR** [MAR Hold] acetaminophen, [MAR Hold] polyethylene glycol (PEG 3350), [MAR Hold] senna, [MAR Hold] bisacodyl, [MAR Hold] fleet enema, fentanyl

## 2024-03-15 NOTE — PLAN OF CARE
Problem: RESPIRATORY - ADULT  Goal: Achieves optimal ventilation and oxygenation  Description: INTERVENTIONS:  - Assess for changes in respiratory status  - Assess for changes in mentation and behavior  - Position to facilitate oxygenation and minimize respiratory effort  - Oxygen supplementation based on oxygen saturation or ABGs  - Provide Smoking Cessation handout, if applicable  - Encourage broncho-pulmonary hygiene including cough, deep breathe, Incentive Spirometry  - Assess the need for suctioning and perform as needed  - Assess and instruct to report SOB or any respiratory difficulty  - Respiratory Therapy support as indicated  - Manage/alleviate anxiety  - Monitor for signs/symptoms of CO2 retention  Outcome: Progressing     Pt remains intubated with ETT size 7.0 at 23 cm at the lip, on full support. Pt is stable and tolerating well, saturating appropriately, suction as needed. ABG and VBG analyzed before midnight.   Vent settings and readings as follow:       03/14/24 2350   Vent Information   Is this patient on Chronic Ventilation? No   Interface Invasive   Vent Type AV   Vent plugged into main power? Yes   Vent ID    Vent Mode VC/AC   Settings   FiO2 (%) 30 %   Resp Rate (Set) 15   Vt (Set, mL) 400 mL   Waveform Decelerating ramp   PEEP/CPAP (cm H2O) 5 cm H20   PEEP Low (cm H2O) 2 cm H2O   Peak Flow LPM 60   Trigger Sensitivity Flow (L/min) 3 L/min   Trigger Sensitivity Pressure (cm H2O) 3 cm H2O   Humidification Heater   H2O Bag Level 3/4 Full   Heater Temperature 99.3 °F (37.4 °C)   Readings   Total RR 20   Minute Ventilation (L/min) 8.6 L/min   Expiratory Tidal Volume 420 mL   PIP Observed (cm H2O) 17 cm H2O   MAP (cm H2O) 7   PEEP Low (cm H2O) 2 cm H2O   I/E Ratio 1:4.6   Plateau Pressure (cm H2O) 12 cm H2O   Static Compliance (L/cm H2O) 37   Dynamic Compliance (L/cm H2O) 41 L/cm H2O   Airway Resistance 9.6   Alarms   High RR 40   Insp Pressure High (cm H2O) 40 cm H2O   Insp Pressure Low (cm  H2O) 6 cm H2O   MV High (L/min) 20 L/min   MV Low (L/min) 3 L/min   Apnea Interval (sec) 20 seconds   ETT   Placement Date: 03/05/24   Airway Size: 7 mm  Placed By: Anesthesiologist   Secured at (cm) 23 cm   Suctioned? N   Measured From Lips   Secured Location Left   Secured by Commercial tube gunderson   Req'd equipment at bedside Bag mask

## 2024-03-15 NOTE — PLAN OF CARE
Problem: ALTERED NUTRIENT INTAKE - ADULT  Goal: Nutrient intake appropriate for improving, restoring or maintaining nutritional needs  Description: INTERVENTIONS:  - Assess nutritional status and recommend course of action  - Monitor oral intake when eating, labs, and treatment plans  - Recommend appropriate diets, oral nutritional supplements, and vitamin/mineral supplements  - monitor, and adjust tube feedings and TPN/PPN based on assessed needs  - Provide specific nutrition education as appropriate  Outcome: Progressing

## 2024-03-15 NOTE — RESPIRATORY THERAPY NOTE
Received patient From OR  , open heart surgery . CABG X 3, on ventilator, ETT 7 at 23 Lip    Ventilator setting set by MD Ryan Wen: AC/VC R 10 , PEEP + 5, FIO2 50%, ABG Done at 16:00, Reported to PATEL Farmer     03/15/24 16:04   ABG PH 7.40   ABG PCO2 35   ABG PO2 81   ABG HCO3 22.8   ABG O2 SATURATION 98.6   Blood Gas Base Excess -2.7 (L)   TOTAL HEMOGLOBIN 10.3 (L)   METHEMOGLOBIN 0.6   POTASSIUM BLOOD GAS 4.2   SODIUM BLOOD    Lactic Acid (Blood Gas) 1.7   Oxygen Delivery Device Vent   Blood Gas Vent Mode A/C   Blood Gas Respiration Rate 10   Oxygen Content 13.9 (L)   CARBOXYHEMOGLOBIN 2.6     Found patient with new setting changed by MD Banuelos, AC/VC R 12,  , PEEP +5, FIO2 50%, ABG and VBG done after one hour, Results reported to PATEL Farmer.   03/15/24 17:50   ABG PH 7.43   ABG PCO2 35   ABG PO2 170 (H)   ABG HCO3 24.4   ABG O2 SATURATION >99.0   Blood Gas Base Excess -0.7   Oxygen Delivery Device Vent   Blood Gas Vent Mode A/C   Blood Gas Respiration Rate 12   Oxygen Content 17.3      03/15/24 17:39   VENOUS pH 7.38   VENOUS PCO2 44   VENOUS PO2 34 (L)   VENOUS O2 SATURATION 68.2   VENOUS BICARBONATE 24.4   VENOUS SAMPLE SITE Central Line   FiO2 50.00   TIDAL VOLUME 650.0   PEEP 5.0   (L): Data is abnormally low      RT will continuing monitor this patient. Alarms were check

## 2024-03-15 NOTE — PROGRESS NOTES
Notified by PATEL-Brianda pt in afib with RVR-rate up to 130s.BP 90s/60s. MAP in the 70s. Digoxin 125 mcg IVP x 1 ordered. Will initiate pressors if MAP < 60.

## 2024-03-15 NOTE — PROGRESS NOTES
[facility]  part of [facility]    Cardiology Progress Note    Cristiano Obregon Patient Status:  Inpatient    1970 MRN [id number]   Location [facility] 2W/SW Attending Bud Camarena MD   Hosp Day # 10 PCP Isauro Pina MD     Subjective   Patient's status post CABG x 3  LIMA to LAD, SVG to PDA and SVG to OM  Intubated and sedated-on propofol  Impella in place  Chest Tube- Mediastinal and Left pleural    Objective:  / 64    Vent Fio2 50%  SvO2 51  CO 5.1 CI 2.9            Intake/Output                   24 0700 - 24 0659 24 0700 - 03/15/24 0659 03/15/24 07 - 24 0659       Intake    I.V.  1523.8  1549.2  1700    I.V. 446 161 --    Volume (mL) Cangrelor 365 357.7 --    Volume (mL) Propofol 350.6 369 --    Volume (mL) Fentanyl 127.5 123.5 --    Amount given (ml) 14.7 -- --    Volume (mL) Impella Non-Heparin   (sodium bicarbonate 25mEq in dextrose 5% 1000mL IMPELLA purge solution) 220 263 --    Volume (mL) (sodium chloride 0.9% infusion) -- 275 --    Volume (mL) (sodium chloride 0.9% infusion) -- -- 1700    Blood  331  --  6950    Cell Saver Blood  -- -- 1000    Volume (Transfuse RBC) 331 -- --    Volume (Transfuse RBC) -- -- 350    Volume (Transfuse RBC) -- -- 350    Volume (Transfuse RBC) -- -- 350    Volume (Transfuse RBC) -- -- 350    Volume (Transfuse RBC) -- -- 350    Volume (Transfuse platelets) -- -- 350    Volume (Transfuse platelets) -- -- 350    Volume (Transfuse platelets) -- -- 350    Volume (Transfuse fresh frozen plasma) -- -- 350    Volume (Transfuse fresh frozen plasma) -- -- 350    Volume (Transfuse platelets) -- -- 350    Volume (Transfuse cryoprecipitate) -- -- 350    Volume (Transfuse fresh frozen plasma) -- -- 350    Volume (Transfuse fresh frozen plasma) -- -- 350    Volume (Transfuse RBC) -- -- 350    Volume (Transfuse RBC) -- -- 350    Volume (Transfuse RBC) -- -- 350    IV PIGGYBACK  373  638.2  215    Volume (mL)  (ceFEPIme (Maxipime) 1 g in sodium chloride 0.9% 100 mL IVPB-MBP) 173 15.4 --    Volume (mL) (piperacillin-tazobactam (Zosyn) 3.375 g in dextrose 5% 100 mL IVPB-ADDV) -- 222 --    Volume (mL) (fluconazole (Diflucan) 2 mg/mL IVPB (6 hour duration) 800 mg) -- 400.8 --    Volume (mL) (ceFAZolin (Ancef) 2 g in 20mL IV syringe premix) -- -- 40    Volume (mL) (aminocaproic acid 5 g BOLUS FROM BAG infusion) -- -- 125    Volume (mL) (desmopressin (DDAVP) 25.6 mcg in sodium chloride 0.9% 50 mL IVPB) -- -- 50    Volume (mL) (acetaminophen (Ofirmev) 10 mg/mL infusion premix 1,000 mg) 200 -- --    TPN  1114  1180  --    Volume Infused  (mL) 1114 1180 --    Total Intake 3341.8 3367.4 8865       Output    Urine  17  9  --    Output (mL) ([REMOVED] Urethral Catheter Latex) 17 9 --    Emesis/NG output  --  100  --    Emesis -- 100 --    Other  1235  3725  --    Actual UF removed (History Screen) 1132 3085 --    Blood  --  --  2100    Blood Output -- -- 2100    Total Output 2712 3834 2100       Net I/O     629.8 -466.6 6765             Vent Settings: Vent Mode: VC/AC  FiO2 (%):  [30 %] 30 %  S RR:  [15] 15  S VT:  [400 mL] 400 mL  PEEP/CPAP (cm H2O):  [5 cm H20] 5 cm H20  MAP (cm H2O):  [7-8] 8    Hemodynamic parameters (last 24 hours): PAP: (30-45)/(15-25) 34/21  CO:  [5.8 L/min-7.2 L/min] 6.6 L/min  CI:  [3 L/min/m2-3.7 L/min/m2] 3.4 L/min/m2    Scheduled Meds:    norepinephrine        famotidine  20 mg Oral Daily    Or    famotidine  20 mg Intravenous Daily    [START ON 3/16/2024] metoprolol tartrate  25 mg Oral 2x Daily(Beta Blocker)    mupirocin  1 Application Nasal BID    chlorhexidine gluconate  15 mL Mouth/Throat BID    [START ON 3/16/2024] multivitamin  1 tablet Oral Daily    [START ON 3/16/2024] ascorbic acid  500 mg Oral TID    acetaminophen  1,000 mg Intravenous Q8H    [START ON 3/16/2024] heparin  5,000 Units Subcutaneous 2 times per day    [START ON 3/16/2024] clopidogrel  75 mg Oral Daily    [START ON 3/16/2024]  aspirin  81 mg Oral Daily    metoclopramide  10 mg Intravenous Q6H    piperacillin-tazobactam  3.375 g Intravenous Q8H    fluconazole  400 mg Intravenous Q24H    [MAR Hold] sodium chloride   Intravenous Once    [MAR Hold] mineral oil-white petrolatum   Both Eyes TID       Continuous Infusions:    insulin regular 4 Units/hr (03/15/24 1443)    dextrose 5%-sodium chloride 0.9%      DOBUTamine      nitroGLYCERIN in dextrose 5%      norepinephrine      dexmedetomidine      insulin regular      propofol      NxStage Pure Flow 401 CRRT/PIRRT fluid 5000mL      adult 3 in 1 TPN Stopped (03/15/24 0556)    sodium chloride 10 mL/hr at 03/14/24 2047    dextrose 10%      sodium bicarbonate 25mEq in dextrose 5% 1000mL IMPELLA purge solution 10 mL/hr (03/13/24 1750)    propofol 50 mcg/kg/min (03/15/24 1220)    fentanyl Stopped (03/15/24 0925)       Results:     Lab Results   Component Value Date    WBC 9.3 03/15/2024    HGB 7.7 (L) 03/15/2024    HCT 23.5 (L) 03/15/2024    .0 03/15/2024    CREATSERUM 3.70 (H) 03/15/2024    CREATSERUM 3.70 (H) 03/15/2024    BUN 43 (H) 03/15/2024    BUN 43 (H) 03/15/2024     03/15/2024     03/15/2024    K 5.1 03/15/2024    K 5.1 03/15/2024     03/15/2024     03/15/2024    CO2 23.0 03/15/2024    CO2 23.0 03/15/2024     (H) 03/15/2024     (H) 03/15/2024    CA 8.0 (L) 03/15/2024    CA 8.0 (L) 03/15/2024    ALB 2.8 (L) 03/15/2024    ALKPHO 137 (H) 03/14/2024    ALKPHO 137 (H) 03/14/2024    BILT 0.5 03/14/2024    BILT 0.5 03/14/2024    TP 6.5 03/14/2024    TP 6.5 03/14/2024    AST 68 (H) 03/14/2024    AST 68 (H) 03/14/2024    ALT 57 (H) 03/14/2024    ALT 57 (H) 03/14/2024    PTT 32.1 03/15/2024    INR 1.31 (H) 03/15/2024    PSA 1.95 10/06/2021    DDIMER 13.83 (H) 03/15/2024    MG 2.1 03/15/2024    PHOS 3.4 03/15/2024    PHOS 3.4 03/15/2024       Recent Labs   Lab 03/14/24  1812 03/15/24  0423 03/15/24  1605   * 126* 152*  152*   BUN 35* 38* 43*  43*    CREATSERUM 2.93* 2.99* 3.70*  3.70*   CA 8.4* 8.5* 8.0*  8.0*    137 137  137   K 3.8 3.9 5.1  5.1    105 106  106   CO2 25.0 26.0 23.0  23.0     Recent Labs   Lab 24  1452 24  0408 03/15/24  0423 03/15/24  1256 03/15/24  1521   RBC 2.86* 2.64* 2.64*  --   --    HGB 8.9* 7.8* 7.7*  --   --    HCT 25.4* 23.6* 23.5*  --   --    MCV 88.8 89.4 89.0  --   --    MCH 31.1 29.5 29.2  --   --    MCHC 35.0 33.1 32.8  --   --    RDW 14.0 13.9 13.7  --   --    NEPRELIM 5.86 7.53 6.14  --   --    WBC 9.2 10.4 9.3  --   --    PLT 79.0* 90.0* 119.0* 163.0 154.0       No results for input(s): \"BNPML\" in the last 168 hours.    No results for input(s): \"TROP\", \"CK\" in the last 168 hours.    No results found.  EKG 12 Lead    Result Date: 3/15/2024  Sinus tachycardia Rightward axis Cannot rule out Inferior infarct , age undetermined Abnormal ECG When compared with ECG of 12-MAR-2024 19:38, Sinus rhythm has replaced Atrial fibrillation QRS duration has decreased ST no longer depressed in Anterior-lateral leads T wave inversion no longer evident in Anterior-lateral leads   CARD ECHO LIMITED (CPT=93308)    Result Date: 3/11/2024  Transthoracic Echocardiogram Name:Cristiano Obregon Date: 2024 :  1970 Ht:  (67in)  BP: 110 / 76 MRN:  [id number]    Age:  53years    Wt:  (192lb) HR: 75bpm Loc:  [facility]       Gndr: M          BSA: 1.99m^2 Sonographer: MobiTX Ordering:    Ruben Ramirez Consulting:  Janet Velazco ---------------------------------------------------------------------------- History/Indications:   Coronary artery disease.  Impella placement. ---------------------------------------------------------------------------- Procedure information:  A transthoracic echocardiogram, limited study was performed. Additional evaluation included M-mode and limited 2D.  Patient status:  Inpatient.  Location:  [facility]    Comparison was made to the study of 2024.    This was a routine study. Transthoracic  echocardiography for diagnosis and ventricular function evaluation. Image quality was adequate. ECG rhythm:   Normal sinus ---------------------------------------------------------------------------- Conclusions: 1. Left ventricle: The cavity size was normal. Wall thickness was moderately    increased. The estimated ejection fraction was 50-55%, by visual    assessment. Hypokinesis of the inferior wall. Hypokinesis of the    basal-midlateral wall. Impella catheter noted in the left ventricle,    placement measures 3.30cm from the aortic valve. 2. Right ventricle: Pacer wire noted in the right ventricle. 3. Left atrium: The left atrial volume was moderately increased. 4. Right atrium: Pacer wire noted in right atrium. 5. Pericardium, extracardiac: There was a right pleural effusion. 6. Left ventricle: Impressions:  This study is compared with previous dated 03/08/2024: * ---------------------------------------------------------------------------- * Findings: Left ventricle:  The cavity size was normal. Wall thickness was moderately increased. The estimated ejection fraction was 50-55%, by visual assessment. Impella catheter noted in the left ventricle, placement measures 3.30cm from the aortic valve.  Regional wall motion abnormalities:   Hypokinesis of the inferior wall.  Hypokinesis of the basal-midlateral wall. Left atrium:  The left atrial volume was moderately increased. Right ventricle:  The cavity size was normal. Pacer wire noted in the right ventricle. Systolic function was normal. Right atrium:  The atrium was normal in size. Pacer wire noted in right atrium. Mitral valve:  The valve was structurally normal. Leaflet separation was normal. Aortic valve:  The valve was structurally normal. The valve was trileaflet. Cusp separation was normal. Tricuspid valve:  The valve is structurally normal. Leaflet separation was normal. Pulmonic valve:   Not visualized. Pleura:  There was a right pleural effusion.  Pulmonary arteries: Systolic pressure could not be accurately estimated. Systemic veins:  Central venous respirophasic diameter changes are blunted (< 50%). Inferior vena cava: The IVC was normal-sized. ---------------------------------------------------------------------------- Measurements  Left ventricle         Value        Ref       2024  IVS thickness, ED,     1.0   cm     0.6 - 1.0 1.0  PLAX  LV ID, ED, PLAX        5.0   cm     4.2 - 5.8 5.1  LV ID, ES, PLAX        3.6   cm     2.5 - 4.0 3.8  LV PW thickness,   (H) 1.1   cm     0.6 - 1.0 0.9  ED, PLAX  IVS/LV PW ratio,       0.91         --------- 1.11  ED, PLAX  LV PW/LV ID ratio,     0.22         --------- 0.18  ED, PLAX  LV ejection            54    %      52 - 72   50  fraction  Left atrium            Value        Ref       2024  LA volume, S       (H) 88    ml     18 - 58   ----------  LA volume/bsa, S   (H) 44    ml/m^2    ----------  LA volume, ES, 1-p (H) 95    ml     18 - 58   ----------  A4C  LA volume, ES, 1-p (H) 81    ml     18 - 58   ----------  A2C  LA volume, ES, A/L     95    ml     --------- ----------  LA volume/bsa, ES, (H) 48    ml/m^2 16  34   ----------  A/L  Inferior vena cava     Value        Ref       2024  ID                     1.9   cm     <=2.1     2.5 Legend: (L)  and  (H)  liza values outside specified reference range. ---------------------------------------------------------------------------- Prepared and electronically signed by Ruebn Ramirez 2024 19:47     CARD ECHO 2D W/O DOPPLER (CPT=93307)    Result Date: 3/8/2024  Transthoracic Echocardiogram Name:Cristiano Obregon Date: 2024 :  1970 Ht:  (67in)  BP: 127 / 78 MRN:  [id number]    Age:  53years    Wt:  (185lb) HR: 75bpm Loc:  [facility]       Gndr: M          BSA: 1.96m^2 Sonographer: Manuel Ordering:    Carlitos Sandoval Consulting:  Janet Velazco ----------------------------------------------------------------------------  History/Indications:  Impella placement. ---------------------------------------------------------------------------- Procedure information:  A transthoracic echocardiogram, limited study was performed. Additional evaluation included M-mode and limited 2D.  Patient status:  Inpatient.  Location:  [facility]    This was a routine study. Transthoracic echocardiography for diagnosis, ventricular function evaluation, and post closure device deployment evaluation. Image quality was adequate. ECG rhythm:   Normal sinus ---------------------------------------------------------------------------- Conclusions: 1. Left ventricle: The cavity size was normal. Wall thickness was normal.    Systolic function was normal. The estimated ejection fraction was 50-55%,    by visual assessment. Hypokinesis of the anterolateral wall. Unable to    assess LV diastolic function. Impella catheter noted in the left    ventricle, placement measures 3.50cm from the aortic valve. 2. Right ventricle: Pacer wire noted in the right ventricle. 3. Right atrium: Pacer wire noted in right atrium. * ---------------------------------------------------------------------------- * Findings: Left ventricle:  The cavity size was normal. Wall thickness was normal. Systolic function was normal. The estimated ejection fraction was 50-55%, by visual assessment. Impella catheter noted in the left ventricle, placement measures 3.50cm from the aortic valve.  Regional wall motion abnormalities:  Hypokinesis of the anterolateral wall. Unable to assess LV diastolic function. Right ventricle:  The cavity size was normal. Pacer wire noted in the right ventricle. Systolic function was normal. Right atrium:  Pacer wire noted in right atrium. Mitral valve:  The valve was structurally normal. Leaflet separation was normal. Aortic valve:   The valve was trileaflet. Tricuspid valve:  The valve is structurally normal. Leaflet separation was normal. Pulmonic valve:   Not visualized.  Pericardium:   There was no pericardial effusion. Pulmonary arteries: Systolic pressure could not be accurately estimated. Systemic veins:  Central venous respirophasic diameter changes are blunted (< 50%). Inferior vena cava: The IVC was dilated. ---------------------------------------------------------------------------- Measurements  Left ventricle         Value        Ref       2024  IVS thickness, ED,     1.0   cm     0.6 - 1.0 1.3  PLAX  LV ID, ED, PLAX        5.1   cm     4.2 - 5.8 4.4  LV ID, ES, PLAX        3.8   cm     2.5 - 4.0 3.3  LV PW thickness,       0.9   cm     0.6 - 1.0 1.4  ED, PLAX  IVS/LV PW ratio,       1.11         --------- 0.93  ED, PLAX  LV PW/LV ID ratio,     0.18         --------- 0.32  ED, PLAX  LV ejection        (L) 50    %      52 - 72   50  fraction  Inferior vena cava     Value        Ref       2024  ID                 (H) 2.5   cm     <=2.1     ----------  Right ventricle        Value        Ref       2024  TAPSE, 2D              2.39  cm     >=1.70    ----------  TAPSE, MM              2.39  cm     >=1.70    ----------  RV s', lateral         18.8  cm/sec >=9.5     ---------- Legend: (L)  and  (H)  liza values outside specified reference range. ---------------------------------------------------------------------------- Prepared and electronically signed by Gurjit Elliott 2024 17:42     CARD ECHO 2D W/O DOPPLER (CPT=93307)    Result Date: 3/7/2024  Transthoracic Echocardiogram Name:Cristiano Obregon Date: 2024 :  1970 Ht:  (67in)  BP: 105 / 68 MRN:  [id number]    Age:  53years    Wt:  (183lb) HR: Loc:  [facility]       Gndr: M          BSA: 1.95m^2 Sonographer: Glenna DENIS Ordering:    Carlitos Sandoval Consulting:  Janet Velazco ---------------------------------------------------------------------------- History/Indications:   Impella Placement. ---------------------------------------------------------------------------- Procedure information:  A  transthoracic echocardiogram, limited study was performed. Additional evaluation included M-mode and limited 2D.  Patient status:  Inpatient.  Location:  Bedside.    Comparison was made to the study of 03/06/2024.    This was a routine study. Transthoracic echocardiography for diagnosis. Image quality was adequate. The study was technically limited due to restricted patient mobility and patient supine. ---------------------------------------------------------------------------- Conclusions: Left ventricle: The cavity size was normal. Wall thickness was mildly increased. Systolic function was mildly reduced. The estimated ejection fraction was 45-50%, by biplane method of disks. Unable to assess LV diastolic function. Impressions:  No significant change since prior study. * ---------------------------------------------------------------------------- * Findings: Left ventricle:  The cavity size was normal. Wall thickness was mildly increased. Systolic function was mildly reduced. The estimated ejection fraction was 45-50%, by biplane method of disks. Unable to assess LV diastolic function. Pericardium:   There was no pericardial effusion. Pleura:  No evidence of pleural fluid accumulation. ---------------------------------------------------------------------------- Measurements  Left ventricle         Value        Ref       03/06/2024  IVS thickness, ED, (H) 1.3   cm     0.6 - 1.0 ----------  PLAX  LV ID, ED, PLAX        4.4   cm     4.2 - 5.8 ----------  LV ID, ES, PLAX        3.3   cm     2.5 - 4.0 ----------  LV PW thickness,   (H) 1.4   cm     0.6 - 1.0 ----------  ED, PLAX  IVS/LV PW ratio,       0.93         --------- ----------  ED, PLAX  LV PW/LV ID ratio,     0.32         --------- ----------  ED, PLAX  LV ejection        (L) 50    %      52 - 72   ----------  fraction  LV end-diastolic       90    ml     69 - 185  82  volume, 1-p A4C  LV ejection            46    %      46 - 74   39  fraction, 1-p A4C   Stroke volume, 1-p     41    ml     --------- 32  A4C  LV end-diastolic       46    ml/m^2 37 - 93   43  volume/bsa, 1-p  A4C  Stroke volume/bsa,     21    ml/m^2 --------- 17  1-p A4C  LV end-diastolic       79    ml     62 - 150  81  volume, 2-p  LV end-systolic        45    ml     21 - 61   49  volume, 2-p  LV ejection        (L) 43    %      52 - 72   40  fraction, 2-p  Stroke volume, 2-p     34    ml     --------- 33  LV end-diastolic       40    ml/m^2 34 - 74   42  volume/bsa, 2-p  LV end-systolic        23    ml/m^2 11 - 31   25  volume/bsa, 2-p  Stroke volume/bsa,     17.2  ml/m^2 --------- 16.9  2-p Legend: (L)  and  (H)  liza values outside specified reference range. ---------------------------------------------------------------------------- Prepared and electronically signed by Carlitos Sandoval 2024 12:43     CARD ECHO 2D W/O DOPPLER (CPT=93307)    Result Date: 3/6/2024  Transthoracic Echocardiogram Name:Cristiano Obregon Date:    2024    :     1970    Ht:     (67in)     BP: MRN:     [id number]       Age:     53years       Wt:     (180lb)    HR: Loc:     [facility]          Gndr:    M             BSA:    1.93m^2 Sonographer: Pao DENIS, [facility] Ordering:    Carlitos Sandoval Consulting:  Janet Velazco ---------------------------------------------------------------------------- Procedure information:  A transthoracic echocardiogram, limited study was performed. Additional evaluation included M-mode and limited 2D.  Image quality was adequate. ECG rhythm:   Normal sinus ---------------------------------------------------------------------------- Conclusions: Left ventricle: The cavity size was normal. Wall thickness was normal. Systolic function was mildly reduced. The estimated ejection fraction was 40-45%, by biplane method of disks. Impella catheter noted in the left ventricle, placement measures 3.70cm from the aortic valve. * ----------------------------------------------------------------------------  * Findings: Left ventricle:  The cavity size was normal. Wall thickness was normal. Systolic function was mildly reduced. The estimated ejection fraction was 40-45%, by biplane method of disks. Impella catheter noted in the left ventricle, placement measures 3.70cm from the aortic valve. Left atrium:  The atrium was normal in size. Right ventricle:  The cavity size was normal. Wall thickness was normal. Systolic function was normal. Right atrium:  The atrium was normal in size. Mitral valve:  The valve was structurally normal. Leaflet separation was normal. Aortic valve:  The valve was structurally normal. The valve was trileaflet. Cusp separation was normal. Tricuspid valve:  The valve is structurally normal. Leaflet separation was normal. Pulmonic valve:   The valve is structurally normal. Cusp separation was normal. Pericardium:   There was no pericardial effusion. Aorta: Aortic root: The aortic root was normal-sized. Pulmonary arteries: The main pulmonary artery was normal-sized. Systemic veins: Inferior vena cava: The IVC was normal-sized. ---------------------------------------------------------------------------- Measurements  Left ventricle                     Value        Ref  LV end-diastolic volume, 1-p       82    ml     69 - 185  A4C  LV ejection fraction, 1-p A4C  (L) 39    %      46 - 74  Stroke volume, 1-p A4C             32    ml     --------  LV end-diastolic volume/bsa,       43    ml/m^2 37 - 93  1-p A4C  Stroke volume/bsa, 1-p A4C         17    ml/m^2 --------  LV end-diastolic volume, 2-p       81    ml     62 - 150  LV end-systolic volume, 2-p        49    ml     21 - 61  LV ejection fraction, 2-p      (L) 40    %      52 - 72  Stroke volume, 2-p                 33    ml     --------  LV end-diastolic volume/bsa,       42    ml/m^2 34 - 74  2-p  LV end-systolic volume/bsa,        25    ml/m^2 11 - 31  2-p  Stroke volume/bsa, 2-p             16.9  ml/m^2 --------  Right ventricle                     Value        Ref  TAPSE, MM                          2.29  cm     >=1.70  RV s', lateral                     16.3  cm/sec >=9.5 Legend: (L)  and  (H)  liza values outside specified reference range. ---------------------------------------------------------------------------- Prepared and electronically signed by Andrea Villeda 2024 11:13     CARD ECHO 2D W/O DOPPLER (CPT=93307)    Result Date: 3/5/2024  Transthoracic Echocardiogram Name:Cristiano Obregon Date: 2024 :  1970 Ht:  (67in)  BP: 107 / 84 MRN:  [id number]    Age:  53years    Wt:  (160lb) HR: 75bpm Loc:  [facility]       Gndr: M          BSA: 1.84m^2 Sonographer: Angelica Ordering:    Carlitos Sandoval Consulting:  Danial Kirk ---------------------------------------------------------------------------- History/Indications:   Coronary artery disease. Impella placement. ---------------------------------------------------------------------------- Procedure information:  A transthoracic echocardiogram, limited study was performed. Additional evaluation included M-mode and limited 2D.  Patient status:  Inpatient.  Location:  Bedside.    Comparison was made to the study of 2024.    This was a STAT study. Transthoracic echocardiography for diagnosis, ventricular function evaluation, and post intervention evaluation. Image quality was adequate. ECG rhythm:   Frequent ectopies ---------------------------------------------------------------------------- Conclusions: 1. Left ventricle: The cavity size was normal. Wall thickness was normal.    Systolic function was at the lower limits of normal. The estimated    ejection fraction was 50-55%, by visual assessment. Unable to assess LV    diastolic function. Impella catheter noted in the left ventricle,    placement measures 3.50cm from the aortic valve. 2. Left atrium: The atrium was mildly dilated. 3. Right atrium: The atrium was mildly dilated. Impressions:  This study is compared with previous dated  2024: Compared to the previous study, these findings are new. Interval placement of impella. * ---------------------------------------------------------------------------- * Findings: Left ventricle:  The cavity size was normal. Wall thickness was normal. Systolic function was at the lower limits of normal. The estimated ejection fraction was 50-55%, by visual assessment. Impella catheter noted in the left ventricle, placement measures 3.50cm from the aortic valve. Unable to assess LV diastolic function. Left atrium:  The atrium was mildly dilated. Right ventricle:  The cavity size was normal. Systolic function was normal. Right atrium:  The atrium was mildly dilated. Mitral valve:  The valve was structurally normal. Leaflet separation was normal. Aortic valve:   The valve was trileaflet.   Cusp separation was normal. Tricuspid valve:  The valve is structurally normal. Leaflet separation was normal. Pulmonic valve:   Not well visualized. Pericardium:   There was no pericardial effusion. Pulmonary arteries: Systolic pressure could not be accurately estimated. ---------------------------------------------------------------------------- Measurements  Left ventricle               Value    Ref  IVS thickness, ED, PLAX      1.0   cm 0.6 - 1.0  LV ID, ED, PLAX              4.7   cm 4.2 - 5.8  LV ID, ES, PLAX              3.2   cm 2.5 - 4.0  LV PW thickness, ED, PLAX    0.8   cm 0.6 - 1.0  IVS/LV PW ratio, ED, PLAX    1.25     ---------  LV PW/LV ID ratio, ED, PLAX  0.17     ---------  LV ejection fraction         60    %  52 - 72 Legend: (L)  and  (H)  liza values outside specified reference range. ---------------------------------------------------------------------------- Prepared and electronically signed by Carlitos Sandoval 2024 11:32     CARD ECHO LIMITED (CPT=93308)    Result Date: 3/5/2024  Transthoracic Echocardiogram Name:Cristiano Obregon Date:    2024    :     1970    Ht:     (67in)      BP: MRN:     [id number]       Age:     53years       Wt:     (160lb)    HR: Loc:     [facility]          Gndr:    M             BSA:    1.84m^2 Sonographer: ADEEL Wyman Ordering:    Carlitos Sandoval Consulting:  Richie Llanes ---------------------------------------------------------------------------- History/Indications:   Chest pain.  Coronary artery disease. ---------------------------------------------------------------------------- Procedure information:  A transthoracic echocardiogram, limited study was performed. Additional evaluation included M-mode and limited 2D.  Patient status:  Inpatient.  Location:  Catheterization laboratory.    This was a STAT study. Transthoracic echocardiography for diagnosis and ventricular function evaluation. Image quality was adequate. ---------------------------------------------------------------------------- Conclusions: 1. Left ventricle: Systolic function was mildly reduced. The estimated    ejection fraction was 45-50%, by visual assessment. Unable to assess LV    diastolic function. 2. Left atrium: The atrium was dilated. 3. Right atrium: The atrium was mildly dilated. 4. Aortic valve: There was mild regurgitation. 5. Mitral valve: There was mild regurgitation. 6. Limited study in cath lab, grossly ejection fraction 45-50%. Wire present    in the left ventricle. Impressions:  No previous study was available for comparison. * ---------------------------------------------------------------------------- * Findings: Left ventricle:  Systolic function was mildly reduced. The estimated ejection fraction was 45-50%, by visual assessment. Unable to assess LV diastolic function. Left atrium:  The atrium was dilated. Right ventricle:  The cavity size was normal. Systolic function was normal. Right atrium:  The atrium was mildly dilated. Mitral valve:  The valve was structurally normal. Leaflet separation was normal.  Doppler:  Transvalvular velocity was within the normal range. There was no  evidence for stenosis. There was mild regurgitation. Aortic valve:  Not well visualized.  Doppler:  There was mild regurgitation. Tricuspid valve:  The valve is structurally normal. Leaflet separation was normal.  Doppler:  Transvalvular velocity was within the normal range. There was no evidence for stenosis. There was mild regurgitation. Pulmonic valve:   Not visualized. Pericardium:   There was no pericardial effusion. Pulmonary arteries: Systolic pressure could not be accurately estimated. Systemic veins:  Not visualized. ---------------------------------------------------------------------------- Prepared and electronically signed by Carlitos Sandoval 03/05/2024 09:46        Exam:     Physical Exam:  General: Alert and oriented x 3. No apparent distress.   HEENT: Normocephalic, anicteric sclera, neck supple, no thyromegaly or adenopathy.  Neck: No JVD, carotids 2+, no bruits.  Cardiac: Regular rate and rhythm. S1, S2 normal. No murmur, pericardial rub, S3, or extra cardiac sounds.  Lungs: Clear without wheezes, rales, rhonchi or dullness.  Normal excursions and effort.  Abdomen: Soft, non-tender. No organosplenomegally, mass or rebound, BS-present.  Extremities: Without clubbing or cyanosis. No edema.    Neurologic: Alert and oriented, normal affect. No focal defects  Skin: Warm and dry.     Assessment and Plan:     NSTEMI  Multivessel CAD s/p balloon angioplasty proximal circumflex 3/5/2024  S/P placement of Impella  S/p RHC on 3/6/2024 with normal hemodynamics, low to normal filling pressures  Echo from 3/11 LVEF 50 -55% with regional wall motion abnormalities  Had CABG today x3  LIMA to LAD, SVG to PDA, SVG to OM1  On the vent FiO2 50%  Intubated and sedated-on propofol  Impella in place currently on P6  Chest Tube- Mediastinal and Left pleural  SvO2 51  CO 5.1 CI 2.9  PAD 21/8/12  On levo 2 mcg/min  Dobutamine 1 huey per KG per minute    NORMA  On CRRT since 3/7  To be resumed    Plan:  Impella support per  surgery  Chest tubes/ vent support to be decided by surgery  CRRT per nephrology      Mulugeta Corcoran MD  [facility]  3/15/2024    L3

## 2024-03-15 NOTE — ANESTHESIA PROCEDURE NOTES
Procedure Performed: HUGO       Start Time:  3/15/2024 7:30 AM       End Time:   3/15/2024 1:30 PM    Preanesthesia Checklist:  Patient identified, IV assessed, risks and benefits discussed, monitors and equipment assessed, procedure being performed at surgeon's request and anesthesia consent obtained.    General Procedure Information  Diagnostic Indications for Echo:  assessment of surgical repair and hemodynamic monitoring  Physician Requesting Echo: Richie Llanes MD  Location performed:  OR  Intubated  Heart visualized  Probe Insertion:  Easy  Probe Type:  Multiplane    Echocardiographic and Doppler Measurements    Ventricles    Left Ventricle:  Ejection Fraction 56%.      Ventricular Regional Function:  6- Basal Inferoseptal:  normal  7- Mid Anteroseptal:  normal  8- Mid Anterior:  hypokinetic  9- Mid Anterolateral:  hypokinetic  10- Mid Inferolateral:  hypokinetic  11- Mid Inferior:  normal  12- Mid Inferoseptal:  normal  14- Apical Lateral:  normal  17- Lawrence:  normal      Valves    Aortic Valve:  Regurgitation +1.      Mitral Valve:  Regurgitation +1.  Leaflets normal.  Leaflet motions normal.              Atria      Left Atrium:  Left atrial appendage normal.              Other Findings  Pericardium:  pericardial effusion      Anesthesia Information  Performed Personally  Anesthesiologist:  Soumya Sullivan MD      Echocardiogram Comments:       Impella noted pre-bypass    Lateral wall motion improved post-bypass. Cardiac tamponade resolved

## 2024-03-15 NOTE — ANESTHESIA POSTPROCEDURE EVALUATION
Patient: Cristiano Obregon    Procedure Summary       Date: 03/15/24 Room / Location: [facility] MAIN OR [address] [place] [address] MAIN OR    Anesthesia Start: 0706 Anesthesia Stop:     Procedure: CORONARY ARTERY BYPASS GRAFT x 3; UTILIZING THE LEFT INTERNAL MAMMARY ARTERY TO THE LAD; SVG TO THE OM; SVG TO THE PDA; LEFT LEG GREATER SAPHENOUS VEIN GRAFT HARVEST; INTRAOPERATIVE TRANSESOPHAGEAL ECHOCARDIOGRAM; INSERTION OF TEMPORARY VENTRICULAR PACING WIRE Diagnosis:       Atherosclerosis of native coronary artery of native heart with unstable angina pectoris (HCC)      (Atherosclerosis of native coronary artery of native heart with unstable angina pectoris (HCC) [I25.110])    Surgeons: Richie Llanes MD Anesthesiologist: Behzad France MD    Anesthesia Type: general ASA Status: 4            Anesthesia Type: general    Vitals Value Taken Time   /65 03/15/24 1557   Temp 97.8 03/15/24 1557   Pulse 106 03/15/24 1557   Resp 10 03/15/24 1557   SpO2 99 03/15/24 1557       [facility] AN Post Evaluation:   Patient Evaluated in ICU  Patient Participation: complete - patient cannot participate  Level of Consciousness: lethargic  Cardiovascular Status: acceptable  Respiratory Status: intubated  Postoperative Hydration acceptable      BEHZAD FRANCE MD  3/15/2024 3:57 PM

## 2024-03-15 NOTE — CM/SW NOTE
HH referrals sent via protocol.CM will follow-up and provide list when appropriate.    / to remain available for support and/or discharge planning.      Mary Jane Infante MBA BSN RN CRRN   RN Case Manager  [phone]

## 2024-03-15 NOTE — OR NURSING
Called patient spouse Reyes at #[phone] to update about start of procedure, no answer at this time.

## 2024-03-15 NOTE — PROGRESS NOTES
NEPHROLOGY DAILY PROGRESS NOTE       SUBJECTIVE:    Seen post- op, s/p CABG.   Intubated, sedated. Requiring levophed        OBJECTIVE:    Total Intake/Output:    Intake/Output Summary (Last 24 hours) at 3/15/2024 1614  Last data filed at 3/15/2024 1452  Gross per 24 hour   Intake 23677.1 ml   Output 4616 ml   Net 6436.1 ml       PHYSICAL EXAM:  /62   Pulse 106   Temp 97.8 °F (36.6 °C)   Resp 10   Ht 5' 7\" (1.702 m)   Wt 188 lb 4.4 oz (85.4 kg)   SpO2 98%   BMI 29.49 kg/m²   GEN: Intubated, sedated   HEENT: ETT in place   CHEST: mechanical breath sounds, chest tube in place   CARDIAC: S1S2 normal  ABD: soft, NT/ND  : lujan in place   EXT:  trace upper and lower ext edema noted   NEURO: sedated   ACCESS: RIJ temp HD cath (3/7)      CURRENT MEDICATIONS:     norepinephrine        famotidine  20 mg Oral Daily    Or    famotidine  20 mg Intravenous Daily    [START ON 3/16/2024] metoprolol tartrate  25 mg Oral 2x Daily(Beta Blocker)    mupirocin  1 Application Nasal BID    chlorhexidine gluconate  15 mL Mouth/Throat BID    [START ON 3/16/2024] multivitamin  1 tablet Oral Daily    [START ON 3/16/2024] ascorbic acid  500 mg Oral TID    acetaminophen  1,000 mg Intravenous Q8H    [START ON 3/16/2024] heparin  5,000 Units Subcutaneous 2 times per day    [START ON 3/16/2024] clopidogrel  75 mg Oral Daily    [START ON 3/16/2024] aspirin  81 mg Oral Daily    metoclopramide  10 mg Intravenous Q6H    ceFAZolin  2 g Intravenous Q8H    piperacillin-tazobactam  3.375 g Intravenous Q8H    fluconazole  400 mg Intravenous Q24H    [MAR Hold] sodium chloride   Intravenous Once    [MAR Hold] mineral oil-white petrolatum   Both Eyes TID         LABS:  Patient Labs Reviewed in Detail. Pertinent Labs as follows:  Recent Labs   Lab 03/14/24  0408 03/14/24  1812 03/15/24  0423   *  136*  136* 132* 126*   BUN 36*  36*  36* 35* 38*   CREATSERUM 3.18*  3.18*  3.18* 2.93* 2.99*   EGFRCR 22*  22*  22* 24* 24*   CA 8.6*   8.6*  8.6* 8.4* 8.5*     136  136 137 137   K 3.7  3.7  3.7 3.8 3.9     104  104 105 105   CO2 26.0  26.0  26.0 25.0 26.0     Recent Labs   Lab 03/13/24  1452 03/14/24  0408 03/15/24  0423 03/15/24  1256 03/15/24  1521   RBC 2.86* 2.64* 2.64*  --   --    HGB 8.9* 7.8* 7.7*  --   --    HCT 25.4* 23.6* 23.5*  --   --    MCV 88.8 89.4 89.0  --   --    MCH 31.1 29.5 29.2  --   --    MCHC 35.0 33.1 32.8  --   --    RDW 14.0 13.9 13.7  --   --    NEPRELIM 5.86 7.53 6.14  --   --    WBC 9.2 10.4 9.3  --   --    PLT 79.0* 90.0* 119.0* 163.0 154.0         IMAGING:  No results found.     ASSESSMENT AND PLAN:   This is a 53 year old male with PMH sig for HLD, tobacco use. Presented with chest pain and was admitted for NSTEMI.  Nephrology is consulted for NORMA      Anuric NORMA  - creatinine 1.23 on admission, worsened to 7.19 (3/7)  - renal US neg for hydronephrosis    - UA: + 100 protein, 6-10 RBCs, + ketones, few squamous epi cells.   - suspect due to cardiogenic shock / ATN  - initiated on CVVH on 3/7 via [facility] HD cath   - Restart CRRT.  Awaiting post-op labs.  Renal panel and Mg level to be drawn 2 hours after restarting CRRT, then every 12 hours.  - UF goal is net negative 50/hr as tolerated by blood pressure. If unable to tolerate fluid removal, keep patient net even   - maintain adequate perfusion pressure  - monitor for renal recovery.   - follow renal fxn and I/Os   - renally dose meds      Hyperkalemia   - managed with CRRT      Metabolic acidosis  - resolved with CRRT     Hyponatremia  - resolved     NSTEMI  CAD s/p CABG 3/15  - per cards / CV surgery      Acute respiratory failure  - vent management per ICU      Anemia   - blood transfusions per primary / ICU       Dw Cardiothoracic surgery and RN      ADDENDUM: post-op labs reviewed - K 5.1. Switch to 2K bags.  Repeat labs 2 hours after the start of CRRT.     We will continue to follow Cristiano Obregon    Critical care time > 35 mins      Janet Velazco MD  [facility] - Nephrology

## 2024-03-15 NOTE — ANESTHESIA PREPROCEDURE EVALUATION
Anesthesia PreOp Note    HPI:     Cristiano Obregon is a 53 year old male who presents for preoperative consultation requested by: Richie Llanes MD    Date of Surgery: 3/5/2024 - 3/15/2024    Procedure(s):  CORONARY ARTERY BYPASS GRAFT; POSSIBLE ENDOSCOPIC VEIN HARVEST; MODIFIED MAZE ABLATION; LEFT ATRIAL APPENDAGE CLIP  Indication: Atherosclerosis of native coronary artery of native heart with unstable angina pectoris (HCC) [I25.110]    Relevant Problems   No relevant active problems       NPO:                         History Review:  Patient Active Problem List    Diagnosis Date Noted    Chest pain 03/05/2024    NSTEMI (non-ST elevated myocardial infarction) (HCC) 03/05/2024    Paresthesia of left thumb 05/10/2017    Hypercholesteremia 07/11/2016    Floaters in visual field, bilateral 07/11/2016    Acne, unspecified acne type 07/11/2016    Eczema, unspecified type 07/11/2016       History reviewed. No pertinent past medical history.    Past Surgical History:   Procedure Laterality Date    COLONOSCOPY N/A 12/9/2017    Procedure: COLONOSCOPY, POSSIBLE BIOPSY, POSSIBLE POLYPECTOMY 62414;  Surgeon: Byron Plaza MD;  Location: [facility]       Medications Prior to Admission   Medication Sig Dispense Refill Last Dose    ATORVASTATIN 20 MG Oral Tab TAKE 1 TABLET BY MOUTH EVERY DAY 90 tablet 1     FLUOCINONIDE 0.05 % External Cream APPLY SPARINGLY TO AFFECTED AREA TWICE DAILY 45 g 1     TRETINOIN 0.01 % External Gel APPLY 1 APPLICATION TOPICALLY NIGHTLY. 45 g 1     omega-3 fatty acids 1000 MG Oral Cap Take 1,000 mg by mouth daily.       Melatonin 10-10 MG Oral Tab CR Take by mouth.       Multiple Vitamins-Minerals (CENTRUM) Oral Tab Take 1 tablet by mouth daily.       Multiple Vitamin (MULTIVITAMIN TAB/CAP) Take 1 tablet by mouth daily.        Current Facility-Administered Medications Ordered in Epic   Medication Dose Route Frequency Provider Last Rate Last Admin    insulin regular human (Novolin R,  Humulin R) 100 Units in sodium chloride 0.9% 100 mL standard infusion (100 mL)  1-40 Units/hr Intravenous Continuous Richie Llanes MD        [MAR Hold] aminocaproic acid 5 g BOLUS FROM BAG infusion  5 g Intravenous Once Richie Llanes MD        And    [MAR Hold] aminocaproic acid (Amicar) 10 g in sodium chloride 0.9% 250 mL infusion  1 g/hr Intravenous Once Richie Llanes MD        norepinephrine (Levophed) 4 mg/250mL infusion premix             adult 3 in 1 TPN   Intravenous Continuous TPN Rick Mendenhall MD   Stopped at 03/15/24 0556    piperacillin-tazobactam (Zosyn) 3.375 g in dextrose 5% 100 mL IVPB-ADDV  3.375 g Intravenous Q8H Derick Webb MD 25 mL/hr at 03/15/24 0509 3.375 g at 03/15/24 0509    fluconazole in sodium chloride 0.9% (Diflucan) 400 mg/200mL IVPB premix 400 mg  400 mg Intravenous Q24H Derick Webb MD        ceFAZolin (Ancef) 2 g in 20mL IV syringe premix  2 g Intravenous On Call Richie Llanes MD        sodium chloride 0.9% infusion   Intravenous Continuous Richie Llanes MD 10 mL/hr at 03/14/24 2047 New Bag at 03/14/24 2047    [MAR Hold] sodium chloride 0.9% infusion   Intravenous Once Emily Adams APRN        NxStage Pure Flow 401 CRRT/PIRRT fluid 5000mL  2.5 L/hr CRRT Continuous Janet Velazco MD 2,500 mL/hr at 03/14/24 2135 2.5 L/hr at 03/14/24 2135    [MAR Hold] mineral oil-white petrolatum (Artificial Tears) 83-15 % ophthalmic ointment   Both Eyes TID Jensen Miranda MD   Given at 03/14/24 2046    [MAR Hold] diazepam (Valium) 5 mg/mL injection 2.5 mg  2.5 mg Intravenous Q4H PRN Arvin Blanco MD   2.5 mg at 03/14/24 1636    dextrose 10% infusion (TPN no rate)   Intravenous Continuous PRN Rick Mendenhall MD        [MAR Hold] heparin (Porcine) 1000 UNIT/ML injection 1,500 Units  1.5 mL Intracatheter PRN Janet Velazco MD        [MAR Hold] aspirin 300 MG rectal suppository 300 mg  300 mg Rectal Daily Gurjit Elliott MD   300 mg at 03/13/24 1028    [MAR Hold] pantoprazole (Protonix)  40 mg in sodium chloride 0.9% PF 10 mL IV push  40 mg Intravenous Daily Shae Watson APRN   40 mg at 03/14/24 0822    sodium bicarbonate 25mEq in dextrose 5% 1000mL IMPELLA purge solution  3-30 mL/hr Intravenous Continuous Carlitos Sandoval MD 10 mL/hr at 03/13/24 1750 10 mL/hr at 03/13/24 1750    [MAR Hold] fentaNYL (Sublimaze) 50 mcg/mL injection 25 mcg  25 mcg Intravenous Q30 Min PRN Keiko Cardenas APRN   25 mcg at 03/13/24 0802    Or    [MAR Hold] fentaNYL (Sublimaze) 50 mcg/mL injection 50 mcg  50 mcg Intravenous Q30 Min PRN Keiko Cardenas APRN   50 mcg at 03/08/24 0237    [MAR Hold] acetaminophen (Tylenol) tab 650 mg  650 mg Oral Q4H PRN Keiko Cardenas APRN        Or    [MAR Hold] acetaminophen (Tylenol) 160 MG/5ML oral liquid 650 mg  650 mg Oral Q4H PRN Keiko Cardenas APRN        Or    [MAR Hold] acetaminophen (Tylenol) rectal suppository 650 mg  650 mg Rectal Q4H PRN Keiko Cardenas APRN        Or    [MAR Hold] acetaminophen (Ofirmev) 10 mg/mL infusion premix 1,000 mg  1,000 mg Intravenous Q6H PRN Keiko Cardenas APRN   Stopped at 03/14/24 0043    [MAR Hold] polyethylene glycol (PEG 3350) (Miralax) 17 g oral packet 17 g  17 g Oral Daily PRN Keiko Cardenas APRN        [MAR Hold] senna (Senokot) 8.8 MG/5ML oral syrup 17.6 mg  10 mL Oral Nightly PRN Keiko Cardenas APRN        [MAR Hold] bisacodyl (Dulcolax) 10 MG rectal suppository 10 mg  10 mg Rectal Daily PRN Keiko Cardenas APRN        [MAR Hold] fleet enema (Fleet) 7-19 GM/118ML rectal enema 133 mL  1 enema Rectal Once PRN Keiko Cardenas APRN        [MAR Hold] chlorhexidine gluconate (Peridex) 0.12 % oral solution 15 mL  15 mL Mouth/Throat BID@0800,2000 Keiko Cardenas APRN   15 mL at 03/14/24 2046    propofol (Diprivan) 10 mg/mL infusion premix  5-50 mcg/kg/min Intravenous Continuous Keiko Cardenas APRN 15.2 mL/hr at 03/15/24 0600 35 mcg/kg/min at 03/15/24 0600    [MAR Hold] fentaNYL (Sublimaze) 25 mcg BOLUS FROM BAG infusion  25 mcg Intravenous  Q30 Min PRN Danial Kirk, DO   25 mcg at 03/14/24 2048    Or    [MAR Hold] fentaNYL (Sublimaze) 50 mcg BOLUS FROM BAG infusion  50 mcg Intravenous Q30 Min PRN Danial Kirk, DO   50 mcg at 03/11/24 1747    fentaNYL in sodium chloride 0.9% (Sublimaze) 1000 mcg/100mL infusion premix   mcg/hr Intravenous Continuous PRN Danial Kirk, DO 5 mL/hr at 03/15/24 0600 50 mcg/hr at 03/15/24 0600     No current [facility]-ordered outpatient medications on file.       No Known Allergies    History reviewed. No pertinent family history.  Social History     Socioeconomic History    Marital status:    Tobacco Use    Smoking status: Every Day     Types: Cigarettes    Smokeless tobacco: Never    Tobacco comments:     occ/5 cigs daily\   Vaping Use    Vaping Use: Never used   Substance and Sexual Activity    Alcohol use: No     Alcohol/week: 0.0 standard drinks of alcohol    Drug use: No       Available pre-op labs reviewed.  Lab Results   Component Value Date    WBC 9.3 03/15/2024    RBC 2.64 (L) 03/15/2024    HGB 7.7 (L) 03/15/2024    HCT 23.5 (L) 03/15/2024    MCV 89.0 03/15/2024    MCH 29.2 03/15/2024    MCHC 32.8 03/15/2024    RDW 13.7 03/15/2024    .0 (L) 03/15/2024     Lab Results   Component Value Date     03/15/2024    K 3.9 03/15/2024     03/15/2024    CO2 26.0 03/15/2024    BUN 38 (H) 03/15/2024    CREATSERUM 2.99 (H) 03/15/2024     (H) 03/15/2024    PGLU 144 (H) 03/14/2024    CA 8.5 (L) 03/15/2024     Lab Results   Component Value Date    INR 1.06 03/13/2024       Vital Signs:  Body mass index is 29.49 kg/m².   height is 1.702 m (5' 7\") and weight is 85.4 kg (188 lb 4.4 oz). His pulmonary artery temperature is 100 °F (37.8 °C). His blood pressure is 103/83 and his pulse is 111. His respiration is 19 and oxygen saturation is 96%.   Vitals:    03/15/24 0300 03/15/24 0400 03/15/24 0500 03/15/24 0600   BP: 104/69 105/79 105/69 103/83   Pulse: 117 84 113 111   Resp: 17 19 18 19   Temp: 98.9  °F (37.2 °C) 99.3 °F (37.4 °C) 99.4 °F (37.4 °C) 100 °F (37.8 °C)   TempSrc: Pulmonary Artery Pulmonary Artery Pulmonary Artery Pulmonary Artery   SpO2: 97% 97% 99% 96%   Weight:    85.4 kg (188 lb 4.4 oz)   Height:            Anesthesia Evaluation     Patient summary reviewed and Nursing notes reviewed    No history of anesthetic complications   Airway   Comment: + intubated and sedated  Dental      Pulmonary - normal exam     ROS comment: Cigarette smoker, 1/2 ppd, also daily marijuana use  Cardiovascular   (+) past MI, CABG/stent    Rhythm: irregular  ROS comment: Echo 3/11/24:  1. Left ventricle: The cavity size was normal. Wall thickness was moderately increased. The estimated ejection fraction was 50-55%, by visual assessment. Hypokinesis of the inferior wall. Hypokinesis of the basal-midlateral wall. Impella catheter noted in the left ventricle, placement measures 3.30cm from the aortic valve.   2. Right ventricle: Pacer wire noted in the right ventricle.   3. Left atrium: The left atrial volume was moderately increased.   4. Right atrium: Pacer wire noted in right atrium.   5. Pericardium, extracardiac: There was a right pleural effusion.       Neuro/Psych - negative ROS     GI/Hepatic/Renal    (+) chronic renal disease (acute renal failure) dialysis    Endo/Other    (+) blood dyscrasia (anemia)    Comments: + pt was taking 16 tablets of fish oil daily prior to admission  Abdominal      Other findings: + left eye swollen, sore on right nare            Anesthesia Plan:   ASA:  4  Plan:   General  Monitors and Lines:   A-line, BIS, Brain oximetry, [place], HUGO, CVP, Central line and Additonal IV  Airway:  ETT  Post-op Pain Management: IV analgesics  Plan Comments: Mr. Cristiano Obregon is a 53 year old man admitted 3/5/24 with NSTEMI. He had 2 cardiac stents placed, now s/p Impella placement and intubation for acute respiratory failure. Course complicated by cardiogenic shock and end organ dysfunction with  acute rental failure on dialysis. Seen at bedside in ICU prior to case. Anesthesia explained to his partner Reyes at the bedside as well as Cristiano's cousins. Consent for anesthesia obtained from his partner.   Post-op intubation and likely blood transfusion discussed, they agree to proceed.   Will use existing central line and PA catheter for procedure. Right radial a-line is a week old, will place new left radial a-line intra-op. Cangrelor and TPN infusions stopped by ICU earlier this morning.   Informed Consent Plan and Risks Discussed With:  Spouse  Use of Blood Products Discussed With:  Spouse  Blood Product Use Consented    Discussed plan with:  Surgeon      I have informed Cristiano Obregon and/or legal guardian or family member of the nature of the anesthetic plan, benefits, risks including possible dental damage if relevant, major complications, and any alternative forms of anesthetic management.   All of the patient's questions were answered to the best of my ability. The patient desires the anesthetic management as planned.  Soumya Sullivan MD  3/15/2024 7:07 AM  Present on Admission:  **None**

## 2024-03-15 NOTE — PROGRESS NOTES
[facility] Pul[facility], Critical Care and Sleep    Cristianosneha Estebanadrien Patient Status:  Inpatient    1970 MRN [id number]   Location [facility] 2W/SW Attending Bud Camarena MD   Hosp Day # 10 PCP Isauro Pina MD     Date of Admission: 3/5/2024  7:14 AM    Admission Diagnosis: NSTEMI (non-ST elevated myocardial infarction) ([facility]) [I21.4]    S: s/p CABG today. On vent.     Scheduled Medications:     norepinephrine        famotidine  20 mg Oral Daily    Or    famotidine  20 mg Intravenous Daily    [START ON 3/16/2024] metoprolol tartrate  25 mg Oral 2x Daily(Beta Blocker)    mupirocin  1 Application Nasal BID    chlorhexidine gluconate  15 mL Mouth/Throat BID    [START ON 3/16/2024] multivitamin  1 tablet Oral Daily    [START ON 3/16/2024] ascorbic acid  500 mg Oral TID    acetaminophen  1,000 mg Intravenous Q8H    [START ON 3/16/2024] heparin  5,000 Units Subcutaneous 2 times per day    [START ON 3/16/2024] clopidogrel  75 mg Oral Daily    [START ON 3/16/2024] aspirin  81 mg Oral Daily    metoclopramide  10 mg Intravenous Q6H    ceFAZolin  2 g Intravenous Q8H    piperacillin-tazobactam  3.375 g Intravenous Q8H    fluconazole  400 mg Intravenous Q24H    [MAR Hold] sodium chloride   Intravenous Once    [MAR Hold] mineral oil-white petrolatum   Both Eyes TID       Infusing Medications:     insulin regular 4 Units/hr (03/15/24 1443)    dextrose 5%-sodium chloride 0.9%      DOBUTamine      nitroGLYCERIN in dextrose 5%      norepinephrine      dexmedetomidine      insulin regular      propofol      adult 3 in 1 TPN Stopped (03/15/24 0556)    sodium chloride 10 mL/hr at 24 2047    NxStage Pure Flow 401 CRRT/PIRRT fluid 5000mL 2.5 L/hr (24 2135)    dextrose 10%      sodium bicarbonate 25mEq in dextrose 5% 1000mL IMPELLA purge solution 10 mL/hr (24 1750)    propofol 50 mcg/kg/min (03/15/24 1220)    fentanyl Stopped (03/15/24 0925)       PRN Medications:  norepinephrine, melatonin, polyethylene  glycol (PEG 3350), sennosides, bisacodyl, ondansetron, prochlorperazine, albumin human, DOBUTamine, nitroGLYCERIN in dextrose 5%, norepinephrine, milrinone in dextrose 5%, potassium chloride **OR** potassium chloride, magnesium sulfate in dextrose 5%, magnesium sulfate in sterile water for injection, acetaminophen **OR** HYDROcodone-acetaminophen **OR** HYDROcodone-acetaminophen, morphINE **OR** morphINE **OR** morphINE, glucose **OR** glucose **OR** glucose-vitamin C **OR** dextrose **OR** glucose **OR** glucose **OR** glucose-vitamin C, [MAR Hold] diazepam, dextrose 10%, [MAR Hold] heparin, [MAR Hold] acetaminophen **OR** [MAR Hold] acetaminophen **OR** [MAR Hold] acetaminophen **OR** [MAR Hold] acetaminophen, [MAR Hold] polyethylene glycol (PEG 3350), [MAR Hold] senna, [MAR Hold] bisacodyl, [MAR Hold] fleet enema, fentanyl    OBJECTIVE:  /62   Pulse 106   Temp 97.8 °F (36.6 °C)   Resp 10   Ht 170.2 cm (5' 7\")   Wt 188 lb 4.4 oz (85.4 kg)   SpO2 98%   BMI 29.49 kg/m²    Temp (24hrs), Av.6 °F (37.6 °C), Min:97.8 °F (36.6 °C), Max:100.4 °F (38 °C)      Vent Mode: VC/AC  FiO2 (%):  [30 %] 30 %  S RR:  [15] 15  S VT:  [400 mL] 400 mL  PEEP/CPAP (cm H2O):  [5 cm H20] 5 cm H20  MAP (cm H2O):  [7-8] 8      Wt Readings from Last 3 Encounters:   03/15/24 188 lb 4.4 oz (85.4 kg)   10/06/21 177 lb 6.4 oz (80.5 kg)   20 172 lb 9.6 oz (78.3 kg)       I/O last 3 completed shifts:  In: 4953.4 [I.V.:2345.2; IV PIGGYBACK:839.2]  Out: 5334 [Urine:23; Emesis/NG output:100; Other:5211]  I/O this shift:  In: 8865 [I.V.:1700; Blood:6950; IV PIGGYBACK:215]  Out: 2100 [Blood:2100]     General: intubated and NAD, anasarca.   Neuro: Sedated.   HEENT: PERRL  Neck : No LAD  CV: RRR, nl S1, S2, no S4, S3 or murmur.   Lungs: Clear bilaterally.   Abd: Nontender, non distended.    Ext: No edema.   Skin: No rashes.     Recent Labs   Lab 24  1452 24  0408 03/15/24  0423 03/15/24  1256 03/15/24  1521   WBC 9.2  10.4 9.3  --   --    HGB 8.9* 7.8* 7.7*  --   --    HCT 25.4* 23.6* 23.5*  --   --    PLT 79.0* 90.0* 119.0* 163.0 154.0     Recent Labs   Lab 03/12/24  0539 03/12/24  1639 03/13/24  0504 03/13/24  1744 03/14/24  0408 03/14/24  1812 03/15/24  0423   *  138*   < > 127*  127*   < > 136*  136*  136* 132* 126*   BUN 28*  28*   < > 34*  34*   < > 36*  36*  36* 35* 38*   CREATSERUM 3.06*  3.06*   < > 3.03*  3.03*   < > 3.18*  3.18*  3.18* 2.93* 2.99*   CA 8.9  8.9   < > 8.6*  8.6*   < > 8.6*  8.6*  8.6* 8.4* 8.5*   *  133*   < > 138  138   < > 136  136  136 137 137   K 5.0  5.0   < > 4.5  4.5   < > 3.7  3.7  3.7 3.8 3.9     108   < > 105  105   < > 104  104  104 105 105   CO2 26.0  26.0   < > 26.0  26.0   < > 26.0  26.0  26.0 25.0 26.0   *  --  89*  --  68*  68*  --   --    ALT 78*  --  69*  --  57*  57*  --   --    ALB 3.1*  3.1*   < > 2.9*  2.9*   < > 2.9*  2.9*  2.9* 2.9* 2.9*    < > = values in this interval not displayed.     Recent Labs   Lab 03/13/24  1452 03/15/24  1346 03/15/24  1521   INR 1.06 1.37* 1.31*   PTT 42.9* 33.2 32.1     Recent Labs   Lab 03/15/24  1604   ABGPHT 7.40   IPCJXM0T 35   PHDSG4A 81   ABGHCO3 22.8   SITE Arterial Line   THGB 10.3*       COVID-19 Lab Results    COVID-19  Lab Results   Component Value Date    COVID19 Not Detected 03/05/2024       Pro-Calcitonin  No results for input(s): \"PCT\" in the last 168 hours.    Cardiac  No results for input(s): \"TROP\", \"PBNP\" in the last 168 hours.    Creatinine Kinase  No results for input(s): \"CK\" in the last 168 hours.    Inflammatory Markers  Recent Labs   Lab 03/13/24  1452 03/15/24  1346 03/15/24  1521   DDIMER >20.00* 16.34* 13.83*       Imaging:   CXR 3/12/24:    Chest images personally reviewed.   Assessment/Plan   Acute respiratory failure - intubated in cath lab, pulmonary edema and anasarca with renal failure.   - continue mechanical ventilation with full support,   - Weaning  once HD stable.  - Decrease Vt to 650 and f/u ABG. May go lower if peak pressures are elevated.   NSTEMI - diagnosed with new multivessel CAD s/p angioplasty and cardiogenic shock requiring impella placement.   - s/p 3vCABG 3/15. Impella in place and removal per cardiology   - cardiology and cardiac surgery following  Cardiogenic shock - due to acute coronary syndrome, EF was down and has now improved on repeat echo  - Wean pressors.   Afib  - rate controlled  - per cardiology  Pneumonia - HCAP at high risk for GNR, fever and RML infiltrate  - fever curve improved  - sputum cultures with klebsiella and MSSA  - MRSA swab was negative  - continue cefepime (3/10-)  - blood cultures NGTD  - ID eval noted.   Acute renal failure - suspect from ATN and cardiogenic shock  - CRRT per nephrology. Resuming today.   - Poor u/o.   Thrombocytopenia, anemia - due to impella and CRRT, low probability for HIT by 4T score  - transfuse for plt <50  - keep Hb >8  18 units in OR>   FEN/GI  - TPN  Tobacco Abuse  - strongly advise cessation  Proph  - heparin gtt  - pepcid  Dispo  - ICU, will follow  Not ready for extubation. At risk for decompenstation.   Critical Care Time greater than: 35 minutes    My best regards,         Juaquin Banuelos MD  [facility] Pulmonary, Critical Care and Sleep Medicine

## 2024-03-16 ENCOUNTER — ANESTHESIA EVENT (OUTPATIENT)
Dept: SURGERY | Facility: HOSPITAL | Age: 54
End: 2024-03-16
Payer: COMMERCIAL

## 2024-03-16 ENCOUNTER — ANESTHESIA (OUTPATIENT)
Dept: SURGERY | Facility: HOSPITAL | Age: 54
End: 2024-03-16
Payer: COMMERCIAL

## 2024-03-16 PROCEDURE — 36430 TRANSFUSION BLD/BLD COMPNT: CPT | Performed by: ANESTHESIOLOGY

## 2024-03-16 RX ORDER — DOPAMINE HYDROCHLORIDE 160 MG/100ML
INJECTION, SOLUTION INTRAVENOUS CONTINUOUS PRN
Status: DISCONTINUED | OUTPATIENT
Start: 2024-03-16 | End: 2024-03-16

## 2024-03-16 RX ORDER — ROCURONIUM BROMIDE 10 MG/ML
INJECTION, SOLUTION INTRAVENOUS AS NEEDED
Status: DISCONTINUED | OUTPATIENT
Start: 2024-03-16 | End: 2024-03-16 | Stop reason: SURG

## 2024-03-16 RX ORDER — DEXMEDETOMIDINE HYDROCHLORIDE 4 UG/ML
INJECTION, SOLUTION INTRAVENOUS CONTINUOUS PRN
Status: DISCONTINUED | OUTPATIENT
Start: 2024-03-16 | End: 2024-03-16 | Stop reason: SURG

## 2024-03-16 RX ORDER — DOBUTAMINE HYDROCHLORIDE 200 MG/100ML
INJECTION INTRAVENOUS CONTINUOUS PRN
Status: DISCONTINUED | OUTPATIENT
Start: 2024-03-16 | End: 2024-03-16 | Stop reason: SURG

## 2024-03-16 RX ADMIN — DEXMEDETOMIDINE HYDROCHLORIDE 0.6 MCG/KG/HR: 4 INJECTION, SOLUTION INTRAVENOUS at 03:28:00

## 2024-03-16 RX ADMIN — SODIUM CHLORIDE: 9 INJECTION, SOLUTION INTRAVENOUS at 03:26:00

## 2024-03-16 RX ADMIN — ROCURONIUM BROMIDE 50 MG: 10 INJECTION, SOLUTION INTRAVENOUS at 03:28:00

## 2024-03-16 RX ADMIN — DOBUTAMINE HYDROCHLORIDE 1 MCG/KG/MIN: 200 INJECTION INTRAVENOUS at 03:26:00

## 2024-03-16 RX ADMIN — CEFAZOLIN SODIUM/WATER 2 G: 2 G/20 ML SYRINGE (ML) INTRAVENOUS at 03:25:00

## 2024-03-16 RX ADMIN — SODIUM CHLORIDE: 9 INJECTION, SOLUTION INTRAVENOUS at 05:30:00

## 2024-03-16 NOTE — ANESTHESIA POSTPROCEDURE EVALUATION
Patient: Cristiano Obregon    Procedure Summary       Date: 03/16/24 Room / Location: [facility] MAIN OR [address] [place] [address] MAIN OR    Anesthesia Start: 0325 Anesthesia Stop:     Procedure: STERNAL EXPLORATION AND WASH OUT Diagnosis:       Cardiac tamponade (HCC)      (Cardiac tamponade (HCC) [I31.4])    Surgeons: Richie Llanes MD Anesthesiologist: Behzad France MD    Anesthesia Type: general ASA Status: 5 - Emergent            Anesthesia Type: No value filed.    Vitals Value Taken Time   /58 03/16/24 0528   Temp 97.9 °F (36.6 °C) 03/16/24 0528   Pulse 95 03/16/24 0528   Resp 10 03/16/24 0528   SpO2 98 % 03/16/24 0528       [facility] AN Post Evaluation:   Patient Evaluated in ICU  Patient Participation: complete - patient cannot participate  Level of Consciousness: lethargicYes    Cardiovascular Status: acceptable  Respiratory Status: intubated and ETT      BEHZAD FRANCE MD  3/16/2024 5:28 AM

## 2024-03-16 NOTE — PROGRESS NOTES
Cardiology Progress Note    Cristiano Obregon Patient Status:  Inpatient    1970 MRN [id number]   Location [facility] 2W/SW Attending Donna Llanes, DO   Hosp Day # 11 PCP Isauro Pina MD       Subjective: Orally intubated and sedated.  Events of last night noted.  Taken back to the operating room due to bleeding and cardiac tamponade.  Just got back from the OR.  Currently hemodynamically stable on multiple pressors.    Objective:   Temp: 97.5 °F (36.4 °C)  Pulse: 86  Resp: 12  BP: 108/77  AO: 76/61  FiO2 (%): 40 %    Intake/Output:     Intake/Output Summary (Last 24 hours) at 3/16/2024 0805  Last data filed at 3/16/2024 0700  Gross per 24 hour   Intake 81962.81 ml   Output 4266 ml   Net 9998.81 ml       Last 3 Weights   03/15/24 0600 188 lb 4.4 oz (85.4 kg)   24 0600 184 lb 15.5 oz (83.9 kg)   24 1000 186 lb 4.6 oz (84.5 kg)   24 1000 192 lb 10.9 oz (87.4 kg)   24 0500 188 lb 15 oz (85.7 kg)   24 0600 185 lb 13.6 oz (84.3 kg)   24 0600 183 lb 3.2 oz (83.1 kg)   24 0500 180 lb 1.9 oz (81.7 kg)   24 1324 175 lb 7.8 oz (79.6 kg)   24 0722 160 lb (72.6 kg)   10/06/21 1541 177 lb 6.4 oz (80.5 kg)   20 0929 172 lb 9.6 oz (78.3 kg)       Tele: NSR    Physical Exam:       Cardiac: Regular rate and rhythm. S1, S2 normal. No murmur, pericardial rub, S3.  Lungs: Clear without wheezes, rales, rhonchi or dullness.  Normal excursions and effort.  Abdomen: Soft, non-tender. BS-present.  Extremities: Without clubbing, cyanosis or edema.  Peripheral pulses are 2+.      Laboratory/Data:    Labs:    Lab Results   Component Value Date    DDIMER 13.83 03/15/2024       Recent Labs   Lab 24  1452 24  0408 03/15/24  0423 03/15/24  1256 03/15/24  1346 03/15/24  1521 03/15/24  1605 24  0536   WBC 9.2 10.4 9.3  --   --   --  11.0 8.5   HGB 8.9* 7.8* 7.7*  --   --   --  10.2* 7.4*   MCV 88.8 89.4 89.0  --   --   --  85.2 87.3   PLT  79.0* 90.0* 119.0* 163.0  --  154.0 178.0 149.0*   BAND  --   --   --   --   --   --  2  --    INR 1.06  --   --   --  1.37* 1.31* 1.37* 1.24*       Recent Labs   Lab 03/14/24  1812 03/15/24  0423 03/15/24  1605 03/15/24  1928 03/16/24  0536    137 137  137 138 139   K 3.8 3.9 5.1  5.1 4.6 4.7    105 106  106 107 107   CO2 25.0 26.0 23.0  23.0 23.0 23.0   BUN 35* 38* 43*  43* 43* 42*   CREATSERUM 2.93* 2.99* 3.70*  3.70* 3.53* 3.49*   CA 8.4* 8.5* 8.0*  8.0* 7.9* 7.4*   MG 2.0 2.0 2.1 2.0 1.9   PHOS 2.5 2.7  2.7 3.4  3.4 2.9 5.6*  5.6*   * 126* 152*  152* 167* 155*       Recent Labs   Lab 03/10/24  0405 03/10/24  1802 03/12/24  0539 03/12/24  1639 03/13/24  0504 03/13/24  1744 03/14/24  0408 03/14/24  1812 03/15/24  0423 03/15/24  1605 03/15/24  1928 03/16/24  0536   ALT 56*  --  78*  --  69*  --  57*  57*  --   --   --   --   --    *  --  103*  --  89*  --  68*  68*  --   --   --   --   --    ALB 2.9*  2.9*   < > 3.1*  3.1*   < > 2.9*  2.9*   < > 2.9*  2.9*  2.9* 2.9* 2.9* 2.8* 3.0* 2.9*    < > = values in this interval not displayed.       No results for input(s): \"TROP\" in the last 168 hours.    Allergies:   No Known Allergies    Medications:  Current Facility-Administered Medications   Medication Dose Route Frequency    clevidipine (Cleviprex) 25 MG/50ML IV infusion  1-6 mg/hr Intravenous Continuous    sugammadex (Bridion) 200 MG/2ML injection 170 mg  2 mg/kg Intravenous Once    Or    sugammadex (Bridion) 200 MG/2ML injection 340 mg  4 mg/kg Intravenous Once    insulin regular human (Novolin R, Humulin R) 100 Units in sodium chloride 0.9% 100 mL standard infusion (100 mL)  1-40 Units/hr Intravenous Continuous    dextrose 5%-sodium chloride 0.9% infusion   mL/hr Intravenous Continuous    melatonin tab 3 mg  3 mg Oral Nightly PRN    polyethylene glycol (PEG 3350) (Miralax) 17 g oral packet 17 g  17 g Oral Daily PRN    sennosides (Senokot) tab 17.2 mg  17.2 mg Oral  Nightly PRN    bisacodyl (Dulcolax) 10 MG rectal suppository 10 mg  10 mg Rectal Daily PRN    ondansetron (Zofran) 4 MG/2ML injection 4 mg  4 mg Intravenous Q6H PRN    prochlorperazine (Compazine) 10 MG/2ML injection 5 mg  5 mg Intravenous Q8H PRN    famotidine (Pepcid) tab 20 mg  20 mg Oral Daily    Or    famotidine (Pepcid) 20 mg/2mL injection 20 mg  20 mg Intravenous Daily    [MAR Hold] albumin human (Albumin) 5% injection 12.5 g  12.5 g Intravenous Once PRN    DOBUTamine in dextrose 5% (Dobutrex) 500 mg/250mL infusion premix  2.5-20 mcg/kg/min (Dosing Weight) Intravenous Continuous PRN    nitroGLYCERIN in dextrose 5% 50 mg/250mL infusion premix  5-300 mcg/min Intravenous Continuous PRN    norepinephrine (Levophed) 4 mg/250mL infusion premix  0.5-30 mcg/min Intravenous Continuous PRN    metoprolol tartrate (Lopressor) tab 25 mg  25 mg Oral 2x Daily(Beta Blocker)    milrinone in dextrose 5% (Primacor) 20 mg/100mL infusion premix  0.125-0.25 mcg/kg/min Intravenous PRN    potassium chloride 20 mEq/100mL IVPB premix 20 mEq  20 mEq Intravenous PRN    Or    potassium chloride 40 mEq/100mL IVPB premix (central line) 40 mEq  40 mEq Intravenous PRN    magnesium sulfate in dextrose 5% 1 g/100mL infusion premix 1 g  1 g Intravenous PRN    magnesium sulfate in sterile water for injection 2 g/50mL IVPB premix 2 g  2 g Intravenous PRN    mupirocin (Bactroban) 2% nasal ointment 1 Application  1 Application Nasal BID    chlorhexidine gluconate (Peridex) 0.12 % oral solution 15 mL  15 mL Mouth/Throat BID    multivitamin (Tab-A-Kentrell/Beta Carotene) tab 1 tablet  1 tablet Oral Daily    ascorbic acid (Vitamin C) tab 500 mg  500 mg Oral TID    [MAR Hold] acetaminophen (Ofirmev) 10 mg/mL infusion premix 1,000 mg  1,000 mg Intravenous Q8H    heparin (Porcine) 5000 UNIT/ML injection 5,000 Units  5,000 Units Subcutaneous 2 times per day    acetaminophen (Tylenol) tab 650 mg  650 mg Oral Q4H PRN    Or    HYDROcodone-acetaminophen (Norco)  5-325 MG per tab 1 tablet  1 tablet Oral Q4H PRN    Or    HYDROcodone-acetaminophen (Norco) 5-325 MG per tab 2 tablet  2 tablet Oral Q4H PRN    morphINE PF 2 MG/ML injection 2 mg  2 mg Intravenous Q2H PRN    Or    morphINE PF 4 MG/ML injection 4 mg  4 mg Intravenous Q2H PRN    Or    morphINE PF 4 MG/ML injection 8 mg  8 mg Intravenous Q2H PRN    dexmedeTOMIDine in sodium chloride 0.9% (Precedex) 400 mcg/100mL infusion premix  0.2-1.5 mcg/kg/hr (Dosing Weight) Intravenous Continuous    clopidogrel (Plavix) tab 75 mg  75 mg Oral Daily    aspirin DR tab 81 mg  81 mg Oral Daily    metoclopramide (Reglan) 5 mg/mL injection 10 mg  10 mg Intravenous Q6H    glucose (Dex4) 15 GM/59ML oral liquid 15 g  15 g Oral Q15 Min PRN    Or    glucose (Glutose) 40% oral gel 15 g  15 g Oral Q15 Min PRN    Or    glucose-vitamin C (Dex-4) chewable tab 4 tablet  4 tablet Oral Q15 Min PRN    Or    dextrose 50% injection 50 mL  50 mL Intravenous Q15 Min PRN    Or    glucose (Dex4) 15 GM/59ML oral liquid 30 g  30 g Oral Q15 Min PRN    Or    glucose (Glutose) 40% oral gel 30 g  30 g Oral Q15 Min PRN    Or    glucose-vitamin C (Dex-4) chewable tab 8 tablet  8 tablet Oral Q15 Min PRN    insulin regular human (Novolin R, Humulin R) 100 Units in sodium chloride 0.9% 100 mL standard infusion (100 mL)  1-28 Units/hr Intravenous Continuous    propofol (Diprivan) 10 mg/mL infusion premix  5-67 mcg/kg/min Intravenous Continuous    NxStage Pure Flow 400 CRRT/PIRRT fluid 5000mL  2.5 L/hr CRRT Continuous    amiodarone (Cordarone) 450 mg in dextrose 5% 250 mL infusion  1 mg/min Intravenous Continuous    piperacillin-tazobactam (Zosyn) 3.375 g in dextrose 5% 100 mL IVPB-ADDV  3.375 g Intravenous Q8H    fluconazole in sodium chloride 0.9% (Diflucan) 400 mg/200mL IVPB premix 400 mg  400 mg Intravenous Q24H    sodium chloride 0.9% infusion   Intravenous Continuous    mineral oil-white petrolatum (Artificial Tears) 83-15 % ophthalmic ointment   Both Eyes TID     diazepam (Valium) 5 mg/mL injection 2.5 mg  2.5 mg Intravenous Q4H PRN    dextrose 10% infusion (TPN no rate)   Intravenous Continuous PRN    heparin (Porcine) 1000 UNIT/ML injection 1,500 Units  1.5 mL Intracatheter PRN    sodium bicarbonate 25mEq in dextrose 5% 1000mL IMPELLA purge solution  3-30 mL/hr Intravenous Continuous    acetaminophen (Tylenol) tab 650 mg  650 mg Oral Q4H PRN    Or    acetaminophen (Tylenol) 160 MG/5ML oral liquid 650 mg  650 mg Oral Q4H PRN    Or    acetaminophen (Tylenol) rectal suppository 650 mg  650 mg Rectal Q4H PRN    Or    acetaminophen (Ofirmev) 10 mg/mL infusion premix 1,000 mg  1,000 mg Intravenous Q6H PRN    polyethylene glycol (PEG 3350) (Miralax) 17 g oral packet 17 g  17 g Oral Daily PRN    senna (Senokot) 8.8 MG/5ML oral syrup 17.6 mg  10 mL Oral Nightly PRN    bisacodyl (Dulcolax) 10 MG rectal suppository 10 mg  10 mg Rectal Daily PRN    fleet enema (Fleet) 7-19 GM/118ML rectal enema 133 mL  1 enema Rectal Once PRN    propofol (Diprivan) 10 mg/mL infusion premix  5-50 mcg/kg/min Intravenous Continuous    fentaNYL in sodium chloride 0.9% (Sublimaze) 1000 mcg/100mL infusion premix   mcg/hr Intravenous Continuous PRN         Assessment:  S/p urgent CABG x 3 yesterday complicated by episodes of reexploration for bleeding  History of recent MI s/p balloon angioplasty of the circumflex  Percutaneous left ventricular assist device support, ongoing  Hypertension  Hyperlipidemia    Plan:  Continue Impella support for now, check echo this morning for positioning since he was moved overnight.  Will discuss with surgery regarding timing for removal since patient had another episode of bleeding overnight  ACT this morning was 157.  Cardiac index at 2.4 L/min/m²  Patient is critically ill    Dima Ponce MD  3/16/2024  8:05 AM    3

## 2024-03-16 NOTE — PROGRESS NOTES
03/16/24 0230   Vent Information   Vent Mode VC/AC   Settings   FiO2 (%) 40 %   Resp Rate (Set) 12   Vt (Set, mL) 650 mL   Waveform Decelerating ramp   PEEP/CPAP (cm H2O) 5 cm H20     Pt transferred to and from OR without complications

## 2024-03-16 NOTE — PROGRESS NOTES
Critical Care Progress Note     Assessment / Plan:  Acute respiratory failure - intubated in cath lab, pulmonary edema and anasarca with renal failure.   - continue mechanical ventilation with full support,   - repeat ABG reviewed  - will not plan to extubate today  - minimize sedation as able  NSTEMI - diagnosed with new multivessel CAD s/p angioplasty and cardiogenic shock requiring impella placement.   - s/p 3vCABG 3/15. Impella in place, removal per cardiology   - back to OR 3/16 AM for tamponade s/p clot removal  - cardiology and cardiac surgery following  Cardiogenic shock - due to acute coronary syndrome, EF was down and has now improved on repeat echo  - Wean pressors  - repeat echo pending  Afib  - rate controlled  - per cardiology  Pneumonia - HCAP at high risk for GNR, fever and RML infiltrate  - fever curve improved  - sputum cultures with klebsiella and MSSA  - MRSA swab was negative  - continue cefepime (3/10-)  - blood cultures NGTD  - ID eval noted.   Acute renal failure - suspect from ATN and cardiogenic shock  - CRRT per nephrology. Resuming this morning.   - Poor u/o.   Thrombocytopenia, anemia - due to impella and CRRT, low probability for HIT by 4T score  - transfuse for plt <50  - keep Hb >8, receiving another unit pRBC this morning  - 18 units in OR initially  FEN/GI  - TPN  Tobacco Abuse  - strongly advise cessation  Proph  - heparin gtt  - pepcid  Dispo  - ICU, will follow  Not ready for extubation. At risk for decompenstation.     Critical care time: 35 minutes      Subjective:  Increasing pressor requirements overnight, went back to OR where a right ventricle clot in the pericardium was found, c/w tamponade.     Objective:  Vitals:    03/16/24 0645 03/16/24 0700 03/16/24 0730 03/16/24 0800   BP: 127/88 121/82 116/84 108/77   BP Location:  Left arm Left arm Left arm   Pulse: 81 81 84 86   Resp: 12 12 12 12   Temp: 96.6 °F (35.9 °C) 96.5 °F (35.8 °C) 96.9 °F (36.1 °C) 97.5 °F (36.4 °C)    TempSrc:  Pulmonary Artery Temporal Temporal   SpO2: 97% 97% 98% 98%   Weight:       Height:         Physical Exam:  General - laying in bed  Respiratory - mech breath sounds bilaterally  Cardiovascular - regular rate and rhythm, distant. Midline dressing and chest tubes in place  Abdo - soft, NTND  Ext - left leg dressing with VIDAL drain in place  Mental status - sedated    Medications:  Reviewed in EMR    Lab Data:  Reviewed in EMR    Imaging:  I independently visualized all relevant chest imaging in PACS and agree with radiology interpretation except where noted.

## 2024-03-16 NOTE — PROCEDURES
Removal of Impella CP at bedside    Impella was reduced to P2, blood pressure was stable.  SpO2 was stable.  Manually removal of Impella after turning it off.  Manual pressure held for 15 to 20 minutes with no oozing or bleeding.  FemoStop applied.  Will plan on leaving FemoStop on for at least 60 minutes.  Will decrease the pressure, discussed with the nursing staff.  Right leg is warm and dopplerable pulses      Dima Ponce MD

## 2024-03-16 NOTE — PROGRESS NOTES
NEPHROLOGY DAILY PROGRESS NOTE       SUBJECTIVE:  Cristiano Obregon is intubated and sedated. Seen and examined on CRRT.        OBJECTIVE:    Total Intake/Output:    Intake/Output Summary (Last 24 hours) at 3/16/2024 1247  Last data filed at 3/16/2024 0900  Gross per 24 hour   Intake 8889.81 ml   Output 2229 ml   Net 6660.81 ml       PHYSICAL EXAM:  /58   Pulse 93   Temp 96.5 °F (35.8 °C)   Resp 14   Ht 5' 7\" (1.702 m)   Wt 188 lb 4.4 oz (85.4 kg)   SpO2 97%   BMI 29.49 kg/m²   GEN: Intubated, sedated   HEENT: ETT in place   CHEST: mechanical breath sounds, chest tube in place   CARDIAC: S1S2 normal  ABD: soft, NT/ND  : lujan in place   EXT:  trace upper and lower ext edema noted   NEURO: sedated   ACCESS: RIJ temp HD cath (3/7)      CURRENT MEDICATIONS:     sugammadex  2 mg/kg Intravenous Once    Or    sugammadex  4 mg/kg Intravenous Once    sodium chloride   Intravenous Once    acetaminophen  1,000 mg Intravenous Q8H    metoclopramide  5 mg Intravenous Q6H    insulin regular human  1-7 Units Subcutaneous 4 times per day    famotidine  20 mg Oral Daily    Or    famotidine  20 mg Intravenous Daily    metoprolol tartrate  25 mg Oral 2x Daily(Beta Blocker)    mupirocin  1 Application Nasal BID    chlorhexidine gluconate  15 mL Mouth/Throat BID    multivitamin  1 tablet Oral Daily    ascorbic acid  500 mg Oral TID    [MAR Hold] acetaminophen  1,000 mg Intravenous Q8H    heparin  5,000 Units Subcutaneous 2 times per day    [Held by provider] clopidogrel  75 mg Oral Daily    aspirin  81 mg Oral Daily    piperacillin-tazobactam  3.375 g Intravenous Q8H    fluconazole  400 mg Intravenous Q24H    mineral oil-white petrolatum   Both Eyes TID         LABS:  Patient Labs Reviewed in Detail. Pertinent Labs as follows:  Recent Labs   Lab 03/15/24  1605 03/15/24  1928 03/16/24  0536   *  152* 167* 155*   BUN 43*  43* 43* 42*   CREATSERUM 3.70*  3.70* 3.53* 3.49*   EGFRCR 18*  18* 20* 20*   CA 8.0*   8.0* 7.9* 7.4*     137 138 139   K 5.1  5.1 4.6 4.7     106 107 107   CO2 23.0  23.0 23.0 23.0     Recent Labs   Lab 03/15/24  0423 03/15/24  1256 03/15/24  1521 03/15/24  1605 03/16/24  0536   RBC 2.64*  --   --  3.44* 2.45*   HGB 7.7*  --   --  10.2* 7.4*   HCT 23.5*  --   --  29.3* 21.4*   MCV 89.0  --   --  85.2 87.3   MCH 29.2  --   --  29.7 30.2   MCHC 32.8  --   --  34.8 34.6   RDW 13.7  --   --  14.3 15.2*   NEPRELIM 6.14  --   --  9.11* 6.88   WBC 9.3  --   --  11.0 8.5   .0*   < > 154.0 178.0 149.0*    < > = values in this interval not displayed.         IMAGING:  XR CHEST AP PORTABLE  (CPT=71045)    Result Date: 3/16/2024  CONCLUSION:   Distal tip of the [place] catheter remains within the left lower lobe pulmonary artery.  Recommend retraction.  A secure message was sent to Emily Adams on 03/16/2024 at 8:31 a.m.  Trace left pleural effusion.  Left retrocardiac opacity , which may be secondary to subsegmental atelectasis, edema, and/or infection.  Resolution of previously seen right pleural effusion and basilar opacity.    Dictated by (CST): Emily Álvarez MD on 3/16/2024 at 8:26 AM     Finalized by (CST): Emily Álvarez MD on 3/16/2024 at 8:30 AM          XR CHEST AP PORTABLE  (CPT=71045)    Result Date: 3/15/2024  CONCLUSION:   1. Postoperative changes from recent CABG. 2. Well-positioned enteric tube, which terminates in the body of the stomach. 3. Interval placement of mediastinal and left basilar chest tubes.  No pneumothorax. 4. Remaining support devices are in unchanged positioning.  5. Near complete resolution of the right pleural effusion and associated right lower lobe airspace opacity. 6. No significant change in the pulmonary vascular congestion.     Dictated by (CST): David Hendrickson MD on 3/15/2024 at 5:11 PM     Finalized by (CST): David Hendrickson MD on 3/15/2024 at 5:17 PM            ASSESSMENT AND PLAN:   This is a 53 year old male with PMH sig for HLD,  tobacco use. Presented with chest pain and was admitted for NSTEMI.  Nephrology is consulted for NORMA      Anuric NORMA  - due to ATN from shock.   - creatinine 1.23 on admission, worsened to 7.19 (3/7)  - initiated on CVVH on 3/7 via RIJ temp HD cath   - Continue CRRT  - Increase UF as blood pressures allow.   - maintain adequate perfusion pressure  - Dose medications for CRRT  - Avoid nephrotoxins.  - BID BMP, phos, and mag while on CRRT.      Hyperkalemia   - resolved with CRRT    Metabolic acidosis  - resolved with CRRT     Hyponatremia  - resolved     Critical care time > 35 mins     Regan Seipp, MD  [facility] Nephrology

## 2024-03-16 NOTE — PROGRESS NOTES
Critical Care Progress Note     Assessment / Plan:  Acute respiratory failure - intubated in cath lab, pulmonary edema and anasarca with renal failure.   - continue mechanical ventilation with full support,   - repeat ABG reviewed  - will not plan to extubate today  - minimize sedation as able  NSTEMI - diagnosed with new multivessel CAD s/p angioplasty and cardiogenic shock requiring impella placement.   - s/p 3vCABG 3/15. Impella in place, removal per cardiology   - back to OR 3/16 AM for tamponade s/p clot removal  - cardiology and cardiac surgery following  Cardiogenic shock - due to acute coronary syndrome, EF was down and has now improved on repeat echo  - Wean pressors  Afib  - rate controlled  - per cardiology  Pneumonia - HCAP at high risk for GNR, fever and RML infiltrate  - fever curve improved  - sputum cultures with klebsiella and MSSA  - MRSA swab was negative  - continue cefepime (3/10-)  - blood cultures NGTD  - ID eval noted.   Acute renal failure - suspect from ATN and cardiogenic shock  - CRRT per nephrology. Resuming this morning.   - Poor u/o.   Thrombocytopenia, anemia - due to impella and CRRT, low probability for HIT by 4T score  - transfuse for plt <50  - keep Hb >8, receiving another unit pRBC this morning  - 18 units in OR initially  FEN/GI  - TPN  Tobacco Abuse  - strongly advise cessation  Proph  - heparin gtt  - pepcid  Dispo  - ICU, will follow  Not ready for extubation. At risk for decompenstation.     Critical care time: 35 minutes      Subjective:  Increasing pressor requirements overnight, went back to OR where a right ventricle clot in the pericardium was found, c/w tamponade.     Objective:  Vitals:    03/16/24 0630 03/16/24 0645 03/16/24 0700 03/16/24 0730   BP: (!) 138/99 127/88 121/82 116/84   BP Location:   Left arm Left arm   Pulse: 80 81 81 84   Resp: 12 12 12 12   Temp: 96.7 °F (35.9 °C) 96.6 °F (35.9 °C) 96.5 °F (35.8 °C) 96.9 °F (36.1 °C)   TempSrc:   Pulmonary Artery  Temporal   SpO2: 98% 97% 97% 98%   Weight:       Height:         Physical Exam:  General - laying in bed  Respiratory - clear bilaterally  Cardiovascular - regular rate and rhythm, no MRG  Abdo - soft, NTND  Ext - no LE edema  Mental status - interactive, answering questions appropriately    Medications:  Reviewed in EMR    Lab Data:  Reviewed in EMR    Imaging:  I independently visualized all relevant chest imaging in PACS and agree with radiology interpretation except where noted.

## 2024-03-16 NOTE — PROGRESS NOTES
[facility]  part of [facility] Infectious Disease Consult    Cristiano Obregon Patient Status:  Inpatient    1970 MRN [id number]   Location [facility] 2W/SW Attending Bud Camarena MD   Hosp Day # 11 PCP MD Cristiano Mccullough seen and examined,   Afebrile,   Previous entries noted,   Intubated,   Sedated,     History:  History reviewed. No pertinent past medical history.  Past Surgical History:   Procedure Laterality Date    COLONOSCOPY N/A 2017    Procedure: COLONOSCOPY, POSSIBLE BIOPSY, POSSIBLE POLYPECTOMY 70575;  Surgeon: Byron Plaza MD;  Location: [facility]     History reviewed. No pertinent family history.   reports that he has been smoking cigarettes. He has never used smokeless tobacco. He reports that he does not drink alcohol and does not use drugs.    Allergies:  No Known Allergies    Medications:    Current Facility-Administered Medications:     clevidipine (Cleviprex) 25 MG/50ML IV infusion, 1-6 mg/hr, Intravenous, Continuous    sugammadex (Bridion) 200 MG/2ML injection 170 mg, 2 mg/kg, Intravenous, Once **OR** sugammadex (Bridion) 200 MG/2ML injection 340 mg, 4 mg/kg, Intravenous, Once    sodium chloride 0.9% infusion, , Intravenous, Once    adult 3 in 1 TPN, , Intravenous, Continuous TPN    acetaminophen (Ofirmev) 10 mg/mL infusion premix 1,000 mg, 1,000 mg, Intravenous, Q8H    metoclopramide (Reglan) 5 mg/mL injection 5 mg, 5 mg, Intravenous, Q6H    insulin regular human (Novolin R, Humulin R) 100 UNIT/ML injection 1-7 Units, 1-7 Units, Subcutaneous, 4 times per day    melatonin tab 3 mg, 3 mg, Oral, Nightly PRN    polyethylene glycol (PEG 3350) (Miralax) 17 g oral packet 17 g, 17 g, Oral, Daily PRN    sennosides (Senokot) tab 17.2 mg, 17.2 mg, Oral, Nightly PRN    ondansetron (Zofran) 4 MG/2ML injection 4 mg, 4 mg, Intravenous, Q6H PRN    famotidine (Pepcid) tab 20 mg, 20 mg, Oral, Daily **OR** famotidine  (Pepcid) 20 mg/2mL injection 20 mg, 20 mg, Intravenous, Daily    DOBUTamine in dextrose 5% (Dobutrex) 500 mg/250mL infusion premix, 2.5-20 mcg/kg/min (Dosing Weight), Intravenous, Continuous PRN    nitroGLYCERIN in dextrose 5% 50 mg/250mL infusion premix, 5-300 mcg/min, Intravenous, Continuous PRN    norepinephrine (Levophed) 4 mg/250mL infusion premix, 0.5-30 mcg/min, Intravenous, Continuous PRN    metoprolol tartrate (Lopressor) tab 25 mg, 25 mg, Oral, 2x Daily(Beta Blocker)    potassium chloride 20 mEq/100mL IVPB premix 20 mEq, 20 mEq, Intravenous, PRN **OR** potassium chloride 40 mEq/100mL IVPB premix (central line) 40 mEq, 40 mEq, Intravenous, PRN    magnesium sulfate in dextrose 5% 1 g/100mL infusion premix 1 g, 1 g, Intravenous, PRN    magnesium sulfate in sterile water for injection 2 g/50mL IVPB premix 2 g, 2 g, Intravenous, PRN    mupirocin (Bactroban) 2% nasal ointment 1 Application, 1 Application, Nasal, BID    chlorhexidine gluconate (Peridex) 0.12 % oral solution 15 mL, 15 mL, Mouth/Throat, BID    multivitamin (Tab-A-Kentrell/Beta Carotene) tab 1 tablet, 1 tablet, Oral, Daily    ascorbic acid (Vitamin C) tab 500 mg, 500 mg, Oral, TID    [MAR Hold] acetaminophen (Ofirmev) 10 mg/mL infusion premix 1,000 mg, 1,000 mg, Intravenous, Q8H    heparin (Porcine) 5000 UNIT/ML injection 5,000 Units, 5,000 Units, Subcutaneous, 2 times per day    acetaminophen (Tylenol) tab 650 mg, 650 mg, Oral, Q4H PRN **OR** HYDROcodone-acetaminophen (Norco) 5-325 MG per tab 1 tablet, 1 tablet, Oral, Q4H PRN **OR** [DISCONTINUED] HYDROcodone-acetaminophen (Norco) 5-325 MG per tab 2 tablet, 2 tablet, Oral, Q4H PRN    morphINE PF 2 MG/ML injection 2 mg, 2 mg, Intravenous, Q2H PRN **OR** [DISCONTINUED] morphINE PF 4 MG/ML injection 4 mg, 4 mg, Intravenous, Q2H PRN **OR** [DISCONTINUED] morphINE PF 4 MG/ML injection 8 mg, 8 mg, Intravenous, Q2H PRN    dexmedeTOMIDine in sodium chloride 0.9% (Precedex) 400 mcg/100mL infusion premix,  0.2-1.5 mcg/kg/hr (Dosing Weight), Intravenous, Continuous    [Held by provider] clopidogrel (Plavix) tab 75 mg, 75 mg, Oral, Daily    aspirin DR tab 81 mg, 81 mg, Oral, Daily    glucose (Dex4) 15 GM/59ML oral liquid 15 g, 15 g, Oral, Q15 Min PRN **OR** glucose (Glutose) 40% oral gel 15 g, 15 g, Oral, Q15 Min PRN **OR** glucose-vitamin C (Dex-4) chewable tab 4 tablet, 4 tablet, Oral, Q15 Min PRN **OR** dextrose 50% injection 50 mL, 50 mL, Intravenous, Q15 Min PRN **OR** glucose (Dex4) 15 GM/59ML oral liquid 30 g, 30 g, Oral, Q15 Min PRN **OR** glucose (Glutose) 40% oral gel 30 g, 30 g, Oral, Q15 Min PRN **OR** glucose-vitamin C (Dex-4) chewable tab 8 tablet, 8 tablet, Oral, Q15 Min PRN    propofol (Diprivan) 10 mg/mL infusion premix, 5-67 mcg/kg/min, Intravenous, Continuous    NxStage Pure Flow 400 CRRT/PIRRT fluid 5000mL, 2.5 L/hr, CRRT, Continuous **AND** CRRT Replacement Fluid 400, , , Until Discontinued    amiodarone (Cordarone) 450 mg in dextrose 5% 250 mL infusion, 1 mg/min, Intravenous, Continuous    piperacillin-tazobactam (Zosyn) 3.375 g in dextrose 5% 100 mL IVPB-ADDV, 3.375 g, Intravenous, Q8H    fluconazole in sodium chloride 0.9% (Diflucan) 400 mg/200mL IVPB premix 400 mg, 400 mg, Intravenous, Q24H    sodium chloride 0.9% infusion, , Intravenous, Continuous    mineral oil-white petrolatum (Artificial Tears) 83-15 % ophthalmic ointment, , Both Eyes, TID    diazepam (Valium) 5 mg/mL injection 2.5 mg, 2.5 mg, Intravenous, Q4H PRN    dextrose 10% infusion (TPN no rate), , Intravenous, Continuous PRN    heparin (Porcine) 1000 UNIT/ML injection 1,500 Units, 1.5 mL, Intracatheter, PRN    sodium bicarbonate 25mEq in dextrose 5% 1000mL IMPELLA purge solution, 3-30 mL/hr, Intravenous, Continuous    acetaminophen (Tylenol) tab 650 mg, 650 mg, Oral, Q4H PRN **OR** acetaminophen (Tylenol) 160 MG/5ML oral liquid 650 mg, 650 mg, Oral, Q4H PRN **OR** acetaminophen (Tylenol) rectal suppository 650 mg, 650 mg, Rectal,  Q4H PRN **OR** acetaminophen (Ofirmev) 10 mg/mL infusion premix 1,000 mg, 1,000 mg, Intravenous, Q6H PRN    senna (Senokot) 8.8 MG/5ML oral syrup 17.6 mg, 10 mL, Oral, Nightly PRN    bisacodyl (Dulcolax) 10 MG rectal suppository 10 mg, 10 mg, Rectal, Daily PRN    fleet enema (Fleet) 7-19 GM/118ML rectal enema 133 mL, 1 enema, Rectal, Once PRN    propofol (Diprivan) 10 mg/mL infusion premix, 5-50 mcg/kg/min, Intravenous, Continuous    fentaNYL in sodium chloride 0.9% (Sublimaze) 1000 mcg/100mL infusion premix,  mcg/hr, Intravenous, Continuous PRN    Review of Systems:   Constitutional: Negative for anorexia, chills, fatigue, fevers, malaise, night sweats and weight loss.  Eyes: Negative for visual disturbance, irritation and redness.  Ears, nose, mouth, throat, and face: Negative for hearing loss, tinnitus, nasal congestion, snoring, sore throat, hoarseness and voice change.  Respiratory: Negative for cough, sputum, hemoptysis, chest pain, wheezing, dyspnea on exertion, or stridor.  Cardiovascular: Negative for chest pain, palpitations, irregular heart beats, syncope, fatigue, orthopnea, paroxysmal nocturnal dyspnea, lower extremity edema.  Gastrointestinal: Negative for dysphagia, odynophagia, reflux symptoms, nausea, vomiting, change in bowel habits, diarrhea, constipation and abdominal pain.  Integument/breast: Negative for rash, skin lesions, and pruritus.  Hematologic/lymphatic: Negative for easy bruising, bleeding, and lymphadenopathy.  Musculoskeletal: Negative for myalgias, arthralgias, muscle weakness.  Neurological: Negative for headaches, dizziness, seizures, memory problems, trouble swallowing, speech problems, gait problems and weakness.  Behavioral/Psych: Negative for active tobacco use.  Endocrine: No history of of diabetes, thyroid disorder.  Unable to obtain,     Vital signs in last 24 hours:  Patient Vitals for the past 24 hrs:   BP Temp Temp src Pulse Resp SpO2 Weight   03/14/24 1100 122/81  99.5 °F (37.5 °C) Pulmonary Ar 114 14 99 % --   03/14/24 1000 96/73 99.3 °F (37.4 °C) Pulmonary Ar 117 18 98 % --   03/14/24 0900 103/69 99.3 °F (37.4 °C) Pulmonary Ar (!) 124 19 98 % --   03/14/24 0800 104/72 98.9 °F (37.2 °C) Pulmonary Ar 119 18 97 % --   03/14/24 0700 127/90 100.2 °F (37.9 °C) Pulmonary Ar 114 13 97 % --   03/14/24 0600 131/81 100.1 °F (37.8 °C) Pulmonary Ar (!) 121 22 98 % 184 lb 15.5 oz (83.9 kg)   03/14/24 0500 123/82 100.2 °F (37.9 °C) Pulmonary Ar 120 25 94 % --   03/14/24 0400 101/73 99.3 °F (37.4 °C) Pulmonary Ar 96 17 97 % --   03/14/24 0300 111/76 98.8 °F (37.1 °C) Pulmonary Ar 91 17 91 % --   03/14/24 0200 90/71 98.9 °F (37.2 °C) Pulmonary Ar 118 18 95 % --   03/14/24 0100 99/79 99.4 °F (37.4 °C) Pulmonary Ar (!) 121 18 96 % --   03/14/24 0000 105/79 99.7 °F (37.6 °C) Pulmonary Ar 117 20 96 % --   03/13/24 2300 104/78 100.3 °F (37.9 °C) Pulmonary Ar 94 18 95 % --   03/13/24 2200 119/75 (!) 100.6 °F (38.1 °C) Pulmonary Ar 118 19 94 % --   03/13/24 2100 100/69 100.4 °F (38 °C) Pulmonary Ar 119 20 94 % --   03/13/24 2000 113/86 100.1 °F (37.8 °C) Pulmonary Ar 116 18 92 % --   03/13/24 1900 -- 100 °F (37.8 °C) Pulmonary Ar 108 20 97 % --   03/13/24 1800 116/69 99.3 °F (37.4 °C) -- 101 16 96 % --   03/13/24 1700 -- 98.7 °F (37.1 °C) -- 113 17 98 % --   03/13/24 1600 123/87 98.8 °F (37.1 °C) -- 100 17 96 % --   03/13/24 1500 -- 99.5 °F (37.5 °C) -- 93 14 98 % --   03/13/24 1400 105/82 100.1 °F (37.8 °C) -- 87 14 99 % --   03/13/24 1330 -- -- -- 83 15 99 % --   03/13/24 1300 -- 98.2 °F (36.8 °C) -- 85 15 99 % --   03/13/24 1200 90/59 99.2 °F (37.3 °C) Pulmonary Ar 88 16 100 % --   03/13/24 1130 -- -- -- 87 15 -- --       Physical Exam:   General: alert, cooperative, oriented.  Intubated,    Head: Normocephalic, without obvious abnormality, atraumatic.   Eyes: Conjunctivae/corneas clear.  No scleral icterus.  No conjunctival     hemorrhage.   Nose: Nares normal.   Throat: Lips, mucosa, and  tongue normal.  No thrush noted.   Neck: Soft, supple neck; trachea midline, no adenopathy, no thyromegaly.   Lungs: CTAB, normal and equal bilateral chest rise   Chest wall: No tenderness or deformity.   Heart: Regular rate and rhythm, normal S1S2, no murmur.   Abdomen: soft, non-tender, non-distended, positive BS.   Extremity: no edema, no cyanosis, Trialysis, [place], Groin line,    Skin: No rashes or lesions.   Neurological: Alert, interactive, no focal deficits    Labs:  Lab Results   Component Value Date    WBC 8.5 03/16/2024    HGB 7.4 03/16/2024    HCT 21.4 03/16/2024    .0 03/16/2024    CREATSERUM 3.49 03/16/2024    BUN 42 03/16/2024     03/16/2024    K 4.7 03/16/2024     03/16/2024    CO2 23.0 03/16/2024     03/16/2024    CA 7.4 03/16/2024    ALB 2.9 03/16/2024    PTT 33.1 03/16/2024    INR 1.24 03/16/2024    DDIMER 13.83 03/15/2024    MG 1.9 03/16/2024    PHOS 5.6 03/16/2024    PHOS 5.6 03/16/2024       Radiology:  CXR- 1. Cardiomegaly pulmonary venous distention.   2. Support tubes and catheters are satisfactory.  No pneumothorax   3. Mild perihilar and basilar congestive changes worse on the right has progressed.    4. Small right-sided effusion.       Cultures:  Susceptibility data from last 90 days.  Collected Specimen Info Organism Cefazolin Cefepime Ceftriaxone Ciprofloxacin Clindamycin Erythromycin Gentamicin Levofloxacin Meropenem Oxacillin Piperacillin/Tazobactam Trimethoprim/Sulfamethoxazole Vancomycin   03/08/24 Other from Endotracheal aspirate Klebsiella (Enterobacter) aerogenes  R  S  S  S    S  S  S   S  S      Staphylococcus aureus S     S  S  S  S   S   S  S       Assessment and Plan:    1.   Fevers: Multifactorial,   - Sec to HAP vs drop of Hgb and multiple transfusions,   - UA was bland on admission, will repeat,   - Sputum cul with Kleb and MSSA,   - Blood cul are NGTD  - CXR with bilateral Congestion, no obvious consolidation, thick sputum in Etr earlier, now  clearing up,   - Lines are clean, has Groin line, Trialysis Catheter and [place],   - MRSA Carriage is negative,   - On IV IV Zosyn, Fluconazole, d # 3,     2.    Hx of Acute MI with Cardiogenic Shock: Got intubated post Cath,   - Plan for CABG, Blood cul drawn on 3/11 are NGTD,  - S/P CABG x 3, R Pleural effusion drainage, 3/15/24,   - Followed by Sternal exploration with mediastinal washout for Cardiac tamponade, 3/16/24,     3.     Disposition: in house, A middle aged male with Acute MI, Cardiogenic shock, with persistent Fevers and now hypothermia,   - Follow Pending Cul,   - Hgb trend,   - Continue IV Zosyn , Fluconazole IV.   - Supportive care,     Discussed with PCP, RN, all questions answered, further recommendations to follow, Thanks,     Thank you for consulting [facility] for Cristiano Obregon.  If you have any questions or concerns please call [facility] Infectious Disease at [phone].     Derick Webb MD  3/14/2024  11:25 AM

## 2024-03-16 NOTE — PROGRESS NOTES
CV Surgery    Increasing levophed and dobutamine over last few hours with no chest tube output.  Concern for tamponade.  Plan to return to OR for re-exploration.    Partner Cristiano farnsworth.    Richie Llanes MD  Cardiothoracic Surgery  [facility]    circumcision

## 2024-03-16 NOTE — DIETARY NOTE
Follow up Nutrition Note         3/16/2024:  Received MD consult to re-initiate TPN.   CRRT resumed. Intubated, Sedated on propofol. On pressors, Insulin gtt. Labs and other meds reviewed.     Will re-initiate TPN tonight at 9 pm. Omitted Lipid emulsion in the bag due to current propofol ( providing 575 lipid kcal).     Nutrition Plan/ Intervention:     Diet: NPO  - Parenteral Nutrition: RD ordered the following  TPN 1200 ml, 110 g Protein, 350 kcal dextrose & 0 lipid. 790 Total kcal .   Additives and electrolytes ordered.   Met 51% of kcal and 66% of Protein needs. Will Increase PN as abran to reach full nutrition.   recommend to adjust IV fluids Accordingly.   TPN infusing via central line.    Medication Rate Frequency Start End   adult 3 in 1 TPN 50 mL/hr Continuous TPN 3/16/2024 2100 3/17/2024 2059   Infusion Site:   Central     Route:   Intravenous     Volume:   1,200 mL     Admin Duration:   24 Hours     Order #:   [id number]      Dose   Amino Acids Components   amino acid infusion (Plenamine) 15 % 110 g   Dextrose Components   dextrose 70% 350 kcal   QS Base Components   sterile water 246.0122 mL   Electrolytes Components   sodium chloride 4 MEQ/ mEq   sodium acetate 2 mEq/mL 20 mEq   calcium gluconate 10 % 10 mEq   magnesium sulfate 50 % 6 mEq   Additives Components   multivitamin adult 10 mL   trace minerals Cu-Mn-Se-Zn 073-98- MCG/ML 1 mL          Recommend :  transition Nutrition Support Therapy (NST) TPN to Enteral Nutrition (EN) once able to place safe tube access.       RD will continue to manage TPN daily.        Jaja Saldana RD, LDN, [facility]  Clinical Dietitian  [phone]  3/16/2024

## 2024-03-16 NOTE — ANESTHESIA PREPROCEDURE EVALUATION
Anesthesia PreOp Note    HPI:     Cristiano Obregon is a 53 year old male who presents for preoperative consultation requested by: Richie Llanes MD    Date of Surgery: 3/5/2024 - 3/16/2024    Procedure(s):  STERNAL IRRIGATION AND DEBRIDEMENT  Indication: Cardiac tamponade (HCC) [I31.4]    Relevant Problems   No relevant active problems       NPO:                         History Review:  Patient Active Problem List    Diagnosis Date Noted    Chest pain 03/05/2024    NSTEMI (non-ST elevated myocardial infarction) (HCC) 03/05/2024    Paresthesia of left thumb 05/10/2017    Hypercholesteremia 07/11/2016    Floaters in visual field, bilateral 07/11/2016    Acne, unspecified acne type 07/11/2016    Eczema, unspecified type 07/11/2016       History reviewed. No pertinent past medical history.    Past Surgical History:   Procedure Laterality Date    COLONOSCOPY N/A 12/9/2017    Procedure: COLONOSCOPY, POSSIBLE BIOPSY, POSSIBLE POLYPECTOMY 91479;  Surgeon: Byron Plaza MD;  Location: [facility] SURGICAL [facility]       Medications Prior to Admission   Medication Sig Dispense Refill Last Dose    ATORVASTATIN 20 MG Oral Tab TAKE 1 TABLET BY MOUTH EVERY DAY 90 tablet 1     FLUOCINONIDE 0.05 % External Cream APPLY SPARINGLY TO AFFECTED AREA TWICE DAILY 45 g 1     TRETINOIN 0.01 % External Gel APPLY 1 APPLICATION TOPICALLY NIGHTLY. 45 g 1     omega-3 fatty acids 1000 MG Oral Cap Take 1,000 mg by mouth daily.       Melatonin 10-10 MG Oral Tab CR Take by mouth.       Multiple Vitamins-Minerals (CENTRUM) Oral Tab Take 1 tablet by mouth daily.       Multiple Vitamin (MULTIVITAMIN TAB/CAP) Take 1 tablet by mouth daily.        Current Facility-Administered Medications Ordered in Epic   Medication Dose Route Frequency Provider Last Rate Last Admin    insulin regular human (Novolin R, Humulin R) 100 Units in sodium chloride 0.9% 100 mL standard infusion (100 mL)  1-40 Units/hr Intravenous Continuous Richie Llanes MD 4 mL/hr at 03/15/24  1443 4 Units/hr at 03/15/24 1443    dextrose 5%-sodium chloride 0.9% infusion   mL/hr Intravenous Continuous Byron, Emily R, APRN 40 mL/hr at 03/16/24 0000 40 mL/hr at 03/16/24 0000    melatonin tab 3 mg  3 mg Oral Nightly PRN Byron, Emily R, APRN        polyethylene glycol (PEG 3350) (Miralax) 17 g oral packet 17 g  17 g Oral Daily PRN Byron, Emily R, APRN        sennosides (Senokot) tab 17.2 mg  17.2 mg Oral Nightly PRN Byron, Emily R, APRN        bisacodyl (Dulcolax) 10 MG rectal suppository 10 mg  10 mg Rectal Daily PRN Byron, Emily R, APRN        ondansetron (Zofran) 4 MG/2ML injection 4 mg  4 mg Intravenous Q6H PRN Byron, Emily R, APRN        prochlorperazine (Compazine) 10 MG/2ML injection 5 mg  5 mg Intravenous Q8H PRN Bryan, Emily R, APRN        famotidine (Pepcid) tab 20 mg  20 mg Oral Daily Bryan, Emily R, APRN        Or    famotidine (Pepcid) 20 mg/2mL injection 20 mg  20 mg Intravenous Daily Byron, Emily R, APRN   20 mg at 03/15/24 2148    albumin human (Albumin) 5% injection 12.5 g  12.5 g Intravenous Once PRN Byron, Emily R, APRN   12.5 g at 03/15/24 1835    DOBUTamine in dextrose 5% (Dobutrex) 500 mg/250mL infusion premix  2.5-20 mcg/kg/min (Dosing Weight) Intravenous Continuous PRN Bryan, Emily R, APRN 4.8 mL/hr at 03/16/24 0115 2 mcg/kg/min at 03/16/24 0115    nitroGLYCERIN in dextrose 5% 50 mg/250mL infusion premix  5-300 mcg/min Intravenous Continuous PRN Bryan, Emily R, APRN        norepinephrine (Levophed) 4 mg/250mL infusion premix  0.5-30 mcg/min Intravenous Continuous PRN Bryan, Emily R, APRN 75 mL/hr at 03/16/24 0245 20 mcg/min at 03/16/24 0245    metoprolol tartrate (Lopressor) tab 25 mg  25 mg Oral 2x Daily(Beta Blocker) Emily Adams, ADRIENNE        milrinone in dextrose 5% (Primacor) 20 mg/100mL infusion premix  0.125-0.25 mcg/kg/min Intravenous PRN Emily Adams, ADRIENNE        potassium chloride 20 mEq/100mL IVPB premix 20 mEq  20 mEq Intravenous PRN  Bryan Emily R, APRN        Or    potassium chloride 40 mEq/100mL IVPB premix (central line) 40 mEq  40 mEq Intravenous PRN Bryan Emily R, APRN        magnesium sulfate in dextrose 5% 1 g/100mL infusion premix 1 g  1 g Intravenous PRN Bryan, Emily R, APRN        magnesium sulfate in sterile water for injection 2 g/50mL IVPB premix 2 g  2 g Intravenous PRN Bryan Emily R, APRN        mupirocin (Bactroban) 2% nasal ointment 1 Application  1 Application Nasal BID Bryan Emily R, APRN   1 Application at 03/15/24 2154    chlorhexidine gluconate (Peridex) 0.12 % oral solution 15 mL  15 mL Mouth/Throat BID Emily Adams R, APRN   15 mL at 03/15/24 2130    multivitamin (Tab-A-Kentrell/Beta Carotene) tab 1 tablet  1 tablet Oral Daily Emily Adams R, APRN        ascorbic acid (Vitamin C) tab 500 mg  500 mg Oral TID Emily Adams RADRIENNE        acetaminophen (Ofirmev) 10 mg/mL infusion premix 1,000 mg  1,000 mg Intravenous Q8H Bryan Emily R, APRN 400 mL/hr at 03/16/24 0024 1,000 mg at 03/16/24 0024    heparin (Porcine) 5000 UNIT/ML injection 5,000 Units  5,000 Units Subcutaneous 2 times per day Emily Adams R, APRN        acetaminophen (Tylenol) tab 650 mg  650 mg Oral Q4H PRN Bryan Emily R, APRN        Or    HYDROcodone-acetaminophen (Norco) 5-325 MG per tab 1 tablet  1 tablet Oral Q4H PRN Bryan, Emily R, APRN        Or    HYDROcodone-acetaminophen (Norco) 5-325 MG per tab 2 tablet  2 tablet Oral Q4H PRN Bryan Emily R, APRN        morphINE PF 2 MG/ML injection 2 mg  2 mg Intravenous Q2H PRN Bryan, Emily R, APRN        Or    morphINE PF 4 MG/ML injection 4 mg  4 mg Intravenous Q2H PRN Bryan, Emily R, APRN        Or    morphINE PF 4 MG/ML injection 8 mg  8 mg Intravenous Q2H PRN Emily Adams R, APRN        dexmedeTOMIDine in sodium chloride 0.9% (Precedex) 400 mcg/100mL infusion premix  0.2-1.5 mcg/kg/hr (Dosing Weight) Intravenous Continuous Emily Adams APRN        clopidogrel (Plavix) tab  75 mg  75 mg Oral Daily Bryan, Emily R, APRN        aspirin DR tab 81 mg  81 mg Oral Daily Bryan, Emily R, APRN        metoclopramide (Reglan) 5 mg/mL injection 10 mg  10 mg Intravenous Q6H Bryan, Emily R, APRN   10 mg at 03/15/24 1812    glucose (Dex4) 15 GM/59ML oral liquid 15 g  15 g Oral Q15 Min PRN [place], Emily R, APRN        Or    glucose (Glutose) 40% oral gel 15 g  15 g Oral Q15 Min PRN Bryan, Emily R, APRN        Or    glucose-vitamin C (Dex-4) chewable tab 4 tablet  4 tablet Oral Q15 Min PRN Bryan, Emily R, APRN        Or    dextrose 50% injection 50 mL  50 mL Intravenous Q15 Min PRN Bryan, Emily R, APRN        Or    glucose (Dex4) 15 GM/59ML oral liquid 30 g  30 g Oral Q15 Min PRN Bryan, Emily R, APRN        Or    glucose (Glutose) 40% oral gel 30 g  30 g Oral Q15 Min PRN Bryan, Emily R, APRN        Or    glucose-vitamin C (Dex-4) chewable tab 8 tablet  8 tablet Oral Q15 Min PRN Bryan, Emily R, APRN        insulin regular human (Novolin R, Humulin R) 100 Units in sodium chloride 0.9% 100 mL standard infusion (100 mL)  1-28 Units/hr Intravenous Continuous Bryan, Emily R, APRN 7 mL/hr at 03/16/24 0100 7 Units/hr at 03/16/24 0100    propofol (Diprivan) 10 mg/mL infusion premix  5-67 mcg/kg/min Intravenous Continuous Richie Llanes MD 25.6 mL/hr at 03/16/24 0036 50 mcg/kg/min at 03/16/24 0036    NxStage Pure Flow 400 CRRT/PIRRT fluid 5000mL  2.5 L/hr CRRT Continuous Janet Velazco MD 2,500 mL/hr at 03/15/24 1721 2.5 L/hr at 03/15/24 1721    amiodarone (Cordarone) 450 mg in dextrose 5% 250 mL infusion  1 mg/min Intravenous Continuous Richie Llanes MD 33.3 mL/hr at 03/16/24 0000 1 mg/min at 03/16/24 0000    piperacillin-tazobactam (Zosyn) 3.375 g in dextrose 5% 100 mL IVPB-ADDV  3.375 g Intravenous Q8H Derick Webb MD 25 mL/hr at 03/15/24 2301 3.375 g at 03/15/24 2301    fluconazole in sodium chloride 0.9% (Diflucan) 400 mg/200mL IVPB premix 400 mg  400 mg Intravenous Q24H Jon  MD Derick 100 mL/hr at 03/15/24 2100 400 mg at 03/15/24 2100    sodium chloride 0.9% infusion   Intravenous Continuous Richie Llanes MD 25 mL/hr at 03/15/24 1900 Rate Change at 03/15/24 1900    [MAR Hold] sodium chloride 0.9% infusion   Intravenous Once Emily Adams APRN        [MAR Hold] mineral oil-white petrolatum (Artificial Tears) 83-15 % ophthalmic ointment   Both Eyes TID Jensen Miranda MD   Given at 03/14/24 2046    [MAR Hold] diazepam (Valium) 5 mg/mL injection 2.5 mg  2.5 mg Intravenous Q4H PRN Arvin Blanco MD   2.5 mg at 03/14/24 1636    dextrose 10% infusion (TPN no rate)   Intravenous Continuous PRN Rick Mendenhall MD        [MAR Hold] heparin (Porcine) 1000 UNIT/ML injection 1,500 Units  1.5 mL Intracatheter PRN Janet Velazco MD        sodium bicarbonate 25mEq in dextrose 5% 1000mL IMPELLA purge solution  3-30 mL/hr Intravenous Continuous Carlitos Sandoval MD 10 mL/hr at 03/13/24 1750 10 mL/hr at 03/13/24 1750    [MAR Hold] acetaminophen (Tylenol) tab 650 mg  650 mg Oral Q4H PRN Keiko Cardenas APRN        Or    [MAR Hold] acetaminophen (Tylenol) 160 MG/5ML oral liquid 650 mg  650 mg Oral Q4H PRN Keiko Cardenas APRN        Or    [MAR Hold] acetaminophen (Tylenol) rectal suppository 650 mg  650 mg Rectal Q4H PRN Keiko Cardenas APRN        Or    [MAR Hold] acetaminophen (Ofirmev) 10 mg/mL infusion premix 1,000 mg  1,000 mg Intravenous Q6H PRN Keiko Cardenas APRN   Stopped at 03/14/24 0043    [MAR Hold] polyethylene glycol (PEG 3350) (Miralax) 17 g oral packet 17 g  17 g Oral Daily PRN Keiko Cardenas APRN        [MAR Hold] senna (Senokot) 8.8 MG/5ML oral syrup 17.6 mg  10 mL Oral Nightly PRN Keiko Cardenas APRN        [MAR Hold] bisacodyl (Dulcolax) 10 MG rectal suppository 10 mg  10 mg Rectal Daily PRN Keiko Cardenas APRN        [MAR Hold] fleet enema (Fleet) 7-19 GM/118ML rectal enema 133 mL  1 enema Rectal Once PRN Keiko Cardenas APRN        propofol (Diprivan) 10 mg/mL infusion premix   5-50 mcg/kg/min Intravenous Continuous Keiko Cardenas APRN 21.78 mL/hr at 03/15/24 1220 50 mcg/kg/min at 03/15/24 1220    fentaNYL in sodium chloride 0.9% (Sublimaze) 1000 mcg/100mL infusion premix   mcg/hr Intravenous Continuous PRN Sebastianchantel Danial DO 2.5 mL/hr at 03/16/24 0037 25 mcg/hr at 03/16/24 0037     No current [facility]-ordered outpatient medications on file.       No Known Allergies    History reviewed. No pertinent family history.  Social History     Socioeconomic History    Marital status:    Tobacco Use    Smoking status: Every Day     Types: Cigarettes    Smokeless tobacco: Never    Tobacco comments:     occ/5 cigs daily\   Vaping Use    Vaping Use: Never used   Substance and Sexual Activity    Alcohol use: No     Alcohol/week: 0.0 standard drinks of alcohol    Drug use: No       Available pre-op labs reviewed.  Lab Results   Component Value Date    WBC 11.0 03/15/2024    RBC 3.44 (L) 03/15/2024    HGB 10.2 (L) 03/15/2024    HCT 29.3 (L) 03/15/2024    MCV 85.2 03/15/2024    MCH 29.7 03/15/2024    MCHC 34.8 03/15/2024    RDW 14.3 03/15/2024    .0 03/15/2024     Lab Results   Component Value Date     03/15/2024    K 4.6 03/15/2024     03/15/2024    CO2 23.0 03/15/2024    BUN 43 (H) 03/15/2024    CREATSERUM 3.53 (H) 03/15/2024     (H) 03/15/2024    PGLU 167 (H) 03/16/2024    CA 7.9 (L) 03/15/2024     Lab Results   Component Value Date    INR 1.37 (H) 03/15/2024       Vital Signs:  Body mass index is 29.49 kg/m².   height is 1.702 m (5' 7\") and weight is 85.4 kg (188 lb 4.4 oz). His pulmonary artery temperature is 98.4 °F (36.9 °C). His blood pressure is 86/75 (abnormal) and his pulse is 107. His respiration is 12 and oxygen saturation is 100%.   Vitals:    03/16/24 0215 03/16/24 0230 03/16/24 0245 03/16/24 0300   BP: (!) 86/72 98/79 (!) 88/75 (!) 86/75   Pulse: 107 108 106 107   Resp: 12 12 12 12   Temp:    98.4 °F (36.9 °C)   TempSrc:    Pulmonary Artery   SpO2: 100%  100% 100% 100%   Weight:       Height:            Anesthesia Evaluation      Airway   Dental      Pulmonary     breath sounds clear to auscultation    ROS comment: Full vent support following surgery today earlier  Cardiovascular - normal exam    ROS comment: On Dobutamine, Levo.  POSSIBLY TAMPONADING, BLEEDING    Neuro/Psych      Comments: Fully sedated    GI/Hepatic/Renal    (+) chronic renal disease dialysis and ARF    Endo/Other    Abdominal      Other findings: Vented            Anesthesia Plan:   ASA:  5  Emergent    Plan:   General  Monitors and Lines:   A-line and Central line  Airway:  ETT  Post-op Pain Management: IV analgesics  Informed Consent Plan and Risks Discussed With:  Patient  Use of Blood Products Discussed With:  Patient  Blood Product Use Consented    Discussed plan with:  Surgeon      I have informed Cristiano Obregon and/or legal guardian or family member of the nature of the anesthetic plan, benefits, risks including possible dental damage if relevant, major complications, and any alternative forms of anesthetic management.   All of the patient's questions were answered to the best of my ability. The patient desires the anesthetic management as planned.  NELLIE FRANCE MD  3/16/2024 3:10 AM  Present on Admission:  **None**

## 2024-03-16 NOTE — OPERATIVE REPORT
Operative Note    Patient Name: Cristiano Obregon    Preoperative Diagnosis: Cardiac tamponade (HCC) [I31.4]    Postoperative Diagnosis: Cardiac tamponade (HCC) [I31.4]    Surgeon(s) and Role:     * Richie Llanes MD - Primary     Assistant: Surgical Assistant.: Regan Jorge  PA: Gurjit Fink PA    Procedures: Sternal exploration with mediastinal washout    Indication:  53-year-old male underwent urgent coronary bypass grafting surgery earlier today with presence of Impella.  Had profound coagulopathy.  Overnight has had increasing pressor output with decreasing chest tube output concerning for tamponade.  Urgent exploration indicated    Surgical Findings:   Clot around right ventricle and increased fluid in the pericardium consistent with tamponade.  Immediately relieved upon entry.  Inspection from states demonstrated no bleeding from surgical sites.  Small venous branch off the mammary pedicle controlled with clips.  Immediately improved hemodynamics after reentry.    Description procedure:  Patient to the operating room.  Prepped and draped in usual sterile fashion.  Timeout performed.  Skin incision reopened, sternal wires removed and sternum reopened.  Immediately there was return of thin bloody fluid with immediate improvement in hemodynamics.  Sternal retractor placed.  Pericardial stitches were removed.  There was some clot visualized around the right ventricle which was removed.  Clot from any of the sternum was all removed.  Chest copiously irrigated and pleural spaces were suctioned out.  Surgical sites were all inspected hemostasis.  Proximal anastomoses were hemostatic as were distals.  Vein grafts that were visible were nonbleeding.  Do not lift the heart due to presence of Impella device however no blood was seen emanating from behind the heart.  Treva pedicle was visualized.  Up high, there was a very small venous branch that had some mild ooze which was controlled with clips.  Made the  mammary pedicle appeared hemostatic.  Chest is then packed.  Bone was inspected and found to be hemostatic.  Packings were removed and overall the hemostasis was significantly improved.  Of note product additional blood product was given.  Reclosed.  Pericardial proximal mammary pedicle and over the aorta.  Chest tubes reposition.  An additional 24 Gambian Haile drain was placed in the pericardial space.  Chest then closed multiple single stainless steel wires.  Wounds copiously irrigated and closed in multiple layers.  Sterile dressings applied.  Patient taken back to the ICU in critical condition on 2 mg Levophed, 2 dobutamine with significantly improved hemodynamics.  Impella remains at P2.  Sponge, needle and instrument counts were all correct at the end of the case.    Anesthesia: General    Complications: None    Implants: None    Specimen: None    Drains: 32 Gambian chest events 2 and 24 Gambian Haile drain x 1    Condition: Critical to CVICU    Estimated Blood Loss: 200 mL    Richie Llanes MD

## 2024-03-16 NOTE — PLAN OF CARE
Increasing pressor requirements with decreasing hemodynamics as the night progressed. See documentation for specifics. Increasing ectopy with 2 runs of VT of 17 & 18 beats (strips in chart). CV Surgeon notified of changes approx 0215 & pt back to OR for Tamponade by 0315. CRRT stopped approx 0220 & blood returned to patient.     Rec'd from OR approx 0545. Vitals as charted. Minimal pressor requirements post-op with improved hemodynamics & pressures. [facility] notified to restart CRRT. Impella remains in place P6.     Problem: METABOLIC/FLUID AND ELECTROLYTES - ADULT  Goal: Hemodynamic stability and optimal renal function maintained  Description: INTERVENTIONS:  - Monitor labs and assess for signs and symptoms of volume excess or deficit  - Monitor intake, output and patient weight  - Monitor urine specific gravity, serum osmolarity and serum sodium as indicated or ordered  - Monitor response to interventions for patient's volume status, including labs, urine output, blood pressure (other measures as available)  - Encourage oral intake as appropriate  - Instruct patient on fluid and nutrition restrictions as appropriate  Outcome: Not Progressing     Problem: NEUROLOGICAL - ADULT  Goal: Achieves stable or improved neurological status  Description: INTERVENTIONS  - Assess for and report changes in neurological status  - Initiate measures to prevent increased intracranial pressure  - Maintain blood pressure and fluid volume within ordered parameters to optimize cerebral perfusion and minimize risk of hemorrhage  - Monitor temperature, glucose, and sodium. Initiate appropriate interventions as ordered  Outcome: Not Progressing     Problem: RESPIRATORY - ADULT  Goal: Achieves optimal ventilation and oxygenation  Description: INTERVENTIONS:  - Assess for changes in respiratory status  - Assess for changes in mentation and behavior  - Position to facilitate oxygenation and minimize respiratory effort  - Oxygen  supplementation based on oxygen saturation or ABGs  - Provide Smoking Cessation handout, if applicable  - Encourage broncho-pulmonary hygiene including cough, deep breathe, Incentive Spirometry  - Assess the need for suctioning and perform as needed  - Assess and instruct to report SOB or any respiratory difficulty  - Respiratory Therapy support as indicated  - Manage/alleviate anxiety  - Monitor for signs/symptoms of CO2 retention  Outcome: Progressing     Problem: CARDIOVASCULAR - ADULT  Goal: Maintains optimal cardiac output and hemodynamic stability  Description: INTERVENTIONS:  - Monitor vital signs, rhythm, and trends  - Monitor for bleeding, hypotension and signs of decreased cardiac output  - Evaluate effectiveness of vasoactive medications to optimize hemodynamic stability  - Monitor arterial and/or venous puncture sites for bleeding and/or hematoma  - Assess quality of pulses, skin color and temperature  - Assess for signs of decreased coronary artery perfusion - ex. Angina  - Evaluate fluid balance, assess for edema, trend weights  Outcome: Progressing  Goal: Absence of cardiac arrhythmias or at baseline  Description: INTERVENTIONS:  - Continuous cardiac monitoring, monitor vital signs, obtain 12 lead EKG if indicated  - Evaluate effectiveness of antiarrhythmic and heart rate control medications as ordered  - Initiate emergency measures for life threatening arrhythmias  - Monitor electrolytes and administer replacement therapy as ordered  Outcome: Progressing     Problem: SKIN/TISSUE INTEGRITY - ADULT  Goal: Skin integrity remains intact  Description: INTERVENTIONS  - Assess and document risk factors for pressure ulcer development  - Assess and document skin integrity  - Monitor for areas of redness and/or skin breakdown  - Initiate interventions, skin care algorithm/standards of care as needed  Outcome: Progressing  Goal: Incision(s), wounds(s) or drain site(s) healing without S/S of  infection  Description: INTERVENTIONS:  - Assess and document risk factors for pressure ulcer development  - Assess and document skin integrity  - Assess and document dressing/incision, wound bed, drain sites and surrounding tissue  - Implement wound care per orders  - Initiate isolation precautions as appropriate  - Initiate Pressure Ulcer prevention bundle as indicated  Outcome: Progressing  Goal: Oral mucous membranes remain intact  Description: INTERVENTIONS  - Assess oral mucosa and hygiene practices  - Implement preventative oral hygiene regimen  - Implement oral medicated treatments as ordered  Outcome: Progressing

## 2024-03-16 NOTE — PROGRESS NOTES
[facility]  part of [facility]    Progress Note    Cristiano Obregon Patient Status:  Inpatient    1970 MRN [id number]   Location [facility] 2W/SW Attending Donna Llanes, DO   Hosp Day # 11 PCP Isauro Pina MD     Subjective:  Intubated/sedated, taken back to the OR 2am for tamponade, mediastinal washout.  Currently hemodynamically stable on Impella at P6,  1, levo 2, Amio 1.  Echo currently being performed at bedside.    Objective:  /81   Pulse 86   Temp 97.7 °F (36.5 °C)   Resp 12   Ht 5' 7\" (1.702 m)   Wt 188 lb 4.4 oz (85.4 kg)   SpO2 98%   BMI 29.49 kg/m²     Temp (24hrs), Av.5 °F (36.4 °C), Min:96.5 °F (35.8 °C), Max:99.2 °F (37.3 °C)  Telemetry-NSR    Intake/Output:    Intake/Output Summary (Last 24 hours) at 3/16/2024 0934  Last data filed at 3/16/2024 0900  Gross per 24 hour   Intake 84772.81 ml   Output 4329 ml   Net 9185.81 ml       Wt Readings from Last 3 Encounters:   03/15/24 188 lb 4.4 oz (85.4 kg)   10/06/21 177 lb 6.4 oz (80.5 kg)   20 172 lb 9.6 oz (78.3 kg)       Allergies:  No Known Allergies    Labs:  Lab Results   Component Value Date    WBC 8.5 2024    HGB 7.4 2024    HCT 21.4 2024    .0 2024    CREATSERUM 3.49 2024    BUN 42 2024     2024    K 4.7 2024     2024    CO2 23.0 2024     2024    CA 7.4 2024    ALB 2.9 2024    PTT 33.1 2024    INR 1.24 2024    DDIMER 13.83 03/15/2024    MG 1.9 2024    PHOS 5.6 2024    PHOS 5.6 2024       Physical Exam:  Blood pressure 111/81, pulse 86, temperature 97.7 °F (36.5 °C), resp. rate 12, height 5' 7\" (1.702 m), weight 188 lb 4.4 oz (85.4 kg), SpO2 98%.  General: Sedated/intubated  Neck: Difficult to assess with edema/RIJ cordis/Gowen  Lungs: Coarse bilaterally anteriorly   Pericardial drain 13 cc since over this a.m., chest tubes-360, serous  sang/thin drainage-no airleak noted.  Heart: RRR, S1, S2  Abdomen: Soft, NT/ND, BS+x4  Extremities: Warm, dry, generalized edema/anasarca. LUE edema, forearm taut, [place] in wrist  Pulses: Doppler bilat DP  Skin: sternotomy stable, incision CDI  Neurological: Sedated, +clonus     Assessment/Plan:  S/p CABG x 3 (LIMA-LAD, SVG-OM, SVG-PDA) 3/15,   Reop secondary to tamponade 3/16-mediastinal exploration/washout.  Sedated/intubated -wean vent/management per pulmonary.  CXR reviewed this a.m.    Currently remains hemodynamically stable on Impella P6, dobutamine/levo/Amio.  Echo performed this a.m.  Plan to DC Impella this a.m per Dr. Ponce.      Currently sedated, clonus noted this a.m.  Continue to monitor neurostatus.  Plan to leave intubated overnight.  Will attempt to wean vent to extubate tomorrow, DC sedation in a.m.  DVT prevention - SCDs/  Expected postop anemia - Transfuse to maintain Hgb 10 prn.  CBC Q 8  NORMA/ATN - CRRT resumed today - attempt fluid removal as BP tolerates, okay to increase levo to remove fluid patient greater than 15 L up from transfusions  Insulin coverage per protocol -will transition insulin drip later today or in a.m.  FEN-resume TPN tonight.  Will try to replace DHT prior to extubation, may need GI to place next week if unable to pass, as there is issues per staff the past week trying to place DHT.  L UE edema noted, new A-line placed in OR-plan to DC that A-line in the left wrist, stat arterial ultrasound left upper extremity.  Elevate arm  Pneumonia-currently afebrile, sputum culture with Klebsiella/MSSA-on Zosyn, blood cultures NGTD.   Discharge planning:  Patient lives with  and supportive family members.  Anticipate will need rehab.  Will need PT/OT once extubated medically stable.  Appreciate SW/case management to assist with DC planning      Plan of care discussed with pt, bedside RN, and CV Surgeon: Dr. Shraddha Henderson, APRN  3/16/2024  9:34 AM

## 2024-03-17 NOTE — PROGRESS NOTES
Received pt on the following vent settings: AC/VC 12, 650, +5, 40%.  FiO2 weaned to 30%, pt tolerating well. No SAT performed today. BS auscultated b/l, sx provided as needed. ABG analyzed per schedule. ETT remains secured at 22cm at the lip. RT to continue to monitor.

## 2024-03-17 NOTE — PROGRESS NOTES
PCCU  Critical Care APRN Progress Note    NAME: Cristiano Obregon - ROOM: 229/229-A - MRN: [id number] - Age: 53 year old - :1970    Unsuccessful attempt to place PICC line, patient is quite mobile, coughing with ventilator and unable to be sedated sufficiently to safely proceed at this time.  Will reassess readiness tomorrow.  Appreciate assistance of nursing as well.       Shae Watson APRN/CNP  Critical Care  3/17/2024   3:20 PM

## 2024-03-17 NOTE — RESPIRATORY THERAPY NOTE
Is This A New Presentation, Or A Follow-Up?: Skin Lesion Patient received intubated and on ventilator. AC/VC 12/650/+5/30%. SBT done today for 50 minutes, patient tolerated well, mild tachypnea and labored breathing during SBT. ABG drawn showing mild respiratory alkalosis, placed back on full support. Respiratory status stable and tolerating ventilator well, suctioned as needed.    SBT Safety Screen: Performed by RArieTArie  Spontaneous respiratory effort present  FiO2: 30   / PEEP: 5  HR    HR = 93  SpO2 > 90%   Saturation = 95      SBT  Time: 10:45 AM - 11:35 AM  Settings:  Pressure Support: 8    CPAP: 5    FiO2:  30  Patient RR = 28  Respiratory distress (Tachypnea and labored)    Weaning Parameters:  RR: 27  RSBI: 64  NIF: N/A  VT: 400 mL  VC: N/A     Latest Reference Range & Units 03/17/24 11:31   ABG PH 7.35 - 7.45  7.50 (H)   ABG PCO2 35 - 45 mm Hg 29 (L)   ABG PO2 80 - 100 mm Hg 92   ABG HCO3 21.0 - 27.0 mEq/L 24.8   ABG O2 SATURATION 94.0 - 100.0 % 98.8   Blood Gas Base Excess -2.0 - 2.0 mmol/L -0.1   TOTAL HEMOGLOBIN 13.0 - 17.5 g/dL 9.3 (L)   METHEMOGLOBIN 0.4 - 1.5 % SAT 1.3   POTASSIUM BLOOD GAS 3.6 - 5.1 mmol/L 3.8   SODIUM BLOOD  - 145 mmol/L 131 (L)   Lactic Acid (Blood Gas) 0.5 - 2.0 mmol/L 1.6   Oxygen Delivery Device  Vent   Oxygen Content 15.0 - 23.0 Vol% 12.6 (L)   CARBOXYHEMOGLOBIN 0.0 - 3.0 % 2.0   FiO2  30.00   IONIZED CALCIUM 0.95 - 1.32 mmol/L 1.04   SAMPLE SITE  Arterial Line   PEEP cm H2O 5.0   PRESSURE SUPPORT cm H2O 8.0   (H): Data is abnormally high  (L): Data is abnormally low   How Severe Is Your Skin Lesion?: mild Has Your Skin Lesion Been Treated?: not been treated

## 2024-03-17 NOTE — PROGRESS NOTES
Critical Care Progress Note     Assessment / Plan:  Acute respiratory failure - intubated in cath lab, pulmonary edema and anasarca with renal failure.   - continue mechanical ventilation with full support,   - ABG reviewed  - sedation hold and SBT as mental status allows  - ok for precedex for cough  NSTEMI - diagnosed with new multivessel CAD s/p angioplasty and cardiogenic shock requiring impella placement.   - s/p 3vCABG 3/15. Impella removed 3/16   - back to OR 3/16 AM for tamponade s/p pericardial clot removal  - cardiology and cardiac surgery following  Cardiogenic shock - due to acute coronary syndrome, EF was down and has now improved on repeat echo  - pressors weaned  Afib  - rate controlled  - per cardiology  Pneumonia - HCAP at high risk for GNR, fever and RML infiltrate  - fever curve improved  - sputum cultures with klebsiella and MSSA  - MRSA swab was negative  - continue zosyn and fluconazole per ID  - blood cultures NGTD  Acute renal failure - suspect from ATN and cardiogenic shock  - CRRT per nephrology  Thrombocytopenia, anemia - due to impella and CRRT, low probability for HIT by 4T score  - transfuse for plt <50  - keep Hb >8  - 18 units in OR initially  FEN/GI  - TPN  Tobacco Abuse  - strongly advise cessation  Proph  - heparin gtt  - pepcid  Dispo  - ICU, will follow     Critical care time: 35 minutes      Subjective:  No acute events.    Objective:  Vitals:    03/17/24 0400 03/17/24 0500 03/17/24 0600 03/17/24 0700   BP: 131/77 124/66 129/70 145/84   BP Location: Left arm  Left arm Left arm   Pulse: 87 88 88 91   Resp: 14 14 14 16   Temp: 97.8 °F (36.6 °C) 98 °F (36.7 °C) 98.6 °F (37 °C) 99 °F (37.2 °C)   TempSrc: Pulmonary Artery Pulmonary Artery Pulmonary Artery Pulmonary Artery   SpO2: 99% 97% 97% 100%   Weight:       Height:         Physical Exam:  General - laying in bed intubated  Respiratory - mechanical breath sounds bilaterally, midline dressing and chest tubes in  place  Cardiovascular - regular rate and rhythm, distant  Abdo - soft, NTND  Ext - LLE dressing in place, palpable bilateral pulses  Mental status - sedated, pupils dilated but equal and sluggishly reactive, b/l ankle clonus noted    Medications:  Reviewed in EMR    Lab Data:  Reviewed in EMR    Imaging:  I independently visualized all relevant chest imaging in PACS and agree with radiology interpretation except where noted.

## 2024-03-17 NOTE — PROGRESS NOTES
Received PT intubated with a (7) ETT secured at (22) on vent with the following settings;   BS heard bilaterally, SXN provided as needed. No changes made, RT to continue to monitor.        03/17/24 8874   Vent Information   Vent Mode VC/AC   Settings   FiO2 (%) 30 %   Resp Rate (Set) 12   Vt (Set, mL) 650 mL   Waveform Decelerating ramp   PEEP/CPAP (cm H2O) 5 cm H20   PEEP Low (cm H2O) 2 cm H2O   Peak Flow LPM 60   Trigger Sensitivity Flow (L/min) 3 L/min   Humidification Heater   H2O Bag Level 1/2 Full   Heater Temperature 98.6 °F (37 °C)   Readings   Total RR 14   Minute Ventilation (L/min) 8 L/min   Expiratory Tidal Volume 630 mL   PIP Observed (cm H2O) 26 cm H2O   MAP (cm H2O) 9   PEEP Low (cm H2O) 2 cm H2O   Plateau Pressure (cm H2O) 21 cm H2O   Static Compliance (L/cm H2O) 38   Dynamic Compliance (L/cm H2O) 31 L/cm H2O

## 2024-03-17 NOTE — PROGRESS NOTES
Cardiology Progress Note    Cristianosneha Estebanadrien Patient Status:  Inpatient    1970 MRN [id number]   Location [facility] 2W/SW Attending Donna Llanes, DO   Hosp Day # 12 PCP Isauro Pina MD       Subjective: Orally intubated and sedated, stable overnight without any deterioration.  Minimal dobutamine, off Levophed    Objective:   Temp: 99 °F (37.2 °C)  Pulse: 91  Resp: 16  BP: 145/84  FiO2 (%): 30 %    Intake/Output:     Intake/Output Summary (Last 24 hours) at 3/17/2024 0814  Last data filed at 3/17/2024 0700  Gross per 24 hour   Intake 1972.85 ml   Output 3634 ml   Net -1661.15 ml       Last 3 Weights   03/15/24 0600 188 lb 4.4 oz (85.4 kg)   24 0600 184 lb 15.5 oz (83.9 kg)   24 1000 186 lb 4.6 oz (84.5 kg)   24 1000 192 lb 10.9 oz (87.4 kg)   24 0500 188 lb 15 oz (85.7 kg)   24 0600 185 lb 13.6 oz (84.3 kg)   24 0600 183 lb 3.2 oz (83.1 kg)   24 0500 180 lb 1.9 oz (81.7 kg)   24 1324 175 lb 7.8 oz (79.6 kg)   24 0722 160 lb (72.6 kg)   10/06/21 1541 177 lb 6.4 oz (80.5 kg)   20 0929 172 lb 9.6 oz (78.3 kg)       Tele: NSR    Physical Exam:       Cardiac: Regular rate and rhythm. S1, S2 normal. No murmur, pericardial rub, S3.  Lungs: Clear without wheezes, rales, rhonchi or dullness.  Normal excursions and effort.  Abdomen: Soft, non-tender. BS-present.  Extremities: Significant edema peripheral pulses are 2+.      Laboratory/Data:    Labs:           Recent Labs   Lab 24  1452 24  0408 03/15/24  0423 03/15/24  1256 03/15/24  1346 03/15/24  1521 03/15/24  1605 24  0536 24  1443 24  0004 24  0553   WBC 9.2 10.4 9.3  --   --   --  11.0 8.5  --   --  10.3   HGB 8.9* 7.8* 7.7*  --   --   --  10.2* 7.4* 10.4* 9.4* 9.0*   MCV 88.8 89.4 89.0  --   --   --  85.2 87.3  --   --  85.4   PLT 79.0* 90.0* 119.0* 163.0  --  154.0 178.0 149.0*  --   --  131.0*   BAND  --   --   --   --   --   --  2 21   --   --   --    INR 1.06  --   --   --  1.37* 1.31* 1.37* 1.24*  --   --   --        Recent Labs   Lab 03/15/24  1605 03/15/24  1928 03/16/24  0536 03/16/24  1712 03/17/24  0553     137 138 139 135* 134*   K 5.1  5.1 4.6 4.7 4.7 4.4     106 107 107 105 103   CO2 23.0  23.0 23.0 23.0 20.0* 23.0   BUN 43*  43* 43* 42* 40* 46*   CREATSERUM 3.70*  3.70* 3.53* 3.49* 3.41* 3.44*   CA 8.0*  8.0* 7.9* 7.4* 7.6* 7.7*   MG 2.1 2.0 1.9 1.9 1.9   PHOS 3.4  3.4 2.9 5.6*  5.6* 4.4 4.7   *  152* 167* 155* 171* 187*       Recent Labs   Lab 03/12/24  0539 03/12/24  1639 03/13/24  0504 03/13/24  1744 03/14/24  0408 03/14/24  1812 03/15/24  0423 03/15/24  1605 03/15/24  1928 03/16/24  0536 03/17/24  0553   ALT 78*  --  69*  --  57*  57*  --   --   --   --   --  <7*   *  --  89*  --  68*  68*  --   --   --   --   --  26   ALB 3.1*  3.1*   < > 2.9*  2.9*   < > 2.9*  2.9*  2.9*   < > 2.9* 2.8* 3.0* 2.9* 3.0*    < > = values in this interval not displayed.       No results for input(s): \"TROP\" in the last 168 hours.    Allergies:   No Known Allergies    Medications:          Assessment:  S/p urgent CABG x 3 yesterday complicated by episodes of reexploration for bleeding  History of recent MI s/p balloon angioplasty of the circumflex  Impella removed yesterday, hemodynamically stable  Hypertension  Hyperlipidemia  Bilateral lower extremity myoclonus     Plan:  Hemodynamically stable  Continue low-dose dobutamine  Predominantly third spacing with probable intravascular depletion, would be careful with aggressive diuresis  Wean sedation  CT of the head if able to due to focal deficit   Once extubated will start low-dose of Lopressor      Dima Ponce MD      3

## 2024-03-17 NOTE — PLAN OF CARE
Problem: Patient Centered Care  Goal: Patient preferences are identified and integrated in the patient's plan of care  Description: Interventions:  - What would you like us to know as we care for you?   - Provide timely, complete, and accurate information to patient/family  - Incorporate patient and family knowledge, values, beliefs, and cultural backgrounds into the planning and delivery of care  - Encourage patient/family to participate in care and decision-making at the level they choose  - Honor patient and family perspectives and choices  Outcome: Progressing     Problem: Patient/Family Goals  Goal: Patient/Family Long Term Goal  Description: Patient's Long Term Goal:     Interventions:  -   - See additional Care Plan goals for specific interventions  Outcome: Progressing  Goal: Patient/Family Short Term Goal  Description: Patient's Short Term Goal:   Interventions:   -   - See additional Care Plan goals for specific interventions  Outcome: Progressing     Problem: RESPIRATORY - ADULT  Goal: Achieves optimal ventilation and oxygenation  Description: INTERVENTIONS:  - Assess for changes in respiratory status  - Assess for changes in mentation and behavior  - Position to facilitate oxygenation and minimize respiratory effort  - Oxygen supplementation based on oxygen saturation or ABGs  - Provide Smoking Cessation handout, if applicable  - Encourage broncho-pulmonary hygiene including cough, deep breathe, Incentive Spirometry  - Assess the need for suctioning and perform as needed  - Assess and instruct to report SOB or any respiratory difficulty  - Respiratory Therapy support as indicated  - Manage/alleviate anxiety  - Monitor for signs/symptoms of CO2 retention  Outcome: Progressing     Problem: CARDIOVASCULAR - ADULT  Goal: Maintains optimal cardiac output and hemodynamic stability  Description: INTERVENTIONS:  - Monitor vital signs, rhythm, and trends  - Monitor for bleeding, hypotension and signs of  decreased cardiac output  - Evaluate effectiveness of vasoactive medications to optimize hemodynamic stability  - Monitor arterial and/or venous puncture sites for bleeding and/or hematoma  - Assess quality of pulses, skin color and temperature  - Assess for signs of decreased coronary artery perfusion - ex. Angina  - Evaluate fluid balance, assess for edema, trend weights  Outcome: Progressing  Goal: Absence of cardiac arrhythmias or at baseline  Description: INTERVENTIONS:  - Continuous cardiac monitoring, monitor vital signs, obtain 12 lead EKG if indicated  - Evaluate effectiveness of antiarrhythmic and heart rate control medications as ordered  - Initiate emergency measures for life threatening arrhythmias  - Monitor electrolytes and administer replacement therapy as ordered  Outcome: Progressing     Problem: METABOLIC/FLUID AND ELECTROLYTES - ADULT  Goal: Hemodynamic stability and optimal renal function maintained  Description: INTERVENTIONS:  - Monitor labs and assess for signs and symptoms of volume excess or deficit  - Monitor intake, output and patient weight  - Monitor urine specific gravity, serum osmolarity and serum sodium as indicated or ordered  - Monitor response to interventions for patient's volume status, including labs, urine output, blood pressure (other measures as available)  - Encourage oral intake as appropriate  - Instruct patient on fluid and nutrition restrictions as appropriate  Outcome: Progressing     Problem: METABOLIC/FLUID AND ELECTROLYTES - ADULT  Goal: Hemodynamic stability and optimal renal function maintained  Description: INTERVENTIONS:  - Monitor labs and assess for signs and symptoms of volume excess or deficit  - Monitor intake, output and patient weight  - Monitor urine specific gravity, serum osmolarity and serum sodium as indicated or ordered  - Monitor response to interventions for patient's volume status, including labs, urine output, blood pressure (other measures as  available)  - Encourage oral intake as appropriate  - Instruct patient on fluid and nutrition restrictions as appropriate  Outcome: Progressing     Problem: SKIN/TISSUE INTEGRITY - ADULT  Goal: Skin integrity remains intact  Description: INTERVENTIONS  - Assess and document risk factors for pressure ulcer development  - Assess and document skin integrity  - Monitor for areas of redness and/or skin breakdown  - Initiate interventions, skin care algorithm/standards of care as needed  Outcome: Progressing  Goal: Incision(s), wounds(s) or drain site(s) healing without S/S of infection  Description: INTERVENTIONS:  - Assess and document risk factors for pressure ulcer development  - Assess and document skin integrity  - Assess and document dressing/incision, wound bed, drain sites and surrounding tissue  - Implement wound care per orders  - Initiate isolation precautions as appropriate  - Initiate Pressure Ulcer prevention bundle as indicated  Outcome: Progressing  Goal: Oral mucous membranes remain intact  Description: INTERVENTIONS  - Assess oral mucosa and hygiene practices  - Implement preventative oral hygiene regimen  - Implement oral medicated treatments as ordered  Outcome: Progressing     Problem: NEUROLOGICAL - ADULT  Goal: Achieves stable or improved neurological status  Description: INTERVENTIONS  - Assess for and report changes in neurological status  - Initiate measures to prevent increased intracranial pressure  - Maintain blood pressure and fluid volume within ordered parameters to optimize cerebral perfusion and minimize risk of hemorrhage  - Monitor temperature, glucose, and sodium. Initiate appropriate interventions as ordered  Outcome: Progressing     Problem: HEMATOLOGIC - ADULT  Goal: Free from bleeding injury  Description: (Example usage: patient with low platelets)  INTERVENTIONS:  - Avoid intramuscular injections, enemas and rectal medication administration  - Ensure safe mobilization of  patient  - Hold pressure on venipuncture sites to achieve adequate hemostasis  - Assess for signs and symptoms of internal bleeding  - Monitor lab trends  - Patient is to report abnormal signs of bleeding to staff  - Avoid use of toothpicks and dental floss  - Use electric shaver for shaving  - Use soft bristle tooth brush  - Limit straining and forceful nose blowing  Outcome: Progressing   Pt's sedation maintained hemodynamics fairly stable  with bp dipping to mid 80s at times, then recovers on own, low dose levo, dobut, amio,, TPN started, abg at [place] improved, rad and pedal pulses 3+, avery hugger on and off thru nite, CRRT clotted off at 2145, dialysis called, here at [place], catheter with sluggish flow, requested tpa for ports, rec'd order from nocturnist, dialysis RN infused then waited 1 hr before started treatment.  Pressure running high so started on low settings, not pulling fluid for a few hrs.  Pressure pod bottoming out, had to reset a few times  between 2 and 4am, with improvement in machine numbers.  Rt groin site cdi, w/o hematoma.

## 2024-03-17 NOTE — PROGRESS NOTES
[facility]  part of [facility]    Progress Note    Cristiano Obregon Patient Status:  Inpatient    1970 MRN [id number]   Location [facility] 2W/SW Attending Donna Llanes,    Hosp Day # 12 PCP Isauro Pina MD     Subjective:  Sedation held at 7 AM, able to blink, cough, moved to left hand spontaneously, does not Follow commands yet.  Still with clonus.  CRRT clotted this a.m., currently off.  Impella removed yesterday.  Remains on dobutamine 1 huey, levo off.   currently visiting at bedside.    Objective:  /70   Pulse 94   Temp 99.3 °F (37.4 °C) (Pulmonary Artery)   Resp 19   Ht 5' 7\" (1.702 m)   Wt 188 lb 4.4 oz (85.4 kg)   SpO2 100%   BMI 29.49 kg/m²     Temp (24hrs), Av.7 °F (36.5 °C), Min:96.2 °F (35.7 °C), Max:99.4 °F (37.4 °C)  Telemetry-NSR    Intake/Output:    Intake/Output Summary (Last 24 hours) at 3/17/2024 1033  Last data filed at 3/17/2024 0700  Gross per 24 hour   Intake 1972.85 ml   Output 3571 ml   Net -1598.15 ml       Wt Readings from Last 3 Encounters:   03/15/24 188 lb 4.4 oz (85.4 kg)   10/06/21 177 lb 6.4 oz (80.5 kg)   20 172 lb 9.6 oz (78.3 kg)       Allergies:  No Known Allergies    Labs:  Lab Results   Component Value Date    WBC 10.3 2024    HGB 9.0 2024    HCT 26.3 2024    .0 2024    CREATSERUM 3.44 2024    BUN 46 2024     2024    K 4.4 2024     2024    CO2 23.0 2024     2024    CA 7.7 2024    ALB 3.0 2024    ALKPHO 59 2024    BILT 0.3 2024    TP 5.0 2024    AST 26 2024    ALT <7 2024    MG 1.9 2024    PHOS 4.7 2024       Physical Exam:  Blood pressure 117/70, pulse 94, temperature 99.3 °F (37.4 °C), temperature source Pulmonary Artery, resp. rate 19, height 5' 7\" (1.702 m), weight 188 lb 4.4 oz (85.4 kg), SpO2 100%.  General: Sedated/intubated  Neck: Difficult  to assess with edema/RIJ cordis/[place]  Lungs: Coarse bilaterally anteriorly   Pericardial drain 24cc since over this a.m., chest tubes-60, serous sang/thin drainage-no airleak noted.  When patient was sat up in bed, did have approximately  200 cc sanguinous drainage, has slowed since.  Heart: RRR, S1, S2  Abdomen: Soft, NT/mildly distended, BS+x4 -hypoactive, negative BM  Extremities: Warm, dry, generalized edema/anasarca. LUE edema -improved  Pulses: Doppler bilat DP  Skin: sternotomy stable, incision CDI  Neurological: Coughs, blinks eyes, spontaneously moves left arm nonpurposeful, +clonus     Assessment/Plan:  S/p CABG x 3 (LIMA-LAD, SVG-OM, SVG-PDA) 3/15,   Reop secondary to tamponade 3/16-mediastinal exploration/washout  Sedation off at 7 AM, blinks eyes, spontaneously moves left upper extremity, positive clonus.  Management per pulmonary-plan for SBT later today if alert. CXR reviewed this a.m.    Currently remains hemodynamically stable-Impella moved yesterday, low-dose dobutamine at 1 huey, levo 1.  Plan to DC [place] this a.m.  Will place PICC line for more IV access.  Transition to p.o. Amio via OGT, DC IV drip.  No VT/VF or A-fib noted postop.  DVT prevention - SCDs/heparin subcu  Expected postop anemia - Transfuse to maintain Hgb 10 prn.  CBC Q 8.  Plan for 1 RBC this a.m.  NORMA/ATN - CRRT clotted this a.m., late restart.  Patient even currently, CVP 5.  Likely third space, albumin 3.  Insulin coverage per protocol -transitioned off drip yesterday, with TPN, make adjustments to DCF/insulin accordingly.  FEN-TPN resumed last night.  Will try to replace DHT prior to extubation.  If unable to place unless GI, as multiple attempts were made prior to surgery without success.  Currently has OGT to suction.  L UE edema improved-left arterial ultrasound WNL, left wrist A-line removed.  Pneumonia-currently afebrile, sputum culture with Klebsiella/MSSA-on Zosyn, blood cultures NGTD.     Discharge planning:  Patient  lives with  and supportive family members.  Anticipate will need rehab.  Will need PT/OT once extubated medically stable.  Appreciate SW/case management to assist with DC planning      Plan of care discussed with pt, bedside RN, and CV Surgeon: Dr. Shraddha Henderson, APRN  3/17/2024  1037

## 2024-03-17 NOTE — PROGRESS NOTES
[facility]  part of [facility] Infectious Disease Consult    Cristiano Obregon Patient Status:  Inpatient    1970 MRN [id number]   Location [facility] 2W/SW Attending Bud Camarena MD   Hosp Day # 12 PCP MD Cristiano Mccullough seen and examined,   Afebrile,   Previous entries noted,   Intubated,   Sedated,     History:  History reviewed. No pertinent past medical history.  Past Surgical History:   Procedure Laterality Date    COLONOSCOPY N/A 2017    Procedure: COLONOSCOPY, POSSIBLE BIOPSY, POSSIBLE POLYPECTOMY 13884;  Surgeon: Byron Plaza MD;  Location: [facility]     History reviewed. No pertinent family history.   reports that he has been smoking cigarettes. He has never used smokeless tobacco. He reports that he does not drink alcohol and does not use drugs.    Allergies:  No Known Allergies    Medications:    Current Facility-Administered Medications:     amiodarone (Pacerone) tab 200 mg, 200 mg, Oral, TID    sodium chloride 0.9% infusion, , Intravenous, Once    adult 3 in 1 TPN, , Intravenous, Continuous TPN    clevidipine (Cleviprex) 25 MG/50ML IV infusion, 1-6 mg/hr, Intravenous, Continuous    sugammadex (Bridion) 200 MG/2ML injection 170 mg, 2 mg/kg, Intravenous, Once **OR** sugammadex (Bridion) 200 MG/2ML injection 340 mg, 4 mg/kg, Intravenous, Once    sodium chloride 0.9% infusion, , Intravenous, Once    adult 3 in 1 TPN, , Intravenous, Continuous TPN    metoclopramide (Reglan) 5 mg/mL injection 5 mg, 5 mg, Intravenous, Q6H    insulin regular human (Novolin R, Humulin R) 100 UNIT/ML injection 1-7 Units, 1-7 Units, Subcutaneous, 4 times per day    melatonin tab 3 mg, 3 mg, Oral, Nightly PRN    polyethylene glycol (PEG 3350) (Miralax) 17 g oral packet 17 g, 17 g, Oral, Daily PRN    ondansetron (Zofran) 4 MG/2ML injection 4 mg, 4 mg, Intravenous, Q6H PRN    famotidine (Pepcid) tab 20 mg, 20 mg, Oral, Daily **OR**  famotidine (Pepcid) 20 mg/2mL injection 20 mg, 20 mg, Intravenous, Daily    DOBUTamine in dextrose 5% (Dobutrex) 500 mg/250mL infusion premix, 2.5-20 mcg/kg/min (Dosing Weight), Intravenous, Continuous PRN    norepinephrine (Levophed) 4 mg/250mL infusion premix, 0.5-30 mcg/min, Intravenous, Continuous PRN    metoprolol tartrate (Lopressor) tab 25 mg, 25 mg, Oral, 2x Daily(Beta Blocker)    potassium chloride 20 mEq/100mL IVPB premix 20 mEq, 20 mEq, Intravenous, PRN **OR** potassium chloride 40 mEq/100mL IVPB premix (central line) 40 mEq, 40 mEq, Intravenous, PRN    magnesium sulfate in dextrose 5% 1 g/100mL infusion premix 1 g, 1 g, Intravenous, PRN    magnesium sulfate in sterile water for injection 2 g/50mL IVPB premix 2 g, 2 g, Intravenous, PRN    mupirocin (Bactroban) 2% nasal ointment 1 Application, 1 Application, Nasal, BID    chlorhexidine gluconate (Peridex) 0.12 % oral solution 15 mL, 15 mL, Mouth/Throat, BID    ascorbic acid (Vitamin C) tab 500 mg, 500 mg, Oral, TID    heparin (Porcine) 5000 UNIT/ML injection 5,000 Units, 5,000 Units, Subcutaneous, 2 times per day    acetaminophen (Tylenol) tab 650 mg, 650 mg, Oral, Q4H PRN **OR** HYDROcodone-acetaminophen (Norco) 5-325 MG per tab 1 tablet, 1 tablet, Oral, Q4H PRN **OR** [DISCONTINUED] HYDROcodone-acetaminophen (Norco) 5-325 MG per tab 2 tablet, 2 tablet, Oral, Q4H PRN    dexmedeTOMIDine in sodium chloride 0.9% (Precedex) 400 mcg/100mL infusion premix, 0.2-1.5 mcg/kg/hr (Dosing Weight), Intravenous, Continuous    [Held by provider] clopidogrel (Plavix) tab 75 mg, 75 mg, Oral, Daily    aspirin DR tab 81 mg, 81 mg, Oral, Daily    glucose (Dex4) 15 GM/59ML oral liquid 15 g, 15 g, Oral, Q15 Min PRN **OR** glucose (Glutose) 40% oral gel 15 g, 15 g, Oral, Q15 Min PRN **OR** glucose-vitamin C (Dex-4) chewable tab 4 tablet, 4 tablet, Oral, Q15 Min PRN **OR** dextrose 50% injection 50 mL, 50 mL, Intravenous, Q15 Min PRN **OR** glucose (Dex4) 15 GM/59ML oral liquid  30 g, 30 g, Oral, Q15 Min PRN **OR** glucose (Glutose) 40% oral gel 30 g, 30 g, Oral, Q15 Min PRN **OR** glucose-vitamin C (Dex-4) chewable tab 8 tablet, 8 tablet, Oral, Q15 Min PRN    propofol (Diprivan) 10 mg/mL infusion premix, 5-67 mcg/kg/min, Intravenous, Continuous    NxStage Pure Flow 400 CRRT/PIRRT fluid 5000mL, 2.5 L/hr, CRRT, Continuous **AND** CRRT Replacement Fluid 400, , , Until Discontinued    piperacillin-tazobactam (Zosyn) 3.375 g in dextrose 5% 100 mL IVPB-ADDV, 3.375 g, Intravenous, Q8H    fluconazole in sodium chloride 0.9% (Diflucan) 400 mg/200mL IVPB premix 400 mg, 400 mg, Intravenous, Q24H    sodium chloride 0.9% infusion, , Intravenous, Continuous    mineral oil-white petrolatum (Artificial Tears) 83-15 % ophthalmic ointment, , Both Eyes, TID    diazepam (Valium) 5 mg/mL injection 2.5 mg, 2.5 mg, Intravenous, Q4H PRN    dextrose 10% infusion (TPN no rate), , Intravenous, Continuous PRN    heparin (Porcine) 1000 UNIT/ML injection 1,500 Units, 1.5 mL, Intracatheter, PRN    sodium bicarbonate 25mEq in dextrose 5% 1000mL IMPELLA purge solution, 3-30 mL/hr, Intravenous, Continuous    acetaminophen (Tylenol) tab 650 mg, 650 mg, Oral, Q4H PRN **OR** acetaminophen (Tylenol) 160 MG/5ML oral liquid 650 mg, 650 mg, Oral, Q4H PRN **OR** acetaminophen (Tylenol) rectal suppository 650 mg, 650 mg, Rectal, Q4H PRN **OR** acetaminophen (Ofirmev) 10 mg/mL infusion premix 1,000 mg, 1,000 mg, Intravenous, Q6H PRN    senna (Senokot) 8.8 MG/5ML oral syrup 17.6 mg, 10 mL, Oral, Nightly PRN    bisacodyl (Dulcolax) 10 MG rectal suppository 10 mg, 10 mg, Rectal, Daily PRN    fleet enema (Fleet) 7-19 GM/118ML rectal enema 133 mL, 1 enema, Rectal, Once PRN    propofol (Diprivan) 10 mg/mL infusion premix, 5-50 mcg/kg/min, Intravenous, Continuous    Review of Systems:   Constitutional: Negative for anorexia, chills, fatigue, fevers, malaise, night sweats and weight loss.  Eyes: Negative for visual disturbance, irritation  and redness.  Ears, nose, mouth, throat, and face: Negative for hearing loss, tinnitus, nasal congestion, snoring, sore throat, hoarseness and voice change.  Respiratory: Negative for cough, sputum, hemoptysis, chest pain, wheezing, dyspnea on exertion, or stridor.  Cardiovascular: Negative for chest pain, palpitations, irregular heart beats, syncope, fatigue, orthopnea, paroxysmal nocturnal dyspnea, lower extremity edema.  Gastrointestinal: Negative for dysphagia, odynophagia, reflux symptoms, nausea, vomiting, change in bowel habits, diarrhea, constipation and abdominal pain.  Integument/breast: Negative for rash, skin lesions, and pruritus.  Hematologic/lymphatic: Negative for easy bruising, bleeding, and lymphadenopathy.  Musculoskeletal: Negative for myalgias, arthralgias, muscle weakness.  Neurological: Negative for headaches, dizziness, seizures, memory problems, trouble swallowing, speech problems, gait problems and weakness.  Behavioral/Psych: Negative for active tobacco use.  Endocrine: No history of of diabetes, thyroid disorder.  Unable to obtain,     Vital signs in last 24 hours:  Patient Vitals for the past 24 hrs:   BP Temp Temp src Pulse Resp SpO2 Weight   03/14/24 1100 122/81 99.5 °F (37.5 °C) Pulmonary Ar 114 14 99 % --   03/14/24 1000 96/73 99.3 °F (37.4 °C) Pulmonary Ar 117 18 98 % --   03/14/24 0900 103/69 99.3 °F (37.4 °C) Pulmonary Ar (!) 124 19 98 % --   03/14/24 0800 104/72 98.9 °F (37.2 °C) Pulmonary Ar 119 18 97 % --   03/14/24 0700 127/90 100.2 °F (37.9 °C) Pulmonary Ar 114 13 97 % --   03/14/24 0600 131/81 100.1 °F (37.8 °C) Pulmonary Ar (!) 121 22 98 % 184 lb 15.5 oz (83.9 kg)   03/14/24 0500 123/82 100.2 °F (37.9 °C) Pulmonary Ar 120 25 94 % --   03/14/24 0400 101/73 99.3 °F (37.4 °C) Pulmonary Ar 96 17 97 % --   03/14/24 0300 111/76 98.8 °F (37.1 °C) Pulmonary Ar 91 17 91 % --   03/14/24 0200 90/71 98.9 °F (37.2 °C) Pulmonary Ar 118 18 95 % --   03/14/24 0100 99/79 99.4 °F (37.4 °C)  Pulmonary Ar (!) 121 18 96 % --   03/14/24 0000 105/79 99.7 °F (37.6 °C) Pulmonary Ar 117 20 96 % --   03/13/24 2300 104/78 100.3 °F (37.9 °C) Pulmonary Ar 94 18 95 % --   03/13/24 2200 119/75 (!) 100.6 °F (38.1 °C) Pulmonary Ar 118 19 94 % --   03/13/24 2100 100/69 100.4 °F (38 °C) Pulmonary Ar 119 20 94 % --   03/13/24 2000 113/86 100.1 °F (37.8 °C) Pulmonary Ar 116 18 92 % --   03/13/24 1900 -- 100 °F (37.8 °C) Pulmonary Ar 108 20 97 % --   03/13/24 1800 116/69 99.3 °F (37.4 °C) -- 101 16 96 % --   03/13/24 1700 -- 98.7 °F (37.1 °C) -- 113 17 98 % --   03/13/24 1600 123/87 98.8 °F (37.1 °C) -- 100 17 96 % --   03/13/24 1500 -- 99.5 °F (37.5 °C) -- 93 14 98 % --   03/13/24 1400 105/82 100.1 °F (37.8 °C) -- 87 14 99 % --   03/13/24 1330 -- -- -- 83 15 99 % --   03/13/24 1300 -- 98.2 °F (36.8 °C) -- 85 15 99 % --   03/13/24 1200 90/59 99.2 °F (37.3 °C) Pulmonary Ar 88 16 100 % --   03/13/24 1130 -- -- -- 87 15 -- --       Physical Exam:   General: alert, cooperative, oriented.  Intubated,    Head: Normocephalic, without obvious abnormality, atraumatic.   Eyes: Conjunctivae/corneas clear.  No scleral icterus.  No conjunctival     hemorrhage.   Nose: Nares normal.   Throat: Lips, mucosa, and tongue normal.  No thrush noted.   Neck: Soft, supple neck; trachea midline, no adenopathy, no thyromegaly.   Lungs: CTAB, normal and equal bilateral chest rise   Chest wall: No tenderness or deformity.   Heart: Regular rate and rhythm, normal S1S2, no murmur.   Abdomen: soft, non-tender, non-distended, positive BS.   Extremity: no edema, no cyanosis, Trialysis, [place], Groin line,    Skin: No rashes or lesions.   Neurological: Alert, interactive, no focal deficits    Labs:  Lab Results   Component Value Date    WBC 10.3 03/17/2024    HGB 9.0 03/17/2024    HCT 26.3 03/17/2024    .0 03/17/2024    CREATSERUM 3.44 03/17/2024    BUN 46 03/17/2024     03/17/2024    K 4.4 03/17/2024     03/17/2024    CO2 23.0 03/17/2024      03/17/2024    CA 7.7 03/17/2024    ALB 3.0 03/17/2024    ALKPHO 59 03/17/2024    BILT 0.3 03/17/2024    TP 5.0 03/17/2024    AST 26 03/17/2024    ALT <7 03/17/2024    MG 1.9 03/17/2024    PHOS 4.7 03/17/2024       Radiology:  CXR- 1. Cardiomegaly pulmonary venous distention.   2. Support tubes and catheters are satisfactory.  No pneumothorax   3. Mild perihilar and basilar congestive changes worse on the right has progressed.    4. Small right-sided effusion.       Cultures:  Susceptibility data from last 90 days.  Collected Specimen Info Organism Cefazolin Cefepime Ceftriaxone Ciprofloxacin Clindamycin Erythromycin Gentamicin Levofloxacin Meropenem Oxacillin Piperacillin/Tazobactam Trimethoprim/Sulfamethoxazole Vancomycin   03/08/24 Other from Endotracheal aspirate Klebsiella (Enterobacter) aerogenes  R  S  S  S    S  S  S   S  S      Staphylococcus aureus S     S  S  S  S   S   S  S       Assessment and Plan:    1.   Fevers: Multifactorial,   - Sec to HAP vs drop of Hgb and multiple transfusions,   - UA was bland on admission, will repeat,   - Sputum cul with Kleb and MSSA,   - Blood cul are NGTD  - CXR with bilateral Congestion, no obvious consolidation, thick sputum in Etr earlier, now clearing up,   - Lines are clean, has Groin line, Trialysis Catheter and [place],   - MRSA Carriage is negative,   - On IV IV Zosyn, Fluconazole, d # 4,     2.    Hx of Acute MI with Cardiogenic Shock: Got intubated post Cath,   - Plan for CABG, Blood cul drawn on 3/11 are NGTD,  - S/P CABG x 3, R Pleural effusion drainage, 3/15/24,   - Followed by Sternal exploration with mediastinal washout for Cardiac tamponade, 3/16/24,     3.     Disposition: in house, A middle aged male with Acute MI, Cardiogenic shock, with persistent Fevers and now hypothermia,   - Follow Pending Cul,   - Hgb trend,   - Continue IV Zosyn , Fluconazole IV. Will dc Fluc today, hopefully will treat with Zosyn for 7 days,   - Supportive care,      Discussed with PCP, RN, all questions answered, further recommendations to follow, Thanks,     Thank you for consulting [facility] for Cristiano Obregon.  If you have any questions or concerns please call [facility] Infectious Disease at [phone].     Derick Webb MD  3/14/2024  11:25 AM

## 2024-03-17 NOTE — PROGRESS NOTES
NEPHROLOGY DAILY PROGRESS NOTE       SUBJECTIVE:  Cristiano Obregon is intubated. CRRT clotted off and is being resumed. PICC line placement is desired so the [place] my be removed.        OBJECTIVE:    Total Intake/Output:    Intake/Output Summary (Last 24 hours) at 3/17/2024 1315  Last data filed at 3/17/2024 0700  Gross per 24 hour   Intake 1958.85 ml   Output 3167 ml   Net -1208.15 ml       PHYSICAL EXAM:  /80   Pulse 98   Temp 99.5 °F (37.5 °C) (Pulmonary Artery)   Resp 21   Ht 5' 7\" (1.702 m)   Wt 188 lb 4.4 oz (85.4 kg)   SpO2 98%   BMI 29.49 kg/m²   GEN: Intubated, sedated   HEENT: ETT in place   CHEST: mechanical breath sounds, chest tube in place   CARDIAC: S1S2 normal  ABD: soft, NT/ND  : lujan in place   EXT:  trace upper and lower ext edema noted   NEURO: sedated   ACCESS: RIJ temp HD cath (3/7)      CURRENT MEDICATIONS:     amiodarone  200 mg Oral TID    sodium chloride   Intravenous Once    sugammadex  2 mg/kg Intravenous Once    Or    sugammadex  4 mg/kg Intravenous Once    sodium chloride   Intravenous Once    metoclopramide  5 mg Intravenous Q6H    insulin regular human  1-7 Units Subcutaneous 4 times per day    famotidine  20 mg Oral Daily    Or    famotidine  20 mg Intravenous Daily    metoprolol tartrate  25 mg Oral 2x Daily(Beta Blocker)    mupirocin  1 Application Nasal BID    chlorhexidine gluconate  15 mL Mouth/Throat BID    ascorbic acid  500 mg Oral TID    heparin  5,000 Units Subcutaneous 2 times per day    [Held by provider] clopidogrel  75 mg Oral Daily    aspirin  81 mg Oral Daily    piperacillin-tazobactam  3.375 g Intravenous Q8H    fluconazole  400 mg Intravenous Q24H    mineral oil-white petrolatum   Both Eyes TID         LABS:  Patient Labs Reviewed in Detail. Pertinent Labs as follows:  Recent Labs   Lab 03/16/24  0536 03/16/24  1712 03/17/24  0553   * 171* 187*   BUN 42* 40* 46*   CREATSERUM 3.49* 3.41* 3.44*   EGFRCR 20* 20* 20*   CA 7.4* 7.6* 7.7*     135* 134*   K 4.7 4.7 4.4    105 103   CO2 23.0 20.0* 23.0     Recent Labs   Lab 03/15/24  1605 03/16/24  0536 03/16/24  1443 03/17/24  0004 03/17/24  0553   RBC 3.44* 2.45*  --   --  3.08*   HGB 10.2* 7.4* 10.4* 9.4* 9.0*   HCT 29.3* 21.4*  --  27.4* 26.3*   MCV 85.2 87.3  --   --  85.4   MCH 29.7 30.2  --   --  29.2   MCHC 34.8 34.6  --   --  34.2   RDW 14.3 15.2*  --   --  15.2*   NEPRELIM 9.11* 6.88  --   --  8.17*   WBC 11.0 8.5  --   --  10.3   .0 149.0*  --   --  131.0*         IMAGING:  XR CHEST AP PORTABLE  (CPT=71045)    Result Date: 3/17/2024  CONCLUSION:   Distal tip of the [place]-Ophelia catheter projects over the left lower lobe.  Recommend retraction.  Unchanged trace left pleural effusion.  Unchanged left retrocardiac opacity.    Dictated by (CST): Emily Álvarez MD on 3/17/2024 at 12:22 PM     Finalized by (CST): Emily Álvarez MD on 3/17/2024 at 12:24 PM          US ARTERIAL DUPLEX UPPER EXTREMITY LEFT (CPT=93931)    Result Date: 3/16/2024  CONCLUSION:  1. Widely patent left upper extremity arterial vasculature, including radial artery without pseudoaneurysm or dissection.    Dictated by (CST): Guille Chan MD on 3/16/2024 at 12:56 PM     Finalized by (CST): Guille Chan MD on 3/16/2024 at 12:57 PM          XR CHEST AP PORTABLE  (CPT=71045)    Result Date: 3/16/2024  CONCLUSION:   Distal tip of the [place]-Ophelia catheter remains within the left lower lobe pulmonary artery.  Recommend retraction.  A secure message was sent to Emily Adams on 03/16/2024 at 8:31 a.m.  Trace left pleural effusion.  Left retrocardiac opacity , which may be secondary to subsegmental atelectasis, edema, and/or infection.  Resolution of previously seen right pleural effusion and basilar opacity.    Dictated by (CST): Emily Álvarez MD on 3/16/2024 at 8:26 AM     Finalized by (CST): Emily Álvarez MD on 3/16/2024 at 8:30 AM          XR CHEST AP PORTABLE  (CPT=71045)    Result Date:  3/15/2024  CONCLUSION:   1. Postoperative changes from recent CABG. 2. Well-positioned enteric tube, which terminates in the body of the stomach. 3. Interval placement of mediastinal and left basilar chest tubes.  No pneumothorax. 4. Remaining support devices are in unchanged positioning.  5. Near complete resolution of the right pleural effusion and associated right lower lobe airspace opacity. 6. No significant change in the pulmonary vascular congestion.     Dictated by (CST): David Hendrickson MD on 3/15/2024 at 5:11 PM     Finalized by (CST): David Hendrickson MD on 3/15/2024 at 5:17 PM            ASSESSMENT AND PLAN:   This is a 53 year old male with PMH sig for HLD, tobacco use. Presented with chest pain and was admitted for NSTEMI.  Nephrology is consulted for NORMA      Anuric NORMA  - due to ATN from shock.   - creatinine 1.23 on admission, worsened to 7.19 (3/7)  - initiated on CVVH on 3/7 via RIJ temp HD cath   - Continue CRRT  - Increase UF as blood pressures allow.  - Increasing blood flow to 250 ml/min to help with clotting issues. The next option would be to start a heparin infusion pre-filter on the CRRT circuit at 500 units/hr. His recent bleeding issues may preclude this however.    - OK for PICC line if needed.   - Maintain adequate perfusion pressure  - Dose medications for CRRT  - Avoid nephrotoxins.  - BID BMP, phos, and mag while on CRRT.      Hyperkalemia   - resolved with CRRT    Metabolic acidosis  - resolved with CRRT     Hyponatremia  - resolved     Critical care time > 35 mins     Regan Seipp, MD  [facility] - Nephrology

## 2024-03-17 NOTE — PROGRESS NOTES
[facility] Hospitalist Progress Note     CC: Hospital Follow up    PCP: Isauro Pina MD       Assessment/Plan:   Mr. Obregon is a 53-year-old male with a history of hyperlipidemia, tobacco use, who presents to the hospital for evaluation of acute chest pain.  STEMI with cardiogenic shock requiring Impella placement.  Continuing ventilation support with CRRT.  S/p CABG on 3/15     STEMI  Cardiogenic shock   Cardiac tamponade  -Multivessel disease including chronically occluded large RCA, 95% sequential LAD stenosis on cath 3/5  -flow restored to heavily thrombosed circumflex and OM1  -Impella cardiology now removed  -taken to cath again emergently on 3/6/24, for right heart cath to accurately obtain hemodynamics. Worsening renal function noted.   -trialed IV hydration, renal function worsening----started on CRRT on 3/7  -cardiology, nephrology, CV surgery and critical care on consult   -vent management and sedation per critical care  -s/p CABG on 3/15  -post CABG protocol per Cardiology, CV surgery, planning to resume CRRT now, s/p 20 units of blood products in OR  - cardiac tamponade developed 3/15, s/p mediastinal washout   - amio gtt to po/IV, dobutamine gtt, levophed  - repeat echo reviewed    Acute hypoxemic respiratory failure  - intubated in cath lab  - vent management per crit care colleagues  - no plans for extubation today    Acute kidney injury  Anuric   -suspect cardiogenic shock  -component of ATI  -decreased urine output   -start CRRT on 3/7.   -resume CRRT, post CABG now, s/p 20 units of blood products in OR    HCAP  Fevers  -Low-grade fevers  -Sputum with Klebsiella and staph  -Continue cefepime-> Changed to zosyn per ID  - fluconazole added per ID  -persistent fever, repeat BC 3/14 ->NGTD, ID consulteded    Hematuria, resolved  -yellow urine noted, minimal, but not bloody      Diet: PPN to TPN   DVT prophylaxis: per CV surgery  Code status: FULL    Discussed with RN     Disposition:  ICU     Donnasoraya Llanes [facility] Hospitalist        Subjective:     Intubated, opening eyes and moving LUE,  at bedside    OBJECTIVE:    Blood pressure 117/70, pulse 96, temperature 99.7 °F (37.6 °C), temperature source Temporal, resp. rate (!) 29, height 5' 7\" (1.702 m), weight 188 lb 4.4 oz (85.4 kg), SpO2 98%.    Temp:  [96.2 °F (35.7 °C)-99.7 °F (37.6 °C)] 99.7 °F (37.6 °C)  Pulse:  [85-99] 96  Resp:  [12-31] 29  BP: ()/(57-84) 117/70  SpO2:  [97 %-100 %] 98 %  FiO2 (%):  [30 %] 30 %      Intake/Output:    Intake/Output Summary (Last 24 hours) at 3/17/2024 1207  Last data filed at 3/17/2024 0700  Gross per 24 hour   Intake 1958.85 ml   Output 3412 ml   Net -1453.15 ml       Last 3 Weights   03/15/24 0600 188 lb 4.4 oz (85.4 kg)   03/14/24 0600 184 lb 15.5 oz (83.9 kg)   03/12/24 1000 186 lb 4.6 oz (84.5 kg)   03/11/24 1000 192 lb 10.9 oz (87.4 kg)   03/09/24 0500 188 lb 15 oz (85.7 kg)   03/08/24 0600 185 lb 13.6 oz (84.3 kg)   03/07/24 0600 183 lb 3.2 oz (83.1 kg)   03/06/24 0500 180 lb 1.9 oz (81.7 kg)   03/05/24 1324 175 lb 7.8 oz (79.6 kg)   03/05/24 0722 160 lb (72.6 kg)   10/06/21 1541 177 lb 6.4 oz (80.5 kg)   09/09/20 0929 172 lb 9.6 oz (78.3 kg)       /70   Pulse 96   Temp 99.7 °F (37.6 °C) (Temporal)   Resp (!) 29   Ht 5' 7\" (1.702 m)   Wt 188 lb 4.4 oz (85.4 kg)   SpO2 98%   BMI 29.49 kg/m²   General: intubated,   HEENT: Cortis, ET tube intact  Lungs: mechanical breath sounds   Heart: Regular rate and rhythm, S1, S2   Abdomen: soft  Extremities: mild upper/lower ext edema, LUE edema  Skin: normal color    Data Review:       Labs:     Recent Labs   Lab 03/15/24  1605 03/16/24  0536 03/16/24  1443 03/17/24  0004 03/17/24  0553   RBC 3.44* 2.45*  --   --  3.08*   HGB 10.2* 7.4* 10.4* 9.4* 9.0*   HCT 29.3* 21.4*  --  27.4* 26.3*   MCV 85.2 87.3  --   --  85.4   MCH 29.7 30.2  --   --  29.2   MCHC 34.8 34.6  --   --  34.2   RDW 14.3 15.2*  --   --  15.2*    NEPRELIM 9.11* 6.88  --   --  8.17*   WBC 11.0 8.5  --   --  10.3   .0 149.0*  --   --  131.0*         Recent Labs   Lab 03/16/24  0536 03/16/24  1712 03/17/24  0553   * 171* 187*   BUN 42* 40* 46*   CREATSERUM 3.49* 3.41* 3.44*   EGFRCR 20* 20* 20*   CA 7.4* 7.6* 7.7*    135* 134*   K 4.7 4.7 4.4    105 103   CO2 23.0 20.0* 23.0       Recent Labs   Lab 03/12/24  0539 03/12/24  1639 03/13/24  0504 03/13/24  1744 03/14/24  0408 03/14/24  1812 03/15/24  0423 03/15/24  1605 03/15/24  1928 03/16/24  0536 03/17/24  0553   ALT 78*  --  69*  --  57*  57*  --   --   --   --   --  <7*   *  --  89*  --  68*  68*  --   --   --   --   --  26   ALB 3.1*  3.1*   < > 2.9*  2.9*   < > 2.9*  2.9*  2.9*   < > 2.9* 2.8* 3.0* 2.9* 3.0*    < > = values in this interval not displayed.         Imaging:  US ARTERIAL DUPLEX UPPER EXTREMITY LEFT (CPT=93931)    Result Date: 3/16/2024  CONCLUSION:  1. Widely patent left upper extremity arterial vasculature, including radial artery without pseudoaneurysm or dissection.    Dictated by (CST): Guille Chan MD on 3/16/2024 at 12:56 PM     Finalized by (CST): Guille Chan MD on 3/16/2024 at 12:57 PM          XR CHEST AP PORTABLE  (CPT=71045)    Result Date: 3/16/2024  CONCLUSION:   Distal tip of the [place]-Ophelia catheter remains within the left lower lobe pulmonary artery.  Recommend retraction.  A secure message was sent to Emily Adams on 03/16/2024 at 8:31 a.m.  Trace left pleural effusion.  Left retrocardiac opacity , which may be secondary to subsegmental atelectasis, edema, and/or infection.  Resolution of previously seen right pleural effusion and basilar opacity.    Dictated by (CST): Emily Álvarez MD on 3/16/2024 at 8:26 AM     Finalized by (CST): Emily Álvarez MD on 3/16/2024 at 8:30 AM          XR CHEST AP PORTABLE  (CPT=71045)    Result Date: 3/15/2024  CONCLUSION:   1. Postoperative changes from recent CABG. 2. Well-positioned  enteric tube, which terminates in the body of the stomach. 3. Interval placement of mediastinal and left basilar chest tubes.  No pneumothorax. 4. Remaining support devices are in unchanged positioning.  5. Near complete resolution of the right pleural effusion and associated right lower lobe airspace opacity. 6. No significant change in the pulmonary vascular congestion.     Dictated by (CST): David Hendrickson MD on 3/15/2024 at 5:11 PM     Finalized by (CST): David Hendrickson MD on 3/15/2024 at 5:17 PM             Meds:      amiodarone  200 mg Oral TID    sodium chloride   Intravenous Once    sugammadex  2 mg/kg Intravenous Once    Or    sugammadex  4 mg/kg Intravenous Once    sodium chloride   Intravenous Once    metoclopramide  5 mg Intravenous Q6H    insulin regular human  1-7 Units Subcutaneous 4 times per day    famotidine  20 mg Oral Daily    Or    famotidine  20 mg Intravenous Daily    metoprolol tartrate  25 mg Oral 2x Daily(Beta Blocker)    mupirocin  1 Application Nasal BID    chlorhexidine gluconate  15 mL Mouth/Throat BID    ascorbic acid  500 mg Oral TID    heparin  5,000 Units Subcutaneous 2 times per day    [Held by provider] clopidogrel  75 mg Oral Daily    aspirin  81 mg Oral Daily    piperacillin-tazobactam  3.375 g Intravenous Q8H    fluconazole  400 mg Intravenous Q24H    mineral oil-white petrolatum   Both Eyes TID      adult 3 in 1 TPN      clevidipine      adult 3 in 1 TPN 50 mL/hr at 03/16/24 2319    DOBUTamine 2.5 mcg/kg/min (03/17/24 0700)    norepinephrine 3 mcg/min (03/17/24 0700)    dexmedetomidine      propofol Stopped (03/17/24 0725)    NxStage Pure Flow 400 CRRT/PIRRT fluid 5000mL 2.5 L/hr (03/15/24 1721)    sodium chloride 25 mL/hr at 03/15/24 1900    dextrose 10% 50 mL/hr at 03/16/24 2319    sodium bicarbonate 25mEq in dextrose 5% 1000mL IMPELLA purge solution 10 mL/hr (03/13/24 1750)    propofol 50 mcg/kg/min (03/17/24 0519)     melatonin, polyethylene glycol (PEG 3350),  ondansetron, DOBUTamine, norepinephrine, potassium chloride **OR** potassium chloride, magnesium sulfate in dextrose 5%, magnesium sulfate in sterile water for injection, acetaminophen **OR** HYDROcodone-acetaminophen **OR** [DISCONTINUED] HYDROcodone-acetaminophen, glucose **OR** glucose **OR** glucose-vitamin C **OR** dextrose **OR** glucose **OR** glucose **OR** glucose-vitamin C, diazepam, dextrose 10%, heparin, acetaminophen **OR** acetaminophen **OR** acetaminophen **OR** acetaminophen, senna, bisacodyl, fleet enema

## 2024-03-17 NOTE — PLAN OF CARE
SAT and SBT performed this AM. Patient maintained off sedation to improve neuro status.  Vasopressors weaned and off. TPN at goal and infusing centrally. PICC attempted, will attempt again in AM with vascular team. CRRT restarted after clotted. TPN to line. Patient able to move extremities X3 spontaneously, but not to command. Family bedside and supportive.    Problem: Patient Centered Care  Goal: Patient preferences are identified and integrated in the patient's plan of care  Description: Interventions:  - What would you like us to know as we care for you? I love music and my friends and family  - Provide timely, complete, and accurate information to patient/family  - Incorporate patient and family knowledge, values, beliefs, and cultural backgrounds into the planning and delivery of care  - Encourage patient/family to participate in care and decision-making at the level they choose  - Honor patient and family perspectives and choices  Outcome: Progressing     Problem: CARDIOVASCULAR - ADULT  Goal: Maintains optimal cardiac output and hemodynamic stability  Description: INTERVENTIONS:  - Monitor vital signs, rhythm, and trends  - Monitor for bleeding, hypotension and signs of decreased cardiac output  - Evaluate effectiveness of vasoactive medications to optimize hemodynamic stability  - Monitor arterial and/or venous puncture sites for bleeding and/or hematoma  - Assess quality of pulses, skin color and temperature  - Assess for signs of decreased coronary artery perfusion - ex. Angina  - Evaluate fluid balance, assess for edema, trend weights  Outcome: Progressing  Goal: Absence of cardiac arrhythmias or at baseline  Description: INTERVENTIONS:  - Continuous cardiac monitoring, monitor vital signs, obtain 12 lead EKG if indicated  - Evaluate effectiveness of antiarrhythmic and heart rate control medications as ordered  - Initiate emergency measures for life threatening arrhythmias  - Monitor electrolytes and  administer replacement therapy as ordered  Outcome: Progressing     Problem: NEUROLOGICAL - ADULT  Goal: Achieves stable or improved neurological status  Description: INTERVENTIONS  - Assess for and report changes in neurological status  - Initiate measures to prevent increased intracranial pressure  - Maintain blood pressure and fluid volume within ordered parameters to optimize cerebral perfusion and minimize risk of hemorrhage  - Monitor temperature, glucose, and sodium. Initiate appropriate interventions as ordered  Outcome: Progressing     Problem: RESPIRATORY - ADULT  Goal: Achieves optimal ventilation and oxygenation  Description: INTERVENTIONS:  - Assess for changes in respiratory status  - Assess for changes in mentation and behavior  - Position to facilitate oxygenation and minimize respiratory effort  - Oxygen supplementation based on oxygen saturation or ABGs  - Provide Smoking Cessation handout, if applicable  - Encourage broncho-pulmonary hygiene including cough, deep breathe, Incentive Spirometry  - Assess the need for suctioning and perform as needed  - Assess and instruct to report SOB or any respiratory difficulty  - Respiratory Therapy support as indicated  - Manage/alleviate anxiety  - Monitor for signs/symptoms of CO2 retention  Outcome: Not Progressing     Problem: METABOLIC/FLUID AND ELECTROLYTES - ADULT  Goal: Hemodynamic stability and optimal renal function maintained  Description: INTERVENTIONS:  - Monitor labs and assess for signs and symptoms of volume excess or deficit  - Monitor intake, output and patient weight  - Monitor urine specific gravity, serum osmolarity and serum sodium as indicated or ordered  - Monitor response to interventions for patient's volume status, including labs, urine output, blood pressure (other measures as available)  - Encourage oral intake as appropriate  - Instruct patient on fluid and nutrition restrictions as appropriate  Outcome: Not Progressing

## 2024-03-18 NOTE — RESPIRATORY THERAPY NOTE
Did leak Test. Suctioned Pt. Orally and deflated the cuff while Patient on full A/C mode. Leak test positive. Massive leaked with volume return only around 100 to 120 ml return volume.Notified RN and will call Dr. Saini.Re inflated cuff.

## 2024-03-18 NOTE — PROGRESS NOTES
[facility] Hospitalist Progress Note     CC: Hospital Follow up    PCP: Abilio Dorsey MD       Assessment/Plan:   Mr. Obregon is a 53-year-old male with a history of hyperlipidemia, tobacco use, who presents to the hospital for evaluation of acute chest pain.  STEMI with cardiogenic shock requiring Impella placement.  Continuing ventilation support with CRRT.  S/p CABG on 3/15     STEMI  Cardiogenic shock   Cardiac tamponade  -Multivessel disease including chronically occluded large RCA, 95% sequential LAD stenosis on cath 3/5  -flow restored to heavily thrombosed circumflex and OM1  -Impella cardiology now removed  -taken to cath again emergently on 3/6/24, for right heart cath to accurately obtain hemodynamics. Worsening renal function noted.   -trialed IV hydration, renal function worsening----started on CRRT on 3/7  -cardiology, nephrology, CV surgery and critical care on consult   -vent management and sedation per critical care  -s/p CABG on 3/15  -post CABG protocol per Cardiology, CV surgery, planning to resume CRRT now, s/p 20 units of blood products in OR  - cardiac tamponade developed 3/15, s/p mediastinal washout   - amio gtt to po/IV, dobutamine gtt, levophed stopped  - PO amiodarone 200 mg TID  - repeat echo reviewed    Acute hypoxemic respiratory failure  - intubated in cath lab  - vent management per crit care colleagues  - passed SBT yesterday, plan for extubation today    Acute kidney injury  Anuric   -suspect cardiogenic shock  -component of ATI  -decreased urine output   -start CRRT on 3/7.   -resume CRRT, post CABG now, s/p 20 units of blood products in OR  -hep gtt for HD cath clotting per nephro    HCAP  Fevers  -Low-grade fevers  -Sputum with Klebsiella and staph  -Continue cefepime-> Changed to zosyn per ID  - fluconazole added per ID  -persistent fever, repeat BC 3/14 ->NGTD, ID consulteded    Hematuria, resolved  -yellow urine noted, minimal, but not bloody      Diet:  PPN to TPN   DVT prophylaxis: per CV surgery  Code status: FULL    Discussed with RN     Disposition: ICU     Donna LlanesDO  [facility] Hospitalist        Subjective:     Intubated, following commands. Passed SBT yesterday    OBJECTIVE:    Blood pressure (!) 138/91, pulse 93, temperature 98.6 °F (37 °C), temperature source Pulmonary Artery, resp. rate (!) 29, height 5' 7\" (1.702 m), weight 185 lb 10 oz (84.2 kg), SpO2 100%.    Temp:  [98 °F (36.7 °C)-99.8 °F (37.7 °C)] 98.6 °F (37 °C)  Pulse:  [] 93  Resp:  [13-29] 29  BP: (116-150)/(80-99) 138/91  SpO2:  [96 %-100 %] 100 %  FiO2 (%):  [30 %] 30 %      Intake/Output:    Intake/Output Summary (Last 24 hours) at 3/18/2024 1133  Last data filed at 3/18/2024 1100  Gross per 24 hour   Intake 2651.42 ml   Output 3445 ml   Net -793.58 ml       Last 3 Weights   03/18/24 0231 185 lb 10 oz (84.2 kg)   03/15/24 0600 188 lb 4.4 oz (85.4 kg)   03/14/24 0600 184 lb 15.5 oz (83.9 kg)   03/12/24 1000 186 lb 4.6 oz (84.5 kg)   03/11/24 1000 192 lb 10.9 oz (87.4 kg)   03/09/24 0500 188 lb 15 oz (85.7 kg)   03/08/24 0600 185 lb 13.6 oz (84.3 kg)   03/07/24 0600 183 lb 3.2 oz (83.1 kg)   03/06/24 0500 180 lb 1.9 oz (81.7 kg)   03/05/24 1324 175 lb 7.8 oz (79.6 kg)   03/05/24 0722 160 lb (72.6 kg)   10/06/21 1541 177 lb 6.4 oz (80.5 kg)   09/09/20 0929 172 lb 9.6 oz (78.3 kg)       BP (!) 138/91 (BP Location: Left arm)   Pulse 93   Temp 98.6 °F (37 °C) (Pulmonary Artery)   Resp (!) 29   Ht 5' 7\" (1.702 m)   Wt 185 lb 10 oz (84.2 kg)   SpO2 100%   BMI 29.07 kg/m²   General: intubated,   HEENT: Cortis, ET tube intact  Lungs: mechanical breath sounds   Heart: Regular rate and rhythm, S1, S2   Abdomen: soft  Extremities: mild upper/lower ext edema, LUE edema  Skin: normal color    Data Review:       Labs:     Recent Labs   Lab 03/15/24  1605 03/16/24  0536 03/16/24  1443 03/17/24  0553 03/17/24  1352 03/17/24  1759 03/18/24  0627   RBC 3.44* 2.45*  --  3.08*  3.30* 3.25* 2.93*   HGB 10.2* 7.4*   < > 9.0* 9.4* 9.2* 8.5*   HCT 29.3* 21.4*   < > 26.3* 28.0* 27.4* 25.1*   MCV 85.2 87.3  --  85.4 84.8 84.3 85.7   MCH 29.7 30.2  --  29.2 28.5 28.3 29.0   MCHC 34.8 34.6  --  34.2 33.6 33.6 33.9   RDW 14.3 15.2*  --  15.2* 15.3* 15.2* 15.8*   NEPRELIM 9.11* 6.88  --  8.17*  --   --   --    WBC 11.0 8.5  --  10.3 10.0 13.0* 14.1*   .0 149.0*  --  131.0* 121.0* 137.0* 147.0*    < > = values in this interval not displayed.         Recent Labs   Lab 03/17/24  0553 03/17/24  1759 03/18/24  0620   * 157* 163*   BUN 46* 53* 62*   CREATSERUM 3.44* 3.71* 4.16*   EGFRCR 20* 18* 16*   CA 7.7* 7.9* 7.9*   * 138 137   K 4.4 3.3* 3.6    105 104   CO2 23.0 22.0 23.0       Recent Labs   Lab 03/12/24  0539 03/12/24  1639 03/13/24  0504 03/13/24  1744 03/14/24  0408 03/14/24  1812 03/15/24  1605 03/15/24  1928 03/16/24  0536 03/17/24  0553 03/18/24  0620   ALT 78*  --  69*  --  57*  57*  --   --   --   --  <7* <7*   *  --  89*  --  68*  68*  --   --   --   --  26 28   ALB 3.1*  3.1*   < > 2.9*  2.9*   < > 2.9*  2.9*  2.9*   < > 2.8* 3.0* 2.9* 3.0* 2.9*    < > = values in this interval not displayed.         Imaging:  XR CHEST AP PORTABLE  (CPT=71045)    Result Date: 3/17/2024  CONCLUSION:   Distal tip of the [place]-Ophelia catheter projects over the left lower lobe.  Recommend retraction.  Unchanged trace left pleural effusion.  Unchanged left retrocardiac opacity.    Dictated by (CST): Emily Álvarez MD on 3/17/2024 at 12:22 PM     Finalized by (CST): Emily Álvarez MD on 3/17/2024 at 12:24 PM          US ARTERIAL DUPLEX UPPER EXTREMITY LEFT (CPT=93931)    Result Date: 3/16/2024  CONCLUSION:  1. Widely patent left upper extremity arterial vasculature, including radial artery without pseudoaneurysm or dissection.    Dictated by (CST): Guille Chan MD on 3/16/2024 at 12:56 PM     Finalized by (CST): Guille Chan MD on 3/16/2024 at 12:57 PM           XR CHEST AP PORTABLE  (CPT=71045)    Result Date: 3/16/2024  CONCLUSION:   Distal tip of the [place]-Ophelia catheter remains within the left lower lobe pulmonary artery.  Recommend retraction.  A secure message was sent to Emily Adams on 03/16/2024 at 8:31 a.m.  Trace left pleural effusion.  Left retrocardiac opacity , which may be secondary to subsegmental atelectasis, edema, and/or infection.  Resolution of previously seen right pleural effusion and basilar opacity.    Dictated by (CST): Emily Álvarez MD on 3/16/2024 at 8:26 AM     Finalized by (CST): Emily Álvarez MD on 3/16/2024 at 8:30 AM          XR CHEST AP PORTABLE  (CPT=71045)    Result Date: 3/15/2024  CONCLUSION:   1. Postoperative changes from recent CABG. 2. Well-positioned enteric tube, which terminates in the body of the stomach. 3. Interval placement of mediastinal and left basilar chest tubes.  No pneumothorax. 4. Remaining support devices are in unchanged positioning.  5. Near complete resolution of the right pleural effusion and associated right lower lobe airspace opacity. 6. No significant change in the pulmonary vascular congestion.     Dictated by (CST): David Hendrickson MD on 3/15/2024 at 5:11 PM     Finalized by (CST): David Hendrickson MD on 3/15/2024 at 5:17 PM             Meds:      aspirin  81 mg Oral Daily    metoprolol  5 mg Intravenous Once    amiodarone  200 mg Oral TID    sodium chloride   Intravenous Once    sugammadex  2 mg/kg Intravenous Once    Or    sugammadex  4 mg/kg Intravenous Once    sodium chloride   Intravenous Once    metoclopramide  5 mg Intravenous Q6H    insulin regular human  1-7 Units Subcutaneous 4 times per day    famotidine  20 mg Oral Daily    Or    famotidine  20 mg Intravenous Daily    metoprolol tartrate  25 mg Oral 2x Daily(Beta Blocker)    mupirocin  1 Application Nasal BID    chlorhexidine gluconate  15 mL Mouth/Throat BID    ascorbic acid  500 mg Oral TID    heparin  5,000 Units Subcutaneous 2 times  per day    clopidogrel  75 mg Oral Daily    piperacillin-tazobactam  3.375 g Intravenous Q8H    mineral oil-white petrolatum   Both Eyes TID      dextrose 10%      adult 3 in 1 TPN 50 mL/hr at 03/18/24 0600    NxStage Pure Flow 400 CRRT/PIRRT fluid 5000mL 2.5 L/hr (03/18/24 0251)    heparin 500 Units/hr (03/18/24 0600)    clevidipine      DOBUTamine 2.5 mcg/kg/min (03/17/24 0700)    norepinephrine 3 mcg/min (03/17/24 0700)    dexmedetomidine 0.3 mcg/kg/hr (03/18/24 1118)    sodium chloride 25 mL/hr at 03/15/24 1900    dextrose 10% 50 mL/hr at 03/16/24 2319    sodium bicarbonate 25mEq in dextrose 5% 1000mL IMPELLA purge solution 10 mL/hr (03/13/24 1750)    propofol 50 mcg/kg/min (03/17/24 0519)     dextrose 10%, lipase-protease-amylase (Lip-Prot-Amyl) **AND** sodium bicarbonate, alteplase (Activase) 4 mg in sterile water for injection (PF) 2.2 mL IV push to declot line, melatonin, polyethylene glycol (PEG 3350), ondansetron, DOBUTamine, norepinephrine, potassium chloride **OR** potassium chloride, magnesium sulfate in dextrose 5%, magnesium sulfate in sterile water for injection, acetaminophen **OR** HYDROcodone-acetaminophen **OR** [DISCONTINUED] HYDROcodone-acetaminophen, glucose **OR** glucose **OR** glucose-vitamin C **OR** dextrose **OR** glucose **OR** glucose **OR** glucose-vitamin C, diazepam, dextrose 10%, heparin, acetaminophen **OR** acetaminophen **OR** acetaminophen **OR** acetaminophen, senna, bisacodyl, fleet enema

## 2024-03-18 NOTE — PLAN OF CARE
Patient on precedex this morning. SAT/SBT performed. Positive cuff leak test. The patient remains intubated due to weakness and increased RR/distress while on SBT. Frequent suctioning provided for thin, clear secretions.     Precedex remains off. The patient is able to follow commands. He is able to move all extremities, although much stronger on L than R.    The patient remains in NSR-ST. BP hypertensive, discussed with MELISSA LYNNE. x1 IV lopressor given, oral hydralazine ordered, and x1 IVP hydralazine.    CRRT continued with pre-filter heparin infusing. Filter and HD cath line clotted at 1812. Cathflow instilled, fresenius notified for a restart.    TPN continued. Planning for TF to start via OG when TPN bag is completed. [place]-eliazar removed. Family at the bedside and supportive.     Problem: Patient Centered Care  Goal: Patient preferences are identified and integrated in the patient's plan of care  Description: Interventions:  - What would you like us to know as we care for you? I have a large family that cares about me.  - Provide timely, complete, and accurate information to patient/family  - Incorporate patient and family knowledge, values, beliefs, and cultural backgrounds into the planning and delivery of care  - Encourage patient/family to participate in care and decision-making at the level they choose  - Honor patient and family perspectives and choices  Outcome: Progressing     Problem: RESPIRATORY - ADULT  Goal: Achieves optimal ventilation and oxygenation  Description: INTERVENTIONS:  - Assess for changes in respiratory status  - Assess for changes in mentation and behavior  - Position to facilitate oxygenation and minimize respiratory effort  - Oxygen supplementation based on oxygen saturation or ABGs  - Provide Smoking Cessation handout, if applicable  - Encourage broncho-pulmonary hygiene including cough, deep breathe, Incentive Spirometry  - Assess the need for suctioning and perform as needed  -  Assess and instruct to report SOB or any respiratory difficulty  - Respiratory Therapy support as indicated  - Manage/alleviate anxiety  - Monitor for signs/symptoms of CO2 retention  Outcome: Progressing     Problem: CARDIOVASCULAR - ADULT  Goal: Maintains optimal cardiac output and hemodynamic stability  Description: INTERVENTIONS:  - Monitor vital signs, rhythm, and trends  - Monitor for bleeding, hypotension and signs of decreased cardiac output  - Evaluate effectiveness of vasoactive medications to optimize hemodynamic stability  - Monitor arterial and/or venous puncture sites for bleeding and/or hematoma  - Assess quality of pulses, skin color and temperature  - Assess for signs of decreased coronary artery perfusion - ex. Angina  - Evaluate fluid balance, assess for edema, trend weights  Outcome: Progressing  Goal: Absence of cardiac arrhythmias or at baseline  Description: INTERVENTIONS:  - Continuous cardiac monitoring, monitor vital signs, obtain 12 lead EKG if indicated  - Evaluate effectiveness of antiarrhythmic and heart rate control medications as ordered  - Initiate emergency measures for life threatening arrhythmias  - Monitor electrolytes and administer replacement therapy as ordered  Outcome: Progressing     Problem: Delirium  Goal: Minimize duration of delirium  Description: Interventions:  - Encourage use of hearing aids, eye glasses  - Promote highest level of mobility daily  - Provide frequent reorientation  - Promote wakefulness i.e. lights on, blinds open  - Promote sleep, encourage patient's normal rest cycle i.e. lights off, TV off, minimize noise and interruptions  - Encourage family to assist in orientation and promotion of home routines  Outcome: Progressing     Problem: METABOLIC/FLUID AND ELECTROLYTES - ADULT  Goal: Hemodynamic stability and optimal renal function maintained  Description: INTERVENTIONS:  - Monitor labs and assess for signs and symptoms of volume excess or deficit  -  Monitor intake, output and patient weight  - Monitor urine specific gravity, serum osmolarity and serum sodium as indicated or ordered  - Monitor response to interventions for patient's volume status, including labs, urine output, blood pressure (other measures as available)  - Encourage oral intake as appropriate  - Instruct patient on fluid and nutrition restrictions as appropriate  Outcome: Progressing     Problem: SKIN/TISSUE INTEGRITY - ADULT  Goal: Skin integrity remains intact  Description: INTERVENTIONS  - Assess and document risk factors for pressure ulcer development  - Assess and document skin integrity  - Monitor for areas of redness and/or skin breakdown  - Initiate interventions, skin care algorithm/standards of care as needed  Outcome: Progressing  Goal: Incision(s), wounds(s) or drain site(s) healing without S/S of infection  Description: INTERVENTIONS:  - Assess and document risk factors for pressure ulcer development  - Assess and document skin integrity  - Assess and document dressing/incision, wound bed, drain sites and surrounding tissue  - Implement wound care per orders  - Initiate isolation precautions as appropriate  - Initiate Pressure Ulcer prevention bundle as indicated  Outcome: Progressing  Goal: Oral mucous membranes remain intact  Description: INTERVENTIONS  - Assess oral mucosa and hygiene practices  - Implement preventative oral hygiene regimen  - Implement oral medicated treatments as ordered  Outcome: Progressing     Problem: NEUROLOGICAL - ADULT  Goal: Achieves stable or improved neurological status  Description: INTERVENTIONS  - Assess for and report changes in neurological status  - Initiate measures to prevent increased intracranial pressure  - Maintain blood pressure and fluid volume within ordered parameters to optimize cerebral perfusion and minimize risk of hemorrhage  - Monitor temperature, glucose, and sodium. Initiate appropriate interventions as ordered  Outcome:  Progressing     Problem: HEMATOLOGIC - ADULT  Goal: Free from bleeding injury  Description: (Example usage: patient with low platelets)  INTERVENTIONS:  - Avoid intramuscular injections, enemas and rectal medication administration  - Ensure safe mobilization of patient  - Hold pressure on venipuncture sites to achieve adequate hemostasis  - Assess for signs and symptoms of internal bleeding  - Monitor lab trends  - Patient is to report abnormal signs of bleeding to staff  - Avoid use of toothpicks and dental floss  - Use electric shaver for shaving  - Use soft bristle tooth brush  - Limit straining and forceful nose blowing  Outcome: Progressing     Problem: Safety Risk - Non-Violent Restraints  Goal: Patient will remain free from self-harm  Description: INTERVENTIONS:  - Apply the least restrictive restraint to prevent harm  - Notify patient and family of reasons restraints applied  - Assess for any contributing factors to confusion (electrolyte disturbances, delirium, medications)  - Discontinue any unnecessary medical devices as soon as possible  - Assess the patient's physical comfort, circulation, skin condition, hydration, nutrition and elimination needs   - Reorient and redirection as needed  - Assess for the need to continue restraints  3/18/2024 1735 by Kailee Mauro  Outcome: Progressing  3/18/2024 1253 by Kailee Mauro  Outcome: Progressing     Problem: Patient/Family Goals  Goal: Patient/Family Long Term Goal  Description: Patient's Long Term Goal: Go home upon discharge    Interventions:  - Monitor labs  - Administer medications  - Pain management  - Patient centered care  - Keep patient and family informed on plan of care  - See additional Care Plan goals for specific interventions  Outcome: Not Progressing  Goal: Patient/Family Short Term Goal  Description: Patient's Short Term Goal: extubate    Interventions:   - SAT/SBT  - improve strength  - pulmonary hygeine  - See additional Care  Plan goals for specific interventions  Outcome: Not Progressing

## 2024-03-18 NOTE — PLAN OF CARE
Problem: Safety Risk - Non-Violent Restraints  Goal: Patient will remain free from self-harm  Description: INTERVENTIONS:  - Apply the least restrictive restraint to prevent harm  - Notify patient and family of reasons restraints applied  - Assess for any contributing factors to confusion (electrolyte disturbances, delirium, medications)  - Discontinue any unnecessary medical devices as soon as possible  - Assess the patient's physical comfort, circulation, skin condition, hydration, nutrition and elimination needs   - Reorient and redirection as needed  - Assess for the need to continue restraints  Outcome: Progressing     Problem: Patient Centered Care  Goal: Patient preferences are identified and integrated in the patient's plan of care  Description: Interventions:- Provide timely, complete, and accurate information to patient/family  - Incorporate patient and family knowledge, values, beliefs, and cultural backgrounds into the planning and delivery of care  - Encourage patient/family to participate in care and decision-making at the level they choose  - Honor patient and family perspectives and choices  Outcome: Progressing     Problem: Patient/Family Goals  Goal: Patient/Family Long Term Goa  Description: Patient's Long Term Goal:     Intervntions:  -   - See additional Care Plan goals for specific interventions  Outcome: Progressing  Goal: Patient/Family Short Term Goal  Description: Patient's Short Term Goal:   Interventions:   -   - See additional Care Plan goals for specific interventions  Outcome: Progressing     Problem: RESPIRATORY - ADULT  Goal: Achieves optimal ventilation and oxygenation  Description: INTERVENTIONS:  - Assess for changes in respiratory status  - Assess for changes in mentation and behavior  - Position to facilitate oxygenation and minimize respiratory effort  - Oxygen supplementation based on oxygen saturation or ABGs  - Provide Smoking Cessation handout, if applicable  - Encourage  broncho-pulmonary hygiene including cough, deep breathe, Incentive Spirometry  - Assess the need for suctioning and perform as needed  - Assess and instruct to report SOB or any respiratory difficulty  - Respiratory Therapy support as indicated  - Manage/alleviate anxiety  - Monitor for signs/symptoms of CO2 retention  Outcome: Progressing     Problem: CARDIOVASCULAR - ADULT  Goal: Maintains optimal cardiac output and hemodynamic stability  Description: INTERVENTIONS:  - Monitor vital signs, rhythm, and trends  - Monitor for bleeding, hypotension and signs of decreased cardiac output  - Evaluate effectiveness of vasoactive medications to optimize hemodynamic stability  - Monitor arterial and/or venous puncture sites for bleeding and/or hematoma  - Assess quality of pulses, skin color and temperature  - Assess for signs of decreased coronary artery perfusion - ex. Angina  - Evaluate fluid balance, assess for edema, trend weights  Outcome: Progressing  Goal: Absence of cardiac arrhythmias or at baseline  Description: INTERVENTIONS:  - Continuous cardiac monitoring, monitor vital signs, obtain 12 lead EKG if indicated  - Evaluate effectiveness of antiarrhythmic and heart rate control medications as ordered  - Initiate emergency measures for life threatening arrhythmias  - Monitor electrolytes and administer replacement therapy as ordered  Outcome: Progressing     Problem: METABOLIC/FLUID AND ELECTROLYTES - ADULT  Goal: Hemodynamic stability and optimal renal function maintained  Description: INTERVENTIONS:  - Monitor labs and assess for signs and symptoms of volume excess or deficit  - Monitor intake, output and patient weight  - Monitor urine specific gravity, serum osmolarity and serum sodium as indicated or ordered  - Monitor response to interventions for patient's volume status, including labs, urine output, blood pressure (other measures as available)  - Encourage oral intake as appropriate  - Instruct patient on  fluid and nutrition restrictions as appropriate  Outcome: Progressing     Problem: SKIN/TISSUE INTEGRITY - ADULT  Goal: Skin integrity remains intact  Description: INTERVENTIONS  - Assess and document risk factors for pressure ulcer development  - Assess and document skin integrity  - Monitor for areas of redness and/or skin breakdown  - Initiate interventions, skin care algorithm/standards of care as needed  Outcome: Progressing  Goal: Incision(s), wounds(s) or drain site(s) healing without S/S of infection  Description: INTERVENTIONS:  - Assess and document risk factors for pressure ulcer development  - Assess and document skin integrity  - Assess and document dressing/incision, wound bed, drain sites and surrounding tissue  - Implement wound care per orders  - Initiate isolation precautions as appropriate  - Initiate Pressure Ulcer prevention bundle as indicated  Outcome: Progressing  Goal: Oral mucous membranes remain intact  Description: INTERVENTIONS  - Assess oral mucosa and hygiene practices  - Implement preventative oral hygiene regimen  - Implement oral medicated treatments as ordered  Outcome: Progressing     Problem: SKIN/TISSUE INTEGRITY - ADULT  Goal: Skin integrity remains intact  Description: INTERVENTIONS  - Assess and document risk factors for pressure ulcer development  - Assess and document skin integrity  - Monitor for areas of redness and/or skin breakdown  - Initiate interventions, skin care algorithm/standards of care as needed  Outcome: Progressing  Goal: Incision(s), wounds(s) or drain site(s) healing without S/S of infection  Description: INTERVENTIONS:  - Assess and document risk factors for pressure ulcer development  - Assess and document skin integrity  - Assess and document dressing/incision, wound bed, drain sites and surrounding tissue  - Implement wound care per orders  - Initiate isolation precautions as appropriate  - Initiate Pressure Ulcer prevention bundle as  indicated  Outcome: Progressing  Goal: Oral mucous membranes remain intact  Description: INTERVENTIONS  - Assess oral mucosa and hygiene practices  - Implement preventative oral hygiene regimen  - Implement oral medicated treatments as ordered  Outcome: Progressing     Problem: NEUROLOGICAL - ADULT  Goal: Achieves stable or improved neurological status  Description: INTERVENTIONS  - Assess for and report changes in neurological status  - Initiate measures to prevent increased intracranial pressure  - Maintain blood pressure and fluid volume within ordered parameters to optimize cerebral perfusion and minimize risk of hemorrhage  - Monitor temperature, glucose, and sodium. Initiate appropriate interventions as ordered  Outcome: Progressing     Problem: HEMATOLOGIC - ADULT  Goal: Free from bleeding injury  Description: (Example usage: patient with low platelets)  INTERVENTIONS:  - Avoid intramuscular injections, enemas and rectal medication administration  - Ensure safe mobilization of patient  - Hold pressure on venipuncture sites to achieve adequate hemostasis  - Assess for signs and symptoms of internal bleeding  - Monitor lab trends  - Patient is to report abnormal signs of bleeding to staff  - Avoid use of toothpicks and dental floss  - Use electric shaver for shaving  - Use soft bristle tooth brush  - Limit straining and forceful nose blowing  Outcome: Progressing   CRRT clotted at shift change, unable to return blood, Dr Seipp will put heparin 500un/hr in prefilter of crrt machine if ok with CV Surg, Dr Llanes agreed, CRRT restarted with 4k bags at 0245.  Vss, pt moving left side more, left hand reaching up towards face, looks to speaker but not following commands.  Bilat soft wrist restraints to prevent pulling out ett, hd cath etc.  Rt arm pulls away from noxious stimuli, pressure on finger nails.  Pts coughing and suctioning sets off alarm on crrt, so precedex started at low rate, able to run machine more  continuously.  Noted rt leg moving some after mn.

## 2024-03-18 NOTE — DIETARY NOTE
Brief Nutrition Update Note    Received consult to start tube feedings. RN reports OG tube is in place and ready to use. Will plan to let current bag of TPN  and transition to EN. Still on CRRT. Pt still intubated, no pressors. Minimized sedation as able per chart notes. Will continue to monitor clinical status.     NUTRITION PRESCRIPTION:   Estimated Nutrition needs: --dosing wt of 79.6 kg - wt taken on 3/5  Calories: ~1183-2004 calories/day (22-26 calories per kg Dosing wt)  [place] = 1533 kcal/day  Protein: 100-167  g protein/day (>1.5-2.5 g protein/kg IBW while on CRRT)  Fluid Needs: ~1ml/kcal- adjust per renal tolerance, CRRT    - Enteral Nutrition: Nepro at 20 ml/hr advance by 10ml q 10 hours to goal rate of 40ml/hr via OG tube. Based on average 22 hour infusion time. Give Prosource 2 pkts daily as med flush (22g protein, 80 kcal, 90ml fluid). Goal rate + Prosource provides 1664 kcal, 93 grams protein, 640ml total free water. Meets 100% of energy needs and 93% of protein needs. Water flushes 30ml q 6 hours (180ml).  Total free fluid daily = 820ml  Keep FWF minimal per clinical status (oliguric).     Will f/u per protocol or as appropriate.     Mayela Hallman RD, LDN  Clinical Dietitian  P: [phone]

## 2024-03-18 NOTE — PROGRESS NOTES
[facility]  part of [facility] Infectious Disease Consult    Cristiano Obregon Patient Status:  Inpatient    1970 MRN [id number]   Location [facility] 2W/SW Attending Bud Camarena MD   Hosp Day # 13 PCP MD Cristiano Azar seen and examined,   Afebrile,   Previous entries noted,   Intubated,   Sedated,   Family at the bed side,     History:  History reviewed. No pertinent past medical history.  Past Surgical History:   Procedure Laterality Date    COLONOSCOPY N/A 2017    Procedure: COLONOSCOPY, POSSIBLE BIOPSY, POSSIBLE POLYPECTOMY 51147;  Surgeon: Byron Plaza MD;  Location: [facility] SURG[facility] [place], [facility]     History reviewed. No pertinent family history.   reports that he has been smoking cigarettes. He has never used smokeless tobacco. He reports that he does not drink alcohol and does not use drugs.    Allergies:  No Known Allergies    Medications:    Current Facility-Administered Medications:     aspirin chewable tab 81 mg, 81 mg, Oral, Daily    dextrose 10% infusion (TPN no rate), , Intravenous, Continuous PRN    pancrelipase (Lip-Prot-Amyl) (Zenpep) DR particles cap 10,000 Units, 10,000 Units, Per G Tube, PRN **AND** sodium bicarbonate tab 325 mg, 325 mg, Oral, PRN    amiodarone (Pacerone) tab 200 mg, 200 mg, Oral, TID    sodium chloride 0.9% infusion, , Intravenous, Once    adult 3 in 1 TPN, , Intravenous, Continuous TPN    NxStage Pure Flow 400 CRRT/PIRRT fluid 5000mL, 2.5 L/hr, CRRT, Continuous **AND** CRRT Replacement Fluid 400, , , Until Discontinued    heparin (Porcine) 25,000 Units/250mL infusion premix, 500 Units/hr, Intravenous, Continuous    alteplase (Activase) 4 mg in sterile water for injection (PF) 2.2 mL IV push to declot line, 4 mg, Intravenous, PRN    clevidipine (Cleviprex) 25 MG/50ML IV infusion, 1-6 mg/hr, Intravenous, Continuous    sugammadex (Bridion) 200 MG/2ML injection 170 mg, 2 mg/kg, Intravenous, Once  **OR** sugammadex (Bridion) 200 MG/2ML injection 340 mg, 4 mg/kg, Intravenous, Once    sodium chloride 0.9% infusion, , Intravenous, Once    metoclopramide (Reglan) 5 mg/mL injection 5 mg, 5 mg, Intravenous, Q6H    insulin regular human (Novolin R, Humulin R) 100 UNIT/ML injection 1-7 Units, 1-7 Units, Subcutaneous, 4 times per day    melatonin tab 3 mg, 3 mg, Oral, Nightly PRN    polyethylene glycol (PEG 3350) (Miralax) 17 g oral packet 17 g, 17 g, Oral, Daily PRN    ondansetron (Zofran) 4 MG/2ML injection 4 mg, 4 mg, Intravenous, Q6H PRN    famotidine (Pepcid) tab 20 mg, 20 mg, Oral, Daily **OR** famotidine (Pepcid) 20 mg/2mL injection 20 mg, 20 mg, Intravenous, Daily    DOBUTamine in dextrose 5% (Dobutrex) 500 mg/250mL infusion premix, 2.5-20 mcg/kg/min (Dosing Weight), Intravenous, Continuous PRN    norepinephrine (Levophed) 4 mg/250mL infusion premix, 0.5-30 mcg/min, Intravenous, Continuous PRN    metoprolol tartrate (Lopressor) tab 25 mg, 25 mg, Oral, 2x Daily(Beta Blocker)    potassium chloride 20 mEq/100mL IVPB premix 20 mEq, 20 mEq, Intravenous, PRN **OR** potassium chloride 40 mEq/100mL IVPB premix (central line) 40 mEq, 40 mEq, Intravenous, PRN    magnesium sulfate in dextrose 5% 1 g/100mL infusion premix 1 g, 1 g, Intravenous, PRN    magnesium sulfate in sterile water for injection 2 g/50mL IVPB premix 2 g, 2 g, Intravenous, PRN    mupirocin (Bactroban) 2% nasal ointment 1 Application, 1 Application, Nasal, BID    chlorhexidine gluconate (Peridex) 0.12 % oral solution 15 mL, 15 mL, Mouth/Throat, BID    ascorbic acid (Vitamin C) tab 500 mg, 500 mg, Oral, TID    heparin (Porcine) 5000 UNIT/ML injection 5,000 Units, 5,000 Units, Subcutaneous, 2 times per day    acetaminophen (Tylenol) tab 650 mg, 650 mg, Oral, Q4H PRN **OR** HYDROcodone-acetaminophen (Norco) 5-325 MG per tab 1 tablet, 1 tablet, Oral, Q4H PRN **OR** [DISCONTINUED] HYDROcodone-acetaminophen (Norco) 5-325 MG per tab 2 tablet, 2 tablet, Oral,  Q4H PRN    dexmedeTOMIDine in sodium chloride 0.9% (Precedex) 400 mcg/100mL infusion premix, 0.2-1.5 mcg/kg/hr (Dosing Weight), Intravenous, Continuous    clopidogrel (Plavix) tab 75 mg, 75 mg, Oral, Daily    glucose (Dex4) 15 GM/59ML oral liquid 15 g, 15 g, Oral, Q15 Min PRN **OR** glucose (Glutose) 40% oral gel 15 g, 15 g, Oral, Q15 Min PRN **OR** glucose-vitamin C (Dex-4) chewable tab 4 tablet, 4 tablet, Oral, Q15 Min PRN **OR** dextrose 50% injection 50 mL, 50 mL, Intravenous, Q15 Min PRN **OR** glucose (Dex4) 15 GM/59ML oral liquid 30 g, 30 g, Oral, Q15 Min PRN **OR** glucose (Glutose) 40% oral gel 30 g, 30 g, Oral, Q15 Min PRN **OR** glucose-vitamin C (Dex-4) chewable tab 8 tablet, 8 tablet, Oral, Q15 Min PRN    piperacillin-tazobactam (Zosyn) 3.375 g in dextrose 5% 100 mL IVPB-ADDV, 3.375 g, Intravenous, Q8H    sodium chloride 0.9% infusion, , Intravenous, Continuous    mineral oil-white petrolatum (Artificial Tears) 83-15 % ophthalmic ointment, , Both Eyes, TID    diazepam (Valium) 5 mg/mL injection 2.5 mg, 2.5 mg, Intravenous, Q4H PRN    dextrose 10% infusion (TPN no rate), , Intravenous, Continuous PRN    heparin (Porcine) 1000 UNIT/ML injection 1,500 Units, 1.5 mL, Intracatheter, PRN    sodium bicarbonate 25mEq in dextrose 5% 1000mL IMPELLA purge solution, 3-30 mL/hr, Intravenous, Continuous    acetaminophen (Tylenol) tab 650 mg, 650 mg, Oral, Q4H PRN **OR** acetaminophen (Tylenol) 160 MG/5ML oral liquid 650 mg, 650 mg, Oral, Q4H PRN **OR** acetaminophen (Tylenol) rectal suppository 650 mg, 650 mg, Rectal, Q4H PRN **OR** acetaminophen (Ofirmev) 10 mg/mL infusion premix 1,000 mg, 1,000 mg, Intravenous, Q6H PRN    senna (Senokot) 8.8 MG/5ML oral syrup 17.6 mg, 10 mL, Oral, Nightly PRN    bisacodyl (Dulcolax) 10 MG rectal suppository 10 mg, 10 mg, Rectal, Daily PRN    fleet enema (Fleet) 7-19 GM/118ML rectal enema 133 mL, 1 enema, Rectal, Once PRN    propofol (Diprivan) 10 mg/mL infusion premix, 5-50  mcg/kg/min, Intravenous, Continuous    Review of Systems:   Constitutional: Negative for anorexia, chills, fatigue, fevers, malaise, night sweats and weight loss.  Eyes: Negative for visual disturbance, irritation and redness.  Ears, nose, mouth, throat, and face: Negative for hearing loss, tinnitus, nasal congestion, snoring, sore throat, hoarseness and voice change.  Respiratory: Negative for cough, sputum, hemoptysis, chest pain, wheezing, dyspnea on exertion, or stridor.  Cardiovascular: Negative for chest pain, palpitations, irregular heart beats, syncope, fatigue, orthopnea, paroxysmal nocturnal dyspnea, lower extremity edema.  Gastrointestinal: Negative for dysphagia, odynophagia, reflux symptoms, nausea, vomiting, change in bowel habits, diarrhea, constipation and abdominal pain.  Integument/breast: Negative for rash, skin lesions, and pruritus.  Hematologic/lymphatic: Negative for easy bruising, bleeding, and lymphadenopathy.  Musculoskeletal: Negative for myalgias, arthralgias, muscle weakness.  Neurological: Negative for headaches, dizziness, seizures, memory problems, trouble swallowing, speech problems, gait problems and weakness.  Behavioral/Psych: Negative for active tobacco use.  Endocrine: No history of of diabetes, thyroid disorder.  Unable to obtain,     Vital signs in last 24 hours:  Patient Vitals for the past 24 hrs:   BP Temp Temp src Pulse Resp SpO2 Weight   03/14/24 1100 122/81 99.5 °F (37.5 °C) Pulmonary Ar 114 14 99 % --   03/14/24 1000 96/73 99.3 °F (37.4 °C) Pulmonary Ar 117 18 98 % --   03/14/24 0900 103/69 99.3 °F (37.4 °C) Pulmonary Ar (!) 124 19 98 % --   03/14/24 0800 104/72 98.9 °F (37.2 °C) Pulmonary Ar 119 18 97 % --   03/14/24 0700 127/90 100.2 °F (37.9 °C) Pulmonary Ar 114 13 97 % --   03/14/24 0600 131/81 100.1 °F (37.8 °C) Pulmonary Ar (!) 121 22 98 % 184 lb 15.5 oz (83.9 kg)   03/14/24 0500 123/82 100.2 °F (37.9 °C) Pulmonary Ar 120 25 94 % --   03/14/24 0400 101/73 99.3 °F  (37.4 °C) Pulmonary Ar 96 17 97 % --   03/14/24 0300 111/76 98.8 °F (37.1 °C) Pulmonary Ar 91 17 91 % --   03/14/24 0200 90/71 98.9 °F (37.2 °C) Pulmonary Ar 118 18 95 % --   03/14/24 0100 99/79 99.4 °F (37.4 °C) Pulmonary Ar (!) 121 18 96 % --   03/14/24 0000 105/79 99.7 °F (37.6 °C) Pulmonary Ar 117 20 96 % --   03/13/24 2300 104/78 100.3 °F (37.9 °C) Pulmonary Ar 94 18 95 % --   03/13/24 2200 119/75 (!) 100.6 °F (38.1 °C) Pulmonary Ar 118 19 94 % --   03/13/24 2100 100/69 100.4 °F (38 °C) Pulmonary Ar 119 20 94 % --   03/13/24 2000 113/86 100.1 °F (37.8 °C) Pulmonary Ar 116 18 92 % --   03/13/24 1900 -- 100 °F (37.8 °C) Pulmonary Ar 108 20 97 % --   03/13/24 1800 116/69 99.3 °F (37.4 °C) -- 101 16 96 % --   03/13/24 1700 -- 98.7 °F (37.1 °C) -- 113 17 98 % --   03/13/24 1600 123/87 98.8 °F (37.1 °C) -- 100 17 96 % --   03/13/24 1500 -- 99.5 °F (37.5 °C) -- 93 14 98 % --   03/13/24 1400 105/82 100.1 °F (37.8 °C) -- 87 14 99 % --   03/13/24 1330 -- -- -- 83 15 99 % --   03/13/24 1300 -- 98.2 °F (36.8 °C) -- 85 15 99 % --   03/13/24 1200 90/59 99.2 °F (37.3 °C) Pulmonary Ar 88 16 100 % --   03/13/24 1130 -- -- -- 87 15 -- --       Physical Exam:   General: alert, cooperative, oriented.  Intubated,    Head: Normocephalic, without obvious abnormality, atraumatic.   Eyes: Conjunctivae/corneas clear.  No scleral icterus.  No conjunctival     hemorrhage.   Nose: Nares normal.   Throat: Lips, mucosa, and tongue normal.  No thrush noted.   Neck: Soft, supple neck; trachea midline, no adenopathy, no thyromegaly.   Lungs: CTAB, normal and equal bilateral chest rise   Chest wall: No tenderness or deformity.   Heart: Regular rate and rhythm, normal S1S2, no murmur.   Abdomen: soft, non-tender, non-distended, positive BS.   Extremity: no edema, no cyanosis, Trialysis, [place], Groin line,    Skin: No rashes or lesions.   Neurological: Alert, interactive, no focal deficits    Labs:  Lab Results   Component Value Date    WBC 14.1  03/18/2024    HGB 8.5 03/18/2024    HCT 25.1 03/18/2024    .0 03/18/2024    CREATSERUM 4.16 03/18/2024    BUN 62 03/18/2024     03/18/2024    K 3.6 03/18/2024     03/18/2024    CO2 23.0 03/18/2024     03/18/2024    CA 7.9 03/18/2024    ALB 2.9 03/18/2024    ALKPHO 73 03/18/2024    BILT 0.5 03/18/2024    TP 5.0 03/18/2024    AST 28 03/18/2024    ALT <7 03/18/2024    MG 2.3 03/18/2024    PHOS 2.7 03/18/2024       Radiology:  CXR- 1. Cardiomegaly pulmonary venous distention.   2. Support tubes and catheters are satisfactory.  No pneumothorax   3. Mild perihilar and basilar congestive changes worse on the right has progressed.    4. Small right-sided effusion.       Cultures:  Susceptibility data from last 90 days.  Collected Specimen Info Organism Cefazolin Cefepime Ceftriaxone Ciprofloxacin Clindamycin Erythromycin Gentamicin Levofloxacin Meropenem Oxacillin Piperacillin/Tazobactam Trimethoprim/Sulfamethoxazole Vancomycin   03/08/24 Other from Endotracheal aspirate Klebsiella (Enterobacter) aerogenes  R  S  S  S    S  S  S   S  S      Staphylococcus aureus S     S  S  S  S   S   S  S       Assessment and Plan:    1.   Fevers: Multifactorial,   - Sec to HAP vs drop of Hgb and multiple transfusions,   - UA was bland on admission, will repeat,   - Sputum cul with Kleb and MSSA,   - Blood cul are NGTD  - CXR with bilateral Congestion, no obvious consolidation, thick sputum in Etr earlier, now clearing up,   - Lines are clean, Trialysis Catheter and [place],   - MRSA Carriage is negative,   - On IV IV Zosyn,  d # 5,     2.    Hx of Acute MI with Cardiogenic Shock: Got intubated post Cath,   - Plan for CABG, Blood cul drawn on 3/11 are NGTD,  - S/P CABG x 3, R Pleural effusion drainage, 3/15/24,   - Followed by Sternal exploration with mediastinal washout for Cardiac tamponade, 3/16/24,     3.     Disposition: in house, A middle aged male with Acute MI, Cardiogenic shock, with persistent Fevers and  then hypothermia, improving,   - Follow Pending Cul,   - Hgb trend,   - Continue IV Zosyn pharm to dose,   - Supportive care,     Discussed with PCP, RN, all questions answered, further recommendations to follow, Thanks,     Thank you for consulting [facility] for Cristiano Obregon.  If you have any questions or concerns please call [facility] Infectious Disease at [phone].     Derick Webb MD  3/14/2024  11:25 AM

## 2024-03-18 NOTE — CARDIAC REHAB
Order received and Chart review completed, followed by assessment as to appropriateness of patient to receive Cardiac Rehab. Patient is not appropriate for cardiac rehab intervention at this time.  Will continue to monitor patient and when appropriate, begin cardiac rehab education.

## 2024-03-18 NOTE — PROGRESS NOTES
[facility]  part of [facility]    Progress Note    Cristiano Moralezmesfin Patient Status:  Inpatient    1970 MRN [id number]   Location [facility] 2W/SW Attending Donna Llanes,    Hosp Day # 13 PCP Abilio Dorsey MD     Subjective:  Pt intubated/lightly sedated. Placed on CPAP this AM per Pulm for weaning trial. Pt opens eyes slightly to verbal stimuli. He can very weakly squeeze his hands on command and wiggle toes. His , Reyes, is at bedside. Pt was able to nod head slowly to some very simple questions.     Objective:  BP (!) 138/91 (BP Location: Left arm)   Pulse 93   Temp 98.7 °F (37.1 °C) (Pulmonary Artery)   Resp (!) 29   Ht 5' 7\" (1.702 m)   Wt 185 lb 10 oz (84.2 kg)   SpO2 100%   BMI 29.07 kg/m²     Temp (24hrs), Av.3 °F (37.4 °C), Min:98 °F (36.7 °C), Max:99.8 °F (37.7 °C)      Intake/Output:    Intake/Output Summary (Last 24 hours) at 3/18/2024 1013  Last data filed at 3/18/2024 0900  Gross per 24 hour   Intake 2519.42 ml   Output 3365 ml   Net -845.58 ml       Wt Readings from Last 3 Encounters:   24 185 lb 10 oz (84.2 kg)   10/06/21 177 lb 6.4 oz (80.5 kg)   20 172 lb 9.6 oz (78.3 kg)       Allergies:  No Known Allergies    Labs:  Lab Results   Component Value Date    WBC 14.1 2024    HGB 8.5 2024    HCT 25.1 2024    .0 2024    CREATSERUM 4.16 2024    BUN 62 2024     2024    K 3.6 2024     2024    CO2 23.0 2024     2024    CA 7.9 2024    ALB 2.9 2024    ALKPHO 73 2024    BILT 0.5 2024    TP 5.0 2024    AST 28 2024    ALT <7 2024    MG 2.3 2024    PHOS 2.7 2024       Physical Exam:  Blood pressure (!) 138/91, pulse 93, temperature 98.7 °F (37.1 °C), temperature source Pulmonary Artery, resp. rate (!) 29, height 5' 7\" (1.702 m), weight 185 lb 10 oz (84.2 kg), SpO2 100%.  General:  NAD  Neck: No JVD  Lungs: coarse bilaterally anteriorly   Pericardial Haile drain=20cc/12 hours  Pleural chest tubes= 80cc/12 hours- no air leak noted  Heart: RRR, S1, S2  Abdomen: Mildly distended/Round/ NT/ BS+x4 diminished/no BM   Extremities: Warm, dry, 2+ UE & LE edema bilat  Pulses: 2+ bilat DP  Skin: sternotomy incision drsg: CDI w/TPW intact/Left leg ACE wrap in place w/VIDAL drain=2cc/12 hours  Neurological:  slowly waking up: opens eyes slightly to verbal stimuli     Assessment/Plan:  S/P CABG x 3 POD # 3  S/P RE-OP MEDIASTINAL EXPLORATION/WASHOUT on 3/16  Respiratory Failure- intubated. Weaning as able-SBT today w/mgt per Pulm-  extubated when appropriate  Pre op Impella removed post op on 3/16 per Cards. Pt hemodynamically stable off vasoactive meds-possible Rosalie/Ophelia & cordis removal today as new midline IV placed per Critical Care APN.   Re op mediastinal exploration/washout due to pericardial tamponade. Limited echo 3/16 post exploration w/EF=45-50%/no residual effusion.   Continue ICU status  Pain meds as needed: avoid narcotics to assess neuro status  Plan to increase activity when appropriate  Scds/Heparin Sub Q prophylaxis DVT prevention   Pre op NORMA/ATN/CRRT w/mgt per Nephrology. Expected post op volume overload. Pt currently on CRRT. [place] remains for close I/O monitoring and immobility  Expected post op anemia w/o clinical significance/stable.  Nutrition per TPN- attempt for Dobhoff placement and start TF   ID consulted on 3/14: following recs. Continues on ABX. + sputum cx  No hx: DM- continue correction scale coverage and glucose checks.   Discharge planning: pt lives w/his  and has supportive family. Anticipate rehab. Continue to eval as pt progresses. Will ask PT/OT to eval when appropriate. /SW involved w/planning.     Plan of care discussed w//RN and CV Surgeon: Dr. Shraddha Adams, APRN  3/18/2024  10:13 AM

## 2024-03-18 NOTE — PROGRESS NOTES
NEPHROLOGY DAILY PROGRESS NOTE       SUBJECTIVE:    Clotted again overnight.  Started on prefilter heparin gtt.  Spouse at bedside.    OBJECTIVE:    Total Intake/Output:    Intake/Output Summary (Last 24 hours) at 3/18/2024 0904  Last data filed at 3/18/2024 0800  Gross per 24 hour   Intake 2211.42 ml   Output 2816 ml   Net -604.58 ml       PHYSICAL EXAM:  BP (!) 139/99 (BP Location: Left arm)   Pulse 93   Temp 99.1 °F (37.3 °C) (Pulmonary Artery)   Resp 23   Ht 5' 7\" (1.702 m)   Wt 185 lb 10 oz (84.2 kg)   SpO2 100%   BMI 29.07 kg/m²   GEN: Intubated.  HEENT: ETT in place   CHEST: mechanical breath sounds, chest tube in place   CARDIAC: S1S2 normal  ABD: soft, NT/ND  : lujan in place   EXT:  trace upper and lower ext edema noted   NEURO: sedated   ACCESS: RIJ temp HD cath (3/7)      CURRENT MEDICATIONS:     aspirin  81 mg Oral Daily    amiodarone  200 mg Oral TID    sodium chloride   Intravenous Once    sugammadex  2 mg/kg Intravenous Once    Or    sugammadex  4 mg/kg Intravenous Once    sodium chloride   Intravenous Once    metoclopramide  5 mg Intravenous Q6H    insulin regular human  1-7 Units Subcutaneous 4 times per day    famotidine  20 mg Oral Daily    Or    famotidine  20 mg Intravenous Daily    metoprolol tartrate  25 mg Oral 2x Daily(Beta Blocker)    mupirocin  1 Application Nasal BID    chlorhexidine gluconate  15 mL Mouth/Throat BID    ascorbic acid  500 mg Oral TID    heparin  5,000 Units Subcutaneous 2 times per day    clopidogrel  75 mg Oral Daily    piperacillin-tazobactam  3.375 g Intravenous Q8H    mineral oil-white petrolatum   Both Eyes TID         LABS:  Patient Labs Reviewed in Detail. Pertinent Labs as follows:  Recent Labs   Lab 03/17/24  0553 03/17/24  1759 03/18/24  0620   * 157* 163*   BUN 46* 53* 62*   CREATSERUM 3.44* 3.71* 4.16*   EGFRCR 20* 18* 16*   CA 7.7* 7.9* 7.9*   * 138 137   K 4.4 3.3* 3.6    105 104   CO2 23.0 22.0 23.0     Recent Labs   Lab  03/15/24  1605 03/16/24  0536 03/16/24  1443 03/17/24  0553 03/17/24  1352 03/17/24  1759 03/18/24  0627   RBC 3.44* 2.45*  --  3.08* 3.30* 3.25* 2.93*   HGB 10.2* 7.4*   < > 9.0* 9.4* 9.2* 8.5*   HCT 29.3* 21.4*   < > 26.3* 28.0* 27.4* 25.1*   MCV 85.2 87.3  --  85.4 84.8 84.3 85.7   MCH 29.7 30.2  --  29.2 28.5 28.3 29.0   MCHC 34.8 34.6  --  34.2 33.6 33.6 33.9   RDW 14.3 15.2*  --  15.2* 15.3* 15.2* 15.8*   NEPRELIM 9.11* 6.88  --  8.17*  --   --   --    WBC 11.0 8.5  --  10.3 10.0 13.0* 14.1*   .0 149.0*  --  131.0* 121.0* 137.0* 147.0*    < > = values in this interval not displayed.         IMAGING:  XR CHEST AP PORTABLE  (CPT=71045)    Result Date: 3/17/2024  CONCLUSION:   Distal tip of the [place]-Ophelia catheter projects over the left lower lobe.  Recommend retraction.  Unchanged trace left pleural effusion.  Unchanged left retrocardiac opacity.    Dictated by (CST): Emily Álvarez MD on 3/17/2024 at 12:22 PM     Finalized by (CST): Emily Álvarez MD on 3/17/2024 at 12:24 PM          US ARTERIAL DUPLEX UPPER EXTREMITY LEFT (CPT=93931)    Result Date: 3/16/2024  CONCLUSION:  1. Widely patent left upper extremity arterial vasculature, including radial artery without pseudoaneurysm or dissection.    Dictated by (CST): Guille Chan MD on 3/16/2024 at 12:56 PM     Finalized by (CST): Guille Chan MD on 3/16/2024 at 12:57 PM            ASSESSMENT AND PLAN:   This is a 53 year old male with PMH sig for HLD, tobacco use. Presented with chest pain and was admitted for NSTEMI.  Nephrology is consulted for NORMA      Anuric NORMA  - due to ATN from shock.   - creatinine 1.23 on admission, worsened to 7.19 (3/7)  - initiated on CVVH on 3/7 via RIJ temp HD cath   - Continue CRRT  - Increase UF as blood pressures allow.  - clotted overnight was started on prefilter heparin 500units, this was cleared from CV by my partner.  - OK for PICC line if needed.  - Maintain adequate perfusion pressure  - Dose  medications for CRRT  - Avoid nephrotoxins.  - Will plan to transition to iHD when patient extubated and more awake.  - BID BMP, phos, and mag while on CRRT.      Hyperkalemia   - resolved with CRRT    Metabolic acidosis  - resolved with CRRT     Hyponatremia  - resolved     Critical care time > 35 mins     Jerome Gonzalez MD  [facility]  Nephrology

## 2024-03-18 NOTE — PROGRESS NOTES
Cardiology Progress Note    Cristiano Obregon Patient Status:  Inpatient    1970 MRN [id number]   Location [facility] 2W/SW Attending Donna Llanes,    Hosp Day # 13 PCP Isauro Pina MD     Interval Note:  Patient intubated, sedated but opening eyes  No acute issues overnight  Hemodynamics by [place]-Ophelia catheter and invasive arterial pressures are normal      --------------------------------------------------------------------------------------------------------------------------------  ROS 12 systems reviewed, pertinent findings above.  ROS    History:  History reviewed. No pertinent past medical history.  Past Surgical History:   Procedure Laterality Date    COLONOSCOPY N/A 2017    Procedure: COLONOSCOPY, POSSIBLE BIOPSY, POSSIBLE POLYPECTOMY 02229;  Surgeon: Byron Plaza MD;  Location: [facility]     History reviewed. No pertinent family history.   reports that he has been smoking cigarettes. He has never used smokeless tobacco. He reports that he does not drink alcohol and does not use drugs.    Objective:   Temp: 99.1 °F (37.3 °C)  Pulse: 93  Resp: 23  BP: 139/99  FiO2 (%): 30 %    Intake/Output:     Intake/Output Summary (Last 24 hours) at 3/18/2024 0914  Last data filed at 3/18/2024 0800  Gross per 24 hour   Intake 2211.42 ml   Output 2816 ml   Net -604.58 ml       Physical Exam:    General: Intubated, sedated however responsive and opening eyes  HEENT: Normocephalic, anicteric sclera, neck supple.  Neck: No JVD, carotids 2+, no bruits.  Cardiac: Regular rate and rhythm. S1, S2 normal. No murmur, pericardial rub, S3.  Lungs: Clear without wheezes, rales, rhonchi or dullness.  Normal excursions and effort.  Abdomen: Soft, non-tender. BS-present.  Extremities: Without clubbing, cyanosis or edema.  Peripheral pulses are 2+.  Neurologic: Non-focal  Skin: Warm and dry.       Assessment   STEMI, severe multivessel coronary disease status post CABG  Emergent  redo sternotomy 3/16/2024 for tamponade  Cardiogenic shock requiring Impella-resolved  Acute renal failure, on HD  Paroxysmal atrial fibrillation  Anemia  Thrombocytopenia  History of tobacco abuse      Plan  Patient seen and examined-hemodynamically stable  Chest tubes, epicardial, lower extremity drains in place  [place]-Ophelia catheter readings reviewed-normal cardiac output/index without pressors or inotropes  On low vent settings  Defer extubation plan to critical care team  Line management per CT surgery team  Continue amiodarone, aspirin, Plavix, metoprolol  Start statin prior to discharge    Thank you for allowing me to take part in the care of Cristiano Obregon. Please call with any questions of concerns.      Level of care: L4    Zander Cano DO  [facility]   Interventional Cardiac and Vascular Services      Zander Cano DO  03/18/24  9:14 AM

## 2024-03-18 NOTE — PROGRESS NOTES
Critical Care Progress Note     Assessment / Plan:  Acute respiratory failure - due to pulmonary edema  - continue full vent support  - daily SBT  - minimize sedation as able  NSTEMI - diagnosed with new multivessel CAD s/p angioplasty and cardiogenic shock requiring Impella placement. S/p 3vCABG 3/15. Impella removed 3/16. Back to OR 3/16 AM for tamponade s/p pericardial clot removal  - cardiology and cardiac surgery following  Cardiogenic shock - due to acute coronary syndrome, EF was down and has now improved on repeat echo  - off vasopressors  Afib  - rate controlled  - per cardiology  Pneumonia - sputum cultures with Klebsiella and MSSA  - Zosyn and fluconazole per ID  Acute renal failure - suspect from ATN and cardiogenic shock  - CRRT per nephrology  Thrombocytopenia, anemia  - monitor  Smoker  - cessation counseling once appropriate  FEN  - start TFs  Proph  - heparin gtt  - famotidine  Dispo  - full code  - ICU    Critical care time: 35 minutes      Subjective:  No events overnight    Objective:  Vitals:    03/18/24 0440 03/18/24 0500 03/18/24 0600 03/18/24 0700   BP:   137/87 134/86   BP Location:   Left arm Left arm   Pulse: 103 107 92 93   Resp: 15 17 13 15   Temp:   98 °F (36.7 °C) 98.8 °F (37.1 °C)   TempSrc:   Pulmonary Artery Pulmonary Artery   SpO2: 100% 100% 100% 100%   Weight:       Height:         Physical Exam:  General: intubated  Skin: no rash, ulcers or subcutaneous nodules  Eyes: anicteric sclerae, moist conjunctivae  Head, ears, nose, throat: atraumatic, oropharynx clear with moist mucous membranes  Neck: trachea midline with no thyromegaly  Heart: regular rate and rhythm, no murmurs / rubs / gallops  Lungs: clear bilaterally  Abdomen: soft, nontender, nondistended   Extremities: +LE edema  Psych: opens eyes to voice, moves upper extremities and lower extremities to command    Medications:  Reviewed in EMR    Lab Data:  Reviewed in EMR    Imaging:  I independently visualized all relevant  chest imaging in PACS and agree with radiology interpretation except where noted.

## 2024-03-19 PROBLEM — I63.412 CEREBROVASCULAR ACCIDENT (CVA) DUE TO EMBOLISM OF LEFT MIDDLE CEREBRAL ARTERY (HCC): Status: ACTIVE | Noted: 2024-03-19

## 2024-03-19 PROBLEM — I63.412 CEREBROVASCULAR ACCIDENT (CVA) DUE TO EMBOLISM OF LEFT MIDDLE CEREBRAL ARTERY (HCC): Status: ACTIVE | Noted: 2024-01-01

## 2024-03-19 NOTE — PROGRESS NOTES
On phone w/ Rhett now after trouble shooting cycler w/ the [facility] 1-800 help number. Advised to disconnect pt and get a new set-up by [facility], informed Rhett, awaiting a call back from the on-call tech/RN with an ETA.    [id number] sent a Perfect Serve msg to Dr. Gonzalez informing him that the CRRT was not working again, asked if TPA was appropriate. Dr. Gonzalez wants IR to exchange the catheter and then resume CRRT.     Of note, heparin had been infusing into the cycler, this order is discontinued and staff should contact Dr. Gonzalez or partner if it is felt that this needs re-ordered.

## 2024-03-19 NOTE — PROGRESS NOTES
PT evaluation orders received and chart reviewed. Patient currently intubated. Will follow peripherally until medically stable.   Thank you,  Celia Lima, PT, DPT

## 2024-03-19 NOTE — CONSULTS
[facility]  part of [facility]    Report of Consultation    Cristiano Obregon Patient Status:  Inpatient    1970 MRN [id number]   Location [facility] 2W/SW Attending Carlo Vaughn MD   Hosp Day # 14 PCP Abilio Dorsey MD     Date of Admission:  3/5/2024  Date of Consult:  3/19/24  Reason for Consultation:   PEG Tube Evaluation    History of Present Illness:   Patient is a 53 year old male with history of CABG (3/15/24) s/p Revision and Mediastinal Washout (3/16/24) and recent CVA who GI is consulted to evaluate for PEG Tube placement.    Cristiano was initially admitted with NSTEMI found to have new multivessel CAD status status post angioplasty.  Had cardiogenic shock and needed Impella placement.  Underwent three-vessel CABG on 3/15/2024 and needed to be taken back to the OR 3/16/2024 for tamponade status post pericardial clot removal and mediastinal washout.  Has drains in place in the lower chest region.  Recent imaging consistent with subacute stroke.  Has been shaking head to commands and has been in and out of consciousness.  Has acute renal failure as well and receiving CRRT.  Family at bedside and spoke with patient's  and cousin who is a nurse.  Patient has Dobbhoff in place and tube feeds have been started.  Speech evaluation deferred today given current Exa to patient's status.  Pulmonary to evaluate patient to discuss tracheostomy.    Past Medical History  History reviewed. No pertinent past medical history.    Past Surgical History  Past Surgical History:   Procedure Laterality Date    COLONOSCOPY N/A 2017    Procedure: COLONOSCOPY, POSSIBLE BIOPSY, POSSIBLE POLYPECTOMY 27436;  Surgeon: Byron Plaza MD;  [place] [facility] [place] [facility]       Family History  History reviewed. No pertinent family history.    Social History  Social History     Socioeconomic History    Marital status:    Tobacco Use    Smoking status: Every Day      Types: Cigarettes    Smokeless tobacco: Never    Tobacco comments:     occ/5 cigs daily\   Vaping Use    Vaping Use: Never used   Substance and Sexual Activity    Alcohol use: No     Alcohol/week: 0.0 standard drinks of alcohol    Drug use: No     Social Determinants of Health     Food Insecurity: Unknown (3/5/2024)    Food Insecurity     Food Insecurity: Patient unable to answer   Transportation Needs: Unknown (3/5/2024)    Transportation Needs     Lack of Transportation: Patient unable to answer   Housing Stability: Unknown (3/5/2024)    Housing Stability     Housing Instability: Patient unable to answer          Current Medications:  Current Facility-Administered Medications   Medication Dose Route Frequency    hydrALAzine (Apresoline) 20 mg/mL injection 10 mg  10 mg Intravenous Once    senna (Senokot) 8.8 MG/5ML oral syrup 8.8 mg  5 mL Oral BID    docusate (Colace) 50 MG/5ML oral solution 100 mg  100 mg Oral BID    labetalol (Trandate) 5 mg/mL injection 10 mg  10 mg Intravenous Q10 Min PRN    hydrALAzine (Apresoline) 20 mg/mL injection 10 mg  10 mg Intravenous Q2H PRN    prochlorperazine (Compazine) 10 MG/2ML injection 5 mg  5 mg Intravenous Q8H PRN    atorvastatin (Lipitor) tab 40 mg  40 mg Oral Nightly    aspirin chewable tab 81 mg  81 mg Oral Daily    dextrose 10% infusion (TPN no rate)   Intravenous Continuous PRN    pancrelipase (Lip-Prot-Amyl) (Zenpep) DR particles cap 10,000 Units  10,000 Units Per G Tube PRN    And    sodium bicarbonate tab 325 mg  325 mg Oral PRN    hydrALAZINE (Apresoline) tab 25 mg  25 mg Oral Q8H CHAYITO    amiodarone (Pacerone) tab 200 mg  200 mg Oral TID    [facility] Pure Flow 400 CRRT/PIRRT fluid 5000mL  2.5 L/hr CRRT Continuous    alteplase (Activase) 4 mg in sterile water for injection (PF) 2.2 mL IV push to declot line  4 mg Intravenous PRN    sugammadex (Bridion) 200 MG/2ML injection 170 mg  2 mg/kg Intravenous Once    Or    sugammadex (Bridion) 200 MG/2ML injection 340 mg  4 mg/kg  Intravenous Once    metoclopramide (Reglan) 5 mg/mL injection 5 mg  5 mg Intravenous Q6H    insulin regular human (Novolin R, Humulin R) 100 UNIT/ML injection 1-7 Units  1-7 Units Subcutaneous 4 times per day    melatonin tab 3 mg  3 mg Oral Nightly PRN    polyethylene glycol (PEG 3350) (Miralax) 17 g oral packet 17 g  17 g Oral Daily PRN    ondansetron (Zofran) 4 MG/2ML injection 4 mg  4 mg Intravenous Q6H PRN    famotidine (Pepcid) tab 20 mg  20 mg Oral Daily    Or    famotidine (Pepcid) 20 mg/2mL injection 20 mg  20 mg Intravenous Daily    DOBUTamine in dextrose 5% (Dobutrex) 500 mg/250mL infusion premix  2.5-20 mcg/kg/min (Dosing Weight) Intravenous Continuous PRN    metoprolol tartrate (Lopressor) tab 25 mg  25 mg Oral 2x Daily(Beta Blocker)    potassium chloride 20 mEq/100mL IVPB premix 20 mEq  20 mEq Intravenous PRN    Or    potassium chloride 40 mEq/100mL IVPB premix (central line) 40 mEq  40 mEq Intravenous PRN    magnesium sulfate in dextrose 5% 1 g/100mL infusion premix 1 g  1 g Intravenous PRN    magnesium sulfate in sterile water for injection 2 g/50mL IVPB premix 2 g  2 g Intravenous PRN    mupirocin (Bactroban) 2% nasal ointment 1 Application  1 Application Nasal BID    chlorhexidine gluconate (Peridex) 0.12 % oral solution 15 mL  15 mL Mouth/Throat BID    ascorbic acid (Vitamin C) tab 500 mg  500 mg Oral TID    heparin (Porcine) 5000 UNIT/ML injection 5,000 Units  5,000 Units Subcutaneous 2 times per day    acetaminophen (Tylenol) tab 650 mg  650 mg Oral Q4H PRN    Or    HYDROcodone-acetaminophen (Norco) 5-325 MG per tab 1 tablet  1 tablet Oral Q4H PRN    dexmedeTOMIDine in sodium chloride 0.9% (Precedex) 400 mcg/100mL infusion premix  0.2-1.5 mcg/kg/hr (Dosing Weight) Intravenous Continuous    [Held by provider] clopidogrel (Plavix) tab 75 mg  75 mg Oral Daily    glucose (Dex4) 15 GM/59ML oral liquid 15 g  15 g Oral Q15 Min PRN    Or    glucose (Glutose) 40% oral gel 15 g  15 g Oral Q15 Min PRN     Or    glucose-vitamin C (Dex-4) chewable tab 4 tablet  4 tablet Oral Q15 Min PRN    Or    dextrose 50% injection 50 mL  50 mL Intravenous Q15 Min PRN    Or    glucose (Dex4) 15 GM/59ML oral liquid 30 g  30 g Oral Q15 Min PRN    Or    glucose (Glutose) 40% oral gel 30 g  30 g Oral Q15 Min PRN    Or    glucose-vitamin C (Dex-4) chewable tab 8 tablet  8 tablet Oral Q15 Min PRN    piperacillin-tazobactam (Zosyn) 3.375 g in dextrose 5% 100 mL IVPB-ADDV  3.375 g Intravenous Q8H    mineral oil-white petrolatum (Artificial Tears) 83-15 % ophthalmic ointment   Both Eyes TID    diazepam (Valium) 5 mg/mL injection 2.5 mg  2.5 mg Intravenous Q4H PRN    heparin (Porcine) 1000 UNIT/ML injection 1,500 Units  1.5 mL Intracatheter PRN    acetaminophen (Tylenol) tab 650 mg  650 mg Oral Q4H PRN    Or    acetaminophen (Tylenol) 160 MG/5ML oral liquid 650 mg  650 mg Oral Q4H PRN    Or    acetaminophen (Tylenol) rectal suppository 650 mg  650 mg Rectal Q4H PRN    Or    acetaminophen (Ofirmev) 10 mg/mL infusion premix 1,000 mg  1,000 mg Intravenous Q6H PRN    senna (Senokot) 8.8 MG/5ML oral syrup 17.6 mg  10 mL Oral Nightly PRN    bisacodyl (Dulcolax) 10 MG rectal suppository 10 mg  10 mg Rectal Daily PRN    fleet enema (Fleet) 7-19 GM/118ML rectal enema 133 mL  1 enema Rectal Once PRN     Medications Prior to Admission   Medication Sig    ATORVASTATIN 20 MG Oral Tab TAKE 1 TABLET BY MOUTH EVERY DAY    FLUOCINONIDE 0.05 % External Cream APPLY SPARINGLY TO AFFECTED AREA TWICE DAILY    TRETINOIN 0.01 % External Gel APPLY 1 APPLICATION TOPICALLY NIGHTLY.    omega-3 fatty acids 1000 MG Oral Cap Take 1,000 mg by mouth daily.    Melatonin 10-10 MG Oral Tab CR Take by mouth.    Multiple Vitamins-Minerals (CENTRUM) Oral Tab Take 1 tablet by mouth daily.    Multiple Vitamin (MULTIVITAMIN TAB/CAP) Take 1 tablet by mouth daily.       Allergies  No Known Allergies    Review of Systems:    Pertinent items are noted in HPI.    Physical Exam:   Blood  pressure (!) 142/94, pulse 107, temperature 98.7 °F (37.1 °C), temperature source Temporal, resp. rate 22, height 5' 7\" (1.702 m), weight 189 lb 9.5 oz (86 kg), SpO2 100%.    GENERAL: intubated, not on sedation, not responding currently  CHEST/CARDIO: sternotomy bandage midline, mediastinal drains in epigastric region  LUNGS: mechanical breath sounds  GI: good BS's, soft, non-tender      Results:     Laboratory Data:  Lab Results   Component Value Date    WBC 18.5 (H) 03/19/2024    HGB 8.7 (L) 03/19/2024    HCT 24.5 (L) 03/19/2024    .0 03/19/2024    CREATSERUM 4.36 (H) 03/19/2024    BUN 56 (H) 03/19/2024     03/19/2024    K 3.5 03/19/2024     03/19/2024    CO2 21.0 03/19/2024     (H) 03/19/2024    CA 8.3 (L) 03/19/2024    ALB 3.1 (L) 03/19/2024    ALKPHO 93 03/19/2024    TP 5.3 (L) 03/19/2024    AST 26 03/19/2024    ALT <7 (L) 03/19/2024    PTT 33.1 03/16/2024    INR 1.24 (H) 03/16/2024    PTP 16.4 (H) 03/16/2024    PSA 1.95 10/06/2021    DDIMER 13.83 (H) 03/15/2024    MG 2.2 03/19/2024    PHOS 2.8 03/19/2024    TROPHS 1,140,512 (HH) 03/06/2024         Imaging:  XR CHEST AP PORTABLE  (CPT=71045)    Result Date: 3/19/2024  CONCLUSION:  1. Cardiomegaly.  Pulmonary venous distention.  CABG 2. Bilateral perihilar and lower lobe pulmonary congestive changes with improved aeration left basilar retrocardiac region .  Support tubes and catheters are satisfactory.  No pneumothorax 3. Metallic tip enteric tube within the body of the stomach alongside the indwelling nasogastric tube    Dictated by (CST): Wily Stephenson MD on 3/19/2024 at 11:41 AM     Finalized by (CST): Wily Stephenson MD on 3/19/2024 at 11:46 AM          XR CHEST AP PORTABLE  (CPT=71045)    Result Date: 3/19/2024  CONCLUSION:  1. Right-sided hemodialysis catheter with tip projecting near the cavoatrial junction.  2. Postthoracotomy chest with cardiomegaly and pulmonary vascular congestion.  3. Bibasilar opacities, which may  reflect some combination of pleural effusions, atelectasis, and/or superimposed pneumonia.   4. Additional support tubes and lines as above.   Dictated by (CST): Zachery Dean MD on 3/19/2024 at 10:57 AM     Finalized by (CST): Zachery Dean MD on 3/19/2024 at 11:01 AM          CT BRAIN OR HEAD (13008)    Result Date: 3/19/2024  CONCLUSION:   Subacute appearing left frontal and left occipital lobe infarcts.  Brain MRI can be obtained to help stage chronicity.  Pansinusitis  Partial opacification of the bilateral mastoid air cells may be sequela of inflammation. No osseous destruction or bony erosions.   Dictated by (CST): Henri Melgoza MD on 3/19/2024 at 9:46 AM     Finalized by (CST): Henri Melgoza MD on 3/19/2024 at 9:48 AM              Impression:   53 year old male with history of CABG (3/15/24) s/p Revision and Mediastinal Washout (3/16/24) and recent CVA who GI is consulted to evaluate for PEG Tube placement.    CAD s/p CABG:  Subacute CVA:  Leukocytosis:  NORMA on CRRT:  - Patient admitted 2 weeks ago and underwent recent 3 vessel CABG with need to take back to OR given tamponade, need for washout and does have drains in place in the epigastric region.   Plan for trach and PEG.  Has Dobhoff in place now.    - Given recent sternotomy as well as mediastinal drains, performing endoscopic placement of PEG Tube is high risk.  On exam there is minimal room for clean window for placement and given close proximity to open wound for drains, it would be advised to place eventual gastrostomy tube through image guidance.  - Discussed with IR who is familiar with patient already and will evaluate for eventual tube placement.  This does not need to be performed urgently, has Dobhoff in place and tolerating feeds.    Thank you for allowing me to participate in the care of your patient.    Alexander Morton MD  3/19/2024

## 2024-03-19 NOTE — PLAN OF CARE
Cristiano is alert but lethargic/drowsy - follows simple commands intermittently, strength on L>R, able to nod/shake head in response to simple questions. Fair effort on SBT today - pulmonary/CT surg had discussion with family re: recommendation for trach/peg in light of CT head results today. Chest tubes patent. Hemodynamically stable. HD cath exchanged at BS today - CRRT restarted at 1200 - running well, tolerating pulling net neg 100cc hourly. OGT exchanged for keofeed this morning, placement confirmed & TF started (OGT removed)-> tolerating well. Several large loose BMs today with improvement in abd distention. Blood sugar within goal range. BUE soft restraints in place - pt intermittently reaching for ETT/chest tubes - maintained for tube/line safety.  at BS - present during MD rounding, and kept updated on pt's condition and plan of care.    Problem: RESPIRATORY - ADULT  Goal: Achieves optimal ventilation and oxygenation  Description: INTERVENTIONS:  - Assess for changes in respiratory status  - Assess for changes in mentation and behavior  - Position to facilitate oxygenation and minimize respiratory effort  - Oxygen supplementation based on oxygen saturation or ABGs  - Provide Smoking Cessation handout, if applicable  - Encourage broncho-pulmonary hygiene including cough, deep breathe, Incentive Spirometry  - Assess the need for suctioning and perform as needed  - Assess and instruct to report SOB or any respiratory difficulty  - Respiratory Therapy support as indicated  - Manage/alleviate anxiety  - Monitor for signs/symptoms of CO2 retention  Outcome: Progressing     Problem: CARDIOVASCULAR - ADULT  Goal: Maintains optimal cardiac output and hemodynamic stability  Description: INTERVENTIONS:  - Monitor vital signs, rhythm, and trends  - Monitor for bleeding, hypotension and signs of decreased cardiac output  - Evaluate effectiveness of vasoactive medications to optimize hemodynamic stability  -  Monitor arterial and/or venous puncture sites for bleeding and/or hematoma  - Assess quality of pulses, skin color and temperature  - Assess for signs of decreased coronary artery perfusion - ex. Angina  - Evaluate fluid balance, assess for edema, trend weights  Outcome: Progressing  Goal: Absence of cardiac arrhythmias or at baseline  Description: INTERVENTIONS:  - Continuous cardiac monitoring, monitor vital signs, obtain 12 lead EKG if indicated  - Evaluate effectiveness of antiarrhythmic and heart rate control medications as ordered  - Initiate emergency measures for life threatening arrhythmias  - Monitor electrolytes and administer replacement therapy as ordered  Outcome: Progressing     Problem: METABOLIC/FLUID AND ELECTROLYTES - ADULT  Goal: Hemodynamic stability and optimal renal function maintained  Description: INTERVENTIONS:  - Monitor labs and assess for signs and symptoms of volume excess or deficit  - Monitor intake, output and patient weight  - Monitor urine specific gravity, serum osmolarity and serum sodium as indicated or ordered  - Monitor response to interventions for patient's volume status, including labs, urine output, blood pressure (other measures as available)  - Encourage oral intake as appropriate  - Instruct patient on fluid and nutrition restrictions as appropriate  Outcome: Progressing     Problem: SKIN/TISSUE INTEGRITY - ADULT  Goal: Skin integrity remains intact  Description: INTERVENTIONS  - Assess and document risk factors for pressure ulcer development  - Assess and document skin integrity  - Monitor for areas of redness and/or skin breakdown  - Initiate interventions, skin care algorithm/standards of care as needed  Outcome: Progressing  Goal: Incision(s), wounds(s) or drain site(s) healing without S/S of infection  Description: INTERVENTIONS:  - Assess and document risk factors for pressure ulcer development  - Assess and document skin integrity  - Assess and document  dressing/incision, wound bed, drain sites and surrounding tissue  - Implement wound care per orders  - Initiate isolation precautions as appropriate  - Initiate Pressure Ulcer prevention bundle as indicated  Outcome: Progressing  Goal: Oral mucous membranes remain intact  Description: INTERVENTIONS  - Assess oral mucosa and hygiene practices  - Implement preventative oral hygiene regimen  - Implement oral medicated treatments as ordered  Outcome: Progressing     Problem: NEUROLOGICAL - ADULT  Goal: Achieves stable or improved neurological status  Description: INTERVENTIONS  - Assess for and report changes in neurological status  - Initiate measures to prevent increased intracranial pressure  - Maintain blood pressure and fluid volume within ordered parameters to optimize cerebral perfusion and minimize risk of hemorrhage  - Monitor temperature, glucose, and sodium. Initiate appropriate interventions as ordered  Outcome: Progressing     Problem: Safety Risk - Non-Violent Restraints  Goal: Patient will remain free from self-harm  Description: INTERVENTIONS:  - Apply the least restrictive restraint to prevent harm  - Notify patient and family of reasons restraints applied  - Assess for any contributing factors to confusion (electrolyte disturbances, delirium, medications)  - Discontinue any unnecessary medical devices as soon as possible  - Assess the patient's physical comfort, circulation, skin condition, hydration, nutrition and elimination needs   - Reorient and redirection as needed  - Assess for the need to continue restraints  Outcome: Progressing

## 2024-03-19 NOTE — PROGRESS NOTES
03/19/24 1253   VISIT TYPE   SLP Inpatient Visit Type (Documentation Required) Attempted Evaluation   FOLLOW UP/PLAN   Follow Up Needed (Documentation Required) On hold     SLP orders received, acknowledged, and completed. Per RN, pt remains intubated and not appropriate for evaluation at this time. SLP to place pt ON HOLD. Please reorder SLP as extubated and appropriate.     Thank you.    Emily Burgos M.S. CCC-SLP  Speech Language Pathologist  Phone Number [phone]

## 2024-03-19 NOTE — PROCEDURES
Interventional Radiology  Brief Post-Procedure Note    Procedure(s): non-tunneled hemodialysis catheter exchange    Indication: CRRT, existing catheter not functioning    (s): Ion    Anesthesia: Local    Findings:    -bedside  -preprocedure chest xray demonstrated catheter terminating in mid SVC  -existing catheter retracted approx 2 cm  -exchanged over wire for 20 cm catheter  -chest xray demonstrating catheter terminating at SVC/RA junction    Blood loss: <5 mL      Complications: None    Plan: ready for use    Please refer to full dictation under the \"Imaging\" tab in Epic.    Carlitos Lemon MD  3/19/2024  Interventional Radiology  [facility]

## 2024-03-19 NOTE — PROGRESS NOTES
NEPHROLOGY DAILY PROGRESS NOTE       SUBJECTIVE:    Clotted again overnight.  Unable to draw from red port s/p tpa clotted again.    OBJECTIVE:    Total Intake/Output:    Intake/Output Summary (Last 24 hours) at 3/19/2024 0859  Last data filed at 3/19/2024 0600  Gross per 24 hour   Intake 2408.3 ml   Output 3124 ml   Net -715.7 ml       PHYSICAL EXAM:  BP (!) 148/105   Pulse 112   Temp 99.2 °F (37.3 °C) (Axillary)   Resp 17   Ht 5' 7\" (1.702 m)   Wt 185 lb 10 oz (84.2 kg)   SpO2 100%   BMI 29.07 kg/m²   GEN: Intubated.  HEENT: ETT in place   CHEST: mechanical breath sounds, chest tube in place   CARDIAC: S1S2 normal  ABD: soft, NT/ND  : lujan in place   EXT:  trace upper and lower ext edema noted   NEURO: sedated   ACCESS: RIJ temp HD cath (3/7)      CURRENT MEDICATIONS:     hydrALAzine  10 mg Intravenous Once    aspirin  81 mg Oral Daily    hydrALAZINE  25 mg Oral Q8H CHAYITO    amiodarone  200 mg Oral TID    sodium chloride   Intravenous Once    sugammadex  2 mg/kg Intravenous Once    Or    sugammadex  4 mg/kg Intravenous Once    sodium chloride   Intravenous Once    metoclopramide  5 mg Intravenous Q6H    insulin regular human  1-7 Units Subcutaneous 4 times per day    famotidine  20 mg Oral Daily    Or    famotidine  20 mg Intravenous Daily    metoprolol tartrate  25 mg Oral 2x Daily(Beta Blocker)    mupirocin  1 Application Nasal BID    chlorhexidine gluconate  15 mL Mouth/Throat BID    ascorbic acid  500 mg Oral TID    heparin  5,000 Units Subcutaneous 2 times per day    clopidogrel  75 mg Oral Daily    piperacillin-tazobactam  3.375 g Intravenous Q8H    mineral oil-white petrolatum   Both Eyes TID         LABS:  Patient Labs Reviewed in Detail. Pertinent Labs as follows:  Recent Labs   Lab 03/18/24  0620 03/18/24  1621 03/19/24  0541   * 160* 145*   BUN 62* 51* 56*   CREATSERUM 4.16* 3.69* 4.36*   EGFRCR 16* 19* 15*   CA 7.9* 8.3* 8.3*    137 137   K 3.6 3.6 3.5    104 105   CO2 23.0  23.0 21.0     Recent Labs   Lab 03/15/24  1605 03/16/24  0536 03/16/24  1443 03/17/24  0553 03/17/24  1352 03/18/24  0627 03/18/24  1621 03/19/24  0105 03/19/24  0541   RBC 3.44* 2.45*  --  3.08*   < > 2.93* 2.96* 2.71*  --    HGB 10.2* 7.4*   < > 9.0*   < > 8.5* 8.7* 7.8* 8.8*   HCT 29.3* 21.4*   < > 26.3*   < > 25.1* 25.2* 23.1* 26.2*   MCV 85.2 87.3  --  85.4   < > 85.7 85.1 85.2  --    MCH 29.7 30.2  --  29.2   < > 29.0 29.4 28.8  --    MCHC 34.8 34.6  --  34.2   < > 33.9 34.5 33.8  --    RDW 14.3 15.2*  --  15.2*   < > 15.8* 15.6* 15.7*  --    NEPRELIM 9.11* 6.88  --  8.17*  --   --   --   --   --    WBC 11.0 8.5  --  10.3   < > 14.1* 16.7* 15.1*  --    .0 149.0*  --  131.0*   < > 147.0* 105.0* 128.0*  --     < > = values in this interval not displayed.         IMAGING:  No results found.     ASSESSMENT AND PLAN:   This is a 53 year old male with PMH sig for HLD, tobacco use. Presented with chest pain and was admitted for NSTEMI.  Nephrology is consulted for NORMA      Anuric NORMA  - due to ATN from shock.   - creatinine 1.23 on admission, worsened to 7.19 (3/7)  - initiated on CVVH on 3/7 via RIJ temp HD cath   - Continue CRRT  - Increase UF as blood pressures allow.  - discontinue heparin gtt at this time.  - OK for PICC line if needed.  - Maintain adequate perfusion pressure  - Dose medications for CRRT  - Avoid nephrotoxins.  - Temporary catheter replacement  - Will plan to transition to iHD when patient extubated and more awake.  - BID BMP, phos, and mag while on CRRT.      Hyperkalemia   - resolved with CRRT    Metabolic acidosis  - resolved with CRRT     Hyponatremia  - resolved     Critical care time > 35 mins     Jerome Gonzalez MD  [facility]  Nephrology

## 2024-03-19 NOTE — PLAN OF CARE
Problem: Safety Risk - Non-Violent Restraints  Goal: Patient will remain free from self-harm  Description: INTERVENTIONS:  - Apply the least restrictive restraint to prevent harm  - Notify patient and family of reasons restraints applied  - Assess for any contributing factors to confusion (electrolyte disturbances, delirium, medications)  - Discontinue any unnecessary medical devices as soon as possible  - Assess the patient's physical comfort, circulation, skin condition, hydration, nutrition and elimination needs   - Reorient and redirection as needed  - Assess for the need to continue restraints  Outcome: Not Progressing     Problem: RESPIRATORY - ADULT  Goal: Achieves optimal ventilation and oxygenation  Description: INTERVENTIONS:  - Assess for changes in respiratory status  - Assess for changes in mentation and behavior  - Position to facilitate oxygenation and minimize respiratory effort  - Oxygen supplementation based on oxygen saturation or ABGs  - Provide Smoking Cessation handout, if applicable  - Encourage broncho-pulmonary hygiene including cough, deep breathe, Incentive Spirometry  - Assess the need for suctioning and perform as needed  - Assess and instruct to report SOB or any respiratory difficulty  - Respiratory Therapy support as indicated  - Manage/alleviate anxiety  - Monitor for signs/symptoms of CO2 retention  Outcome: Progressing     Problem: CARDIOVASCULAR - ADULT  Goal: Maintains optimal cardiac output and hemodynamic stability  Description: INTERVENTIONS:  - Monitor vital signs, rhythm, and trends  - Monitor for bleeding, hypotension and signs of decreased cardiac output  - Evaluate effectiveness of vasoactive medications to optimize hemodynamic stability  - Monitor arterial and/or venous puncture sites for bleeding and/or hematoma  - Assess quality of pulses, skin color and temperature  - Assess for signs of decreased coronary artery perfusion - ex. Angina  - Evaluate fluid balance,  assess for edema, trend weights  Outcome: Progressing     Problem: METABOLIC/FLUID AND ELECTROLYTES - ADULT  Goal: Hemodynamic stability and optimal renal function maintained  Description: INTERVENTIONS:  - Monitor labs and assess for signs and symptoms of volume excess or deficit  - Monitor intake, output and patient weight  - Monitor urine specific gravity, serum osmolarity and serum sodium as indicated or ordered  - Monitor response to interventions for patient's volume status, including labs, urine output, blood pressure (other measures as available)  - Encourage oral intake as appropriate  - Instruct patient on fluid and nutrition restrictions as appropriate  Outcome: Not Progressing

## 2024-03-19 NOTE — PLAN OF CARE
Problem: RESPIRATORY - ADULT  Goal: Achieves optimal ventilation and oxygenation  Description: INTERVENTIONS:  - Assess for changes in respiratory status  - Assess for changes in mentation and behavior  - Position to facilitate oxygenation and minimize respiratory effort  - Oxygen supplementation based on oxygen saturation or ABGs  - Provide Smoking Cessation handout, if applicable  - Encourage broncho-pulmonary hygiene including cough, deep breathe, Incentive Spirometry  - Assess the need for suctioning and perform as needed  - Assess and instruct to report SOB or any respiratory difficulty  - Respiratory Therapy support as indicated  - Manage/alleviate anxiety  - Monitor for signs/symptoms of CO2 retention  Outcome: Progressing    Received patient on vent settings of AC/VC 12, , PEEP +5, FIO2 30%, ETT 7/22 @ the lip,  pt with CRRT suctioned small amount of white thick secretions through ET. No acute events/changes on this shift.   SBT done, cpap 5/5/30% pt was on cpap trial little above one hour, back on full support per Dr. Saini, no extubation plan today, patient ios not fully ready for it, will tri again tomorrow morning.       03/18/24 0915   Vent Information   Vent Mode (S)  CPAP;PS   Settings   FiO2 (%) 30 %   PEEP/CPAP (cm H2O) 5 cm H20   Press Support CWP 5   Readings   Total RR 28   Minute Ventilation (L/min) 12.7 L/min   Vt Spontaneous (mL) 440 mL   MAP (cm H2O) 7   Alarms   High RR 40   Insp Pressure High (cm H2O) 50 cm H2O   Insp Pressure Low (cm H2O) 8 cm H2O   MV High (L/min) 20 L/min   MV Low (L/min) 3 L/min   Apnea Interval (sec) 20 seconds   Apnea Rate 12   Apnea Volume (mL) 650 mL   Spontaneous Breathing Trial   Spontaneous Breathing Trial Complete Y   Is the FiO2 <= 0.5? (titrated for sats 92-94%) Y   Is the PEEP <= 5? Y   Is the RSBI <=104 Y   Is the patient off pressor and narcotic / sedation drips? Y   Is the patient free of ventricular arrhythmias in the past 24 hours? Y   Is the  patient's cough adequate? Y   Is the patient alert (neuro), able to follow commands? Y   Daily Screen Meets All Criteria No   Spontaneous Parameters   Spontaneous RR Rate 28   Spontaneous Minute Volume 12.7   Average Spontaneous Tidal Volume 420   $ Spontaneous Vital Capacity 550   Negative Inspiratory Force -36   Total RSBI 66   Weaning Trials   Spontaneous Breathing Trial Time Initiated 0915   Spontaneous Breathing Trial Method VENT CPAP   Spontaneous Breathing Trial Settings 5/5/30%   Pre Trial    Pre Trial RR 20   Pre Trial SpO2 100 %   Pre Trial /99   Pre Trial Vt 650

## 2024-03-19 NOTE — BRIEF PROCEDURE NOTE
Patient bedside procedure in CCU room 229 for temporary hemodialysis catheter exchange procedure. Patient is intubated and sedated at this time; MD Lemon determines ASA 4/Mallampati 4; local anesthesia only to be used. Time-out performed, all staff agree. Site sterilely prepped and draped. Patient received 5 mL lidocaine to right neck. Existing temporary dialysis catheter removed intact. New 13F x 20 cm dialysis catheter placed without difficulty. STAT CXR ordered by MD, pending. Patient tolerated procedure well, vital signs stable. Report given to PATEL Pedro.

## 2024-03-19 NOTE — PROGRESS NOTES
[facility]  part of [facility] Infectious Disease Consult    Cristiano Obregon Patient Status:  Inpatient    1970 MRN [id number]   Location [facility] 2W/SW Attending Bud Camarena MD   Hosp Day # 14 PCP MD Cristiano Azar seen and examined,   Afebrile,   Previous entries noted,   Intubated,   Little more awake,   Family at the bed side,     History:  History reviewed. No pertinent past medical history.  Past Surgical History:   Procedure Laterality Date    COLONOSCOPY N/A 2017    Procedure: COLONOSCOPY, POSSIBLE BIOPSY, POSSIBLE POLYPECTOMY 52357;  Surgeon: Byron Plaza MD;  Location: [facility] SURGICAL [place], [facility]     History reviewed. No pertinent family history.   reports that he has been smoking cigarettes. He has never used smokeless tobacco. He reports that he does not drink alcohol and does not use drugs.    Allergies:  No Known Allergies    Medications:    Current Facility-Administered Medications:     hydrALAzine (Apresoline) 20 mg/mL injection 10 mg, 10 mg, Intravenous, Once    senna (Senokot) 8.8 MG/5ML oral syrup 8.8 mg, 5 mL, Oral, BID    docusate (Colace) 50 MG/5ML oral solution 100 mg, 100 mg, Oral, BID    labetalol (Trandate) 5 mg/mL injection 10 mg, 10 mg, Intravenous, Q10 Min PRN    hydrALAzine (Apresoline) 20 mg/mL injection 10 mg, 10 mg, Intravenous, Q2H PRN    prochlorperazine (Compazine) 10 MG/2ML injection 5 mg, 5 mg, Intravenous, Q8H PRN    atorvastatin (Lipitor) tab 40 mg, 40 mg, Oral, Nightly    aspirin chewable tab 81 mg, 81 mg, Oral, Daily    dextrose 10% infusion (TPN no rate), , Intravenous, Continuous PRN    pancrelipase (Lip-Prot-Amyl) (Zenpep) DR particles cap 10,000 Units, 10,000 Units, Per G Tube, PRN **AND** sodium bicarbonate tab 325 mg, 325 mg, Oral, PRN    hydrALAZINE (Apresoline) tab 25 mg, 25 mg, Oral, Q8H CHAYITO    amiodarone (Pacerone) tab 200 mg, 200 mg, Oral, TID    [facility] Pure Flow 400  CRRT/PIRRT fluid 5000mL, 2.5 L/hr, CRRT, Continuous **AND** CRRT Replacement Fluid 400, , , Until Discontinued    alteplase (Activase) 4 mg in sterile water for injection (PF) 2.2 mL IV push to declot line, 4 mg, Intravenous, PRN    sugammadex (Bridion) 200 MG/2ML injection 170 mg, 2 mg/kg, Intravenous, Once **OR** sugammadex (Bridion) 200 MG/2ML injection 340 mg, 4 mg/kg, Intravenous, Once    metoclopramide (Reglan) 5 mg/mL injection 5 mg, 5 mg, Intravenous, Q6H    insulin regular human (Novolin R, Humulin R) 100 UNIT/ML injection 1-7 Units, 1-7 Units, Subcutaneous, 4 times per day    melatonin tab 3 mg, 3 mg, Oral, Nightly PRN    polyethylene glycol (PEG 3350) (Miralax) 17 g oral packet 17 g, 17 g, Oral, Daily PRN    ondansetron (Zofran) 4 MG/2ML injection 4 mg, 4 mg, Intravenous, Q6H PRN    famotidine (Pepcid) tab 20 mg, 20 mg, Oral, Daily **OR** famotidine (Pepcid) 20 mg/2mL injection 20 mg, 20 mg, Intravenous, Daily    DOBUTamine in dextrose 5% (Dobutrex) 500 mg/250mL infusion premix, 2.5-20 mcg/kg/min (Dosing Weight), Intravenous, Continuous PRN    metoprolol tartrate (Lopressor) tab 25 mg, 25 mg, Oral, 2x Daily(Beta Blocker)    potassium chloride 20 mEq/100mL IVPB premix 20 mEq, 20 mEq, Intravenous, PRN **OR** potassium chloride 40 mEq/100mL IVPB premix (central line) 40 mEq, 40 mEq, Intravenous, PRN    magnesium sulfate in dextrose 5% 1 g/100mL infusion premix 1 g, 1 g, Intravenous, PRN    magnesium sulfate in sterile water for injection 2 g/50mL IVPB premix 2 g, 2 g, Intravenous, PRN    mupirocin (Bactroban) 2% nasal ointment 1 Application, 1 Application, Nasal, BID    chlorhexidine gluconate (Peridex) 0.12 % oral solution 15 mL, 15 mL, Mouth/Throat, BID    ascorbic acid (Vitamin C) tab 500 mg, 500 mg, Oral, TID    heparin (Porcine) 5000 UNIT/ML injection 5,000 Units, 5,000 Units, Subcutaneous, 2 times per day    acetaminophen (Tylenol) tab 650 mg, 650 mg, Oral, Q4H PRN **OR** HYDROcodone-acetaminophen  (Norco) 5-325 MG per tab 1 tablet, 1 tablet, Oral, Q4H PRN **OR** [DISCONTINUED] HYDROcodone-acetaminophen (Norco) 5-325 MG per tab 2 tablet, 2 tablet, Oral, Q4H PRN    dexmedeTOMIDine in sodium chloride 0.9% (Precedex) 400 mcg/100mL infusion premix, 0.2-1.5 mcg/kg/hr (Dosing Weight), Intravenous, Continuous    clopidogrel (Plavix) tab 75 mg, 75 mg, Oral, Daily    glucose (Dex4) 15 GM/59ML oral liquid 15 g, 15 g, Oral, Q15 Min PRN **OR** glucose (Glutose) 40% oral gel 15 g, 15 g, Oral, Q15 Min PRN **OR** glucose-vitamin C (Dex-4) chewable tab 4 tablet, 4 tablet, Oral, Q15 Min PRN **OR** dextrose 50% injection 50 mL, 50 mL, Intravenous, Q15 Min PRN **OR** glucose (Dex4) 15 GM/59ML oral liquid 30 g, 30 g, Oral, Q15 Min PRN **OR** glucose (Glutose) 40% oral gel 30 g, 30 g, Oral, Q15 Min PRN **OR** glucose-vitamin C (Dex-4) chewable tab 8 tablet, 8 tablet, Oral, Q15 Min PRN    piperacillin-tazobactam (Zosyn) 3.375 g in dextrose 5% 100 mL IVPB-ADDV, 3.375 g, Intravenous, Q8H    mineral oil-white petrolatum (Artificial Tears) 83-15 % ophthalmic ointment, , Both Eyes, TID    diazepam (Valium) 5 mg/mL injection 2.5 mg, 2.5 mg, Intravenous, Q4H PRN    heparin (Porcine) 1000 UNIT/ML injection 1,500 Units, 1.5 mL, Intracatheter, PRN    acetaminophen (Tylenol) tab 650 mg, 650 mg, Oral, Q4H PRN **OR** acetaminophen (Tylenol) 160 MG/5ML oral liquid 650 mg, 650 mg, Oral, Q4H PRN **OR** acetaminophen (Tylenol) rectal suppository 650 mg, 650 mg, Rectal, Q4H PRN **OR** acetaminophen (Ofirmev) 10 mg/mL infusion premix 1,000 mg, 1,000 mg, Intravenous, Q6H PRN    senna (Senokot) 8.8 MG/5ML oral syrup 17.6 mg, 10 mL, Oral, Nightly PRN    bisacodyl (Dulcolax) 10 MG rectal suppository 10 mg, 10 mg, Rectal, Daily PRN    fleet enema (Fleet) 7-19 GM/118ML rectal enema 133 mL, 1 enema, Rectal, Once PRN    Review of Systems:   Constitutional: Negative for anorexia, chills, fatigue, fevers, malaise, night sweats and weight loss.  Eyes: Negative  for visual disturbance, irritation and redness.  Ears, nose, mouth, throat, and face: Negative for hearing loss, tinnitus, nasal congestion, snoring, sore throat, hoarseness and voice change.  Respiratory: Negative for cough, sputum, hemoptysis, chest pain, wheezing, dyspnea on exertion, or stridor.  Cardiovascular: Negative for chest pain, palpitations, irregular heart beats, syncope, fatigue, orthopnea, paroxysmal nocturnal dyspnea, lower extremity edema.  Gastrointestinal: Negative for dysphagia, odynophagia, reflux symptoms, nausea, vomiting, change in bowel habits, diarrhea, constipation and abdominal pain.  Integument/breast: Negative for rash, skin lesions, and pruritus.  Hematologic/lymphatic: Negative for easy bruising, bleeding, and lymphadenopathy.  Musculoskeletal: Negative for myalgias, arthralgias, muscle weakness.  Neurological: Negative for headaches, dizziness, seizures, memory problems, trouble swallowing, speech problems, gait problems and weakness.  Behavioral/Psych: Negative for active tobacco use.  Endocrine: No history of of diabetes, thyroid disorder.  Unable to obtain,     Vital signs in last 24 hours:  Patient Vitals for the past 24 hrs:   BP Temp Temp src Pulse Resp SpO2 Weight   03/14/24 1100 122/81 99.5 °F (37.5 °C) Pulmonary Ar 114 14 99 % --   03/14/24 1000 96/73 99.3 °F (37.4 °C) Pulmonary Ar 117 18 98 % --   03/14/24 0900 103/69 99.3 °F (37.4 °C) Pulmonary Ar (!) 124 19 98 % --   03/14/24 0800 104/72 98.9 °F (37.2 °C) Pulmonary Ar 119 18 97 % --   03/14/24 0700 127/90 100.2 °F (37.9 °C) Pulmonary Ar 114 13 97 % --   03/14/24 0600 131/81 100.1 °F (37.8 °C) Pulmonary Ar (!) 121 22 98 % 184 lb 15.5 oz (83.9 kg)   03/14/24 0500 123/82 100.2 °F (37.9 °C) Pulmonary Ar 120 25 94 % --   03/14/24 0400 101/73 99.3 °F (37.4 °C) Pulmonary Ar 96 17 97 % --   03/14/24 0300 111/76 98.8 °F (37.1 °C) Pulmonary Ar 91 17 91 % --   03/14/24 0200 90/71 98.9 °F (37.2 °C) Pulmonary Ar 118 18 95 % --    03/14/24 0100 99/79 99.4 °F (37.4 °C) Pulmonary Ar (!) 121 18 96 % --   03/14/24 0000 105/79 99.7 °F (37.6 °C) Pulmonary Ar 117 20 96 % --   03/13/24 2300 104/78 100.3 °F (37.9 °C) Pulmonary Ar 94 18 95 % --   03/13/24 2200 119/75 (!) 100.6 °F (38.1 °C) Pulmonary Ar 118 19 94 % --   03/13/24 2100 100/69 100.4 °F (38 °C) Pulmonary Ar 119 20 94 % --   03/13/24 2000 113/86 100.1 °F (37.8 °C) Pulmonary Ar 116 18 92 % --   03/13/24 1900 -- 100 °F (37.8 °C) Pulmonary Ar 108 20 97 % --   03/13/24 1800 116/69 99.3 °F (37.4 °C) -- 101 16 96 % --   03/13/24 1700 -- 98.7 °F (37.1 °C) -- 113 17 98 % --   03/13/24 1600 123/87 98.8 °F (37.1 °C) -- 100 17 96 % --   03/13/24 1500 -- 99.5 °F (37.5 °C) -- 93 14 98 % --   03/13/24 1400 105/82 100.1 °F (37.8 °C) -- 87 14 99 % --   03/13/24 1330 -- -- -- 83 15 99 % --   03/13/24 1300 -- 98.2 °F (36.8 °C) -- 85 15 99 % --   03/13/24 1200 90/59 99.2 °F (37.3 °C) Pulmonary Ar 88 16 100 % --   03/13/24 1130 -- -- -- 87 15 -- --       Physical Exam:   General: alert, cooperative, oriented.  Intubated,    Head: Normocephalic, without obvious abnormality, atraumatic.   Eyes: Conjunctivae/corneas clear.  No scleral icterus.  No conjunctival     hemorrhage.   Nose: Nares normal.   Throat: Lips, mucosa, and tongue normal.  No thrush noted.   Neck: Soft, supple neck; trachea midline, no adenopathy, no thyromegaly.   Lungs: CTAB, normal and equal bilateral chest rise   Chest wall: No tenderness or deformity.   Heart: Regular rate and rhythm, normal S1S2, no murmur.   Abdomen: soft, non-tender, non-distended, positive BS.   Extremity: no edema, no cyanosis, Trialysis, midline in place,    Skin: No rashes or lesions.   Neurological: Alert, interactive, no focal deficits    Labs:  Lab Results   Component Value Date    WBC 18.5 03/19/2024    HGB 8.7 03/19/2024    HCT 24.5 03/19/2024    .0 03/19/2024    CREATSERUM 4.36 03/19/2024    BUN 56 03/19/2024     03/19/2024    K 3.5 03/19/2024      03/19/2024    CO2 21.0 03/19/2024     03/19/2024    CA 8.3 03/19/2024    ALB 3.1 03/19/2024    ALKPHO 93 03/19/2024    BILT 0.8 03/19/2024    TP 5.3 03/19/2024    AST 26 03/19/2024    ALT <7 03/19/2024    MG 2.2 03/19/2024    PHOS 2.8 03/19/2024       Radiology:  CXR- 1. Cardiomegaly pulmonary venous distention.   2. Support tubes and catheters are satisfactory.  No pneumothorax   3. Mild perihilar and basilar congestive changes worse on the right has progressed.    4. Small right-sided effusion.       Cultures:  Susceptibility data from last 90 days.  Collected Specimen Info Organism Cefazolin Cefepime Ceftriaxone Ciprofloxacin Clindamycin Erythromycin Gentamicin Levofloxacin Meropenem Oxacillin Piperacillin/Tazobactam Trimethoprim/Sulfamethoxazole Vancomycin   03/08/24 Other from Endotracheal aspirate Klebsiella (Enterobacter) aerogenes  R  S  S  S    S  S  S   S  S      Staphylococcus aureus S     S  S  S  S   S   S  S       Assessment and Plan:    1.   Fevers: Multifactorial,   - Sec to HAP vs drop of Hgb and multiple transfusions,   - UA was bland on admission, will repeat,   - Sputum cul with Kleb and MSSA,   - Blood cul are NGTD  - CXR with bilateral Congestion, no obvious consolidation, thick sputum in Etr earlier, now clearing up,   - Lines are clean, Trialysis Catheter exchanged and midline inserted 3/19.  - MRSA Carriage is negative,   - On IV IV Zosyn,  d # 6,     2.    Hx of Acute MI with Cardiogenic Shock: Got intubated post Cath,   - Plan for CABG, Blood cul drawn on 3/11 are NGTD,  - S/P CABG x 3, R Pleural effusion drainage, 3/15/24,   - Followed by Sternal exploration with mediastinal washout for Cardiac tamponade, 3/16/24,     3.    New subacute multiple strokes: Noted Neuro input,   - Likely cardio embolic in the setting of Impella placement and complicated stay  - More awake today, moving L sided of body,     4.     Leukocytosis: worsening,   - Multifactorial, sec to surgery,  tamponade, multiple transfusions, HAP, now multiple cardio embolic stroke,     5.     Disposition: in house, A middle aged male with Acute MI, Cardiogenic shock, with persistent Fevers and then hypothermia, improving,   - Follow Pending Cul,   - WBC trend,   - Continue IV Zosyn pharm to dose,   - Plan for Trach and Peg per critical care team,   - Supportive care,     Discussed with PCP, RN, all questions answered, further recommendations to follow, Thanks,     Thank you for consulting [facility] for Cristiano Obregon.  If you have any questions or concerns please call [facility] Infectious Disease at [phone].     Derick Webb MD  3/14/2024  11:25 AM

## 2024-03-19 NOTE — PROGRESS NOTES
Cardiology Progress Note    Cristiano Moralezmesfin Patient Status:  Inpatient    1970 MRN [id number]   Location [facility] 2W/SW Attending Donna Llanes, DO   Hosp Day # 14 PCP Abilio Dorsey MD     Interval Note:  Patient seen and examined  Arterial line, [place]-Ophelia catheter removed yesterday  Remains intubated, concern for altered mental status delirium versus neurological event-planning CT head today      --------------------------------------------------------------------------------------------------------------------------------  ROS 12 systems reviewed, pertinent findings above.  ROS    History:  History reviewed. No pertinent past medical history.  Past Surgical History:   Procedure Laterality Date    COLONOSCOPY N/A 2017    Procedure: COLONOSCOPY, POSSIBLE BIOPSY, POSSIBLE POLYPECTOMY 26515;  Surgeon: Byron Plaza MD;  Location: [facility]     History reviewed. No pertinent family history.   reports that he has been smoking cigarettes. He has never used smokeless tobacco. He reports that he does not drink alcohol and does not use drugs.    Objective:   Temp: 99.2 °F (37.3 °C)  Pulse: 112  Resp: 17  BP: 148/105  FiO2 (%): 30 %    Intake/Output:     Intake/Output Summary (Last 24 hours) at 3/19/2024 0926  Last data filed at 3/19/2024 0600  Gross per 24 hour   Intake 2100.3 ml   Output 2876 ml   Net -775.7 ml       Physical Exam:    General: Intubated, sedated however responsive and opening eyes  HEENT: Normocephalic, anicteric sclera, neck supple.  Neck: No JVD, carotids 2+, no bruits.  Cardiac: Regular rate and rhythm. S1, S2 normal. No murmur, pericardial rub, S3.  Lungs: Clear without wheezes, rales, rhonchi or dullness.  Normal excursions and effort.  Abdomen: Soft, non-tender. BS-present.  Extremities: Without clubbing, cyanosis or edema.  Peripheral pulses are 2+.  Neurologic: Non-focal  Skin: Warm and dry.       Assessment   Altered mental  status  STEMI, severe multivessel coronary disease status post CABG  Emergent redo sternotomy 3/16/2024 for tamponade  Cardiogenic shock requiring Impella-resolved  Acute renal failure, on HD  Paroxysmal atrial fibrillation  Anemia  Thrombocytopenia  History of tobacco abuse      Plan  - Patient is doing well from cardiac standpoint  - No pressors, inotropes  - Lines are being removed, [place]-Ophelia catheter and A-line removed  since yesterday  - Altered mental status-delirium versus acute neurological event, having CT head done this morning  - Continue aspirin, amiodarone,  metoprolol  - Increase hydralazine as needed  - Temporary dialysis catheter clotted-planning on exchange today, plan per nephrology to start intermittent hemodialysis after extubation otherwise continue CRRT  -Hopefully blood pressure will be better controlled with fluid removal.    Thank you for allowing me to take part in the care of Cristiano Obregon. Please call with any questions of concerns.      Level of care: L4    Zander Cano DO  [facility]   Interventional Cardiac and Vascular Services      Zander Cano DO  March 19, 2024  9:30 AM

## 2024-03-19 NOTE — PROGRESS NOTES
[facility]   INTERNAL MEDICINE PROGRESS NOTE    CHIEF COMPLAINT   Chest Pain Angina    SUBJECTIVE  24 HR EVENTS   More drowsy overnight  Issues with dialysis catheter - crrt paused  Still following commands  Remains intubated    INPATIENT MEDICATIONS  Scheduled Medications:  hydrALAzine, 10 mg, Once  aspirin, 81 mg, Daily  hydrALAZINE, 25 mg, Q8H CHAYITO  amiodarone, 200 mg, TID  sodium chloride, , Once  sugammadex, 2 mg/kg, Once   Or  sugammadex, 4 mg/kg, Once  sodium chloride, , Once  metoclopramide, 5 mg, Q6H  insulin regular human, 1-7 Units, 4 times per day  famotidine, 20 mg, Daily   Or  famotidine, 20 mg, Daily  metoprolol tartrate, 25 mg, 2x Daily(Beta Blocker)  mupirocin, 1 Application, BID  chlorhexidine gluconate, 15 mL, BID  ascorbic acid, 500 mg, TID  heparin, 5,000 Units, 2 times per day  clopidogrel, 75 mg, Daily  piperacillin-tazobactam, 3.375 g, Q8H  mineral oil-white petrolatum, , TID     Drips:   dextrose 10%  NxStage Pure Flow 400 CRRT/PIRRT fluid 5000mL, Last Rate: 2.5 L/hr (03/19/24 0121)  DOBUTamine, Last Rate: 2.5 mcg/kg/min (03/17/24 0700)  norepinephrine, Last Rate: 3 mcg/min (03/17/24 0700)  dexmedetomidine, Last Rate: Stopped (03/18/24 1315)  sodium chloride, Last Rate: 25 mL/hr at 03/15/24 1900  dextrose 10%, Last Rate: 50 mL/hr at 03/16/24 2319  sodium bicarbonate 25mEq in dextrose 5% 1000mL IMPELLA purge solution, Last Rate: 10 mL/hr (03/13/24 1750)  propofol, Last Rate: 50 mcg/kg/min (03/17/24 0519)       PRN Medications:   dextrose 10%, , Continuous PRN  lipase-protease-amylase (Lip-Prot-Amyl), 10,000 Units, PRN   And  sodium bicarbonate, 325 mg, PRN  alteplase (Activase) 4 mg in sterile water for injection (PF) 2.2 mL IV push to declot line, 4 mg, PRN  melatonin, 3 mg, Nightly PRN  polyethylene glycol (PEG 3350), 17 g, Daily PRN  ondansetron, 4 mg, Q6H PRN  DOBUTamine, 2.5-20 mcg/kg/min (Dosing Weight), Continuous PRN  norepinephrine, 0.5-30 mcg/min, Continuous PRN  potassium  chloride, 20 mEq, PRN   Or  potassium chloride, 40 mEq, PRN  magnesium sulfate in dextrose 5%, 1 g, PRN  magnesium sulfate in sterile water for injection, 2 g, PRN  acetaminophen, 650 mg, Q4H PRN   Or  HYDROcodone-acetaminophen, 1 tablet, Q4H PRN  glucose, 15 g, Q15 Min PRN   Or  glucose, 15 g, Q15 Min PRN   Or  glucose-vitamin C, 4 tablet, Q15 Min PRN   Or  dextrose, 50 mL, Q15 Min PRN   Or  glucose, 30 g, Q15 Min PRN   Or  glucose, 30 g, Q15 Min PRN   Or  glucose-vitamin C, 8 tablet, Q15 Min PRN  diazepam, 2.5 mg, Q4H PRN  dextrose 10%, , Continuous PRN  heparin, 1.5 mL, PRN  acetaminophen, 650 mg, Q4H PRN   Or  acetaminophen, 650 mg, Q4H PRN   Or  acetaminophen, 650 mg, Q4H PRN   Or  acetaminophen, 1,000 mg, Q6H PRN  senna, 10 mL, Nightly PRN  bisacodyl, 10 mg, Daily PRN  fleet enema, 1 enema, Once PRN       PHYSICAL EXAMINATION  Vitals: BP (!) 148/105   Pulse 112   Temp 99.2 °F (37.3 °C) (Axillary)   Resp 17   Ht 5' 7\" (1.702 m)   Wt 185 lb 10 oz (84.2 kg)   SpO2 100%   BMI 29.07 kg/m²   Gen: NAD, intubated  Eyes: PERRLA, normal conjunctivae  ENMT: Dry mucous membranes, trachea midline  CV: RRR, no peripheral edema  Resp: MBS bilat, non-labored respirations, symmetric expansion  GI: Soft, NT, ND, no rebound, no guarding  MSK:  No C/C/E, normal active/passive ROM in C-spine  Skin: No rashes  Neuro: Follows commands, w/d all 4 extrem  Psych: Alert, intubated, some higher level processing present    LABORATORY VALUES   Recent Labs   Lab 03/13/24  0504 03/13/24  1744 03/14/24  0408 03/14/24  1812 03/15/24  1928 03/16/24  0536 03/16/24  1712 03/17/24  0553 03/17/24  1759 03/18/24  0620 03/18/24  1621 03/19/24  0541     138   < > 136  136  136   < > 138 139   < > 134* 138 137 137 137   K 4.5  4.5   < > 3.7  3.7  3.7   < > 4.6 4.7   < > 4.4 3.3* 3.6 3.6 3.5     105   < > 104  104  104   < > 107 107   < > 103 105 104 104 105   CO2 26.0  26.0   < > 26.0  26.0  26.0   < > 23.0 23.0   < >  23.0 22.0 23.0 23.0 21.0   BUN 34*  34*   < > 36*  36*  36*   < > 43* 42*   < > 46* 53* 62* 51* 56*   CREATSERUM 3.03*  3.03*   < > 3.18*  3.18*  3.18*   < > 3.53* 3.49*   < > 3.44* 3.71* 4.16* 3.69* 4.36*   ANIONGAP 7  7   < > 6  6  6   < > 8 9   < > 8 11 10 10 11   *  127*   < > 136*  136*  136*   < > 167* 155*   < > 187* 157* 163* 160* 145*   CA 8.6*  8.6*   < > 8.6*  8.6*  8.6*   < > 7.9* 7.4*   < > 7.7* 7.9* 7.9* 8.3* 8.3*   PHOS 3.3   < > 2.9   < > 2.9 5.6*  5.6*   < > 4.7 3.0 2.7 2.4 2.8   TP 5.5*  --  6.5  6.5  --   --   --   --  5.0*  --  5.0*  --  5.3*   ALB 2.9*  2.9*   < > 2.9*  2.9*  2.9*   < > 3.0* 2.9*  --  3.0*  --  2.9*  --  3.1*   AST 89*  --  68*  68*  --   --   --   --  26  --  28  --  26   ALT 69*  --  57*  57*  --   --   --   --  <7*  --  <7*  --  <7*   ALKPHO 134*  --  137*  137*  --   --   --   --  59  --  73  --  93   BILT 0.5  --  0.5  0.5  --   --   --   --  0.3  --  0.5  --  0.8   EGFRCR 24*  24*   < > 22*  22*  22*   < > 20* 20*   < > 20* 18* 16* 19* 15*    < > = values in this interval not displayed.     Recent Labs   Lab 03/13/24  0504 03/13/24  1452 03/14/24  0408 03/15/24  0423 03/15/24  1256 03/17/24  0553 03/17/24  1352 03/17/24  1759 03/18/24  0627 03/18/24  1621 03/19/24  0105 03/19/24  0541   WBC 9.0 9.2 10.4 9.3   < > 10.3 10.0 13.0* 14.1* 16.7* 15.1*  --    HGB 7.9* 8.9* 7.8* 7.7*   < > 9.0* 9.4* 9.2* 8.5* 8.7* 7.8* 8.8*   HCT 23.4* 25.4* 23.6* 23.5*   < > 26.3* 28.0* 27.4* 25.1* 25.2* 23.1* 26.2*   PLT 79.0* 79.0* 90.0* 119.0*   < > 131.0* 121.0* 137.0* 147.0* 105.0* 128.0*  --    MCV 88.0 88.8 89.4 89.0   < > 85.4 84.8 84.3 85.7 85.1 85.2  --    MOPERCENT 14.6 15.0 11.6 12.0  --  10.2  --   --   --   --   --   --    EOPERCENT 6.2 6.8 5.8 7.1  --  0.3  --   --   --   --   --   --    BAPERCENT 0.3 0.2 0.1 0.1  --  0.3  --   --   --   --   --   --     < > = values in this interval not displayed.     ASSESSMENT & PLAN  Cristiano Obregon - [place]  year old male with hyperlipidemia, tobacco use, presented 3/5/2024 with chest pain. Found to have STEMI with cardiogenic shock requiring Impella placement.  Continuing ventilation support with CRRT.  S/p CABG on 3/15.      STEMI c/b cardiogenic shock w multi-system organ failure   S/p CABG 3/15  Post-op cardiac tamponade s/p washout 3/16  Paroxysmal/post-op a fib  HTN  HLD  - Multivessel disease including chronically occluded large RCA, 95% sequential LAD stenosis on cath 3/5. Flow restored to heavily thrombosed circumflex and OM1, Impella placed (now removed). 3/6 s/p emergent RHC to eval hemodynamics. Worsening renal function noted.   - Cardiology consulted: angiograms as above, med mgmt  - CCM consulted: vent management, sedation  - CV surgery consulted: s/p CABG on 3/15  - cardiac tamponade developed 3/15, s/p mediastinal washout   - amio gtt to po/IV, dobutamine gtt, levophed stopped  - PO amiodarone 200 mg TID  - repeat echo w preserved EF despite hypokinesis    Acute hypoxemic respiratory failure  - intubated in cath lab (3/5 -  )  - vent management per crit care colleagues  - Daily SBT, extubated when able  [  ] Intubated > 14d. If unable to extubate in near future, will need to discuss trach    Anuric acute kidney failure - suspect ATN from shock  Metabolic acidosis  Hyponatremia  Hyperkalemia  - Nephrology consutled  - CRRT initiated 3/7. Catheter exchanged 3/19  - resume CRRT, post CABG now, s/p 20 units of blood products in OR  - hep gtt for HD cath clotting per nephro    Sepsis 2/2 RML bacterial pneumonia - Persistent fevers 3/10 - 3/15, HCAP  - Sputum with Klebsiella and staph  - Blood cx neg  - ID consulted  - Cefepime (3/10-3/14) -> Changed to zosyn per ID (3/14 -  )  - fluconazole added per ID    Subacute L frontal & occiptal lobe infarcts  - presume cardioembolic  - CTH w subacute L frontal & occipital lobe infarcts  - 3/5 RxFx eval: A1c - 6%, FLP - LDL 64, tele  - TTE noted  - Neuro consulted,  message sent to Dr. Suggs  - ASA 81 & plavix   - PT/OT/SLP when able  [  ] presume will need full dose AC when okay w CVS & neuro    Hematuria, resolved  -yellow urine noted, minimal, but not bloody     Acute blood loss anemia  Thrombocytopenia  - required 20u blood products during CABG  - trend CBC    Tobacco use disorder  - Cessation    Elevated liver enzymes  - Likely 2/2 to shock. Hep b neg   - Trend CMP    Nutrition  - 180lbs on admission  - TPN while unable to take PO    ACP: Full Code. Linnea contact:  Reyes  Ppx: DVT: SQ   Dispo  EDoD:  ~ 3/25. Suspect will need placement   F/u:   - PCP:  Abilio Dorsey MD  - Cardiology  - CV surgery  - Nephrology  - Neurology    Available via bubble chat or perfect serve 7A - 7P.    Remains critically ill with multi-system organ failure - AMS/TME - new stroke, resp failure, renal failure, liver injury.  40 minutes of critical care time 03/19/24. Time spent on this encounter was for management of multi-system organ failure - AMS/TME - new stroke, resp failure, renal failure, liver injury.. Patient w/ multi-organ dysfunction & high risk for further decline.      Carlo Vaughn MD   Internal Medicine - [place] [facility]  [facility]

## 2024-03-19 NOTE — CONSULTS
[facility]  [address] [place] [address], [address]  [facility] [address]  [phone]            Cristiano Obregon Patient Status:  Inpatient    1970 MRN [id number]   Location [facility] 2W/SW Attending Carlo Vaughn MD   Hosp Day # 14 PCP Abilio Dorsey MD     Date of Admission:  3/5/2024  Date of Consult:  3/19/2024  Reason for Consultation:   Poor responsiveness, abnormal CT of the head.    History of Present Illness:   Patient is a 53 year old male who was admitted to the hospital for NSTEMI (non-ST elevated myocardial infarction) (HCC):  Patient with complicated past medical history, hyperlipidemia, tobacco use, strong family history of coronary disease.  Presented in 2024 with chest pain, and was found to have STEMI with cardiogenic shock requiring Impella placement.    Continued ventilation support with CRRT for the time he was anticoagulated due to Impella placement.    Status post CABG on 15 March.  Postop cardiac tamponade status post washout on 3/16.  Paroxysmal and postoperative fibrillation.    History of hypertension, hyperlipidemia.  Off sedation he was still drowsy and therefore CT of the head was done and found subacute appearing left frontal and left occipital lobe infarcts.    At that point neurology was consulted.    Patient also was noted to have clonus in both feet prior to that.    Past Medical History  History reviewed. No pertinent past medical history.    Past Surgical History  Past Surgical History:   Procedure Laterality Date    COLONOSCOPY N/A 2017    Procedure: COLONOSCOPY, POSSIBLE BIOPSY, POSSIBLE POLYPECTOMY 19573;  Surgeon: Byron Plaza MD;  Location: [facility]       Family History  History reviewed. No pertinent family history.    Social History  Pediatric History   Patient Parents    Not on file     Other Topics Concern    Not on file   Social History Narrative    Not on file           Current  Medications:  Current Facility-Administered Medications   Medication Dose Route Frequency    hydrALAzine (Apresoline) 20 mg/mL injection 10 mg  10 mg Intravenous Once    senna (Senokot) 8.8 MG/5ML oral syrup 8.8 mg  5 mL Oral BID    docusate (Colace) 50 MG/5ML oral solution 100 mg  100 mg Oral BID    heparin sodium lock flush 100 UNIT/ML injection 150 Units  1.5 mL Intravenous Once    acetaminophen (Tylenol) tab 650 mg  650 mg Oral Q4H PRN    Or    acetaminophen (Tylenol) rectal suppository 650 mg  650 mg Rectal Q4H PRN    labetalol (Trandate) 5 mg/mL injection 10 mg  10 mg Intravenous Q10 Min PRN    hydrALAzine (Apresoline) 20 mg/mL injection 10 mg  10 mg Intravenous Q2H PRN    ondansetron (Zofran) 4 MG/2ML injection 4 mg  4 mg Intravenous Q6H PRN    prochlorperazine (Compazine) 10 MG/2ML injection 5 mg  5 mg Intravenous Q8H PRN    [START ON 3/20/2024] clopidogrel (Plavix) tab 75 mg  75 mg Oral Daily    aspirin chewable tab 81 mg  81 mg Oral Daily    atorvastatin (Lipitor) tab 40 mg  40 mg Oral Nightly    aspirin chewable tab 81 mg  81 mg Oral Daily    dextrose 10% infusion (TPN no rate)   Intravenous Continuous PRN    pancrelipase (Lip-Prot-Amyl) (Zenpep) DR particles cap 10,000 Units  10,000 Units Per G Tube PRN    And    sodium bicarbonate tab 325 mg  325 mg Oral PRN    hydrALAZINE (Apresoline) tab 25 mg  25 mg Oral Q8H CHAYITO    amiodarone (Pacerone) tab 200 mg  200 mg Oral TID    NxStage Pure Flow 400 CRRT/PIRRT fluid 5000mL  2.5 L/hr CRRT Continuous    alteplase (Activase) 4 mg in sterile water for injection (PF) 2.2 mL IV push to declot line  4 mg Intravenous PRN    sugammadex (Bridion) 200 MG/2ML injection 170 mg  2 mg/kg Intravenous Once    Or    sugammadex (Bridion) 200 MG/2ML injection 340 mg  4 mg/kg Intravenous Once    metoclopramide (Reglan) 5 mg/mL injection 5 mg  5 mg Intravenous Q6H    insulin regular human (Novolin R, Humulin R) 100 UNIT/ML injection 1-7 Units  1-7 Units Subcutaneous 4 times per day     melatonin tab 3 mg  3 mg Oral Nightly PRN    polyethylene glycol (PEG 3350) (Miralax) 17 g oral packet 17 g  17 g Oral Daily PRN    ondansetron (Zofran) 4 MG/2ML injection 4 mg  4 mg Intravenous Q6H PRN    famotidine (Pepcid) tab 20 mg  20 mg Oral Daily    Or    famotidine (Pepcid) 20 mg/2mL injection 20 mg  20 mg Intravenous Daily    DOBUTamine in dextrose 5% (Dobutrex) 500 mg/250mL infusion premix  2.5-20 mcg/kg/min (Dosing Weight) Intravenous Continuous PRN    metoprolol tartrate (Lopressor) tab 25 mg  25 mg Oral 2x Daily(Beta Blocker)    potassium chloride 20 mEq/100mL IVPB premix 20 mEq  20 mEq Intravenous PRN    Or    potassium chloride 40 mEq/100mL IVPB premix (central line) 40 mEq  40 mEq Intravenous PRN    magnesium sulfate in dextrose 5% 1 g/100mL infusion premix 1 g  1 g Intravenous PRN    magnesium sulfate in sterile water for injection 2 g/50mL IVPB premix 2 g  2 g Intravenous PRN    mupirocin (Bactroban) 2% nasal ointment 1 Application  1 Application Nasal BID    chlorhexidine gluconate (Peridex) 0.12 % oral solution 15 mL  15 mL Mouth/Throat BID    ascorbic acid (Vitamin C) tab 500 mg  500 mg Oral TID    heparin (Porcine) 5000 UNIT/ML injection 5,000 Units  5,000 Units Subcutaneous 2 times per day    acetaminophen (Tylenol) tab 650 mg  650 mg Oral Q4H PRN    Or    HYDROcodone-acetaminophen (Norco) 5-325 MG per tab 1 tablet  1 tablet Oral Q4H PRN    dexmedeTOMIDine in sodium chloride 0.9% (Precedex) 400 mcg/100mL infusion premix  0.2-1.5 mcg/kg/hr (Dosing Weight) Intravenous Continuous    clopidogrel (Plavix) tab 75 mg  75 mg Oral Daily    glucose (Dex4) 15 GM/59ML oral liquid 15 g  15 g Oral Q15 Min PRN    Or    glucose (Glutose) 40% oral gel 15 g  15 g Oral Q15 Min PRN    Or    glucose-vitamin C (Dex-4) chewable tab 4 tablet  4 tablet Oral Q15 Min PRN    Or    dextrose 50% injection 50 mL  50 mL Intravenous Q15 Min PRN    Or    glucose (Dex4) 15 GM/59ML oral liquid 30 g  30 g Oral Q15 Min PRN     Or    glucose (Glutose) 40% oral gel 30 g  30 g Oral Q15 Min PRN    Or    glucose-vitamin C (Dex-4) chewable tab 8 tablet  8 tablet Oral Q15 Min PRN    piperacillin-tazobactam (Zosyn) 3.375 g in dextrose 5% 100 mL IVPB-ADDV  3.375 g Intravenous Q8H    mineral oil-white petrolatum (Artificial Tears) 83-15 % ophthalmic ointment   Both Eyes TID    diazepam (Valium) 5 mg/mL injection 2.5 mg  2.5 mg Intravenous Q4H PRN    heparin (Porcine) 1000 UNIT/ML injection 1,500 Units  1.5 mL Intracatheter PRN    acetaminophen (Tylenol) tab 650 mg  650 mg Oral Q4H PRN    Or    acetaminophen (Tylenol) 160 MG/5ML oral liquid 650 mg  650 mg Oral Q4H PRN    Or    acetaminophen (Tylenol) rectal suppository 650 mg  650 mg Rectal Q4H PRN    Or    acetaminophen (Ofirmev) 10 mg/mL infusion premix 1,000 mg  1,000 mg Intravenous Q6H PRN    senna (Senokot) 8.8 MG/5ML oral syrup 17.6 mg  10 mL Oral Nightly PRN    bisacodyl (Dulcolax) 10 MG rectal suppository 10 mg  10 mg Rectal Daily PRN    fleet enema (Fleet) 7-19 GM/118ML rectal enema 133 mL  1 enema Rectal Once PRN     Medications Prior to Admission   Medication Sig    ATORVASTATIN 20 MG Oral Tab TAKE 1 TABLET BY MOUTH EVERY DAY    FLUOCINONIDE 0.05 % External Cream APPLY SPARINGLY TO AFFECTED AREA TWICE DAILY    TRETINOIN 0.01 % External Gel APPLY 1 APPLICATION TOPICALLY NIGHTLY.    omega-3 fatty acids 1000 MG Oral Cap Take 1,000 mg by mouth daily.    Melatonin 10-10 MG Oral Tab CR Take by mouth.    Multiple Vitamins-Minerals (CENTRUM) Oral Tab Take 1 tablet by mouth daily.    Multiple Vitamin (MULTIVITAMIN TAB/CAP) Take 1 tablet by mouth daily.       Allergies  No Known Allergies    Review of Systems:   As in HPI, the rest of the 14 system review was done and was negative    Physical Exam:     Vitals:    03/19/24 1030 03/19/24 1045 03/19/24 1100 03/19/24 1130   BP:   (!) 138/95    Pulse: 105 106 105 108   Resp: 23 20 21 (!) 37   Temp:       TempSrc:       SpO2: 99% 99% 99% 98%   Weight:        Height:           General: No apparent distress, well nourished, well groomed.  Head- Normocephalic, atraumatic  Eyes- No redness or swelling  ENT- Hearing intake, smell preserved, normal glutition  Neck- No masses or adenopathy  Cv: pulses were palpable and normal, no cyanosis or edema     Neurological:     Mental Status- Alert intubated, follows commands with squeezing the left hand.    Cranial Nerves:  Tracks with his eyes, possibly inattention on the right side.    VII. Face symmetric, no facial weakness    To the command he squeezed his left hand.  Clonus was sustained in both feet    No withdrawal to pain in the feet    Results:     Laboratory Data:  Lab Results   Component Value Date    WBC 18.5 (H) 03/19/2024    HGB 8.7 (L) 03/19/2024    HCT 24.5 (L) 03/19/2024    .0 03/19/2024    CREATSERUM 4.36 (H) 03/19/2024    BUN 56 (H) 03/19/2024     03/19/2024    K 3.5 03/19/2024     03/19/2024    CO2 21.0 03/19/2024     (H) 03/19/2024    CA 8.3 (L) 03/19/2024    ALB 3.1 (L) 03/19/2024    ALKPHO 93 03/19/2024    TP 5.3 (L) 03/19/2024    AST 26 03/19/2024    ALT <7 (L) 03/19/2024    PTT 33.1 03/16/2024    INR 1.24 (H) 03/16/2024    PTP 16.4 (H) 03/16/2024    PSA 1.95 10/06/2021    DDIMER 13.83 (H) 03/15/2024    MG 2.2 03/19/2024    PHOS 2.8 03/19/2024         Imaging:    XR CHEST AP PORTABLE  (CPT=71045)    Result Date: 3/19/2024  CONCLUSION:  1. Cardiomegaly.  Pulmonary venous distention.  CABG 2. Bilateral perihilar and lower lobe pulmonary congestive changes with improved aeration left basilar retrocardiac region .  Support tubes and catheters are satisfactory.  No pneumothorax 3. Metallic tip enteric tube within the body of the stomach alongside the indwelling nasogastric tube    Dictated by (CST): Wily Stephenson MD on 3/19/2024 at 11:41 AM     Finalized by (CST): Wily Stephenson MD on 3/19/2024 at 11:46 AM          XR CHEST AP PORTABLE  (CPT=71045)    Result Date:  3/19/2024  CONCLUSION:  1. Right-sided hemodialysis catheter with tip projecting near the cavoatrial junction.  2. Postthoracotomy chest with cardiomegaly and pulmonary vascular congestion.  3. Bibasilar opacities, which may reflect some combination of pleural effusions, atelectasis, and/or superimposed pneumonia.   4. Additional support tubes and lines as above.   Dictated by (CST): Zachery Dean MD on 3/19/2024 at 10:57 AM     Finalized by (CST): Zachery Dean MD on 3/19/2024 at 11:01 AM          CT BRAIN OR HEAD (31225)    Result Date: 3/19/2024  CONCLUSION:   Subacute appearing left frontal and left occipital lobe infarcts.  Brain MRI can be obtained to help stage chronicity.  Pansinusitis  Partial opacification of the bilateral mastoid air cells may be sequela of inflammation. No osseous destruction or bony erosions.   Dictated by (CST): Henri Melgoza MD on 3/19/2024 at 9:46 AM     Finalized by (CST): Henri Melgoza MD on 3/19/2024 at 9:48 AM                 Impression:       Patient with what appears to be subacute strokes.  Most likely cardioembolic in the setting of Impella placement and complicated stay.  No evidence of atrial fibrillation at this point.  Continue aspirin Plavix.  Not a candidate for tenecteplase being outside the window as well as no intervention.    MRI of the brain and MRI of the head will be done.  MRI of the cervical and thoracic spine will be done as well to assess for the possibility of spinal cord involvement since there is bilateral clonus noted.    Thank you for allowing me to participate in the care of your patient.    Arvind Suggs MD  3/19/2024          35+ minutes critical care time spent reviewing record, evaluating patient, speaking with family, discussion w/ colleagues / coordination of studies, documenting in the chart, while at bedside or immediately available.  Patient is critically ill.

## 2024-03-19 NOTE — PROGRESS NOTES
[facility]  part of [facility]    Progress Note    Cristiano Obregon Patient Status:  Inpatient    1970 MRN [id number]   Location [facility] 2W/SW Attending Carlo Vaughn MD   Hosp Day # 14 PCP Abilio Dorsey MD     Subjective:  Pt intubated: currently on 30% full vent support. His , Reyes, is at bedside. Pt able to opens eyes to verbal commands but more drowsy today.     Objective:  BP (!) 148/105   Pulse 112   Temp 99.2 °F (37.3 °C) (Axillary)   Resp 17   Ht 5' 7\" (1.702 m)   Wt 185 lb 10 oz (84.2 kg)   SpO2 100%   BMI 29.07 kg/m²     Temp (24hrs), Av.7 °F (37.1 °C), Min:98.1 °F (36.7 °C), Max:99.2 °F (37.3 °C)      Intake/Output:    Intake/Output Summary (Last 24 hours) at 3/19/2024 0942  Last data filed at 3/19/2024 0600  Gross per 24 hour   Intake 2100.3 ml   Output 2876 ml   Net -775.7 ml       Wt Readings from Last 3 Encounters:   24 185 lb 10 oz (84.2 kg)   10/06/21 177 lb 6.4 oz (80.5 kg)   20 172 lb 9.6 oz (78.3 kg)       Allergies:  No Known Allergies    Labs:  Lab Results   Component Value Date    WBC 15.1 2024    HGB 8.8 2024    HCT 26.2 2024    .0 2024    CREATSERUM 4.36 2024    BUN 56 2024     2024    K 3.5 2024     2024    CO2 21.0 2024     2024    CA 8.3 2024    ALB 3.1 2024    ALKPHO 93 2024    BILT 0.8 2024    TP 5.3 2024    AST 26 2024    ALT <7 2024    MG 2.2 2024    PHOS 2.8 2024       Physical Exam:  Blood pressure (!) 148/105, pulse 112, temperature 99.2 °F (37.3 °C), temperature source Axillary, resp. rate 17, height 5' 7\" (1.702 m), weight 185 lb 10 oz (84.2 kg), SpO2 100%.  General: NAD  Neck: No JVD  Lungs: Clear bilaterally anteriorly/laterally   Haile drain=50cc/12 hours  Pleural chest tubes=275cc/12 hours- no air leak noted  Heart: RRR, S1, S2  Abdomen: slightly  distended/Round/ NT/ BS+x4/no BM   Extremities: Warm, dry, 2+ UE & LE edema bilat  Pulses: 2+ bilat DP  Skin: sternotomy incision drsg: CDI w/TPW intact/Left leg ACE wrap removed/drsgs: CDI w/VIDAL drain=2cc/12 hours  Neurological:  opens eyes slowly to verbal stimuli/more drowsy today    Assessment/Plan:  S/P CABG x 3 POD # 4  S/P RE-OP MEDIASTINAL EXPLORATION/WASHOUT on 3/16  Respiratory Failure- intubated. Weaning as able-SBT w/mgt per Pulm-  extubated when appropriate. CXR pending  Pre op Impella removed post op on 3/16 per Cards. Pt hemodynamically stable. [place]/Ophelia removed yesterday. Plan to remove cordis today. Midline cath placed yesterday.  Re op mediastinal exploration/washout due to pericardial tamponade. Limited echo 3/16 post exploration w/EF=45-50%/no residual effusion.   Continue ICU status  Pain meds as needed: avoid narcotics to assess neuro status  Altered MS: more drowsy today-CT head  Plan to increase activity when appropriate  Scds/Heparin Sub Q prophylaxis DVT prevention   Pre op NORMA/ATN/CRRT w/mgt per Nephrology. Expected post op volume overload. Pt currently on CRRT w/mgt per Nephrology. Plan for temp catheter insertion per IR today. [place] remains for close I/O monitoring and immobility  Expected post op anemia w/o clinical significance/stable- transfuse one unit PRBCs today  Nutrition per TF via OGT- Dobhoff placed this AM; CXR pending  Constipation/mild abdominal distension. Add Colace/Senna/continue Reglan. Consider Abd X-ray if persists.   ID consulted on 3/14: following recs. Continues on ABX. + sputum cx  No hx: DM- continue correction scale coverage and glucose checks.   Discharge planning: pt lives w/his  and has supportive family. Anticipate rehab. Continue to eval as pt progresses. Will ask PT/OT to eval when appropriate. /SW involved w/planning    Plan of care discussed w/patient//RN and CV Surgeon: Dr. Shraddha Adams, APRN  3/19/2024  9:42 AM

## 2024-03-19 NOTE — PROGRESS NOTES
CT brain notable for cardioembolic strokes. MRI brain pending. Patient is severely deconditioned with waxing and waning mental status and poor performance during SBTs. He will need a tracheostomy and PEG. Discussed with his partner and they are agreeable with proceeding. Discussed with CT surgery team. They will consult Dr. Moser and I consulted Dr. Morton. Additional critical care time: 30 minutes

## 2024-03-19 NOTE — RESPIRATORY THERAPY NOTE
SBT Safety Screen: Performed by R.T.  No myocardial ischemia in past 24 hours  Spontaneous respiratory effort present  FiO2: 30   / PEEP: 5  Off or decreasing vasopressors/inotrope doses  Stable hemodynamics, BP  (134/75)  HR    HR = 103  MAP > 60      SpO2 > 90%   Saturation = 99    No new arrhythmias    SBT:   Start time:  1105  Settings:  Pressure Support: 5    CPAP: 5    FiO2:  30%    Weaning Parameters:  RR: 32-35  RSBI: 99  NIF: -17  VT: 310  VC: .440L      Returned to Full support: (Time:1130)  MD notified: (Physician: Dr Saini)  Current Ventilator Settings:     03/19/24 1139   Vent Information   Vent Mode VC/AC   Settings   FiO2 (%) 30 %   Resp Rate (Set) 12   Vt (Set, mL) (S)  530 mL  (per abg)   Waveform Decelerating ramp   PEEP/CPAP (cm H2O) 5 cm H20     Vt decreased from 650 to 550 per Dr Saini. Repeat abg showed persistent hyperventilation. Vt decreased to 530mls, d/w Dr Saini. BS auscultated b/l, sx provided as needed. Pt transported to & from CT via transport vent w/o complications. ETT remains secured at 22cm at the lip. RT to continue to monitor.

## 2024-03-19 NOTE — PROGRESS NOTES
The TPA was removed from both HD lines during Bedside Shift Report (BSSR) and replaced with NS. Awaiting [facility]'s arrival to resume CRRT treatment, ETA was 2000.

## 2024-03-19 NOTE — CM/SW NOTE
CM sent orders to LTAC facilities as patient will be getting a trach and PEG.  Remains on vent support.    PLAN:  Trach, PEG and then transfer to LTAC to continue vent weaning.  Patient has been on vent support for 14 days.    / to remain available for support and/or discharge planning.      Mary Jane Infante MBA BSN RN CRRN   RN Case Manager  [phone]

## 2024-03-19 NOTE — PROGRESS NOTES
Critical Care Progress Note     Assessment / Plan:  Acute respiratory failure - due to pulmonary edema. Now with lethargy and is severely deconditioned. CT brain with stroke. Intubated 3/5  - continue full vent support  - daily SBT  - minimize sedation as able  - will need tracheostomy; will discuss consulting ENT with CT surgery  NSTEMI - diagnosed with new multivessel CAD s/p angioplasty and cardiogenic shock requiring Impella placement. S/p 3vCABG 3/15. Impella removed 3/16. Back to OR 3/16 AM for tamponade s/p pericardial clot removal  - cardiology and cardiac surgery following  Cardiogenic shock - due to acute coronary syndrome, EF was down and has now improved on repeat echo  - off vasopressors  Afib  - rate controlled  - per cardiology  Pneumonia - sputum cultures with Klebsiella and MSSA  - Zosyn and fluconazole per ID  Acute renal failure - suspect from ATN and cardiogenic shock  - CRRT per nephrology  Stroke  - neurology to see  Thrombocytopenia, anemia  - monitor  Smoker  - cessation counseling once appropriate  FEN  - TFs  Proph  - heparin gtt  - famotidine  Dispo  - full code  - ICU    Critical care time: 50 minutes      Subjective:  More somnolent - CT brain with stroke    Objective:  Vitals:    03/19/24 1030 03/19/24 1045 03/19/24 1100 03/19/24 1130   BP:   (!) 138/95    BP Location:   Radial    Pulse: 105 106 105 108   Resp: 23 20 21 (!) 37   Temp:       TempSrc:       SpO2: 99% 99% 99% 98%   Weight:       Height:         Physical Exam:  General: intubated  Skin: no rash, ulcers or subcutaneous nodules  Eyes: anicteric sclerae, moist conjunctivae  Head, ears, nose, throat: atraumatic, oropharynx clear with moist mucous membranes  Neck: trachea midline with no thyromegaly  Heart: regular rate and rhythm, no murmurs / rubs / gallops  Lungs: clear bilaterally  Abdomen: soft, nontender, nondistended   Extremities: +LE edema  Psych: opens eyes to voice, does not follow commands    Medications:  Reviewed  in EMR    Lab Data:  Reviewed in EMR    Imaging:  I independently visualized all relevant chest imaging in PACS and agree with radiology interpretation except where noted.

## 2024-03-20 PROBLEM — D64.9 ANEMIA: Status: ACTIVE | Noted: 2024-03-20

## 2024-03-20 PROBLEM — D64.9 ANEMIA: Status: ACTIVE | Noted: 2024-01-01

## 2024-03-20 NOTE — PROGRESS NOTES
Pt received intubated,suctioned as needed,saturating appropriately.   03/19/24 1955   Vent Information   Interface Invasive   Vent Type AV   Vent plugged into main power? Yes   Vent ID    Vent Mode VC/AC   Settings   FiO2 (%) 30 %   Resp Rate (Set) 12   Vt (Set, mL) 530 mL   Waveform Decelerating ramp   PEEP/CPAP (cm H2O) 5 cm H20   PEEP Low (cm H2O) 2 cm H2O   Peak Flow LPM 60   Trigger Sensitivity Flow (L/min) 3 L/min   Humidification Heater   H2O Bag Level 1/2 Full   Heater Temperature 98.6 °F (37 °C)   Readings   Total RR 26   Minute Ventilation (L/min) 13.7 L/min   Expiratory Tidal Volume 520 mL   PIP Observed (cm H2O) 22 cm H2O   MAP (cm H2O) 11

## 2024-03-20 NOTE — RESPIRATORY THERAPY NOTE
Pt received last night at 2300 on VC/AC ventilation, rate 12, Vt 530, PEEP +5, FiO2 30% tolerating well. ETT 7.0, 22 at the lips. Bilat BS auscultated, pt suctioned as needed with small amounts of thick white secretions overnight. No changes were made to the vent overnight. RT will continue to monitor.

## 2024-03-20 NOTE — PROGRESS NOTES
[facility]  Gastroenterology Progress Note    Cristiano Obregon Patient Status:  Inpatient    1970 MRN [id number]   Location [facility] 2W/SW Attending Carlo Vaughn MD   Hosp Day # 15 PCP Abilio Dorsey MD     Subjective:  Cristiano Obregon is a(n) 53 year old male.    Current complaints: tolerating tube feeding per nursing     Objective:  Blood pressure 121/77, pulse 86, temperature 98.3 °F (36.8 °C), temperature source Temporal, resp. rate 19, height 5' 7\" (1.702 m), weight 185 lb 3 oz (84 kg), SpO2 100%.    GENERAL: intubated, not on sedation, not responding currently  CHEST/CARDIO: sternotomy bandage midline, mediastinal drains in epigastric region  LUNGS: mechanical breath sounds  GI: good BS's, soft, non-tender    Labs:   Recent Labs   Lab 03/15/24  1521 03/15/24  1605 03/15/24  1928 24  0536 24  1443 24  0553 24  1352 24  0620 24  0627 24  1621 24  0105 24  0541 24  1026 24  1709 24  0126 24  0444 24  0852   WBC  --  11.0  --  8.5  --  10.3   < >  --    < > 16.7*   < >  --    < > 16.8* 17.4*  --  15.6*   HGB  --  10.2*  --  7.4*   < > 9.0*   < >  --    < > 8.7*   < > 8.8*   < > 8.4* 8.0*  --  7.7*   HCT  --  29.3*  --  21.4*   < > 26.3*   < >  --    < > 25.2*   < > 26.2*   < > 24.1* 24.1*  --  23.1*   .0 178.0  --  149.0*  --  131.0*   < >  --    < > 105.0*   < >  --    < > 141.0* 146.0*  --  150.0   CREATSERUM  --  3.70*  3.70*   < > 3.49*   < > 3.44*   < > 4.16*  --  3.69*  --  4.36*  --   --   --  4.87*  --    BUN  --  43*  43*   < > 42*   < > 46*   < > 62*  --  51*  --  56*  --   --   --  65*  --    NA  --  137  137   < > 139   < > 134*   < > 137  --  137  --  137  --   --   --  136  --    K  --  5.1  5.1   < > 4.7   < > 4.4   < > 3.6  --  3.6  --  3.5  --   --   --  3.7  --    CL  --  106  106   < > 107   < > 103   < > 104  --  104  --  105  --   --   --  102  --    CO2  --   23.0  23.0   < > 23.0   < > 23.0   < > 23.0  --  23.0  --  21.0  --   --   --  23.0  --    GLU  --  152*  152*   < > 155*   < > 187*   < > 163*  --  160*  --  145*  --   --   --  151*  --    CA  --  8.0*  8.0*   < > 7.4*   < > 7.7*   < > 7.9*  --  8.3*  --  8.3*  --   --   --  8.2*  --    ALB  --  2.8*   < > 2.9*  --  3.0*  --  2.9*  --   --   --  3.1*  --   --   --  3.1*  --    ALKPHO  --   --   --   --   --  59  --  73  --   --   --  93  --   --   --   --   --    BILT  --   --   --   --   --  0.3  --  0.5  --   --   --  0.8  --   --   --   --   --    TP  --   --   --   --   --  5.0*  --  5.0*  --   --   --  5.3*  --   --   --   --   --    AST  --   --   --   --   --  26  --  28  --   --   --  26  --   --   --   --   --    ALT  --   --   --   --   --  <7*  --  <7*  --   --   --  <7*  --   --   --   --   --    PTT 32.1 104.5*  --  33.1  --   --   --   --   --   --   --   --   --   --   --   --   --    INR 1.31* 1.37*  --  1.24*  --   --   --   --   --   --   --   --   --   --   --   --   --    PTP 17.1* 17.7*  --  16.4*  --   --   --   --   --   --   --   --   --   --   --   --   --    MG  --  2.1   < > 1.9   < > 1.9   < > 2.3  --  2.2  --  2.2  --  2.3  --  2.3  --    PHOS  --  3.4  3.4   < > 5.6*  5.6*   < > 4.7   < > 2.7  --  2.4  --  2.8  --  3.0  --  3.6  3.6  --     < > = values in this interval not displayed.       Recent Labs   Lab 03/13/24  1452 03/15/24  1346 03/15/24  1521   DDIMER >20.00* 16.34* 13.83*        Imaging:  IR NON-TUNNELED CATHETER    Result Date: 3/19/2024  CONCLUSION:  Exchange of existing non-tunneled central venous catheter via internal jugular vein at bedside.  Ready for use.    Dictated by (CST): Carlitos Lemon MD on 3/19/2024 at 6:09 PM     Finalized by (CST): Carlitos Lemon MD on 3/19/2024 at 6:11 PM          XR CHEST AP PORTABLE  (CPT=71045)    Result Date: 3/19/2024  CONCLUSION:  1. Cardiomegaly.  Pulmonary venous distention.  CABG 2. Bilateral perihilar and lower  lobe pulmonary congestive changes with improved aeration left basilar retrocardiac region .  Support tubes and catheters are satisfactory.  No pneumothorax 3. Metallic tip enteric tube within the body of the stomach alongside the indwelling nasogastric tube    Dictated by (CST): Wily Stephenson MD on 3/19/2024 at 11:41 AM     Finalized by (CST): Wily Stephenson MD on 3/19/2024 at 11:46 AM          XR CHEST AP PORTABLE  (CPT=71045)    Result Date: 3/19/2024  CONCLUSION:  1. Right-sided hemodialysis catheter with tip projecting near the cavoatrial junction.  2. Postthoracotomy chest with cardiomegaly and pulmonary vascular congestion.  3. Bibasilar opacities, which may reflect some combination of pleural effusions, atelectasis, and/or superimposed pneumonia.   4. Additional support tubes and lines as above.   Dictated by (CST): Zachery Dean MD on 3/19/2024 at 10:57 AM     Finalized by (CST): Zachery Dean MD on 3/19/2024 at 11:01 AM          CT BRAIN OR HEAD (21691)    Result Date: 3/19/2024  CONCLUSION:   Subacute appearing left frontal and left occipital lobe infarcts.  Brain MRI can be obtained to help stage chronicity.  Pansinusitis  Partial opacification of the bilateral mastoid air cells may be sequela of inflammation. No osseous destruction or bony erosions.   Dictated by (CST): Henri Melgoza MD on 3/19/2024 at 9:46 AM     Finalized by (CST): Henri Melgoza MD on 3/19/2024 at 9:48 AM            Problem list:  Patient Active Problem List   Diagnosis    Hypercholesteremia    Floaters in visual field, bilateral    Acne, unspecified acne type    Eczema, unspecified type    Paresthesia of left thumb    Chest pain    NSTEMI (non-ST elevated myocardial infarction) (HCC)    Cerebrovascular accident (CVA) due to embolism of left middle cerebral artery (HCC)       Assessment  53 year old male with history of CABG (3/15/24) s/p Revision and Mediastinal Washout (3/16/24) and recent CVA who GI is consulted to  evaluate for PEG Tube placement.     CAD s/p CABG:  Subacute CVA:  Leukocytosis:  NORMA on CRRT:  - Patient admitted 2 weeks ago and underwent recent 3 vessel CABG with need to take back to OR given tamponade, need for washout and does have drains in place in the epigastric region.   Plan for trach Friday. Needs PEG  Has Dobhoff in place now.    - Given recent sternotomy as well as mediastinal drains, performing endoscopic placement of PEG Tube is high risk.  On exam there is minimal room for clean window for placement and given close proximity to open wound for drains, it would be advised to place eventual  tube through image guidance.  - IR will evaluate for eventual tube gastrostomy placement.  This does not need to be performed urgently, has Dobhoff in place and tolerating feeds.    Plan:   - IR placement of gastrostomy tube   - will sign off, call if needed    Is this a shared or split note between Advanced Practice Provider and Physician? Yes    ADRIENNE Chauhan    Agree with note.  I have personally seen the patient and performed my own physical exam.  The note has been fully edited by me.     Alexander Morton M.D.  [facility]  Gastroenterology & Hepatology

## 2024-03-20 NOTE — PROGRESS NOTES
[facility]  part of [facility]    Progress Note    Cristiano Obregon Patient Status:  Inpatient    1970 MRN [id number]   Location [facility] 2W/SW Attending Carlo Vaughn MD   Hosp Day # 15 PCP Abilio Dorsey MD     Subjective:  Pt in bed currently on 30% full vent support. His , Reyes, is at bedside. Pt opens eyes to verbal stimuli. Squeezes left hand on command. Continues drowsy.     Objective:  /75 (BP Location: Left arm)   Pulse 88   Temp 98.7 °F (37.1 °C) (Temporal)   Resp 20   Ht 5' 7\" (1.702 m)   Wt 185 lb 3 oz (84 kg)   SpO2 100%   BMI 29.00 kg/m²     Temp (24hrs), Av.6 °F (37 °C), Min:98.4 °F (36.9 °C), Max:98.8 °F (37.1 °C)      Intake/Output:    Intake/Output Summary (Last 24 hours) at 3/20/2024 0847  Last data filed at 3/20/2024 0700  Gross per 24 hour   Intake 1347 ml   Output 2406 ml   Net -1059 ml       Wt Readings from Last 3 Encounters:   24 185 lb 3 oz (84 kg)   10/06/21 177 lb 6.4 oz (80.5 kg)   20 172 lb 9.6 oz (78.3 kg)       Allergies:  No Known Allergies    Labs:  Lab Results   Component Value Date    WBC 17.4 2024    HGB 8.0 2024    HCT 24.1 2024    .0 2024    CREATSERUM 4.87 2024    BUN 65 2024     2024    K 3.7 2024     2024    CO2 23.0 2024     2024    CA 8.2 2024    ALB 3.1 2024    MG 2.3 2024    PHOS 3.6 2024    PHOS 3.6 2024       Physical Exam:  Blood pressure 124/75, pulse 88, temperature 98.7 °F (37.1 °C), temperature source Temporal, resp. rate 20, height 5' 7\" (1.702 m), weight 185 lb 3 oz (84 kg), SpO2 100%.  General: NAD  Neck: No JVD  Lungs: Clear bilaterally anteriorly   Pericardial Haile drain=25cc/12 hours  Pleural Chest tubes=35cc/12 hours- no air leak noted  Heart: RRR, S1, S2  Abdomen: Softer/Round, NT/ND, BS+x4/+BM(s)-loose w/Flexiseal in place  Extremities: Warm, dry, 1+  UE & LE edema bilat  Pulses: 2+ bilat DP  Skin: sternotomy incision drsg=CDI w/TPW intact/Left leg incisions w/drsg:CDI w/VIDAL drain=18cc/12 hours  Neurological:  opens eyes slowly to verbal stimuli/continues drowsy    Assessment/Plan:  S/P CABG x 3 POD # 5  S/P RE-OP MEDIASTINAL EXPLORATION/WASHOUT on 3/16  Respiratory Failure- intubated. SBT per Pulm. Plan for Trach placement: Gen Surgery: Dr. Krishna consulted. Plavix on hold w/last dose 3/19  Pre op Impella removed post op on 3/16 per Cards. Pt hemodynamically stable. [place]/Ophelia removed 3/18.  Midline cath placed 3/18  Re op mediastinal exploration/washout due to pericardial tamponade. Limited echo 3/16 post exploration w/EF=45-50%/no residual effusion.   Continue ICU status  Pain meds as needed: avoid narcotics to assess neuro status  Altered MS post op: drowsy -CT head 3/19=+CVA  Plan to increase activity when appropriate  Scds/Heparin Sub Q prophylaxis DVT prevention   Pre op NORMA/ATN/CRRT w/mgt per Nephrology. Expected post op volume overload. Pt currently on CRRT w/mgt per Nephrology. Temp catheter insertion per IR placed 3/19.   Plan for tunneled catheter per IR next few day w/plans to change to HD. Also discussed w/Nephrologist.  [place] remains for close I/O monitoring and immobility  Expected post op anemia w/o clinical significance/stable. Acute blood loss anemia immediate post op w/mediastinal exploration/tamponade.  Nutrition per TF via Dobhoff- plan for PEG per IR next few days-Plavix now on hold  Constipation/mild abdominal distension-resolved-+ bowel movements.   ID consulted on 3/14: following recs. Continues on ABX. + sputum cx  No hx: DM- continue correction scale coverage and glucose checks.   Discharge planning: pt lives w/his  and has supportive family. Anticipate rehab: LTAC. /SW will assist w/planning.     Plan of care discussed w/patient//RN and CV Surgeon: Dr. Shraddha Adams, APRN  3/20/2024  8:47 AM

## 2024-03-20 NOTE — SPIRITUAL CARE NOTE
Spiritual Care Visit Note    Patient Name: Cristiano Obregon Date of Spiritual Care Visit: 24   : 1970 Primary Dx: NSTEMI (non-ST elevated myocardial infarction) (HCC)       Referred By: Referral From: Family    Spiritual Care Taxonomy:    Intended Effects: Demonstrate caring and concern    Methods: Offer support    Interventions: Active listening;Ask guided questions;Provide hospitality;[place];Prayer for healing    Visit Type/Summary:    Writer met pt  and good friend as they were walking into pt room. They shared about pt progress and next steps in his recovery. They showed all the different ways they were continuing to encourage his healing and looking forward to his discharge at some point. They asked writer for continued prayers, which writer provided. They are appreciative of the continued support.     Spiritual Care support can be requested via an Epic consult. For urgent/immediate needs, please contact the On Call  at: [place]: ext [phone]    Keyona Buck MA, MA   Chaplain Resident  [facility], Spiritual Care Department   On-Call  via EPIC consult

## 2024-03-20 NOTE — DIETARY NOTE
ADULT NUTRITION UPDATE NOTE:     Pt is at high nutrition risk.  Pt does not meet malnutrition criteria.      RECOMMENDATIONS TO MD: See Nutrition Intervention for EN specifics.       ADMITTING DIAGNOSIS:  NSTEMI (non-ST elevated myocardial infarction) (HCC) [I21.4]  PERTINENT PAST MEDICAL HISTORY: History reviewed. No pertinent past medical history.    Update 3/20/24: See previous notes for all past updates.   Pt remains intubated with plans for trach and PEG on Friday 3/22 or when other issues resolved. Has not been able to wean from vent. Per MD chart notes CT showing multiple cardio embolic strokes. Pt is severely deconditioned, weak, intermittently following commands. Continues on CRRT now 7pm - 7am. Line clotted few times again. Will transition to HD when able per jyothi GALVAN. TPN was stopped on 3/18 and TF started. Flexiseal in place with loose BM's (expected since no TF/PO intake x ~2 weeks with gut hypomotility). Currently on Nepro (fiber containing), tolerating at goal rate of 40ml/hr with 2 pkts prosource daily. Minimal FWF on CRRT. Anuric. Continue minimal FWF per clinical/renal status. Total CRRT UF removal = 1596ml. No drips, pressors, or sedation now.     GASTROINTESTINAL: +BM 3/20; loose, brown. Flexiseal in place with loose BM's (expected since no TF/PO intake x ~2 weeks with gut hypomotility).    MEDICATIONS: reviewed. Continuous:    NxStage Pure Flow 400 CRRT/PIRRT fluid 5000mL      dextrose 10%        hydrALAzine  10 mg Intravenous Once    atorvastatin  40 mg Oral Nightly    metoprolol tartrate  50 mg Oral 2x Daily(Beta Blocker)    ascorbic acid  500 mg Oral Daily    aspirin  81 mg Oral Daily    hydrALAZINE  25 mg Oral Q8H CHAYITO    amiodarone  200 mg Oral TID    insulin regular human  1-7 Units Subcutaneous 4 times per day    famotidine  20 mg Oral Daily    Or    famotidine  20 mg Intravenous Daily    chlorhexidine gluconate  15 mL Mouth/Throat BID    heparin  5,000 Units Subcutaneous 2 times per day     [Held by provider] clopidogrel  75 mg Oral Daily    piperacillin-tazobactam  3.375 g Intravenous Q8H    mineral oil-white petrolatum   Both Eyes TID        LABS: reviewed- NORMA on CRRT 7pm-7am. Jered WNL  Recent Labs     03/18/24  1621 03/19/24  0541 03/19/24  1709 03/20/24  0444   * 145*  --  151*   BUN 51* 56*  --  65*   CREATSERUM 3.69* 4.36*  --  4.87*   CA 8.3* 8.3*  --  8.2*   MG 2.2 2.2 2.3 2.3    137  --  136   K 3.6 3.5  --  3.7    105  --  102   CO2 23.0 21.0  --  23.0   PHOS 2.4 2.8 3.0 3.6  3.6   OSMOCALC 301* 302*  --  304*     NUTRITION RELATED PHYSICAL FINDINGS:  - Nutrition Focused Physical Exam (NFPE): Appears well nourished, unable to completed full exam. Hard to fully visualize as pt edematous.   - Fluid Accumulation: nonpitting - +2 pitting generalized --> See RN documentation for details  - Skin Integrity: at risk. See RN documentation for details    ANTHROPOMETRICS:  HT: 170.2 cm (5' 7\")  WT: 184 lbs ( 3/14)   BMI: Body mass index is 29.21 kg/m².  BMI CLASSIFICATION: 25-29.9 kg/m2 - overweight  IBW: 148 lbs        118% IBW on admit.   Usual Body Wt: ~175 lbs per EMR      100% UBW    NUTRITION DIAGNOSIS/PROBLEM:   Inadequate oral intake related to Decreased ability to consume sufficient energy  as evidenced by NPO status.     Nutrition Diagnosis Progress: Improvement (unresolved) , Nutrition Support Therapy (NST) --TPN transitioned to TF meeting needs.     NUTRITION INTERVENTION:     NUTRITION PRESCRIPTION:   Estimated Nutrition needs: --dosing wt of 79.6 kg - wt taken on 3/5  Calories: ~2269-2513 calories/day (22-26 calories per kg Dosing wt)  Nick State = 1857 kcal/day  Protein: 100-167  g protein/day (>1.5-2.5 g protein/kg IBW while on CRRT)  Fluid Needs: ~1ml/kcal- adjust per renal tolerance, CRRT    - Enteral Nutrition: Increase Nepro to 45ml/hr via NGT tube. Based on average 22 hour infusion time. Give Prosource 2 pkts daily as med flush (22g protein, 80 kcal, 90ml  fluid). Goal rate + Prosource provides 1862 kcal, 102 grams protein, 720ml total free water. Meets 100% of energy needs and protein needs. Water flushes 30ml q 4 hours (180ml).  Total free fluid daily = 990ml  Keep FWF minimal per clinical status (oliguric).    - RD Care Plan:  initiated Nutrition Support Therapy (NST)   - Meals and snacks: NPO  - Medical Food Supplements- NPO.   - Vitamin and mineral supplements: vit C  - Feeding assistance: NPO  - Nutrition education: not appropriate   - Coordination of nutrition care: collaboration with other providers  - Discharge and transfer of nutrition care to new setting or provider: monitor plans    MONITOR AND EVALUATE/NUTRITION GOALS:  - Food and Nutrient Intake:      Monitor: for PO initiation when save  - Food and Nutrient Administration:      Monitor: tolerance to enteral nutrition and adequacy of enteral nutrition  - Anthropometric Measurement:    Monitor weight  - Nutrition Goals:      allow wt loss due to fluid losses, tube feed meets greater than 80% of needs, labs within acceptable limits, support body systems, and monitor fluid status    DIETITIAN FOLLOW UP: RD to follow and monitor nutrition status, daily CPN ordering.        Mayela Hallman RD, LDN  Clinical Dietitian  P: [phone]

## 2024-03-20 NOTE — PLAN OF CARE
The patient was not following commands throughout the shift until 1800. The patient started to nod appropriately and squeeze hands. Heme consulted. CRRT continued throughout the day with no complications. Planning to stop in the morning. BP normotensive. HR NSR. Family at the bedside during the shift. Education provided.     Problem: Patient Centered Care  Goal: Patient preferences are identified and integrated in the patient's plan of care  Description: Interventions:  - What would you like us to know as we care for you? I have been  for 26 years.   - Provide timely, complete, and accurate information to patient/family  - Incorporate patient and family knowledge, values, beliefs, and cultural backgrounds into the planning and delivery of care  - Encourage patient/family to participate in care and decision-making at the level they choose  - Honor patient and family perspectives and choices  Outcome: Progressing     Problem: Patient/Family Goals  Goal: Patient/Family Long Term Goal  Description: Patient's Long Term Goal: Go home upon discharge    Interventions:  - Monitor labs  - Administer medications  - Pain management  - Patient centered care  - Keep patient and family informed on plan of care  - See additional Care Plan goals for specific interventions  Outcome: Progressing  Goal: Patient/Family Short Term Goal  Description: Patient's Short Term Goal: extubate    Interventions:   - SAT/SBT  - improve strength  - pulmonary hygeine  - See additional Care Plan goals for specific interventions  Outcome: Progressing     Problem: RESPIRATORY - ADULT  Goal: Achieves optimal ventilation and oxygenation  Description: INTERVENTIONS:  - Assess for changes in respiratory status  - Assess for changes in mentation and behavior  - Position to facilitate oxygenation and minimize respiratory effort  - Oxygen supplementation based on oxygen saturation or ABGs  - Provide Smoking Cessation handout, if applicable  - Encourage  broncho-pulmonary hygiene including cough, deep breathe, Incentive Spirometry  - Assess the need for suctioning and perform as needed  - Assess and instruct to report SOB or any respiratory difficulty  - Respiratory Therapy support as indicated  - Manage/alleviate anxiety  - Monitor for signs/symptoms of CO2 retention  Outcome: Progressing     Problem: CARDIOVASCULAR - ADULT  Goal: Maintains optimal cardiac output and hemodynamic stability  Description: INTERVENTIONS:  - Monitor vital signs, rhythm, and trends  - Monitor for bleeding, hypotension and signs of decreased cardiac output  - Evaluate effectiveness of vasoactive medications to optimize hemodynamic stability  - Monitor arterial and/or venous puncture sites for bleeding and/or hematoma  - Assess quality of pulses, skin color and temperature  - Assess for signs of decreased coronary artery perfusion - ex. Angina  - Evaluate fluid balance, assess for edema, trend weights  Outcome: Progressing  Goal: Absence of cardiac arrhythmias or at baseline  Description: INTERVENTIONS:  - Continuous cardiac monitoring, monitor vital signs, obtain 12 lead EKG if indicated  - Evaluate effectiveness of antiarrhythmic and heart rate control medications as ordered  - Initiate emergency measures for life threatening arrhythmias  - Monitor electrolytes and administer replacement therapy as ordered  Outcome: Progressing     Problem: Delirium  Goal: Minimize duration of delirium  Description: Interventions:  - Encourage use of hearing aids, eye glasses  - Promote highest level of mobility daily  - Provide frequent reorientation  - Promote wakefulness i.e. lights on, blinds open  - Promote sleep, encourage patient's normal rest cycle i.e. lights off, TV off, minimize noise and interruptions  - Encourage family to assist in orientation and promotion of home routines  Outcome: Progressing     Problem: METABOLIC/FLUID AND ELECTROLYTES - ADULT  Goal: Hemodynamic stability and optimal  renal function maintained  Description: INTERVENTIONS:  - Monitor labs and assess for signs and symptoms of volume excess or deficit  - Monitor intake, output and patient weight  - Monitor urine specific gravity, serum osmolarity and serum sodium as indicated or ordered  - Monitor response to interventions for patient's volume status, including labs, urine output, blood pressure (other measures as available)  - Encourage oral intake as appropriate  - Instruct patient on fluid and nutrition restrictions as appropriate  Outcome: Progressing     Problem: SKIN/TISSUE INTEGRITY - ADULT  Goal: Skin integrity remains intact  Description: INTERVENTIONS  - Assess and document risk factors for pressure ulcer development  - Assess and document skin integrity  - Monitor for areas of redness and/or skin breakdown  - Initiate interventions, skin care algorithm/standards of care as needed  Outcome: Progressing  Goal: Incision(s), wounds(s) or drain site(s) healing without S/S of infection  Description: INTERVENTIONS:  - Assess and document risk factors for pressure ulcer development  - Assess and document skin integrity  - Assess and document dressing/incision, wound bed, drain sites and surrounding tissue  - Implement wound care per orders  - Initiate isolation precautions as appropriate  - Initiate Pressure Ulcer prevention bundle as indicated  Outcome: Progressing  Goal: Oral mucous membranes remain intact  Description: INTERVENTIONS  - Assess oral mucosa and hygiene practices  - Implement preventative oral hygiene regimen  - Implement oral medicated treatments as ordered  Outcome: Progressing     Problem: NEUROLOGICAL - ADULT  Goal: Achieves stable or improved neurological status  Description: INTERVENTIONS  - Assess for and report changes in neurological status  - Initiate measures to prevent increased intracranial pressure  - Maintain blood pressure and fluid volume within ordered parameters to optimize cerebral perfusion  and minimize risk of hemorrhage  - Monitor temperature, glucose, and sodium. Initiate appropriate interventions as ordered  Outcome: Progressing     Problem: HEMATOLOGIC - ADULT  Goal: Free from bleeding injury  Description: (Example usage: patient with low platelets)  INTERVENTIONS:  - Avoid intramuscular injections, enemas and rectal medication administration  - Ensure safe mobilization of patient  - Hold pressure on venipuncture sites to achieve adequate hemostasis  - Assess for signs and symptoms of internal bleeding  - Monitor lab trends  - Patient is to report abnormal signs of bleeding to staff  - Avoid use of toothpicks and dental floss  - Use electric shaver for shaving  - Use soft bristle tooth brush  - Limit straining and forceful nose blowing  Outcome: Progressing     Problem: Safety Risk - Non-Violent Restraints  Goal: Patient will remain free from self-harm  Description: INTERVENTIONS:  - Apply the least restrictive restraint to prevent harm  - Notify patient and family of reasons restraints applied  - Assess for any contributing factors to confusion (electrolyte disturbances, delirium, medications)  - Discontinue any unnecessary medical devices as soon as possible  - Assess the patient's physical comfort, circulation, skin condition, hydration, nutrition and elimination needs   - Reorient and redirection as needed  - Assess for the need to continue restraints  Outcome: Progressing

## 2024-03-20 NOTE — CONSULTS
[facility] Hematology/Oncology Group  Initial Inpatient Consult Note    Cristiano Obregon Patient Status:  Inpatient    1970 MRN [id number]   Location [facility] 2W/SW Attending Carlo Vaughn MD   Hosp Day # 15 PCP Abilio Dorsey MD     Subjective:   Cristiano Obregon is a 53 year old male with a history of tobacco use and hyperlipidemia who was admitted 3/5/2024 with an MRI.  His hospital stay has been extremely complicated.  He developed respiratory failure and cardiogenic shock requiring ventilation and Impella placement.  He underwent three-vessel CABG 3/15/2024 complicated by cardiac tamponade.  He required vasopressors and remained intubated.  He has bilateral chest tubes.  He developed ventilator associated pneumonia and acute renal failure and is on CVVH per nephrology.  He developed thrombocytopenia and anemia and required multiple transfusions.  He was also found to have a subacute appearing left frontal and left occipital lobe infarcts.  He remains intubated, on tube feeds and there are plans for tracheostomy and PEG tube.  Hematology was consulted today for refractory anemia and concern for hypercoagulable condition.  He had no prior history of clotting conditions prior to admission and MI.    Review of Systems:  Hematology/Oncology ROS performed and negative except as above in HPI    History/Other:   Past Medical History:  History reviewed. No pertinent past medical history.    Past Surgical History:  Past Surgical History:   Procedure Laterality Date    COLONOSCOPY N/A 2017    Procedure: COLONOSCOPY, POSSIBLE BIOPSY, POSSIBLE POLYPECTOMY 43008;  Surgeon: Byron Plaza MD;  Location: [facility] SURGICAL [facility]       Current Medications:   NxStage Pure Flow 400 CRRT/PIRRT fluid 5000mL  2.5 L/hr CRRT Continuous    ceFEPIme (Maxipime) 1 g in sodium chloride 0.9% 100 mL IVPB-MBP  1 g Intravenous Q12H    iron sucrose (Venofer) 20 mg/mL injection 200 mg  200 mg  Intravenous Daily    [COMPLETED] sodium chloride 0.9% infusion   Intravenous Once    hydrALAzine (Apresoline) 20 mg/mL injection 10 mg  10 mg Intravenous Once    [COMPLETED] labetalol (Trandate) 5 mg/mL injection 10 mg  10 mg Intravenous Once    [COMPLETED] heparin (Porcine) 1000 UNIT/ML injection        [COMPLETED] heparin in sodium chloride 0.9% (Porcine) 2 Units/mL flush bag premix        [COMPLETED] lidocaine PF (Xylocaine-MPF) 2 % injection        [COMPLETED] heparin sodium lock flush 100 UNIT/ML injection 150 Units  1.5 mL Intravenous Once    labetalol (Trandate) 5 mg/mL injection 10 mg  10 mg Intravenous Q10 Min PRN    hydrALAzine (Apresoline) 20 mg/mL injection 10 mg  10 mg Intravenous Q2H PRN    prochlorperazine (Compazine) 10 MG/2ML injection 5 mg  5 mg Intravenous Q8H PRN    atorvastatin (Lipitor) tab 40 mg  40 mg Oral Nightly    metoprolol tartrate (Lopressor) tab 50 mg  50 mg Oral 2x Daily(Beta Blocker)    ascorbic acid (Vitamin C) tab 500 mg  500 mg Oral Daily    aspirin chewable tab 81 mg  81 mg Oral Daily    [COMPLETED] metoprolol (Lopressor) 5 mg/5mL injection 5 mg  5 mg Intravenous Once    dextrose 10% infusion (TPN no rate)   Intravenous Continuous PRN    pancrelipase (Lip-Prot-Amyl) (Zenpep) DR particles cap 10,000 Units  10,000 Units Per G Tube PRN    And    sodium bicarbonate tab 325 mg  325 mg Oral PRN    hydrALAZINE (Apresoline) tab 25 mg  25 mg Oral Q8H CHAYITO    [COMPLETED] hydrALAzine (Apresoline) 20 mg/mL injection 10 mg  10 mg Intravenous Once    [COMPLETED] alteplase (Activase) 2 mg in sterile water for injection (PF) 2.2 mL IV push to declot line  2 mg Intravenous Once    amiodarone (Pacerone) tab 200 mg  200 mg Oral TID    [] adult 3 in 1 TPN   Intravenous Continuous TPN    alteplase (Activase) 4 mg in sterile water for injection (PF) 2.2 mL IV push to declot line  4 mg Intravenous PRN    [] clevidipine (Cleviprex) 25 MG/50ML IV infusion  1-6 mg/hr Intravenous Continuous     [] adult 3 in 1 TPN   Intravenous Continuous TPN    [COMPLETED] acetaminophen (Ofirmev) 10 mg/mL infusion premix 1,000 mg  1,000 mg Intravenous Q8H    insulin regular human (Novolin R, Humulin R) 100 UNIT/ML injection 1-7 Units  1-7 Units Subcutaneous 4 times per day    [COMPLETED] alteplase (Activase) 4 mg in sterile water for injection (PF) 2.2 mL IV push to declot line  4 mg Intravenous Once    [COMPLETED] digoxin (Lanoxin) 250 MCG/ML injection 125 mcg  125 mcg Intravenous Once    [COMPLETED] aminocaproic acid 5 g BOLUS FROM BAG infusion  5 g Intravenous Once    And    [COMPLETED] aminocaproic acid (Amicar) 10 g in sodium chloride 0.9% 250 mL infusion  1 g/hr Intravenous Once    [] norepinephrine (Levophed) 4 mg/250mL infusion premix        melatonin tab 3 mg  3 mg Oral Nightly PRN    polyethylene glycol (PEG 3350) (Miralax) 17 g oral packet 17 g  17 g Oral Daily PRN    ondansetron (Zofran) 4 MG/2ML injection 4 mg  4 mg Intravenous Q6H PRN    famotidine (Pepcid) tab 20 mg  20 mg Oral Daily    Or    famotidine (Pepcid) 20 mg/2mL injection 20 mg  20 mg Intravenous Daily    potassium chloride 20 mEq/100mL IVPB premix 20 mEq  20 mEq Intravenous PRN    Or    potassium chloride 40 mEq/100mL IVPB premix (central line) 40 mEq  40 mEq Intravenous PRN    magnesium sulfate in dextrose 5% 1 g/100mL infusion premix 1 g  1 g Intravenous PRN    magnesium sulfate in sterile water for injection 2 g/50mL IVPB premix 2 g  2 g Intravenous PRN    [COMPLETED] mupirocin (Bactroban) 2% nasal ointment 1 Application  1 Application Nasal BID    chlorhexidine gluconate (Peridex) 0.12 % oral solution 15 mL  15 mL Mouth/Throat BID    [] acetaminophen (Ofirmev) 10 mg/mL infusion premix 1,000 mg  1,000 mg Intravenous Q8H    heparin (Porcine) 5000 UNIT/ML injection 5,000 Units  5,000 Units Subcutaneous 2 times per day    acetaminophen (Tylenol) tab 650 mg  650 mg Oral Q4H PRN    Or    HYDROcodone-acetaminophen (Norco) 5-325  MG per tab 1 tablet  1 tablet Oral Q4H PRN    [Held by provider] clopidogrel (Plavix) tab 75 mg  75 mg Oral Daily    glucose (Dex4) 15 GM/59ML oral liquid 15 g  15 g Oral Q15 Min PRN    Or    glucose (Glutose) 40% oral gel 15 g  15 g Oral Q15 Min PRN    Or    glucose-vitamin C (Dex-4) chewable tab 4 tablet  4 tablet Oral Q15 Min PRN    Or    dextrose 50% injection 50 mL  50 mL Intravenous Q15 Min PRN    Or    glucose (Dex4) 15 GM/59ML oral liquid 30 g  30 g Oral Q15 Min PRN    Or    glucose (Glutose) 40% oral gel 30 g  30 g Oral Q15 Min PRN    Or    glucose-vitamin C (Dex-4) chewable tab 8 tablet  8 tablet Oral Q15 Min PRN    [COMPLETED] desmopressin (DDAVP) 25.6 mcg in sodium chloride 0.9% 50 mL IVPB  0.3 mcg/kg Intravenous Once    [COMPLETED] albumin human (Albumin) 5% injection 12.5 g  12.5 g Intravenous Once    [] cangrelor (Kengreal) 50 mg in sodium chloride 0.9% 250 mL IVPB - BRIDGING/MAINTENANCE dose  0.75 mcg/kg/min Intravenous Continuous    [] adult 3 in 1 TPN   Intravenous Continuous TPN    [COMPLETED] fluconazole (Diflucan) 2 mg/mL IVPB (6 hour duration) 800 mg  800 mg Intravenous Once    [COMPLETED] ceFAZolin (Ancef) 2 g in 20mL IV syringe premix  2 g Intravenous On Call    [COMPLETED] sodium chloride 0.9% infusion   Intravenous Once    [COMPLETED] mupirocin (Bactroban) 2% nasal ointment 1 Application  1 Application Nasal Now then every 0500    [COMPLETED] ceFAZolin (Ancef) 2 g in 20mL IV syringe premix  2 g Intravenous On Call    [] adult 3 in 1 TPN   Intravenous Continuous TPN    [] adult 3 in 1 TPN   Intravenous Continuous TPN    [COMPLETED] sodium chloride 0.9% infusion   Intravenous Once    [] adult 3 in 1 TPN   Intravenous Continuous TPN    mineral oil-white petrolatum (Artificial Tears) 83-15 % ophthalmic ointment   Both Eyes TID    [] adult 3 in 1 TPN   Intravenous Continuous TPN    [] adult 3 in 1 TPN   Intravenous Continuous TPN    [COMPLETED]  diazepam (Valium) 5 mg/mL injection 10 mg  10 mg Intravenous Once    [COMPLETED] magnesium sulfate in sterile water for injection 2 g/50mL IVPB premix 2 g  2 g Intravenous Once    [COMPLETED] sodium chloride 0.9% infusion   Intravenous Once    heparin (Porcine) 1000 UNIT/ML injection 1,500 Units  1.5 mL Intracatheter PRN    [COMPLETED] heparin (Porcine) 1000 UNIT/ML injection        [COMPLETED] heparin in sodium chloride 0.9% (Porcine) 2 Units/mL flush bag premix        [COMPLETED] lidocaine PF (Xylocaine-MPF) 2 % injection        [COMPLETED] magnesium sulfate in sterile water for injection 2 g/50mL IVPB premix 2 g  2 g Intravenous Once    [COMPLETED] sodium chloride 0.9 % IV bolus 500 mL  500 mL Intravenous Once    [COMPLETED] heparin in sodium chloride 0.9% (Porcine) 2 Units/mL flush bag premix        [COMPLETED] propofol (Diprivan) 10 MG/ML injection        [COMPLETED] furosemide (Lasix) 10 mg/mL injection 80 mg  80 mg Intravenous Once    [COMPLETED] heparin (Porcine) 1000 UNIT/ML injection - BOLUS  Units  10 Units/kg Intravenous Once    [COMPLETED] sodium chloride 0.9 % IV bolus 500 mL  500 mL Intravenous Once    [COMPLETED] sodium bicarbonate injection 50 mEq  50 mEq Intravenous Once    [COMPLETED] midazolam (Versed) 2 MG/2ML injection        [COMPLETED] fentaNYL (Sublimaze) 50 mcg/mL injection        [COMPLETED] aspirin chewable tab 324 mg  324 mg Oral Once    [COMPLETED] cangrelor (Kengreal) 50 MG injection        [COMPLETED] amiodarone (Cordarone) 150 MG/3ML injection        [COMPLETED] heparin (Porcine) 25,000 Units/250mL infusion premix        [COMPLETED] midazolam (Versed) 2 MG/2ML injection        [COMPLETED] midazolam (Versed) 2 MG/2ML injection        [COMPLETED] heparin (Porcine) 1000 UNIT/ML injection        [COMPLETED] iohexol (Omnipaque) 300 MG/ML injection 150 mL  150 mL Intravenous ONCE PRN    [] cangrelor (Kengreal) 50 mg in sodium chloride 0.9% 250 mL IVPB - BRIDGING/MAINTENANCE  dose  0.75 mcg/kg/min Intravenous Continuous    [COMPLETED] midazolam (Versed) 2 MG/2ML injection        [COMPLETED] lidocaine PF (Xylocaine-MPF) 2 % injection        [COMPLETED] heparin in sodium chloride 0.9% (Porcine) 2 Units/mL flush bag premix        [COMPLETED] heparin in sodium chloride 0.9% (Porcine) 2 Units/mL flush bag premix        acetaminophen (Tylenol) tab 650 mg  650 mg Oral Q4H PRN    Or    acetaminophen (Tylenol) 160 MG/5ML oral liquid 650 mg  650 mg Oral Q4H PRN    Or    acetaminophen (Tylenol) rectal suppository 650 mg  650 mg Rectal Q4H PRN    Or    acetaminophen (Ofirmev) 10 mg/mL infusion premix 1,000 mg  1,000 mg Intravenous Q6H PRN    senna (Senokot) 8.8 MG/5ML oral syrup 17.6 mg  10 mL Oral Nightly PRN    bisacodyl (Dulcolax) 10 MG rectal suppository 10 mg  10 mg Rectal Daily PRN    fleet enema (Fleet) 7-19 GM/118ML rectal enema 133 mL  1 enema Rectal Once PRN    [COMPLETED] furosemide (Lasix) 10 mg/mL injection 20 mg  20 mg Intravenous Once    [] furosemide (Lasix) 10 mg/mL injection 20 mg  20 mg Intravenous Once PRN    [COMPLETED] lidocaine PF (Xylocaine-MPF) 2 % injection        [COMPLETED] heparin in sodium chloride 0.9% (Porcine) 2 Units/mL flush bag premix        [COMPLETED] potassium chloride 20 mEq/100mL IVPB premix 20 mEq  20 mEq Intravenous Once    [COMPLETED] heparin (Porcine) 1000 UNIT/ML injection - BOLUS  Units  10 Units/kg Intravenous Once       Allergies:   No Known Allergies    Family Medical History:  History reviewed. No pertinent family history.    Social History:  Social History     Socioeconomic History    Marital status:      Spouse name: Not on file    Number of children: Not on file    Years of education: Not on file    Highest education level: Not on file   Occupational History    Not on file   Tobacco Use    Smoking status: Every Day     Types: Cigarettes    Smokeless tobacco: Never    Tobacco comments:     occ/5 cigs daily\   Vaping Use     Vaping Use: Never used   Substance and Sexual Activity    Alcohol use: No     Alcohol/week: 0.0 standard drinks of alcohol    Drug use: No    Sexual activity: Not on file   Other Topics Concern    Not on file   Social History Narrative    Not on file     Social Determinants of Health     Financial Resource Strain: Not on file   Food Insecurity: Unknown (3/5/2024)    Food Insecurity     Food Insecurity: Patient unable to answer   Transportation Needs: Unknown (3/5/2024)    Transportation Needs     Lack of Transportation: Patient unable to answer   Physical Activity: Not on file   Stress: Not on file   Social Connections: Not on file   Housing Stability: Unknown (3/5/2024)    Housing Stability     Housing Instability: Patient unable to answer     Housing Instability Emergency: Not on file   Objective:    /76 (BP Location: Left arm)   Pulse 93   Temp 99.2 °F (37.3 °C) (Temporal)   Resp 22   Ht 1.702 m (5' 7\")   Wt 84 kg (185 lb 3 oz)   SpO2 100%   BMI 29.00 kg/m²   Physical Exam:  General: intubated   HEENT: PERRL, OP clear  Neck: supple, no LAD or JVD  CV: RRR, no murmurs, + pulses  Pulm: CTA b/l, no w/r/r, normal effort  Abd: soft, ntnd, normal bowel sounds, no HSM or masses  Lymph: no palpable lymphadenopathy throughout the cervical, supraclavicular, axillary, or inguinal regions  Extremities: no edema or calf tenderness  Neurological: Grossly intact    Labs:  Lab Results   Component Value Date/Time    WBC 15.6 (H) 03/20/2024 08:52 AM    RBC 2.64 (L) 03/20/2024 08:52 AM    HGB 7.7 (L) 03/20/2024 08:52 AM    HCT 23.1 (L) 03/20/2024 08:52 AM    MCV 87.5 03/20/2024 08:52 AM    MCH 29.2 03/20/2024 08:52 AM    MCHC 33.3 03/20/2024 08:52 AM    RDW 15.9 (H) 03/20/2024 08:52 AM    NEPRELIM 8.17 (H) 03/17/2024 05:53 AM    .0 03/20/2024 08:52 AM       Lab Results   Component Value Date/Time     (H) 03/20/2024 04:25 PM    BUN 50 (H) 03/20/2024 04:25 PM    CREATSERUM 3.78 (H) 03/20/2024 04:25 PM    CA  8.2 (L) 03/20/2024 04:25 PM    ALB 3.1 (L) 03/20/2024 04:44 AM     (L) 03/20/2024 04:25 PM    K 3.8 03/20/2024 04:25 PM     03/20/2024 04:25 PM    CO2 24.0 03/20/2024 04:25 PM    ALKPHO 93 03/19/2024 05:41 AM    AST 26 03/19/2024 05:41 AM    ALT <7 (L) 03/19/2024 05:41 AM       Imaging:  IR NON-TUNNELED CATHETER    Result Date: 3/19/2024  CONCLUSION:  Exchange of existing non-tunneled central venous catheter via internal jugular vein at bedside.  Ready for use.    Dictated by (CST): Carlitos Lemon MD on 3/19/2024 at 6:09 PM     Finalized by (CST): Carlitos Lemon MD on 3/19/2024 at 6:11 PM          XR CHEST AP PORTABLE  (CPT=71045)    Result Date: 3/19/2024  CONCLUSION:  1. Cardiomegaly.  Pulmonary venous distention.  CABG 2. Bilateral perihilar and lower lobe pulmonary congestive changes with improved aeration left basilar retrocardiac region .  Support tubes and catheters are satisfactory.  No pneumothorax 3. Metallic tip enteric tube within the body of the stomach alongside the indwelling nasogastric tube    Dictated by (CST): Wily Stephenson MD on 3/19/2024 at 11:41 AM     Finalized by (CST): Wily Stephenson MD on 3/19/2024 at 11:46 AM          XR CHEST AP PORTABLE  (CPT=71045)    Result Date: 3/19/2024  CONCLUSION:  1. Right-sided hemodialysis catheter with tip projecting near the cavoatrial junction.  2. Postthoracotomy chest with cardiomegaly and pulmonary vascular congestion.  3. Bibasilar opacities, which may reflect some combination of pleural effusions, atelectasis, and/or superimposed pneumonia.   4. Additional support tubes and lines as above.   Dictated by (CST): Zachery Dean MD on 3/19/2024 at 10:57 AM     Finalized by (CST): Zachery Dean MD on 3/19/2024 at 11:01 AM          CT BRAIN OR HEAD (97306)    Result Date: 3/19/2024  CONCLUSION:   Subacute appearing left frontal and left occipital lobe infarcts.  Brain MRI can be obtained to help stage chronicity.   Pansinusitis  Partial opacification of the bilateral mastoid air cells may be sequela of inflammation. No osseous destruction or bony erosions.   Dictated by (CST): Henri Melgoza MD on 3/19/2024 at 9:46 AM     Finalized by (CST): Henri Melgoza MD on 3/19/2024 at 9:48 AM            Assessment & Plan:    Cristiano Obregon is a 53 year old male with a history of tobacco use and hyperlipidemia who was admitted 3/5/2024 with an MRI.  His hospital stay has been extremely complicated.  He developed respiratory failure and cardiogenic shock requiring ventilation and Impella placement.  He underwent three-vessel CABG 3/15/2024 complicated by cardiac tamponade.  He required vasopressors and remained intubated.  He developed ventilator associated pneumonia and acute renal failure and is on CVVH per nephrology.  He developed thrombocytopenia and anemia and required multiple transfusions.  He was also found to have a subacute appearing left frontal and left occipital lobe infarcts.  He remains intubated, on tube feeds and there are plans for tracheostomy and PEG tube.  Hematology was consulted today for refractory anemia and concern for hypercoagulable condition.    Anemia is multifactorial secondary to critical condition, acute renal failure, chronic inflammatory state with subsequent impairment of iron regulation, prior hemolysis from Impella, and prior cardiac tamponade and bleeding.  He continues to require intermittent blood transfusions however I think this is mostly iatrogenic with blood draws and clotting CVVH line.  Iron indices were checked and saturation is low at 16% with a ferritin of 900 consistent with anemia of chronic disease.  We can attempt a short course of IV iron in an effort to improve his saturation although with a ferritin of 900 proper regulation is unlikely.  Coags were checked, fibrinogen 243 D-dimer elevated again consistent with chronic inflammatory state.  Negative for DIC, LDH much improved no  concern for hemolysis at this time.  Recommend transfusing as needed for goal hemoglobin.     It is possible the patient's inflammatory state has increase his risk for thrombosis and may be contributing to recurrent clotting of his CVVH line, but I would not recommend more aggressive anticoagulation as evidence for this supports correcting the underlying cause of inflammation over anticoagulating.  Continue antiplatelets per cards, Plavix currently on hold for anticipated procedures.  He has no prior history suggestive of an underlying thrombophilia and I would not recommend testing at this time.    Regarding his CVA, agree with neurology that this is likely embolic from extended Impella use.  If cardiology is concerned for mural thrombus this could be ruled out but I will defer to cardiology.    Thank you for the consult.  I will continue to follow intermittently.    CAROLA Styles DO    [facility] Hematology/Oncology [facility]  [facility]    This note was created using a voice-recognition transcribing system. Incorrect words or phrases may have been missed during proofreading. Please interpret accordingly.

## 2024-03-20 NOTE — PROGRESS NOTES
Cristiano Estebanadrien Patient Status:  Inpatient    1970 MRN [id number]   Location [facility] 2W/SW Attending Carlo Vaughn MD   Hosp Day # 15 PCP Abilio Dorsey MD     Critical Care Progress Note      Assessment/Plan:  Acute respiratory failure - due to pulmonary edema. CT brain with multiple cardioembolic strokes. Severely deconditioned that is precluding weaning.    - continue full vent support  - Trach planned for Friday 3/22 with general surgery  - daily SBT  - minimize sedation as able  NSTEMI - diagnosed with new multivessel CAD s/p angioplasty and cardiogenic shock requiring Impella placement. S/p 3vCABG 3/15. Impella removed 3/16. Back to OR 3/16 AM for tamponade s/p pericardial clot removal  - cardiology and cardiac surgery following  Cardiogenic shock - due to acute coronary syndrome, EF was down and has now improved on repeat echo  - off vasopressors  Afib  - rate controlled  - per cardiology  Pneumonia - sputum cultures with Klebsiella and MSSA  - Zosyn (day #7) per ID  Acute renal failure - suspect from ATN and cardiogenic shock. CRRT with multiple clotted catheters  - CRRT at night per nephrology  - Would avoid systemic AC given strokes and labile hemoglobin  Stroke  - neurology following  - Permissive hypertension  - No HD at this time and recommend to avoid systemic anticoagulation  Thrombocytopenia, anemia  - monitor  Smoker  - cessation counseling once appropriate  FEN  - TF; plan for gastrostomy tube with IR when other medical issues have resolved  Proph  - heparin subQ  - famotidine  Dispo  - full code  - ICU      Critical Care Time: 49 minutes    Danial Kirk DO  [facility]  Pulmonary & Critical Care Medicine      Subjective:  No overnight events, still weak and intermittently following commands    Objective:    Medications:   hydrALAzine  10 mg Intravenous Once    atorvastatin  40 mg Oral Nightly    metoprolol tartrate  50 mg Oral 2x Daily(Beta Blocker)     ascorbic acid  500 mg Oral Daily    aspirin  81 mg Oral Daily    hydrALAZINE  25 mg Oral Q8H CHAYITO    amiodarone  200 mg Oral TID    insulin regular human  1-7 Units Subcutaneous 4 times per day    famotidine  20 mg Oral Daily    Or    famotidine  20 mg Intravenous Daily    chlorhexidine gluconate  15 mL Mouth/Throat BID    heparin  5,000 Units Subcutaneous 2 times per day    [Held by provider] clopidogrel  75 mg Oral Daily    piperacillin-tazobactam  3.375 g Intravenous Q8H    mineral oil-white petrolatum   Both Eyes TID       Vent Mode: VC/AC  FiO2 (%):  [30 %] 30 %  S RR:  [12] 12  S VT:  [530 mL-550 mL] 530 mL  PEEP/CPAP (cm H2O):  [5 cm H20] 5 cm H20  MAP (cm H2O):  [9-11] 10    Intake/Output Summary (Last 24 hours) at 3/20/2024 0802  Last data filed at 3/20/2024 0700  Gross per 24 hour   Intake 1347 ml   Output 2406 ml   Net -1059 ml       /82 (BP Location: Left arm)   Pulse 85   Temp 98.7 °F (37.1 °C) (Temporal)   Resp 18   Ht 5' 7\" (1.702 m)   Wt 185 lb 3 oz (84 kg)   SpO2 100%   BMI 29.00 kg/m²   Physical Exam:   General: intubated, weak, tracks with weyes   HEENT: lips, mucosa, tongue normal. Mallampati 2   Lungs: clear b/l, chest tubes (mediastinal/pericardial), intubated   Chest wall: No tenderness or deformity.   Heart: Regular rate and rhythm, normal S1S2, tachycardic.   Abdomen: soft, non-tender, non-distended, positive BS.   Extremity: No clubbing or cyanosis no edema.   Skin: No rashes or lesions.    Recent Labs   Lab 03/17/24  0553 03/17/24  1352 03/20/24  0126   RBC 3.08*   < > 2.80*   HGB 9.0*   < > 8.0*   HCT 26.3*   < > 24.1*   MCV 85.4   < > 86.1   MCH 29.2   < > 28.6   MCHC 34.2   < > 33.2   RDW 15.2*   < > 15.8*   NEPRELIM 8.17*  --   --    WBC 10.3   < > 17.4*   .0*   < > 146.0*    < > = values in this interval not displayed.     Recent Labs   Lab 03/17/24  0553 03/17/24  1759 03/18/24  0620 03/18/24  1621 03/19/24  0541 03/20/24  0444   *   < > 163* 160* 145* 151*    BUN 46*   < > 62* 51* 56* 65*   CREATSERUM 3.44*   < > 4.16* 3.69* 4.36* 4.87*   CA 7.7*   < > 7.9* 8.3* 8.3* 8.2*   ALB 3.0*  --  2.9*  --  3.1* 3.1*   *   < > 137 137 137 136   K 4.4   < > 3.6 3.6 3.5 3.7      < > 104 104 105 102   CO2 23.0   < > 23.0 23.0 21.0 23.0   ALKPHO 59  --  73  --  93  --    AST 26  --  28  --  26  --    ALT <7*  --  <7*  --  <7*  --    BILT 0.3  --  0.5  --  0.8  --    TP 5.0*  --  5.0*  --  5.3*  --     < > = values in this interval not displayed.     Recent Labs   Lab 03/18/24  0129 03/19/24  1051   ABGPHT 7.53* 7.49*   PYOUNQ6L 30* 30*   WNYKF3D 104* 104*   ABGHCO3 27.0 25.0   SITE Arterial Line Arterial Line   THGB 9.7*  --      No results for input(s): \"BNP\" in the last 168 hours.  No results for input(s): \"TROP\", \"CK\" in the last 168 hours.    Imaging: I independently visualized all relevant chest imaging in PACS, agree with radiology interpretation except where noted.

## 2024-03-20 NOTE — PROGRESS NOTES
NEPHROLOGY DAILY PROGRESS NOTE       SUBJECTIVE:    CVA noted on CT.  Clotted again overnight.    OBJECTIVE:    Total Intake/Output:    Intake/Output Summary (Last 24 hours) at 3/20/2024 0857  Last data filed at 3/20/2024 0700  Gross per 24 hour   Intake 1347 ml   Output 2406 ml   Net -1059 ml       PHYSICAL EXAM:  /75 (BP Location: Left arm)   Pulse 88   Temp 98.7 °F (37.1 °C) (Temporal)   Resp 20   Ht 5' 7\" (1.702 m)   Wt 185 lb 3 oz (84 kg)   SpO2 100%   BMI 29.00 kg/m²   GEN: Intubated.  HEENT: ETT in place   CHEST: mechanical breath sounds, chest tube in place   CARDIAC: S1S2 normal  ABD: soft, NT/ND  : lujan in place   EXT:  trace upper and lower ext edema noted   NEURO: sedated   ACCESS: RIJ temp HD cath exchanged (3/19)      CURRENT MEDICATIONS:     hydrALAzine  10 mg Intravenous Once    atorvastatin  40 mg Oral Nightly    metoprolol tartrate  50 mg Oral 2x Daily(Beta Blocker)    ascorbic acid  500 mg Oral Daily    aspirin  81 mg Oral Daily    hydrALAZINE  25 mg Oral Q8H CHAYITO    amiodarone  200 mg Oral TID    insulin regular human  1-7 Units Subcutaneous 4 times per day    famotidine  20 mg Oral Daily    Or    famotidine  20 mg Intravenous Daily    chlorhexidine gluconate  15 mL Mouth/Throat BID    heparin  5,000 Units Subcutaneous 2 times per day    [Held by provider] clopidogrel  75 mg Oral Daily    piperacillin-tazobactam  3.375 g Intravenous Q8H    mineral oil-white petrolatum   Both Eyes TID         LABS:  Patient Labs Reviewed in Detail. Pertinent Labs as follows:  Recent Labs   Lab 03/18/24  1621 03/19/24  0541 03/20/24  0444   * 145* 151*   BUN 51* 56* 65*   CREATSERUM 3.69* 4.36* 4.87*   EGFRCR 19* 15* 13*   CA 8.3* 8.3* 8.2*    137 136   K 3.6 3.5 3.7    105 102   CO2 23.0 21.0 23.0     Recent Labs   Lab 03/15/24  1605 03/16/24  0536 03/16/24  1443 03/17/24  0553 03/17/24  1352 03/19/24  1026 03/19/24  1709 03/20/24  0126   RBC 3.44* 2.45*  --  3.08*   < > 2.87*  2.84* 2.80*   HGB 10.2* 7.4*   < > 9.0*   < > 8.7* 8.4* 8.0*   HCT 29.3* 21.4*   < > 26.3*   < > 24.5* 24.1* 24.1*   MCV 85.2 87.3  --  85.4   < > 85.4 84.9 86.1   MCH 29.7 30.2  --  29.2   < > 30.3 29.6 28.6   MCHC 34.8 34.6  --  34.2   < > 35.5 34.9 33.2   RDW 14.3 15.2*  --  15.2*   < > 15.7* 15.7* 15.8*   NEPRELIM 9.11* 6.88  --  8.17*  --   --   --   --    WBC 11.0 8.5  --  10.3   < > 18.5* 16.8* 17.4*   .0 149.0*  --  131.0*   < > 176.0 141.0* 146.0*    < > = values in this interval not displayed.         IMAGING:  IR NON-TUNNELED CATHETER    Result Date: 3/19/2024  CONCLUSION:  Exchange of existing non-tunneled central venous catheter via internal jugular vein at bedside.  Ready for use.    Dictated by (CST): Carlitos Lemon MD on 3/19/2024 at 6:09 PM     Finalized by (CST): Carlitos Lemon MD on 3/19/2024 at 6:11 PM          XR CHEST AP PORTABLE  (CPT=71045)    Result Date: 3/19/2024  CONCLUSION:  1. Cardiomegaly.  Pulmonary venous distention.  CABG 2. Bilateral perihilar and lower lobe pulmonary congestive changes with improved aeration left basilar retrocardiac region .  Support tubes and catheters are satisfactory.  No pneumothorax 3. Metallic tip enteric tube within the body of the stomach alongside the indwelling nasogastric tube    Dictated by (CST): Wily Stephenson MD on 3/19/2024 at 11:41 AM     Finalized by (CST): Wily Stephenson MD on 3/19/2024 at 11:46 AM          XR CHEST AP PORTABLE  (CPT=71045)    Result Date: 3/19/2024  CONCLUSION:  1. Right-sided hemodialysis catheter with tip projecting near the cavoatrial junction.  2. Postthoracotomy chest with cardiomegaly and pulmonary vascular congestion.  3. Bibasilar opacities, which may reflect some combination of pleural effusions, atelectasis, and/or superimposed pneumonia.   4. Additional support tubes and lines as above.   Dictated by (CST): Zachery Dean MD on 3/19/2024 at 10:57 AM     Finalized by (CST): Zachery Dean,  MD on 3/19/2024 at 11:01 AM          CT BRAIN OR HEAD (67731)    Result Date: 3/19/2024  CONCLUSION:   Subacute appearing left frontal and left occipital lobe infarcts.  Brain MRI can be obtained to help stage chronicity.  Pansinusitis  Partial opacification of the bilateral mastoid air cells may be sequela of inflammation. No osseous destruction or bony erosions.   Dictated by (CST): Henri Melgoza MD on 3/19/2024 at 9:46 AM     Finalized by (CST): Henri Melgoza MD on 3/19/2024 at 9:48 AM            ASSESSMENT AND PLAN:   This is a 53 year old male with PMH sig for HLD, tobacco use. Presented with chest pain and was admitted for NSTEMI.  Nephrology is consulted for NORMA      Anuric NORMA  - due to ATN from shock.   - creatinine 1.23 on admission, worsened to 7.19 (3/7)  - initiated on CVVH on 3/7 via RIJ temp HD cath   - Continue CRRT  - Increase UF as blood pressures allow.  - discontinue heparin gtt at this time.  - OK for PICC line if needed.  - Maintain adequate perfusion pressure  - Dose medications for CRRT  - Avoid nephrotoxins.  - discussed with neurology will resume CRRT at this time, iHD when cleared from neuro perspective.  - switch to tunneled HD catheter - timing as per IR.  - Avoid Maximiliano in MRI.  - transition to CRRT from 7pm-7am to allow for procedures and ambulation until cleared for iHD.  - Consider Heme/onc evaluation for clotting.  - BID BMP, phos, and mag while on CRRT.      Hyperkalemia   - resolved with CRRT    Metabolic acidosis  - resolved with CRRT     Hyponatremia  - resolved     Critical care time > 35 mins     Jerome Gonzalez MD  [facility]  Nephrology

## 2024-03-20 NOTE — PLAN OF CARE
Problem: Safety Risk - Non-Violent Restraints  Goal: Patient will remain free from self-harm  Description: INTERVENTIONS:  - Apply the least restrictive restraint to prevent harm  - Notify patient and family of reasons restraints applied  - Assess for any contributing factors to confusion (electrolyte disturbances, delirium, medications)  - Discontinue any unnecessary medical devices as soon as possible  - Assess the patient's physical comfort, circulation, skin condition, hydration, nutrition and elimination needs   - Reorient and redirection as needed  - Assess for the need to continue restraints  3/20/2024 0054 by Mikayla Workman, RN  Outcome: Not Progressing  3/19/2024 2339 by Mikayla Workman, RN  Outcome: Not Progressing

## 2024-03-20 NOTE — PHYSICAL THERAPY NOTE
PT order received, chart reviewed and spoke with PATEL Browne who reports patient is more lethargic today and not following commands, was better yesterday. Will plan to check back tomorrow.

## 2024-03-20 NOTE — PLAN OF CARE
Patient continues vented; no sedation easily opens eyes with verbal stimuli, with repeated questions slowly nods/gestures though generalized weakness. CRRT was clotted off at 10pm, I  tpa the line 2 times,  until this morning Dialysis nurse came to restart and reprogram the time on machine.  Currently CRRT going no issues, hemodynamically stable throughout the night.  I gave him tylenol was chewing on ett and grimacing, seems like he only sleeps for few hours. Chest tubes milked as needed, checking on them hourly got a total of 64ml for the night.   Redding catheter with hazy yellow u/o 45ml for the night.  Abdomen round soft, flexiseal with over 500ml of loose brown stool. Complete bed bath and linen change, soft wrist restraints on for safety.    Reyes at the bedside and updated plan of care.  Problem: RESPIRATORY - ADULT  Goal: Achieves optimal ventilation and oxygenation  Description: INTERVENTIONS:  - Assess for changes in respiratory status  - Assess for changes in mentation and behavior  - Position to facilitate oxygenation and minimize respiratory effort  - Oxygen supplementation based on oxygen saturation or ABGs  - Provide Smoking Cessation handout, if applicable  - Encourage broncho-pulmonary hygiene including cough, deep breathe, Incentive Spirometry  - Assess the need for suctioning and perform as needed  - Assess and instruct to report SOB or any respiratory difficulty  - Respiratory Therapy support as indicated  - Manage/alleviate anxiety  - Monitor for signs/symptoms of CO2 retention  Outcome: Not Progressing     Problem: CARDIOVASCULAR - ADULT  Goal: Maintains optimal cardiac output and hemodynamic stability  Description: INTERVENTIONS:  - Monitor vital signs, rhythm, and trends  - Monitor for bleeding, hypotension and signs of decreased cardiac output  - Evaluate effectiveness of vasoactive medications to optimize hemodynamic stability  - Monitor arterial and/or venous puncture sites for  bleeding and/or hematoma  - Assess quality of pulses, skin color and temperature  - Assess for signs of decreased coronary artery perfusion - ex. Angina  - Evaluate fluid balance, assess for edema, trend weights  Outcome: Progressing     Problem: METABOLIC/FLUID AND ELECTROLYTES - ADULT  Goal: Hemodynamic stability and optimal renal function maintained  Description: INTERVENTIONS:  - Monitor labs and assess for signs and symptoms of volume excess or deficit  - Monitor intake, output and patient weight  - Monitor urine specific gravity, serum osmolarity and serum sodium as indicated or ordered  - Monitor response to interventions for patient's volume status, including labs, urine output, blood pressure (other measures as available)  - Encourage oral intake as appropriate  - Instruct patient on fluid and nutrition restrictions as appropriate  Outcome: Not Progressing     Problem: SKIN/TISSUE INTEGRITY - ADULT  Goal: Skin integrity remains intact  Description: INTERVENTIONS  - Assess and document risk factors for pressure ulcer development  - Assess and document skin integrity  - Monitor for areas of redness and/or skin breakdown  - Initiate interventions, skin care algorithm/standards of care as needed  Outcome: Progressing     Problem: NEUROLOGICAL - ADULT  Goal: Achieves stable or improved neurological status  Description: INTERVENTIONS  - Assess for and report changes in neurological status  - Initiate measures to prevent increased intracranial pressure  - Maintain blood pressure and fluid volume within ordered parameters to optimize cerebral perfusion and minimize risk of hemorrhage  - Monitor temperature, glucose, and sodium. Initiate appropriate interventions as ordered  Outcome: Not Progressing

## 2024-03-20 NOTE — CONSULTS
[facility]  part of [facility]    Report of Consultation    Cristiano Obregon Patient Status:  Inpatient    1970 MRN [id number]   Location [facility] 2W/SW Attending Carlo Vaughn MD   Hosp Day # 15 PCP Abilio Dorsey MD     Date of Admission:  3/5/2024  Date of Consult:  3/20/2024    Reason for Consultation:  Respiratory failure     History of Present Illness:  Cristiano Obregon is a a(n) 53 year old male. Very complicated PMH   see below    Currently  intubated   unable to wean  Asked to see pt for tracheostomy  Off plavix       History:  History reviewed. No pertinent past medical history.  Past Surgical History:   Procedure Laterality Date    COLONOSCOPY N/A 2017    Procedure: COLONOSCOPY, POSSIBLE BIOPSY, POSSIBLE POLYPECTOMY 30809;  Surgeon: Byron Plaza MD;  Location: [facility]     History reviewed. No pertinent family history.   reports that he has been smoking cigarettes. He has never used smokeless tobacco. He reports that he does not drink alcohol and does not use drugs.    Allergies:  No Known Allergies    Medications:    Current Facility-Administered Medications:     NxStage Pure Flow 400 CRRT/PIRRT fluid 5000mL, 2.5 L/hr, CRRT, Continuous **AND** CRRT Replacement Fluid 400, , , Until Discontinued    hydrALAzine (Apresoline) 20 mg/mL injection 10 mg, 10 mg, Intravenous, Once    labetalol (Trandate) 5 mg/mL injection 10 mg, 10 mg, Intravenous, Q10 Min PRN    hydrALAzine (Apresoline) 20 mg/mL injection 10 mg, 10 mg, Intravenous, Q2H PRN    prochlorperazine (Compazine) 10 MG/2ML injection 5 mg, 5 mg, Intravenous, Q8H PRN    atorvastatin (Lipitor) tab 40 mg, 40 mg, Oral, Nightly    metoprolol tartrate (Lopressor) tab 50 mg, 50 mg, Oral, 2x Daily(Beta Blocker)    ascorbic acid (Vitamin C) tab 500 mg, 500 mg, Oral, Daily    aspirin chewable tab 81 mg, 81 mg, Oral, Daily    dextrose 10% infusion (TPN no rate), , Intravenous, Continuous  PRN    pancrelipase (Lip-Prot-Amyl) (Zenpep) DR particles cap 10,000 Units, 10,000 Units, Per G Tube, PRN **AND** sodium bicarbonate tab 325 mg, 325 mg, Oral, PRN    hydrALAZINE (Apresoline) tab 25 mg, 25 mg, Oral, Q8H CHAYITO    amiodarone (Pacerone) tab 200 mg, 200 mg, Oral, TID    alteplase (Activase) 4 mg in sterile water for injection (PF) 2.2 mL IV push to declot line, 4 mg, Intravenous, PRN    insulin regular human (Novolin R, Humulin R) 100 UNIT/ML injection 1-7 Units, 1-7 Units, Subcutaneous, 4 times per day    melatonin tab 3 mg, 3 mg, Oral, Nightly PRN    polyethylene glycol (PEG 3350) (Miralax) 17 g oral packet 17 g, 17 g, Oral, Daily PRN    ondansetron (Zofran) 4 MG/2ML injection 4 mg, 4 mg, Intravenous, Q6H PRN    famotidine (Pepcid) tab 20 mg, 20 mg, Oral, Daily **OR** famotidine (Pepcid) 20 mg/2mL injection 20 mg, 20 mg, Intravenous, Daily    potassium chloride 20 mEq/100mL IVPB premix 20 mEq, 20 mEq, Intravenous, PRN **OR** potassium chloride 40 mEq/100mL IVPB premix (central line) 40 mEq, 40 mEq, Intravenous, PRN    magnesium sulfate in dextrose 5% 1 g/100mL infusion premix 1 g, 1 g, Intravenous, PRN    magnesium sulfate in sterile water for injection 2 g/50mL IVPB premix 2 g, 2 g, Intravenous, PRN    chlorhexidine gluconate (Peridex) 0.12 % oral solution 15 mL, 15 mL, Mouth/Throat, BID    heparin (Porcine) 5000 UNIT/ML injection 5,000 Units, 5,000 Units, Subcutaneous, 2 times per day    acetaminophen (Tylenol) tab 650 mg, 650 mg, Oral, Q4H PRN **OR** HYDROcodone-acetaminophen (Norco) 5-325 MG per tab 1 tablet, 1 tablet, Oral, Q4H PRN **OR** [DISCONTINUED] HYDROcodone-acetaminophen (Norco) 5-325 MG per tab 2 tablet, 2 tablet, Oral, Q4H PRN    [Held by provider] clopidogrel (Plavix) tab 75 mg, 75 mg, Oral, Daily    glucose (Dex4) 15 GM/59ML oral liquid 15 g, 15 g, Oral, Q15 Min PRN **OR** glucose (Glutose) 40% oral gel 15 g, 15 g, Oral, Q15 Min PRN **OR** glucose-vitamin C (Dex-4) chewable tab 4 tablet,  4 tablet, Oral, Q15 Min PRN **OR** dextrose 50% injection 50 mL, 50 mL, Intravenous, Q15 Min PRN **OR** glucose (Dex4) 15 GM/59ML oral liquid 30 g, 30 g, Oral, Q15 Min PRN **OR** glucose (Glutose) 40% oral gel 30 g, 30 g, Oral, Q15 Min PRN **OR** glucose-vitamin C (Dex-4) chewable tab 8 tablet, 8 tablet, Oral, Q15 Min PRN    piperacillin-tazobactam (Zosyn) 3.375 g in dextrose 5% 100 mL IVPB-ADDV, 3.375 g, Intravenous, Q8H    mineral oil-white petrolatum (Artificial Tears) 83-15 % ophthalmic ointment, , Both Eyes, TID    heparin (Porcine) 1000 UNIT/ML injection 1,500 Units, 1.5 mL, Intracatheter, PRN    acetaminophen (Tylenol) tab 650 mg, 650 mg, Oral, Q4H PRN **OR** acetaminophen (Tylenol) 160 MG/5ML oral liquid 650 mg, 650 mg, Oral, Q4H PRN **OR** acetaminophen (Tylenol) rectal suppository 650 mg, 650 mg, Rectal, Q4H PRN **OR** acetaminophen (Ofirmev) 10 mg/mL infusion premix 1,000 mg, 1,000 mg, Intravenous, Q6H PRN    senna (Senokot) 8.8 MG/5ML oral syrup 17.6 mg, 10 mL, Oral, Nightly PRN    bisacodyl (Dulcolax) 10 MG rectal suppository 10 mg, 10 mg, Rectal, Daily PRN    fleet enema (Fleet) 7-19 GM/118ML rectal enema 133 mL, 1 enema, Rectal, Once PRN  Medications Prior to Admission   Medication Sig Dispense Refill Last Dose    ATORVASTATIN 20 MG Oral Tab TAKE 1 TABLET BY MOUTH EVERY DAY 90 tablet 1     FLUOCINONIDE 0.05 % External Cream APPLY SPARINGLY TO AFFECTED AREA TWICE DAILY 45 g 1     TRETINOIN 0.01 % External Gel APPLY 1 APPLICATION TOPICALLY NIGHTLY. 45 g 1     omega-3 fatty acids 1000 MG Oral Cap Take 1,000 mg by mouth daily.       Melatonin 10-10 MG Oral Tab CR Take by mouth.       Multiple Vitamins-Minerals (CENTRUM) Oral Tab Take 1 tablet by mouth daily.       Multiple Vitamin (MULTIVITAMIN TAB/CAP) Take 1 tablet by mouth daily.          Review of Systems:  A ten point review of systems was negative except as noted.    Physical Exam:  Blood pressure 126/81, pulse 90, temperature 98.3 °F (36.8 °C),  temperature source Temporal, resp. rate 22, height 5' 7\" (1.702 m), weight 185 lb 3 oz (84 kg), SpO2 100%.    General: Alert, orientated x3.  Cooperative.  No apparent distress.  HEENT: Exam is unremarkable.  Laboratory Data:  Lab Results   Component Value Date    WBC 15.6 (H) 03/20/2024    HGB 7.7 (L) 03/20/2024    HCT 23.1 (L) 03/20/2024    .0 03/20/2024    CREATSERUM 4.87 (H) 03/20/2024    BUN 65 (H) 03/20/2024     03/20/2024    K 3.7 03/20/2024     03/20/2024    CO2 23.0 03/20/2024     (H) 03/20/2024    CA 8.2 (L) 03/20/2024    ALB 3.1 (L) 03/20/2024    ALKPHO 93 03/19/2024    BILT 0.8 03/19/2024    TP 5.3 (L) 03/19/2024    AST 26 03/19/2024    ALT <7 (L) 03/19/2024    PTT 33.1 03/16/2024    INR 1.24 (H) 03/16/2024    PSA 1.95 10/06/2021    DDIMER 13.83 (H) 03/15/2024    MG 2.3 03/20/2024    PHOS 3.6 03/20/2024    PHOS 3.6 03/20/2024       Imaging:  IR NON-TUNNELED CATHETER    Result Date: 3/19/2024  PROCEDURE: IR NON-TUNNELLED CATHETER  INDICATIONS:  Critically ill patient on CRRT via non tunneled hemodialysis catheter.  Catheter not functioning for dialysis.  Chest x-ray demonstrates catheter terminating in the mid superior vena cava.  (S): Ion  ANESTHESIA/SEDATION: Level of anesthesia/sedation: None (Lidocaine only) Anesthesia/sedation administered by: Not applicable  ESTIMATED BLOOD LOSS: Less than 5 mL  COMPLICATIONS: None  FINDINGS:  Informed consent was obtained. The procedure was performed at bedside. The right neck including the existing 15 centimeter double-lumen catheter was sterilely prepped and draped.  Of note, the catheter was retracted several centimeters out of the venotomy site.  Local Lidocaine was administered.  A 0.035 inch wire was advanced through one of the lumens.  The catheter was then exchanged for a new 20 centimeter double-lumen catheter approximately 1 centimeter out of the venotomy. The catheter was secured to the skin. Follow-up chest  radiograph demonstrated the catheter terminating at the superior cavoatrial junction.         CONCLUSION:  Exchange of existing non-tunneled central venous catheter via internal jugular vein at bedside.  Ready for use.    Dictated by (CST): Carlitos Lemon MD on 3/19/2024 at 6:09 PM     Finalized by (CST): Carlitos Lemon MD on 3/19/2024 at 6:11 PM          XR CHEST AP PORTABLE  (CPT=71045)    Result Date: 3/19/2024  PROCEDURE: XR CHEST AP PORTABLE  (CPT=71045) TIME: 0802 hours.   COMPARISON: [facility], XR CHEST AP PORTABLE (CPT=71045), 3/17/2024, 6:21  INDICATIONS: NG tube placement. Coronary artery bypass graft on 3/15/2024.  TECHNIQUE:   Single view.   FINDINGS:  CARDIAC/VASC: Cardiomegaly.  ET tube in satisfactory position.  MEDIAST/ANTONI:   Atherosclerotic aorta with no visible aneurysm.  CABG.  Double-lumen right IJ catheter with the tip in the SVC.  Right IJ sheath with the tip in the SVC.  [place]-Ophelia catheter has been removed.  No pneumothorax.  Right paramediastinal left basilar chest tubes present.  Pericardial drainage catheter suspected at the base of the heart. LUNGS/PLEURA: Bilateral perihilar lower lobe pulmonary congestion with improved aeration left basilar retrocardiac region BONES: Mild scoliosis with mild degenerative disc disease and spondylosis.  OTHER: Nasogastric tube within the body of the stomach.  Adjacent metallic tip enteric tube also within the body of the stomach..  Cardiac monitoring leads electrodes overlie the chest.  Epicardial leads suspected         CONCLUSION:  1. Cardiomegaly.  Pulmonary venous distention.  CABG 2. Bilateral perihilar and lower lobe pulmonary congestive changes with improved aeration left basilar retrocardiac region .  Support tubes and catheters are satisfactory.  No pneumothorax 3. Metallic tip enteric tube within the body of the stomach alongside the indwelling nasogastric tube    Dictated by (CST): Wily Stephenson MD on 3/19/2024  at 11:41 AM     Finalized by (CST): Wily Stephenson MD on 3/19/2024 at 11:46 AM          XR CHEST AP PORTABLE  (CPT=71045)    Result Date: 3/19/2024  PROCEDURE: XR CHEST AP PORTABLE  (CPT=71045) TIME: 1019.   COMPARISON: [facility], XR CHEST AP PORTABLE (CPT=71045), 3/16/2024, 6:51 AM.  [facility], XR CHEST AP PORTABLE (CPT=71045), 3/17/2024, 6:21 AM.  [facility], XR CHEST AP PORTABLE (CPT=71045), 3/19/2024, 8:02 AM.  INDICATIONS: Right internal jugular dialysis catheter placement confirmation.  TECHNIQUE:   Single view.   FINDINGS:  DEVICES: There is a right-sided large-bore dual-lumen hemodialysis catheter with tip projecting near the cavoatrial junction. An endotracheal tube terminates approximately 7.2 cm above the pham. Enteric tubes extend caudally beyond the field of view. Bilateral chest tubes are evident. CARDIAC/VASC: The cardiomediastinal silhouette is accentuated by AP technique, but likely stably enlarged. There is mild tortuosity of the thoracic aorta with peripheral atherosclerotic vascular calcification. The pulmonary vascularity is mildly congested. MEDIAST/ANTONI: Surgical clips are present. LUNGS/PLEURA: Patchy opacities are demonstrated bilaterally, right worse than left, and may reflect some combination of accumulating pleural effusions, compressive atelectasis, and/or superimposed airspace consolidation. No pneumothorax is evident.  BONES: Median sternotomy wires are demonstrated. Mild scoliosis and multilevel degenerative changes of the thoracic spine are apparent. There are degenerative changes of shoulders bilaterally. OTHER: Leads overlie the chest and obscure underlying detail.           CONCLUSION:  1. Right-sided hemodialysis catheter with tip projecting near the cavoatrial junction.  2. Postthoracotomy chest with cardiomegaly and pulmonary vascular congestion.  3. Bibasilar opacities, which may reflect some combination of pleural  effusions, atelectasis, and/or superimposed pneumonia.   4. Additional support tubes and lines as above.   Dictated by (CST): Zachery Dean MD on 3/19/2024 at 10:57 AM     Finalized by (CST): Zachery Dean MD on 3/19/2024 at 11:01 AM          CT BRAIN OR HEAD (72756)    Result Date: 3/19/2024  PROCEDURE: CT BRAIN OR HEAD (CPT=70450)  COMPARISON: None.  INDICATIONS: AMS  TECHNIQUE: CT images were obtained without contrast material.  Automated exposure control for dose reduction was used.  Dose information is transmitted to the ACR (American College of Radiology) NRDR (National Radiology Data Registry) which includes the Dose Index Registry.  FINDINGS:  CSF SPACES: Ventricles, cisterns, and sulci are appropriate for age.  No hydrocephalus, subarachnoid hemorrhage, or mass.  No midline shift.  CEREBRUM: Areas of volume loss with blurring of the gray-white differentiation seen involving the left frontal lobe and left posterior occipital lobe. WHITE MATTER: Normal white matter.  CEREBELLUM: No edema, hemorrhage, mass, acute infarction, or significant atrophy.  BRAINSTEM: No edema, hemorrhage, mass, acute infarction, or significant atrophy.  CALVARIUM: No apparent fracture, mass, or other significant visible lesion.  SINUSES: There is mild mucosal thickening of the ethmoid air cells and sphenoid sinuses.  Moderate mucosal thickening of the frontal sinuses. Mild partial opacification of the bilateral mastoid air cells. ORBITS: Limited views are unremarkable.   OTHER: Negative.          CONCLUSION:   Subacute appearing left frontal and left occipital lobe infarcts.  Brain MRI can be obtained to help stage chronicity.  Pansinusitis  Partial opacification of the bilateral mastoid air cells may be sequela of inflammation. No osseous destruction or bony erosions.   Dictated by (CST): Henri Melgoza MD on 3/19/2024 at 9:46 AM     Finalized by (CST): Henri Melgoza MD on 3/19/2024 at 9:48 AM          XR CHEST AP PORTABLE   (CPT=71045)    Result Date: 3/17/2024  PROCEDURE: XR CHEST AP PORTABLE  (CPT=71045) TIME: 628 hours.   COMPARISON: [facility], XR CHEST AP PORTABLE (CPT=71045), 3/16/2024, 6:51 AM.  [facility], XR CHEST AP PORTABLE (CPT=71045), 3/15/2024, 4:44 PM.  [facility], XR CHEST AP PORTABLE (CPT=71045), 3/12/2024, 7:06 AM.  INDICATIONS: Follow up shortness of breath.  TECHNIQUE:   Single view.   FINDINGS:  CARDIAC/VASC: The cardiomediastinal silhouette is unchanged in size.  Postoperative changes from recent CABG.  There are sternotomy wires. MEDIAST/ANTONI:   No mass. LUNGS/PLEURA: Mildly low lung volumes.  Left retrocardiac opacity is present.  Slight blunting of the left costophrenic angle.  Right lung and pleural spaces are clear. BONES: The osseous structures are unchanged. OTHER: The enteric tube courses below the diaphragm with the distal tip out of the field of view.  Mediastinal drains and left basilar chest tube are unchanged.  There is a right internal jugular approach [place]-Ophelia catheter, with the tip projecting over the distal left lower lobe pulmonary artery The endotracheal tube, right IJ central venous catheter, and Impella device are in unchanged position         CONCLUSION:   Distal tip of the [place]-Ophelia catheter projects over the left lower lobe.  Recommend retraction.  Unchanged trace left pleural effusion.  Unchanged left retrocardiac opacity.    Dictated by (CST): Emily Álvarez MD on 3/17/2024 at 12:22 PM     Finalized by (CST): Emily Álvarez MD on 3/17/2024 at 12:24 PM          US ARTERIAL DUPLEX UPPER EXTREMITY LEFT (CPT=93931)    Result Date: 3/16/2024  PROCEDURE: US ARTERIAL DUPLEX UPPER EXTREMITY LEFT (CPT=93931)  COMPARISON: None.  INDICATIONS: 53-year-old man with left upper extremity edema and diminished distal pulses after recent arterial catheterization.  FINDINGS: There are multiphasic waveforms throughout the left subclavian artery with peak  systolic velocity of 122 centimeters/second proximally.  There are multiphasic waveforms throughout the left axillary artery with peak systolic velocity of 87 centimeters/second.  There are multiphasic waveforms throughout the left brachial artery with peak systolic velocity of 95 centimeters/second proximally.  There are multiphasic waveforms throughout the left radial artery, including at the wrist.  Peak systolic velocity within the proximal left radial artery is 83 centimeters/second, while peak systolic velocity within the distal radial artery is 88 centimeters/second.  There is no arterial pseudoaneurysm or dissection.  There are multiphasic waveforms throughout the left ulnar artery with peak systolic velocity of 91 centimeters/second proximally.         CONCLUSION:  1. Widely patent left upper extremity arterial vasculature, including radial artery without pseudoaneurysm or dissection.    Dictated by (CST): Guille Chan MD on 3/16/2024 at 12:56 PM     Finalized by (CST): Guille Chan MD on 3/16/2024 at 12:57 PM          CARD ECHO LIMITED (CPT=93308)    Result Date: 3/16/2024  Transthoracic Echocardiogram Name:Cristiano Obregon Date: 2024 :  1970 Ht:  (67in)  BP: 108 / 77 MRN:  [id number]    Age:  53years    Wt:  (188lb) HR: 84bpm Loc:  [facility]       Gndr: M          BSA: 1.97m^2 Sonographer: ADEEL Wyman Ordering:    Dima Ponce MD Consulting:  Derick Webb ---------------------------------------------------------------------------- History/Indications:   Coronary artery disease.  Impella placement.  PMH: Myocardial infarction.  Coronary artery bypass grafting (03/15/2024). Performed during the current admission. ---------------------------------------------------------------------------- Procedure information:  A transthoracic echocardiogram, limited study was performed. Additional evaluation included M-mode and limited 2D.  Patient status:  Inpatient.  Location:  Bedside.     Comparison was made to the study of 03/11/2024.    This was a routine study. Transthoracic echocardiography for diagnosis, ventricular function evaluation, and postoperative evaluation. Image quality was adequate. ECG rhythm:   Normal sinus ---------------------------------------------------------------------------- Conclusions: 1. Left ventricle: The cavity size was normal. Wall thickness was normal.    Systolic function was mildly reduced. The estimated ejection fraction was    45-50%, by 3D assessment. Unable to assess LV diastolic function. Impella    catheter noted in the left ventricle, placement measures 3.40cm from the    aortic valve. 2. Left ventricle: There is moderate hypokinesis of the mid-apical inferior    and mid inferolateral walls. 3. Right ventricle: Pacer wire noted in the right ventricle. Systolic    function was mildly reduced. 4. Left atrium: The left atrial volume was moderately increased. 5. Right atrium: The atrium was dilated. Pacer wire noted in right atrium. 6. Pericardium, extracardiac: There was no residual pericardial effusion.    There was a left pleural effusion. Impressions:  This study is compared with previous dated 03/11/2024: No significant change since prior study. * ---------------------------------------------------------------------------- * Findings: Left ventricle:  The cavity size was normal. Wall thickness was normal. Systolic function was mildly reduced. The estimated ejection fraction was 45-50%, by 3D assessment. Impella catheter noted in the left ventricle, placement measures 3.40cm from the aortic valve. Regional wall motion:   There is moderate hypokinesis of the mid-apical inferior and mid inferolateral walls.   Unable to assess LV diastolic function. Left atrium:  The left atrial volume was moderately increased. Right ventricle:  The cavity size was normal. Pacer wire noted in the right ventricle. Systolic function was mildly reduced. Right atrium:  The atrium  was dilated. Pacer wire noted in right atrium. Mitral valve:  The valve was structurally normal. Leaflet separation was normal. Aortic valve:   The valve was trileaflet.   Cusp separation was normal. Tricuspid valve:  The valve is structurally normal. Leaflet separation was normal. Pulmonic valve:   Poorly visualized. Pericardium:   Post pericardiocentesis:   There was no residual pericardial effusion. Pleura:  There was a left pleural effusion. Pulmonary arteries: Systolic pressure could not be accurately estimated. Systemic veins:  Not visualized. ---------------------------------------------------------------------------- Measurements  Left ventricle         Value        Ref       03/11/2024  LV end-diastolic       122   ml     --------- ----------  volume, 3D  LV end-systolic        65    ml     --------- ----------  volume, 3D  LV ejection        (L) 46    %      52 - 72   ----------  fraction, 3D  LV end-diastolic       62    ml/m^2 <=79      ----------  volume/bsa, 3D  LV end-systolic    (H) 33    ml/m^2 <=32      ----------  volume/bsa, 3D  LV wall mass, 3D       145   g      --------- ----------  LV wall mass/bsa,      74    g/m^2  --------- ----------  3D  IVS thickness, ED,     0.9   cm     0.6 - 1.0 1.0  PLAX  LV ID, ED, PLAX        5.1   cm     4.2 - 5.8 5.0  LV ID, ES, PLAX        3.8   cm     2.5 - 4.0 3.6  LV PW thickness,       0.8   cm     0.6 - 1.0 1.1  ED, PLAX  IVS/LV PW ratio,       1.13         --------- 0.91  ED, PLAX  LV PW/LV ID ratio,     0.16         --------- 0.22  ED, PLAX  LV ejection        (L) 50    %      52 - 72   54  fraction  Left atrium            Value        Ref       03/11/2024  LA volume, S       (H) 101   ml     18 - 58   88  LA volume/bsa, S   (H) 51    ml/m^2 16 - 34   44  LA volume, ES, 1-p (H) 103   ml     18 - 58   95  A4C  LA volume, ES, 1-p (H) 82    ml     18 - 58   81  A2C  LA volume, ES, A/L     106   ml     --------- 95  LA volume/bsa, ES, (H) 54    ml/m^2 16 -  34   48  A/L  LA volume, ES, 3D  (H) 80    ml     18 - 58   ----------  LA volume/bsa, ES,     41    ml/m^2 --------- ----------  3D  Right ventricle        Value        Ref       03/11/2024  TAPSE, MM          (L) 0.92  cm     >=1.70    ----------  RV s', lateral     (L) 6.3   cm/sec >=9.5     ---------- Legend: (L)  and  (H)  liza values outside specified reference range. ---------------------------------------------------------------------------- Prepared and electronically signed by Dima Ponce MD 03/16/2024 10:15     XR CHEST AP PORTABLE  (CPT=71045)    Result Date: 3/16/2024  PROCEDURE: XR CHEST AP PORTABLE  (CPT=71045) TIME: 656 hours.   COMPARISON: [facility] [place], XR CHEST AP PORTABLE (CPT=71045), 3/15/2024, 4:44 PM.  [facility] [place], XR CHEST AP PORTABLE (CPT=71045), 3/12/2024, 7:06 AM.  [facility] [place], XR CHEST AP PORTABLE (CPT=71045), 3/11/2024, 7:59 AM.  INDICATIONS: S/P Surgery  TECHNIQUE:   Single view.   FINDINGS:  CARDIAC/VASC: The cardiomediastinal silhouette is unchanged in size.  Postoperative changes from recent CABG.  There are sternotomy wires. MEDIAST/ANTONI:   No mass. LUNGS/PLEURA: Mildly low lung volumes.  Left retrocardiac opacity is present.  Slight blunting of the left costophrenic angle.  Right lung and pleural spaces are clear. BONES: The osseous structures are unchanged. OTHER: The enteric tube courses below the diaphragm with the distal tip terminating the body of the stomach.  Mediastinal drains and left basilar chest tube are unchanged.  There is a right internal jugular approach [place]-Ophelia catheter, with the tip projecting over the distal left lower lobe pulmonary artery The endotracheal tube, right IJ central venous catheter, and Impella device are in unchanged position         CONCLUSION:   Distal tip of the [place]-Ophelia catheter remains within the left lower lobe pulmonary artery.  Recommend retraction.  A secure message was sent to Emily Adams  on 03/16/2024 at 8:31 a.m.  Trace left pleural effusion.  Left retrocardiac opacity , which may be secondary to subsegmental atelectasis, edema, and/or infection.  Resolution of previously seen right pleural effusion and basilar opacity.    Dictated by (CST): Emily Álvarez MD on 3/16/2024 at 8:26 AM     Finalized by (CST): Emily Álvarez MD on 3/16/2024 at 8:30 AM          XR CHEST AP PORTABLE  (CPT=71045)    Result Date: 3/15/2024  PROCEDURE: XR CHEST AP PORTABLE  (CPT=71045) TIME: 16:44.   COMPARISON: [facility], XR CHEST AP PORTABLE (CPT=71045), 3/12/2024, 7:06 AM.  [facility] Hospital, XR CHEST AP PORTABLE (CPT=71045), 3/11/2024, 7:59 AM.  [facility], XR CHEST AP PORTABLE (CPT=71045), 3/08/2024, 11:50 AM.  INDICATIONS: og tube placement  TECHNIQUE:   Single view.   FINDINGS:  CARDIAC/VASC: The cardiomediastinal silhouette is unchanged in size.  Postoperative changes from recent CABG.  There are sternotomy wires. MEDIAST/ANTONI:   No mass. LUNGS/PLEURA: The right pleural effusion and associated right basilar airspace opacity have almost entirely resolved.  No significant change in the pulmonary vascular congestion.  No pneumothorax.  There are low lung volumes. BONES: The osseous structures are unchanged. OTHER: The enteric tube courses below the diaphragm with the distal tip terminating the body of the stomach.  There is been interval placement of a mediastinal left basilar chest tube.  The endotracheal tube, right IJ central venous catheter, right IJ swans Ophelia catheter and Impella device are in unchanged position         CONCLUSION:   1. Postoperative changes from recent CABG. 2. Well-positioned enteric tube, which terminates in the body of the stomach. 3. Interval placement of mediastinal and left basilar chest tubes.  No pneumothorax. 4. Remaining support devices are in unchanged positioning.  5. Near complete resolution of the right pleural effusion and associated  right lower lobe airspace opacity. 6. No significant change in the pulmonary vascular congestion.     Dictated by (CST): David Hendrickson MD on 3/15/2024 at 5:11 PM     Finalized by (CST): David Hendrickson MD on 3/15/2024 at 5:17 PM          XR CHEST AP PORTABLE  (CPT=71045)    Result Date: 3/12/2024  PROCEDURE: XR CHEST AP PORTABLE  (CPT=71045) TIME: 0708 hours  COMPARISON: [facility], XR CHEST AP PORTABLE (CPT=71045), 3/11/2024, 7:59  INDICATIONS: Acute myocardial infarction.  Severe three-vessel CAD status post balloon angioplasty.  Assess Impella catheter placement.  TECHNIQUE:   Single view.   FINDINGS:  CARDIAC/VASC: Cardiomegaly.  Pulmonary venous distention.  ET tube in satisfactory position  MEDIAST/ANTONI:   Atherosclerotic aorta with no visible aneurysm.  Left ventricular assist Impella catheter with the tip in the left ventricle.  Right IJ [place]-Ophelia catheter with the tip in the left descending pulmonary intralobar artery.  Additional right IJ catheter with the tip in the SVC.  No pneumothorax. LUNGS/PLEURA: Mild bilateral perihilar basilar pulmonary congestive changes.  Small right-sided effusion. BONES: S-shaped scoliosis dorsal lumbar spine OTHER: Negative.          CONCLUSION:  1. Cardiomegaly pulmonary venous distention. 2. Support tubes and catheters are satisfactory.  No pneumothorax 3. Mild perihilar and basilar congestive changes worse on the right has progressed.  4. Small right-sided effusion.    Dictated by (CST): Wily Stephenson MD on 3/12/2024 at 8:51 AM     Finalized by (CST): Wily Stephenson MD on 3/12/2024 at 8:56 AM          CARD ECHO LIMITED (CPT=93308)    Result Date: 3/11/2024  Transthoracic Echocardiogram Name:Cristiano Obregon Date: 2024 :  1970 Ht:  (67in)  BP: 110 / 76 MRN:  [id number]    Age:  53years    Wt:  (192lb) HR: 75bpm Loc:  [facility]       Gndr: M          BSA: 1.99m^2 Sonographer: Yasmo Ordering:    Ruben Ramirez Consulting:  Janet Velazco  ---------------------------------------------------------------------------- History/Indications:   Coronary artery disease.  Impella placement. ---------------------------------------------------------------------------- Procedure information:  A transthoracic echocardiogram, limited study was performed. Additional evaluation included M-mode and limited 2D.  Patient status:  Inpatient.  Location:  [facility]    Comparison was made to the study of 03/08/2024.    This was a routine study. Transthoracic echocardiography for diagnosis and ventricular function evaluation. Image quality was adequate. ECG rhythm:   Normal sinus ---------------------------------------------------------------------------- Conclusions: 1. Left ventricle: The cavity size was normal. Wall thickness was moderately    increased. The estimated ejection fraction was 50-55%, by visual    assessment. Hypokinesis of the inferior wall. Hypokinesis of the    basal-midlateral wall. Impella catheter noted in the left ventricle,    placement measures 3.30cm from the aortic valve. 2. Right ventricle: Pacer wire noted in the right ventricle. 3. Left atrium: The left atrial volume was moderately increased. 4. Right atrium: Pacer wire noted in right atrium. 5. Pericardium, extracardiac: There was a right pleural effusion. 6. Left ventricle: Impressions:  This study is compared with previous dated 03/08/2024: * ---------------------------------------------------------------------------- * Findings: Left ventricle:  The cavity size was normal. Wall thickness was moderately increased. The estimated ejection fraction was 50-55%, by visual assessment. Impella catheter noted in the left ventricle, placement measures 3.30cm from the aortic valve.  Regional wall motion abnormalities:   Hypokinesis of the inferior wall.  Hypokinesis of the basal-midlateral wall. Left atrium:  The left atrial volume was moderately increased. Right ventricle:  The cavity size was normal. Pacer  wire noted in the right ventricle. Systolic function was normal. Right atrium:  The atrium was normal in size. Pacer wire noted in right atrium. Mitral valve:  The valve was structurally normal. Leaflet separation was normal. Aortic valve:  The valve was structurally normal. The valve was trileaflet. Cusp separation was normal. Tricuspid valve:  The valve is structurally normal. Leaflet separation was normal. Pulmonic valve:   Not visualized. Pleura:  There was a right pleural effusion. Pulmonary arteries: Systolic pressure could not be accurately estimated. Systemic veins:  Central venous respirophasic diameter changes are blunted (< 50%). Inferior vena cava: The IVC was normal-sized. ---------------------------------------------------------------------------- Measurements  Left ventricle         Value        Ref       03/08/2024  IVS thickness, ED,     1.0   cm     0.6 - 1.0 1.0  PLAX  LV ID, ED, PLAX        5.0   cm     4.2 - 5.8 5.1  LV ID, ES, PLAX        3.6   cm     2.5 - 4.0 3.8  LV PW thickness,   (H) 1.1   cm     0.6 - 1.0 0.9  ED, PLAX  IVS/LV PW ratio,       0.91         --------- 1.11  ED, PLAX  LV PW/LV ID ratio,     0.22         --------- 0.18  ED, PLAX  LV ejection            54    %      52 - 72   50  fraction  Left atrium            Value        Ref       03/08/2024  LA volume, S       (H) 88    ml     18 - 58   ----------  LA volume/bsa, S   (H) 44    ml/m^2 16 - 34   ----------  LA volume, ES, 1-p (H) 95    ml     18 - 58   ----------  A4C  LA volume, ES, 1-p (H) 81    ml     18 - 58   ----------  A2C  LA volume, ES, A/L     95    ml     --------- ----------  LA volume/bsa, ES, (H) 48    ml/m^2 16 - 34   ----------  A/L  Inferior vena cava     Value        Ref       03/08/2024  ID                     1.9   cm     <=2.1     2.5 Legend: (L)  and  (H)  liza values outside specified reference range. ---------------------------------------------------------------------------- Prepared and  electronically signed by Ruben Ramirez 2024 19:47     XR CHEST AP PORTABLE  (CPT=71045)    Result Date: 3/11/2024  PROCEDURE: XR CHEST AP PORTABLE  (CPT=71045) TIME: 759 a.   COMPARISON: [facility], XR CHEST AP PORTABLE (CPT=71045), 3/05/2024, 9:26 AM.  [facility], XR CHEST AP PORTABLE (CPT=71045), 3/07/2024, 10:58 AM.  [facility], XR CHEST AP PORTABLE (CPT=71045), 3/08/2024, 11:50 AM.  INDICATIONS: Impella and intubated, assess hardware.  TECHNIQUE:   Single view.   FINDINGS:  CARDIAC/VASC: Heart size upper limits of normal to mildly enlarged, and there is mild interstitial edema suggesting mild pulmonary congestion/fluid overload. MEDIAST/ANTONI:   No visible mass or adenopathy. LUNGS/PLEURA: ET tube in the trachea at the level the clavicles.  NG tube has been removed.  Right internal jugular central line sheath ends in the SVC.  [place]-Ophelia catheter is in the descending left pulmonary artery.  Impella device in profile with the left ventricle.  Blunting of the right costophrenic angle may suggest small amount of right pleural fluid. BONES: No fracture or visible bony lesion. OTHER: Negative.          CONCLUSION:  1. Heart size upper limits of normal to mildly enlarged, and there is mild interstitial edema suggesting mild cardiac failure/fluid overload.  ET tube in the trachea at the level of the clavicles.  Impella device in profile with the left ventricle.  [place]-Ophelia catheter in the descending left pulmonary artery.  No pneumothorax.  Small right pleural effusion suggested.    Dictated by (CST): Moisés Delgadillo MD on 3/11/2024 at 8:29 AM     Finalized by (CST): Moisés Delgadillo MD on 3/11/2024 at 8:32 AM          CARD ECHO 2D W/O DOPPLER (CPT=93307)    Result Date: 3/8/2024  Transthoracic Echocardiogram Name:Cristiano Obregon Date: 2024 :  1970 Ht:  (67in)  BP: 127 / 78 MRN:  [id number]    Age:  53years    Wt:  (185lb) HR: 75bpm Loc:  [facility]       Gndr: M           BSA: 1.96m^2 Sonographer: Manuel Ordering:    Carlitos Sandoval Consulting:  Janet Velazco ---------------------------------------------------------------------------- History/Indications:  Impella placement. ---------------------------------------------------------------------------- Procedure information:  A transthoracic echocardiogram, limited study was performed. Additional evaluation included M-mode and limited 2D.  Patient status:  Inpatient.  Location:  [facility]    This was a routine study. Transthoracic echocardiography for diagnosis, ventricular function evaluation, and post closure device deployment evaluation. Image quality was adequate. ECG rhythm:   Normal sinus ---------------------------------------------------------------------------- Conclusions: 1. Left ventricle: The cavity size was normal. Wall thickness was normal.    Systolic function was normal. The estimated ejection fraction was 50-55%,    by visual assessment. Hypokinesis of the anterolateral wall. Unable to    assess LV diastolic function. Impella catheter noted in the left    ventricle, placement measures 3.50cm from the aortic valve. 2. Right ventricle: Pacer wire noted in the right ventricle. 3. Right atrium: Pacer wire noted in right atrium. * ---------------------------------------------------------------------------- * Findings: Left ventricle:  The cavity size was normal. Wall thickness was normal. Systolic function was normal. The estimated ejection fraction was 50-55%, by visual assessment. Impella catheter noted in the left ventricle, placement measures 3.50cm from the aortic valve.  Regional wall motion abnormalities:  Hypokinesis of the anterolateral wall. Unable to assess LV diastolic function. Right ventricle:  The cavity size was normal. Pacer wire noted in the right ventricle. Systolic function was normal. Right atrium:  Pacer wire noted in right atrium. Mitral valve:  The valve was structurally normal. Leaflet separation  was normal. Aortic valve:   The valve was trileaflet. Tricuspid valve:  The valve is structurally normal. Leaflet separation was normal. Pulmonic valve:   Not visualized. Pericardium:   There was no pericardial effusion. Pulmonary arteries: Systolic pressure could not be accurately estimated. Systemic veins:  Central venous respirophasic diameter changes are blunted (< 50%). Inferior vena cava: The IVC was dilated. ---------------------------------------------------------------------------- Measurements  Left ventricle         Value        Ref       03/07/2024  IVS thickness, ED,     1.0   cm     0.6 - 1.0 1.3  PLAX  LV ID, ED, PLAX        5.1   cm     4.2 - 5.8 4.4  LV ID, ES, PLAX        3.8   cm     2.5 - 4.0 3.3  LV PW thickness,       0.9   cm     0.6 - 1.0 1.4  ED, PLAX  IVS/LV PW ratio,       1.11         --------- 0.93  ED, PLAX  LV PW/LV ID ratio,     0.18         --------- 0.32  ED, PLAX  LV ejection        (L) 50    %      52 - 72   50  fraction  Inferior vena cava     Value        Ref       03/07/2024  ID                 (H) 2.5   cm     <=2.1     ----------  Right ventricle        Value        Ref       03/07/2024  TAPSE, 2D              2.39  cm     >=1.70    ----------  TAPSE, MM              2.39  cm     >=1.70    ----------  RV s', lateral         18.8  cm/sec >=9.5     ---------- Legend: (L)  and  (H)  liza values outside specified reference range. ---------------------------------------------------------------------------- Prepared and electronically signed by Gurjit Elliott 03/08/2024 17:42     XR CHEST AP PORTABLE  (CPT=71045)    Result Date: 3/8/2024  PROCEDURE: XR CHEST AP PORTABLE  (CPT=71045) TIME: 12:05.   COMPARISON: [facility], XR CHEST AP PORTABLE (CPT=71045), 3/05/2024, 9:26 AM.  [facility], XR CHEST AP PORTABLE (CPT=71045), 3/06/2024, 6:13 AM.  [facility], XR CHEST AP PORTABLE (CPT=71045), 3/07/2024, 10:58 AM.  INDICATIONS: Verify  confirm NG tube placement for tube feeding.  Myocardial infarction, cardiogenic shock, acute hypoxic respiratory failure.  TECHNIQUE:   Single view.   FINDINGS:  CARDIAC/VASC: The cardiac silhouette is not enlarged.  Unremarkable pulmonary vasculature.  MEDIAST/ANTONI:   No visible mass or adenopathy. LUNGS/PLEURA: Subsegmental atelectasis has developed in the lower right lung.  No pleural effusion or pneumothorax. BONES: The osseous structures are unchanged. OTHER: An endotracheal tube tip projects 6.7 cm above the pham.  There is a new enteric tube with tip projecting over the expected location of the gastroesophageal junction.  A right internal jugular approach central venous catheter tip again projects over the SVC.  A right internal jugular approach [place]-Ophelia catheter tip again projects over the left interlobar artery. An Impella device again projects over the left ventricular apex.         CONCLUSION:  1. New enteric tube tip appears to terminate about the gastroesophageal junction.  Recommend advancing at least 9.0 cm distally into the stomach. 2. Additional lines/tubes in stable customary positioning. 3. Development of subsegmental atelectasis in the lower right lung.   Results of this examination were discussed with the patient's nurse, Mayela Ch, by Dr. Edwards at 12:40 on 03/08/2024.  Dictated by (CST): Bc Edwards MD on 3/08/2024 at 12:36 PM     Finalized by (CST): Bc Edwards MD on 3/08/2024 at 12:42 PM          IR CENTRAL VENOUS ACCESS    Result Date: 3/7/2024  PROCEDURE: IR ULTRASOUND-GUIDED NON TUNNELED HEMODIALYSIS CATHETER INSERTION  INDICATIONS:  53-year-old man with acute kidney insufficiency requiring CRRT.  (S): MD Dustin  ANESTHESIA/SEDATION: Level of anesthesia/sedation: None (Lidocaine only) Anesthesia/sedation administered by: Not applicable  ESTIMATED BLOOD LOSS: Less than 5 mL  COMPLICATIONS: None  FINDINGS:  Informed consent was obtained from the  patient's power-of-. The procedure was performed at bedside. The right side of the neck was sterilely prepped and draped. Preliminary ultrasound demonstrated a patent right internal jugular vein despite the presence of a [place]-Ophelia catheter. Local Lidocaine was administered. Under ultrasound guidance, the vein was accessed with a micropuncture set; an image was saved.  A 0.035 inch wire was advanced through the dilator. The access was dilated. A 15 cm temporary hemodialysis catheter was placed with its tip in the expected location of the superior vena cava. The catheter was secured to the skin. Follow-up chest radiograph demonstrated the catheter terminating in expected position.         CONCLUSION: Placement of non-tunneled central venous catheter via right internal jugular vein at bedside.  The catheter is ready for use.    Dictated by (CST): Guille Chan MD on 3/07/2024 at 12:46 PM     Finalized by (CST): Guille Chan MD on 3/07/2024 at 12:47 PM          CARD ECHO 2D W/O DOPPLER (CPT=93307)    Result Date: 3/7/2024  Transthoracic Echocardiogram Name:Cristiano Obregon Date: 2024 :  1970 Ht:  (67in)  BP: 105 / 68 MRN:  [id number]    Age:  53years    Wt:  (183lb) HR: Loc:  [facility]       Gndr: M          BSA: 1.95m^2 Sonographer: Glenna DENIS Ordering:    Carlitos Sandoval Consulting:  Janet Velazco ---------------------------------------------------------------------------- History/Indications:   Impella Placement. ---------------------------------------------------------------------------- Procedure information:  A transthoracic echocardiogram, limited study was performed. Additional evaluation included M-mode and limited 2D.  Patient status:  Inpatient.  Location:  Bedside.    Comparison was made to the study of 2024.    This was a routine study. Transthoracic echocardiography for diagnosis. Image quality was adequate. The study was technically limited due to restricted patient mobility  and patient supine. ---------------------------------------------------------------------------- Conclusions: Left ventricle: The cavity size was normal. Wall thickness was mildly increased. Systolic function was mildly reduced. The estimated ejection fraction was 45-50%, by biplane method of disks. Unable to assess LV diastolic function. Impressions:  No significant change since prior study. * ---------------------------------------------------------------------------- * Findings: Left ventricle:  The cavity size was normal. Wall thickness was mildly increased. Systolic function was mildly reduced. The estimated ejection fraction was 45-50%, by biplane method of disks. Unable to assess LV diastolic function. Pericardium:   There was no pericardial effusion. Pleura:  No evidence of pleural fluid accumulation. ---------------------------------------------------------------------------- Measurements  Left ventricle         Value        Ref       03/06/2024  IVS thickness, ED, (H) 1.3   cm     0.6 - 1.0 ----------  PLAX  LV ID, ED, PLAX        4.4   cm     4.2 - 5.8 ----------  LV ID, ES, PLAX        3.3   cm     2.5 - 4.0 ----------  LV PW thickness,   (H) 1.4   cm     0.6 - 1.0 ----------  ED, PLAX  IVS/LV PW ratio,       0.93         --------- ----------  ED, PLAX  LV PW/LV ID ratio,     0.32         --------- ----------  ED, PLAX  LV ejection        (L) 50    %      52 - 72   ----------  fraction  LV end-diastolic       90    ml     69 - 185  82  volume, 1-p A4C  LV ejection            46    %      46 - 74   39  fraction, 1-p A4C  Stroke volume, 1-p     41    ml     --------- 32  A4C  LV end-diastolic       46    ml/m^2 37 - 93   43  volume/bsa, 1-p  A4C  Stroke volume/bsa,     21    ml/m^2 --------- 17  1-p A4C  LV end-diastolic       79    ml     62 - 150  81  volume, 2-p  LV end-systolic        45    ml     21 - 61   49  volume, 2-p  LV ejection        (L) 43    %      52 - 72   40  fraction, 2-p  Stroke volume,  2-p     34    ml     --------- 33  LV end-diastolic       40    ml/m^2 34 - 74   42  volume/bsa, 2-p  LV end-systolic        23    ml/m^2 11 - 31   25  volume/bsa, 2-p  Stroke volume/bsa,     17.2  ml/m^2 --------- 16.9  2-p Legend: (L)  and  (H)  liza values outside specified reference range. ---------------------------------------------------------------------------- Prepared and electronically signed by Carlitos Sandoval 2024 12:43     XR CHEST AP PORTABLE  (CPT=71045)    Result Date: 3/7/2024         PROCEDURE: XR CHEST AP PORTABLE  (CPT=71045) TIME: 1058  COMPARISON: [facility], XR CHEST AP PORTABLE (CPT=71045), 3/06/2024, 6:13 AM.  INDICATIONS: Verify Temporary Hemodialysis Catheter placement.  TECHNIQUE:   Single view.   Findings and impression:  Right IJ dialysis catheter in the lower SVC.  No pneumothorax  Interval PA catheter in the left inter lobar artery  Stable ETT and Impella.  Enteric tube remains in the lower esophagus  Increasing vascular congestion with mild basilar predominant edema     Dictated by (CST): Reyes Rodriguez MD on 3/07/2024 at 11:51 AM     Finalized by (CST): Reyes Rodriguez MD on 3/07/2024 at 11:52 AM          US KIDNEYS (CPT=76775)    Result Date: 3/6/2024  PROCEDURE: US KIDNEYS (CPT=76775)  COMPARISON: None.  INDICATIONS: Acute renal failure  TECHNIQUE: Ultrasound examination was performed to visualize the kidneys and bladder.   FINDINGS:  RIGHT KIDNEY: 11 cm.  No mass or calculus or hydronephrosis.  LEFT KIDNEY: 10.5 cm. No mass or calculus or hydronephrosis.  BLADDER: Partly decompressed around a [place] catheter.  Bladder contains 40 cc of urine.  CONCLUSION: No hydronephrosis     DICTATED BY (CST): REYES RODRIGUEZ MD ON 3/06/2024 AT 3:00 PM     FINALIZED BY (CST): REYES RODRIGUEZ MD ON 3/06/2024 AT 3:01 PM             CARD ECHO 2D W/O DOPPLER (CPT=93307)    Result Date: 3/6/2024  Transthoracic Echocardiogram Name:Cristiano Obregon Date:    2024    :      05/19/1970    Ht:     (67in)     BP: MRN:     [id number]       Age:     53years       Wt:     (180lb)    HR: Loc:     [facility]          Gndr:    M             BSA:    1.93m^2 Sonographer: BELKIS Cedeno RDCS Ordering:    Carlitos Sandoval Consulting:  Janet Velazco ---------------------------------------------------------------------------- Procedure information:  A transthoracic echocardiogram, limited study was performed. Additional evaluation included M-mode and limited 2D.  Image quality was adequate. ECG rhythm:   Normal sinus ---------------------------------------------------------------------------- Conclusions: Left ventricle: The cavity size was normal. Wall thickness was normal. Systolic function was mildly reduced. The estimated ejection fraction was 40-45%, by biplane method of disks. Impella catheter noted in the left ventricle, placement measures 3.70cm from the aortic valve. * ---------------------------------------------------------------------------- * Findings: Left ventricle:  The cavity size was normal. Wall thickness was normal. Systolic function was mildly reduced. The estimated ejection fraction was 40-45%, by biplane method of disks. Impella catheter noted in the left ventricle, placement measures 3.70cm from the aortic valve. Left atrium:  The atrium was normal in size. Right ventricle:  The cavity size was normal. Wall thickness was normal. Systolic function was normal. Right atrium:  The atrium was normal in size. Mitral valve:  The valve was structurally normal. Leaflet separation was normal. Aortic valve:  The valve was structurally normal. The valve was trileaflet. Cusp separation was normal. Tricuspid valve:  The valve is structurally normal. Leaflet separation was normal. Pulmonic valve:   The valve is structurally normal. Cusp separation was normal. Pericardium:   There was no pericardial effusion. Aorta: Aortic root: The aortic root was normal-sized. Pulmonary arteries: The main pulmonary artery  was normal-sized. Systemic veins: Inferior vena cava: The IVC was normal-sized. ---------------------------------------------------------------------------- Measurements  Left ventricle                     Value        Ref  LV end-diastolic volume, 1-p       82    ml     69 - 185  A4C  LV ejection fraction, 1-p A4C  (L) 39    %      46 - 74  Stroke volume, 1-p A4C             32    ml     --------  LV end-diastolic volume/bsa,       43    ml/m^2 37 - 93  1-p A4C  Stroke volume/bsa, 1-p A4C         17    ml/m^2 --------  LV end-diastolic volume, 2-p       81    ml     62 - 150  LV end-systolic volume, 2-p        49    ml     21 - 61  LV ejection fraction, 2-p      (L) 40    %      52 - 72  Stroke volume, 2-p                 33    ml     --------  LV end-diastolic volume/bsa,       42    ml/m^2 34 - 74  2-p  LV end-systolic volume/bsa,        25    ml/m^2 11 - 31  2-p  Stroke volume/bsa, 2-p             16.9  ml/m^2 --------  Right ventricle                    Value        Ref  TAPSE, MM                          2.29  cm     >=1.70  RV s', lateral                     16.3  cm/sec >=9.5 Legend: (L)  and  (H)  liza values outside specified reference range. ---------------------------------------------------------------------------- Prepared and electronically signed by Andrea Villeda 03/06/2024 11:13     CATH SWAN WAYNE INSERTION    Result Date: 3/6/2024  This exam has been completed. Please refer to Notes for the results to this procedure.    XR CHEST AP PORTABLE  (CPT=71045)    Addendum Date: 3/6/2024    ADDENDUM:  Enteric tube remains positioned in the distal esophagus approximately 3 cm above the expected location of the GE junction   Dictated by (CST): Reyes Rodriguez MD on 3/06/2024 at 7:45 AM     Finalized by (CST): Reyes Rodriguez MD on 3/06/2024 at 7:46 AM             Result Date: 3/6/2024         PROCEDURE: XR CHEST AP PORTABLE  (CPT=71045) TIME: 613  COMPARISON: [facility], XR CHEST AP PORTABLE  (CPT=71045), 3/05/2024, 9:26 AM.  [facility], XR CHEST AP PORTABLE (CPT=71045), 3/05/2024, 6:37 PM.  INDICATIONS: Follow up chest pain.  TECHNIQUE:   Single view.   Findings and impression:  Impella was advanced further into the left ventricle  ETT 5.5 cm above the pham  Stable normal heart size.  Stable borderline vascular congestion but there is no edema  Minimal basilar atelectasis.  No consolidation  No pneumothorax or significant effusion     Dictated by (CST): Reyes Rodriguez MD on 3/06/2024 at 7:41 AM     Finalized by (CST): Reyes Rodriguez MD on 3/06/2024 at 7:44 AM          CATH VENTRICULAR ASSIST DEVICE    Result Date: 3/5/2024  This exam has been completed. Please refer to Notes for the results to this procedure.    XR CHEST AP PORTABLE  (CPT=71045)    Result Date: 3/5/2024  PROCEDURE: XR CHEST AP PORTABLE  (CPT=71045) TIME: 1659.   COMPARISON: [facility], XR CHEST AP PORTABLE (CPT=71045), 3/05/2024, 3:17 PM.  [facility], XR CHEST AP PORTABLE (CPT=71045), 3/05/2024, 9:26 AM.  INDICATIONS: Verify OG placement.  TECHNIQUE:   Single view.   FINDINGS:          CONCLUSION:   Nasogastric tube with tip within the distal esophagus and side hole within the mid esophagus.  Advancement recommended.  Remainder of the lines and tubes are otherwise unchanged.    Dictated by (CST): Henri Melgoza MD on 3/05/2024 at 7:30 PM     Finalized by (CST): Henri Melgoza MD on 3/05/2024 at 7:32 PM          XR CHEST AP PORTABLE  (CPT=71045)    Result Date: 3/5/2024  PROCEDURE: XR CHEST AP PORTABLE  (CPT=71045) TIME: 1842 hours  COMPARISON: [facility], XR CHEST AP PORTABLE (CPT=71045), 3/05/2024, 4:59 PM.  INDICATIONS: Impella placement.  Follow-up abnormal chest x-ray.  TECHNIQUE:   Single view.   FINDINGS:  CARDIAC/VASC: Heart size and pulmonary vascularity normal. MEDIAST/ANTONI:   No visible mass or adenopathy. LUNGS/PLEURA: No consolidation or pleural effusion. BONES: No  fracture or visible bony lesion. OTHER: Pigtail from the Impella device is probably in the LVOT and outlet pump in aortic arch.  ET tube in the thoracic inlet approximately 6 cm above the pham.  NG tube tip in distal esophagus just above the diaphragm.         CONCLUSION:  1. Pigtail of Impella device is probably in the LVOT, and the outlet pump is probably in the aortic arch. 2. High position of ET tube approximately 6 cm above pham. 3. High position of nasogastric tube with tip in the distal esophagus just above GE junction.     Dictated by (CST): Fabian Gay MD on 3/05/2024 at 6:54 PM     Finalized by (CST): Fabian Gay MD on 3/05/2024 at 7:07 PM          XR CHEST AP PORTABLE  (CPT=71045)    Result Date: 3/5/2024  PROCEDURE: XR CHEST AP PORTABLE  (CPT=71045) TIME: 1517 hours.   COMPARISON: [facility], XR CHEST AP PORTABLE (CPT=71045), 3/05/2024, 9:26 AM.  INDICATIONS: OG placement  TECHNIQUE:   Single view.   FINDINGS:  CARDIAC/VASC: Normal heart size and mildly prominent pulmonary vascularity with minimal interstitial edema improved somewhat since previous study.  Impella device in profile with the left side of the heart. MEDIAST/ANTONI:   No visible mass or adenopathy. LUNGS/PLEURA: ET tube in the trachea the level the clavicles.  No pneumothorax.  No acute airspace consolidation.  Mild interstitial edema but improved somewhat since previous study.  NG tube terminates at the GE junction and should be advanced further into the stomach by at least 10 cm. BONES: No fracture or visible bony lesion. OTHER: Negative.          CONCLUSION:  1. NG tube terminates at the GE junction should be advanced further into the stomach by at least 10 cm. 2. Normal heart size with mild interstitial edema but improved somewhat since previous study.  No large airspace consolidation or pleural effusion.  No pneumothorax.  ET tube in the trachea the level the clavicles.    Dictated by (CST): Moisés Delgadillo MD on  3/05/2024 at 3:48 PM     Finalized by (CST): Moisés Delgadillo MD on 3/05/2024 at 3:50 PM          US CAROTID DOPPLER BILAT - DIAG IMG (CPT=93880)    Result Date: 3/5/2024  PROCEDURE: US CAROTID DOPPLER BILAT-DIAG IMG (CPT=93880)  COMPARISON: None.  INDICATIONS: Pre op cabg  TECHNIQUE: Color duplex Doppler ultrasound and pulsed Doppler analysis were performed to evaluate the cervical carotid arteries and vertebral flow.  All measurements for carotid artery narrowing or stenosis were obtained using the ipsilateral distal internal carotid artery as the reference value.  (NASCET criteria)  FINDINGS:  PEAK FLOW VELOCITIES (cm/sec):  RIGHT CAROTID: Mild ICA atherosclerotic plaque.  CCA Peak systolic: 40  ICA Peak systolic: 27.  ICA End diastolic: 15.   ECA Peak systolic:  32.   VICA/VCCA: 0.9.   LEFT CAROTID: Distal ICA not visualized.  Mild atherosclerotic plaque bifurcation and ICA.  CCA Peak systolic: 37.   ICA Peak systolic: 27.   ICA End diastolic: 19.   ECA Peak systolic:  29.   VICA/VCCA: 0.8.   VERTEBRALS: Antegrade flow on the left.  Right vertebral artery not visualized.  Left Peak systolic: 29.   OTHER FINDINGS: No significant findings.   pectral Doppler US Thresholds (Reference: Isauro EG, et al. Radiology 2000; 214:247-252)      Stenosis (%)       PSV (cm/sec)       VICA/VCCA           0-49                    <150                     <2.5           50-69                  150-225                2.5-4.0             CONCLUSION:   Distal left internal carotid artery not visualized.  Otherwise no evidence of bilateral hemodynamically significant stenosis.  Nonvisualization right vertebral artery.  Antegrade flow left vertebral artery.    Dictated by (CST): Jcarlos Harmon MD on 3/05/2024 at 2:31 PM     Finalized by (CST): Jcarlos Harmon MD on 3/05/2024 at 2:35 PM          CARD ECHO 2D W/O DOPPLER (CPT=93307)    Result Date: 3/5/2024  Transthoracic Echocardiogram Name:Cristiano Obregon Date: 2024 :   05/19/1970 Ht:  (67in)  BP: 107 / 84 MRN:  [id number]    Age:  53years    Wt:  (160lb) HR: 75bpm Loc:  [facility]       Gndr: M          BSA: 1.84m^2 Sonographer: Angelica Ordering:    Carlitos Sandoval Consulting:  Danial Kirk ---------------------------------------------------------------------------- History/Indications:   Coronary artery disease. Impella placement. ---------------------------------------------------------------------------- Procedure information:  A transthoracic echocardiogram, limited study was performed. Additional evaluation included M-mode and limited 2D.  Patient status:  Inpatient.  Location:  Bedside.    Comparison was made to the study of 03/05/2024.    This was a STAT study. Transthoracic echocardiography for diagnosis, ventricular function evaluation, and post intervention evaluation. Image quality was adequate. ECG rhythm:   Frequent ectopies ---------------------------------------------------------------------------- Conclusions: 1. Left ventricle: The cavity size was normal. Wall thickness was normal.    Systolic function was at the lower limits of normal. The estimated    ejection fraction was 50-55%, by visual assessment. Unable to assess LV    diastolic function. Impella catheter noted in the left ventricle,    placement measures 3.50cm from the aortic valve. 2. Left atrium: The atrium was mildly dilated. 3. Right atrium: The atrium was mildly dilated. Impressions:  This study is compared with previous dated 03/05/2024: Compared to the previous study, these findings are new. Interval placement of impella. * ---------------------------------------------------------------------------- * Findings: Left ventricle:  The cavity size was normal. Wall thickness was normal. Systolic function was at the lower limits of normal. The estimated ejection fraction was 50-55%, by visual assessment. Impella catheter noted in the left ventricle, placement measures 3.50cm from the aortic valve. Unable to assess  LV diastolic function. Left atrium:  The atrium was mildly dilated. Right ventricle:  The cavity size was normal. Systolic function was normal. Right atrium:  The atrium was mildly dilated. Mitral valve:  The valve was structurally normal. Leaflet separation was normal. Aortic valve:   The valve was trileaflet.   Cusp separation was normal. Tricuspid valve:  The valve is structurally normal. Leaflet separation was normal. Pulmonic valve:   Not well visualized. Pericardium:   There was no pericardial effusion. Pulmonary arteries: Systolic pressure could not be accurately estimated. ---------------------------------------------------------------------------- Measurements  Left ventricle               Value    Ref  IVS thickness, ED, PLAX      1.0   cm 0.6 - 1.0  LV ID, ED, PLAX              4.7   cm 4.2 - 5.8  LV ID, ES, PLAX              3.2   cm 2.5 - 4.0  LV PW thickness, ED, PLAX    0.8   cm 0.6 - 1.0  IVS/LV PW ratio, ED, PLAX    1.25     ---------  LV PW/LV ID ratio, ED, PLAX  0.17     ---------  LV ejection fraction         60    %  52 - 72 Legend: (L)  and  (H)  liza values outside specified reference range. ---------------------------------------------------------------------------- Prepared and electronically signed by Carlitos Sandoval 03/05/2024 11:32     XR CHEST AP PORTABLE  (CPT=71045)    Result Date: 3/5/2024  PROCEDURE: XR CHEST AP PORTABLE  (CPT=71045) TIME: 0926 hours  COMPARISON: None.  INDICATIONS: Chest pain, post intubation  TECHNIQUE:   Single view.   FINDINGS:  CARDIAC/VASC: Normal heart size with mild to moderate pulmonary congestive change suggesting acute cardiac failure/fluid overload.  Correlate clinically.  Cardiac device projected in profile with the left ventricle and aortic outflow tract. MEDIAST/ANTONI:   No visible mass or adenopathy. LUNGS/PLEURA: ET tube in the trachea at the level of the clavicles.  No pneumothorax.  No large airspace consolidation.  Mild to moderate pulmonary congestive  change. BONES: No fracture or visible bony lesion. OTHER: Negative.          CONCLUSION:  1. Normal heart size with mild to moderate pulmonary congestive change suggesting acute cardiac failure/fluid overload.  Correlate clinically.  ET tube in the trachea the level of the clavicles.  No pneumothorax or large airspace consolidation.    Dictated by (CST): Moisés Delgadillo MD on 3/05/2024 at 9:47 AM     Finalized by (CST): Moisés Delgadillo MD on 3/05/2024 at 9:50 AM          CARD ECHO LIMITED (CPT=93308)    Result Date: 3/5/2024  Transthoracic Echocardiogram Name:Cristiano Obregon Date:    2024    :     1970    Ht:     (67in)     BP: MRN:     [id number]       Age:     53years       Wt:     (160lb)    HR: Loc:     [facility]          Gndr:    M             BSA:    1.84m^2 Sonographer: ADEEL Wyman Ordering:    Carlitos Sandoval Consulting:  Richie Llanes ---------------------------------------------------------------------------- History/Indications:   Chest pain.  Coronary artery disease. ---------------------------------------------------------------------------- Procedure information:  A transthoracic echocardiogram, limited study was performed. Additional evaluation included M-mode and limited 2D.  Patient status:  Inpatient.  Location:  Catheterization laboratory.    This was a STAT study. Transthoracic echocardiography for diagnosis and ventricular function evaluation. Image quality was adequate. ---------------------------------------------------------------------------- Conclusions: 1. Left ventricle: Systolic function was mildly reduced. The estimated    ejection fraction was 45-50%, by visual assessment. Unable to assess LV    diastolic function. 2. Left atrium: The atrium was dilated. 3. Right atrium: The atrium was mildly dilated. 4. Aortic valve: There was mild regurgitation. 5. Mitral valve: There was mild regurgitation. 6. Limited study in cath lab, grossly ejection fraction 45-50%. Wire present    in the  left ventricle. Impressions:  No previous study was available for comparison. * ---------------------------------------------------------------------------- * Findings: Left ventricle:  Systolic function was mildly reduced. The estimated ejection fraction was 45-50%, by visual assessment. Unable to assess LV diastolic function. Left atrium:  The atrium was dilated. Right ventricle:  The cavity size was normal. Systolic function was normal. Right atrium:  The atrium was mildly dilated. Mitral valve:  The valve was structurally normal. Leaflet separation was normal.  Doppler:  Transvalvular velocity was within the normal range. There was no evidence for stenosis. There was mild regurgitation. Aortic valve:  Not well visualized.  Doppler:  There was mild regurgitation. Tricuspid valve:  The valve is structurally normal. Leaflet separation was normal.  Doppler:  Transvalvular velocity was within the normal range. There was no evidence for stenosis. There was mild regurgitation. Pulmonic valve:   Not visualized. Pericardium:   There was no pericardial effusion. Pulmonary arteries: Systolic pressure could not be accurately estimated. Systemic veins:  Not visualized. ---------------------------------------------------------------------------- Prepared and electronically signed by Carlitos Sandoval 03/05/2024 09:46     CATH LEFT HEART CATH W/INTERVENTION    Result Date: 3/5/2024  This exam has been completed. Please refer to Notes for the results to this procedure.      Impression and Plan:  Patient Active Problem List   Diagnosis    Hypercholesteremia    Floaters in visual field, bilateral    Acne, unspecified acne type    Eczema, unspecified type    Paresthesia of left thumb    Chest pain    NSTEMI (non-ST elevated myocardial infarction) (HCC)    Cerebrovascular accident (CVA) due to embolism of left middle cerebral artery (HCC)   33.1 ptt    INR  ? plate 131  On sub q heparin   clotting drains per nurse  Discussed with renal     hematology consult  Tracheostomy scheduled for Friday 22    STEPHEN VILLA MD  3/20/2024  9:56 AM

## 2024-03-20 NOTE — PROGRESS NOTES
Health system - CARDIOLOGY PROGRESS NOTE  Cristiano Obregon Patient Status:  Inpatient    1970 MRN [id number]   Location Health system 2W/SW Attending Calro Vaughn MD   Hosp Day # 15 PCP Abilio Dorsey MD     Assessment:    1.  CAD.  Admitted 3/5/2024 with non-STEMI.  Cardiac cath revealed critical three-vessel disease.  Palliative PTCA of circumflex performed 3/5/2024.  Eventual CABG x 3 performed 3/15/2024.  POD #5.    -At this time patient has stable blood pressure, heart rate and rhythm.  -No drips.  -Remains intubated.  -Receiving enteric amiodarone protocol.  -On aspirin, hydralazine, metoprolol.  Clopidogrel on hold.    2.  Acute renal failure.  Receiving CRRT.    3.  Subacute left frontal and occipital strokes identified on CT scan.    4.  Respiratory failure.  Remains intubated.      Plan:    Continue same cardiac recommendations.    Eventually add clopidogrel when procedures are completed.      Subjective:      Objective:  /75 (BP Location: Left arm)   Pulse 88   Temp 98.7 °F (37.1 °C) (Temporal)   Resp 20   Ht 170.2 cm (5' 7\")   Wt 185 lb 3 oz (84 kg)   SpO2 100%   BMI 29.00 kg/m²     Temp (24hrs), Av.6 °F (37 °C), Min:98.4 °F (36.9 °C), Max:98.8 °F (37.1 °C)      Intake/Output:    Intake/Output Summary (Last 24 hours) at 3/20/2024 0836  Last data filed at 3/20/2024 0700  Gross per 24 hour   Intake 1347 ml   Output 2406 ml   Net -1059 ml       Wt Readings from Last 2 Encounters:   24 185 lb 3 oz (84 kg)   10/06/21 177 lb 6.4 oz (80.5 kg)       Physical Exam:    General: Awake, seems aware.  No apparent distress.  HEENT: No focal deficits.  Neck: No JVD, carotids 2+ no bruits.  Cardiac: Regular rate and rhythm, S1, S2 normal, no murmur or rub  Lungs: Clear anterolaterally without wheezes, rales, rhonchi.   Abdomen: Soft, non-tender.   Extremities: Without clubbing,  cyanosis or edema.  Peripheral pulses are diminished  Skin: Warm and dry.     Labs:  Lab Results   Component Value Date    WBC 17.4 03/20/2024    HGB 8.0 03/20/2024    HCT 24.1 03/20/2024    .0 03/20/2024     Lab Results   Component Value Date    INR 1.24 (H) 03/16/2024     Lab Results   Component Value Date     03/20/2024    K 3.7 03/20/2024     03/20/2024    CO2 23.0 03/20/2024    BUN 65 03/20/2024    CREATSERUM 4.87 03/20/2024     03/20/2024    MG 2.3 03/20/2024    CA 8.2 03/20/2024    ALB 3.1 03/20/2024        No results found for: \"TROP\", \"CKMB\"     Medications:     hydrALAzine  10 mg Intravenous Once    atorvastatin  40 mg Oral Nightly    metoprolol tartrate  50 mg Oral 2x Daily(Beta Blocker)    ascorbic acid  500 mg Oral Daily    aspirin  81 mg Oral Daily    hydrALAZINE  25 mg Oral Q8H CHAYITO    amiodarone  200 mg Oral TID    insulin regular human  1-7 Units Subcutaneous 4 times per day    famotidine  20 mg Oral Daily    Or    famotidine  20 mg Intravenous Daily    chlorhexidine gluconate  15 mL Mouth/Throat BID    heparin  5,000 Units Subcutaneous 2 times per day    [Held by provider] clopidogrel  75 mg Oral Daily    piperacillin-tazobactam  3.375 g Intravenous Q8H    mineral oil-white petrolatum   Both Eyes TID      dextrose 10%      NxStage Pure Flow 400 CRRT/PIRRT fluid 5000mL 2.5 L/hr (03/20/24 0710)       Ruben Ramirez MD  3/20/2024  8:36 AM

## 2024-03-20 NOTE — OCCUPATIONAL THERAPY NOTE
OT orders received. Chart reviewed. Pt currently intubated, on CRRT. Consulted with nurse who states pt has been unresponsive this date and not following commands. Will continue to check on pt status and initiate services when pt is able to participate in session. Of note, plan for trach on Friday 3/22.

## 2024-03-20 NOTE — PROGRESS NOTES
[facility]   INTERNAL MEDICINE PROGRESS NOTE    CHIEF COMPLAINT   Chest Pain Angina    SUBJECTIVE  24 HR EVENTS   Remains intubated  Planning for tracheostomy & PEG  More drowsy overnight  Issues with dialysis catheter - crrt paused again  Still following commands    INPATIENT MEDICATIONS  Scheduled Medications:  hydrALAzine, 10 mg, Once  atorvastatin, 40 mg, Nightly  metoprolol tartrate, 50 mg, 2x Daily(Beta Blocker)  ascorbic acid, 500 mg, Daily  aspirin, 81 mg, Daily  hydrALAZINE, 25 mg, Q8H CHAYITO  amiodarone, 200 mg, TID  insulin regular human, 1-7 Units, 4 times per day  famotidine, 20 mg, Daily   Or  famotidine, 20 mg, Daily  chlorhexidine gluconate, 15 mL, BID  heparin, 5,000 Units, 2 times per day  [Held by provider] clopidogrel, 75 mg, Daily  piperacillin-tazobactam, 3.375 g, Q8H  mineral oil-white petrolatum, , TID     Drips:   dextrose 10%  NxStage Pure Flow 400 CRRT/PIRRT fluid 5000mL, Last Rate: 2.5 L/hr (03/20/24 0710)       PRN Medications:   labetalol, 10 mg, Q10 Min PRN  hydrALAzine, 10 mg, Q2H PRN  prochlorperazine, 5 mg, Q8H PRN  dextrose 10%, , Continuous PRN  lipase-protease-amylase (Lip-Prot-Amyl), 10,000 Units, PRN   And  sodium bicarbonate, 325 mg, PRN  alteplase (Activase) 4 mg in sterile water for injection (PF) 2.2 mL IV push to declot line, 4 mg, PRN  melatonin, 3 mg, Nightly PRN  polyethylene glycol (PEG 3350), 17 g, Daily PRN  ondansetron, 4 mg, Q6H PRN  potassium chloride, 20 mEq, PRN   Or  potassium chloride, 40 mEq, PRN  magnesium sulfate in dextrose 5%, 1 g, PRN  magnesium sulfate in sterile water for injection, 2 g, PRN  acetaminophen, 650 mg, Q4H PRN   Or  HYDROcodone-acetaminophen, 1 tablet, Q4H PRN  glucose, 15 g, Q15 Min PRN   Or  glucose, 15 g, Q15 Min PRN   Or  glucose-vitamin C, 4 tablet, Q15 Min PRN   Or  dextrose, 50 mL, Q15 Min PRN   Or  glucose, 30 g, Q15 Min PRN   Or  glucose, 30 g, Q15 Min PRN   Or  glucose-vitamin C, 8 tablet, Q15 Min PRN  heparin, 1.5 mL,  PRN  acetaminophen, 650 mg, Q4H PRN   Or  acetaminophen, 650 mg, Q4H PRN   Or  acetaminophen, 650 mg, Q4H PRN   Or  acetaminophen, 1,000 mg, Q6H PRN  senna, 10 mL, Nightly PRN  bisacodyl, 10 mg, Daily PRN  fleet enema, 1 enema, Once PRN       PHYSICAL EXAMINATION  Vitals: /82 (BP Location: Left arm)   Pulse 85   Temp 98.7 °F (37.1 °C) (Temporal)   Resp 18   Ht 5' 7\" (1.702 m)   Wt 185 lb 3 oz (84 kg)   SpO2 100%   BMI 29.00 kg/m²   Gen: NAD, intubated  Eyes: PERRLA, normal conjunctivae  ENMT: Dry mucous membranes, trachea midline  CV: RRR, no peripheral edema  Resp: MBS bilat, non-labored respirations, symmetric expansion  GI: Soft, NT, ND, no rebound, no guarding  MSK:  No C/C/E, normal active/passive ROM in C-spine  Skin: No rashes  Neuro: Follows commands, w/d all 4 extrem  Psych: Alert, intubated, some higher level processing present    LABORATORY VALUES   Recent Labs   Lab 03/14/24  0408 03/14/24  1812 03/16/24  0536 03/16/24  1712 03/17/24  0553 03/17/24  1759 03/18/24  0620 03/18/24  1621 03/19/24  0541 03/19/24  1709 03/20/24  0444     136  136   < > 139   < > 134* 138 137 137 137  --  136   K 3.7  3.7  3.7   < > 4.7   < > 4.4 3.3* 3.6 3.6 3.5  --  3.7     104  104   < > 107   < > 103 105 104 104 105  --  102   CO2 26.0  26.0  26.0   < > 23.0   < > 23.0 22.0 23.0 23.0 21.0  --  23.0   BUN 36*  36*  36*   < > 42*   < > 46* 53* 62* 51* 56*  --  65*   CREATSERUM 3.18*  3.18*  3.18*   < > 3.49*   < > 3.44* 3.71* 4.16* 3.69* 4.36*  --  4.87*   ANIONGAP 6  6  6   < > 9   < > 8 11 10 10 11  --  11   *  136*  136*   < > 155*   < > 187* 157* 163* 160* 145*  --  151*   CA 8.6*  8.6*  8.6*   < > 7.4*   < > 7.7* 7.9* 7.9* 8.3* 8.3*  --  8.2*   PHOS 2.9   < > 5.6*  5.6*   < > 4.7 3.0 2.7 2.4 2.8 3.0 3.6  3.6   TP 6.5  6.5  --   --   --  5.0*  --  5.0*  --  5.3*  --   --    ALB 2.9*  2.9*  2.9*   < > 2.9*  --  3.0*  --  2.9*  --  3.1*  --  3.1*   AST 68*  68*  --    --   --  26  --  28  --  26  --   --    ALT 57*  57*  --   --   --  <7*  --  <7*  --  <7*  --   --    ALKPHO 137*  137*  --   --   --  59  --  73  --  93  --   --    BILT 0.5  0.5  --   --   --  0.3  --  0.5  --  0.8  --   --    EGFRCR 22*  22*  22*   < > 20*   < > 20* 18* 16* 19* 15*  --  13*    < > = values in this interval not displayed.     Recent Labs   Lab 03/13/24  1452 03/14/24  0408 03/15/24  0423 03/15/24  1256 03/17/24  0553 03/17/24  1352 03/18/24  1621 03/19/24  0105 03/19/24  0541 03/19/24  1026 03/19/24  1709 03/20/24  0126   WBC 9.2 10.4 9.3   < > 10.3   < > 16.7* 15.1*  --  18.5* 16.8* 17.4*   HGB 8.9* 7.8* 7.7*   < > 9.0*   < > 8.7* 7.8* 8.8* 8.7* 8.4* 8.0*   HCT 25.4* 23.6* 23.5*   < > 26.3*   < > 25.2* 23.1* 26.2* 24.5* 24.1* 24.1*   PLT 79.0* 90.0* 119.0*   < > 131.0*   < > 105.0* 128.0*  --  176.0 141.0* 146.0*   MCV 88.8 89.4 89.0   < > 85.4   < > 85.1 85.2  --  85.4 84.9 86.1   MOPERCENT 15.0 11.6 12.0  --  10.2  --   --   --   --   --   --   --    EOPERCENT 6.8 5.8 7.1  --  0.3  --   --   --   --   --   --   --    BAPERCENT 0.2 0.1 0.1  --  0.3  --   --   --   --   --   --   --     < > = values in this interval not displayed.     ASSESSMENT & PLAN  Cristiano Obregon - 53 year old male with hyperlipidemia, tobacco use, presented 3/5/2024 with chest pain. Found to have STEMI with cardiogenic shock requiring Impella placement.  Continuing ventilation support with CRRT.  S/p CABG on 3/15.      STEMI c/b cardiogenic shock w multi-system organ failure   S/p CABG 3/15  Post-op cardiac tamponade s/p washout 3/16  Paroxysmal/post-op a fib  HTN  HLD  - Multivessel disease including chronically occluded large RCA, 95% sequential LAD stenosis on cath 3/5. Flow restored to heavily thrombosed circumflex and OM1, Impella placed (now removed). 3/6 s/p emergent RHC to eval hemodynamics. Worsening renal function noted.   - Cardiology consulted: angiograms as above, med mgmt  - CCM consulted: vent  management, sedation  - CV surgery consulted: s/p CABG on 3/15  - cardiac tamponade developed 3/15, s/p mediastinal washout   - amio gtt to po/IV, dobutamine gtt, levophed stopped  - PO amiodarone 200 mg TID  - repeat echo w preserved EF despite hypokinesis    Acute hypoxemic respiratory failure  - intubated in cath lab (3/5 -  )  - vent management per crit care colleagues  - Daily SBT, extubated when able  [  ] Intubated > 14d. Planning for tracheostomy. CTS consulted    Anuric acute kidney failure - suspect ATN from shock  Metabolic acidosis  Hyponatremia  Hyperkalemia  - Nephrology consutled  - CRRT initiated 3/7. Catheter exchanged 3/19  - resume CRRT, post CABG now, s/p 20 units of blood products in OR  - hep gtt for HD cath clotting per nephro    Sepsis 2/2 RML bacterial pneumonia - Persistent fevers 3/10 - 3/15, HCAP  Leukocytosis - initially from infection, now suspect reactive with strokes, HD, critical illness, etc  - Sputum with Klebsiella and staph  - Blood cx neg  - ID consulted  - Cefepime (3/10-3/14) -> Changed to zosyn per ID (3/14 -  )  - fluconazole added per ID    Subacute L frontal & occiptal lobe infarcts  - presume cardioembolic  - CTH w subacute L frontal & occipital lobe infarcts  - 3/5 RxFx eval: A1c - 6%, FLP - LDL 64, tele  - TTE noted  - Neuro consulted  - ASA 81 & plavix   - PT/OT/SLP when able  [  ] MRI Brain    Hematuria, resolved  -yellow urine noted, minimal, but not bloody     Acute blood loss anemia  Thrombocytopenia - suspect reactive/consumptive  - required 20u blood products during CABG  - trend CBC    Tobacco use disorder  - Cessation    Elevated liver enzymes  - Likely 2/2 to shock. Hep b neg   - Trend CMP    Nutrition  - 180lbs on admission  - TPN while unable to take PO  - GI consulted - high risk for GI PEG, rec IR  [  ] Planning for PEG when able w IR  [  ] CT abd for planning    ACP: Full Code. Linnea contact:  Reyes  Ppx: DVT: SQH   Dispo  EDoD:  ~ 3/25. Suspect  will need LTAC for vent weaning. Dispo TBD clinical course - PT/OT following.   F/u:   - PCP:  Abilio Dorsey MD  - Cardiology  - CV surgery  - Nephrology  - Neurology    Available via bubble chat or perfect serve 7A - 7P.    Remains critically ill with multi-system organ failure - AMS/TME - new stroke, resp failure, renal failure, liver injury.  40 minutes of critical care time 03/19/24. Time spent on this encounter was for management of multi-system organ failure - AMS/TME - new stroke, resp failure, renal failure, liver injury.. Patient w/ multi-organ dysfunction & high risk for further decline.      Carlo Vaughn MD   Internal Medicine - Hospitalist  [facility]

## 2024-03-20 NOTE — PROGRESS NOTES
[facility]  part of [facility] Infectious Disease Consult    Cristiano Obregon Patient Status:  Inpatient    1970 MRN [id number]   Location [facility] 2W/SW Attending Bud Camarena MD   Hosp Day # 15 PCP MD Cristaino Azar seen and examined,   Afebrile,   Previous entries noted,   Intubated,   Little more awake,   Dobhoff in place now,     History:  History reviewed. No pertinent past medical history.  Past Surgical History:   Procedure Laterality Date    COLONOSCOPY N/A 2017    Procedure: COLONOSCOPY, POSSIBLE BIOPSY, POSSIBLE POLYPECTOMY 82117;  Surgeon: Byron Plaza MD;  Location: [facility] SURGICAL [place], [facility]     History reviewed. No pertinent family history.   reports that he has been smoking cigarettes. He has never used smokeless tobacco. He reports that he does not drink alcohol and does not use drugs.    Allergies:  No Known Allergies    Medications:    Current Facility-Administered Medications:     NxStage Pure Flow 400 CRRT/PIRRT fluid 5000mL, 2.5 L/hr, CRRT, Continuous **AND** CRRT Replacement Fluid 400, , , Until Discontinued    hydrALAzine (Apresoline) 20 mg/mL injection 10 mg, 10 mg, Intravenous, Once    labetalol (Trandate) 5 mg/mL injection 10 mg, 10 mg, Intravenous, Q10 Min PRN    hydrALAzine (Apresoline) 20 mg/mL injection 10 mg, 10 mg, Intravenous, Q2H PRN    prochlorperazine (Compazine) 10 MG/2ML injection 5 mg, 5 mg, Intravenous, Q8H PRN    atorvastatin (Lipitor) tab 40 mg, 40 mg, Oral, Nightly    metoprolol tartrate (Lopressor) tab 50 mg, 50 mg, Oral, 2x Daily(Beta Blocker)    ascorbic acid (Vitamin C) tab 500 mg, 500 mg, Oral, Daily    aspirin chewable tab 81 mg, 81 mg, Oral, Daily    dextrose 10% infusion (TPN no rate), , Intravenous, Continuous PRN    pancrelipase (Lip-Prot-Amyl) (Zenpep) DR particles cap 10,000 Units, 10,000 Units, Per G Tube, PRN **AND** sodium bicarbonate tab 325 mg, 325 mg, Oral, PRN     hydrALAZINE (Apresoline) tab 25 mg, 25 mg, Oral, Q8H CHAYITO    amiodarone (Pacerone) tab 200 mg, 200 mg, Oral, TID    alteplase (Activase) 4 mg in sterile water for injection (PF) 2.2 mL IV push to declot line, 4 mg, Intravenous, PRN    insulin regular human (Novolin R, Humulin R) 100 UNIT/ML injection 1-7 Units, 1-7 Units, Subcutaneous, 4 times per day    melatonin tab 3 mg, 3 mg, Oral, Nightly PRN    polyethylene glycol (PEG 3350) (Miralax) 17 g oral packet 17 g, 17 g, Oral, Daily PRN    ondansetron (Zofran) 4 MG/2ML injection 4 mg, 4 mg, Intravenous, Q6H PRN    famotidine (Pepcid) tab 20 mg, 20 mg, Oral, Daily **OR** famotidine (Pepcid) 20 mg/2mL injection 20 mg, 20 mg, Intravenous, Daily    potassium chloride 20 mEq/100mL IVPB premix 20 mEq, 20 mEq, Intravenous, PRN **OR** potassium chloride 40 mEq/100mL IVPB premix (central line) 40 mEq, 40 mEq, Intravenous, PRN    magnesium sulfate in dextrose 5% 1 g/100mL infusion premix 1 g, 1 g, Intravenous, PRN    magnesium sulfate in sterile water for injection 2 g/50mL IVPB premix 2 g, 2 g, Intravenous, PRN    chlorhexidine gluconate (Peridex) 0.12 % oral solution 15 mL, 15 mL, Mouth/Throat, BID    heparin (Porcine) 5000 UNIT/ML injection 5,000 Units, 5,000 Units, Subcutaneous, 2 times per day    acetaminophen (Tylenol) tab 650 mg, 650 mg, Oral, Q4H PRN **OR** HYDROcodone-acetaminophen (Norco) 5-325 MG per tab 1 tablet, 1 tablet, Oral, Q4H PRN **OR** [DISCONTINUED] HYDROcodone-acetaminophen (Norco) 5-325 MG per tab 2 tablet, 2 tablet, Oral, Q4H PRN    [Held by provider] clopidogrel (Plavix) tab 75 mg, 75 mg, Oral, Daily    glucose (Dex4) 15 GM/59ML oral liquid 15 g, 15 g, Oral, Q15 Min PRN **OR** glucose (Glutose) 40% oral gel 15 g, 15 g, Oral, Q15 Min PRN **OR** glucose-vitamin C (Dex-4) chewable tab 4 tablet, 4 tablet, Oral, Q15 Min PRN **OR** dextrose 50% injection 50 mL, 50 mL, Intravenous, Q15 Min PRN **OR** glucose (Dex4) 15 GM/59ML oral liquid 30 g, 30 g, Oral, Q15  Min PRN **OR** glucose (Glutose) 40% oral gel 30 g, 30 g, Oral, Q15 Min PRN **OR** glucose-vitamin C (Dex-4) chewable tab 8 tablet, 8 tablet, Oral, Q15 Min PRN    piperacillin-tazobactam (Zosyn) 3.375 g in dextrose 5% 100 mL IVPB-ADDV, 3.375 g, Intravenous, Q8H    mineral oil-white petrolatum (Artificial Tears) 83-15 % ophthalmic ointment, , Both Eyes, TID    heparin (Porcine) 1000 UNIT/ML injection 1,500 Units, 1.5 mL, Intracatheter, PRN    acetaminophen (Tylenol) tab 650 mg, 650 mg, Oral, Q4H PRN **OR** acetaminophen (Tylenol) 160 MG/5ML oral liquid 650 mg, 650 mg, Oral, Q4H PRN **OR** acetaminophen (Tylenol) rectal suppository 650 mg, 650 mg, Rectal, Q4H PRN **OR** acetaminophen (Ofirmev) 10 mg/mL infusion premix 1,000 mg, 1,000 mg, Intravenous, Q6H PRN    senna (Senokot) 8.8 MG/5ML oral syrup 17.6 mg, 10 mL, Oral, Nightly PRN    bisacodyl (Dulcolax) 10 MG rectal suppository 10 mg, 10 mg, Rectal, Daily PRN    fleet enema (Fleet) 7-19 GM/118ML rectal enema 133 mL, 1 enema, Rectal, Once PRN    Review of Systems:   Constitutional: Negative for anorexia, chills, fatigue, fevers, malaise, night sweats and weight loss.  Eyes: Negative for visual disturbance, irritation and redness.  Ears, nose, mouth, throat, and face: Negative for hearing loss, tinnitus, nasal congestion, snoring, sore throat, hoarseness and voice change.  Respiratory: Negative for cough, sputum, hemoptysis, chest pain, wheezing, dyspnea on exertion, or stridor.  Cardiovascular: Negative for chest pain, palpitations, irregular heart beats, syncope, fatigue, orthopnea, paroxysmal nocturnal dyspnea, lower extremity edema.  Gastrointestinal: Negative for dysphagia, odynophagia, reflux symptoms, nausea, vomiting, change in bowel habits, diarrhea, constipation and abdominal pain.  Integument/breast: Negative for rash, skin lesions, and pruritus.  Hematologic/lymphatic: Negative for easy bruising, bleeding, and lymphadenopathy.  Musculoskeletal: Negative  for myalgias, arthralgias, muscle weakness.  Neurological: Negative for headaches, dizziness, seizures, memory problems, trouble swallowing, speech problems, gait problems and weakness.  Behavioral/Psych: Negative for active tobacco use.  Endocrine: No history of of diabetes, thyroid disorder.  Unable to obtain,     Vital signs in last 24 hours:  Patient Vitals for the past 24 hrs:   BP Temp Temp src Pulse Resp SpO2 Weight   03/14/24 1100 122/81 99.5 °F (37.5 °C) Pulmonary Ar 114 14 99 % --   03/14/24 1000 96/73 99.3 °F (37.4 °C) Pulmonary Ar 117 18 98 % --   03/14/24 0900 103/69 99.3 °F (37.4 °C) Pulmonary Ar (!) 124 19 98 % --   03/14/24 0800 104/72 98.9 °F (37.2 °C) Pulmonary Ar 119 18 97 % --   03/14/24 0700 127/90 100.2 °F (37.9 °C) Pulmonary Ar 114 13 97 % --   03/14/24 0600 131/81 100.1 °F (37.8 °C) Pulmonary Ar (!) 121 22 98 % 184 lb 15.5 oz (83.9 kg)   03/14/24 0500 123/82 100.2 °F (37.9 °C) Pulmonary Ar 120 25 94 % --   03/14/24 0400 101/73 99.3 °F (37.4 °C) Pulmonary Ar 96 17 97 % --   03/14/24 0300 111/76 98.8 °F (37.1 °C) Pulmonary Ar 91 17 91 % --   03/14/24 0200 90/71 98.9 °F (37.2 °C) Pulmonary Ar 118 18 95 % --   03/14/24 0100 99/79 99.4 °F (37.4 °C) Pulmonary Ar (!) 121 18 96 % --   03/14/24 0000 105/79 99.7 °F (37.6 °C) Pulmonary Ar 117 20 96 % --   03/13/24 2300 104/78 100.3 °F (37.9 °C) Pulmonary Ar 94 18 95 % --   03/13/24 2200 119/75 (!) 100.6 °F (38.1 °C) Pulmonary Ar 118 19 94 % --   03/13/24 2100 100/69 100.4 °F (38 °C) Pulmonary Ar 119 20 94 % --   03/13/24 2000 113/86 100.1 °F (37.8 °C) Pulmonary Ar 116 18 92 % --   03/13/24 1900 -- 100 °F (37.8 °C) Pulmonary Ar 108 20 97 % --   03/13/24 1800 116/69 99.3 °F (37.4 °C) -- 101 16 96 % --   03/13/24 1700 -- 98.7 °F (37.1 °C) -- 113 17 98 % --   03/13/24 1600 123/87 98.8 °F (37.1 °C) -- 100 17 96 % --   03/13/24 1500 -- 99.5 °F (37.5 °C) -- 93 14 98 % --   03/13/24 1400 105/82 100.1 °F (37.8 °C) -- 87 14 99 % --   03/13/24 1330 -- -- -- 83 15  99 % --   03/13/24 1300 -- 98.2 °F (36.8 °C) -- 85 15 99 % --   03/13/24 1200 90/59 99.2 °F (37.3 °C) Pulmonary Ar 88 16 100 % --   03/13/24 1130 -- -- -- 87 15 -- --       Physical Exam:   General: alert, cooperative, oriented.  Intubated,    Head: Normocephalic, without obvious abnormality, atraumatic.   Eyes: Conjunctivae/corneas clear.  No scleral icterus.  No conjunctival     hemorrhage.   Nose: Nares normal.   Throat: Lips, mucosa, and tongue normal.  No thrush noted.   Neck: Soft, supple neck; trachea midline, no adenopathy, no thyromegaly.   Lungs: CTAB, normal and equal bilateral chest rise   Chest wall: No tenderness or deformity.   Heart: Regular rate and rhythm, normal S1S2, no murmur.   Abdomen: soft, non-tender, non-distended, positive BS.   Extremity: no edema, no cyanosis, Trialysis, midline in place,    Skin: No rashes or lesions.   Neurological: Alert, interactive, no focal deficits    Labs:  Lab Results   Component Value Date    WBC 15.6 03/20/2024    HGB 7.7 03/20/2024    HCT 23.1 03/20/2024    .0 03/20/2024    CREATSERUM 4.87 03/20/2024    BUN 65 03/20/2024     03/20/2024    K 3.7 03/20/2024     03/20/2024    CO2 23.0 03/20/2024     03/20/2024    CA 8.2 03/20/2024    ALB 3.1 03/20/2024    MG 2.3 03/20/2024    PHOS 3.6 03/20/2024    PHOS 3.6 03/20/2024       Radiology:  CXR- 1. Cardiomegaly pulmonary venous distention.   2. Support tubes and catheters are satisfactory.  No pneumothorax   3. Mild perihilar and basilar congestive changes worse on the right has progressed.    4. Small right-sided effusion.       Cultures:  Susceptibility data from last 90 days.  Collected Specimen Info Organism Cefazolin Cefepime Ceftriaxone Ciprofloxacin Clindamycin Erythromycin Gentamicin Levofloxacin Meropenem Oxacillin Piperacillin/Tazobactam Trimethoprim/Sulfamethoxazole Vancomycin   03/08/24 Other from Endotracheal aspirate Klebsiella (Enterobacter) aerogenes  R  S  S  S    S  S  S    S  S      Staphylococcus aureus S     S  S  S  S   S   S  S       Assessment and Plan:    1.   Fevers: Multifactorial,   - Sec to HAP vs drop of Hgb and multiple transfusions,   - UA was bland on admission, will repeat,   - Sputum cul with Kleb and MSSA,   - Blood cul are NGTD  - CXR with bilateral Congestion, no obvious consolidation, thick sputum in Etr earlier, now clearing up,   - Lines are clean, Trialysis Catheter exchanged and midline inserted 3/19.  - MRSA Carriage is negative,   - On IV Zosyn,  d # 7.     2.    Hx of Acute MI with Cardiogenic Shock: Got intubated post Cath,   - S/P CABG x 3, R Pleural effusion drainage, 3/15/24,   - Followed by Sternal exploration with mediastinal washout for Cardiac tamponade, 3/16/24,     3.    New subacute multiple strokes: Noted Neuro input,   - Likely cardio embolic in the setting of Impella placement and complicated stay  - More awake today, moving L sided of body,     4.     Leukocytosis: Persistently elevated,   - Multifactorial, sec to surgery, tamponade, multiple transfusions, HAP, now multiple cardio embolic stroke,     5.     Disposition: in house, A middle aged male with Acute MI, Cardiogenic shock, with persistent Fevers and then hypothermia, improving,   - Follow Pending Cul,   - WBC trend,   - Continue IV Zosyn pharm to dose,   - Plan for Trach and Peg by the end of the week, if possible,   - Supportive care,     Discussed with PCP, RN, all questions answered, further recommendations to follow, Thanks,     Thank you for consulting [facility] for Cristiano Obregon.  If you have any questions or concerns please call [facility] Infectious Disease at [phone].     Derick Webb MD  3/14/2024  11:25 AM

## 2024-03-20 NOTE — RESPIRATORY THERAPY NOTE
Patient received intubated on full vent support with the following settings AC 12/530/30%/+5. Bilateral BS heard on ascultation. Suction provided as needed.     1110: Patient placed on SBT 5/5 30%. Patient tolerated well from about an hour.       RR 20  MV 7.5  Spo2 100%  RSBI 68    Unable to obtain NIF and VC, pt not following commands.

## 2024-03-21 NOTE — CM/SW NOTE
Reyes will be ready for discharge next week once medically cleared.  Currently he will be transitioned to HD-with tunneled catheter.  PEG and trach tube will be placed on Friday 3/22/24.    The earliest we can open for insurance approval will be Monday 3/24/24 based on timing of PEG and trach.    CM discussed benefits of LTAC with spouse Reyes and two RN family members.  CM provided quality data to family at bedside.  CM discussed recovery expectations and rehab needs for acute or ALIRIO after LTAC depending on how Cristiano progresses.    Reyes and family are agreeable to LTAC. CM has reached out to liaisons at both [facility] and [place] and asked them to call Reyes to answer questions.  CM also encouraged touring both facilities on Friday.    PLAN:  LTAC at [place] dependent on insurance approval.  Will need choice on Monday to start insurance authorization.    / to remain available for support and/or discharge planning.      Mary Jane Infante MBA BSN RN CRRN   RN Case Manager  [phone]

## 2024-03-21 NOTE — PROGRESS NOTES
[facility]   INTERNAL MEDICINE PROGRESS NOTE    CHIEF COMPLAINT   Chest Pain Angina    SUBJECTIVE  24 HR EVENTS   Remains intubated  Planning for tracheostomy & PEG  More drowsy overnight  Issues with dialysis catheter - crrt paused again  Still following commands    INPATIENT MEDICATIONS  Scheduled Medications:  cefepime, 1 g, Q12H  iron sucrose, 200 mg, Daily  hydrALAzine, 10 mg, Once  atorvastatin, 40 mg, Nightly  metoprolol tartrate, 50 mg, 2x Daily(Beta Blocker)  ascorbic acid, 500 mg, Daily  aspirin, 81 mg, Daily  hydrALAZINE, 25 mg, Q8H CHAYITO  amiodarone, 200 mg, TID  insulin regular human, 1-7 Units, 4 times per day  famotidine, 20 mg, Daily   Or  famotidine, 20 mg, Daily  chlorhexidine gluconate, 15 mL, BID  heparin, 5,000 Units, 2 times per day  [Held by provider] clopidogrel, 75 mg, Daily  mineral oil-white petrolatum, , TID     Drips:   NxStage Pure Flow 400 CRRT/PIRRT fluid 5000mL, Last Rate: 25 L/hr (03/20/24 4279)  dextrose 10%       PRN Medications:   labetalol, 10 mg, Q10 Min PRN  hydrALAzine, 10 mg, Q2H PRN  prochlorperazine, 5 mg, Q8H PRN  dextrose 10%, , Continuous PRN  lipase-protease-amylase (Lip-Prot-Amyl), 10,000 Units, PRN   And  sodium bicarbonate, 325 mg, PRN  alteplase (Activase) 4 mg in sterile water for injection (PF) 2.2 mL IV push to declot line, 4 mg, PRN  melatonin, 3 mg, Nightly PRN  polyethylene glycol (PEG 3350), 17 g, Daily PRN  ondansetron, 4 mg, Q6H PRN  potassium chloride, 20 mEq, PRN   Or  potassium chloride, 40 mEq, PRN  magnesium sulfate in dextrose 5%, 1 g, PRN  magnesium sulfate in sterile water for injection, 2 g, PRN  acetaminophen, 650 mg, Q4H PRN   Or  HYDROcodone-acetaminophen, 1 tablet, Q4H PRN  glucose, 15 g, Q15 Min PRN   Or  glucose, 15 g, Q15 Min PRN   Or  glucose-vitamin C, 4 tablet, Q15 Min PRN   Or  dextrose, 50 mL, Q15 Min PRN   Or  glucose, 30 g, Q15 Min PRN   Or  glucose, 30 g, Q15 Min PRN   Or  glucose-vitamin C, 8 tablet, Q15 Min PRN  heparin,  1.5 mL, PRN  acetaminophen, 650 mg, Q4H PRN   Or  acetaminophen, 650 mg, Q4H PRN   Or  acetaminophen, 650 mg, Q4H PRN   Or  acetaminophen, 1,000 mg, Q6H PRN  senna, 10 mL, Nightly PRN  bisacodyl, 10 mg, Daily PRN  fleet enema, 1 enema, Once PRN       PHYSICAL EXAMINATION  Vitals: /75 (BP Location: Left arm)   Pulse 94   Temp 98 °F (36.7 °C) (Temporal)   Resp 15   Ht 5' 7\" (1.702 m)   Wt 181 lb 10.5 oz (82.4 kg)   SpO2 99%   BMI 28.45 kg/m²   Gen: NAD, intubated  Eyes: PERRLA, normal conjunctivae  ENMT: Dry mucous membranes, trachea midline  CV: RRR, no peripheral edema  Resp: MBS bilat, non-labored respirations, symmetric expansion  GI: Soft, NT, ND, no rebound, no guarding  MSK:  No C/C/E, normal active/passive ROM in C-spine  Skin: No rashes  Neuro: Follows commands, w/d all 4 extrem  Psych: Alert, intubated, some higher level processing present    LABORATORY VALUES   Recent Labs   Lab 03/16/24  0536 03/16/24  1712 03/17/24  0553 03/17/24  1759 03/18/24  0620 03/18/24  1621 03/19/24  0541 03/19/24  1709 03/20/24  0444 03/20/24  1625 03/21/24  0316      < > 134*   < > 137 137 137  --  136 135* 136   K 4.7   < > 4.4   < > 3.6 3.6 3.5  --  3.7 3.8 3.8      < > 103   < > 104 104 105  --  102 103 104   CO2 23.0   < > 23.0   < > 23.0 23.0 21.0  --  23.0 24.0 27.0   BUN 42*   < > 46*   < > 62* 51* 56*  --  65* 50* 44*   CREATSERUM 3.49*   < > 3.44*   < > 4.16* 3.69* 4.36*  --  4.87* 3.78* 3.34*   ANIONGAP 9   < > 8   < > 10 10 11  --  11 8 5   *   < > 187*   < > 163* 160* 145*  --  151* 145* 139*   CA 7.4*   < > 7.7*   < > 7.9* 8.3* 8.3*  --  8.2* 8.2* 8.5*   PHOS 5.6*  5.6*   < > 4.7   < > 2.7 2.4 2.8 3.0 3.6  3.6 2.8 2.4   TP  --   --  5.0*  --  5.0*  --  5.3*  --   --   --   --    ALB 2.9*  --  3.0*  --  2.9*  --  3.1*  --  3.1*  --   --    AST  --   --  26  --  28  --  26  --   --   --   --    ALT  --   --  <7*  --  <7*  --  <7*  --   --   --   --    ALKPHO  --   --  59  --  73  --   93  --   --   --   --    BILT  --   --  0.3  --  0.5  --  0.8  --   --   --   --    EGFRCR 20*   < > 20*   < > 16* 19* 15*  --  13* 18* 21*    < > = values in this interval not displayed.     Recent Labs   Lab 03/15/24  0423 03/15/24  1256 03/17/24  0553 03/17/24  1352 03/19/24  1709 03/20/24  0126 03/20/24  0852 03/20/24  1625 03/21/24  0316   WBC 9.3   < > 10.3   < > 16.8* 17.4* 15.6* 16.1* 17.1*   HGB 7.7*   < > 9.0*   < > 8.4* 8.0* 7.7* 7.8* 7.6*   HCT 23.5*   < > 26.3*   < > 24.1* 24.1* 23.1* 23.6* 23.1*   .0*   < > 131.0*   < > 141.0* 146.0* 150.0 152.0  152.0 170.0   MCV 89.0   < > 85.4   < > 84.9 86.1 87.5 86.4 87.5   MOPERCENT 12.0  --  10.2  --   --   --   --   --   --    EOPERCENT 7.1  --  0.3  --   --   --   --   --   --    BAPERCENT 0.1  --  0.3  --   --   --   --   --   --     < > = values in this interval not displayed.     ASSESSMENT & PLAN  Cristiano Obregon - 53 year old male with hyperlipidemia, tobacco use, presented 3/5/2024 with chest pain. Found to have STEMI with cardiogenic shock requiring Impella placement.  Continuing ventilation support with CRRT.  S/p CABG on 3/15.      STEMI c/b cardiogenic shock w multi-system organ failure   S/p CABG 3/15  Post-op cardiac tamponade s/p washout 3/16  Paroxysmal/post-op a fib  HTN  HLD  - Multivessel disease including chronically occluded large RCA, 95% sequential LAD stenosis on cath 3/5. Flow restored to heavily thrombosed circumflex and OM1, Impella placed (now removed). 3/6 s/p emergent RHC to eval hemodynamics. Worsening renal function noted.   - Cardiology consulted: angiograms as above, med mgmt  - [facility] consulted: vent management, sedation  - CV surgery consulted: s/p CABG on 3/15  - cardiac tamponade developed 3/15, s/p mediastinal washout   - amio gtt to po/IV, dobutamine gtt, levophed stopped  - PO amiodarone 200 mg TID  - repeat echo w preserved EF despite hypokinesis    Acute hypoxemic respiratory failure  - intubated in cath lab (3/5  -  )  - vent management per crit care colleagues  - Daily SBT, extubated when able  [  ] Intubated > 14d. Planning for tracheostomy. CTS consulted    Anuric acute kidney failure - suspect ATN from shock  Metabolic acidosis  Hyponatremia  Hyperkalemia  - Nephrology consutled  - CRRT initiated 3/7. Catheter exchanged 3/19  - resume CRRT, post CABG now, s/p 20 units of blood products in OR  - hep gtt for HD cath clotting per nephro    Sepsis 2/2 RML bacterial pneumonia - Persistent fevers 3/10 - 3/15, HCAP  Leukocytosis - initially from infection, now suspect reactive with strokes, HD, critical illness, etc  - Sputum with Klebsiella and staph  - Blood cx neg  - ID consulted  - Cefepime (3/10-3/14) -> Changed to zosyn per ID (3/14 -  )  - fluconazole added per ID    Subacute L frontal & occiptal lobe infarcts  - presume cardioembolic  - CTH w subacute L frontal & occipital lobe infarcts  - 3/5 RxFx eval: A1c - 6%, FLP - LDL 64, tele  - TTE noted  - Neuro consulted  - ASA 81 & plavix   - PT/OT/SLP when able  [  ] MRI Brain    Hematuria, resolved  -yellow urine noted, minimal, but not bloody     Acute blood loss anemia  Thrombocytopenia - suspect reactive/consumptive  Hypercoagulability & anemia  - recurring clotting in chest tubes & HD. S/p 20+ u blood products this admission  - Hematology consulted, d/w Dr. Styles - hold on hypercoag w/u at this time  - trend CBC    Tobacco use disorder  - Cessation    Elevated liver enzymes  - Likely 2/2 to shock. Hep b neg   - Trend CMP    Nutrition  - 180lbs on admission  - TPN while unable to take PO  - GI consulted - high risk for GI PEG, rec IR  [  ] Planning for PEG when able w IR  [  ] CT abd for planning        ACP: Full Code. Linnea contact:  Reyes  Ppx: DVT: SQH   Dispo  EDoD:  ~ 3/25. Suspect will need LTAC for vent weaning. Dispo TBD clinical course - PT/OT following.   F/u:   - PCP:  Abilio Dorsey MD  - Cardiology  - CV surgery  - Nephrology  -  Neurology    Available via bubble chat or perfect serve 7A - 7P.    Remains critically ill with multi-system organ failure - AMS/TME - new stroke, resp failure, renal failure, liver injury.      Carlo Vaughn MD   Internal Medicine - [place] Hospitalist  [facility]

## 2024-03-21 NOTE — PROGRESS NOTES
Cardiology Progress Note    Cristiano Obregon Patient Status:  Inpatient    1970 MRN [id number]   Location [facility] 2W/SW Attending Carlo Vaughn MD   Hosp Day # 16 PCP Abilio Dorsey MD     Interval Note:  Patient on vent  No distress    ------------------------------------------------------------------------------------------------------------------------------  ROS 12 systems reviewed, pertinent findings above.  ROS    History:  History reviewed. No pertinent past medical history.  Past Surgical History:   Procedure Laterality Date    COLONOSCOPY N/A 2017    Procedure: COLONOSCOPY, POSSIBLE BIOPSY, POSSIBLE POLYPECTOMY 07107;  Surgeon: Byron Plaza MD;  Location: [facility]     History reviewed. No pertinent family history.   reports that he has been smoking cigarettes. He has never used smokeless tobacco. He reports that he does not drink alcohol and does not use drugs.    Objective:   Temp: 98 °F (36.7 °C)  Pulse: 85  Resp: 24  BP: 104/68  FiO2 (%): 30 %    Intake/Output:     Intake/Output Summary (Last 24 hours) at 3/21/2024 1021  Last data filed at 3/21/2024 0600  Gross per 24 hour   Intake 1393 ml   Output 3652 ml   Net -2259 ml       Physical Exam:    General: Intubated  Opening eyes spontaneously  HEENT: Normocephalic, anicteric sclera, neck supple.  Neck: No JVD, carotids 2+, no bruits.  Cardiac: Regular rate and rhythm. S1, S2 normal. No murmur, pericardial rub, S3.  Lungs: Clear without wheezes, rales, rhonchi or dullness.  Normal excursions and effort.  Abdomen: Soft, non-tender. BS-present.  Extremities: Without clubbing, cyanosis or edema.  Peripheral pulses are 2+.  Neurologic: Non-focal  Skin: Warm and dry.       Assessment:  Cerebrovascular accident (CVA) due to embolism of left middle cerebral artery (HCC)   STEMI, severe multivessel coronary disease status post CABG  Emergent redo sternotomy 3/16/2024 for tamponade  Cardiogenic shock requiring  Impella-resolved  Acute renal failure, on HD  Paroxysmal atrial fibrillation  Remains intubated  Anemia  Thrombocytopenia  History of tobacco abuse    Plan:  Continue same cardiac recommendations.    Level of care: L3    Mulugeta Corcoran MD  [facility]      3/21/2024  10:21 AM

## 2024-03-21 NOTE — PROGRESS NOTES
Critical Care Progress Note     Assessment / Plan:  Acute respiratory failure - initially due to pulmonary edema. Now with encephalopathy and severe deconditioning  - continue full vent support  - daily SBT  - minimize sedation as able  - trach planned for 3/22  NSTEMI - diagnosed with new multivessel CAD s/p angioplasty and cardiogenic shock requiring Impella placement. S/p 3vCABG 3/15. Impella removed 3/16. Back to OR 3/16 AM for tamponade s/p pericardial clot removal  - cardiology and cardiac surgery following  Cardiogenic shock - due to acute coronary syndrome, EF was down and has now improved on repeat echo  - off vasopressors  Atrial fibrillation  - rate controlled  - per cardiology  Pneumonia - sputum cultures with Klebsiella and MSSA  - cefepime per ID  Acute renal failure - suspect from ATN and cardiogenic shock. CRRT with multiple clotted catheters  - RRT per nephrology  Stroke  - MRI pending  - neurology following  Thrombocytopenia, anemia  - monitor  FEN  - TFs  - PEG planned for tomorrw  Proph  - subcu heparin  - famotidine  Dispo  - full code  - ICU    Critical care time: 35 minutes      Subjective:  CRRT clotted  No other events    Objective:  Vitals:    03/21/24 0400 03/21/24 0500 03/21/24 0600 03/21/24 0700   BP: 121/79 127/74 119/75 120/79   BP Location: Left arm Left arm Left arm Left arm   Pulse: 87 96 94 98   Resp: 19 19 15 17   Temp: 98 °F (36.7 °C)      TempSrc: Temporal      SpO2: 100% 100% 99% 100%   Weight:   181 lb 10.5 oz (82.4 kg)    Height:         Physical Exam:  General: intubated  Skin: no rash, ulcers or subcutaneous nodules  Eyes: anicteric sclerae, moist conjunctivae  Head, ears, nose, throat: atraumatic, oropharynx clear with moist mucous membranes  Neck: trachea midline with no thyromegaly  Heart: regular rate and rhythm, no murmurs / rubs / gallops  Lungs: clear bilaterally  Abdomen: soft, nontender, nondistended   Extremities: no edema or cyanosis  Psych: opens eyes to voice,  does not follow commands    Medications:  Reviewed in EMR    Lab Data:  Reviewed in EMR    Imaging:  I independently visualized all relevant chest imaging in PACS and agree with radiology interpretation except where noted.

## 2024-03-21 NOTE — PROGRESS NOTES
CRRT stopped at 0500. Unable to return blood due to copious clots pulled from venous port and line. Venous and arterial ports from temporary dialysis residual showed no following clots once CRRT stopped, thus heparinized locked.

## 2024-03-21 NOTE — PHYSICAL THERAPY NOTE
Orders received, chart reviewed. Spoke with PATEL Smith. Pt not medically cleared to participate this morning as they are pulling chest tubes and medically managing a few other things. Will reattempt this afternoon as able if pt is medically cleared to participate.

## 2024-03-21 NOTE — PHYSICAL THERAPY NOTE
PHYSICAL THERAPY EVALUATION - INPATIENT     Room Number: 229/229-A  Evaluation Date: 3/21/2024  Type of Evaluation: Initial   Physician Order: PT Eval and Treat    Presenting Problem: MI, CVA  Co-Morbidities : HLD, tobacco use  Reason for Therapy: Mobility Dysfunction and Discharge Planning    PHYSICAL THERAPY ASSESSMENT   Patient is a 53 year old male admitted 3/5/2024 for MI. Pt with complicated hospital course, including heart cath, cardiogenic shock, prolonged intubation, CRRT. Plan for 3/22 is trach and G tube. Prior to admission, patient's baseline is indep with ADLs and mobility, working out at the gym, working as a . Pt has supportive family,  and cousin at bedside during eval. Patient is currently functioning below baseline with bed mobility.  Patient is requiring maximum assist x2 as a result of the following impairments: decreased functional strength, pain, impaired sitting balance, decreased muscular endurance, medical status, and increased O2 needs from baseline.  Physical Therapy will continue to follow for duration of hospitalization.    Patient will benefit from continued skilled PT Services while in the hospital, as well as rehab following discharge. Per chart and current functioning level, pt may benefit from LTAC.    PLAN  PT Treatment Plan: Bed mobility;Body mechanics;Energy conservation;Patient education;Family education;Gait training;Strengthening;Transfer training;Balance training  Rehab Potential : Fair  Frequency (Obs): 5x/week    PHYSICAL THERAPY MEDICAL/SOCIAL HISTORY       Problem List  Principal Problem:    NSTEMI (non-ST elevated myocardial infarction) (HCC)  Active Problems:    Chest pain    Cerebrovascular accident (CVA) due to embolism of left middle cerebral artery (HCC)    Anemia      HOME SITUATION  [place] Situation  [place]  [place] Layout: One level  Stairs to Enter : 24 (enter from garage; has basement 12 stairs up)  Railing: Yes  Stairs to  Bedroom: 0  Lives With: Spouse (Reyes)  Drives: Yes  Patient Owned Equipment: Other (Comment) (tub bench)  Patient Regularly Uses: Glasses     Prior Level of [place]: indep in ADLs and mobility, working as a     SUBJECTIVE  Pt intubated, unable to speak. Visually aware of people, grimaced in pain, able to nod head    PHYSICAL THERAPY EXAMINATION   OBJECTIVE  Precautions: Cardiac;Restraints;NG suction;Rectal tube  Fall Risk: High fall risk    WEIGHT BEARING RESTRICTION  Weight Bearing Restriction: None                PAIN ASSESSMENT  Rating:  (unable to rate pain, grimaced with pain)     Management Techniques: Activity promotion;Repositioning    COGNITION  Difficult to assess--pt intubated and unable to speak. Pt able to nod head, able to demo visual awareness of people; will continue to assess    RANGE OF MOTION AND STRENGTH ASSESSMENT  Upper extremity ROM and strength are limited, noted active movement against gravity of R and L UEs--LUE more active  Lower extremity ROM is within functional limits passively  Lower extremity strength is limited, ecc control of LLE in supine; active movement of L and RLE in sitting against gravity--LLE more active    BALANCE  Static Sitting: Poor  Dynamic Sitting: Poor -  Static Standing: Not tested  Dynamic Standing: Not tested    ADDITIONAL TESTS                                    NEUROLOGICAL FINDINGS                      ACTIVITY TOLERANCE  Pulse: 94  Heart Rate Source: Monitor     BP: 104/68  BP Location: Left arm  BP Method: Automatic  Patient Position: Lying    O2 WALK  Oxygen Therapy  SPO2% on Oxygen at Rest: 99 (30% FiO2)    AM-PAC '6-Clicks' INPATIENT SHORT FORM - BASIC MOBILITY  How much difficulty does the patient currently have...  Patient Difficulty: Turning over in bed (including adjusting bedclothes, sheets and blankets)?: A Lot   Patient Difficulty: Sitting down on and standing up from a chair with arms (e.g., wheelchair, bedside commode, etc.):  Unable   Patient Difficulty: Moving from lying on back to sitting on the side of the bed?: A Lot   How much help from another person does the patient currently need...   Help from Another: Moving to and from a bed to a chair (including a wheelchair)?: Total   Help from Another: Need to walk in hospital room?: Total   Help from Another: Climbing 3-5 steps with a railing?: Total     AM-PAC Score:  Raw Score: 8   Approx Degree of Impairment: 86.62%   Standardized Score (AM-PAC Scale): 28.58   CMS Modifier (G-Code): CM    FUNCTIONAL ABILITY STATUS  Functional Mobility/Gait Assessment  Gait Assistance: Not tested  Rolling: maximum assist  Supine to Sit: maximum assistx2  Sit to Supine: maximum assistx2  Sit to Stand:  NT    Exercise/Education Provided:  Bed mobility  Body mechanics  Posture  PT eval    Pt ok to be seen per RN Sarah, coordinated with OT d/t pt's complexity, as well as to maximize rehab benefits and safety. Pt's cousin and  at bedside. Pt appeared willing to participate in therapy. Pt with limited visual tracking to R side initially. Explained to pt and family PT POC. Pt maxAx2 for sup to sit, with positioning needed of both UEs for safety. Pt with limited activation of LEs in supine, LLE more active than RLE. Pt sat >15 mins, initially with modA, but progressing to maxA. Pt required repeated suctioning in sitting, RN present intermittently, then remained with pt to assist with suctioning, NG tube, and new linens. Pt with general lean to L, but able to push through LUE with cueing to right position. Pt more active throughout session, pumping B ankles while sitting EOB, and able to move both UEs. Pt more active in LUE than R. Pt sweaty. VSS throughout. Pt indicated he was ready to return to supine, endorsed being tired, and raised LUE to initiate mobility. MaxAx2 for sit to sup, maxA for rolling for linen change. RN to perform gown change and restraints at end of session. Pt's family encouraged by  pt's performance. At this time, anticipate that pt will require continued rehab, probably at LTAC given pt's respiratory needs and complicated hospital course.    The patient's Approx Degree of Impairment: 86.62% has been calculated based on documentation in the [facility] '6 clicks' Inpatient Basic Mobility Short Form.  Research supports that patients with this level of impairment may benefit from LTC. Pt has rehab potential as he is functioning significantly below baseline and would benefit from continued therapy following d/c at LTAC.  Final disposition will be made by interdisciplinary medical team.    Patient End of Session: In bed;With  staff;Needs met;Call light within reach;RN aware of session/findings;All patient questions and concerns addressed;Family present    CURRENT GOALS  Goals to be met by: 4/4/24  Patient Goal Patient's self-stated goal is: not stated, family's goal is further rehab   Goal #1 Patient is able to demonstrate supine - sit EOB @ level: moderate assistance     Goal #1   Current Status    Goal #2 Patient is able to demonstrate transfers EOB to/from Chair/Wheelchair at assistance level: maximum assistance with  LRAD     Goal #2  Current Status    Goal #3 Amb goal TBD   Goal #3   Current Status    Goal #4    Goal #4   Current Status    Goal #5 Patient to demonstrate independence with home activity/exercise instructions provided to patient in preparation for discharge.   Goal #5   Current Status    Goal #6    Goal #6  Current Status      Patient Evaluation Complexity Level:  History High - 3 or more personal factors and/or co-morbidities   Examination of body systems High - addressing a total of 4 or more elements   Clinical Presentation  Moderate - Evolving   Clinical Decision Making  Moderate Complexity     Therapeutic Activity:  40 minutes

## 2024-03-21 NOTE — PLAN OF CARE
CRRT clotted @ 0500 this AM. Unable to return blood. Hemodynamically stable, able to follow commands to bilateral upper extremities, can wiggle toes upon command at times. Pt's partner updated on plan of care.     Problem: Patient Centered Care  Goal: Patient preferences are identified and integrated in the patient's plan of care  Description: Interventions:  - What would you like us to know as we care for you?  - Provide timely, complete, and accurate information to patient/family  - Incorporate patient and family knowledge, values, beliefs, and cultural backgrounds into the planning and delivery of care  - Encourage patient/family to participate in care and decision-making at the level they choose  - Honor patient and family perspectives and choices  Outcome: Progressing     Problem: Patient/Family Goals  Goal: Patient/Family Long Term Goal  Description: Patient's Long Term Goal: Go home upon discharge    Interventions:  - Monitor labs  - Administer medications  - Pain management  - Patient centered care  - Keep patient and family informed on plan of care  - See additional Care Plan goals for specific interventions  Outcome: Progressing  Goal: Patient/Family Short Term Goal  Description: Patient's Short Term Goal: extubate    Interventions:   - SAT/SBT  - improve strength  - pulmonary hygeine  - See additional Care Plan goals for specific interventions  Outcome: Progressing     Problem: RESPIRATORY - ADULT  Goal: Achieves optimal ventilation and oxygenation  Description: INTERVENTIONS:  - Assess for changes in respiratory status  - Assess for changes in mentation and behavior  - Position to facilitate oxygenation and minimize respiratory effort  - Oxygen supplementation based on oxygen saturation or ABGs  - Provide Smoking Cessation handout, if applicable  - Encourage broncho-pulmonary hygiene including cough, deep breathe, Incentive Spirometry  - Assess the need for suctioning and perform as needed  - Assess and  instruct to report SOB or any respiratory difficulty  - Respiratory Therapy support as indicated  - Manage/alleviate anxiety  - Monitor for signs/symptoms of CO2 retention  Outcome: Progressing     Problem: CARDIOVASCULAR - ADULT  Goal: Maintains optimal cardiac output and hemodynamic stability  Description: INTERVENTIONS:  - Monitor vital signs, rhythm, and trends  - Monitor for bleeding, hypotension and signs of decreased cardiac output  - Evaluate effectiveness of vasoactive medications to optimize hemodynamic stability  - Monitor arterial and/or venous puncture sites for bleeding and/or hematoma  - Assess quality of pulses, skin color and temperature  - Assess for signs of decreased coronary artery perfusion - ex. Angina  - Evaluate fluid balance, assess for edema, trend weights  Outcome: Progressing  Goal: Absence of cardiac arrhythmias or at baseline  Description: INTERVENTIONS:  - Continuous cardiac monitoring, monitor vital signs, obtain 12 lead EKG if indicated  - Evaluate effectiveness of antiarrhythmic and heart rate control medications as ordered  - Initiate emergency measures for life threatening arrhythmias  - Monitor electrolytes and administer replacement therapy as ordered  Outcome: Progressing     Problem: Delirium  Goal: Minimize duration of delirium  Description: Interventions:  - Encourage use of hearing aids, eye glasses  - Promote highest level of mobility daily  - Provide frequent reorientation  - Promote wakefulness i.e. lights on, blinds open  - Promote sleep, encourage patient's normal rest cycle i.e. lights off, TV off, minimize noise and interruptions  - Encourage family to assist in orientation and promotion of home routines  Outcome: Progressing     Problem: METABOLIC/FLUID AND ELECTROLYTES - ADULT  Goal: Hemodynamic stability and optimal renal function maintained  Description: INTERVENTIONS:  - Monitor labs and assess for signs and symptoms of volume excess or deficit  - Monitor  intake, output and patient weight  - Monitor urine specific gravity, serum osmolarity and serum sodium as indicated or ordered  - Monitor response to interventions for patient's volume status, including labs, urine output, blood pressure (other measures as available)  - Encourage oral intake as appropriate  - Instruct patient on fluid and nutrition restrictions as appropriate  Outcome: Progressing     Problem: SKIN/TISSUE INTEGRITY - ADULT  Goal: Skin integrity remains intact  Description: INTERVENTIONS  - Assess and document risk factors for pressure ulcer development  - Assess and document skin integrity  - Monitor for areas of redness and/or skin breakdown  - Initiate interventions, skin care algorithm/standards of care as needed  Outcome: Progressing  Goal: Incision(s), wounds(s) or drain site(s) healing without S/S of infection  Description: INTERVENTIONS:  - Assess and document risk factors for pressure ulcer development  - Assess and document skin integrity  - Assess and document dressing/incision, wound bed, drain sites and surrounding tissue  - Implement wound care per orders  - Initiate isolation precautions as appropriate  - Initiate Pressure Ulcer prevention bundle as indicated  Outcome: Progressing  Goal: Oral mucous membranes remain intact  Description: INTERVENTIONS  - Assess oral mucosa and hygiene practices  - Implement preventative oral hygiene regimen  - Implement oral medicated treatments as ordered  Outcome: Progressing     Problem: NEUROLOGICAL - ADULT  Goal: Achieves stable or improved neurological status  Description: INTERVENTIONS  - Assess for and report changes in neurological status  - Initiate measures to prevent increased intracranial pressure  - Maintain blood pressure and fluid volume within ordered parameters to optimize cerebral perfusion and minimize risk of hemorrhage  - Monitor temperature, glucose, and sodium. Initiate appropriate interventions as ordered  Outcome: Progressing      Problem: HEMATOLOGIC - ADULT  Goal: Free from bleeding injury  Description: (Example usage: patient with low platelets)  INTERVENTIONS:  - Avoid intramuscular injections, enemas and rectal medication administration  - Ensure safe mobilization of patient  - Hold pressure on venipuncture sites to achieve adequate hemostasis  - Assess for signs and symptoms of internal bleeding  - Monitor lab trends  - Patient is to report abnormal signs of bleeding to staff  - Avoid use of toothpicks and dental floss  - Use electric shaver for shaving  - Use soft bristle tooth brush  - Limit straining and forceful nose blowing  Outcome: Progressing     Problem: Safety Risk - Non-Violent Restraints  Goal: Patient will remain free from self-harm  Description: INTERVENTIONS:  - Apply the least restrictive restraint to prevent harm  - Notify patient and family of reasons restraints applied  - Assess for any contributing factors to confusion (electrolyte disturbances, delirium, medications)  - Discontinue any unnecessary medical devices as soon as possible  - Assess the patient's physical comfort, circulation, skin condition, hydration, nutrition and elimination needs   - Reorient and redirection as needed  - Assess for the need to continue restraints  3/21/2024 0739 by Raul Schwab, RN  Outcome: Progressing  3/21/2024 0433 by Raul Schwab, RN  Outcome: Progressing

## 2024-03-21 NOTE — PROGRESS NOTES
[facility]  [address] [place] [address], [address]  [facility] [address]  [phone]            Cristiano Obregon Patient Status:  Inpatient    1970 MRN [id number]   Location [facility] 2W/SW Attending Carlo Vaughn MD   Hosp Day # 16 PCP Abilio Dorsey MD     Subjective:  Cristiano Obregon is a(n) 53 year old male.    Hospital course to date:     Patient with complicated past medical history, hyperlipidemia, tobacco use, strong family history of coronary disease.  Presented in 2024 with chest pain, and was found to have STEMI with cardiogenic shock requiring Impella placement.     Continued ventilation support with CRRT for the time he was anticoagulated due to Impella placement.     Status post CABG on 15 March.  Postop cardiac tamponade status post washout on 3/16.  Paroxysmal and postoperative fibrillation.     History of hypertension, hyperlipidemia.  Off sedation he was still drowsy and therefore CT of the head was done and found subacute appearing left frontal and left occipital lobe infarcts.     At that point neurology was consulted.     Patient also was noted to have clonus in both feet prior to that.    Current Facility-Administered Medications   Medication Dose Route Frequency    NxStage Pure Flow 400 CRRT/PIRRT fluid 5000mL  2.5 L/hr CRRT Continuous    ceFEPIme (Maxipime) 1 g in sodium chloride 0.9% 100 mL IVPB-MBP  1 g Intravenous Q12H    iron sucrose (Venofer) 20 mg/mL injection 200 mg  200 mg Intravenous Daily    hydrALAzine (Apresoline) 20 mg/mL injection 10 mg  10 mg Intravenous Once    labetalol (Trandate) 5 mg/mL injection 10 mg  10 mg Intravenous Q10 Min PRN    hydrALAzine (Apresoline) 20 mg/mL injection 10 mg  10 mg Intravenous Q2H PRN    prochlorperazine (Compazine) 10 MG/2ML injection 5 mg  5 mg Intravenous Q8H PRN    atorvastatin (Lipitor) tab 40 mg  40 mg Oral Nightly    metoprolol tartrate (Lopressor) tab 50 mg  50 mg Oral 2x  Daily(Beta Blocker)    ascorbic acid (Vitamin C) tab 500 mg  500 mg Oral Daily    aspirin chewable tab 81 mg  81 mg Oral Daily    dextrose 10% infusion (TPN no rate)   Intravenous Continuous PRN    pancrelipase (Lip-Prot-Amyl) (Zenpep) DR particles cap 10,000 Units  10,000 Units Per G Tube PRN    And    sodium bicarbonate tab 325 mg  325 mg Oral PRN    hydrALAZINE (Apresoline) tab 25 mg  25 mg Oral Q8H CHAYITO    amiodarone (Pacerone) tab 200 mg  200 mg Oral TID    alteplase (Activase) 4 mg in sterile water for injection (PF) 2.2 mL IV push to declot line  4 mg Intravenous PRN    insulin regular human (Novolin R, Humulin R) 100 UNIT/ML injection 1-7 Units  1-7 Units Subcutaneous 4 times per day    melatonin tab 3 mg  3 mg Oral Nightly PRN    polyethylene glycol (PEG 3350) (Miralax) 17 g oral packet 17 g  17 g Oral Daily PRN    ondansetron (Zofran) 4 MG/2ML injection 4 mg  4 mg Intravenous Q6H PRN    famotidine (Pepcid) tab 20 mg  20 mg Oral Daily    Or    famotidine (Pepcid) 20 mg/2mL injection 20 mg  20 mg Intravenous Daily    potassium chloride 20 mEq/100mL IVPB premix 20 mEq  20 mEq Intravenous PRN    Or    potassium chloride 40 mEq/100mL IVPB premix (central line) 40 mEq  40 mEq Intravenous PRN    magnesium sulfate in dextrose 5% 1 g/100mL infusion premix 1 g  1 g Intravenous PRN    magnesium sulfate in sterile water for injection 2 g/50mL IVPB premix 2 g  2 g Intravenous PRN    chlorhexidine gluconate (Peridex) 0.12 % oral solution 15 mL  15 mL Mouth/Throat BID    heparin (Porcine) 5000 UNIT/ML injection 5,000 Units  5,000 Units Subcutaneous 2 times per day    acetaminophen (Tylenol) tab 650 mg  650 mg Oral Q4H PRN    Or    HYDROcodone-acetaminophen (Norco) 5-325 MG per tab 1 tablet  1 tablet Oral Q4H PRN    [Held by provider] clopidogrel (Plavix) tab 75 mg  75 mg Oral Daily    glucose (Dex4) 15 GM/59ML oral liquid 15 g  15 g Oral Q15 Min PRN    Or    glucose (Glutose) 40% oral gel 15 g  15 g Oral Q15 Min PRN    Or     glucose-vitamin C (Dex-4) chewable tab 4 tablet  4 tablet Oral Q15 Min PRN    Or    dextrose 50% injection 50 mL  50 mL Intravenous Q15 Min PRN    Or    glucose (Dex4) 15 GM/59ML oral liquid 30 g  30 g Oral Q15 Min PRN    Or    glucose (Glutose) 40% oral gel 30 g  30 g Oral Q15 Min PRN    Or    glucose-vitamin C (Dex-4) chewable tab 8 tablet  8 tablet Oral Q15 Min PRN    mineral oil-white petrolatum (Artificial Tears) 83-15 % ophthalmic ointment   Both Eyes TID    heparin (Porcine) 1000 UNIT/ML injection 1,500 Units  1.5 mL Intracatheter PRN    acetaminophen (Tylenol) tab 650 mg  650 mg Oral Q4H PRN    Or    acetaminophen (Tylenol) 160 MG/5ML oral liquid 650 mg  650 mg Oral Q4H PRN    Or    acetaminophen (Tylenol) rectal suppository 650 mg  650 mg Rectal Q4H PRN    Or    acetaminophen (Ofirmev) 10 mg/mL infusion premix 1,000 mg  1,000 mg Intravenous Q6H PRN    senna (Senokot) 8.8 MG/5ML oral syrup 17.6 mg  10 mL Oral Nightly PRN    bisacodyl (Dulcolax) 10 MG rectal suppository 10 mg  10 mg Rectal Daily PRN    fleet enema (Fleet) 7-19 GM/118ML rectal enema 133 mL  1 enema Rectal Once PRN       Objective:  Blood pressure 120/79, pulse 98, temperature 98 °F (36.7 °C), temperature source Temporal, resp. rate 17, height 67\", weight 181 lb 10.5 oz (82.4 kg), SpO2 100%.    Physical Exam:  Vitals:    03/21/24 0400 03/21/24 0500 03/21/24 0600 03/21/24 0700   BP: 121/79 127/74 119/75 120/79   Pulse: 87 96 94 98   Resp: 19 19 15 17   Temp: 98 °F (36.7 °C)      TempSrc: Temporal      SpO2: 100% 100% 99% 100%   Weight:   181 lb 10.5 oz (82.4 kg)    Height:           General: No apparent distress, well nourished, well groomed.  Head- Normocephalic, atraumatic  Eyes- No redness or swelling  ENT- Hearing intake, smell preserved, normal glutition  Neck- No masses or adenopathy  Cv: pulses were palpable and normal, no cyanosis or edema      Neurological:      Mental Status- Alert intubated, follows commands with squeezing the left  hand.     Cranial Nerves:  Tracks with his eyes, possibly inattention on the right side.     VII. Face symmetric, no facial weakness     To the command he squeezed his left hand.  Clonus was sustained in both feet     No withdrawal to pain in the feet     Lab Results   Component Value Date    WBC 17.1 (H) 03/21/2024    HGB 7.6 (L) 03/21/2024    HCT 23.1 (L) 03/21/2024    .0 03/21/2024    CREATSERUM 3.34 (H) 03/21/2024    BUN 44 (H) 03/21/2024     03/21/2024    K 3.8 03/21/2024     03/21/2024    CO2 27.0 03/21/2024     (H) 03/21/2024    CA 8.5 (L) 03/21/2024    ALB 3.1 (L) 03/20/2024    ALKPHO 93 03/19/2024    BILT 0.8 03/19/2024    TP 5.3 (L) 03/19/2024    AST 26 03/19/2024    ALT <7 (L) 03/19/2024    PTT 36.5 (H) 03/20/2024    INR 1.26 (H) 03/20/2024    PSA 1.95 10/06/2021    DDIMER >20.00 (H) 03/20/2024    MG 2.0 03/21/2024    PHOS 2.4 03/21/2024       IR NON-TUNNELED CATHETER    Result Date: 3/19/2024  CONCLUSION:  Exchange of existing non-tunneled central venous catheter via internal jugular vein at bedside.  Ready for use.    Dictated by (CST): Carlitos Lemon MD on 3/19/2024 at 6:09 PM     Finalized by (CST): Carlitos Lemon MD on 3/19/2024 at 6:11 PM          XR CHEST AP PORTABLE  (CPT=71045)    Result Date: 3/19/2024  CONCLUSION:  1. Cardiomegaly.  Pulmonary venous distention.  CABG 2. Bilateral perihilar and lower lobe pulmonary congestive changes with improved aeration left basilar retrocardiac region .  Support tubes and catheters are satisfactory.  No pneumothorax 3. Metallic tip enteric tube within the body of the stomach alongside the indwelling nasogastric tube    Dictated by (CST): Wily Stephenson MD on 3/19/2024 at 11:41 AM     Finalized by (CST): Wily Stephenson MD on 3/19/2024 at 11:46 AM          XR CHEST AP PORTABLE  (CPT=71045)    Result Date: 3/19/2024  CONCLUSION:  1. Right-sided hemodialysis catheter with tip projecting near the cavoatrial junction.   2. Postthoracotomy chest with cardiomegaly and pulmonary vascular congestion.  3. Bibasilar opacities, which may reflect some combination of pleural effusions, atelectasis, and/or superimposed pneumonia.   4. Additional support tubes and lines as above.   Dictated by (CST): Zachery Dean MD on 3/19/2024 at 10:57 AM     Finalized by (CST): Zachery Dean MD on 3/19/2024 at 11:01 AM          CT BRAIN OR HEAD (22685)    Result Date: 3/19/2024  CONCLUSION:   Subacute appearing left frontal and left occipital lobe infarcts.  Brain MRI can be obtained to help stage chronicity.  Pansinusitis  Partial opacification of the bilateral mastoid air cells may be sequela of inflammation. No osseous destruction or bony erosions.   Dictated by (CST): Henri Melgoza MD on 3/19/2024 at 9:46 AM     Finalized by (CST): Henri Melgoza MD on 3/19/2024 at 9:48 AM                 Assessment:  Patient Active Problem List   Diagnosis    Hypercholesteremia    Floaters in visual field, bilateral    Acne, unspecified acne type    Eczema, unspecified type    Paresthesia of left thumb    Chest pain    NSTEMI (non-ST elevated myocardial infarction) (HCC)    Cerebrovascular accident (CVA) due to embolism of left middle cerebral artery (HCC)    Anemia     Patient with what appears to be subacute strokes.  Most likely cardioembolic in the setting of Impella placement and complicated stay.  No evidence of atrial fibrillation at this point.  Continue aspirin and Plavix.  Not a candidate for tenecteplase being outside the window as well as no intervention.     MRI of the brain and MRI of the head will be done.  MRI of the cervical and thoracic spine will be done as well to assess for the possibility of spinal cord involvement since there is bilateral clonus noted.    Arvind Suggs MD  3/21/2024  7:37 AM

## 2024-03-21 NOTE — PROGRESS NOTES
NEPHROLOGY DAILY PROGRESS NOTE       SUBJECTIVE:    CRRT clotted overnight.    OBJECTIVE:    Total Intake/Output:    Intake/Output Summary (Last 24 hours) at 3/21/2024 0920  Last data filed at 3/21/2024 0600  Gross per 24 hour   Intake 1443 ml   Output 3833 ml   Net -2390 ml       PHYSICAL EXAM:  /79 (BP Location: Left arm)   Pulse 98   Temp 98 °F (36.7 °C) (Temporal)   Resp 17   Ht 5' 7\" (1.702 m)   Wt 181 lb 10.5 oz (82.4 kg)   SpO2 100%   BMI 28.45 kg/m²   GEN: Intubated.  HEENT: ETT in place   CHEST: mechanical breath sounds, chest tube in place   CARDIAC: S1S2 normal  ABD: soft, NT/ND  : lujan in place   EXT:  trace upper and lower ext edema noted   NEURO: sedated   ACCESS: RIJ temp HD cath exchanged (3/19)      CURRENT MEDICATIONS:     sodium chloride   Intravenous Once    cefepime  1 g Intravenous Q12H    iron sucrose  200 mg Intravenous Daily    hydrALAzine  10 mg Intravenous Once    atorvastatin  40 mg Oral Nightly    metoprolol tartrate  50 mg Oral 2x Daily(Beta Blocker)    ascorbic acid  500 mg Oral Daily    aspirin  81 mg Oral Daily    hydrALAZINE  25 mg Oral Q8H CHAYITO    amiodarone  200 mg Oral TID    insulin regular human  1-7 Units Subcutaneous 4 times per day    famotidine  20 mg Oral Daily    Or    famotidine  20 mg Intravenous Daily    chlorhexidine gluconate  15 mL Mouth/Throat BID    heparin  5,000 Units Subcutaneous 2 times per day    [Held by provider] clopidogrel  75 mg Oral Daily    mineral oil-white petrolatum   Both Eyes TID         LABS:  Patient Labs Reviewed in Detail. Pertinent Labs as follows:  Recent Labs   Lab 03/20/24  0444 03/20/24  1625 03/21/24  0316   * 145* 139*   BUN 65* 50* 44*   CREATSERUM 4.87* 3.78* 3.34*   EGFRCR 13* 18* 21*   CA 8.2* 8.2* 8.5*    135* 136   K 3.7 3.8 3.8    103 104   CO2 23.0 24.0 27.0     Recent Labs   Lab 03/15/24  1605 03/16/24  0536 03/16/24  1443 03/17/24  0553 03/17/24  1352 03/20/24  0852 03/20/24  1625  03/21/24  0316   RBC 3.44* 2.45*  --  3.08*   < > 2.64* 2.73* 2.64*   HGB 10.2* 7.4*   < > 9.0*   < > 7.7* 7.8* 7.6*   HCT 29.3* 21.4*   < > 26.3*   < > 23.1* 23.6* 23.1*   MCV 85.2 87.3  --  85.4   < > 87.5 86.4 87.5   MCH 29.7 30.2  --  29.2   < > 29.2 28.6 28.8   MCHC 34.8 34.6  --  34.2   < > 33.3 33.1 32.9   RDW 14.3 15.2*  --  15.2*   < > 15.9* 16.1* 15.9*   NEPRELIM 9.11* 6.88  --  8.17*  --   --   --   --    WBC 11.0 8.5  --  10.3   < > 15.6* 16.1* 17.1*   .0 149.0*  --  131.0*   < > 150.0 152.0  152.0 170.0    < > = values in this interval not displayed.         IMAGING:  IR NON-TUNNELED CATHETER    Result Date: 3/19/2024  CONCLUSION:  Exchange of existing non-tunneled central venous catheter via internal jugular vein at bedside.  Ready for use.    Dictated by (CST): Carlitos Lemon MD on 3/19/2024 at 6:09 PM     Finalized by (CST): Carlitos Lemon MD on 3/19/2024 at 6:11 PM          XR CHEST AP PORTABLE  (CPT=71045)    Result Date: 3/19/2024  CONCLUSION:  1. Right-sided hemodialysis catheter with tip projecting near the cavoatrial junction.  2. Postthoracotomy chest with cardiomegaly and pulmonary vascular congestion.  3. Bibasilar opacities, which may reflect some combination of pleural effusions, atelectasis, and/or superimposed pneumonia.   4. Additional support tubes and lines as above.   Dictated by (CST): Zachery Dean MD on 3/19/2024 at 10:57 AM     Finalized by (CST): Zachery Dean MD on 3/19/2024 at 11:01 AM            ASSESSMENT AND PLAN:   This is a 53 year old male with PMH sig for HLD, tobacco use. Presented with chest pain and was admitted for NSTEMI.  Nephrology is consulted for NORMA      Anuric NORMA  - due to ATN from shock.   - creatinine 1.23 on admission, worsened to 7.19 (3/7)  - initiated on CVVH on 3/7 via RIJ temp HD cath   - Continue CRRT  - Increase UF as blood pressures allow.  - discontinue heparin gtt at this time.  - OK for PICC line if needed.  - Maintain  adequate perfusion pressure  - Dose medications for CRRT  - Avoid nephrotoxins.  - discussed with neurology will resume CRRT at this time, iHD when cleared from neuro perspective.  - switch to tunneled HD catheter - timing as per IR.  - Avoid Maximiliano in MRI.  - transition to CRRT from 7pm-7am to allow for procedures and ambulation until cleared for iHD from neurology perspective.  - Appreciate Heme onc input  - BID BMP, phos, and mag while on CRRT.      Hyperkalemia   - resolved with CRRT    Metabolic acidosis  - resolved with CRRT     Hyponatremia  - resolved     Critical care time > 35 mins     Jerome Gonzalez MD  [facility]  Nephrology

## 2024-03-21 NOTE — PROGRESS NOTES
[facility]  part of [facility]    Progress Note    Cristiano Obregon Patient Status:  Inpatient    1970 MRN [id number]   Location [facility] 2W/SW Attending Carlo Vaughn MD   Hosp Day # 16 PCP Abilio Dorsey MD     Subjective:  Pt resting in bed w/his , Reyes, at bedside. He is more alert today/eyes open. Nods head to simple questions. Moves ext: weaker on right.     Objective:  /68 (BP Location: Left arm)   Pulse 85   Temp 98 °F (36.7 °C) (Temporal)   Resp 24   Ht 5' 7\" (1.702 m)   Wt 181 lb 10.5 oz (82.4 kg)   SpO2 99%   BMI 28.45 kg/m²     Temp (24hrs), Av.8 °F (37.1 °C), Min:98 °F (36.7 °C), Max:99.2 °F (37.3 °C)      Intake/Output:    Intake/Output Summary (Last 24 hours) at 3/21/2024 1003  Last data filed at 3/21/2024 0600  Gross per 24 hour   Intake 1393 ml   Output 3652 ml   Net -2259 ml       Wt Readings from Last 3 Encounters:   24 181 lb 10.5 oz (82.4 kg)   10/06/21 177 lb 6.4 oz (80.5 kg)   20 172 lb 9.6 oz (78.3 kg)       Allergies:  No Known Allergies    Labs:  Lab Results   Component Value Date    WBC 17.1 2024    HGB 7.6 2024    HCT 23.1 2024    .0 2024    CREATSERUM 3.34 2024    BUN 44 2024     2024    K 3.8 2024     2024    CO2 27.0 2024     2024    CA 8.5 2024    PTT 36.5 2024    INR 1.26 2024    DDIMER >20.00 2024    MG 2.0 2024    PHOS 2.4 2024       Physical Exam:  Blood pressure 104/68, pulse 85, temperature 98 °F (36.7 °C), temperature source Temporal, resp. rate 24, height 5' 7\" (1.702 m), weight 181 lb 10.5 oz (82.4 kg), SpO2 99%.  General: NAD  Neck: No JVD  Lungs: Clear bilaterally anteriorly and slightly diminished laterally   Pericardial Haile drain=50cc/12 hours  Pleural chest tubes=216cc/12 hours- no air leak noted  Heart: RRR, S1, S2  Abdomen: Soft/Round, NT/ND,  BS+x4/+BM-loose w/Flexiseal in place  Extremities: Warm, dry, 1+ UE & LE edema bilat  Pulses: 2+ bilat DP  Skin: sternotomy incision drsg: CDI w/TPW intact/Left leg incision drsgs: CDI w/VIDAL drain=0cc/12 hours  Neurological:  eyes open/more alert today/nods head to simple questions    Assessment/Plan:  S/P CABG x 3 POD # 6  S/P RE-OP MEDIASTINAL EXPLORATION/WASHOUT on 3/16  Respiratory Failure- intubated. SBT per Pulm. Plan for Trach placement tomorrow: Gen Surgery: Dr. Krishna: consulted. Plavix on hold w/last dose 3/19  Pre op Impella removed post op on 3/16 per Cards. Pt hemodynamically stable. Portis/Ophelia removed 3/18.  Midline cath placed 3/18  Re op mediastinal exploration/washout due to pericardial tamponade. Limited echo 3/16 post exploration w/EF=45-50%/no residual effusion.   Continue ICU status  Pain meds as needed: avoid narcotics to assess neuro status  Altered MS post op: drowsy: improved today -CT head 3/19=+CVA-Mgt per Neuro/ MRI today  Brief pre op AF- Amio protocol for AF prevention- currently SR  Plan to increase activity when appropriate  Scds/Heparin Sub Q prophylaxis DVT prevention   Pre op NORMA/ATN/CRRT w/mgt per Nephrology. Expected post op volume overload. Pt currently on CRRT w/mgt per Nephrology. Temp catheter insertion per IR placed 3/19.   Plan for tunneled catheter per IR next few day w/plans to change to HD. Hematology consulted for freq CRRT line clotting.   Removed leg drain/pacing wires/pleural and pericardial drains today.   [place] remains for close I/O monitoring and immobility  Expected post op anemia w/o clinical significance/stable-transfuse one unit PRBCs today. Hematology consulted 3/20. Acute blood loss anemia immediate post op w/mediastinal exploration/tamponade.  Nutrition per TF via Dobhoff- plan for PEG per IR next few days-Plavix now on hold  CT Abdomen today  ID consulted on 3/14: following recs. Continues on ABX. + sputum cx  No hx: DM- continue correction scale coverage  and glucose checks.   Discharge planning: pt lives w/his  and has supportive family. Anticipate rehab: LTAC. /SW will assist w/planning.     Plan of care discussed w/patient//RN and CV Surgeon: Dr. Shraddha Adams, APRN  3/21/2024  10:03 AM

## 2024-03-21 NOTE — CONSULTS
Interventional Radiology  Consult Note    Reason for consultation: enteric access    History of present illness:  Cristiano Obregon is a 53 year old male initially admittted with NSTEMI on 3/5/24 complicated by respiratory failure and cardiogenic shock eventually post CABG x3 on 3/15/24 and mediastinal exploration/washout 3/16/24. His hospital course has also been complicated by thrombocytopenia, acute stroke, and NORMA requiring CRRT. He remains in the ICU with an ET tube and NG tube.    IR was consulted for gastrostomy tube insertion for long term enteric access.    Objective:    Physical exam:  General: intubated, nasogastric tube present  Midline surgical incision and prior chest tube incisions    Vitals:    03/21/24 1838   BP: 120/71   Pulse: 86   Resp: 15   Temp:        Labs:  Lab Results   Component Value Date    WBC 18.5 03/21/2024    HGB 7.1 03/21/2024    .0 03/21/2024     Lab Results   Component Value Date    CREATSERUM 3.34 03/21/2024          Imaging:  CT abdomen today: appropriate anatomy for percutaneous access    Assessment:  53 year old man admitted with NSTEMI, now post CABG with complicated hospital course. Due to prolonged need for enteric access, IR consulted for gastrostomy tube placement.    Patient's anatomy is appropriate for percutaneous access. IR agrees to proceed.    Plan:  -IR gastrostomy tube insertion 3/22  -please administer barium via NG tube 3/22 AM    I spent 20 minutes speaking with the patient's family, reviewing relevant imaging/labs, and discussing diagnosis and treatment plan.    Carlitos Lemon MD  3/21/2024  Interventional Radiology  [facility]

## 2024-03-21 NOTE — PROGRESS NOTES
[facility]  part of [facility] Infectious Disease Consult    Cristiano Obregon Patient Status:  Inpatient    1970 MRN [id number]   Location [facility] 2W/SW Attending Bud Camarena MD   Hosp Day # 16 PCP MD Cristiano Azar seen and examined,   Afebrile,   Previous entries noted,   Intubated,   Little more awake,   Dobhoff in place now,     History:  History reviewed. No pertinent past medical history.  Past Surgical History:   Procedure Laterality Date    COLONOSCOPY N/A 2017    Procedure: COLONOSCOPY, POSSIBLE BIOPSY, POSSIBLE POLYPECTOMY 19877;  Surgeon: Byron Plaza MD;  Location: [facility] [place] [facility] [place], [facility]     History reviewed. No pertinent family history.   reports that he has been smoking cigarettes. He has never used smokeless tobacco. He reports that he does not drink alcohol and does not use drugs.    Allergies:  No Known Allergies    Medications:    Current Facility-Administered Medications:     sodium chloride 0.9% infusion, , Intravenous, Once    NxStage Pure Flow 400 CRRT/PIRRT fluid 5000mL, 2.5 L/hr, CRRT, Continuous **AND** CRRT Replacement Fluid 400, , , Until Discontinued    ceFEPIme (Maxipime) 1 g in sodium chloride 0.9% 100 mL IVPB-MBP, 1 g, Intravenous, Q12H    iron sucrose (Venofer) 20 mg/mL injection 200 mg, 200 mg, Intravenous, Daily    hydrALAzine (Apresoline) 20 mg/mL injection 10 mg, 10 mg, Intravenous, Once    labetalol (Trandate) 5 mg/mL injection 10 mg, 10 mg, Intravenous, Q10 Min PRN    hydrALAzine (Apresoline) 20 mg/mL injection 10 mg, 10 mg, Intravenous, Q2H PRN    prochlorperazine (Compazine) 10 MG/2ML injection 5 mg, 5 mg, Intravenous, Q8H PRN    atorvastatin (Lipitor) tab 40 mg, 40 mg, Oral, Nightly    metoprolol tartrate (Lopressor) tab 50 mg, 50 mg, Oral, 2x Daily(Beta Blocker)    ascorbic acid (Vitamin C) tab 500 mg, 500 mg, Oral, Daily    aspirin chewable tab 81 mg, 81 mg, Oral, Daily    dextrose  10% infusion (TPN no rate), , Intravenous, Continuous PRN    pancrelipase (Lip-Prot-Amyl) (Zenpep) DR particles cap 10,000 Units, 10,000 Units, Per G Tube, PRN **AND** sodium bicarbonate tab 325 mg, 325 mg, Oral, PRN    hydrALAZINE (Apresoline) tab 25 mg, 25 mg, Oral, Q8H CHAYITO    amiodarone (Pacerone) tab 200 mg, 200 mg, Oral, TID    alteplase (Activase) 4 mg in sterile water for injection (PF) 2.2 mL IV push to declot line, 4 mg, Intravenous, PRN    insulin regular human (Novolin R, Humulin R) 100 UNIT/ML injection 1-7 Units, 1-7 Units, Subcutaneous, 4 times per day    melatonin tab 3 mg, 3 mg, Oral, Nightly PRN    polyethylene glycol (PEG 3350) (Miralax) 17 g oral packet 17 g, 17 g, Oral, Daily PRN    ondansetron (Zofran) 4 MG/2ML injection 4 mg, 4 mg, Intravenous, Q6H PRN    famotidine (Pepcid) tab 20 mg, 20 mg, Oral, Daily **OR** famotidine (Pepcid) 20 mg/2mL injection 20 mg, 20 mg, Intravenous, Daily    potassium chloride 20 mEq/100mL IVPB premix 20 mEq, 20 mEq, Intravenous, PRN **OR** potassium chloride 40 mEq/100mL IVPB premix (central line) 40 mEq, 40 mEq, Intravenous, PRN    magnesium sulfate in dextrose 5% 1 g/100mL infusion premix 1 g, 1 g, Intravenous, PRN    magnesium sulfate in sterile water for injection 2 g/50mL IVPB premix 2 g, 2 g, Intravenous, PRN    chlorhexidine gluconate (Peridex) 0.12 % oral solution 15 mL, 15 mL, Mouth/Throat, BID    heparin (Porcine) 5000 UNIT/ML injection 5,000 Units, 5,000 Units, Subcutaneous, 2 times per day    acetaminophen (Tylenol) tab 650 mg, 650 mg, Oral, Q4H PRN **OR** HYDROcodone-acetaminophen (Norco) 5-325 MG per tab 1 tablet, 1 tablet, Oral, Q4H PRN **OR** [DISCONTINUED] HYDROcodone-acetaminophen (Norco) 5-325 MG per tab 2 tablet, 2 tablet, Oral, Q4H PRN    [Held by provider] clopidogrel (Plavix) tab 75 mg, 75 mg, Oral, Daily    glucose (Dex4) 15 GM/59ML oral liquid 15 g, 15 g, Oral, Q15 Min PRN **OR** glucose (Glutose) 40% oral gel 15 g, 15 g, Oral, Q15 Min PRN  **OR** glucose-vitamin C (Dex-4) chewable tab 4 tablet, 4 tablet, Oral, Q15 Min PRN **OR** dextrose 50% injection 50 mL, 50 mL, Intravenous, Q15 Min PRN **OR** glucose (Dex4) 15 GM/59ML oral liquid 30 g, 30 g, Oral, Q15 Min PRN **OR** glucose (Glutose) 40% oral gel 30 g, 30 g, Oral, Q15 Min PRN **OR** glucose-vitamin C (Dex-4) chewable tab 8 tablet, 8 tablet, Oral, Q15 Min PRN    mineral oil-white petrolatum (Artificial Tears) 83-15 % ophthalmic ointment, , Both Eyes, TID    heparin (Porcine) 1000 UNIT/ML injection 1,500 Units, 1.5 mL, Intracatheter, PRN    acetaminophen (Tylenol) tab 650 mg, 650 mg, Oral, Q4H PRN **OR** acetaminophen (Tylenol) 160 MG/5ML oral liquid 650 mg, 650 mg, Oral, Q4H PRN **OR** acetaminophen (Tylenol) rectal suppository 650 mg, 650 mg, Rectal, Q4H PRN **OR** acetaminophen (Ofirmev) 10 mg/mL infusion premix 1,000 mg, 1,000 mg, Intravenous, Q6H PRN    senna (Senokot) 8.8 MG/5ML oral syrup 17.6 mg, 10 mL, Oral, Nightly PRN    bisacodyl (Dulcolax) 10 MG rectal suppository 10 mg, 10 mg, Rectal, Daily PRN    fleet enema (Fleet) 7-19 GM/118ML rectal enema 133 mL, 1 enema, Rectal, Once PRN    Review of Systems:   Constitutional: Negative for anorexia, chills, fatigue, fevers, malaise, night sweats and weight loss.  Eyes: Negative for visual disturbance, irritation and redness.  Ears, nose, mouth, throat, and face: Negative for hearing loss, tinnitus, nasal congestion, snoring, sore throat, hoarseness and voice change.  Respiratory: Negative for cough, sputum, hemoptysis, chest pain, wheezing, dyspnea on exertion, or stridor.  Cardiovascular: Negative for chest pain, palpitations, irregular heart beats, syncope, fatigue, orthopnea, paroxysmal nocturnal dyspnea, lower extremity edema.  Gastrointestinal: Negative for dysphagia, odynophagia, reflux symptoms, nausea, vomiting, change in bowel habits, diarrhea, constipation and abdominal pain.  Integument/breast: Negative for rash, skin lesions, and  pruritus.  Hematologic/lymphatic: Negative for easy bruising, bleeding, and lymphadenopathy.  Musculoskeletal: Negative for myalgias, arthralgias, muscle weakness.  Neurological: Negative for headaches, dizziness, seizures, memory problems, trouble swallowing, speech problems, gait problems and weakness.  Behavioral/Psych: Negative for active tobacco use.  Endocrine: No history of of diabetes, thyroid disorder.  Unable to obtain,     Vital signs in last 24 hours:  Patient Vitals for the past 24 hrs:   BP Temp Temp src Pulse Resp SpO2 Weight   03/14/24 1100 122/81 99.5 °F (37.5 °C) Pulmonary Ar 114 14 99 % --   03/14/24 1000 96/73 99.3 °F (37.4 °C) Pulmonary Ar 117 18 98 % --   03/14/24 0900 103/69 99.3 °F (37.4 °C) Pulmonary Ar (!) 124 19 98 % --   03/14/24 0800 104/72 98.9 °F (37.2 °C) Pulmonary Ar 119 18 97 % --   03/14/24 0700 127/90 100.2 °F (37.9 °C) Pulmonary Ar 114 13 97 % --   03/14/24 0600 131/81 100.1 °F (37.8 °C) Pulmonary Ar (!) 121 22 98 % 184 lb 15.5 oz (83.9 kg)   03/14/24 0500 123/82 100.2 °F (37.9 °C) Pulmonary Ar 120 25 94 % --   03/14/24 0400 101/73 99.3 °F (37.4 °C) Pulmonary Ar 96 17 97 % --   03/14/24 0300 111/76 98.8 °F (37.1 °C) Pulmonary Ar 91 17 91 % --   03/14/24 0200 90/71 98.9 °F (37.2 °C) Pulmonary Ar 118 18 95 % --   03/14/24 0100 99/79 99.4 °F (37.4 °C) Pulmonary Ar (!) 121 18 96 % --   03/14/24 0000 105/79 99.7 °F (37.6 °C) Pulmonary Ar 117 20 96 % --   03/13/24 2300 104/78 100.3 °F (37.9 °C) Pulmonary Ar 94 18 95 % --   03/13/24 2200 119/75 (!) 100.6 °F (38.1 °C) Pulmonary Ar 118 19 94 % --   03/13/24 2100 100/69 100.4 °F (38 °C) Pulmonary Ar 119 20 94 % --   03/13/24 2000 113/86 100.1 °F (37.8 °C) Pulmonary Ar 116 18 92 % --   03/13/24 1900 -- 100 °F (37.8 °C) Pulmonary Ar 108 20 97 % --   03/13/24 1800 116/69 99.3 °F (37.4 °C) -- 101 16 96 % --   03/13/24 1700 -- 98.7 °F (37.1 °C) -- 113 17 98 % --   03/13/24 1600 123/87 98.8 °F (37.1 °C) -- 100 17 96 % --   03/13/24 1500 -- 99.5  °F (37.5 °C) -- 93 14 98 % --   03/13/24 1400 105/82 100.1 °F (37.8 °C) -- 87 14 99 % --   03/13/24 1330 -- -- -- 83 15 99 % --   03/13/24 1300 -- 98.2 °F (36.8 °C) -- 85 15 99 % --   03/13/24 1200 90/59 99.2 °F (37.3 °C) Pulmonary Ar 88 16 100 % --   03/13/24 1130 -- -- -- 87 15 -- --       Physical Exam:   General: alert, cooperative, oriented.  Intubated,    Head: Normocephalic, without obvious abnormality, atraumatic.   Eyes: Conjunctivae/corneas clear.  No scleral icterus.  No conjunctival     hemorrhage.   Nose: Nares normal.   Throat: Lips, mucosa, and tongue normal.  No thrush noted.   Neck: Soft, supple neck; trachea midline, no adenopathy, no thyromegaly.   Lungs: CTAB, normal and equal bilateral chest rise   Chest wall: No tenderness or deformity.   Heart: Regular rate and rhythm, normal S1S2, no murmur.   Abdomen: soft, non-tender, non-distended, positive BS.   Extremity: no edema, no cyanosis, Trialysis, midline in place,    Skin: No rashes or lesions.   Neurological: Alert, interactive, no focal deficits    Labs:  Lab Results   Component Value Date    WBC 17.5 03/21/2024    HGB 7.5 03/21/2024    HCT 21.5 03/21/2024    .0 03/21/2024    CREATSERUM 3.34 03/21/2024    BUN 44 03/21/2024     03/21/2024    K 3.8 03/21/2024     03/21/2024    CO2 27.0 03/21/2024     03/21/2024    CA 8.5 03/21/2024    PTT 36.5 03/20/2024    INR 1.26 03/20/2024    DDIMER >20.00 03/20/2024    MG 2.0 03/21/2024    PHOS 2.4 03/21/2024       Radiology:  CXR- 1. Cardiomegaly pulmonary venous distention.   2. Support tubes and catheters are satisfactory.  No pneumothorax   3. Mild perihilar and basilar congestive changes worse on the right has progressed.    4. Small right-sided effusion.       Cultures:  Susceptibility data from last 90 days.  Collected Specimen Info Organism Cefazolin Cefepime Ceftriaxone Ciprofloxacin Clindamycin Erythromycin Gentamicin Levofloxacin Meropenem Oxacillin  Piperacillin/Tazobactam Trimethoprim/Sulfamethoxazole Vancomycin   03/08/24 Other from Endotracheal aspirate Klebsiella (Enterobacter) aerogenes  R  S  S  S    S  S  S   S  S      Staphylococcus aureus S     S  S  S  S   S   S  S       Assessment and Plan:    1.   Fevers: Multifactorial, resolved ow,   - Sec to HAP vs drop of Hgb and multiple transfusions,   - UA was bland on admission, will repeat,   - Sputum cul with Kleb and MSSA,   - Blood cul are NGTD  - CXR with bilateral Congestion, no obvious consolidation, thick sputum in Etr earlier, now clearing up,   - Lines are clean, Trialysis Catheter exchanged and midline inserted 3/19.  - MRSA Carriage is negative,   - On IV Zosyn-> Cefepime d # 8/10 of effective abx,      2.    Hx of Acute MI with Cardiogenic Shock: Got intubated post Cath,   - S/P CABG x 3, R Pleural effusion drainage, 3/15/24,   - Followed by Sternal exploration with mediastinal washout for Cardiac tamponade, 3/16/24,     3.    New subacute multiple strokes: Noted Neuro input,   - Likely cardio embolic in the setting of Impella placement and complicated stay  - More awake and moving L sided of the body,     4.     Leukocytosis: Persistently elevated,   - Multifactorial, sec to surgery, tamponade, multiple transfusions, HAP, now multiple cardio embolic stroke,     5.     Disposition: in house, A middle aged male with Acute MI, Cardiogenic shock, with persistent Fevers and then hypothermia, improving, being treated for HAP, intubated, secretions clearing up,   - WBC trend,   - Continue IV Cefepime, changed from IV Zosyn thru 3/23/24  - If persistent Leukocytosis, can consider C diff testing, Rectal tube in place now, soft stool,   - Plan for Trach and Peg by the end of the week,  - Supportive care,     Discussed with PCP, RN, all questions answered, further recommendations to follow, Thanks,     Thank you for consulting [facility] for Cristiano Obregon.  If you have any questions or concerns please  [facility] Infectious Disease at [phone].     Derick Webb MD  3/14/2024  11:25 AM

## 2024-03-21 NOTE — OCCUPATIONAL THERAPY NOTE
OCCUPATIONAL THERAPY EVALUATION - INPATIENT     Room Number: 229/229-A  Evaluation Date: 3/21/2024  Type of Evaluation: Initial  Presenting Problem: Acute respiratory failure - initially due to pulmonary edema. Now with encephalopathy and severe deconditioning  - continue full vent support ; NSTEMI s/p CABG 3/15; Back to OR 3/16 AM for tamponade s/p pericardial clot removal; CRRT; left frontal and left occipital lobe infarcts     Physician Order: IP Consult to Occupational Therapy  Reason for Therapy: ADL/IADL Dysfunction and Discharge Planning    OCCUPATIONAL THERAPY ASSESSMENT   Patient is a 53 year old male admitted 3/5/2024 for MI s/p CABG, CVA, resp failure with vent.  Prior to admission, patient's baseline is independent.  Patient is currently functioning below baseline with toileting, bathing, upper body dressing, lower body dressing, grooming, eating, bed mobility, transfers, stating sitting balance, dynamic sitting balance, static standing balance, dynamic standing balance, maintaining seated position, functional standing tolerance, energy conservation strategies, aerobic capacity, and performing household tasks.  Patient is requiring dependent as a result of the following impairments: decreased functional strength, decreased functional reach, decreased endurance, pain, impaired  balance, impaired coordination, impaired motor planning, decreased muscular endurance, difficulty maintaining precautions, cognitive deficits, medical status, limited  ROM (RUE>LUE deficits), R side inattention, sternal prec, increased O2 needs from baseline, decreased safety awareness, visual deficits, decreased visual spatial awareness, and decreased sensation. Occupational Therapy will continue to follow for duration of hospitalization.    Patient will benefit from continued skilled OT Services to promote return to prior level of function and safety with continuous assistance and gradual rehabilitative therapy  vs IR pending  progress    PLAN  OT Treatment Plan: Balance activities;Energy conservation/work simplification techniques;ADL training;UE strengthening/ROM;Endurance training;Cognitive reorientation;Patient/Family education;Patient/Family training;Equipment eval/education;Compensatory technique education;Continued evaluation;Neuromuscluar reeducation;Fine motor coordination activities;Visual perceptual training;Functional transfer training  OT Device Recommendations: TBD    OCCUPATIONAL THERAPY MEDICAL/SOCIAL HISTORY   Problem List  Principal Problem:    NSTEMI (non-ST elevated myocardial infarction) ([facility])  Active Problems:    Chest pain    Cerebrovascular accident (CVA) due to embolism of left middle cerebral artery (HCC)    Anemia    HOME SITUATION  Type of Home: [facility]  Home Layout: One level  Lives With: Spouse (Reyes)  Toilet and Equipment: Standard height toilet  Shower/Tub and Equipment: Walk-in shower; Tub-shower combo; Tub transfer bench  Occupation/Status: works from home  Drives: Yes  Patient Regularly Uses: Glasses      Prior Level of [place]: independent    SUBJECTIVE  Non-verbal during session, nodding head when asked if he had pain    OCCUPATIONAL THERAPY EXAMINATION    OBJECTIVE  Precautions: Cardiac; Restraints; NG suction; Rectal tube  Fall Risk: High fall risk    PAIN ASSESSMENT  Rating: Unable to rate  Location: unable to specify, facial grimmace with bed mobility  Management Techniques: Relaxation; Repositioning    ACTIVITY TOLERANCE           BP: 104/68             O2 SATURATIONS  Oxygen Therapy  SPO2% on Oxygen at Rest: 100  At rest oxygen flow (liters per minute):  (ventilator 30% FiO2)    COGNITION  Overall Cognitive Status:  Impaired  Arousal/Alertness:  lethargic initially, improved with EOB sitting  Attention Span:  attends with cues to redirect  Orientation Level:  difficulty assessing, turns head to name; non-verbal during session  Following Commands:  follows one-step commands inconsistently  and follows multi-step commands inconsistently; roughly following 50% of commands with increased time    VISION  Head Position:  chin down and head turned to L side  Tracking:  requires cues, head turns, or add eye shifts to track; difficulty tracking to R side, requiring cues, noted to track x1 to R side with loud auditory stimuli, primarily tends to L side with L side gaze  Requires more formal assessment    PERCEPTION  Overall Perception Status:   Impaired  Right Attention:   impaired    SENSATION  Difficulty assessing    Communication: non-verbal    Behavioral/Emotional/Social: flat affect, alert and cooperative 50% of the time; lethargic initially, improved with EOB sitting    RANGE OF MOTION and   STRENGTH ASSESSMENT  Upper extremity ROM/strength impaired LUE and RUE (RUE weakness>LUE); finger flexion on command with L hand and attempts to assist with holding self up in seated position with use of LUE on command, spontaneous movement R hand/shoulder with EOB sitting but no movement on command    COORDINATION  Gross Motor: impaired  Fine Motor: impaired    ACTIVITIES OF DAILY LIVING ASSESSMENT  AM-PAC ‘6-Clicks’ Inpatient Daily Activity Short Form  How much help from another person does the patient currently need…  -   Putting on and taking off regular lower body clothing?: Total  -   Bathing (including washing, rinsing, drying)?: Total  -   Toileting, which includes using toilet, bedpan or urinal? : Total  -   Putting on and taking off regular upper body clothing?: Total  -   Taking care of personal grooming such as brushing teeth?: Total  -   Eating meals?: Total    AM-PAC Score:  Score: 6  Approx Degree of Impairment: 100%  Standardized Score (AM-PAC Scale): 17.07  CMS Modifier (G-Code): CN       BED MOBILITY  Rolling: Dependent  Supine to Sit : Dependent (max Ax2)  Sit to Supine (OT): Dependent (max Ax2)  Scooting: max Ax2    BALANCE ASSESSMENT  Static Sitting: Moderate Assist (progressing to max A with  fatigue, L side lean)    FUNCTIONAL ADL ASSESSMENT  Eating: Not Tested  Grooming Seated: Dependent (washing face, wiping nose, using suction for oral secretions)  UB Dressing Seated: Dependent (gown change)  LB Dressing Seated: Dependent (socks supine)  Toileting Seated: Dependent (rectal tube and lujan)    THERAPEUTIC EXERCISE     Skilled Therapy Provided: RN approved session, coordinated with PT. Pt ventilated on 30% FiO2, vitals stable throughout session. Required total A for ADLs, bed mobility. Sat EOB ~17 minutes with mod A progressing to max A with heavy L side lean with fatigue. Pt with copious oral and nasal secretions, RN assisted with suctioning pt while pt seated EOB. Pt's  and niece present during session providing encouragement, educated them on how to assist with pt's progression of therapy, improving ROM/strength, standing on pt's R side to facilitate R side attention/tracking etc. Pt non-verbal, following ~50% of commands with increased time/repetition, maintained L side gaze with limited attention/tracking to R side. Noted tracking 1x to R side with loud auditory cue. Pt returned supine with total A, rolled with Ax2 for sheet change. All needs met and lines in place, RN present.    EDUCATION PROVIDED  Patient: Role of Occupational Therapy; Plan of Care; Posture/Positioning; Energy Conservation; Compensatory ADL Techniques; Proper Body Mechanics; UE HEP for ROM  Patient's Response to Education: Requires Further Education  Family/Caregiver: Role of Occupational Therapy; Plan of Care; Discharge Recommendations; Fall Prevention; Posture/Positioning; Energy Conservation; Compensatory ADL Techniques; UE HEP for ROM  Family/Caregiver's Response to Education: Verbalized Understanding; Returned Demonstration    The patient's Approx Degree of Impairment: 100% has been calculated based on documentation in the [facility] '6 clicks' Inpatient Daily Activity Short Form.  Research supports that patients with  this level of impairment may benefit from LTAC vs IR pending progress.  Final disposition will be made by interdisciplinary medical team.     Patient End of Session: In bed;Needs met;Call light within reach;RN aware of session/findings;All patient questions and concerns addressed;Restraints;Family present;With  staff    OT Goals  Patients self stated goal is: did not state     Patient will complete functional transfer with mod A  Comment:     Patient will complete grooming mod A  Comment:     Patient will tolerate seated EOB for 20 minutes in prep for adls with CGA   Comment:                Goals  on: 2024  Frequency: 5x/wk    Patient Evaluation Complexity Level:   Occupational Profile/Medical History HIGH - Extensive review of history including review of medical or therapy records    Specific performance deficits impacting engagement in ADL/IADL HIGH  5+ performance deficits    Client Assessment/Performance Deficits HIGH - Comorbidities and significant modifications of tasks    Clinical Decision Making HIGH - Analysis of occupational profile, comprehensive assessments, multiple treatment options    Overall Complexity HIGH     OT Session Time: 45 minutes  Therapeutic Activity: 30 minutes  15 min kitty Trinidad OTR/L

## 2024-03-21 NOTE — OCCUPATIONAL THERAPY NOTE
Order received and chart reviewed. Attempted to see pt for OT  today. Per RN, pt will be getting chest tubes removed and undergoing other RN care this AM. RN requesting to return later,  Will re-attempt when pt is available and medically appropriate.    Nazanin Trinidad, OTR/L

## 2024-03-21 NOTE — PLAN OF CARE
Problem: Patient Centered Care  Goal: Patient preferences are identified and integrated in the patient's plan of care  Description: Interventions:  - What would you like us to know as we care for you? MANUEL  Problem: Safety Risk - Non-Violent Restraints  Goal: Patient will remain free from self-harm  Description: INTERVENTIONS:  - Apply the least restrictive restraint to prevent harm  - Notify patient and family of reasons restraints applied  - Assess for any contributing factors to confusion (electrolyte disturbances, delirium, medications)  - Discontinue any unnecessary medical devices as soon as possible  - Assess the patient's physical comfort, circulation, skin condition, hydration, nutrition and elimination needs   - Reorient and redirection as needed  - Assess for the need to continue restraints  Outcome: Not Progressing     - Provide timely, complete, and accurate information to patient/family  - Incorporate patient and family knowledge, values, beliefs, and cultural backgrounds into the planning and delivery of care  - Encourage patient/family to participate in care and decision-making at the level they choose  - Honor patient and family perspectives and choices  Outcome: Progressing     Problem: Patient/Family Goals  Goal: Patient/Family Long Term Goal  Description: Patient's Long Term Goal: Go home upon discharge    Interventions:  - Monitor labs  - Administer medications  - Pain management  - Patient centered care  - Keep patient and family informed on plan of care  - See additional Care Plan goals for specific interventions  Outcome: Progressing  Goal: Patient/Family Short Term Goal  Description: Patient's Short Term Goal: extubate    Interventions:   - SAT/SBT  - improve strength  - pulmonary hygeine  - See additional Care Plan goals for specific interventions  Outcome: Progressing     Problem: RESPIRATORY - ADULT  Goal: Achieves optimal ventilation and oxygenation  Description: INTERVENTIONS:  -  Assess for changes in respiratory status  - Assess for changes in mentation and behavior  - Position to facilitate oxygenation and minimize respiratory effort  - Oxygen supplementation based on oxygen saturation or ABGs  - Provide Smoking Cessation handout, if applicable  - Encourage broncho-pulmonary hygiene including cough, deep breathe, Incentive Spirometry  - Assess the need for suctioning and perform as needed  - Assess and instruct to report SOB or any respiratory difficulty  - Respiratory Therapy support as indicated  - Manage/alleviate anxiety  - Monitor for signs/symptoms of CO2 retention  Outcome: Progressing     Problem: CARDIOVASCULAR - ADULT  Goal: Maintains optimal cardiac output and hemodynamic stability  Description: INTERVENTIONS:  - Monitor vital signs, rhythm, and trends  - Monitor for bleeding, hypotension and signs of decreased cardiac output  - Evaluate effectiveness of vasoactive medications to optimize hemodynamic stability  - Monitor arterial and/or venous puncture sites for bleeding and/or hematoma  - Assess quality of pulses, skin color and temperature  - Assess for signs of decreased coronary artery perfusion - ex. Angina  - Evaluate fluid balance, assess for edema, trend weights  Outcome: Progressing  Goal: Absence of cardiac arrhythmias or at baseline  Description: INTERVENTIONS:  - Continuous cardiac monitoring, monitor vital signs, obtain 12 lead EKG if indicated  - Evaluate effectiveness of antiarrhythmic and heart rate control medications as ordered  - Initiate emergency measures for life threatening arrhythmias  - Monitor electrolytes and administer replacement therapy as ordered  Outcome: Progressing     Problem: Delirium  Goal: Minimize duration of delirium  Description: Interventions:  - Encourage use of hearing aids, eye glasses  - Promote highest level of mobility daily  - Provide frequent reorientation  - Promote wakefulness i.e. lights on, blinds open  - Promote sleep,  encourage patient's normal rest cycle i.e. lights off, TV off, minimize noise and interruptions  - Encourage family to assist in orientation and promotion of home routines  Outcome: Progressing     Problem: SKIN/TISSUE INTEGRITY - ADULT  Goal: Skin integrity remains intact  Description: INTERVENTIONS  - Assess and document risk factors for pressure ulcer development  - Assess and document skin integrity  - Monitor for areas of redness and/or skin breakdown  - Initiate interventions, skin care algorithm/standards of care as needed  Outcome: Progressing  Goal: Incision(s), wounds(s) or drain site(s) healing without S/S of infection  Description: INTERVENTIONS:  - Assess and document risk factors for pressure ulcer development  - Assess and document skin integrity  - Assess and document dressing/incision, wound bed, drain sites and surrounding tissue  - Implement wound care per orders  - Initiate isolation precautions as appropriate  - Initiate Pressure Ulcer prevention bundle as indicated  Outcome: Progressing  Goal: Oral mucous membranes remain intact  Description: INTERVENTIONS  - Assess oral mucosa and hygiene practices  - Implement preventative oral hygiene regimen  - Implement oral medicated treatments as ordered  Outcome: Progressing     Problem: NEUROLOGICAL - ADULT  Goal: Achieves stable or improved neurological status  Description: INTERVENTIONS  - Assess for and report changes in neurological status  - Initiate measures to prevent increased intracranial pressure  - Maintain blood pressure and fluid volume within ordered parameters to optimize cerebral perfusion and minimize risk of hemorrhage  - Monitor temperature, glucose, and sodium. Initiate appropriate interventions as ordered  Outcome: Progressing     Problem: HEMATOLOGIC - ADULT  Goal: Free from bleeding injury  Description: (Example usage: patient with low platelets)  INTERVENTIONS:  - Avoid intramuscular injections, enemas and rectal medication  administration  - Ensure safe mobilization of patient  - Hold pressure on venipuncture sites to achieve adequate hemostasis  - Assess for signs and symptoms of internal bleeding  - Monitor lab trends  - Patient is to report abnormal signs of bleeding to staff  - Avoid use of toothpicks and dental floss  - Use electric shaver for shaving  - Use soft bristle tooth brush  - Limit straining and forceful nose blowing  Outcome: Progressing

## 2024-03-21 NOTE — PROGRESS NOTES
Received PT intubated with a (7) ETT secured at (22) on vent with the following settings; BS heard bilaterally, SXN provided as needed. No changes made, RT to continue to monitor.        03/21/24 0502   Vent Information   Vent Mode VC/AC   Settings   FiO2 (%) 30 %   Resp Rate (Set) 12   Vt (Set, mL) 530 mL   Waveform Decelerating ramp   PEEP/CPAP (cm H2O) 5 cm H20   PEEP Low (cm H2O) 2 cm H2O   Peak Flow LPM 60   Trigger Sensitivity Flow (L/min) 2 L/min   Humidification Heater   H2O Bag Level 1/4 Full   Heater Temperature 98.6 °F (37 °C)   Readings   Total RR 17   Minute Ventilation (L/min) 9.3 L/min   Expiratory Tidal Volume 450 mL   PIP Observed (cm H2O) 21 cm H2O   MAP (cm H2O) 9   PEEP Low (cm H2O) 2 cm H2O   Plateau Pressure (cm H2O) 15 cm H2O   Static Compliance (L/cm H2O) 37   Dynamic Compliance (L/cm H2O) 30 L/cm H2O

## 2024-03-21 NOTE — PROGRESS NOTES
[facility]  part of [facility]    Progress Note    Cristiano Obregon Patient Status:  Inpatient    1970 MRN [id number]   Location [facility] 2W/SW Attending Carlo Vaughn MD   Hosp Day # 16 PCP Abilio Dorsey MD     Subjective:  intubated    Objective/Physical Exam:  General: Alert, orientated x3.  Cooperative.  No apparent distress.  Vital Signs:  Blood pressure 105/68, pulse 86, temperature 98.9 °F (37.2 °C), temperature source Temporal, resp. rate 19, height 5' 7\" (1.702 m), weight 181 lb 10.5 oz (82.4 kg), SpO2 100%.    Labs:  Lab Results   Component Value Date    WBC 17.5 (H) 2024    HGB 7.5 (L) 2024    HCT 21.5 (L) 2024    .0 2024    CREATSERUM 3.34 (H) 2024    BUN 44 (H) 2024     2024    K 3.8 2024     2024    CO2 27.0 2024     (H) 2024    CA 8.5 (L) 2024    ALB 3.1 (L) 2024    ALKPHO 93 2024    BILT 0.8 2024    TP 5.3 (L) 2024    AST 26 2024    ALT <7 (L) 2024    PTT 36.5 (H) 2024    INR 1.26 (H) 2024    PSA 1.95 10/06/2021    DDIMER >20.00 (H) 2024    MG 2.0 2024    PHOS 2.4 2024       Imaging:  IR NON-TUNNELED CATHETER    Result Date: 3/19/2024  PROCEDURE: IR NON-TUNNELLED CATHETER  INDICATIONS:  Critically ill patient on CRRT via non tunneled hemodialysis catheter.  Catheter not functioning for dialysis.  Chest x-ray demonstrates catheter terminating in the mid superior vena cava.  (S): Gonchigar  ANESTHESIA/SEDATION: Level of anesthesia/sedation: None (Lidocaine only) Anesthesia/sedation administered by: Not applicable  ESTIMATED BLOOD LOSS: Less than 5 mL  COMPLICATIONS: None  FINDINGS:  Informed consent was obtained. The procedure was performed at bedside. The right neck including the existing 15 centimeter double-lumen catheter was sterilely prepped and draped.  Of note, the catheter  was retracted several centimeters out of the venotomy site.  Local Lidocaine was administered.  A 0.035 inch wire was advanced through one of the lumens.  The catheter was then exchanged for a new 20 centimeter double-lumen catheter approximately 1 centimeter out of the venotomy. The catheter was secured to the skin. Follow-up chest radiograph demonstrated the catheter terminating at the superior cavoatrial junction.         CONCLUSION:  Exchange of existing non-tunneled central venous catheter via internal jugular vein at bedside.  Ready for use.    Dictated by (CST): Carlitos Lemon MD on 3/19/2024 at 6:09 PM     Finalized by (CST): Carlitos Lemon MD on 3/19/2024 at 6:11 PM          XR CHEST AP PORTABLE  (CPT=71045)    Result Date: 3/19/2024  PROCEDURE: XR CHEST AP PORTABLE  (CPT=71045) TIME: 0802 hours.   COMPARISON: [facility], XR CHEST AP PORTABLE (CPT=71045), 3/17/2024, 6:21  INDICATIONS: NG tube placement. Coronary artery bypass graft on 3/15/2024.  TECHNIQUE:   Single view.   FINDINGS:  CARDIAC/VASC: Cardiomegaly.  ET tube in satisfactory position.  MEDIAST/ANTONI:   Atherosclerotic aorta with no visible aneurysm.  CABG.  Double-lumen right IJ catheter with the tip in the SVC.  Right IJ sheath with the tip in the SVC.  [place]-Ophelia catheter has been removed.  No pneumothorax.  Right paramediastinal left basilar chest tubes present.  Pericardial drainage catheter suspected at the base of the heart. LUNGS/PLEURA: Bilateral perihilar lower lobe pulmonary congestion with improved aeration left basilar retrocardiac region BONES: Mild scoliosis with mild degenerative disc disease and spondylosis.  OTHER: Nasogastric tube within the body of the stomach.  Adjacent metallic tip enteric tube also within the body of the stomach..  Cardiac monitoring leads electrodes overlie the chest.  Epicardial leads suspected         CONCLUSION:  1. Cardiomegaly.  Pulmonary venous distention.  CABG 2. Bilateral  perihilar and lower lobe pulmonary congestive changes with improved aeration left basilar retrocardiac region .  Support tubes and catheters are satisfactory.  No pneumothorax 3. Metallic tip enteric tube within the body of the stomach alongside the indwelling nasogastric tube    Dictated by (CST): Wily Stephenson MD on 3/19/2024 at 11:41 AM     Finalized by (CST): Wily Stephenson MD on 3/19/2024 at 11:46 AM          XR CHEST AP PORTABLE  (CPT=71045)    Result Date: 3/19/2024  PROCEDURE: XR CHEST AP PORTABLE  (CPT=71045) TIME: 1019.   COMPARISON: [facility], XR CHEST AP PORTABLE (CPT=71045), 3/16/2024, 6:51 AM.  [facility], XR CHEST AP PORTABLE (CPT=71045), 3/17/2024, 6:21 AM.  [facility], XR CHEST AP PORTABLE (CPT=71045), 3/19/2024, 8:02 AM.  INDICATIONS: Right internal jugular dialysis catheter placement confirmation.  TECHNIQUE:   Single view.   FINDINGS:  DEVICES: There is a right-sided large-bore dual-lumen hemodialysis catheter with tip projecting near the cavoatrial junction. An endotracheal tube terminates approximately 7.2 cm above the pham. Enteric tubes extend caudally beyond the field of view. Bilateral chest tubes are evident. CARDIAC/VASC: The cardiomediastinal silhouette is accentuated by AP technique, but likely stably enlarged. There is mild tortuosity of the thoracic aorta with peripheral atherosclerotic vascular calcification. The pulmonary vascularity is mildly congested. MEDIAST/ANTONI: Surgical clips are present. LUNGS/PLEURA: Patchy opacities are demonstrated bilaterally, right worse than left, and may reflect some combination of accumulating pleural effusions, compressive atelectasis, and/or superimposed airspace consolidation. No pneumothorax is evident.  BONES: Median sternotomy wires are demonstrated. Mild scoliosis and multilevel degenerative changes of the thoracic spine are apparent. There are degenerative changes of shoulders  bilaterally. OTHER: Leads overlie the chest and obscure underlying detail.           CONCLUSION:  1. Right-sided hemodialysis catheter with tip projecting near the cavoatrial junction.  2. Postthoracotomy chest with cardiomegaly and pulmonary vascular congestion.  3. Bibasilar opacities, which may reflect some combination of pleural effusions, atelectasis, and/or superimposed pneumonia.   4. Additional support tubes and lines as above.   Dictated by (CST): Zachery Dean MD on 3/19/2024 at 10:57 AM     Finalized by (CST): Zachery Dean MD on 3/19/2024 at 11:01 AM          CT BRAIN OR HEAD (02934)    Result Date: 3/19/2024  PROCEDURE: CT BRAIN OR HEAD (CPT=70450)  COMPARISON: None.  INDICATIONS: AMS  TECHNIQUE: CT images were obtained without contrast material.  Automated exposure control for dose reduction was used.  Dose information is transmitted to the ACR (American College of Radiology) NRDR (National Radiology Data Registry) which includes the Dose Index Registry.  FINDINGS:  CSF SPACES: Ventricles, cisterns, and sulci are appropriate for age.  No hydrocephalus, subarachnoid hemorrhage, or mass.  No midline shift.  CEREBRUM: Areas of volume loss with blurring of the gray-white differentiation seen involving the left frontal lobe and left posterior occipital lobe. WHITE MATTER: Normal white matter.  CEREBELLUM: No edema, hemorrhage, mass, acute infarction, or significant atrophy.  BRAINSTEM: No edema, hemorrhage, mass, acute infarction, or significant atrophy.  CALVARIUM: No apparent fracture, mass, or other significant visible lesion.  SINUSES: There is mild mucosal thickening of the ethmoid air cells and sphenoid sinuses.  Moderate mucosal thickening of the frontal sinuses. Mild partial opacification of the bilateral mastoid air cells. ORBITS: Limited views are unremarkable.   OTHER: Negative.          CONCLUSION:   Subacute appearing left frontal and left occipital lobe infarcts.  Brain MRI can be obtained  to help stage chronicity.  Pansinusitis  Partial opacification of the bilateral mastoid air cells may be sequela of inflammation. No osseous destruction or bony erosions.   Dictated by (CST): Henri Melgoza MD on 3/19/2024 at 9:46 AM     Finalized by (CST): Henri Melgoza MD on 3/19/2024 at 9:48 AM          XR CHEST AP PORTABLE  (CPT=71045)    Result Date: 3/17/2024  PROCEDURE: XR CHEST AP PORTABLE  (CPT=71045) TIME: 628 hours.   COMPARISON: [facility], XR CHEST AP PORTABLE (CPT=71045), 3/16/2024, 6:51 AM.  [facility], XR CHEST AP PORTABLE (CPT=71045), 3/15/2024, 4:44 PM.  [facility], XR CHEST AP PORTABLE (CPT=71045), 3/12/2024, 7:06 AM.  INDICATIONS: Follow up shortness of breath.  TECHNIQUE:   Single view.   FINDINGS:  CARDIAC/VASC: The cardiomediastinal silhouette is unchanged in size.  Postoperative changes from recent CABG.  There are sternotomy wires. MEDIAST/ANTONI:   No mass. LUNGS/PLEURA: Mildly low lung volumes.  Left retrocardiac opacity is present.  Slight blunting of the left costophrenic angle.  Right lung and pleural spaces are clear. BONES: The osseous structures are unchanged. OTHER: The enteric tube courses below the diaphragm with the distal tip out of the field of view.  Mediastinal drains and left basilar chest tube are unchanged.  There is a right internal jugular approach [place]-Ophelia catheter, with the tip projecting over the distal left lower lobe pulmonary artery The endotracheal tube, right IJ central venous catheter, and Impella device are in unchanged position         CONCLUSION:   Distal tip of the [place]-Ophelia catheter projects over the left lower lobe.  Recommend retraction.  Unchanged trace left pleural effusion.  Unchanged left retrocardiac opacity.    Dictated by (CST): Emily Álvarez MD on 3/17/2024 at 12:22 PM     Finalized by (CST): Emily Álvarez MD on 3/17/2024 at 12:24 PM          US ARTERIAL DUPLEX UPPER EXTREMITY LEFT  (CPT=93931)    Result Date: 3/16/2024  PROCEDURE: US ARTERIAL DUPLEX UPPER EXTREMITY LEFT (CPT=93931)  COMPARISON: None.  INDICATIONS: 53-year-old man with left upper extremity edema and diminished distal pulses after recent arterial catheterization.  FINDINGS: There are multiphasic waveforms throughout the left subclavian artery with peak systolic velocity of 122 centimeters/second proximally.  There are multiphasic waveforms throughout the left axillary artery with peak systolic velocity of 87 centimeters/second.  There are multiphasic waveforms throughout the left brachial artery with peak systolic velocity of 95 centimeters/second proximally.  There are multiphasic waveforms throughout the left radial artery, including at the wrist.  Peak systolic velocity within the proximal left radial artery is 83 centimeters/second, while peak systolic velocity within the distal radial artery is 88 centimeters/second.  There is no arterial pseudoaneurysm or dissection.  There are multiphasic waveforms throughout the left ulnar artery with peak systolic velocity of 91 centimeters/second proximally.         CONCLUSION:  1. Widely patent left upper extremity arterial vasculature, including radial artery without pseudoaneurysm or dissection.    Dictated by (CST): Guille Chan MD on 3/16/2024 at 12:56 PM     Finalized by (CST): Guille Chan MD on 3/16/2024 at 12:57 PM          CARD ECHO LIMITED (CPT=93308)    Result Date: 3/16/2024  Transthoracic Echocardiogram Name:Cristiano Obregon Date: 2024 :  1970 Ht:  (67in)  BP: 108 / 77 MRN:  [id number]    Age:  53years    Wt:  (188lb) HR: 84bpm Loc:  [facility]       Gndr: M          BSA: 1.97m^2 Sonographer: ADEEL Wyman Ordering:    Dima Ponce MD Consulting:  Derick Webb ---------------------------------------------------------------------------- History/Indications:   Coronary artery disease.  Impella placement.  PMH: Myocardial infarction.  Coronary artery  bypass grafting (03/15/2024). Performed during the current admission. ---------------------------------------------------------------------------- Procedure information:  A transthoracic echocardiogram, limited study was performed. Additional evaluation included M-mode and limited 2D.  Patient status:  Inpatient.  Location:  Bedside.    Comparison was made to the study of 03/11/2024.    This was a routine study. Transthoracic echocardiography for diagnosis, ventricular function evaluation, and postoperative evaluation. Image quality was adequate. ECG rhythm:   Normal sinus ---------------------------------------------------------------------------- Conclusions: 1. Left ventricle: The cavity size was normal. Wall thickness was normal.    Systolic function was mildly reduced. The estimated ejection fraction was    45-50%, by 3D assessment. Unable to assess LV diastolic function. Impella    catheter noted in the left ventricle, placement measures 3.40cm from the    aortic valve. 2. Left ventricle: There is moderate hypokinesis of the mid-apical inferior    and mid inferolateral walls. 3. Right ventricle: Pacer wire noted in the right ventricle. Systolic    function was mildly reduced. 4. Left atrium: The left atrial volume was moderately increased. 5. Right atrium: The atrium was dilated. Pacer wire noted in right atrium. 6. Pericardium, extracardiac: There was no residual pericardial effusion.    There was a left pleural effusion. Impressions:  This study is compared with previous dated 03/11/2024: No significant change since prior study. * ---------------------------------------------------------------------------- * Findings: Left ventricle:  The cavity size was normal. Wall thickness was normal. Systolic function was mildly reduced. The estimated ejection fraction was 45-50%, by 3D assessment. Impella catheter noted in the left ventricle, placement measures 3.40cm from the aortic valve. Regional wall motion:    There is moderate hypokinesis of the mid-apical inferior and mid inferolateral walls.   Unable to assess LV diastolic function. Left atrium:  The left atrial volume was moderately increased. Right ventricle:  The cavity size was normal. Pacer wire noted in the right ventricle. Systolic function was mildly reduced. Right atrium:  The atrium was dilated. Pacer wire noted in right atrium. Mitral valve:  The valve was structurally normal. Leaflet separation was normal. Aortic valve:   The valve was trileaflet.   Cusp separation was normal. Tricuspid valve:  The valve is structurally normal. Leaflet separation was normal. Pulmonic valve:   Poorly visualized. Pericardium:   Post pericardiocentesis:   There was no residual pericardial effusion. Pleura:  There was a left pleural effusion. Pulmonary arteries: Systolic pressure could not be accurately estimated. Systemic veins:  Not visualized. ---------------------------------------------------------------------------- Measurements  Left ventricle         Value        Ref       03/11/2024  LV end-diastolic       122   ml     --------- ----------  volume, 3D  LV end-systolic        65    ml     --------- ----------  volume, 3D  LV ejection        (L) 46    %      52 - 72   ----------  fraction, 3D  LV end-diastolic       62    ml/m^2 <=79      ----------  volume/bsa, 3D  LV end-systolic    (H) 33    ml/m^2 <=32      ----------  volume/bsa, 3D  LV wall mass, 3D       145   g      --------- ----------  LV wall mass/bsa,      74    g/m^2  --------- ----------  3D  IVS thickness, ED,     0.9   cm     0.6 - 1.0 1.0  PLAX  LV ID, ED, PLAX        5.1   cm     4.2 - 5.8 5.0  LV ID, ES, PLAX        3.8   cm     2.5 - 4.0 3.6  LV PW thickness,       0.8   cm     0.6 - 1.0 1.1  ED, PLAX  IVS/LV PW ratio,       1.13         --------- 0.91  ED, PLAX  LV PW/LV ID ratio,     0.16         --------- 0.22  ED, PLAX  LV ejection        (L) 50    %      52 - 72   54  fraction  Left atrium             Value        Ref       03/11/2024  LA volume, S       (H) 101   ml     18 - 58   88  LA volume/bsa, S   (H) 51    ml/m^2 16 - 34   44  LA volume, ES, 1-p (H) 103   ml     18 - 58   95  A4C  LA volume, ES, 1-p (H) 82    ml     18 - 58   81  A2C  LA volume, ES, A/L     106   ml     --------- 95  LA volume/bsa, ES, (H) 54    ml/m^2 16 - 34   48  A/L  LA volume, ES, 3D  (H) 80    ml     18 - 58   ----------  LA volume/bsa, ES,     41    ml/m^2 --------- ----------  3D  Right ventricle        Value        Ref       03/11/2024  TAPSE, MM          (L) 0.92  cm     >=1.70    ----------  RV s', lateral     (L) 6.3   cm/sec >=9.5     ---------- Legend: (L)  and  (H)  liza values outside specified reference range. ---------------------------------------------------------------------------- Prepared and electronically signed by Diam Ponce MD 03/16/2024 10:15     XR CHEST AP PORTABLE  (CPT=71045)    Result Date: 3/16/2024  PROCEDURE: XR CHEST AP PORTABLE  (CPT=71045) TIME: 656 hours.   COMPARISON: [facility], XR CHEST AP PORTABLE (CPT=71045), 3/15/2024, 4:44 PM.  [facility], XR CHEST AP PORTABLE (CPT=71045), 3/12/2024, 7:06 AM.  [facility], XR CHEST AP PORTABLE (CPT=71045), 3/11/2024, 7:59 AM.  INDICATIONS: S/P Surgery  TECHNIQUE:   Single view.   FINDINGS:  CARDIAC/VASC: The cardiomediastinal silhouette is unchanged in size.  Postoperative changes from recent CABG.  There are sternotomy wires. MEDIAST/ANTONI:   No mass. LUNGS/PLEURA: Mildly low lung volumes.  Left retrocardiac opacity is present.  Slight blunting of the left costophrenic angle.  Right lung and pleural spaces are clear. BONES: The osseous structures are unchanged. OTHER: The enteric tube courses below the diaphragm with the distal tip terminating the body of the stomach.  Mediastinal drains and left basilar chest tube are unchanged.  There is a right internal jugular approach [place]-Ophelia catheter, with the tip  projecting over the distal left lower lobe pulmonary artery The endotracheal tube, right IJ central venous catheter, and Impella device are in unchanged position         CONCLUSION:   Distal tip of the [place]-Ophelia catheter remains within the left lower lobe pulmonary artery.  Recommend retraction.  A secure message was sent to Emily Adams on 03/16/2024 at 8:31 a.m.  Trace left pleural effusion.  Left retrocardiac opacity , which may be secondary to subsegmental atelectasis, edema, and/or infection.  Resolution of previously seen right pleural effusion and basilar opacity.    Dictated by (CST): Emily Álvarez MD on 3/16/2024 at 8:26 AM     Finalized by (CST): Emily Álvarez MD on 3/16/2024 at 8:30 AM          XR CHEST AP PORTABLE  (CPT=71045)    Result Date: 3/15/2024  PROCEDURE: XR CHEST AP PORTABLE  (CPT=71045) TIME: 16:44.   COMPARISON: [facility], XR CHEST AP PORTABLE (CPT=71045), 3/12/2024, 7:06 AM.  [facility], XR CHEST AP PORTABLE (CPT=71045), 3/11/2024, 7:59 AM.  [facility], XR CHEST AP PORTABLE (CPT=71045), 3/08/2024, 11:50 AM.  INDICATIONS: og tube placement  TECHNIQUE:   Single view.   FINDINGS:  CARDIAC/VASC: The cardiomediastinal silhouette is unchanged in size.  Postoperative changes from recent CABG.  There are sternotomy wires. MEDIAST/ANTONI:   No mass. LUNGS/PLEURA: The right pleural effusion and associated right basilar airspace opacity have almost entirely resolved.  No significant change in the pulmonary vascular congestion.  No pneumothorax.  There are low lung volumes. BONES: The osseous structures are unchanged. OTHER: The enteric tube courses below the diaphragm with the distal tip terminating the body of the stomach.  There is been interval placement of a mediastinal left basilar chest tube.  The endotracheal tube, right IJ central venous catheter, right IJ [place] Ophelia catheter and Impella device are in unchanged position         CONCLUSION:    1. Postoperative changes from recent CABG. 2. Well-positioned enteric tube, which terminates in the body of the stomach. 3. Interval placement of mediastinal and left basilar chest tubes.  No pneumothorax. 4. Remaining support devices are in unchanged positioning.  5. Near complete resolution of the right pleural effusion and associated right lower lobe airspace opacity. 6. No significant change in the pulmonary vascular congestion.     Dictated by (CST): aDvid Hendrickson MD on 3/15/2024 at 5:11 PM     Finalized by (CST): David Hendrickson MD on 3/15/2024 at 5:17 PM          XR CHEST AP PORTABLE  (CPT=71045)    Result Date: 3/12/2024  PROCEDURE: XR CHEST AP PORTABLE  (CPT=71045) TIME: 0708 hours  COMPARISON: [facility], XR CHEST AP PORTABLE (CPT=71045), 3/11/2024, 7:59  INDICATIONS: Acute myocardial infarction.  Severe three-vessel CAD status post balloon angioplasty.  Assess Impella catheter placement.  TECHNIQUE:   Single view.   FINDINGS:  CARDIAC/VASC: Cardiomegaly.  Pulmonary venous distention.  ET tube in satisfactory position  MEDIAST/ANTONI:   Atherosclerotic aorta with no visible aneurysm.  Left ventricular assist Impella catheter with the tip in the left ventricle.  Right IJ [place]-Ophelia catheter with the tip in the left descending pulmonary intralobar artery.  Additional right IJ catheter with the tip in the SVC.  No pneumothorax. LUNGS/PLEURA: Mild bilateral perihilar basilar pulmonary congestive changes.  Small right-sided effusion. BONES: S-shaped scoliosis dorsal lumbar spine OTHER: Negative.          CONCLUSION:  1. Cardiomegaly pulmonary venous distention. 2. Support tubes and catheters are satisfactory.  No pneumothorax 3. Mild perihilar and basilar congestive changes worse on the right has progressed.  4. Small right-sided effusion.    Dictated by (CST): Wily Stephenson MD on 3/12/2024 at 8:51 AM     Finalized by (CST): Wily Stephenson MD on 3/12/2024 at 8:56 AM          CARD ECHO  LIMITED (CPT=93308)    Result Date: 3/11/2024  Transthoracic Echocardiogram Name:Cristiano Obregon Date: 2024 :  1970 Ht:  (67in)  BP: 110 / 76 MRN:  [id number]    Age:  53years    Wt:  (192lb) HR: 75bpm Loc:  [facility]       Gndr: M          BSA: 1.99m^2 Sonographer: Manuel Ordering:    Ruben Ramirez Consulting:  Janet Velazco ---------------------------------------------------------------------------- History/Indications:   Coronary artery disease.  Impella placement. ---------------------------------------------------------------------------- Procedure information:  A transthoracic echocardiogram, limited study was performed. Additional evaluation included M-mode and limited 2D.  Patient status:  Inpatient.  Location:  [facility]    Comparison was made to the study of 2024.    This was a routine study. Transthoracic echocardiography for diagnosis and ventricular function evaluation. Image quality was adequate. ECG rhythm:   Normal sinus ---------------------------------------------------------------------------- Conclusions: 1. Left ventricle: The cavity size was normal. Wall thickness was moderately    increased. The estimated ejection fraction was 50-55%, by visual    assessment. Hypokinesis of the inferior wall. Hypokinesis of the    basal-midlateral wall. Impella catheter noted in the left ventricle,    placement measures 3.30cm from the aortic valve. 2. Right ventricle: Pacer wire noted in the right ventricle. 3. Left atrium: The left atrial volume was moderately increased. 4. Right atrium: Pacer wire noted in right atrium. 5. Pericardium, extracardiac: There was a right pleural effusion. 6. Left ventricle: Impressions:  This study is compared with previous dated 2024: * ---------------------------------------------------------------------------- * Findings: Left ventricle:  The cavity size was normal. Wall thickness was moderately increased. The estimated ejection fraction was 50-55%, by  visual assessment. Impella catheter noted in the left ventricle, placement measures 3.30cm from the aortic valve.  Regional wall motion abnormalities:   Hypokinesis of the inferior wall.  Hypokinesis of the basal-midlateral wall. Left atrium:  The left atrial volume was moderately increased. Right ventricle:  The cavity size was normal. Pacer wire noted in the right ventricle. Systolic function was normal. Right atrium:  The atrium was normal in size. Pacer wire noted in right atrium. Mitral valve:  The valve was structurally normal. Leaflet separation was normal. Aortic valve:  The valve was structurally normal. The valve was trileaflet. Cusp separation was normal. Tricuspid valve:  The valve is structurally normal. Leaflet separation was normal. Pulmonic valve:   Not visualized. Pleura:  There was a right pleural effusion. Pulmonary arteries: Systolic pressure could not be accurately estimated. Systemic veins:  Central venous respirophasic diameter changes are blunted (< 50%). Inferior vena cava: The IVC was normal-sized. ---------------------------------------------------------------------------- Measurements  Left ventricle         Value        Ref       03/08/2024  IVS thickness, ED,     1.0   cm     0.6 - 1.0 1.0  PLAX  LV ID, ED, PLAX        5.0   cm     4.2 - 5.8 5.1  LV ID, ES, PLAX        3.6   cm     2.5 - 4.0 3.8  LV PW thickness,   (H) 1.1   cm     0.6 - 1.0 0.9  ED, PLAX  IVS/LV PW ratio,       0.91         --------- 1.11  ED, PLAX  LV PW/LV ID ratio,     0.22         --------- 0.18  ED, PLAX  LV ejection            54    %      52 - 72   50  fraction  Left atrium            Value        Ref       03/08/2024  LA volume, S       (H) 88    ml     18 - 58   ----------  LA volume/bsa, S   (H) 44    ml/m^2 16 - 34   ----------  LA volume, ES, 1-p (H) 95    ml     18 - 58   ----------  A4C  LA volume, ES, 1-p (H) 81    ml     18 - 58   ----------  A2C  LA volume, ES, A/L     95    ml     --------- ----------   LA volume/bsa, ES, (H) 48    ml/m^2 16 - 34   ----------  A/L  Inferior vena cava     Value        Ref       03/08/2024  ID                     1.9   cm     <=2.1     2.5 Legend: (L)  and  (H)  liza values outside specified reference range. ---------------------------------------------------------------------------- Prepared and electronically signed by Ruben Ramirez 03/11/2024 19:47     XR CHEST AP PORTABLE  (CPT=71045)    Result Date: 3/11/2024  PROCEDURE: XR CHEST AP PORTABLE  (CPT=71045) TIME: 759 a.   COMPARISON: [facility], XR CHEST AP PORTABLE (CPT=71045), 3/05/2024, 9:26 AM.  [facility], XR CHEST AP PORTABLE (CPT=71045), 3/07/2024, 10:58 AM.  [facility], XR CHEST AP PORTABLE (CPT=71045), 3/08/2024, 11:50 AM.  INDICATIONS: Impella and intubated, assess hardware.  TECHNIQUE:   Single view.   FINDINGS:  CARDIAC/VASC: Heart size upper limits of normal to mildly enlarged, and there is mild interstitial edema suggesting mild pulmonary congestion/fluid overload. MEDIAST/ANTONI:   No visible mass or adenopathy. LUNGS/PLEURA: ET tube in the trachea at the level the clavicles.  NG tube has been removed.  Right internal jugular central line sheath ends in the SVC.  [place]-Ophelia catheter is in the descending left pulmonary artery.  Impella device in profile with the left ventricle.  Blunting of the right costophrenic angle may suggest small amount of right pleural fluid. BONES: No fracture or visible bony lesion. OTHER: Negative.          CONCLUSION:  1. Heart size upper limits of normal to mildly enlarged, and there is mild interstitial edema suggesting mild cardiac failure/fluid overload.  ET tube in the trachea at the level of the clavicles.  Impella device in profile with the left ventricle.  [place]-Ophelia catheter in the descending left pulmonary artery.  No pneumothorax.  Small right pleural effusion suggested.    Dictated by (CST): Moisés Delgadillo MD on 3/11/2024 at 8:29 AM      Finalized by (CST): Moisés Delgadillo MD on 3/11/2024 at 8:32 AM          CARD ECHO 2D W/O DOPPLER (CPT=93307)    Result Date: 3/8/2024  Transthoracic Echocardiogram Name:Cristiano Obregon Date: 2024 :  1970 Ht:  (67in)  BP: 127 / 78 MRN:  [id number]    Age:  53years    Wt:  (185lb) HR: 75bpm Loc:  [facility]       Gndr: M          BSA: 1.96m^2 Sonographer: Manuel Ordering:    Carlitos Sandoval Consulting:  Janet Velazco ---------------------------------------------------------------------------- History/Indications:  Impella placement. ---------------------------------------------------------------------------- Procedure information:  A transthoracic echocardiogram, limited study was performed. Additional evaluation included M-mode and limited 2D.  Patient status:  Inpatient.  Location:  [facility]    This was a routine study. Transthoracic echocardiography for diagnosis, ventricular function evaluation, and post closure device deployment evaluation. Image quality was adequate. ECG rhythm:   Normal sinus ---------------------------------------------------------------------------- Conclusions: 1. Left ventricle: The cavity size was normal. Wall thickness was normal.    Systolic function was normal. The estimated ejection fraction was 50-55%,    by visual assessment. Hypokinesis of the anterolateral wall. Unable to    assess LV diastolic function. Impella catheter noted in the left    ventricle, placement measures 3.50cm from the aortic valve. 2. Right ventricle: Pacer wire noted in the right ventricle. 3. Right atrium: Pacer wire noted in right atrium. * ---------------------------------------------------------------------------- * Findings: Left ventricle:  The cavity size was normal. Wall thickness was normal. Systolic function was normal. The estimated ejection fraction was 50-55%, by visual assessment. Impella catheter noted in the left ventricle, placement measures 3.50cm from the aortic valve.  Regional wall motion  abnormalities:  Hypokinesis of the anterolateral wall. Unable to assess LV diastolic function. Right ventricle:  The cavity size was normal. Pacer wire noted in the right ventricle. Systolic function was normal. Right atrium:  Pacer wire noted in right atrium. Mitral valve:  The valve was structurally normal. Leaflet separation was normal. Aortic valve:   The valve was trileaflet. Tricuspid valve:  The valve is structurally normal. Leaflet separation was normal. Pulmonic valve:   Not visualized. Pericardium:   There was no pericardial effusion. Pulmonary arteries: Systolic pressure could not be accurately estimated. Systemic veins:  Central venous respirophasic diameter changes are blunted (< 50%). Inferior vena cava: The IVC was dilated. ---------------------------------------------------------------------------- Measurements  Left ventricle         Value        Ref       03/07/2024  IVS thickness, ED,     1.0   cm     0.6 - 1.0 1.3  PLAX  LV ID, ED, PLAX        5.1   cm     4.2 - 5.8 4.4  LV ID, ES, PLAX        3.8   cm     2.5 - 4.0 3.3  LV PW thickness,       0.9   cm     0.6 - 1.0 1.4  ED, PLAX  IVS/LV PW ratio,       1.11         --------- 0.93  ED, PLAX  LV PW/LV ID ratio,     0.18         --------- 0.32  ED, PLAX  LV ejection        (L) 50    %      52 - 72   50  fraction  Inferior vena cava     Value        Ref       03/07/2024  ID                 (H) 2.5   cm     <=2.1     ----------  Right ventricle        Value        Ref       03/07/2024  TAPSE, 2D              2.39  cm     >=1.70    ----------  TAPSE, MM              2.39  cm     >=1.70    ----------  RV s', lateral         18.8  cm/sec >=9.5     ---------- Legend: (L)  and  (H)  liza values outside specified reference range. ---------------------------------------------------------------------------- Prepared and electronically signed by Gurjit Elliott 03/08/2024 17:42     XR CHEST AP PORTABLE  (CPT=71045)    Result Date: 3/8/2024  PROCEDURE: XR  CHEST AP PORTABLE  (CPT=71045) TIME: 12:05.   COMPARISON: [facility], XR CHEST AP PORTABLE (CPT=71045), 3/05/2024, 9:26 AM.  [facility], XR CHEST AP PORTABLE (CPT=71045), 3/06/2024, 6:13 AM.  [facility], XR CHEST AP PORTABLE (CPT=71045), 3/07/2024, 10:58 AM.  INDICATIONS: Verify confirm NG tube placement for tube feeding.  Myocardial infarction, cardiogenic shock, acute hypoxic respiratory failure.  TECHNIQUE:   Single view.   FINDINGS:  CARDIAC/VASC: The cardiac silhouette is not enlarged.  Unremarkable pulmonary vasculature.  MEDIAST/ANTONI:   No visible mass or adenopathy. LUNGS/PLEURA: Subsegmental atelectasis has developed in the lower right lung.  No pleural effusion or pneumothorax. BONES: The osseous structures are unchanged. OTHER: An endotracheal tube tip projects 6.7 cm above the pham.  There is a new enteric tube with tip projecting over the expected location of the gastroesophageal junction.  A right internal jugular approach central venous catheter tip again projects over the SVC.  A right internal jugular approach [place]-Ophelia catheter tip again projects over the left interlobar artery. An Impella device again projects over the left ventricular apex.         CONCLUSION:  1. New enteric tube tip appears to terminate about the gastroesophageal junction.  Recommend advancing at least 9.0 cm distally into the stomach. 2. Additional lines/tubes in stable customary positioning. 3. Development of subsegmental atelectasis in the lower right lung.   Results of this examination were discussed with the patient's nurse, Mayela Ch, by Dr. Edwrads at 12:40 on 03/08/2024.  Dictated by (CST): Bc Edwards MD on 3/08/2024 at 12:36 PM     Finalized by (CST): Bc Edwards MD on 3/08/2024 at 12:42 PM          IR CENTRAL VENOUS ACCESS    Result Date: 3/7/2024  PROCEDURE: IR ULTRASOUND-GUIDED NON TUNNELED HEMODIALYSIS CATHETER INSERTION  INDICATIONS:   53-year-old man with acute kidney insufficiency requiring CRRT.  (S): MD Dustin  ANESTHESIA/SEDATION: Level of anesthesia/sedation: None (Lidocaine only) Anesthesia/sedation administered by: Not applicable  ESTIMATED BLOOD LOSS: Less than 5 mL  COMPLICATIONS: None  FINDINGS:  Informed consent was obtained from the patient's power-of-. The procedure was performed at bedside. The right side of the neck was sterilely prepped and draped. Preliminary ultrasound demonstrated a patent right internal jugular vein despite the presence of a [place]-Ophelia catheter. Local Lidocaine was administered. Under ultrasound guidance, the vein was accessed with a micropuncture set; an image was saved.  A 0.035 inch wire was advanced through the dilator. The access was dilated. A 15 cm temporary hemodialysis catheter was placed with its tip in the expected location of the superior vena cava. The catheter was secured to the skin. Follow-up chest radiograph demonstrated the catheter terminating in expected position.         CONCLUSION: Placement of non-tunneled central venous catheter via right internal jugular vein at bedside.  The catheter is ready for use.    Dictated by (CST): Guille Chan MD on 3/07/2024 at 12:46 PM     Finalized by (CST): Guille Chan MD on 3/07/2024 at 12:47 PM          CARD ECHO 2D W/O DOPPLER (CPT=93307)    Result Date: 3/7/2024  Transthoracic Echocardiogram Name:Cristiano Obregon Date: 2024 :  1970 Ht:  (67in)  BP: 105 / 68 MRN:  [id number]    Age:  53years    Wt:  (183lb) HR: Loc:  [facility]       Gndr: M          BSA: 1.95m^2 Sonographer: Glenna DENIS Ordering:    Carlitos Sandoval Consulting:  Janet Velazco ---------------------------------------------------------------------------- History/Indications:   Impella Placement. ---------------------------------------------------------------------------- Procedure information:  A transthoracic echocardiogram, limited study was performed.  Additional evaluation included M-mode and limited 2D.  Patient status:  Inpatient.  Location:  Bedside.    Comparison was made to the study of 03/06/2024.    This was a routine study. Transthoracic echocardiography for diagnosis. Image quality was adequate. The study was technically limited due to restricted patient mobility and patient supine. ---------------------------------------------------------------------------- Conclusions: Left ventricle: The cavity size was normal. Wall thickness was mildly increased. Systolic function was mildly reduced. The estimated ejection fraction was 45-50%, by biplane method of disks. Unable to assess LV diastolic function. Impressions:  No significant change since prior study. * ---------------------------------------------------------------------------- * Findings: Left ventricle:  The cavity size was normal. Wall thickness was mildly increased. Systolic function was mildly reduced. The estimated ejection fraction was 45-50%, by biplane method of disks. Unable to assess LV diastolic function. Pericardium:   There was no pericardial effusion. Pleura:  No evidence of pleural fluid accumulation. ---------------------------------------------------------------------------- Measurements  Left ventricle         Value        Ref       03/06/2024  IVS thickness, ED, (H) 1.3   cm     0.6 - 1.0 ----------  PLAX  LV ID, ED, PLAX        4.4   cm     4.2 - 5.8 ----------  LV ID, ES, PLAX        3.3   cm     2.5 - 4.0 ----------  LV PW thickness,   (H) 1.4   cm     0.6 - 1.0 ----------  ED, PLAX  IVS/LV PW ratio,       0.93         --------- ----------  ED, PLAX  LV PW/LV ID ratio,     0.32         --------- ----------  ED, PLAX  LV ejection        (L) 50    %      52 - 72   ----------  fraction  LV end-diastolic       90    ml     69 - 185  82  volume, 1-p A4C  LV ejection            46    %      46 - 74   39  fraction, 1-p A4C  Stroke volume, 1-p     41    ml     --------- 32  A4C  LV  end-diastolic       46    ml/m^2 37 - 93   43  volume/bsa, 1-p  A4C  Stroke volume/bsa,     21    ml/m^2 --------- 17  1-p A4C  LV end-diastolic       79    ml     62 - 150  81  volume, 2-p  LV end-systolic        45    ml     21 - 61   49  volume, 2-p  LV ejection        (L) 43    %      52 - 72   40  fraction, 2-p  Stroke volume, 2-p     34    ml     --------- 33  LV end-diastolic       40    ml/m^2 34 - 74   42  volume/bsa, 2-p  LV end-systolic        23    ml/m^2 11 - 31   25  volume/bsa, 2-p  Stroke volume/bsa,     17.2  ml/m^2 --------- 16.9  2-p Legend: (L)  and  (H)  liza values outside specified reference range. ---------------------------------------------------------------------------- Prepared and electronically signed by Carlitos Sandoval 03/07/2024 12:43     XR CHEST AP PORTABLE  (CPT=71045)    Result Date: 3/7/2024         PROCEDURE: XR CHEST AP PORTABLE  (CPT=71045) TIME: 1058  COMPARISON: [facility], XR CHEST AP PORTABLE (CPT=71045), 3/06/2024, 6:13 AM.  INDICATIONS: Verify Temporary Hemodialysis Catheter placement.  TECHNIQUE:   Single view.   Findings and impression:  Right IJ dialysis catheter in the lower SVC.  No pneumothorax  Interval PA catheter in the left inter lobar artery  Stable ETT and Impella.  Enteric tube remains in the lower esophagus  Increasing vascular congestion with mild basilar predominant edema     Dictated by (CST): Reyes Rodriguez MD on 3/07/2024 at 11:51 AM     Finalized by (CST): Reyes Rodriguez MD on 3/07/2024 at 11:52 AM          US KIDNEYS (CPT=76775)    Result Date: 3/6/2024  PROCEDURE: US KIDNEYS (CPT=76775)  COMPARISON: None.  INDICATIONS: Acute renal failure  TECHNIQUE: Ultrasound examination was performed to visualize the kidneys and bladder.   FINDINGS:  RIGHT KIDNEY: 11 cm.  No mass or calculus or hydronephrosis.  LEFT KIDNEY: 10.5 cm. No mass or calculus or hydronephrosis.  BLADDER: Partly decompressed around a [place] catheter.  Bladder contains 40 cc of  urine.  CONCLUSION: No hydronephrosis     DICTATED BY ([facility]): JOSEPH ZUNIGA MD ON 3/06/2024 AT 3:00 PM     FINALIZED BY ([facility]): JOSEPH ZUNIGA MD ON 3/06/2024 AT 3:01 PM             CARD ECHO 2D W/O DOPPLER (CPT=93307)    Result Date: 3/6/2024  Transthoracic Echocardiogram Name:Cristiano Obregon Date:    2024    :     1970    Ht:     (67in)     BP: MRN:     [id number]       Age:     53years       Wt:     (180lb)    HR: Loc:     [facility]          Gndr:    M             BSA:    1.93m^2 Sonographer: Pao DENIS [facility] Ordering:    Carlitos Sandoval Consulting:  Janet Velazco ---------------------------------------------------------------------------- Procedure information:  A transthoracic echocardiogram, limited study was performed. Additional evaluation included M-mode and limited 2D.  Image quality was adequate. ECG rhythm:   Normal sinus ---------------------------------------------------------------------------- Conclusions: Left ventricle: The cavity size was normal. Wall thickness was normal. Systolic function was mildly reduced. The estimated ejection fraction was 40-45%, by biplane method of disks. Impella catheter noted in the left ventricle, placement measures 3.70cm from the aortic valve. * ---------------------------------------------------------------------------- * Findings: Left ventricle:  The cavity size was normal. Wall thickness was normal. Systolic function was mildly reduced. The estimated ejection fraction was 40-45%, by biplane method of disks. Impella catheter noted in the left ventricle, placement measures 3.70cm from the aortic valve. Left atrium:  The atrium was normal in size. Right ventricle:  The cavity size was normal. Wall thickness was normal. Systolic function was normal. Right atrium:  The atrium was normal in size. Mitral valve:  The valve was structurally normal. Leaflet separation was normal. Aortic valve:  The valve was structurally normal. The valve was trileaflet. Cusp separation  was normal. Tricuspid valve:  The valve is structurally normal. Leaflet separation was normal. Pulmonic valve:   The valve is structurally normal. Cusp separation was normal. Pericardium:   There was no pericardial effusion. Aorta: Aortic root: The aortic root was normal-sized. Pulmonary arteries: The main pulmonary artery was normal-sized. Systemic veins: Inferior vena cava: The IVC was normal-sized. ---------------------------------------------------------------------------- Measurements  Left ventricle                     Value        Ref  LV end-diastolic volume, 1-p       82    ml     69 - 185  A4C  LV ejection fraction, 1-p A4C  (L) 39    %      46 - 74  Stroke volume, 1-p A4C             32    ml     --------  LV end-diastolic volume/bsa,       43    ml/m^2 37 - 93  1-p A4C  Stroke volume/bsa, 1-p A4C         17    ml/m^2 --------  LV end-diastolic volume, 2-p       81    ml     62 - 150  LV end-systolic volume, 2-p        49    ml     21 - 61  LV ejection fraction, 2-p      (L) 40    %      52 - 72  Stroke volume, 2-p                 33    ml     --------  LV end-diastolic volume/bsa,       42    ml/m^2 34 - 74  2-p  LV end-systolic volume/bsa,        25    ml/m^2 11 - 31  2-p  Stroke volume/bsa, 2-p             16.9  ml/m^2 --------  Right ventricle                    Value        Ref  TAPSE, MM                          2.29  cm     >=1.70  RV s', lateral                     16.3  cm/sec >=9.5 Legend: (L)  and  (H)  liza values outside specified reference range. ---------------------------------------------------------------------------- Prepared and electronically signed by Andrea Villeda 03/06/2024 11:13     CATH SWAN WAYNE INSERTION    Result Date: 3/6/2024  This exam has been completed. Please refer to Notes for the results to this procedure.    XR CHEST AP PORTABLE  (CPT=71045)    Addendum Date: 3/6/2024    ADDENDUM:  Enteric tube remains positioned in the distal esophagus approximately 3 cm above the  expected location of the GE junction   Dictated by (CST): Reyes Rodriguez MD on 3/06/2024 at 7:45 AM     Finalized by (CST): Reyes Rodriguez MD on 3/06/2024 at 7:46 AM             Result Date: 3/6/2024         PROCEDURE: XR CHEST AP PORTABLE  (CPT=71045) TIME: 613  COMPARISON: [facility], XR CHEST AP PORTABLE (CPT=71045), 3/05/2024, 9:26 AM.  [facility], XR CHEST AP PORTABLE (CPT=71045), 3/05/2024, 6:37 PM.  INDICATIONS: Follow up chest pain.  TECHNIQUE:   Single view.   Findings and impression:  Impella was advanced further into the left ventricle  ETT 5.5 cm above the pham  Stable normal heart size.  Stable borderline vascular congestion but there is no edema  Minimal basilar atelectasis.  No consolidation  No pneumothorax or significant effusion     Dictated by (CST): Reyes Rodriguez MD on 3/06/2024 at 7:41 AM     Finalized by (CST): Reyes Rodriguez MD on 3/06/2024 at 7:44 AM          CATH VENTRICULAR ASSIST DEVICE    Result Date: 3/5/2024  This exam has been completed. Please refer to Notes for the results to this procedure.    XR CHEST AP PORTABLE  (CPT=71045)    Result Date: 3/5/2024  PROCEDURE: XR CHEST AP PORTABLE  (CPT=71045) TIME: 1659.   COMPARISON: [facility], XR CHEST AP PORTABLE (CPT=71045), 3/05/2024, 3:17 PM.  [facility], XR CHEST AP PORTABLE (CPT=71045), 3/05/2024, 9:26 AM.  INDICATIONS: Verify OG placement.  TECHNIQUE:   Single view.   FINDINGS:          CONCLUSION:   Nasogastric tube with tip within the distal esophagus and side hole within the mid esophagus.  Advancement recommended.  Remainder of the lines and tubes are otherwise unchanged.    Dictated by (CST): Henri Melgoza MD on 3/05/2024 at 7:30 PM     Finalized by (CST): Henri Melgoza MD on 3/05/2024 at 7:32 PM          XR CHEST AP PORTABLE  (CPT=71045)    Result Date: 3/5/2024  PROCEDURE: XR CHEST AP PORTABLE  (CPT=71045) TIME: 1842 hours  COMPARISON: [facility], XR  CHEST AP PORTABLE (CPT=71045), 3/05/2024, 4:59 PM.  INDICATIONS: Impella placement.  Follow-up abnormal chest x-ray.  TECHNIQUE:   Single view.   FINDINGS:  CARDIAC/VASC: Heart size and pulmonary vascularity normal. MEDIAST/ANTONI:   No visible mass or adenopathy. LUNGS/PLEURA: No consolidation or pleural effusion. BONES: No fracture or visible bony lesion. OTHER: Pigtail from the Impella device is probably in the LVOT and outlet pump in aortic arch.  ET tube in the thoracic inlet approximately 6 cm above the pham.  NG tube tip in distal esophagus just above the diaphragm.         CONCLUSION:  1. Pigtail of Impella device is probably in the LVOT, and the outlet pump is probably in the aortic arch. 2. High position of ET tube approximately 6 cm above pham. 3. High position of nasogastric tube with tip in the distal esophagus just above GE junction.     Dictated by (CST): Fabian Gay MD on 3/05/2024 at 6:54 PM     Finalized by (CST): Fabian Gay MD on 3/05/2024 at 7:07 PM          XR CHEST AP PORTABLE  (CPT=71045)    Result Date: 3/5/2024  PROCEDURE: XR CHEST AP PORTABLE  (CPT=71045) TIME: 1517 hours.   COMPARISON: [facility], XR CHEST AP PORTABLE (CPT=71045), 3/05/2024, 9:26 AM.  INDICATIONS: OG placement  TECHNIQUE:   Single view.   FINDINGS:  CARDIAC/VASC: Normal heart size and mildly prominent pulmonary vascularity with minimal interstitial edema improved somewhat since previous study.  Impella device in profile with the left side of the heart. MEDIAST/ANTONI:   No visible mass or adenopathy. LUNGS/PLEURA: ET tube in the trachea the level the clavicles.  No pneumothorax.  No acute airspace consolidation.  Mild interstitial edema but improved somewhat since previous study.  NG tube terminates at the GE junction and should be advanced further into the stomach by at least 10 cm. BONES: No fracture or visible bony lesion. OTHER: Negative.          CONCLUSION:  1. NG tube terminates at the GE  junction should be advanced further into the stomach by at least 10 cm. 2. Normal heart size with mild interstitial edema but improved somewhat since previous study.  No large airspace consolidation or pleural effusion.  No pneumothorax.  ET tube in the trachea the level the clavicles.    Dictated by (CST): Moisés Delgadillo MD on 3/05/2024 at 3:48 PM     Finalized by (CST): Moisés Delgadillo MD on 3/05/2024 at 3:50 PM          US CAROTID DOPPLER BILAT - DIAG IMG (CPT=93880)    Result Date: 3/5/2024  PROCEDURE: US CAROTID DOPPLER BILAT-DIAG IMG (CPT=93880)  COMPARISON: None.  INDICATIONS: Pre op cabg  TECHNIQUE: Color duplex Doppler ultrasound and pulsed Doppler analysis were performed to evaluate the cervical carotid arteries and vertebral flow.  All measurements for carotid artery narrowing or stenosis were obtained using the ipsilateral distal internal carotid artery as the reference value.  (NASCET criteria)  FINDINGS:  PEAK FLOW VELOCITIES (cm/sec):  RIGHT CAROTID: Mild ICA atherosclerotic plaque.  CCA Peak systolic: 40  ICA Peak systolic: 27.  ICA End diastolic: 15.   ECA Peak systolic:  32.   VICA/VCCA: 0.9.   LEFT CAROTID: Distal ICA not visualized.  Mild atherosclerotic plaque bifurcation and ICA.  CCA Peak systolic: 37.   ICA Peak systolic: 27.   ICA End diastolic: 19.   ECA Peak systolic:  29.   VICA/VCCA: 0.8.   VERTEBRALS: Antegrade flow on the left.  Right vertebral artery not visualized.  Left Peak systolic: 29.   OTHER FINDINGS: No significant findings.    Spectral Doppler US Thresholds (Reference: Isauro EG, et al. Radiology 2000; 214:247-252)      Stenosis (%)       PSV (cm/sec)       VICA/VCCA           0-49                    <150                     <2.5           50-69                  150-225                2.5-4.0           >70                      >225                     >4.0   CONCLUSION:   Distal left internal carotid artery not visualized.  Otherwise no evidence of bilateral hemodynamically  significant stenosis.  Nonvisualization right vertebral artery.  Antegrade flow left vertebral artery.    Dictated by ([facility]): Jcarlos Harmon MD on 3/05/2024 at 2:31 PM     Finalized by ([facility]): Jcarlos Harmon MD on 3/05/2024 at 2:35 PM          CARD ECHO 2D W/O DOPPLER (CPT=93307)    Result Date: 3/5/2024  Transthoracic Echocardiogram Name:Cristiano Obregon Date: 2024 :  1970 Ht:  (67in)  BP: 107 / 84 MRN:  [id number]    Age:  53years    Wt:  (160lb) HR: 75bpm Loc:  [facility]       Gndr: M          BSA: 1.84m^2 Sonographer: Angelica Ordering:    Carlitos Sandoval Consulting:  Danial Kirk ---------------------------------------------------------------------------- History/Indications:   Coronary artery disease. Impella placement. ---------------------------------------------------------------------------- Procedure information:  A transthoracic echocardiogram, limited study was performed. Additional evaluation included M-mode and limited 2D.  Patient status:  Inpatient.  Location:  Bedside.    Comparison was made to the study of 2024.    This was a STAT study. Transthoracic echocardiography for diagnosis, ventricular function evaluation, and post intervention evaluation. Image quality was adequate. ECG rhythm:   Frequent ectopies ---------------------------------------------------------------------------- Conclusions: 1. Left ventricle: The cavity size was normal. Wall thickness was normal.    Systolic function was at the lower limits of normal. The estimated    ejection fraction was 50-55%, by visual assessment. Unable to assess LV    diastolic function. Impella catheter noted in the left ventricle,    placement measures 3.50cm from the aortic valve. 2. Left atrium: The atrium was mildly dilated. 3. Right atrium: The atrium was mildly dilated. Impressions:  This study is compared with previous dated 2024: Compared to the previous study, these findings are new. Interval placement of impella. *  ---------------------------------------------------------------------------- * Findings: Left ventricle:  The cavity size was normal. Wall thickness was normal. Systolic function was at the lower limits of normal. The estimated ejection fraction was 50-55%, by visual assessment. Impella catheter noted in the left ventricle, placement measures 3.50cm from the aortic valve. Unable to assess LV diastolic function. Left atrium:  The atrium was mildly dilated. Right ventricle:  The cavity size was normal. Systolic function was normal. Right atrium:  The atrium was mildly dilated. Mitral valve:  The valve was structurally normal. Leaflet separation was normal. Aortic valve:   The valve was trileaflet.   Cusp separation was normal. Tricuspid valve:  The valve is structurally normal. Leaflet separation was normal. Pulmonic valve:   Not well visualized. Pericardium:   There was no pericardial effusion. Pulmonary arteries: Systolic pressure could not be accurately estimated. ---------------------------------------------------------------------------- Measurements  Left ventricle               Value    Ref  IVS thickness, ED, PLAX      1.0   cm 0.6 - 1.0  LV ID, ED, PLAX              4.7   cm 4.2 - 5.8  LV ID, ES, PLAX              3.2   cm 2.5 - 4.0  LV PW thickness, ED, PLAX    0.8   cm 0.6 - 1.0  IVS/LV PW ratio, ED, PLAX    1.25     ---------  LV PW/LV ID ratio, ED, PLAX  0.17     ---------  LV ejection fraction         60    %  52 - 72 Legend: (L)  and  (H)  liza values outside specified reference range. ---------------------------------------------------------------------------- Prepared and electronically signed by Carlitos Sandoval 03/05/2024 11:32     XR CHEST AP PORTABLE  (CPT=71045)    Result Date: 3/5/2024  PROCEDURE: XR CHEST AP PORTABLE  (CPT=71045) TIME: 0926 hours  COMPARISON: None.  INDICATIONS: Chest pain, post intubation  TECHNIQUE:   Single view.   FINDINGS:  CARDIAC/VASC: Normal heart size with mild to moderate  pulmonary congestive change suggesting acute cardiac failure/fluid overload.  Correlate clinically.  Cardiac device projected in profile with the left ventricle and aortic outflow tract. MEDIAST/ANTONI:   No visible mass or adenopathy. LUNGS/PLEURA: ET tube in the trachea at the level of the clavicles.  No pneumothorax.  No large airspace consolidation.  Mild to moderate pulmonary congestive change. BONES: No fracture or visible bony lesion. OTHER: Negative.          CONCLUSION:  1. Normal heart size with mild to moderate pulmonary congestive change suggesting acute cardiac failure/fluid overload.  Correlate clinically.  ET tube in the trachea the level of the clavicles.  No pneumothorax or large airspace consolidation.    Dictated by (CST): Moisés Delgadillo MD on 3/05/2024 at 9:47 AM     Finalized by (CST): Moisés Delgadillo MD on 3/05/2024 at 9:50 AM          CARD ECHO LIMITED (CPT=93308)    Result Date: 3/5/2024  Transthoracic Echocardiogram Name:Cristiano Obregon Date:    2024    :     1970    Ht:     (67in)     BP: MRN:     [id number]       Age:     53years       Wt:     (160lb)    HR: Loc:     EM          Gndr:    M             BSA:    1.84m^2 Sonographer: ADEEL Wyman Ordering:    Carlitos Sandoval Consulting:  Richie Llanes ---------------------------------------------------------------------------- History/Indications:   Chest pain.  Coronary artery disease. ---------------------------------------------------------------------------- Procedure information:  A transthoracic echocardiogram, limited study was performed. Additional evaluation included M-mode and limited 2D.  Patient status:  Inpatient.  Location:  Catheterization laboratory.    This was a STAT study. Transthoracic echocardiography for diagnosis and ventricular function evaluation. Image quality was adequate. ---------------------------------------------------------------------------- Conclusions: 1. Left ventricle: Systolic function was  mildly reduced. The estimated    ejection fraction was 45-50%, by visual assessment. Unable to assess LV    diastolic function. 2. Left atrium: The atrium was dilated. 3. Right atrium: The atrium was mildly dilated. 4. Aortic valve: There was mild regurgitation. 5. Mitral valve: There was mild regurgitation. 6. Limited study in cath lab, grossly ejection fraction 45-50%. Wire present    in the left ventricle. Impressions:  No previous study was available for comparison. * ---------------------------------------------------------------------------- * Findings: Left ventricle:  Systolic function was mildly reduced. The estimated ejection fraction was 45-50%, by visual assessment. Unable to assess LV diastolic function. Left atrium:  The atrium was dilated. Right ventricle:  The cavity size was normal. Systolic function was normal. Right atrium:  The atrium was mildly dilated. Mitral valve:  The valve was structurally normal. Leaflet separation was normal.  Doppler:  Transvalvular velocity was within the normal range. There was no evidence for stenosis. There was mild regurgitation. Aortic valve:  Not well visualized.  Doppler:  There was mild regurgitation. Tricuspid valve:  The valve is structurally normal. Leaflet separation was normal.  Doppler:  Transvalvular velocity was within the normal range. There was no evidence for stenosis. There was mild regurgitation. Pulmonic valve:   Not visualized. Pericardium:   There was no pericardial effusion. Pulmonary arteries: Systolic pressure could not be accurately estimated. Systemic veins:  Not visualized. ---------------------------------------------------------------------------- Prepared and electronically signed by Carlitos Sandoval 03/05/2024 09:46     CATH LEFT HEART CATH W/INTERVENTION    Result Date: 3/5/2024  This exam has been completed. Please refer to Notes for the results to this procedure.      Assessment/Plan:  Patient Active Problem List   Diagnosis     Hypercholesteremia    Floaters in visual field, bilateral    Acne, unspecified acne type    Eczema, unspecified type    Paresthesia of left thumb    Chest pain    NSTEMI (non-ST elevated myocardial infarction) (HCC)    Cerebrovascular accident (CVA) due to embolism of left middle cerebral artery (HCC)    Anemia   hgb 7.7 plate 150  PT 16.5  PTT 36.5  Stable     For tracheostomy tomorrow  Stop heparin MN    STEPHEN VILLA MD  3/21/2024  12:28 PM

## 2024-03-22 ENCOUNTER — ANESTHESIA EVENT (OUTPATIENT)
Dept: SURGERY | Facility: HOSPITAL | Age: 54
End: 2024-03-22
Payer: COMMERCIAL

## 2024-03-22 ENCOUNTER — ANESTHESIA (OUTPATIENT)
Dept: SURGERY | Facility: HOSPITAL | Age: 54
End: 2024-03-22
Payer: COMMERCIAL

## 2024-03-22 RX ORDER — ROCURONIUM BROMIDE 10 MG/ML
INJECTION, SOLUTION INTRAVENOUS AS NEEDED
Status: DISCONTINUED | OUTPATIENT
Start: 2024-03-22 | End: 2024-03-22

## 2024-03-22 RX ORDER — SODIUM CHLORIDE, SODIUM LACTATE, POTASSIUM CHLORIDE, CALCIUM CHLORIDE 600; 310; 30; 20 MG/100ML; MG/100ML; MG/100ML; MG/100ML
INJECTION, SOLUTION INTRAVENOUS CONTINUOUS PRN
Status: DISCONTINUED | OUTPATIENT
Start: 2024-03-22 | End: 2024-03-22

## 2024-03-22 RX ADMIN — ROCURONIUM BROMIDE 30 MG: 10 INJECTION, SOLUTION INTRAVENOUS at 15:19:00

## 2024-03-22 RX ADMIN — SODIUM CHLORIDE, SODIUM LACTATE, POTASSIUM CHLORIDE, CALCIUM CHLORIDE: 600; 310; 30; 20 INJECTION, SOLUTION INTRAVENOUS at 15:14:00

## 2024-03-22 RX ADMIN — SODIUM CHLORIDE, SODIUM LACTATE, POTASSIUM CHLORIDE, CALCIUM CHLORIDE: 600; 310; 30; 20 INJECTION, SOLUTION INTRAVENOUS at 16:25:00

## 2024-03-22 RX ADMIN — ROCURONIUM BROMIDE 20 MG: 10 INJECTION, SOLUTION INTRAVENOUS at 15:45:00

## 2024-03-22 NOTE — ADDENDUM NOTE
Addendum  created 03/22/24 1710 by Kendall Ch MD    Clinical Note Signed, Intraprocedure Event edited, Intraprocedure Staff edited, Patient device removed

## 2024-03-22 NOTE — RESPIRATORY THERAPY NOTE
Patient's Problems: Respiratory Failure now intubated, patient was admitted for NSTEMI.    Respiratory Assessment :   Intubated patient is on ventilator on full support.   Breath Sounds: were clear  bilateral.   Secretions: Small  white and thin secretions.   Patient was suctioned as needed.   Patient is not follow commands.    Airway: ETT 7.0  at 23 Lip    Received Ventilator settings:   AC/VC  RR 12 ,  , PEEP + 5, FIO2 30%    SBT: PASS/FAIL? (  FAILED   )  Start time:  0924 am   Settings:  Pressure Support: +5    CPAP: +5    FiO2:   Reason for Failure : unable to do NIF and VC.  SpO2 = 99%     Weaning Parameters:  RR: 27  RSBI: 73  NIF:pt is not doing any effort,  ( Min -13, Max  -24 )  VT: patient is not follow commands  VC: 300 mL ( patient is not follow commands)  Returned to Full support: (Time:   09:55 am )  MD notified: MD Saini     Current Ventilator Settings:   AC/VC  RR 12 ,  , PEEP + 5, FIO2 30%    Plan of Care:  Patient was placed on SBT at 09:24 am, patient was on weaning Parameter ( CPAP 5, PS 8) for 30 minutes. RR increased from 13 to 27, VT's average was 450-550. But VC was unable to obtained  because patient is not follow commands; also NIF was low -13, only one time patient reached -24. Patient is not alert enough to make the weaning parameter well. Trach planned for later today.   Patient needs to continue on ventilator, respiratory status stable and tolerating ventilator well. Alarms were checked. Patient was suction as need it, RT to continue to monitor this patient.  is at the bedside.

## 2024-03-22 NOTE — BRIEF OP NOTE
Patients Name: Cristiano Obregon  Attending Physician: Carlo Vaughn MD  Operating Physician: STEPHEN VILLA MD  CSN: [id number]     Location:  OR  MRN: [id number]    YOB: 1970  Admission Date: 3/5/2024  Operation Date: 3/22/2024    Brief Operative Report    Pre-Operative Diagnosis: Respiratory failure    Post-Operative Diagnosis: Same as above.    Procedure Performed: Tracheostomy  #7 Portex    Surgeon: STEPHEN VILLA MD    Assistants: Deirdre     Anesthesia: General    Attending:  Roderick    EBL: 3cc    Findings: NA    Specimens:  * No specimens in log *    Drains: none    Complications: None    Disposition: stable     STEPHEN VILLA MD  3/22/2024  5:03 PM

## 2024-03-22 NOTE — PROGRESS NOTES
[facility]  [address] [place] [address], [address]  [facility] [address]  [phone]            Cristiano Obregon Patient Status:  Inpatient    1970 MRN [id number]   Location [facility] 2W/SW Attending Carlo Vaughn MD   Hosp Day # 17 PCP Abilio Dorsey MD     Subjective:  Cristiano Obregon is a(n) 53 year old male.    Hospital course to date:     Patient with complicated past medical history, hyperlipidemia, tobacco use, strong family history of coronary disease.  Presented in 2024 with chest pain, and was found to have STEMI with cardiogenic shock requiring Impella placement.     Continued ventilation support with CRRT for the time he was anticoagulated due to Impella placement.     Status post CABG on 15 March.  Postop cardiac tamponade status post washout on 3/16.  Paroxysmal and postoperative fibrillation.     History of hypertension, hyperlipidemia.  Off sedation he was still drowsy and therefore CT of the head was done and found subacute appearing left frontal and left occipital lobe infarcts.     At that point neurology was consulted.     Patient also was noted to have clonus in both feet prior to that.          Objective:  Blood pressure 103/79, pulse 76, temperature 98 °F (36.7 °C), temperature source Temporal, resp. rate 15, height 67\", weight 174 lb 13.2 oz (79.3 kg), SpO2 100%.    Physical Exam:  Vitals:    24 0400 24 0430 24 0500 24 0600   BP: 136/90  111/73 103/79   Pulse: 90  90 76   Resp: 21  13 15   Temp: 98 °F (36.7 °C)      TempSrc: Temporal      SpO2: 100%  100% 100%   Weight:  174 lb 13.2 oz (79.3 kg)     Height:           General: No apparent distress, well nourished, well groomed.  Head- Normocephalic, atraumatic  Eyes- No redness or swelling  ENT- Hearing intake, smell preserved, normal glutition  Neck- No masses or adenopathy  Cv: pulses were palpable and normal, no cyanosis or edema      Neurological:       Mental Status- Alert tracheostomy placed, follows commands with squeezing the left hand.    Findings movements of both lower extremities.  But not much commands.     Cranial Nerves:  Tracks with his eyes, possibly inattention on the right side.     VII. Face symmetric, no facial weakness     To the command he squeezed his left hand.  Clonus was 1-2 beats in both feet       Lab Results   Component Value Date    WBC 21.4 (H) 03/22/2024    HGB 7.7 (L) 03/22/2024    HCT 22.6 (L) 03/22/2024    .0 03/22/2024    CREATSERUM 4.11 (H) 03/22/2024    BUN 51 (H) 03/22/2024     03/22/2024    K 4.0 03/22/2024     03/22/2024    CO2 23.0 03/22/2024     (H) 03/22/2024    CA 7.9 (L) 03/22/2024    ALB 3.1 (L) 03/20/2024    ALKPHO 93 03/19/2024    BILT 0.8 03/19/2024    TP 5.3 (L) 03/19/2024    AST 26 03/19/2024    ALT <7 (L) 03/19/2024    PTT 36.5 (H) 03/20/2024    INR 1.26 (H) 03/20/2024    PSA 1.95 10/06/2021    DDIMER >20.00 (H) 03/20/2024    MG 2.0 03/22/2024    PHOS 3.3 03/22/2024       CT ABDOMEN (CPT=74150)    Result Date: 3/21/2024  CONCLUSION:  1. Stomach appears closely apposed to the ventral peritoneum.  No evidence of colonic interposition.  No free fluid in the visualized upper abdomen.  2. Postoperative changes from a recent CABG and subsequent mediastinal exploration/washout.  There is a partially imaged lobulated mildly hyperdense mass extending from the visualized anterior mediastinum to the epigastric region that is most compatible with a subacute hematoma. 3. Bilateral pleural effusions, right greater than left, with associated passive atelectasis at both visualized lung bases.  These findings are worse on the right compared to 03/19/2024 suggesting worsening CHF/fluid overload or anasarca.    Dictated by (CST): Bc Edwards MD on 3/21/2024 at 7:08 PM     Finalized by (CST): Bc Edwards MD on 3/21/2024 at 7:15 PM                 Assessment:  Patient Active Problem List    Diagnosis    Hypercholesteremia    Floaters in visual field, bilateral    Acne, unspecified acne type    Eczema, unspecified type    Paresthesia of left thumb    Chest pain    NSTEMI (non-ST elevated myocardial infarction) (HCC)    Cerebrovascular accident (CVA) due to embolism of left middle cerebral artery (HCC)    Anemia     Patient with what appears to be subacute strokes.  Most likely cardioembolic in the setting of Impella placement and complicated stay.  No evidence of atrial fibrillation at this point in the last few days, possibly was postoperative, will try to clarify with cardiology.  Continue aspirin and Plavix for now, might consider anticoagulation in the future if atrial fibrillation is confirmed, we typically try to avoid starting anticoagulation immediately after large strokes.  However will need MRI brain brain done to assess for the size of the stroke.  Also to decide on safety of starting hemodialysis, for now patient is on CRRT.     MRI of the brain and MRI of the head will be done.  MRI of the cervical and thoracic spine will be done as well to assess for the possibility of spinal cord involvement since there is bilateral clonus noted.    Arvind Suggs MD

## 2024-03-22 NOTE — PHYSICAL THERAPY NOTE
PHYSICAL THERAPY TREATMENT NOTE - INPATIENT     Room Number: [facility] IV[facility]       Presenting Problem: MI, CVA  Co-Morbidities : HLD, tobacco use    Problem List  Principal Problem:    NSTEMI (non-ST elevated myocardial infarction) (HCC)  Active Problems:    Chest pain    Cerebrovascular accident (CVA) due to embolism of left middle cerebral artery (HCC)    Anemia      PHYSICAL THERAPY ASSESSMENT   Patient demonstrates limited progress this session, goals  remain in progress.    Patient continues to function below baseline with bed mobility, transfers, and maintaining seated position.  Contributing factors to remaining limitations include decreased functional strength, decreased endurance/aerobic capacity, impaired coordination, decreased muscular endurance, medical status, and increased O2 needs from baseline.  Next session anticipate patient to progress bed mobility and maintaining seated position.  Physical Therapy will continue to follow patient for duration of hospitalization.    Patient continues to benefit from continued skilled PT services: to promote return to prior level of function and safety with continuous assistance and gradual rehabilitative therapy .    PLAN  PT Treatment Plan: Bed mobility;Body mechanics;Endurance;Energy conservation;Patient education;Gait training;Neuromuscular re-educate;Range of motion;Strengthening;Transfer training;Balance training  Frequency (Obs): 5x/week    SUBJECTIVE  Pt agreeable to participate.     OBJECTIVE  Precautions: Cardiac;Sternal;Restraints (rectal tube, vent 30% fi02)    WEIGHT BEARING RESTRICTION                PAIN ASSESSMENT   Rating: Unable to rate     Management Techniques: Activity promotion    BALANCE  Static Sitting: Poor -  Dynamic Sitting: Poor -  Static Standing: Not tested  Dynamic Standing: Not tested    ACTIVITY TOLERANCE  Pulse: 90  Heart Rate Source: Monitor     BP: 120/70  BP Location: Left arm  BP Method: Automatic  Patient Position: Lying      O2 WALK  Oxygen Therapy  SPO2% on Oxygen at Rest: 98 (30% fi02)    AM-PAC '6-Clicks' INPATIENT SHORT FORM - BASIC MOBILITY  How much difficulty does the patient currently have...  Patient Difficulty: Turning over in bed (including adjusting bedclothes, sheets and blankets)?: A Lot   Patient Difficulty: Sitting down on and standing up from a chair with arms (e.g., wheelchair, bedside commode, etc.): Unable   Patient Difficulty: Moving from lying on back to sitting on the side of the bed?: A Lot   How much help from another person does the patient currently need...   Help from Another: Moving to and from a bed to a chair (including a wheelchair)?: Total   Help from Another: Need to walk in hospital room?: Total   Help from Another: Climbing 3-5 steps with a railing?: Total     AM-PAC Score:  Raw Score: 8   Approx Degree of Impairment: 86.62%   Standardized Score (AM-PAC Scale): 28.58   CMS Modifier (G-Code): CM    FUNCTIONAL ABILITY STATUS  Functional Mobility/Gait Assessment  Gait Assistance: Not tested  Rolling: dependent  Supine to Sit: dependent  Sit to Supine: dependent      Additional information: Pt received in bed at start, spouse at bedside. Agreeable to participate. Pt presently on vent at 30% Fi02 with plan for trach and PEG tube placement this afternoon. Pt required max A x 2 for supine <-> sit EOB. Required max A for sit balance. Noted significant increase in secretions upon sitting. RN present to assist with secretion mgmt and spouse also very involved during session. Pt tolerated sitting EOB x 15 min with support. Frequent postural cues provided as pt has noted cervical flexion and weakness. Pt returned to supine at end of session, positioned to address UE edema. RN aware of session outcome and therapy recommendations.     The patient's Approx Degree of Impairment: 86.62% has been calculated based on documentation in the [facility] '6 clicks' Inpatient Daily Activity Short Form.  Research supports that  patients with this level of impairment may benefit from LTAC.    Final disposition will be made by interdisciplinary medical team.      Patient End of Session: In bed;Needs met;Call light within reach;RN aware of session/findings;All patient questions and concerns addressed;Family present;Restraints;SCDs in place    CURRENT GOALS   Patient Goal Patient's self-stated goal is: not stated, family's goal is further rehab   Goal #1 Patient is able to demonstrate supine - sit EOB @ level: moderate assistance      Goal #1   Current Status  Progressing: max A x 2   Goal #2 Patient is able to demonstrate transfers EOB to/from Chair/Wheelchair at assistance level: maximum assistance with  LRAD      Goal #2  Current Status  NT   Goal #3 Amb goal TBD   Goal #3   Current Status     Goal #4     Goal #4   Current Status     Goal #5 Patient to demonstrate independence with home activity/exercise instructions provided to patient in preparation for discharge.   Goal #5   Current Status  Progressing     Therapeutic Activity: 30 minutes

## 2024-03-22 NOTE — RESPIRATORY THERAPY NOTE
Patient was transported to IR for two procedures :  1- hemodialysis catheter  2- GI tube    Then patient will go to OR for one more procedure:  1 - Tracheostomy tube

## 2024-03-22 NOTE — PROCEDURES
Interventional Radiology  Brief Post-Procedure Note    Procedure(s):   Conversion of non-tunneled hemodialysis catheter to tunneled hemodialysis catheter  Gastrostomy tube insertion    Indication: post NSTEMI and CABG, NORMA needing long term dialysis access, need for enteric access    (s): Ion    Anesthesia: Sedation    Findings:    -conversion of non-tunneled right internal jugular vein to tunneled 19 cm hemodialysis catheter, terminating at SVC/RA junction  -insertion of 18 [place] gastrostomy tube     Blood loss: <10 mL      Complications: None    Plan:   -dialysis catheter ready for use  -NG tube may be used  -do NOT use gastrostomy tube until cleared by IR tomorrow     Please refer to full dictation under the \"Imaging\" tab in Epic.    Carlitos Lemon MD  3/22/2024  Interventional Radiology  [facility]

## 2024-03-22 NOTE — PROGRESS NOTES
[facility]   INTERNAL MEDICINE PROGRESS NOTE    CHIEF COMPLAINT   Chest Pain Angina    SUBJECTIVE  24 HR EVENTS   Remains intubated  Planning for tracheostomy & PEG  Active overnight - kicking both legs & moving arms per  Reyes  Planning for HD port  Still following commands    INPATIENT MEDICATIONS  Scheduled Medications:  sodium chloride, , Once  cefepime, 1 g, Q12H  iron sucrose, 200 mg, Daily  hydrALAzine, 10 mg, Once  atorvastatin, 40 mg, Nightly  metoprolol tartrate, 50 mg, 2x Daily(Beta Blocker)  ascorbic acid, 500 mg, Daily  aspirin, 81 mg, Daily  hydrALAZINE, 25 mg, Q8H CHAYITO  amiodarone, 200 mg, TID  insulin regular human, 1-7 Units, 4 times per day  famotidine, 20 mg, Daily   Or  famotidine, 20 mg, Daily  chlorhexidine gluconate, 15 mL, BID  heparin, 5,000 Units, 2 times per day  [Held by provider] clopidogrel, 75 mg, Daily  mineral oil-white petrolatum, , TID     Drips:   NxStage Pure Flow 400 CRRT/PIRRT fluid 5000mL, Last Rate: 2.5 L/hr (03/22/24 0456)  dextrose 10%, Last Rate: 45 mL/hr at 03/22/24 0001       PRN Medications:   labetalol, 10 mg, Q10 Min PRN  hydrALAzine, 10 mg, Q2H PRN  prochlorperazine, 5 mg, Q8H PRN  dextrose 10%, , Continuous PRN  lipase-protease-amylase (Lip-Prot-Amyl), 10,000 Units, PRN   And  sodium bicarbonate, 325 mg, PRN  alteplase (Activase) 4 mg in sterile water for injection (PF) 2.2 mL IV push to declot line, 4 mg, PRN  melatonin, 3 mg, Nightly PRN  polyethylene glycol (PEG 3350), 17 g, Daily PRN  ondansetron, 4 mg, Q6H PRN  potassium chloride, 20 mEq, PRN   Or  potassium chloride, 40 mEq, PRN  magnesium sulfate in dextrose 5%, 1 g, PRN  magnesium sulfate in sterile water for injection, 2 g, PRN  acetaminophen, 650 mg, Q4H PRN   Or  HYDROcodone-acetaminophen, 1 tablet, Q4H PRN  glucose, 15 g, Q15 Min PRN   Or  glucose, 15 g, Q15 Min PRN   Or  glucose-vitamin C, 4 tablet, Q15 Min PRN   Or  dextrose, 50 mL, Q15 Min PRN   Or  glucose, 30 g, Q15 Min PRN    Or  glucose, 30 g, Q15 Min PRN   Or  glucose-vitamin C, 8 tablet, Q15 Min PRN  heparin, 1.5 mL, PRN  acetaminophen, 650 mg, Q4H PRN   Or  acetaminophen, 650 mg, Q4H PRN   Or  acetaminophen, 650 mg, Q4H PRN   Or  acetaminophen, 1,000 mg, Q6H PRN  senna, 10 mL, Nightly PRN  bisacodyl, 10 mg, Daily PRN  fleet enema, 1 enema, Once PRN       PHYSICAL EXAMINATION  Vitals: /79   Pulse 76   Temp 98 °F (36.7 °C) (Temporal)   Resp 15   Ht 5' 7\" (1.702 m)   Wt 174 lb 13.2 oz (79.3 kg)   SpO2 100%   BMI 27.38 kg/m²   Gen: NAD, intubated  Eyes: PERRLA, normal conjunctivae  ENMT: Dry mucous membranes, trachea midline  CV: RRR, no peripheral edema  Resp: MBS bilat, non-labored respirations, symmetric expansion  GI: Soft, NT, ND, no rebound, no guarding  MSK:  No C/C/E, normal active/passive ROM in C-spine  Skin: No rashes  Neuro: Follows commands, w/d all 4 extrem  Psych: Alert, intubated, some higher level processing present    LABORATORY VALUES   Recent Labs   Lab 03/16/24  0536 03/16/24  1712 03/17/24  0553 03/17/24  1759 03/18/24  0620 03/18/24  1621 03/19/24  0541 03/19/24  1709 03/20/24  0444 03/20/24  1625 03/21/24  0316 03/21/24  1644 03/22/24  0510      < > 134*   < > 137   < > 137  --  136 135* 136  --  138   K 4.7   < > 4.4   < > 3.6   < > 3.5  --  3.7 3.8 3.8  --  4.0      < > 103   < > 104   < > 105  --  102 103 104  --  106   CO2 23.0   < > 23.0   < > 23.0   < > 21.0  --  23.0 24.0 27.0  --  23.0   BUN 42*   < > 46*   < > 62*   < > 56*  --  65* 50* 44*  --  51*   CREATSERUM 3.49*   < > 3.44*   < > 4.16*   < > 4.36*  --  4.87* 3.78* 3.34*  --  4.11*   ANIONGAP 9   < > 8   < > 10   < > 11  --  11 8 5  --  9   *   < > 187*   < > 163*   < > 145*  --  151* 145* 139*  --  128*   CA 7.4*   < > 7.7*   < > 7.9*   < > 8.3*  --  8.2* 8.2* 8.5*  --  7.9*   PHOS 5.6*  5.6*   < > 4.7   < > 2.7   < > 2.8   < > 3.6  3.6 2.8 2.4 3.9 3.3   TP  --   --  5.0*  --  5.0*  --  5.3*  --   --   --   --   --    --    ALB 2.9*  --  3.0*  --  2.9*  --  3.1*  --  3.1*  --   --   --   --    AST  --   --  26  --  28  --  26  --   --   --   --   --   --    ALT  --   --  <7*  --  <7*  --  <7*  --   --   --   --   --   --    ALKPHO  --   --  59  --  73  --  93  --   --   --   --   --   --    BILT  --   --  0.3  --  0.5  --  0.8  --   --   --   --   --   --    EGFRCR 20*   < > 20*   < > 16*   < > 15*  --  13* 18* 21*  --  16*    < > = values in this interval not displayed.     Recent Labs   Lab 03/17/24  0553 03/17/24  1352 03/21/24  0316 03/21/24  0922 03/21/24  1644 03/21/24  2042 03/21/24  2306 03/22/24  0510   WBC 10.3   < > 17.1* 17.5* 18.5*  --  16.6* 21.4*   HGB 9.0*   < > 7.6* 7.5* 7.1* 8.0* 7.6* 7.7*   HCT 26.3*   < > 23.1* 21.5* 21.8* 24.4* 23.4* 22.6*   .0*   < > 170.0 170.0 190.0  --  142.0* 157.0   MCV 85.4   < > 87.5 88.1 89.7  --  89.7 87.3   MOPERCENT 10.2  --   --   --   --   --   --   --    EOPERCENT 0.3  --   --   --   --   --   --   --    BAPERCENT 0.3  --   --   --   --   --   --   --     < > = values in this interval not displayed.     ASSESSMENT & PLAN  Cristiano Obregon - 53 year old male with HLD, tobacco use, presented 3/5/2024 with chest pain. Found to have STEMI with cardiogenic shock requiring Impella placement. Intubated in cath lab, s/p CABG 3/15. Subsequently with tamponade & back to OR 3/16 w CVS for washout. OR course c/b extensive blood loss - required ~20+u blood products letitia-operatively & subsequently coagulopathy w clotting in chest tubes & CRRT - hematology consulted. Stay further c/b: renal failure requiring CRRT - nephro consulted, resp failure w > 2 wks intubated, planning for tracheostomy - CTS, IR, pulm following, embolic strokes - neuro following.      STEMI c/b cardiogenic shock w multi-system organ failure   S/p CABG 3/15  Post-op cardiac tamponade s/p washout 3/16  Paroxysmal/post-op a fib  HTN  HLD  - Multivessel disease including chronically occluded large RCA, 95%  sequential LAD stenosis on cath 3/5. Flow restored to heavily thrombosed circumflex and OM1, Impella placed (now removed). 3/6 s/p emergent RHC to eval hemodynamics. Worsening renal function noted.   - Cardiology consulted: angiograms as above, med mgmt  - CCM consulted: vent management, sedation  - CV surgery consulted: s/p CABG on 3/15  - cardiac tamponade developed 3/15, s/p mediastinal washout   - amio gtt to po/IV, dobutamine gtt, levophed stopped  - PO amiodarone 200 mg TID  - repeat echo w preserved EF despite hypokinesis    Acute hypoxemic respiratory failure  - intubated in cath lab (3/5 -  )  - vent management per crit care colleagues  - Daily SBT, extubated when able  [  ] Intubated > 14d. Planning for tracheostomy. CTS consulted    Anuric acute kidney failure - suspect ATN from shock  Metabolic acidosis  Hyponatremia  Hyperkalemia  - Nephrology consutled  - CRRT initiated 3/7. Catheter exchanged 3/19  - resume CRRT, post CABG now, s/p 20 units of blood products in OR  - Planning for nightly CRRT vs HD    Sepsis 2/2 RML bacterial pneumonia - Persistent fevers 3/10 - 3/15, HCAP/VAP  Leukocytosis - initially from infection, now suspect reactive with strokes, HD, critical illness, etc  - Sputum with Klebsiella and MSSA  - Blood cx neg  - ID consulted  - Cefepime (3/10-3/14) -> Changed to zosyn per ID (3/14 -  )  - fluconazole added per ID    Subacute L frontal & occiptal lobe infarcts  - presume cardioembolic  - CTH w subacute L frontal & occipital lobe infarcts  - 3/5 RxFx eval: A1c - 6%, FLP - LDL 64, tele  - TTE noted  - Neuro consulted  - ASA 81 & plavix  - no need for full ac per neuro  - PT/OT/SLP when able  [  ] MRI Brain & C-spine    Hematuria, resolved  -yellow urine noted, minimal, but not bloody     Acute blood loss anemia  Thrombocytopenia - suspect reactive/consumptive  Hypercoagulability & anemia  - recurring clotting in chest tubes & HD. S/p 20+ u blood products this admission  - Hematology  consulted, d/w Dr. Styles - hold on hypercoag w/u at this time  - trend CBC    Tobacco use disorder  - Cessation    Elevated liver enzymes  - Likely 2/2 to shock. Hep b neg   - Trend CMP    Nutrition  - 180lbs on admission  - TPN while unable to take PO  - GI consulted - high risk for GI PEG, rec IR  [  ] Planning for PEG 3/22 w IR        ACP: Full Code. Linnea contact:  Reyes  Ppx: DVT: SQH   Dispo  EDoD:  ~ 3/25. Suspect will need LTAC for vent weaning. Dispo TBD clinical course - PT/OT following.   F/u:   - PCP:  Abilio Dorsey MD  - Cardiology  - CV surgery  - Nephrology  - Neurology    Available via bubble chat or perfect serve 7A - 7P.    Remains critically ill with multi-system organ failure - AMS/TME - new stroke, resp failure, renal failure, liver injury.    Carlo Vaughn MD   Internal Medicine - Hospitalist  [facility]

## 2024-03-22 NOTE — CM/SW NOTE
CM received a call earlier from spouse Reyes to notify that choice from LTAC will be RML.  RML reserved in [place] and CM will follow-up with RML on Monday with updates and to start insurance approval process.    Patient has PEG and trach in place.  Tunneled cath in place.    Plan:  Reserved with RML for LTAC.  Will need insurance approval.    Mary Jane Infante MBA BSN RN CRRN   RN Case Manager  [phone]

## 2024-03-22 NOTE — PROGRESS NOTES
Critical Care Progress Note     Assessment / Plan:  Acute respiratory failure - initially due to pulmonary edema. Now with encephalopathy and severe deconditioning. More alert today  - continue full vent support  - SBT now  - trach planned for later today pending performance on SBT and mental status  NSTEMI - diagnosed with new multivessel CAD s/p angioplasty and cardiogenic shock requiring Impella placement. S/p 3vCABG 3/15. Impella removed 3/16. Back to OR 3/16 AM for tamponade s/p pericardial clot removal  - cardiology and cardiac surgery following  Cardiogenic shock - due to acute coronary syndrome, EF was down and has now improved on repeat echo  - off vasopressors  Atrial fibrillation  - per cardiology  Pneumonia - sputum cultures with Klebsiella and MSSA  - cefepime per ID  Acute renal failure - suspect from ATN and cardiogenic shock. CRRT with multiple clotted catheters  - RRT per nephrology  Stroke  - MRI once able  - neurology following  Thrombocytopenia, anemia  - monitor  FEN  - TFs  - possible PEG placement today  Proph  - subcu heparin  - famotidine  Dispo  - full code  - ICU    Critical care time: 75 minutes      Subjective:  More alert today    Objective:  Vitals:    03/22/24 0430 03/22/24 0500 03/22/24 0600 03/22/24 0800   BP:  111/73 103/79 98/64   BP Location:    Left arm   Pulse:  90 76 79   Resp:  13 15 15   Temp:    100.1 °F (37.8 °C)   TempSrc:    Temporal   SpO2:  100% 100% 99%   Weight: 174 lb 13.2 oz (79.3 kg)      Height:         Physical Exam:  General: intubated  Skin: no rash, ulcers or subcutaneous nodules  Eyes: anicteric sclerae, moist conjunctivae  Head, ears, nose, throat: atraumatic, oropharynx clear with moist mucous membranes  Neck: trachea midline with no thyromegaly  Heart: regular rate and rhythm, no murmurs / rubs / gallops  Lungs: clear bilaterally  Abdomen: soft, nontender, nondistended   Extremities: no edema or cyanosis  Psych: opens eyes to voice, intermittently follows  commands    Medications:  Reviewed in EMR    Lab Data:  Reviewed in EMR    Imaging:  I independently visualized all relevant chest imaging in PACS and agree with radiology interpretation except where noted.

## 2024-03-22 NOTE — CONSULTS
[facility]  part of [facility]    Report of Consultation    Cristiano Obregon Patient Status:  Inpatient    1970 MRN [id number]   Location [facility] 2W/SW Attending Carlo Vaughn MD   Hosp Day # 17 PCP Abilio Dorsey MD     Date of Admission:  3/5/2024    Reason for Consultation:   Acute respiratory failure    History of Present Illness:   Patient is a 53-year-old male who initially presented with evidence of NSTEMI with cardiogenic shock requiring Impella device with subsequent CABG and mechanical ventilatory support.  Patient continues to be intubated during hospital course.  Plan for tracheostomy later this afternoon by family requesting second opinion regarding need for tracheostomy.  This morning patient somnolent occasionally following some commands.  Spontaneous breathing trial conducted.  No significant history of known lung disease otherwise.    Past Medical History  History reviewed. No pertinent past medical history.    Past Surgical History  Past Surgical History:   Procedure Laterality Date    COLONOSCOPY N/A 2017    Procedure: COLONOSCOPY, POSSIBLE BIOPSY, POSSIBLE POLYPECTOMY 74255;  Surgeon: Byron Plaza MD;  [place] [facility]       Family History  History reviewed. No pertinent family history.    Social History  Social History     Socioeconomic History    Marital status:    Tobacco Use    Smoking status: Every Day     Types: Cigarettes    Smokeless tobacco: Never    Tobacco comments:     occ/5 cigs daily\   Vaping Use    Vaping Use: Never used   Substance and Sexual Activity    Alcohol use: No     Alcohol/week: 0.0 standard drinks of alcohol    Drug use: No           Current Medications:  Current Facility-Administered Medications   Medication Dose Route Frequency    dexmedeTOMIDine in sodium chloride 0.9% (Precedex) 400 mcg/100mL infusion premix  0.2-1.5 mcg/kg/hr (Dosing Weight) Intravenous Continuous    sodium  chloride 0.9% infusion   Intravenous Once    NxStage Pure Flow 400 CRRT/PIRRT fluid 5000mL  2.5 L/hr CRRT Continuous    ceFEPIme (Maxipime) 1 g in sodium chloride 0.9% 100 mL IVPB-MBP  1 g Intravenous Q12H    hydrALAzine (Apresoline) 20 mg/mL injection 10 mg  10 mg Intravenous Once    labetalol (Trandate) 5 mg/mL injection 10 mg  10 mg Intravenous Q10 Min PRN    hydrALAzine (Apresoline) 20 mg/mL injection 10 mg  10 mg Intravenous Q2H PRN    prochlorperazine (Compazine) 10 MG/2ML injection 5 mg  5 mg Intravenous Q8H PRN    atorvastatin (Lipitor) tab 40 mg  40 mg Oral Nightly    metoprolol tartrate (Lopressor) tab 50 mg  50 mg Oral 2x Daily(Beta Blocker)    ascorbic acid (Vitamin C) tab 500 mg  500 mg Oral Daily    aspirin chewable tab 81 mg  81 mg Oral Daily    dextrose 10% infusion (TPN no rate)   Intravenous Continuous PRN    pancrelipase (Lip-Prot-Amyl) (Zenpep) DR particles cap 10,000 Units  10,000 Units Per G Tube PRN    And    sodium bicarbonate tab 325 mg  325 mg Oral PRN    hydrALAZINE (Apresoline) tab 25 mg  25 mg Oral Q8H CHAYITO    amiodarone (Pacerone) tab 200 mg  200 mg Oral TID    alteplase (Activase) 4 mg in sterile water for injection (PF) 2.2 mL IV push to declot line  4 mg Intravenous PRN    insulin regular human (Novolin R, Humulin R) 100 UNIT/ML injection 1-7 Units  1-7 Units Subcutaneous 4 times per day    melatonin tab 3 mg  3 mg Oral Nightly PRN    polyethylene glycol (PEG 3350) (Miralax) 17 g oral packet 17 g  17 g Oral Daily PRN    ondansetron (Zofran) 4 MG/2ML injection 4 mg  4 mg Intravenous Q6H PRN    famotidine (Pepcid) tab 20 mg  20 mg Oral Daily    Or    famotidine (Pepcid) 20 mg/2mL injection 20 mg  20 mg Intravenous Daily    potassium chloride 20 mEq/100mL IVPB premix 20 mEq  20 mEq Intravenous PRN    Or    potassium chloride 40 mEq/100mL IVPB premix (central line) 40 mEq  40 mEq Intravenous PRN    magnesium sulfate in dextrose 5% 1 g/100mL infusion premix 1 g  1 g Intravenous PRN     magnesium sulfate in sterile water for injection 2 g/50mL IVPB premix 2 g  2 g Intravenous PRN    chlorhexidine gluconate (Peridex) 0.12 % oral solution 15 mL  15 mL Mouth/Throat BID    [Held by provider] heparin (Porcine) 5000 UNIT/ML injection 5,000 Units  5,000 Units Subcutaneous 2 times per day    acetaminophen (Tylenol) tab 650 mg  650 mg Oral Q4H PRN    Or    HYDROcodone-acetaminophen (Norco) 5-325 MG per tab 1 tablet  1 tablet Oral Q4H PRN    [Held by provider] clopidogrel (Plavix) tab 75 mg  75 mg Oral Daily    glucose (Dex4) 15 GM/59ML oral liquid 15 g  15 g Oral Q15 Min PRN    Or    glucose (Glutose) 40% oral gel 15 g  15 g Oral Q15 Min PRN    Or    glucose-vitamin C (Dex-4) chewable tab 4 tablet  4 tablet Oral Q15 Min PRN    Or    dextrose 50% injection 50 mL  50 mL Intravenous Q15 Min PRN    Or    glucose (Dex4) 15 GM/59ML oral liquid 30 g  30 g Oral Q15 Min PRN    Or    glucose (Glutose) 40% oral gel 30 g  30 g Oral Q15 Min PRN    Or    glucose-vitamin C (Dex-4) chewable tab 8 tablet  8 tablet Oral Q15 Min PRN    mineral oil-white petrolatum (Artificial Tears) 83-15 % ophthalmic ointment   Both Eyes TID    heparin (Porcine) 1000 UNIT/ML injection 1,500 Units  1.5 mL Intracatheter PRN    acetaminophen (Tylenol) tab 650 mg  650 mg Oral Q4H PRN    Or    acetaminophen (Tylenol) 160 MG/5ML oral liquid 650 mg  650 mg Oral Q4H PRN    Or    acetaminophen (Ofirmev) 10 mg/mL infusion premix 1,000 mg  1,000 mg Intravenous Q6H PRN    senna (Senokot) 8.8 MG/5ML oral syrup 17.6 mg  10 mL Oral Nightly PRN    bisacodyl (Dulcolax) 10 MG rectal suppository 10 mg  10 mg Rectal Daily PRN    fleet enema (Fleet) 7-19 GM/118ML rectal enema 133 mL  1 enema Rectal Once PRN     Medications Prior to Admission   Medication Sig    ATORVASTATIN 20 MG Oral Tab TAKE 1 TABLET BY MOUTH EVERY DAY    FLUOCINONIDE 0.05 % External Cream APPLY SPARINGLY TO AFFECTED AREA TWICE DAILY    TRETINOIN 0.01 % External Gel APPLY 1 APPLICATION  TOPICALLY NIGHTLY.    omega-3 fatty acids 1000 MG Oral Cap Take 1,000 mg by mouth daily.    Melatonin 10-10 MG Oral Tab CR Take by mouth.    Multiple Vitamins-Minerals (CENTRUM) Oral Tab Take 1 tablet by mouth daily.    Multiple Vitamin (MULTIVITAMIN TAB/CAP) Take 1 tablet by mouth daily.       Allergies  No Known Allergies    Review of Systems:   Unable to obtain secondary to patient's current clinical condition      Physical Exam:   Blood pressure 114/72, pulse 88, temperature 100.1 °F (37.8 °C), temperature source Temporal, resp. rate 20, height 5' 7\" (1.702 m), weight 174 lb 13.2 oz (79.3 kg), SpO2 99%.    Constitutional: Intubated, arousable  Eyes: PERRL  ENT: nares patent  Neck: neck supple, no JVD  Cardio: RRR, S1 S2  Respiratory: Basilar crackles  GI: abdomen soft, non tender, active bowel souds, no organomegaly  Extremities: no clubbing, cyanosis, edema  Neurologic: no gross motor deficits  Skin: warm, dry    Results:   Laboratory Data  Lab Results   Component Value Date    WBC 21.2 (H) 03/22/2024    HGB 7.2 (L) 03/22/2024    HCT 22.2 (L) 03/22/2024    .0 03/22/2024    CREATSERUM 4.11 (H) 03/22/2024    BUN 51 (H) 03/22/2024     03/22/2024    K 4.0 03/22/2024     03/22/2024    CO2 23.0 03/22/2024     (H) 03/22/2024    CA 7.9 (L) 03/22/2024    ALB 3.1 (L) 03/20/2024    ALKPHO 93 03/19/2024    TP 5.3 (L) 03/19/2024    AST 26 03/19/2024    ALT <7 (L) 03/19/2024    PTT 36.5 (H) 03/20/2024    INR 1.26 (H) 03/20/2024    PTP 16.5 (H) 03/20/2024    PSA 1.95 10/06/2021    DDIMER >20.00 (H) 03/20/2024    MG 2.0 03/22/2024    PHOS 3.3 03/22/2024         Imaging  CT ABDOMEN (CPT=74150)    Result Date: 3/21/2024  CONCLUSION:  1. Stomach appears closely apposed to the ventral peritoneum.  No evidence of colonic interposition.  No free fluid in the visualized upper abdomen.  2. Postoperative changes from a recent CABG and subsequent mediastinal exploration/washout.  There is a partially imaged  lobulated mildly hyperdense mass extending from the visualized anterior mediastinum to the epigastric region that is most compatible with a subacute hematoma. 3. Bilateral pleural effusions, right greater than left, with associated passive atelectasis at both visualized lung bases.  These findings are worse on the right compared to 03/19/2024 suggesting worsening CHF/fluid overload or anasarca.    Dictated by (CST): Bc Edwards MD on 3/21/2024 at 7:08 PM     Finalized by (CST): Bc Edwards MD on 3/21/2024 at 7:15 PM           Assessment   1.  Acute respiratory failure  2.  Status post CABG  3.  Severe coronary disease  4.  Cardiogenic shock, improved  5.  Atrial fibrillation  6.  Pneumonia  7.  Acute kidney injury      Plan   -Patient presented initially with evidence of cardiogenic shock found to have multivessel coronary disease requiring Impella placement with subsequent CABG on 3/15/2022.  Impella device removed.  Improved from shock perspective  -Unable to successfully wean off ventilator.  Somnolent this morning.  Did not tolerate spontaneous breathing trial this morning.  I recommend proceeding with tracheostomy at this time given his severe deconditioning.  Tracheostomy scheduled for this afternoon.  Discussed with nursing staff.  -Will sign off from pulmonary perspective.    King Desai DO  Pulmonary Critical Care Medicine  [facility]  3/22/2024  12:49 PM

## 2024-03-22 NOTE — ANESTHESIA POSTPROCEDURE EVALUATION
Patient: Cristiano Obregon    Procedure Summary       Date: 03/22/24 Room / Location: [facility] MAIN OR 03 / [facility] MAIN OR    Anesthesia Start: 1514 Anesthesia Stop: 1659    Procedure: Tracheostomy Diagnosis: (Respiratory failure)    Surgeons: Ammon Krishna MD Anesthesiologist: Kendall Ch MD    Anesthesia Type: general ASA Status: 4            Anesthesia Type: general    Vitals Value Taken Time   /72 03/22/24 1709   Temp 98.7 03/22/24 1709   Pulse 85 03/22/24 1709   Resp 12 03/22/24 1709   SpO2 100 03/22/24 1709       [facility] AN Post Evaluation:   Patient Evaluated in ICU  Patient Participation: complete - patient cannot participate  Level of Consciousness: obtunded/minimal responses  Pain Score: 0  Pain Management: adequate  Airway Patency:patent  Yes    Nausea/Vomiting: none  Cardiovascular Status: acceptable, hemodynamically stable and blood pressure returned to baseline  Respiratory Status: acceptable and intubated  Postoperative Hydration acceptable      Kendall Ch MD  3/22/2024 5:09 PM

## 2024-03-22 NOTE — PLAN OF CARE
CRRT stopped at 7 , HD lines instilled with heparin 1.3mL each . No acute events overnight , given contrast through dobhoff at 6am       Problem: Safety Risk - Non-Violent Restraints  Goal: Patient will remain free from self-harm  Description: INTERVENTIONS:  - Apply the least restrictive restraint to prevent harm  - Notify patient and family of reasons restraints applied  - Assess for any contributing factors to confusion (electrolyte disturbances, delirium, medications)  - Discontinue any unnecessary medical devices as soon as possible  - Assess the patient's physical comfort, circulation, skin condition, hydration, nutrition and elimination needs   - Reorient and redirection as needed  - Assess for the need to continue restraints  Outcome: Progressing     Problem: Patient/Family Goals  Goal: Patient/Family Long Term Goal  Description: Patient's Long Term Goal: Go home upon discharge    Interventions:  - Monitor labs  - Administer medications  - Pain management  - Patient centered care  - Keep patient and family informed on plan of care  - See additional Care Plan goals for specific interventions  Outcome: Progressing  Goal: Patient/Family Short Term Goal  Description: Patient's Short Term Goal: extubate    Interventions:   - SAT/SBT  - improve strength  - pulmonary hygeine  - See additional Care Plan goals for specific interventions  Outcome: Progressing     Problem: RESPIRATORY - ADULT  Goal: Achieves optimal ventilation and oxygenation  Description: INTERVENTIONS:  - Assess for changes in respiratory status  - Assess for changes in mentation and behavior  - Position to facilitate oxygenation and minimize respiratory effort  - Oxygen supplementation based on oxygen saturation or ABGs  - Provide Smoking Cessation handout, if applicable  - Encourage broncho-pulmonary hygiene including cough, deep breathe, Incentive Spirometry  - Assess the need for suctioning and perform as needed  - Assess and instruct to report  SOB or any respiratory difficulty  - Respiratory Therapy support as indicated  - Manage/alleviate anxiety  - Monitor for signs/symptoms of CO2 retention  Outcome: Progressing     Problem: CARDIOVASCULAR - ADULT  Goal: Maintains optimal cardiac output and hemodynamic stability  Description: INTERVENTIONS:  - Monitor vital signs, rhythm, and trends  - Monitor for bleeding, hypotension and signs of decreased cardiac output  - Evaluate effectiveness of vasoactive medications to optimize hemodynamic stability  - Monitor arterial and/or venous puncture sites for bleeding and/or hematoma  - Assess quality of pulses, skin color and temperature  - Assess for signs of decreased coronary artery perfusion - ex. Angina  - Evaluate fluid balance, assess for edema, trend weights  Outcome: Progressing  Goal: Absence of cardiac arrhythmias or at baseline  Description: INTERVENTIONS:  - Continuous cardiac monitoring, monitor vital signs, obtain 12 lead EKG if indicated  - Evaluate effectiveness of antiarrhythmic and heart rate control medications as ordered  - Initiate emergency measures for life threatening arrhythmias  - Monitor electrolytes and administer replacement therapy as ordered  Outcome: Progressing     Problem: METABOLIC/FLUID AND ELECTROLYTES - ADULT  Goal: Hemodynamic stability and optimal renal function maintained  Description: INTERVENTIONS:  - Monitor labs and assess for signs and symptoms of volume excess or deficit  - Monitor intake, output and patient weight  - Monitor urine specific gravity, serum osmolarity and serum sodium as indicated or ordered  - Monitor response to interventions for patient's volume status, including labs, urine output, blood pressure (other measures as available)  - Encourage oral intake as appropriate  - Instruct patient on fluid and nutrition restrictions as appropriate  Outcome: Progressing     Problem: SKIN/TISSUE INTEGRITY - ADULT  Goal: Skin integrity remains intact  Description:  INTERVENTIONS  - Assess and document risk factors for pressure ulcer development  - Assess and document skin integrity  - Monitor for areas of redness and/or skin breakdown  - Initiate interventions, skin care algorithm/standards of care as needed  Outcome: Progressing  Goal: Incision(s), wounds(s) or drain site(s) healing without S/S of infection  Description: INTERVENTIONS:  - Assess and document risk factors for pressure ulcer development  - Assess and document skin integrity  - Assess and document dressing/incision, wound bed, drain sites and surrounding tissue  - Implement wound care per orders  - Initiate isolation precautions as appropriate  - Initiate Pressure Ulcer prevention bundle as indicated  Outcome: Progressing  Goal: Oral mucous membranes remain intact  Description: INTERVENTIONS  - Assess oral mucosa and hygiene practices  - Implement preventative oral hygiene regimen  - Implement oral medicated treatments as ordered  Outcome: Progressing     Problem: NEUROLOGICAL - ADULT  Goal: Achieves stable or improved neurological status  Description: INTERVENTIONS  - Assess for and report changes in neurological status  - Initiate measures to prevent increased intracranial pressure  - Maintain blood pressure and fluid volume within ordered parameters to optimize cerebral perfusion and minimize risk of hemorrhage  - Monitor temperature, glucose, and sodium. Initiate appropriate interventions as ordered  Outcome: Progressing     Problem: Delirium  Goal: Minimize duration of delirium  Description: Interventions:  - Encourage use of hearing aids, eye glasses  - Promote highest level of mobility daily  - Provide frequent reorientation  - Promote wakefulness i.e. lights on, blinds open  - Promote sleep, encourage patient's normal rest cycle i.e. lights off, TV off, minimize noise and interruptions  - Encourage family to assist in orientation and promotion of home routines  Outcome: Progressing     Problem:  HEMATOLOGIC - ADULT  Goal: Free from bleeding injury  Description: (Example usage: patient with low platelets)  INTERVENTIONS:  - Avoid intramuscular injections, enemas and rectal medication administration  - Ensure safe mobilization of patient  - Hold pressure on venipuncture sites to achieve adequate hemostasis  - Assess for signs and symptoms of internal bleeding  - Monitor lab trends  - Patient is to report abnormal signs of bleeding to staff  - Avoid use of toothpicks and dental floss  - Use electric shaver for shaving  - Use soft bristle tooth brush  - Limit straining and forceful nose blowing  Outcome: Progressing

## 2024-03-22 NOTE — ANESTHESIA PREPROCEDURE EVALUATION
Anesthesia PreOp Note    HPI:     Cristiano Obregon is a 53 year old male who presents for preoperative consultation requested by: Ammon Krishna MD    Date of Surgery: 3/22/2024    Procedure(s):  Tracheostomy  Indication: Respiratory failure    Relevant Problems   No relevant active problems       NPO:                         History Review:  Patient Active Problem List    Diagnosis Date Noted    Anemia 03/20/2024    Cerebrovascular accident (CVA) due to embolism of left middle cerebral artery (HCC) 03/19/2024    Chest pain 03/05/2024    NSTEMI (non-ST elevated myocardial infarction) (HCC) 03/05/2024    Paresthesia of left thumb 05/10/2017    Hypercholesteremia 07/11/2016    Floaters in visual field, bilateral 07/11/2016    Acne, unspecified acne type 07/11/2016    Eczema, unspecified type 07/11/2016       History reviewed. No pertinent past medical history.    Past Surgical History:   Procedure Laterality Date    COLONOSCOPY N/A 12/9/2017    Procedure: COLONOSCOPY, POSSIBLE BIOPSY, POSSIBLE POLYPECTOMY 95165;  Surgeon: Byron Plaza MD;  [facility]       Medications Prior to Admission   Medication Sig Dispense Refill Last Dose    ATORVASTATIN 20 MG Oral Tab TAKE 1 TABLET BY MOUTH EVERY DAY 90 tablet 1     FLUOCINONIDE 0.05 % External Cream APPLY SPARINGLY TO AFFECTED AREA TWICE DAILY 45 g 1     TRETINOIN 0.01 % External Gel APPLY 1 APPLICATION TOPICALLY NIGHTLY. 45 g 1     omega-3 fatty acids 1000 MG Oral Cap Take 1,000 mg by mouth daily.       Melatonin 10-10 MG Oral Tab CR Take by mouth.       Multiple Vitamins-Minerals (CENTRUM) Oral Tab Take 1 tablet by mouth daily.       Multiple Vitamin (MULTIVITAMIN TAB/CAP) Take 1 tablet by mouth daily.        Current Facility-Administered Medications Ordered in Epic   Medication Dose Route Frequency Provider Last Rate Last Admin    sodium chloride 0.9% infusion   Intravenous Once Emily Adams APRN        NxStage Pure Flow 400  CRRT/PIRRT fluid 5000mL  2.5 L/hr CRRT Continuous Jerome Gonzalez MD 2,500 mL/hr at 03/22/24 0456 2.5 L/hr at 03/22/24 0456    ceFEPIme (Maxipime) 1 g in sodium chloride 0.9% 100 mL IVPB-MBP  1 g Intravenous Q12H Derick Webb MD 25 mL/hr at 03/21/24 1939 1 g at 03/21/24 1939    iron sucrose (Venofer) 20 mg/mL injection 200 mg  200 mg Intravenous Daily Juaquin Styles, DO   200 mg at 03/21/24 0848    hydrALAzine (Apresoline) 20 mg/mL injection 10 mg  10 mg Intravenous Once Patti Serrano APRN        labetalol (Trandate) 5 mg/mL injection 10 mg  10 mg Intravenous Q10 Min PRN Arvind Suggs MD        hydrALAzine (Apresoline) 20 mg/mL injection 10 mg  10 mg Intravenous Q2H PRN Arvind Suggs MD        prochlorperazine (Compazine) 10 MG/2ML injection 5 mg  5 mg Intravenous Q8H PRN Arvind Suggs MD        atorvastatin (Lipitor) tab 40 mg  40 mg Oral Nightly Arvind Suggs MD   40 mg at 03/21/24 1951    metoprolol tartrate (Lopressor) tab 50 mg  50 mg Oral 2x Daily(Beta Blocker) Emily Adams APRN   50 mg at 03/22/24 0505    ascorbic acid (Vitamin C) tab 500 mg  500 mg Oral Daily Emily Adams, APRN   500 mg at 03/21/24 0850    aspirin chewable tab 81 mg  81 mg Oral Daily Donna Llanes DO   81 mg at 03/21/24 0850    dextrose 10% infusion (TPN no rate)   Intravenous Continuous PRN Balbir Saini MD 45 mL/hr at 03/22/24 0001 New Bag at 03/22/24 0001    pancrelipase (Lip-Prot-Amyl) (Zenpep) DR particles cap 10,000 Units  10,000 Units Per G Tube PRN Balbir Saini MD        And    sodium bicarbonate tab 325 mg  325 mg Oral PRN Balbir Saini MD        hydrALAZINE (Apresoline) tab 25 mg  25 mg Oral Q8H Alan Cartagena APRN   25 mg at 03/22/24 0505    amiodarone (Pacerone) tab 200 mg  200 mg Oral TID Soumya Henderson APRN   200 mg at 03/21/24 2000    alteplase (Activase) 4 mg in sterile water for injection (PF) 2.2 mL IV push to declot line  4 mg  Intravenous PRN Richie Llanes MD   2 mg at 03/20/24 0148    insulin regular human (Novolin R, Humulin R) 100 UNIT/ML injection 1-7 Units  1-7 Units Subcutaneous 4 times per day Soumya Henderson APRN   1 Units at 03/21/24 1237    melatonin tab 3 mg  3 mg Oral Nightly PRN Richie Llanes MD   3 mg at 03/21/24 2239    polyethylene glycol (PEG 3350) (Miralax) 17 g oral packet 17 g  17 g Oral Daily PRN Richie Llanes MD        ondansetron (Zofran) 4 MG/2ML injection 4 mg  4 mg Intravenous Q6H PRN Richie Llanes MD        famotidine (Pepcid) tab 20 mg  20 mg Oral Daily Richie Llanes MD   20 mg at 03/21/24 0850    Or    famotidine (Pepcid) 20 mg/2mL injection 20 mg  20 mg Intravenous Daily Richie Llanes MD   20 mg at 03/20/24 0816    potassium chloride 20 mEq/100mL IVPB premix 20 mEq  20 mEq Intravenous PRN Richie Llanes MD        Or    potassium chloride 40 mEq/100mL IVPB premix (central line) 40 mEq  40 mEq Intravenous PRN Richie Llanes MD        magnesium sulfate in dextrose 5% 1 g/100mL infusion premix 1 g  1 g Intravenous PRN Richie Llanes MD        magnesium sulfate in sterile water for injection 2 g/50mL IVPB premix 2 g  2 g Intravenous PRN Richie Llanes MD        chlorhexidine gluconate (Peridex) 0.12 % oral solution 15 mL  15 mL Mouth/Throat BID Richie Llanes MD   15 mL at 03/21/24 2000    heparin (Porcine) 5000 UNIT/ML injection 5,000 Units  5,000 Units Subcutaneous 2 times per day Richie Llanes MD   5,000 Units at 03/21/24 2000    acetaminophen (Tylenol) tab 650 mg  650 mg Oral Q4H PRN Richie Llanes MD   650 mg at 03/18/24 2156    Or    HYDROcodone-acetaminophen (Norco) 5-325 MG per tab 1 tablet  1 tablet Oral Q4H PRN Richie Llanes MD        [Held by provider] clopidogrel (Plavix) tab 75 mg  75 mg Oral Daily Richie Llanes MD   75 mg at 03/19/24 1033    glucose (Dex4) 15 GM/59ML oral liquid 15 g  15 g Oral Q15 Min PRN Richie Llanes MD        Or    glucose (Glutose) 40% oral gel 15 g  15 g Oral Q15 Min PRN Richie Llanes MD         Or    glucose-vitamin C (Dex-4) chewable tab 4 tablet  4 tablet Oral Q15 Min PRN Richie Llanes MD        Or    dextrose 50% injection 50 mL  50 mL Intravenous Q15 Min PRN Richie Llanes MD        Or    glucose (Dex4) 15 GM/59ML oral liquid 30 g  30 g Oral Q15 Min PRN Richie Llanes MD        Or    glucose (Glutose) 40% oral gel 30 g  30 g Oral Q15 Min PRN Richie Llanes MD        Or    glucose-vitamin C (Dex-4) chewable tab 8 tablet  8 tablet Oral Q15 Min PRN Richie Llanes MD        mineral oil-white petrolatum (Artificial Tears) 83-15 % ophthalmic ointment   Both Eyes TID Richie Llanes MD   Given at 03/21/24 2000    heparin (Porcine) 1000 UNIT/ML injection 1,500 Units  1.5 mL Intracatheter PRN Richie Llanes MD   1,500 Units at 03/22/24 0618    acetaminophen (Tylenol) tab 650 mg  650 mg Oral Q4H PRN Richie Llanes MD   650 mg at 03/19/24 2146    Or    acetaminophen (Tylenol) 160 MG/5ML oral liquid 650 mg  650 mg Oral Q4H PRN Richie Llanes MD        Or    acetaminophen (Tylenol) rectal suppository 650 mg  650 mg Rectal Q4H PRN Richie Llanes MD        Or    acetaminophen (Ofirmev) 10 mg/mL infusion premix 1,000 mg  1,000 mg Intravenous Q6H PRN Richie Llanes  mL/hr at 03/21/24 1235 1,000 mg at 03/21/24 1235    senna (Senokot) 8.8 MG/5ML oral syrup 17.6 mg  10 mL Oral Nightly PRN Richie Llanes MD        bisacodyl (Dulcolax) 10 MG rectal suppository 10 mg  10 mg Rectal Daily PRN Richie Llanes MD        fleet enema (Fleet) 7-19 GM/118ML rectal enema 133 mL  1 enema Rectal Once PRN Richie Llanes MD         No current Epic-ordered outpatient medications on file.       No Known Allergies    History reviewed. No pertinent family history.  Social History     Socioeconomic History    Marital status:    Tobacco Use    Smoking status: Every Day     Types: Cigarettes    Smokeless tobacco: Never    Tobacco comments:     occ/5 cigs daily\   Vaping Use    Vaping Use: Never used   Substance and Sexual Activity    Alcohol use: No      Alcohol/week: 0.0 standard drinks of alcohol    Drug use: No       Available pre-op labs reviewed.  Lab Results   Component Value Date    WBC 21.4 (H) 03/22/2024    RBC 2.59 (L) 03/22/2024    HGB 7.7 (L) 03/22/2024    HCT 22.6 (L) 03/22/2024    MCV 87.3 03/22/2024    MCH 29.7 03/22/2024    MCHC 34.1 03/22/2024    RDW 15.9 (H) 03/22/2024    .0 03/22/2024     Lab Results   Component Value Date     03/22/2024    K 4.0 03/22/2024     03/22/2024    CO2 23.0 03/22/2024    BUN 51 (H) 03/22/2024    CREATSERUM 4.11 (H) 03/22/2024     (H) 03/22/2024    PGLU 139 (H) 03/22/2024    CA 7.9 (L) 03/22/2024     Lab Results   Component Value Date    INR 1.26 (H) 03/20/2024       Vital Signs:  Body mass index is 27.38 kg/m².   height is 1.702 m (5' 7\") and weight is 79.3 kg (174 lb 13.2 oz). His temporal temperature is 98 °F (36.7 °C). His blood pressure is 103/79 and his pulse is 76. His respiration is 15 and oxygen saturation is 100%.   Vitals:    03/22/24 0400 03/22/24 0430 03/22/24 0500 03/22/24 0600   BP: 136/90  111/73 103/79   Pulse: 90  90 76   Resp: 21  13 15   Temp: 98 °F (36.7 °C)      TempSrc: Temporal      SpO2: 100%  100% 100%   Weight:  79.3 kg (174 lb 13.2 oz)     Height:            Anesthesia Evaluation     Patient summary reviewed and Nursing notes reviewed    No history of anesthetic complications   Airway   Mallampati: unable to assess  Dental      Pulmonary      ROS comment: Respiratory failure on vent  Cardiovascular   (+) hypertension, past MI, CAD, CABG/stent, CHF    Neuro/Psych    (+)  CVA,        GI/Hepatic/Renal    (+) chronic renal disease dialysis and ESRD    Endo/Other    (+) blood dyscrasia  Abdominal                  Anesthesia Plan:   ASA:  4  Plan:   General  Informed Consent Plan and Risks Discussed With:  Patient      I have informed Cristiano Obregon and/or legal guardian or family member of the nature of the anesthetic plan, benefits, risks including possible dental  damage if relevant, major complications, and any alternative forms of anesthetic management.   All of the patient's questions were answered to the best of my ability. The patient desires the anesthetic management as planned.  Aida Mercer MD  3/22/2024 6:55 AM  Present on Admission:  **None**

## 2024-03-22 NOTE — PROGRESS NOTES
[facility]  part of [facility]    Progress Note    Cristiano Obregon Patient Status:  Inpatient    1970 MRN [id number]   Location [facility] 2W/SW Attending Carlo Vaughn MD   Hosp Day # 17 PCP Abilio Dorsey MD     Subjective:  Pt resting in bed w/his , Reyes, at bedside. He is more alert today/eyes open. Nods head to simple questions. Moves ext: weaker on right.   Reyes requesting second pulmonary opinion regarding necessity of trach and weaning protocol this morning.      Objective:  /66 (BP Location: Left arm)   Pulse 87   Temp 100.1 °F (37.8 °C) (Temporal)   Resp 16   Ht 5' 7\" (1.702 m)   Wt 174 lb 13.2 oz (79.3 kg)   SpO2 98%   BMI 27.38 kg/m²     Temp (24hrs), Av.8 °F (37.1 °C), Min:98 °F (36.7 °C), Max:100.1 °F (37.8 °C)  Telemetry-NSR    Intake/Output:    Intake/Output Summary (Last 24 hours) at 3/22/2024 1126  Last data filed at 3/22/2024 0647  Gross per 24 hour   Intake 2797.7 ml   Output 1736 ml   Net 1061.7 ml       Wt Readings from Last 3 Encounters:   24 174 lb 13.2 oz (79.3 kg)   10/06/21 177 lb 6.4 oz (80.5 kg)   20 172 lb 9.6 oz (78.3 kg)       Allergies:  No Known Allergies    Labs:  Lab Results   Component Value Date    WBC 21.4 2024    HGB 7.7 2024    HCT 22.6 2024    .0 2024    CREATSERUM 4.11 2024    BUN 51 2024     2024    K 4.0 2024     2024    CO2 23.0 2024     2024    CA 7.9 2024    MG 2.0 2024    PHOS 3.3 2024       Physical Exam:  Blood pressure 104/66, pulse 87, temperature 100.1 °F (37.8 °C), temperature source Temporal, resp. rate 16, height 5' 7\" (1.702 m), weight 174 lb 13.2 oz (79.3 kg), SpO2 98%.  General: NAD  Neck: Difficult to assess with body habitus/intubated  Lungs: Diminished laterally R greater L, few scattered rhonchi  Heart: RRR, S1, S2  Abdomen: Soft/Round, NT/ND,  BS+x4/+BM-loose w/Flexiseal in place  Extremities: Warm, dry, 1+ generalized edema  Pulses: 2+ bilat DP  Skin: sternotomy incision drsg: CDI w/Left leg incision drsgs: CDI   Neurological:  eyes open/more alert today/nods head to simple questions    Assessment/Plan:  S/P CABG x 3 POD # 7  S/P RE-OP MEDIASTINAL EXPLORATION/WASHOUT on 3/16  Respiratory Failure- intubated. SBT per Pulm. Plan for Trach placement today Gen Surgery: Dr. Krishna: consulted. Plavix on hold w/last dose 3/19.  Patient's past requesting second opinion prior to trach placement-discussed with Dr. Desai who will see patient per his spouse request, and after reviewing record agrees with trach.  Pre op Impella removed post op on 3/16 per Cards. Pt hemodynamically stable. [place]helia removed 3/18.  Midline cath placed 3/18  Re op mediastinal exploration/washout due to pericardial tamponade. Limited echo 3/16 post exploration w/EF=45-50%/no residual effusion.   Continue ICU status  Pain meds as needed: avoid narcotics to assess neuro status, IV Tylenol as needed  Altered MS post op: drowsy: improved today -CT head 3/19=+CVA-Mgt per Neuro/ MRI   Brief pre op AF- Amio protocol for AF prevention- currently maintaining NSR  Plan to increase activity when appropriate -PT/OT   Scds/Heparin Sub Q prophylaxis DVT prevention -heparin held this a.m. for planned procedures, will resume postprocedure when okay with consultants  Pre op NORMA/ATN/CRRT w/mgt per Nephrology. Expected post op volume overload. Pt currently on CRRT w/mgt per Nephrology. Temp catheter insertion per IR placed 3/19.   Plan for tunneled catheter per IR.  Hematology consulted for freq CRRT line clotting.   [place] removed 3/21 per renal-will monitor bladder scans every 8 hours/as needed  Expected post op anemia w/o clinical significance/stable-Hgb 7.7 s/p 1 unit PRBC yesterday. Hematology consulted 3/20. Acute blood loss anemia immediate post op w/mediastinal exploration/tamponade.  Loose stools  as Flexi-Seal in place, DC Flexi-Seal per Dr. Llanes  Nutrition per TF via Dobhoff- plan for PEG per IR today.  Plavix/heparin on hold  ID consulted on 3/14: following recs. Continues on ABX. + sputum cx  No hx: DM- continue correction scale coverage and glucose checks, make adjustments accordingly    Discharge planning: pt lives w/his  and has supportive family. Anticipate rehab: LTAC once medically stable.  /SW will assist w/planning.     Plan of care discussed w/patient//RN and CV Surgeon: Dr. Shraddha Henderson, APRN  3/22/2024  8029

## 2024-03-22 NOTE — RESPIRATORY THERAPY NOTE
Patient  is a candidate for SBT. Bilateral breath sounds auscultated. Suction provided when indicated. No acute events. RT will continue to monitor.       03/22/24 0445   Vent Information   Vent Mode VC/AC   Settings   FiO2 (%) 30 %   Resp Rate (Set) 12   Vt (Set, mL) 530 mL   Waveform Decelerating ramp   PEEP/CPAP (cm H2O) 5 cm H20

## 2024-03-22 NOTE — PROGRESS NOTES
Cardiology Progress Note    Cristiano Obregon Patient Status:  Inpatient    1970 MRN [id number]   Location [facility] 2W/SW Attending Carlo Vaughn MD   Hosp Day # 17 PCP Abilio Dorsey MD     Interval Note:  Patient continuing on vent  Sleeping at present  No distress  -------------------------------------------------------------------------------------------------------------------------------  ROS 12 systems reviewed, pertinent findings above.  ROS    History:  History reviewed. No pertinent past medical history.  Past Surgical History:   Procedure Laterality Date    COLONOSCOPY N/A 2017    Procedure: COLONOSCOPY, POSSIBLE BIOPSY, POSSIBLE POLYPECTOMY 87252;  Surgeon: Byron Plaza MD;  Location: [facility]     History reviewed. No pertinent family history.   reports that he has been smoking cigarettes. He has never used smokeless tobacco. He reports that he does not drink alcohol and does not use drugs.    Objective:   Temp: 100.1 °F (37.8 °C)  Pulse: 79  Resp: 15  BP: 98/64  FiO2 (%): 30 %    Intake/Output:     Intake/Output Summary (Last 24 hours) at 3/22/2024 0918  Last data filed at 3/22/2024 0647  Gross per 24 hour   Intake 2797.7 ml   Output 1736 ml   Net 1061.7 ml       Physical Exam:    General: Sleeping, continuing on vent, comfortable  HEENT: Normocephalic, anicteric sclera, neck supple.  Neck: No JVD, carotids 2+, no bruits.  Cardiac: Regular rate and rhythm. S1, S2 normal. No murmur, pericardial rub, S3.  Lungs: Clear without wheezes, rales, rhonchi or dullness.  Normal excursions and effort.  Abdomen: Soft, non-tender. BS-present.  Extremities: Without clubbing, cyanosis or edema.  Peripheral pulses are 2+.  Neurologic: Non-focal  Skin: Warm and dry.       Assessment and Plan    Assessment:  Cerebrovascular accident (CVA) due to embolism of left middle cerebral artery (HCC)   STEMI, severe multivessel coronary disease status post CABG  Emergent redo  sternotomy 3/16/2024 for tamponade  Cardiogenic shock requiring Impella-resolved  Acute renal failure, on HD  Paroxysmal atrial fibrillation  Remains intubated  Anemia  Thrombocytopenia  History of tobacco abuse     Plan:  Amiodarone  Asa 81 mg  Atorvastatin 40 mg  Hydralazine 25 mg every 8 hours  Metoprolol tartrate 50 mg 2 times a day   Heparin for DVT prophylaxis     Level of care: L3    Mulugeta Corcoran MD  [facility]      3/22/2024  9:18 AM

## 2024-03-22 NOTE — PROGRESS NOTES
NEPHROLOGY DAILY PROGRESS NOTE       SUBJECTIVE:    Tolerating CRRT at night no clotting.    OBJECTIVE:    Total Intake/Output:    Intake/Output Summary (Last 24 hours) at 3/22/2024 0923  Last data filed at 3/22/2024 0647  Gross per 24 hour   Intake 2797.7 ml   Output 1736 ml   Net 1061.7 ml       PHYSICAL EXAM:  BP 98/64 (BP Location: Left arm)   Pulse 79   Temp 100.1 °F (37.8 °C) (Temporal)   Resp 15   Ht 5' 7\" (1.702 m)   Wt 174 lb 13.2 oz (79.3 kg)   SpO2 99%   BMI 27.38 kg/m²   GEN: Intubated awake  HEENT: ETT in place   CHEST: mechanical breath sounds, chest tube in place   CARDIAC: S1S2 normal  ABD: soft, NT/ND  : lujan in place   EXT:  trace upper and lower ext edema noted   NEURO: sedated   ACCESS: RIJ temp HD cath exchanged (3/19)      CURRENT MEDICATIONS:     sodium chloride   Intravenous Once    cefepime  1 g Intravenous Q12H    hydrALAzine  10 mg Intravenous Once    atorvastatin  40 mg Oral Nightly    metoprolol tartrate  50 mg Oral 2x Daily(Beta Blocker)    ascorbic acid  500 mg Oral Daily    aspirin  81 mg Oral Daily    hydrALAZINE  25 mg Oral Q8H CHAYITO    amiodarone  200 mg Oral TID    insulin regular human  1-7 Units Subcutaneous 4 times per day    famotidine  20 mg Oral Daily    Or    famotidine  20 mg Intravenous Daily    chlorhexidine gluconate  15 mL Mouth/Throat BID    heparin  5,000 Units Subcutaneous 2 times per day    [Held by provider] clopidogrel  75 mg Oral Daily    mineral oil-white petrolatum   Both Eyes TID         LABS:  Patient Labs Reviewed in Detail. Pertinent Labs as follows:  Recent Labs   Lab 03/20/24  1625 03/21/24  0316 03/22/24  0510   * 139* 128*   BUN 50* 44* 51*   CREATSERUM 3.78* 3.34* 4.11*   EGFRCR 18* 21* 16*   CA 8.2* 8.5* 7.9*   * 136 138   K 3.8 3.8 4.0    104 106   CO2 24.0 27.0 23.0     Recent Labs   Lab 03/15/24  1605 03/16/24  0536 03/16/24  1443 03/17/24  0553 03/17/24  1352 03/21/24  1644 03/21/24  2042 03/21/24  2306 03/22/24  0510    RBC 3.44* 2.45*  --  3.08*   < > 2.43*  --  2.61* 2.59*   HGB 10.2* 7.4*   < > 9.0*   < > 7.1* 8.0* 7.6* 7.7*   HCT 29.3* 21.4*   < > 26.3*   < > 21.8* 24.4* 23.4* 22.6*   MCV 85.2 87.3  --  85.4   < > 89.7  --  89.7 87.3   MCH 29.7 30.2  --  29.2   < > 29.2  --  29.1 29.7   MCHC 34.8 34.6  --  34.2   < > 32.6  --  32.5 34.1   RDW 14.3 15.2*  --  15.2*   < > 16.2*  --  15.7* 15.9*   NEPRELIM 9.11* 6.88  --  8.17*  --   --   --   --   --    WBC 11.0 8.5  --  10.3   < > 18.5*  --  16.6* 21.4*   .0 149.0*  --  131.0*   < > 190.0  --  142.0* 157.0    < > = values in this interval not displayed.         IMAGING:  CT ABDOMEN (CPT=74150)    Result Date: 3/21/2024  CONCLUSION:  1. Stomach appears closely apposed to the ventral peritoneum.  No evidence of colonic interposition.  No free fluid in the visualized upper abdomen.  2. Postoperative changes from a recent CABG and subsequent mediastinal exploration/washout.  There is a partially imaged lobulated mildly hyperdense mass extending from the visualized anterior mediastinum to the epigastric region that is most compatible with a subacute hematoma. 3. Bilateral pleural effusions, right greater than left, with associated passive atelectasis at both visualized lung bases.  These findings are worse on the right compared to 03/19/2024 suggesting worsening CHF/fluid overload or anasarca.    Dictated by (CST): Bc Edwards MD on 3/21/2024 at 7:08 PM     Finalized by (CST): Bc Edwards MD on 3/21/2024 at 7:15 PM            ASSESSMENT AND PLAN:   This is a 53 year old male with PMH sig for HLD, tobacco use. Presented with chest pain and was admitted for NSTEMI.  Nephrology is consulted for NORMA      Anuric NORMA  - due to ATN from shock.   - creatinine 1.23 on admission, worsened to 7.19 (3/7)  - initiated on CVVH on 3/7 via RIJ temp HD cath   - Continue CRRT  - Increase UF as blood pressures allow.  - OK for PICC line if needed.  - Maintain adequate perfusion  pressure  - Dose medications for CRRT  - Avoid nephrotoxins.  - discussed with neurology will resume CRRT at this time, iHD when cleared from neuro perspective.  - switch to tunneled HD catheter - timing as per IR.  - Avoid Maximiliano in MRI.  - transition to CRRT from 7pm-7am to allow for procedures and ambulation until cleared for iHD from neurology perspective.  - Appreciate Heme onc input  - BID BMP, phos, and mag while on CRRT.      Hyperkalemia   - resolved with CRRT    Metabolic acidosis  - resolved with CRRT     Hyponatremia  - resolved     Critical care time > 35 mins     Jerome Gonzalez MD  [facility]  Nephrology

## 2024-03-23 NOTE — PLAN OF CARE
Problem: Safety Risk - Non-Violent Restraints  Goal: Patient will remain free from self-harm  Description: INTERVENTIONS:  - Apply the least restrictive restraint to prevent harm  - Notify patient and family of reasons restraints applied  - Assess for any contributing factors to confusion (electrolyte disturbances, delirium, medications)  - Discontinue any unnecessary medical devices as soon as possible  - Assess the patient's physical comfort, circulation, skin condition, hydration, nutrition and elimination needs   - Reorient and redirection as needed  - Assess for the need to continue restraints  Outcome: Progressing   Bilat soft wrist restraints to  prevent pulling out important tubes and iv lines.

## 2024-03-23 NOTE — PROGRESS NOTES
[facility]  part of [facility]    Progress Note    Cristiano Obregon Patient Status:  Inpatient    1970 MRN [id number]   Location [facility] 2W/SW Attending Bud Camarena MD   Hosp Day # 18 PCP Abilio Dorsey MD     Subjective:  Patient resting in bed with friend Marycarmen chapin. Patient moving all four extremities L>R and nodding to simple questions.     Objective:  /81   Pulse 95   Temp 99.5 °F (37.5 °C) (Temporal)   Resp 23   Ht 170.2 cm (5' 7\")   Wt 81.2 kg (179 lb 0.2 oz)   SpO2 99%   BMI 28.04 kg/m²     Temp (24hrs), Av.9 °F (37.2 °C), Min:98.2 °F (36.8 °C), Max:100 °F (37.8 °C)      Intake/Output:    Intake/Output Summary (Last 24 hours) at 3/23/2024 1014  Last data filed at 3/23/2024 0600  Gross per 24 hour   Intake 2426 ml   Output 2374 ml   Net 52 ml       Wt Readings from Last 3 Encounters:   24 81.2 kg (179 lb 0.2 oz)   10/06/21 80.5 kg (177 lb 6.4 oz)   20 78.3 kg (172 lb 9.6 oz)       Allergies:  No Known Allergies    Labs:  Lab Results   Component Value Date    WBC 23.7 2024    HGB 7.2 2024    HCT 21.4 2024    .0 2024    CREATSERUM 4.12 2024    BUN 46 2024     2024    K 4.4 2024     2024    CO2 24.0 2024     2024    CA 8.0 2024    PTT 51.7 2024    INR 1.24 2024    MG 1.8 2024    PHOS 3.4 2024       Physical Exam:  Blood pressure 128/81, pulse 95, temperature 99.5 °F (37.5 °C), temperature source Temporal, resp. rate 23, height 170.2 cm (5' 7\"), weight 81.2 kg (179 lb 0.2 oz), SpO2 99%.  General: NAD  Neck: New trach in place  Lungs: Clear with diminished bases bilaterally R>L  Heart: RRR, S1, S2  Abdomen: Soft, rounded, NT/ND, BS+x4, New PEG in place, +BM, flexiseal remains in place  Extremities: Warm, dry, generalized edema  Pulses: 2+ bilat DP  Skin: sternotomy dressing CDI, L leg dressings  CDI  Neurological:  Eyes open, tracks, nods head to simple questions    Assessment/Plan:  S/P CABG x 3 POD # 8  S/P RE-OP MEDIASTINAL EXPLORATION/WASHOUT on 3/16  Respiratory Failure- Trach placement 3/23 Gen Surgery: Dr. Krishna: consulted. Plavix on hold w/last dose 3/19. SBT per Pulm  Pre op Impella removed post op on 3/16 per Cards. Pt hemodynamically stable. Aurora/Ophelia removed 3/18.  Midline cath placed 3/18  Re op mediastinal exploration/washout due to pericardial tamponade. Limited echo 3/16 post exploration w/EF=45-50%/no residual effusion.   Continue ICU status  Pain meds as needed: avoid narcotics to assess neuro status, IV Tylenol as needed  Altered MS post op: drowsy: improved -CT head 3/19=+CVA-Mgt per Neuro/ MRI   Brief pre op AF- Amio protocol for AF prevention- currently maintaining NSR  Plan to increase activity when appropriate -PT/OT   Scds/Heparin Sub Q prophylaxis DVT prevention -heparin held 3/22 for planned procedures, will resume postprocedure when okay with consultants  Pre op NORMA/ATN/CRRT w/mgt per Nephrology. Expected post op volume overload. Pt currently on CRRT w/mgt per Nephrology. Temp catheter insertion per IR placed 3/19.   Tunneled catheter placed 3/23 per IR.  Hematology consulted for freq CRRT line clotting.   Red[place] removed 3/21 per renal-will monitor bladder scans every 8 hours/as needed  Expected post op anemia w/o clinical significance/stable-Hgb 7.2 s/p 1 unit PRBC this AM, transfuse if repeat less than 8. Hematology consulted 3/20. Acute blood loss anemia immediate post op w/mediastinal exploration/tamponade.  Loose stools as Flexi-Seal in place, DC Flexi-Seal per Dr. Llanes  Nutrition per TF PEG placed 3/22. Resume TF when cleared by IR.  Plavix/heparin on hold  ID consulted on 3/14: following recs. Continues on ABX. + sputum cx  No hx: DM- continue correction scale coverage and glucose checks, make adjustments accordingly    Discharge planning: pt lives w/his  and has  supportive family. Anticipate rehab: LTAC once medically stable.  /SW will assist w/planning.     Plan of care discussed with RN, patient, friend bedside and rounding surgeon Dr. Vega RODRIGUEZ RN  3/23/2024  10:14 AM

## 2024-03-23 NOTE — PROGRESS NOTES
[facility]  part of [facility] Infectious Disease  Progress Note    Cristiano Obregon Patient Status:  Inpatient    1970 MRN [id number]   Location [facility] 2W/SW Attending Bud Camarena MD   Hosp Day # 18 PCP Abilio Dorsey MD     Cristiano Obregon is a 53 year old male.   Chief Complaint   Patient presents with    Chest Pain Angina       HPI:      More alert earlier  Now sedated on vent               REVIEW OF SYSTEMS:   A comprehensive 10 point review of systems was completed.  Pertinent positives and negatives noted in the the HPI.       Allergies:  No Known Allergies     Current Meds:    Current Facility-Administered Medications:     sodium chloride 0.9% infusion, , Intravenous, Once    sodium chloride 0.9% infusion, , Intravenous, Once    dexmedeTOMIDine in sodium chloride 0.9% (Precedex) 400 mcg/100mL infusion premix, 0.2-1.5 mcg/kg/hr (Dosing Weight), Intravenous, Continuous    heparin sodium lock flush 100 UNIT/ML injection 150 Units, 1.5 mL, Intravenous, Once    sodium chloride 0.9% infusion, , Intravenous, Once    sodium chloride 0.9% infusion, , Intravenous, Once    NxStage Pure Flow 400 CRRT/PIRRT fluid 5000mL, 2.5 L/hr, CRRT, Continuous **AND** CRRT Replacement Fluid 400, , , Until Discontinued    ceFEPIme (Maxipime) 1 g in sodium chloride 0.9% 100 mL IVPB-MBP, 1 g, Intravenous, Q12H    hydrALAzine (Apresoline) 20 mg/mL injection 10 mg, 10 mg, Intravenous, Once    labetalol (Trandate) 5 mg/mL injection 10 mg, 10 mg, Intravenous, Q10 Min PRN    hydrALAzine (Apresoline) 20 mg/mL injection 10 mg, 10 mg, Intravenous, Q2H PRN    prochlorperazine (Compazine) 10 MG/2ML injection 5 mg, 5 mg, Intravenous, Q8H PRN    atorvastatin (Lipitor) tab 40 mg, 40 mg, Oral, Nightly    metoprolol tartrate (Lopressor) tab 50 mg, 50 mg, Oral, 2x Daily(Beta Blocker)    ascorbic acid (Vitamin C) tab 500 mg, 500 mg, Oral, Daily    aspirin chewable tab 81 mg,  81 mg, Oral, Daily    dextrose 10% infusion (TPN no rate), , Intravenous, Continuous PRN    pancrelipase (Lip-Prot-Amyl) (Zenpep) DR particles cap 10,000 Units, 10,000 Units, Per G Tube, PRN **AND** sodium bicarbonate tab 325 mg, 325 mg, Oral, PRN    hydrALAZINE (Apresoline) tab 25 mg, 25 mg, Oral, Q8H CHAYITO    amiodarone (Pacerone) tab 200 mg, 200 mg, Oral, TID    alteplase (Activase) 4 mg in sterile water for injection (PF) 2.2 mL IV push to declot line, 4 mg, Intravenous, PRN    insulin regular human (Novolin R, Humulin R) 100 UNIT/ML injection 1-7 Units, 1-7 Units, Subcutaneous, 4 times per day    melatonin tab 3 mg, 3 mg, Oral, Nightly PRN    polyethylene glycol (PEG 3350) (Miralax) 17 g oral packet 17 g, 17 g, Oral, Daily PRN    ondansetron (Zofran) 4 MG/2ML injection 4 mg, 4 mg, Intravenous, Q6H PRN    famotidine (Pepcid) tab 20 mg, 20 mg, Oral, Daily **OR** famotidine (Pepcid) 20 mg/2mL injection 20 mg, 20 mg, Intravenous, Daily    potassium chloride 20 mEq/100mL IVPB premix 20 mEq, 20 mEq, Intravenous, PRN **OR** potassium chloride 40 mEq/100mL IVPB premix (central line) 40 mEq, 40 mEq, Intravenous, PRN    magnesium sulfate in dextrose 5% 1 g/100mL infusion premix 1 g, 1 g, Intravenous, PRN    magnesium sulfate in sterile water for injection 2 g/50mL IVPB premix 2 g, 2 g, Intravenous, PRN    chlorhexidine gluconate (Peridex) 0.12 % oral solution 15 mL, 15 mL, Mouth/Throat, BID    [Held by provider] heparin (Porcine) 5000 UNIT/ML injection 5,000 Units, 5,000 Units, Subcutaneous, 2 times per day    [DISCONTINUED] acetaminophen (Tylenol) tab 650 mg, 650 mg, Oral, Q4H PRN **OR** HYDROcodone-acetaminophen (Norco) 5-325 MG per tab 1 tablet, 1 tablet, Oral, Q4H PRN **OR** [DISCONTINUED] HYDROcodone-acetaminophen (Norco) 5-325 MG per tab 2 tablet, 2 tablet, Oral, Q4H PRN    [Held by provider] clopidogrel (Plavix) tab 75 mg, 75 mg, Oral, Daily    glucose (Dex4) 15 GM/59ML oral liquid 15 g, 15 g, Oral, Q15 Min PRN  **OR** glucose (Glutose) 40% oral gel 15 g, 15 g, Oral, Q15 Min PRN **OR** glucose-vitamin C (Dex-4) chewable tab 4 tablet, 4 tablet, Oral, Q15 Min PRN **OR** dextrose 50% injection 50 mL, 50 mL, Intravenous, Q15 Min PRN **OR** glucose (Dex4) 15 GM/59ML oral liquid 30 g, 30 g, Oral, Q15 Min PRN **OR** glucose (Glutose) 40% oral gel 30 g, 30 g, Oral, Q15 Min PRN **OR** glucose-vitamin C (Dex-4) chewable tab 8 tablet, 8 tablet, Oral, Q15 Min PRN    mineral oil-white petrolatum (Artificial Tears) 83-15 % ophthalmic ointment, , Both Eyes, TID    heparin (Porcine) 1000 UNIT/ML injection 1,500 Units, 1.5 mL, Intracatheter, PRN    acetaminophen (Tylenol) tab 650 mg, 650 mg, Oral, Q4H PRN **OR** acetaminophen (Tylenol) 160 MG/5ML oral liquid 650 mg, 650 mg, Oral, Q4H PRN **OR** [DISCONTINUED] acetaminophen (Tylenol) rectal suppository 650 mg, 650 mg, Rectal, Q4H PRN **OR** acetaminophen (Ofirmev) 10 mg/mL infusion premix 1,000 mg, 1,000 mg, Intravenous, Q6H PRN    senna (Senokot) 8.8 MG/5ML oral syrup 17.6 mg, 10 mL, Oral, Nightly PRN    bisacodyl (Dulcolax) 10 MG rectal suppository 10 mg, 10 mg, Rectal, Daily PRN    fleet enema (Fleet) 7-19 GM/118ML rectal enema 133 mL, 1 enema, Rectal, Once PRN   No current outpatient medications on file.        HISTORY:  History reviewed. No pertinent past medical history.   Past Surgical History:   Procedure Laterality Date    COLONOSCOPY N/A 12/9/2017    Procedure: COLONOSCOPY, POSSIBLE BIOPSY, POSSIBLE POLYPECTOMY 64709;  Surgeon: Byron Plaza MD;  Location: [facility]        Social history and family history negative related to present illness except as above.    PHYSICAL EXAM:   /84 (BP Location: Left arm)   Pulse 96   Temp 99.7 °F (37.6 °C) (Temporal)   Resp 16   Ht 5' 7\" (1.702 m)   Wt 179 lb 0.2 oz (81.2 kg)   SpO2 98%   BMI 28.04 kg/m²   GENERAL:  fio2 30%  HEENT:  Normocephalic, no scleral abnormalities.  Oropharynx clear, trach site ok  NECK:   Supple, no masses, no lymphadenopathy.  LUNGS:  diminished bs  CARDIO: RRR S1/S2, no rubs, clicks, heaves, or murmurs.  GI:  Soft NT/some distension, BS present x4 quadrants, no HSM.  EXTREMITIES:  ue>le edema, no clubbing, no cyanosis.  NEURO:  No focal neurologic deficits.  DERM:  Warm, dry, no rashes.    IMPRESSION/PLAN:   Fevers improved; suspect hcap resolving; fio2 30% a positive sign  Continue cefepime day 9 rx  S/p m.I. acute and subacute multiple strokes  Wbc remains elevated at 17k  S/p trach and peg  Spoke with  and family  >35 min            Recent Results (from the past 72 hour(s))   POCT Glucose    Collection Time: 03/20/24  6:19 PM   Result Value Ref Range    POC Glucose  123 (H) 70 - 99 mg/dL   POCT Glucose    Collection Time: 03/20/24 11:33 PM   Result Value Ref Range    POC Glucose  149 (H) 70 - 99 mg/dL   Magnesium    Collection Time: 03/21/24  3:16 AM   Result Value Ref Range    Magnesium 2.0 1.6 - 2.6 mg/dL   Phosphorus    Collection Time: 03/21/24  3:16 AM   Result Value Ref Range    Phosphorus 2.4 2.4 - 5.1 mg/dL   CBC, Platelet; No Differential    Collection Time: 03/21/24  3:16 AM   Result Value Ref Range    WBC 17.1 (H) 4.0 - 11.0 x10(3) uL    RBC 2.64 (L) 4.30 - 5.70 x10(6)uL    HGB 7.6 (L) 13.0 - 17.5 g/dL    HCT 23.1 (L) 39.0 - 53.0 %    MCV 87.5 80.0 - 100.0 fL    MCH 28.8 26.0 - 34.0 pg    MCHC 32.9 31.0 - 37.0 g/dL    RDW 15.9 (H) 11.0 - 15.0 %    RDW-SD 48.9 (H) 35.1 - 46.3 fL    .0 150.0 - 450.0 10(3)uL    Immature Platelet Fraction 12.4 (H) 0.0 - 7.0 %   Basic Metabolic Panel (8)    Collection Time: 03/21/24  3:16 AM   Result Value Ref Range    Glucose 139 (H) 70 - 99 mg/dL    Sodium 136 136 - 145 mmol/L    Potassium 3.8 3.5 - 5.1 mmol/L    Chloride 104 98 - 112 mmol/L    CO2 27.0 21.0 - 32.0 mmol/L    Anion Gap 5 0 - 18 mmol/L    BUN 44 (H) 9 - 23 mg/dL    Creatinine 3.34 (H) 0.70 - 1.30 mg/dL    BUN/CREA Ratio 13.2 10.0 - 20.0    Calcium, Total 8.5 (L) 8.7 - 10.4  mg/dL    Calculated Osmolality 295 275 - 295 mOsm/kg    eGFR-Cr 21 (L) >=60 mL/min/1.73m2   Scan slide    Collection Time: 03/21/24  3:16 AM   Result Value Ref Range    Slide Review       Platelets reviewed on smear. No giant platelets or platelet clumps observed.   POCT Glucose    Collection Time: 03/21/24  6:24 AM   Result Value Ref Range    POC Glucose  152 (H) 70 - 99 mg/dL   CBC, Platelet; No Differential    Collection Time: 03/21/24  9:22 AM   Result Value Ref Range    WBC 17.5 (H) 4.0 - 11.0 x10(3) uL    RBC 2.44 (L) 4.30 - 5.70 x10(6)uL    HGB 7.5 (L) 13.0 - 17.5 g/dL    HCT 21.5 (L) 39.0 - 53.0 %    MCV 88.1 80.0 - 100.0 fL    MCH 30.7 26.0 - 34.0 pg    MCHC 34.9 31.0 - 37.0 g/dL    RDW 15.9 (H) 11.0 - 15.0 %    RDW-SD 49.2 (H) 35.1 - 46.3 fL    .0 150.0 - 450.0 10(3)uL    Immature Platelet Fraction 10.5 (H) 0.0 - 7.0 %   ABORH (Blood Type)    Collection Time: 03/21/24  9:22 AM   Result Value Ref Range    ABO BLOOD TYPE B     RH BLOOD TYPE Positive    Antibody Screen    Collection Time: 03/21/24  9:22 AM   Result Value Ref Range    Antibody Screen Negative    Scan slide    Collection Time: 03/21/24  9:22 AM   Result Value Ref Range    Slide Review       Platelets reviewed on smear. No giant platelets or platelet clumps observed.   POCT Glucose    Collection Time: 03/21/24 11:57 AM   Result Value Ref Range    POC Glucose  167 (H) 70 - 99 mg/dL   Magnesium    Collection Time: 03/21/24  4:44 PM   Result Value Ref Range    Magnesium 2.1 1.6 - 2.6 mg/dL   Phosphorus    Collection Time: 03/21/24  4:44 PM   Result Value Ref Range    Phosphorus 3.9 2.4 - 5.1 mg/dL   CBC, Platelet; No Differential    Collection Time: 03/21/24  4:44 PM   Result Value Ref Range    WBC 18.5 (H) 4.0 - 11.0 x10(3) uL    RBC 2.43 (L) 4.30 - 5.70 x10(6)uL    HGB 7.1 (L) 13.0 - 17.5 g/dL    HCT 21.8 (L) 39.0 - 53.0 %    MCV 89.7 80.0 - 100.0 fL    MCH 29.2 26.0 - 34.0 pg    MCHC 32.6 31.0 - 37.0 g/dL    RDW 16.2 (H) 11.0 - 15.0 %     RDW-SD 52.0 (H) 35.1 - 46.3 fL    .0 150.0 - 450.0 10(3)uL   POCT Glucose    Collection Time: 03/21/24  6:04 PM   Result Value Ref Range    POC Glucose  129 (H) 70 - 99 mg/dL   Hemoglobin & Hematocrit    Collection Time: 03/21/24  8:42 PM   Result Value Ref Range    HGB 8.0 (L) 13.0 - 17.5 g/dL    HCT 24.4 (L) 39.0 - 53.0 %   POCT Glucose    Collection Time: 03/21/24 11:05 PM   Result Value Ref Range    POC Glucose  138 (H) 70 - 99 mg/dL   CBC, Platelet; No Differential    Collection Time: 03/21/24 11:06 PM   Result Value Ref Range    WBC 16.6 (H) 4.0 - 11.0 x10(3) uL    RBC 2.61 (L) 4.30 - 5.70 x10(6)uL    HGB 7.6 (L) 13.0 - 17.5 g/dL    HCT 23.4 (L) 39.0 - 53.0 %    MCV 89.7 80.0 - 100.0 fL    MCH 29.1 26.0 - 34.0 pg    MCHC 32.5 31.0 - 37.0 g/dL    RDW 15.7 (H) 11.0 - 15.0 %    RDW-SD 50.4 (H) 35.1 - 46.3 fL    .0 (L) 150.0 - 450.0 10(3)uL   Prepare RBC Once    Collection Time: 03/22/24 12:40 AM   Result Value Ref Range    Blood Product [id number]     Unit Number [id number]     UNIT ABO B     UNIT RH POS     Product Status Presumed Transfused     Expiration Date 938170431798     Blood Type Barcode 7300     Unit Volume 350 ml   POCT Glucose    Collection Time: 03/22/24  5:03 AM   Result Value Ref Range    POC Glucose  139 (H) 70 - 99 mg/dL   Magnesium    Collection Time: 03/22/24  5:10 AM   Result Value Ref Range    Magnesium 2.0 1.6 - 2.6 mg/dL   Phosphorus    Collection Time: 03/22/24  5:10 AM   Result Value Ref Range    Phosphorus 3.3 2.4 - 5.1 mg/dL   CBC, Platelet; No Differential    Collection Time: 03/22/24  5:10 AM   Result Value Ref Range    WBC 21.4 (H) 4.0 - 11.0 x10(3) uL    RBC 2.59 (L) 4.30 - 5.70 x10(6)uL    HGB 7.7 (L) 13.0 - 17.5 g/dL    HCT 22.6 (L) 39.0 - 53.0 %    MCV 87.3 80.0 - 100.0 fL    MCH 29.7 26.0 - 34.0 pg    MCHC 34.1 31.0 - 37.0 g/dL    RDW 15.9 (H) 11.0 - 15.0 %    RDW-SD 49.3 (H) 35.1 - 46.3 fL    .0 150.0 - 450.0 10(3)uL    Immature Platelet Fraction 10.0  (H) 0.0 - 7.0 %   Basic Metabolic Panel (8)    Collection Time: 03/22/24  5:10 AM   Result Value Ref Range    Glucose 128 (H) 70 - 99 mg/dL    Sodium 138 136 - 145 mmol/L    Potassium 4.0 3.5 - 5.1 mmol/L    Chloride 106 98 - 112 mmol/L    CO2 23.0 21.0 - 32.0 mmol/L    Anion Gap 9 0 - 18 mmol/L    BUN 51 (H) 9 - 23 mg/dL    Creatinine 4.11 (H) 0.70 - 1.30 mg/dL    BUN/CREA Ratio 12.4 10.0 - 20.0    Calcium, Total 7.9 (L) 8.7 - 10.4 mg/dL    Calculated Osmolality 301 (H) 275 - 295 mOsm/kg    eGFR-Cr 16 (L) >=60 mL/min/1.73m2   POCT Glucose    Collection Time: 03/22/24 12:19 PM   Result Value Ref Range    POC Glucose  159 (H) 70 - 99 mg/dL   CBC, Platelet; No Differential    Collection Time: 03/22/24 12:30 PM   Result Value Ref Range    WBC 21.2 (H) 4.0 - 11.0 x10(3) uL    RBC 2.50 (L) 4.30 - 5.70 x10(6)uL    HGB 7.2 (L) 13.0 - 17.5 g/dL    HCT 22.2 (L) 39.0 - 53.0 %    MCV 88.8 80.0 - 100.0 fL    MCH 28.8 26.0 - 34.0 pg    MCHC 32.4 31.0 - 37.0 g/dL    RDW 15.8 (H) 11.0 - 15.0 %    RDW-SD 50.2 (H) 35.1 - 46.3 fL    .0 150.0 - 450.0 10(3)uL   Magnesium    Collection Time: 03/22/24  5:49 PM   Result Value Ref Range    Magnesium 1.9 1.6 - 2.6 mg/dL   Phosphorus    Collection Time: 03/22/24  5:49 PM   Result Value Ref Range    Phosphorus 4.7 2.4 - 5.1 mg/dL   CBC, Platelet; No Differential    Collection Time: 03/22/24  5:49 PM   Result Value Ref Range    WBC 18.8 (H) 4.0 - 11.0 x10(3) uL    RBC 2.32 (L) 4.30 - 5.70 x10(6)uL    HGB 6.7 (LL) 13.0 - 17.5 g/dL    HCT 20.9 (L) 39.0 - 53.0 %    MCV 90.1 80.0 - 100.0 fL    MCH 28.9 26.0 - 34.0 pg    MCHC 32.1 31.0 - 37.0 g/dL    RDW 16.3 (H) 11.0 - 15.0 %    RDW-SD 52.0 (H) 35.1 - 46.3 fL    .0 150.0 - 450.0 10(3)uL   Basic Metabolic Panel (8)    Collection Time: 03/22/24  5:49 PM   Result Value Ref Range    Glucose 131 (H) 70 - 99 mg/dL    Sodium 136 136 - 145 mmol/L    Potassium 4.4 3.5 - 5.1 mmol/L    Chloride 103 98 - 112 mmol/L    CO2 24.0 21.0 - 32.0 mmol/L     Anion Gap 9 0 - 18 mmol/L    BUN 57 (H) 9 - 23 mg/dL    Creatinine 5.16 (H) 0.70 - 1.30 mg/dL    BUN/CREA Ratio 11.0 10.0 - 20.0    Calcium, Total 7.6 (L) 8.7 - 10.4 mg/dL    Calculated Osmolality 300 (H) 275 - 295 mOsm/kg    eGFR-Cr 12 (L) >=60 mL/min/1.73m2   MD BLOOD SMEAR CONSULT    Collection Time: 03/22/24  5:49 PM   Result Value Ref Range    MD Blood Smear Consult       Evaluation of the CBC data and the peripheral blood smear demonstrates leukocytosis consisting of neutrophilia, and severe normocytic anemia.  Neutrophils demonstrate reactive features, with a mild left shift including increased myelocytes.  Red blood cells demonstrate mild Bozena leukocytosis, with macrocytes, microcytes, and occasional ovalocytes.  There are no significant morphologic abnormalities in the other white blood cell subsets or platelets.    Differential causes for an anemia of this type may include chronic inflammatory disorders, chronic infections, neoplasms, hemolysis/hemorrhage, and end organ failure.      General differential considerations for neutrophilia include infection, drugs/hormones, tissue necrosis, inflammatory disorders, acute hemorrhage/hemolysis, and metabolic disorders.      Clinical correlation is recommended.    Reviewed by Pushpa Renner M.D.     POCT Glucose    Collection Time: 03/22/24  6:15 PM   Result Value Ref Range    POC Glucose  145 (H) 70 - 99 mg/dL   Prothrombin Time (PT)    Collection Time: 03/22/24  6:42 PM   Result Value Ref Range    PT 16.4 (H) 11.6 - 14.8 seconds    INR 1.24 (H) 0.80 - 1.20   PTT, Activated    Collection Time: 03/22/24  6:42 PM   Result Value Ref Range    PTT 51.7 (H) 23.3 - 35.6 seconds   Prepare RBC Once    Collection Time: 03/22/24 10:04 PM   Result Value Ref Range    Blood Product [id number]     Unit Number [id number]     UNIT ABO B     UNIT RH POS     Product Status Issued     Expiration Date 490159283714     Blood Type Barcode 7300     Unit Volume 350 ml   POCT  Glucose    Collection Time: 03/23/24  1:11 AM   Result Value Ref Range    POC Glucose  114 (H) 70 - 99 mg/dL   Magnesium    Collection Time: 03/23/24  4:49 AM   Result Value Ref Range    Magnesium 1.8 1.6 - 2.6 mg/dL   Phosphorus    Collection Time: 03/23/24  4:49 AM   Result Value Ref Range    Phosphorus 3.4 2.4 - 5.1 mg/dL   Basic Metabolic Panel (8)    Collection Time: 03/23/24  4:49 AM   Result Value Ref Range    Glucose 127 (H) 70 - 99 mg/dL    Sodium 133 (L) 136 - 145 mmol/L    Potassium 4.4 3.5 - 5.1 mmol/L    Chloride 105 98 - 112 mmol/L    CO2 24.0 21.0 - 32.0 mmol/L    Anion Gap 4 0 - 18 mmol/L    BUN 46 (H) 9 - 23 mg/dL    Creatinine 4.12 (H) 0.70 - 1.30 mg/dL    BUN/CREA Ratio 11.2 10.0 - 20.0    Calcium, Total 8.0 (L) 8.7 - 10.4 mg/dL    Calculated Osmolality 289 275 - 295 mOsm/kg    eGFR-Cr 16 (L) >=60 mL/min/1.73m2   CBC W/ DIFFERENTIAL    Collection Time: 03/23/24  4:52 AM   Result Value Ref Range    WBC 23.7 (H) 4.0 - 11.0 x10(3) uL    RBC 2.46 (L) 4.30 - 5.70 x10(6)uL    HGB 7.2 (L) 13.0 - 17.5 g/dL    HCT 21.4 (L) 39.0 - 53.0 %    MCV 87.0 80.0 - 100.0 fL    MCH 29.3 26.0 - 34.0 pg    MCHC 33.6 31.0 - 37.0 g/dL    RDW-SD 47.9 (H) 35.1 - 46.3 fL    RDW 15.8 (H) 11.0 - 15.0 %    .0 (L) 150.0 - 450.0 10(3)uL    Immature Platelet Fraction 10.8 (H) 0.0 - 7.0 %    Neutrophil Absolute Prelim 18.68 (H) 1.50 - 7.70 x10 (3) uL   Manual differential    Collection Time: 03/23/24  4:52 AM   Result Value Ref Range    Neutrophil Absolute Manual 21.09 (H) 1.50 - 7.70 x10(3) uL    Lymphocyte Absolute Manual 1.19 1.00 - 4.00 x10(3) uL    Monocyte Absolute Manual 0.24 0.10 - 1.00 x10(3) uL    Eosinophil Absolute Manual 1.19 (H) 0.00 - 0.70 x10(3) uL    Basophil Absolute Manual 0.00 0.00 - 0.20 x10(3) uL    Neutrophils % Manual 88 %    Band % 1 %    Lymphocyte % Manual 5 %    Monocyte % Manual 1 %    Eosinophil % Manual 5 %    Basophil % Manual 0 %    Total Cells Counted 100     RBC Morphology See morphology  below (A) Normal, Slide reviewed, see previous RBC morphology.    Platelet Morphology Normal Normal    Macrocytosis 1+      Basophilic Stippling 1+     POCT Glucose    Collection Time: 03/23/24  5:29 AM   Result Value Ref Range    POC Glucose  146 (H) 70 - 99 mg/dL   Prepare RBC Once    Collection Time: 03/23/24  8:08 AM   Result Value Ref Range    Blood Product [id number]     Unit Number [id number]     UNIT ABO B     UNIT RH POS     Product Status Issued     Expiration Date [id number]     Blood Type Barcode 7300     Unit Volume 350 ml   Prepare RBC Once    Collection Time: 03/23/24 10:48 AM   Result Value Ref Range    Blood Product [id number]     Unit Number [id number]     UNIT ABO B     UNIT RH POS     Product Status Issued     Expiration Date [id number]     Blood Type Barcode 7300     Unit Volume 350 ml   POCT Glucose    Collection Time: 03/23/24 12:00 PM   Result Value Ref Range    POC Glucose  137 (H) 70 - 99 mg/dL   CBC, Platelet; No Differential    Collection Time: 03/23/24 12:13 PM   Result Value Ref Range    WBC 18.8 (H) 4.0 - 11.0 x10(3) uL    RBC 2.77 (L) 4.30 - 5.70 x10(6)uL    HGB 8.0 (L) 13.0 - 17.5 g/dL    HCT 23.8 (L) 39.0 - 53.0 %    MCV 85.9 80.0 - 100.0 fL    MCH 28.9 26.0 - 34.0 pg    MCHC 33.6 31.0 - 37.0 g/dL    RDW 15.8 (H) 11.0 - 15.0 %    RDW-SD 47.8 (H) 35.1 - 46.3 fL    .0 (L) 150.0 - 450.0 10(3)uL    Immature Platelet Fraction 10.4 (H) 0.0 - 7.0 %   Basic Metabolic Panel (8)    Collection Time: 03/23/24 12:13 PM   Result Value Ref Range    Glucose 124 (H) 70 - 99 mg/dL    Sodium 133 (L) 136 - 145 mmol/L    Potassium 4.6 3.5 - 5.1 mmol/L    Chloride 104 98 - 112 mmol/L    CO2 24.0 21.0 - 32.0 mmol/L    Anion Gap 5 0 - 18 mmol/L    BUN 45 (H) 9 - 23 mg/dL    Creatinine 4.65 (H) 0.70 - 1.30 mg/dL    BUN/CREA Ratio 9.7 (L) 10.0 - 20.0    Calcium, Total 7.9 (L) 8.7 - 10.4 mg/dL    Calculated Osmolality 289 275 - 295 mOsm/kg    eGFR-Cr 14 (L) >=60 mL/min/1.73m2   Scan slide     Collection Time: 03/23/24 12:13 PM   Result Value Ref Range    Slide Review Slide reviewed,morphology review added    RBC Morphology Scan    Collection Time: 03/23/24 12:13 PM   Result Value Ref Range    RBC Morphology Normal Normal, Slide reviewed, see previous RBC morphology.    Platelet Morphology See morphology below (A) Normal    Giant platelets Few (A)     RAINBOW DRAW GOLD    Collection Time: 03/23/24 12:38 PM   Result Value Ref Range    Hold Gold Auto Resulted    Magnesium    Collection Time: 03/23/24  2:08 PM   Result Value Ref Range    Magnesium 2.2 1.6 - 2.6 mg/dL   Phosphorus    Collection Time: 03/23/24  2:08 PM   Result Value Ref Range    Phosphorus 3.8 2.4 - 5.1 mg/dL   CBC, Platelet; No Differential    Collection Time: 03/23/24  2:08 PM   Result Value Ref Range    WBC 18.9 (H) 4.0 - 11.0 x10(3) uL    RBC 2.96 (L) 4.30 - 5.70 x10(6)uL    HGB 8.9 (L) 13.0 - 17.5 g/dL    HCT 25.1 (L) 39.0 - 53.0 %    MCV 84.8 80.0 - 100.0 fL    MCH 30.1 26.0 - 34.0 pg    MCHC 35.5 31.0 - 37.0 g/dL    RDW 15.9 (H) 11.0 - 15.0 %    RDW-SD 46.2 35.1 - 46.3 fL    .0 (L) 150.0 - 450.0 10(3)uL    Immature Platelet Fraction 8.6 (H) 0.0 - 7.0 %   Scan slide    Collection Time: 03/23/24  2:08 PM   Result Value Ref Range    Slide Review Slide reviewed,morphology review added    RBC Morphology Scan    Collection Time: 03/23/24  2:08 PM   Result Value Ref Range    RBC Morphology Normal Normal, Slide reviewed, see previous RBC morphology.    Platelet Morphology See morphology below (A) Normal    Giant platelets Few (A)           Ottoniel Bennett MD     CALL [facility] AT [phone] IF QUESTIONS OR CONCERNS  THANKS

## 2024-03-23 NOTE — PROGRESS NOTES
Brief IR note:    POD 1 s/p G tube. Abd soft site c/d/I. Ok to start TF, check residuals and advance per protocol.    Jorge Saini MD

## 2024-03-23 NOTE — PROGRESS NOTES
Cardiology Progress Note    Cristiano Moralezmesfin Patient Status:  Inpatient    1970 MRN [id number]   Location [facility] 2W/SW Attending Donna Llanes,    Hosp Day # 18 PCP Abilio Dorsey MD     Interval Note:  Patient seen and examined  Status post tracheostomy yesterday      --------------------------------------------------------------------------------------------------------------------------------  ROS 12 systems reviewed, pertinent findings above.  ROS    History:  History reviewed. No pertinent past medical history.  Past Surgical History:   Procedure Laterality Date    COLONOSCOPY N/A 2017    Procedure: COLONOSCOPY, POSSIBLE BIOPSY, POSSIBLE POLYPECTOMY 30370;  Surgeon: Byron Plaza MD;  Location: [facility]     History reviewed. No pertinent family history.   reports that he has been smoking cigarettes. He has never used smokeless tobacco. He reports that he does not drink alcohol and does not use drugs.    Objective:   Temp: 99.5 °F (37.5 °C)  Pulse: 96  Resp: 18  BP: 135/81  FiO2 (%): 30 %    Intake/Output:     Intake/Output Summary (Last 24 hours) at 3/23/2024 0939  Last data filed at 3/23/2024 0600  Gross per 24 hour   Intake 2426 ml   Output 2374 ml   Net 52 ml       Physical Exam:    General: Intubated, sedated however responsive and opening eyes  HEENT: Normocephalic, anicteric sclera, neck supple.  Neck: No JVD, carotids 2+, no bruits.  Cardiac: Regular rate and rhythm. S1, S2 normal. No murmur, pericardial rub, S3.  Lungs: Clear without wheezes, rales, rhonchi or dullness.  Normal excursions and effort.  Abdomen: Soft, non-tender. BS-present.  Extremities: Without clubbing, cyanosis or edema.  Peripheral pulses are 2+.  Neurologic: Non-focal  Skin: Warm and dry.       Assessment   STEMI, severe multivessel coronary disease status post CABG  Ventilator dependent respiratory failure, status post tracheostomy 3/22/2024  Acute stroke  Emergent  redo sternotomy 3/16/2024 for tamponade  Cardiogenic shock requiring Impella-resolved  Acute renal failure, on HD  Paroxysmal atrial fibrillation  Anemia  Thrombocytopenia  History of tobacco abuse    Plan  Patient seen and examined  Hemodynamic stable  Tolerated  CVVH overnight  Patient opening eyes, tracking  Continue amiodarone, aspirin, statin, metoprolol, oral hydralazine    Thank you for allowing me to take part in the care of Cristiano Obregon. Please call with any questions of concerns.      Level of care: L4    Zander Cano DO  [facility]   [facility]    March 23, 2024  9:52 AM

## 2024-03-23 NOTE — PROGRESS NOTES
[facility] Pul[facility], Critical Care and Sleep    Cristianosneha Estebanadrien Patient Status:  Inpatient    1970 MRN [id number]   Location [facility] 2W/SW Attending Bud Camarena MD   Hosp Day # 18 PCP Abilio Dorsey MD     Date of Admission: 3/5/2024  7:14 AM    Admission Diagnosis: NSTEMI (non-ST elevated myocardial infarction) (HCC) [I21.4]    S: Trached yesterday.     Scheduled Medications:     sodium chloride   Intravenous Once    sodium chloride   Intravenous Once    magnesium sulfate  1 g Intravenous Once    heparin sodium lock flush  1.5 mL Intravenous Once    sodium chloride   Intravenous Once    sodium chloride   Intravenous Once    cefepime  1 g Intravenous Q12H    hydrALAzine  10 mg Intravenous Once    atorvastatin  40 mg Oral Nightly    metoprolol tartrate  50 mg Oral 2x Daily(Beta Blocker)    ascorbic acid  500 mg Oral Daily    aspirin  81 mg Oral Daily    hydrALAZINE  25 mg Oral Q8H CHAYITO    amiodarone  200 mg Oral TID    insulin regular human  1-7 Units Subcutaneous 4 times per day    famotidine  20 mg Oral Daily    Or    famotidine  20 mg Intravenous Daily    chlorhexidine gluconate  15 mL Mouth/Throat BID    [Held by provider] heparin  5,000 Units Subcutaneous 2 times per day    [Held by provider] clopidogrel  75 mg Oral Daily    mineral oil-white petrolatum   Both Eyes TID       Infusing Medications:     dexmedetomidine Stopped (24 0800)    NxStage Pure Flow 400 CRRT/PIRRT fluid 5000mL 2.5 L/hr (24 1937)    dextrose 10% 40 mL/hr at 24 0600       PRN Medications:  labetalol, hydrALAzine, prochlorperazine, dextrose 10%, lipase-protease-amylase (Lip-Prot-Amyl) **AND** sodium bicarbonate, alteplase (Activase) 4 mg in sterile water for injection (PF) 2.2 mL IV push to declot line, melatonin, polyethylene glycol (PEG 3350), ondansetron, potassium chloride **OR** potassium chloride, magnesium sulfate in dextrose 5%, magnesium sulfate in sterile water for injection, acetaminophen  **OR** HYDROcodone-acetaminophen **OR** [DISCONTINUED] HYDROcodone-acetaminophen, glucose **OR** glucose **OR** glucose-vitamin C **OR** dextrose **OR** glucose **OR** glucose **OR** glucose-vitamin C, heparin, acetaminophen **OR** acetaminophen **OR** [DISCONTINUED] acetaminophen **OR** acetaminophen, senna, bisacodyl, fleet enema    OBJECTIVE:  /75 (BP Location: Left arm)   Pulse 95   Temp 99.2 °F (37.3 °C) (Temporal)   Resp 15   Ht 170.2 cm (5' 7\")   Wt 179 lb 0.2 oz (81.2 kg)   SpO2 98%   BMI 28.04 kg/m²    Temp (24hrs), Av.9 °F (37.2 °C), Min:98.2 °F (36.8 °C), Max:99.7 °F (37.6 °C)      Vent Mode: VC/AC  FiO2 (%):  [30 %] 30 %  S RR:  [12] 12  S VT:  [530 mL] 530 mL  PEEP/CPAP (cm H2O):  [5 cm H20] 5 cm H20  MAP (cm H2O):  [7-9] 9      Wt Readings from Last 3 Encounters:   24 179 lb 0.2 oz (81.2 kg)   10/06/21 177 lb 6.4 oz (80.5 kg)   20 172 lb 9.6 oz (78.3 kg)       I/O last 3 completed shifts:  In: 4482 [I.V.:2441; Blood:725; NG/GT:1216; IV PIGGYBACK:100]  Out: 3900 [Other:3750; Stool:150]  I/O this shift:  In: 309 [Blood:309]  Out: -      General: trached and NAD.  Neuro: Sedated. Opens eyes and tracks.   HEENT: PERRL  Neck : No LAD  CV: RRR, nl S1, S2, no S4, S3 or murmur.   Lungs: Coarse bilaterally.   Abd: Nontender, non distended.    Ext: No edema.   Skin: No rashes.       Recent Labs   Lab 24  1230 24  1749 24  0452   WBC 21.2* 18.8* 23.7*   HGB 7.2* 6.7* 7.2*   HCT 22.2* 20.9* 21.4*   .0 167.0 146.0*     Recent Labs   Lab 24  0553 24  1759 24  0620 24  1621 24  0541 24  0444 24  1625 24  0510 24  1749 24  0449   *   < > 163*   < > 145* 151*   < > 128* 131* 127*   BUN 46*   < > 62*   < > 56* 65*   < > 51* 57* 46*   CREATSERUM 3.44*   < > 4.16*   < > 4.36* 4.87*   < > 4.11* 5.16* 4.12*   CA 7.7*   < > 7.9*   < > 8.3* 8.2*   < > 7.9* 7.6* 8.0*   *   < > 137   < > 137 136   < >  138 136 133*   K 4.4   < > 3.6   < > 3.5 3.7   < > 4.0 4.4 4.4      < > 104   < > 105 102   < > 106 103 105   CO2 23.0   < > 23.0   < > 21.0 23.0   < > 23.0 24.0 24.0   AST 26  --  28  --  26  --   --   --   --   --    ALT <7*  --  <7*  --  <7*  --   --   --   --   --    ALB 3.0*  --  2.9*  --  3.1* 3.1*  --   --   --   --     < > = values in this interval not displayed.     Recent Labs   Lab 03/20/24  1625 03/22/24  1842   INR 1.26* 1.24*   PTT 36.5* 51.7*     Recent Labs   Lab 03/18/24  0129 03/19/24  1051   ABGPHT 7.53* 7.49*   QNYDSA6F 30* 30*   YOQAL2P 104* 104*   ABGHCO3 27.0 25.0   SITE Arterial Line Arterial Line   THGB 9.7*  --        COVID-19 Lab Results    COVID-19  Lab Results   Component Value Date    COVID19 Not Detected 03/05/2024       Pro-Calcitonin  No results for input(s): \"PCT\" in the last 168 hours.    Cardiac  No results for input(s): \"TROP\", \"PBNP\" in the last 168 hours.    Creatinine Kinase  No results for input(s): \"CK\" in the last 168 hours.    Inflammatory Markers  Recent Labs   Lab 03/20/24  0444 03/20/24  1625   TREVOR 913.0*  --    *  --    DDIMER  --  >20.00*       Imaging:   Assessment/Plan   Acute respiratory failure - initially due to pulmonary edema. Now with encephalopathy and severe deconditioning. More alert today  - continue full vent support off wean.   - s/p trach 3/22.   - PS trials to start tomorrow.   NSTEMI - diagnosed with new multivessel CAD s/p angioplasty and cardiogenic shock requiring Impella placement. S/p 3vCABG 3/15. Impella removed 3/16. Back to OR 3/16 AM for tamponade s/p pericardial clot removal  - cardiology and cardiac surgery following  Cardiogenic shock - due to acute coronary syndrome, EF was down and has now improved on repeat echo  - off vasopressors  Atrial fibrillation  - per cardiology  Pneumonia - sputum cultures with Klebsiella and MSSA  - cefepime per ID  Acute renal failure - suspect from ATN and cardiogenic shock. CRRT with multiple  clotted catheters  - RRT per nephrology  Stroke  - MRI today.   - neurology following  Thrombocytopenia, anemia  - monitor  FEN  - TFs  - PEG 3/23.   Proph  - subcu heparin  - famotidine  Dispo  - full code  - ICU  Discussed with family at bedside.   Critical Care Time greater than: 35 minutes    My best regards,         Juaquin Banuelos MD  [facility] Pulmonary, Critical Care and Sleep Medicine

## 2024-03-23 NOTE — PROGRESS NOTES
NEPHROLOGY DAILY PROGRESS NOTE       SUBJECTIVE:    No acute events overnight   Tolerated CRRT   Friend at bedside     OBJECTIVE:    Total Intake/Output:    Intake/Output Summary (Last 24 hours) at 3/23/2024 1020  Last data filed at 3/23/2024 0600  Gross per 24 hour   Intake 2426 ml   Output 2374 ml   Net 52 ml       PHYSICAL EXAM:  /81   Pulse 95   Temp 99.7 °F (37.6 °C) (Temporal)   Resp 23   Ht 5' 7\" (1.702 m)   Wt 179 lb 0.2 oz (81.2 kg)   SpO2 99%   BMI 28.04 kg/m²   GEN: NAD   HEENT: trached   CHEST: clear anteriorly    CARDIAC: S1S2 normal  ABD: soft, PEG tube   EXT:  mild upper extremity edema noted, no LE edema   NEURO: awake   ACCESS: RIJ tunneled HD cath (3/23)       CURRENT MEDICATIONS:     sodium chloride   Intravenous Once    sodium chloride   Intravenous Once    heparin sodium lock flush  1.5 mL Intravenous Once    sodium chloride   Intravenous Once    sodium chloride   Intravenous Once    cefepime  1 g Intravenous Q12H    hydrALAzine  10 mg Intravenous Once    atorvastatin  40 mg Oral Nightly    metoprolol tartrate  50 mg Oral 2x Daily(Beta Blocker)    ascorbic acid  500 mg Oral Daily    aspirin  81 mg Oral Daily    hydrALAZINE  25 mg Oral Q8H CHAYITO    amiodarone  200 mg Oral TID    insulin regular human  1-7 Units Subcutaneous 4 times per day    famotidine  20 mg Oral Daily    Or    famotidine  20 mg Intravenous Daily    chlorhexidine gluconate  15 mL Mouth/Throat BID    [Held by provider] heparin  5,000 Units Subcutaneous 2 times per day    [Held by provider] clopidogrel  75 mg Oral Daily    mineral oil-white petrolatum   Both Eyes TID         LABS:  Patient Labs Reviewed in Detail. Pertinent Labs as follows:  Recent Labs   Lab 03/22/24  0510 03/22/24  1749 03/23/24  0449   * 131* 127*   BUN 51* 57* 46*   CREATSERUM 4.11* 5.16* 4.12*   EGFRCR 16* 12* 16*   CA 7.9* 7.6* 8.0*    136 133*   K 4.0 4.4 4.4    103 105   CO2 23.0 24.0 24.0     Recent Labs   Lab 03/17/24  0553  03/17/24  1352 03/22/24  1230 03/22/24  1749 03/23/24  0452   RBC 3.08*   < > 2.50* 2.32* 2.46*   HGB 9.0*   < > 7.2* 6.7* 7.2*   HCT 26.3*   < > 22.2* 20.9* 21.4*   MCV 85.4   < > 88.8 90.1 87.0   MCH 29.2   < > 28.8 28.9 29.3   MCHC 34.2   < > 32.4 32.1 33.6   RDW 15.2*   < > 15.8* 16.3* 15.8*   NEPRELIM 8.17*  --   --   --  18.68*   WBC 10.3   < > 21.2* 18.8* 23.7*   .0*   < > 164.0 167.0 146.0*    < > = values in this interval not displayed.         IMAGING:  IR TUNNELED CV CATH INSERTION EXCHANGE CHECK    Result Date: 3/22/2024  CONCLUSION: 1. Conversion of non tunneled hemodialysis catheter to a tunneled hemodialysis catheter via right internal jugular vein access.  Ready for use. 2. Insertion of 18 [place] gastrostomy tube.  Do not use until cleared by IR tomorrow.    Dictated by (CST): Carlitos Lemon MD on 3/22/2024 at 11:33 PM     Finalized by (CST): Carlitos Lemon MD on 3/22/2024 at 11:39 PM          XR CHEST AP PORTABLE  (CPT=71045)    Result Date: 3/22/2024  CONCLUSION:   Tracheostomy tube tip at the level of the clavicles.  Interval removal of left chest tube and mediastinal drain.  No pneumothorax.  Mild patchy persistent opacity in the right mid lung with mild improved aeration right lung base.  Mild left basilar opacity with suspected trace bilateral pleural effusions.     Dictated by (CST): Jcarlos Harmon MD on 3/22/2024 at 4:45 PM     Finalized by (CST): Jcarlos Harmon MD on 3/22/2024 at 4:48 PM          CT ABDOMEN (CPT=74150)    Result Date: 3/21/2024  CONCLUSION:  1. Stomach appears closely apposed to the ventral peritoneum.  No evidence of colonic interposition.  No free fluid in the visualized upper abdomen.  2. Postoperative changes from a recent CABG and subsequent mediastinal exploration/washout.  There is a partially imaged lobulated mildly hyperdense mass extending from the visualized anterior mediastinum to the epigastric region that is most compatible with a  subacute hematoma. 3. Bilateral pleural effusions, right greater than left, with associated passive atelectasis at both visualized lung bases.  These findings are worse on the right compared to 03/19/2024 suggesting worsening CHF/fluid overload or anasarca.    Dictated by (CST): Bc Edwards MD on 3/21/2024 at 7:08 PM     Finalized by (CST): Bc Edwards MD on 3/21/2024 at 7:15 PM            ASSESSMENT AND PLAN:   This is a 53 year old male with PMH sig for HLD, tobacco use. Presented with chest pain and was admitted for NSTEMI.  Nephrology is consulted for NORMA      Anuric NORMA  - due to ATN from shock.   - creatinine 1.23 on admission, worsened to 7.19 (3/7)  - initiated on CVVH on 3/7 via RIJ temp HD cath  - RIJ tunneled HD catheter placed (3/22)   - continue CRRT 7pm-7am  - discussed with Dr. Suggs - recommends to continue CRRT at this time. Will notify nephro when ok to switch to iHD.   - Maintain adequate perfusion pressure  - Dose medications for CRRT  - Avoid nephrotoxins.  - follow renal fxn and I/Os  - monitor for renal recovery.     Hyperkalemia   - resolved with CRRT    Metabolic acidosis  - resolved with CRRT     Hyponatremia  - mildly low, will monitor     Dw RN     Critical care time > 35 mins     Janet Velazco MD  [facility] - Nephrology

## 2024-03-23 NOTE — RESPIRATORY THERAPY NOTE
Patient received with tracheostomy and on ventilator. Tracheostomy care provided. Patient requires chronic ventilator support and is not a candidate for SBT. Bilateral breath sounds auscultated. Suction provided when indicated. No acute events. RT will continue to monitor.    Full support:  Current Ventilator Settings:   - Mode: VC/AC   - Rate: 12   - Tidal Volume:530   - FiO2: 30%   - PEEP 5    Problem: RESPIRATORY - ADULT  Goal: Achieves optimal ventilation and oxygenation  Description: INTERVENTIONS:  - Assess for changes in respiratory status  - Assess for changes in mentation and behavior  - Position to facilitate oxygenation and minimize respiratory effort  - Oxygen supplementation based on oxygen saturation or ABGs  - Provide Smoking Cessation handout, if applicable  - Encourage broncho-pulmonary hygiene including cough, deep breathe, Incentive Spirometry  - Assess the need for suctioning and perform as needed  - Assess and instruct to report SOB or any respiratory difficulty  - Respiratory Therapy support as indicated  - Manage/alleviate anxiety  - Monitor for signs/symptoms of CO2 retention  Outcome: Progressing    RESPIRATORY THERAPY MECHANICAL VENTILATION PROGRESS NOTE    Ventilator Weaning:  Patient meets criteria for weaning? no Weaning was attempted no

## 2024-03-23 NOTE — OCCUPATIONAL THERAPY NOTE
OCCUPATIONAL THERAPY TREATMENT NOTE - INPATIENT        Room Number: 229/229-A     Presenting Problem: Acute respiratory failure - initially due to pulmonary edema. Now with encephalopathy and severe deconditioning  - continue full vent support ; NSTEMI s/p CABG 3/15; Back to OR 3/16 AM for tamponade s/p pericardial clot removal; CRRT; left frontal and left occipital lobe infarcts     Problem List  Principal Problem:    NSTEMI (non-ST elevated myocardial infarction) (HCC)  Active Problems:    Chest pain    Cerebrovascular accident (CVA) due to embolism of left middle cerebral artery (HCC)    Anemia      OCCUPATIONAL THERAPY ASSESSMENT   Patient demonstrates limited progress this session, goals remain in progress.    Patient continues to function below baseline with ADLs/functional mobility/transfers.   Contributing factors to remaining limitations include decreased functional strength, decreased functional reach, decreased endurance, impaired  balance, impaired coordination, impaired motor planning, decreased muscular endurance, cognitive deficits, medical status, limited  ROM, decreased compliance/participation, increased O2 needs from baseline, and decreased visual spatial awareness.  Next session anticipate patient to progress grooming, bed mobility, stating sitting balance, dynamic sitting balance, and energy conservation strategies.  Occupational Therapy will continue to follow patient for duration of hospitalization.    Patient continues to benefit from continued skilled OT services: to promote return to prior level of function and safety with continuous assistance and gradual rehabilitative therapy .     PLAN  OT Treatment Plan: Balance activities;Energy conservation/work simplification techniques;ADL training;Functional transfer training;Endurance training;UE strengthening/ROM;Cognitive reorientation;Patient/Family education;Patient/Family training;Equipment eval/education;Neuromuscluar reeducation;Fine motor  coordination activities;Compensatory technique education;Continued evaluation  OT Device Recommendations: TBD    SUBJECTIVE  Non-verbal, nodding head at times    OBJECTIVE  Precautions: Cardiac;Sternal;Restraints (rectal tube, vent 30% fi02)    WEIGHT BEARING RESTRICTION     PAIN ASSESSMENT  Rating: Unable to rate  Location: unable to specify, facial grimmace with bed mobility  Management Techniques: Relaxation; Repositioning    ACTIVITY TOLERANCE  Pulse: 90        BP: 120/70             O2 SATURATIONS  Oxygen Therapy  SPO2% on Oxygen at Rest: 98 (vented 30% FiO2)    ACTIVITIES OF DAILY LIVING ASSESSMENT  AM-PAC ‘6-Clicks’ Inpatient Daily Activity Short Form  How much help from another person does the patient currently need…  -   Putting on and taking off regular lower body clothing?: Total  -   Bathing (including washing, rinsing, drying)?: Total  -   Toileting, which includes using toilet, bedpan or urinal? : Total  -   Putting on and taking off regular upper body clothing?: Total  -   Taking care of personal grooming such as brushing teeth?: Total  -   Eating meals?: Total    AM-PAC Score:  Score: 6  Approx Degree of Impairment: 100%  Standardized Score (AM-PAC Scale): 17.07  CMS Modifier (G-Code): CN         BED MOBILITY  Rolling: Dependent  Supine to Sit : Dependent (max Ax2)  Sit to Supine (OT): Dependent (max Ax2)  Scooting: max Ax2    BALANCE ASSESSMENT  Static Sitting: Moderate Assist (progressing to max A with fatigue, L side lean)    FUNCTIONAL ADL ASSESSMENT  Eating: Not Tested  Grooming Seated: Dependent (washing face, wiping nose, using suction for oral secretions)  UB Dressing Seated: Dependent (gown change)  LB Dressing Seated: Dependent (socks supine)  Toileting Seated: Dependent (rectal tube and lujan)    THERAPEUTIC EXERCISE     Skilled Therapy Provided: received in bed, RN approved session and coordinated with PT. Pt vented and on 30% FiO2.  present during session. Pt continued to require  max Ax2 for bed mobility, able to sit at EOB for ~15 min required max A this session, did not attempt to support self requiring increased assist for sitting balance today. Pt attending to L side more than R side. Pt with significant secretions with EOB sitting, RN assisting with suctions and pt coughing a lot during seated tasks. Pt assisted back to supine, required total A to don/dof gown. Left in supine with all needs met and lines intact. Will continue to monitor progress    EDUCATION PROVIDED  Patient: Role of Occupational Therapy; Plan of Care; Edema Reduction; Posture/Positioning; Compensatory ADL Techniques; UE HEP for ROM  Patient's Response to Education: Does Not Demonstrate Skills Needed for Learning  Family/Caregiver: Role of Occupational Therapy; Fall Prevention; Edema Reduction; Posture/Positioning; Compensatory ADL Techniques; UE HEP for ROM  Family/Caregiver's Response to Education: Verbalized Understanding; Returned Demonstration    The patient's Approx Degree of Impairment: 100% has been calculated based on documentation in the [facility] '6 clicks' Inpatient Daily Activity Short Form.  Research supports that patients with this level of impairment may benefit from rehab.  Final disposition will be made by interdisciplinary medical team.    Patient End of Session: In bed;Needs met;RN aware of session/findings;Call light within reach;All patient questions and concerns addressed;Family present;Alarm set    OT Goals:  Patient will complete functional transfer with mod A  Comment:     Patient will complete grooming mod A  Comment:     Patient will tolerate seated EOB for 20 minutes in prep for adls with CGA   Comment:                  Goals  on: 2024  Frequency: 5x/wk    OT Session Time: 30 minutes  Self-Care Home Management: 5 minutes  Therapeutic Activity: 25 minutes    TERRA Anthony/L

## 2024-03-23 NOTE — PROGRESS NOTES
RESPIRATORY THERAPY MECHANICAL VENTILATION PROGRESS NOTE    Ventilator Weaning:  Patient meets criteria for weaning? no Weaning was attempted no     Current Ventilator Data:    Trach care provided as needed. Suction small amount of white thick secretions.     03/23/24 1150   Vent Information   Vent Mode VC/AC   Settings   FiO2 (%) 30 %   Resp Rate (Set) 12   Vt (Set, mL) 530 mL   Waveform Decelerating ramp   PEEP/CPAP (cm H2O) 5 cm H20   Peak Flow LPM 60   Trigger Sensitivity Flow (L/min) 2 L/min   Humidification Heater   H2O Bag Level 3/4 Full   Heater Temperature 98.6 °F (37 °C)   Readings   Total RR 21   Minute Ventilation (L/min) 10.7 L/min   Expiratory Tidal Volume 510 mL   MAP (cm H2O) 10   I/E Ratio 1:2.1   Plateau Pressure (cm H2O) 18 cm H2O   Static Compliance (L/cm H2O) 31   Dynamic Compliance (L/cm H2O) 25 L/cm H2O   Airway Resistance 9.1   Alarms   High RR 40   Insp Pressure High (cm H2O) 50 cm H2O   Insp Pressure Low (cm H2O) 8 cm H2O   MV High (L/min) 20 L/min   MV Low (L/min) 3 L/min   Apnea Interval (sec) 20 seconds   Apnea Rate 12   Apnea Volume (mL) 530 mL   Surgical Airway Portex 7 mm   Placement Date/Time: 03/22/24 1730   Placed By: (c) Surgeon  Brand: Portex  Airway size (mm): 7 mm   Status Secured   Site Assessment Dry   Suctioned? Y   Ties Assessment Intact;Secure   Req'd equipment at bedside Trach tube;Bag mask

## 2024-03-23 NOTE — PROGRESS NOTES
[facility] Hospitalist Progress Note     CC: Hospital Follow up    PCP: Abilio Dorsey MD       Assessment/Plan:     Principal Problem:    NSTEMI (non-ST elevated myocardial infarction) (HCC)  Active Problems:    Chest pain    Cerebrovascular accident (CVA) due to embolism of left middle cerebral artery (HCC)    Anemia    Cristiano Obregon is a 53 year old male with HLD, tobacco use, presented 3/5/2024 with chest pain. Found to have STEMI with cardiogenic shock requiring Impella placement. Intubated in cath lab, s/p CABG 3/15. Subsequently with tamponade & back to OR 3/16 w CVS for washout. OR course c/b extensive blood loss - required ~20+u blood products letitia-operatively & subsequently coagulopathy w clotting in chest tubes & CRRT - hematology consulted. Stay further c/b: renal failure requiring CRRT - nephro consulted, resp failure w > 2 wks intubated, s/p tracheostomy and PEG 3/22, embolic strokes - neuro following.      STEMI c/b cardiogenic shock w multi-system organ failure   S/p CABG 3/15  Post-op cardiac tamponade s/p washout 3/16  Paroxysmal/post-op a fib  HTN  HLD  - Multivessel disease including chronically occluded large RCA, 95% sequential LAD stenosis on cath 3/5. Flow restored to heavily thrombosed circumflex and OM1, Impella placed (now removed). 3/6 s/p emergent RHC to eval hemodynamics. Worsening renal function noted.   - Cardiology consulted: angiograms as above   - CCM consulted: vent management, sedation  - CV surgery consulted: s/p CABG on 3/15  - cardiac tamponade developed 3/16, s/p mediastinal washout   - amio gtt to po/IV, dobutamine gtt, levophed stopped  - PO amiodarone 200 mg TID  - repeat echo w preserved EF despite hypokinesis     Acute hypoxemic respiratory failure  - intubated in cath lab (3/5 -  )  - vent management per crit care colleagues  - Daily SBT, extubated when able  - s/p tracheostomy on 3/22  - vent weaning per pulm     Anuric acute kidney failure -  suspect ATN from shock  Metabolic acidosis  Hyponatremia  Hyperkalemia  - Nephrology consutled  - CRRT initiated 3/7. Catheter exchanged 3/19  - resume CRRT, post CABG now, s/p 20 units of blood products in OR  - Planning for nightly CRRT vs HD     Sepsis 2/2 RML bacterial pneumonia - Persistent fevers 3/10 - 3/15, HCAP/VAP  Leukocytosis - initially from infection, now suspect reactive with strokes, HD, critical illness, etc  - Sputum with Klebsiella and MSSA  - Blood cx neg  - ID consulted  - Cefepime to be completed on 3/23, per ID     Subacute L frontal & occiptal lobe infarcts  - presume cardioembolic  - CTH w subacute L frontal & occipital lobe infarcts  - 3/5 RxFx eval: A1c - 6%, FLP - LDL 64, tele  - TTE noted  - Neuro consulted  - ASA 81 & plavix  - no need for full ac per neuro  - PT/OT/SLP when able  -Reflexive movement of the right upper and lower extremity, not purposeful, will squeeze his left hand and wiggle the toes on his left leg (3/23)  [  ] MRI Brain & C-spine     Hematuria, resolved  -yellow urine noted, minimal, but not bloody      Acute blood loss anemia  Thrombocytopenia - suspect reactive/consumptive  Hypercoagulability & anemia  - recurring clotting in chest tubes & HD. S/p 20+ u blood products this admission  - Hematology consulted, d/w Dr. Styles - hold on hypercoag w/u at this time  - trend CBC     Tobacco use disorder  - Cessation     Elevated liver enzymes  - Likely 2/2 to shock. Hep b neg   - Trend CMP     Nutrition  - 180lbs on admission  - TPN while unable to take PO  - GI consulted - high risk for GI PEG, rec IR  - s/p PEG 3/22 w IR-> TF when ok with IR         ACP: Full Code. Linnea contact:  Reyes  Ppx: DVT: SQH   Dispo  EDoD:  ~ 3/25. Suspect will need LTAC for vent weaning. Dispo TBD clinical course - PT/OT following.     Questions/concerns were discussed with patient and/or family by bedside.    Thank You,  Bud Camarena MD    Hospitalist with [facility]      Subjective:     Able to squeeze his left hand, and wiggle the toes on his left leg, reflexive movement of the right side however not purposeful, not following other commands at this time    OBJECTIVE:    Blood pressure (P) 128/81, pulse 97, temperature (P) 99.7 °F (37.6 °C), temperature source (P) Pulmonary Artery, resp. rate 21, height 5' 7\" (1.702 m), weight 179 lb 0.2 oz (81.2 kg), SpO2 99%.    Temp:  [98.2 °F (36.8 °C)-100 °F (37.8 °C)] (P) 99.7 °F (37.6 °C)  Pulse:  [] 97  Resp:  [14-27] 21  BP: ()/(62-82) (P) 128/81  SpO2:  [90 %-100 %] 99 %  FiO2 (%):  [30 %] 30 %      Intake/Output:    Intake/Output Summary (Last 24 hours) at 3/23/2024 1100  Last data filed at 3/23/2024 1000  Gross per 24 hour   Intake 2735 ml   Output 2374 ml   Net 361 ml       Last 3 Weights   03/23/24 0600 179 lb 0.2 oz (81.2 kg)   03/22/24 0430 174 lb 13.2 oz (79.3 kg)   03/21/24 0600 181 lb 10.5 oz (82.4 kg)   03/20/24 0400 185 lb 3 oz (84 kg)   03/19/24 1000 189 lb 9.5 oz (86 kg)   03/18/24 0231 185 lb 10 oz (84.2 kg)   03/15/24 0600 188 lb 4.4 oz (85.4 kg)   03/14/24 0600 184 lb 15.5 oz (83.9 kg)   03/12/24 1000 186 lb 4.6 oz (84.5 kg)   03/11/24 1000 192 lb 10.9 oz (87.4 kg)   03/09/24 0500 188 lb 15 oz (85.7 kg)   03/08/24 0600 185 lb 13.6 oz (84.3 kg)   03/07/24 0600 183 lb 3.2 oz (83.1 kg)   03/06/24 0500 180 lb 1.9 oz (81.7 kg)   03/05/24 1324 175 lb 7.8 oz (79.6 kg)   03/05/24 0722 160 lb (72.6 kg)   10/06/21 1541 177 lb 6.4 oz (80.5 kg)   09/09/20 0929 172 lb 9.6 oz (78.3 kg)       Exam    Gen: Alert to voice  Heent: Tracheostomy in place, tunneled line in place  Pulm: Lungs diminished breath sounds at bases  CV: Heart with regular rate and rhythm, trace edema  Abd: Abdomen soft, PEG tube in place, not distended  MSK: no clubbing, no cyanosis  Skin: no rashes or lesions  Neuro: Able to squeeze hands on the left side, wiggle toes on the left side, reflexive movement of right upper and lower extremity but not  purposeful      Data Review:       Labs:     Recent Labs   Lab 03/17/24  0553 03/17/24  1352 03/22/24  1230 03/22/24  1749 03/23/24  0452   RBC 3.08*   < > 2.50* 2.32* 2.46*   HGB 9.0*   < > 7.2* 6.7* 7.2*   HCT 26.3*   < > 22.2* 20.9* 21.4*   MCV 85.4   < > 88.8 90.1 87.0   MCH 29.2   < > 28.8 28.9 29.3   MCHC 34.2   < > 32.4 32.1 33.6   RDW 15.2*   < > 15.8* 16.3* 15.8*   NEPRELIM 8.17*  --   --   --  18.68*   WBC 10.3   < > 21.2* 18.8* 23.7*   .0*   < > 164.0 167.0 146.0*    < > = values in this interval not displayed.         Recent Labs   Lab 03/22/24  0510 03/22/24  1749 03/23/24  0449   * 131* 127*   BUN 51* 57* 46*   CREATSERUM 4.11* 5.16* 4.12*   EGFRCR 16* 12* 16*   CA 7.9* 7.6* 8.0*    136 133*   K 4.0 4.4 4.4    103 105   CO2 23.0 24.0 24.0       Recent Labs   Lab 03/17/24  0553 03/18/24  0620 03/19/24  0541 03/20/24  0444   ALT <7* <7* <7*  --    AST 26 28 26  --    ALB 3.0* 2.9* 3.1* 3.1*   LDH  --   --   --  382*         Imaging:  IR TUNNELED CV CATH INSERTION EXCHANGE CHECK    Result Date: 3/22/2024  CONCLUSION: 1. Conversion of non tunneled hemodialysis catheter to a tunneled hemodialysis catheter via right internal jugular vein access.  Ready for use. 2. Insertion of 18 Montserratian gastrostomy tube.  Do not use until cleared by IR tomorrow.    Dictated by (CST): Carlitos Lemon MD on 3/22/2024 at 11:33 PM     Finalized by (CST): Carlitos Lemon MD on 3/22/2024 at 11:39 PM          XR CHEST AP PORTABLE  (CPT=71045)    Result Date: 3/22/2024  CONCLUSION:   Tracheostomy tube tip at the level of the clavicles.  Interval removal of left chest tube and mediastinal drain.  No pneumothorax.  Mild patchy persistent opacity in the right mid lung with mild improved aeration right lung base.  Mild left basilar opacity with suspected trace bilateral pleural effusions.     Dictated by (CST): Jcarlos Harmon MD on 3/22/2024 at 4:45 PM     Finalized by (CST): Jcarlos Harmon MD on  3/22/2024 at 4:48 PM          CT ABDOMEN (CPT=74150)    Result Date: 3/21/2024  CONCLUSION:  1. Stomach appears closely apposed to the ventral peritoneum.  No evidence of colonic interposition.  No free fluid in the visualized upper abdomen.  2. Postoperative changes from a recent CABG and subsequent mediastinal exploration/washout.  There is a partially imaged lobulated mildly hyperdense mass extending from the visualized anterior mediastinum to the epigastric region that is most compatible with a subacute hematoma. 3. Bilateral pleural effusions, right greater than left, with associated passive atelectasis at both visualized lung bases.  These findings are worse on the right compared to 03/19/2024 suggesting worsening CHF/fluid overload or anasarca.    Dictated by (CST): Bc Edwards MD on 3/21/2024 at 7:08 PM     Finalized by (CST): Bc Edwards MD on 3/21/2024 at 7:15 PM             Meds:      sodium chloride   Intravenous Once    sodium chloride   Intravenous Once    heparin sodium lock flush  1.5 mL Intravenous Once    sodium chloride   Intravenous Once    sodium chloride   Intravenous Once    cefepime  1 g Intravenous Q12H    hydrALAzine  10 mg Intravenous Once    atorvastatin  40 mg Oral Nightly    metoprolol tartrate  50 mg Oral 2x Daily(Beta Blocker)    ascorbic acid  500 mg Oral Daily    aspirin  81 mg Oral Daily    hydrALAZINE  25 mg Oral Q8H CHAYITO    amiodarone  200 mg Oral TID    insulin regular human  1-7 Units Subcutaneous 4 times per day    famotidine  20 mg Oral Daily    Or    famotidine  20 mg Intravenous Daily    chlorhexidine gluconate  15 mL Mouth/Throat BID    [Held by provider] heparin  5,000 Units Subcutaneous 2 times per day    [Held by provider] clopidogrel  75 mg Oral Daily    mineral oil-white petrolatum   Both Eyes TID      dexmedetomidine Stopped (03/23/24 0800)    NxStage Pure Flow 400 CRRT/PIRRT fluid 5000mL 2.5 L/hr (03/22/24 1937)    dextrose 10% 40 mL/hr at 03/23/24 0600      labetalol, hydrALAzine, prochlorperazine, dextrose 10%, lipase-protease-amylase (Lip-Prot-Amyl) **AND** sodium bicarbonate, alteplase (Activase) 4 mg in sterile water for injection (PF) 2.2 mL IV push to declot line, melatonin, polyethylene glycol (PEG 3350), ondansetron, potassium chloride **OR** potassium chloride, magnesium sulfate in dextrose 5%, magnesium sulfate in sterile water for injection, acetaminophen **OR** HYDROcodone-acetaminophen **OR** [DISCONTINUED] HYDROcodone-acetaminophen, glucose **OR** glucose **OR** glucose-vitamin C **OR** dextrose **OR** glucose **OR** glucose **OR** glucose-vitamin C, heparin, acetaminophen **OR** acetaminophen **OR** [DISCONTINUED] acetaminophen **OR** acetaminophen, senna, bisacodyl, fleet enema

## 2024-03-23 NOTE — PLAN OF CARE
Problem: Safety Risk - Non-Violent Restraints  Goal: Patient will remain free from self-harm  Description: INTERVENTIONS:  - Apply the least restrictive restraint to prevent harm  - Notify patient and family of reasons restraints applied  - Assess for any contributing factors to confusion (electrolyte disturbances, delirium, medications)  - Discontinue any unnecessary medical devices as soon as possible  - Assess the patient's physical comfort, circulation, skin condition, hydration, nutrition and elimination needs   - Reorient and redirection as needed  - Assess for the need to continue restraints  3/23/2024 0835 by Treva Duarte RN  Outcome: Progressing  3/22/2024 2336 by Treva Duarte RN  Outcome: Progressing     Problem: Patient Centered Care  Goal: Patient preferences are identified and integrated in the patient's plan of care  Description: Interventions:  - What would you like us to know as we care for you?   - Provide timely, complete, and accurate information to patient/family  - Incorporate patient and family knowledge, values, beliefs, and cultural backgrounds into the planning and delivery of care  - Encourage patient/family to participate in care and decision-making at the level they choose  - Honor patient and family perspectives and choices  Outcome: Progressing     Problem: Patient/Family Goals  Goal: Patient/Family Long Term Goal  Description: Patient's Long Term Goal: Go home upon discharge    Interventions:  - Monitor labs  - Administer medications  - Pain management  - Patient centered care  - Keep patient and family informed on plan of care  - See additional Care Plan goals for specific interventions  Outcome: Progressing  Goal: Patient/Family Short Term Goal  Description: Patient's Short Term Goal: extubate    Interventions:   - SAT/SBT  - improve strength  - pulmonary hygeine  - See additional Care Plan goals for specific interventions  Outcome: Progressing     Problem: RESPIRATORY -  ADULT  Goal: Achieves optimal ventilation and oxygenation  Description: INTERVENTIONS:  - Assess for changes in respiratory status  - Assess for changes in mentation and behavior  - Position to facilitate oxygenation and minimize respiratory effort  - Oxygen supplementation based on oxygen saturation or ABGs  - Provide Smoking Cessation handout, if applicable  - Encourage broncho-pulmonary hygiene including cough, deep breathe, Incentive Spirometry  - Assess the need for suctioning and perform as needed  - Assess and instruct to report SOB or any respiratory difficulty  - Respiratory Therapy support as indicated  - Manage/alleviate anxiety  - Monitor for signs/symptoms of CO2 retention  Outcome: Progressing     Problem: RESPIRATORY - ADULT  Goal: Achieves optimal ventilation and oxygenation  Description: INTERVENTIONS:  - Assess for changes in respiratory status  - Assess for changes in mentation and behavior  - Position to facilitate oxygenation and minimize respiratory effort  - Oxygen supplementation based on oxygen saturation or ABGs  - Provide Smoking Cessation handout, if applicable  - Encourage broncho-pulmonary hygiene including cough, deep breathe, Incentive Spirometry  - Assess the need for suctioning and perform as needed  - Assess and instruct to report SOB or any respiratory difficulty  - Respiratory Therapy support as indicated  - Manage/alleviate anxiety  - Monitor for signs/symptoms of CO2 retention  Outcome: Progressing     Problem: CARDIOVASCULAR - ADULT  Goal: Maintains optimal cardiac output and hemodynamic stability  Description: INTERVENTIONS:  - Monitor vital signs, rhythm, and trends  - Monitor for bleeding, hypotension and signs of decreased cardiac output  - Evaluate effectiveness of vasoactive medications to optimize hemodynamic stability  - Monitor arterial and/or venous puncture sites for bleeding and/or hematoma  - Assess quality of pulses, skin color and temperature  - Assess for signs  of decreased coronary artery perfusion - ex. Angina  - Evaluate fluid balance, assess for edema, trend weights  Outcome: Progressing  Goal: Absence of cardiac arrhythmias or at baseline  Description: INTERVENTIONS:  - Continuous cardiac monitoring, monitor vital signs, obtain 12 lead EKG if indicated  - Evaluate effectiveness of antiarrhythmic and heart rate control medications as ordered  - Initiate emergency measures for life threatening arrhythmias  - Monitor electrolytes and administer replacement therapy as ordered  Outcome: Progressing     Problem: METABOLIC/FLUID AND ELECTROLYTES - ADULT  Goal: Hemodynamic stability and optimal renal function maintained  Description: INTERVENTIONS:  - Monitor labs and assess for signs and symptoms of volume excess or deficit  - Monitor intake, output and patient weight  - Monitor urine specific gravity, serum osmolarity and serum sodium as indicated or ordered  - Monitor response to interventions for patient's volume status, including labs, urine output, blood pressure (other measures as available)  - Encourage oral intake as appropriate  - Instruct patient on fluid and nutrition restrictions as appropriate  Outcome: Progressing     Problem: SKIN/TISSUE INTEGRITY - ADULT  Goal: Skin integrity remains intact  Description: INTERVENTIONS  - Assess and document risk factors for pressure ulcer development  - Assess and document skin integrity  - Monitor for areas of redness and/or skin breakdown  - Initiate interventions, skin care algorithm/standards of care as needed  Outcome: Progressing  Goal: Incision(s), wounds(s) or drain site(s) healing without S/S of infection  Description: INTERVENTIONS:  - Assess and document risk factors for pressure ulcer development  - Assess and document skin integrity  - Assess and document dressing/incision, wound bed, drain sites and surrounding tissue  - Implement wound care per orders  - Initiate isolation precautions as appropriate  - Initiate  Pressure Ulcer prevention bundle as indicated  Outcome: Progressing  Goal: Oral mucous membranes remain intact  Description: INTERVENTIONS  - Assess oral mucosa and hygiene practices  - Implement preventative oral hygiene regimen  - Implement oral medicated treatments as ordered  Outcome: Progressing     Problem: NEUROLOGICAL - ADULT  Goal: Achieves stable or improved neurological status  Description: INTERVENTIONS  - Assess for and report changes in neurological status  - Initiate measures to prevent increased intracranial pressure  - Maintain blood pressure and fluid volume within ordered parameters to optimize cerebral perfusion and minimize risk of hemorrhage  - Monitor temperature, glucose, and sodium. Initiate appropriate interventions as ordered  Outcome: Progressing     Problem: Delirium  Goal: Minimize duration of delirium  Description: Interventions:  - Encourage use of hearing aids, eye glasses  - Promote highest level of mobility daily  - Provide frequent reorientation  - Promote wakefulness i.e. lights on, blinds open  - Promote sleep, encourage patient's normal rest cycle i.e. lights off, TV off, minimize noise and interruptions  - Encourage family to assist in orientation and promotion of home routines  Outcome: Progressing    Pt awake and alert, seems to follow simple commands occ, CRRT restarted via new permacath, started slowly gradually pulling up to 240cc/hr, bp abran well, lungs with few rhonchi, sx'd trach for sm to mod amt clear white, thin secretions q 2hrs, new trach site had some bloody drainage, which subsided overnite. New peg site cdi, bladder scan at 0700 for 180cc.

## 2024-03-23 NOTE — OPERATIVE REPORT
[facility]    PATIENT'S NAME: DAYNA HAYNES   ATTENDING PHYSICIAN: Carlo Vaughn MD   OPERATING PHYSICIAN: Ammon Krishna MD   PATIENT ACCOUNT#:   [id number]    LOCATION:  [address] [place] [address]  MEDICAL RECORD #:   [id number]       YOB: 1970  ADMISSION DATE:       03/05/2024      OPERATION DATE:  03/22/2024    OPERATIVE REPORT    PREOPERATIVE DIAGNOSIS:  Respiratory failure.  POSTOPERATIVE DIAGNOSIS:  Respiratory failure.  PROCEDURE:  Insertion of tracheostomy #7 Portex.    ASSISTANT:  KHAI Garcia.    ANESTHESIA:  General.    BLOOD LOSS:  3 mL.    SPECIMENS:  None.    DRAINS:  None.    COMPLICATIONS:  None.    DISPOSITION:  Stable.    INDICATIONS:  The patient is a very ill patient, has had open heart surgery x2, MI, renal failure, and sepsis.  Comes in for tracheostomy, had been intubated.    FINDINGS:  Not applicable.    OPERATIVE TECHNIQUE:  The patient came in the operating room, underwent general anesthesia via the endotracheal tube.  We hyperextended the neck, put him in a position where we could expose the trachea as much as possible.  He did have a right dialysis catheter in place.  After prepping and draping, we made an up and down incision directly over the trachea, continued dissection down until we encountered the trachea.  It should be noted, he had very dilated veins cutting across the trachea.  One of these was ligated.  Great care was taken to avoid any injury to these veins.  The isthmus around the thyroid did not appear to be an issue.  We then looked at the first and second tracheal ring after using a trach hook.  We then made a stellate incision, dilated this up, and slowly pulled the endotracheal tube out and inserted the #7 Portex endotracheal tube.  Ventilating well at this point.  We did inflate the balloon, by the way.  There was no bleeding to speak of.  We then sew the deep tissue with chromic, skin with subcuticular Monocryl and Dermabond.  We did wrap a  Vaseline gauze around this.  We then did a chest x-ray which showed normal position and no pneumothorax.  The patient was then transported to the intensive care unit.    Dictated By Ammon Krishna MD  d: 03/22/2024 17:07:00  t: 03/23/2024 00:04:14  Job [id number]  RHG/    cc: Carlo Vaughn MD

## 2024-03-23 NOTE — PLAN OF CARE
Neuro status: following commands weakly in all extremities. Turns head  when asked to look at RN. Tolerating trach, dressing changed for old drainage. Left leg dressing changed for old drainage. 2 units PRBC transfused, HG stable post transfusion. Blood cultures obtained. Order for urine culture from lujan, patient does not have a lujan and does not make urine. Scheduled MRI today. OK to use peg tube for tube feedings per IR. Family updated on plan of care.   Problem: Safety Risk - Non-Violent Restraints  Goal: Patient will remain free from self-harm  Description: INTERVENTIONS:  - Apply the least restrictive restraint to prevent harm  - Notify patient and family of reasons restraints applied  - Assess for any contributing factors to confusion (electrolyte disturbances, delirium, medications)  - Discontinue any unnecessary medical devices as soon as possible  - Assess the patient's physical comfort, circulation, skin condition, hydration, nutrition and elimination needs   - Reorient and redirection as needed  - Assess for the need to continue restraints  3/23/2024 1449 by Aleah Kevin RN  Outcome: Progressing  3/23/2024 0809 by Aleah Kevin RN  Outcome: Progressing     Problem: Patient Centered Care  Goal: Patient preferences are identified and integrated in the patient's plan of care  Description: Interventions:  - What would you like us to know as we care for you?   - Provide timely, complete, and accurate information to patient/family  - Incorporate patient and family knowledge, values, beliefs, and cultural backgrounds into the planning and delivery of care  - Encourage patient/family to participate in care and decision-making at the level they choose  - Honor patient and family perspectives and choices  Outcome: Progressing     Problem: Patient/Family Goals  Goal: Patient/Family Long Term Goal  Description: Patient's Long Term Goal: Go home upon discharge    Interventions:  - Monitor labs  -  Administer medications  - Pain management  - Patient centered care  - Keep patient and family informed on plan of care  - See additional Care Plan goals for specific interventions  Outcome: Progressing  Goal: Patient/Family Short Term Goal  Description: Patient's Short Term Goal: extubate    Interventions:   - SAT/SBT  - improve strength  - pulmonary hygeine  - See additional Care Plan goals for specific interventions  Outcome: Progressing     Problem: RESPIRATORY - ADULT  Goal: Achieves optimal ventilation and oxygenation  Description: INTERVENTIONS:  - Assess for changes in respiratory status  - Assess for changes in mentation and behavior  - Position to facilitate oxygenation and minimize respiratory effort  - Oxygen supplementation based on oxygen saturation or ABGs  - Provide Smoking Cessation handout, if applicable  - Encourage broncho-pulmonary hygiene including cough, deep breathe, Incentive Spirometry  - Assess the need for suctioning and perform as needed  - Assess and instruct to report SOB or any respiratory difficulty  - Respiratory Therapy support as indicated  - Manage/alleviate anxiety  - Monitor for signs/symptoms of CO2 retention  Outcome: Progressing     Problem: CARDIOVASCULAR - ADULT  Goal: Maintains optimal cardiac output and hemodynamic stability  Description: INTERVENTIONS:  - Monitor vital signs, rhythm, and trends  - Monitor for bleeding, hypotension and signs of decreased cardiac output  - Evaluate effectiveness of vasoactive medications to optimize hemodynamic stability  - Monitor arterial and/or venous puncture sites for bleeding and/or hematoma  - Assess quality of pulses, skin color and temperature  - Assess for signs of decreased coronary artery perfusion - ex. Angina  - Evaluate fluid balance, assess for edema, trend weights  Outcome: Progressing  Goal: Absence of cardiac arrhythmias or at baseline  Description: INTERVENTIONS:  - Continuous cardiac monitoring, monitor vital signs,  obtain 12 lead EKG if indicated  - Evaluate effectiveness of antiarrhythmic and heart rate control medications as ordered  - Initiate emergency measures for life threatening arrhythmias  - Monitor electrolytes and administer replacement therapy as ordered  Outcome: Progressing     Problem: METABOLIC/FLUID AND ELECTROLYTES - ADULT  Goal: Hemodynamic stability and optimal renal function maintained  Description: INTERVENTIONS:  - Monitor labs and assess for signs and symptoms of volume excess or deficit  - Monitor intake, output and patient weight  - Monitor urine specific gravity, serum osmolarity and serum sodium as indicated or ordered  - Monitor response to interventions for patient's volume status, including labs, urine output, blood pressure (other measures as available)  - Encourage oral intake as appropriate  - Instruct patient on fluid and nutrition restrictions as appropriate  Outcome: Progressing     Problem: SKIN/TISSUE INTEGRITY - ADULT  Goal: Skin integrity remains intact  Description: INTERVENTIONS  - Assess and document risk factors for pressure ulcer development  - Assess and document skin integrity  - Monitor for areas of redness and/or skin breakdown  - Initiate interventions, skin care algorithm/standards of care as needed  Outcome: Progressing  Goal: Incision(s), wounds(s) or drain site(s) healing without S/S of infection  Description: INTERVENTIONS:  - Assess and document risk factors for pressure ulcer development  - Assess and document skin integrity  - Assess and document dressing/incision, wound bed, drain sites and surrounding tissue  - Implement wound care per orders  - Initiate isolation precautions as appropriate  - Initiate Pressure Ulcer prevention bundle as indicated  Outcome: Progressing  Goal: Oral mucous membranes remain intact  Description: INTERVENTIONS  - Assess oral mucosa and hygiene practices  - Implement preventative oral hygiene regimen  - Implement oral medicated treatments  as ordered  Outcome: Progressing     Problem: NEUROLOGICAL - ADULT  Goal: Achieves stable or improved neurological status  Description: INTERVENTIONS  - Assess for and report changes in neurological status  - Initiate measures to prevent increased intracranial pressure  - Maintain blood pressure and fluid volume within ordered parameters to optimize cerebral perfusion and minimize risk of hemorrhage  - Monitor temperature, glucose, and sodium. Initiate appropriate interventions as ordered  Outcome: Progressing     Problem: Delirium  Goal: Minimize duration of delirium  Description: Interventions:  - Encourage use of hearing aids, eye glasses  - Promote highest level of mobility daily  - Provide frequent reorientation  - Promote wakefulness i.e. lights on, blinds open  - Promote sleep, encourage patient's normal rest cycle i.e. lights off, TV off, minimize noise and interruptions  - Encourage family to assist in orientation and promotion of home routines  Outcome: Progressing     Problem: HEMATOLOGIC - ADULT  Goal: Free from bleeding injury  Description: (Example usage: patient with low platelets)  INTERVENTIONS:  - Avoid intramuscular injections, enemas and rectal medication administration  - Ensure safe mobilization of patient  - Hold pressure on venipuncture sites to achieve adequate hemostasis  - Assess for signs and symptoms of internal bleeding  - Monitor lab trends  - Patient is to report abnormal signs of bleeding to staff  - Avoid use of toothpicks and dental floss  - Use electric shaver for shaving  - Use soft bristle tooth brush  - Limit straining and forceful nose blowing  Outcome: Progressing

## 2024-03-24 NOTE — PLAN OF CARE
Problem: RESPIRATORY - ADULT  Goal: Achieves optimal ventilation and oxygenation  Description: INTERVENTIONS:  - Assess for changes in respiratory status  - Assess for changes in mentation and behavior  - Position to facilitate oxygenation and minimize respiratory effort  - Oxygen supplementation based on oxygen saturation or ABGs  - Provide Smoking Cessation handout, if applicable  - Encourage broncho-pulmonary hygiene including cough, deep breathe, Incentive Spirometry  - Assess the need for suctioning and perform as needed  - Assess and instruct to report SOB or any respiratory difficulty  - Respiratory Therapy support as indicated  - Manage/alleviate anxiety  - Monitor for signs/symptoms of CO2 retention  Outcome: Progressing     Pt received with tracheostomy Portex 7.0, on full support. Pt is stable and tolerating well, suctioned some thin/white secretions. Trach care provided, dressing changed multiple times due to bleeding. Spare tracheostomy in the room, no inner cannula.    Vent settings and readings as follow:     03/23/24 2001   Vent Information   Interface Invasive   Vent Type AV   Vent plugged into main power? Yes   Vent ID    Vent Mode VC/AC   Settings   FiO2 (%) 30 %   Resp Rate (Set) 12   Vt (Set, mL) 450 mL   Waveform Decelerating ramp   PEEP/CPAP (cm H2O) 5 cm H20   PEEP Low (cm H2O) 2 cm H2O   Peak Flow LPM 60   Trigger Sensitivity Flow (L/min) 2 L/min   Trigger Sensitivity Pressure (cm H2O) 3 cm H2O   Humidification Heater   H2O Bag Level 3/4 Full   Heater Temperature 100.4 °F (38 °C)   Readings   Total RR 22   Minute Ventilation (L/min) 11.5 L/min   Expiratory Tidal Volume 550 mL   PIP Observed (cm H2O) 23 cm H2O   MAP (cm H2O) 10   PEEP Low (cm H2O) 2 cm H2O   I/E Ratio 1:4.9   Plateau Pressure (cm H2O) 17 cm H2O   Static Compliance (L/cm H2O) 37   Dynamic Compliance (L/cm H2O) 27 L/cm H2O   Airway Resistance 7.4   Alarms   High RR 40   Insp Pressure High (cm H2O) 50 cm H2O   Insp  Pressure Low (cm H2O) 8 cm H2O   MV High (L/min) 20 L/min   MV Low (L/min) 3 L/min   Apnea Interval (sec) 20 seconds      03/23/24 2001   Respiratory Assessment   Cough Productive   Sputum Amount Small   Sputum Color White   Sputum Consistency Thin   Sputum How Obtained Tracheal   Surgical Airway Portex 7 mm   Placement Date/Time: 03/22/24 1730   Placed By: (c) Surgeon  Brand: Portex  Airway size (mm): 7 mm   Status Secured   Site Assessment Bleeding;Crusty   Site Care Cleansed;Dressing applied   Suctioned? Y   Inner Cannula Care No inner cannula   Ties Assessment Intact;Secure   Req'd equipment at bedside Trach tube;Bag mask   Additional Assessments   Pulse 82   Resp 22   SpO2 96 %

## 2024-03-24 NOTE — DIETARY NOTE
ADULT NUTRITION UPDATE NOTE:     Pt is at high nutrition risk.  Pt does not meet malnutrition criteria.      RECOMMENDATIONS TO MD: See REVISED Nutrition Intervention for EN specifics.      ADMITTING DIAGNOSIS:  NSTEMI (non-ST elevated myocardial infarction) (HCC) [I21.4]  PERTINENT PAST MEDICAL HISTORY: History reviewed. No pertinent past medical history.    Update 3/20/24: See previous notes for all past updates.   Pt remains intubated with plans for trach and PEG on Friday 3/22 or when other issues resolved. Has not been able to wean from vent. Per MD chart notes CT showing multiple cardio embolic strokes. Pt is severely deconditioned, weak, intermittently following commands. Continues on CRRT now 7pm - 7am. Line clotted few times again. Will transition to HD when able per jyothi GALVAN. TPN was stopped on 3/18 and TF started. Flexiseal in place with loose BM's (expected since no TF/PO intake x ~2 weeks with gut hypomotility). Currently on Nepro (fiber containing), tolerating at goal rate of 40ml/hr with 2 pkts prosource daily. Minimal FWF on CRRT. Anuric. Continue minimal FWF per clinical/renal status. Total CRRT UF removal = 1596ml. No drips, pressors, or sedation now.     GASTROINTESTINAL: +BM 3/20; loose, brown. Flexiseal in place with loose BM's (expected since no TF/PO intake x ~2 weeks with gut hypomotility).    MEDICATIONS: reviewed. Continuous:    NxStage Pure Flow 400 CRRT/PIRRT fluid 5000mL      dexmedetomidine 0.3 mcg/kg/hr (03/24/24 1129)    dextrose 10% Stopped (03/23/24 1653)      cefepime  1 g Intravenous Q12H    amiodarone  200 mg Oral BID with meals    sodium chloride   Intravenous Once    heparin sodium lock flush  1.5 mL Intravenous Once    sodium chloride   Intravenous Once    sodium chloride   Intravenous Once    hydrALAzine  10 mg Intravenous Once    atorvastatin  40 mg Oral Nightly    metoprolol tartrate  50 mg Oral 2x Daily(Beta Blocker)    ascorbic acid  500 mg Oral Daily    aspirin  81 mg  Oral Daily    hydrALAZINE  25 mg Oral Q8H CHAYITO    insulin regular human  1-7 Units Subcutaneous 4 times per day    famotidine  20 mg Oral Daily    Or    famotidine  20 mg Intravenous Daily    chlorhexidine gluconate  15 mL Mouth/Throat BID    heparin  5,000 Units Subcutaneous 2 times per day    clopidogrel  75 mg Oral Daily    mineral oil-white petrolatum   Both Eyes TID        LABS: reviewed- NORMA on CRRT 7pm-7am. Jered WNL  Recent Labs     03/23/24  0449 03/23/24  1213 03/23/24  1408 03/24/24  0511   * 124*  --  114*   BUN 46* 45*  --  52*   CREATSERUM 4.12* 4.65*  --  4.83*   CA 8.0* 7.9*  --  7.9*   MG 1.8  --  2.2 2.1   * 133*  --  137   K 4.4 4.6  --  4.3    104  --  104   CO2 24.0 24.0  --  25.0   PHOS 3.4  --  3.8 3.4   OSMOCALC 289 289  --  299*     NUTRITION RELATED PHYSICAL FINDINGS:  - Nutrition Focused Physical Exam (NFPE): Appears well nourished, unable to completed full exam. Hard to fully visualize as pt edematous.   - Fluid Accumulation: nonpitting - +2 pitting generalized --> See RN documentation for details  - Skin Integrity: at risk. See RN documentation for details    ANTHROPOMETRICS:  HT: 170.2 cm (5' 7\")  WT: 184 lbs ( 3/14)   BMI: Body mass index is 29.21 kg/m².  BMI CLASSIFICATION: 25-29.9 kg/m2 - overweight  IBW: 148 lbs        118% IBW on admit.   Usual Body Wt: ~175 lbs per EMR      100% UBW    NUTRITION DIAGNOSIS/PROBLEM:   Inadequate oral intake related to Decreased ability to consume sufficient energy  as evidenced by NPO status.     Nutrition Diagnosis Progress: Improvement (unresolved) , Nutrition Support Therapy (NST) --TPN transitioned to TF meeting needs.     NUTRITION INTERVENTION:     NUTRITION PRESCRIPTION:  3/24: revised from past assessment, Tmax 37.7 C, vent setting 8L/m  Estimated Nutrition needs: --dosing wt of 82.5 kg - wt taken on 3/24,   Calories: ~2540 calories/day (30 calories per kg Dosing wt)    Estimated needs using [place] = 1951 kcal/day X  130% due to CRRT, per clinical guide  Protein: 114-168  g protein/day (>1.7-2.5 g protein/kg IBW [67.3 kg] while on CRRT)  Fluid Needs: ~1ml/kcal- adjust per renal tolerance, CRRT    - Enteral Nutrition: Revised 3/24: Increase Nepro to 60 ml/hr via NGT tube. Based on average 22 hour infusion time.  Goal rate provides 2336 kcals, 107g PRO, and 960 FW. Give Prosource 2 pkts daily as med flush (22g protein, 80 kcal, 90ml fluid). Goal rate + Prosource provides 2416 kcal, 129 grams protein, 1050 ml total free water. Meets 95% of energy needs and about 91% of protein needs. Water flushes 30ml q 4 hours (180ml).  Total free fluid daily =1230 ml  Keep FWF minimal per clinical status (oliguric). CRRT Fluid removal 2902-8508 ml total    - RD Care Plan:  initiated Nutrition Support Therapy (NST)   - Meals and snacks: NPO  - Medical Food Supplements- NPO.   - Vitamin and mineral supplements: vit C  - Feeding assistance: NPO  - Nutrition education: not appropriate   - Coordination of nutrition care: collaboration with other providers  - Discharge and transfer of nutrition care to new setting or provider: monitor plans    MONITOR AND EVALUATE/NUTRITION GOALS:  - Food and Nutrient Intake:      Monitor: for PO initiation when save  - Food and Nutrient Administration:      Monitor: tolerance to enteral nutrition and adequacy of enteral nutrition  - Anthropometric Measurement:    Monitor weight  - Nutrition Goals:      allow wt loss due to fluid losses, tube feed meets greater than 80% of needs, labs within acceptable limits, support body systems, and monitor fluid status    DIETITIAN FOLLOW UP: RD to follow and monitor nutrition status, daily CPN ordering.        Keli Ralph RD, LDN  Clinical Dietitian  Office: [phone]

## 2024-03-24 NOTE — PROGRESS NOTES
[facility]  part of [facility] Infectious Disease  Progress Note    Cristiano Obregon Patient Status:  Inpatient    1970 MRN [id number]   Location [facility] 2W/SW Attending Bud Camarena MD   Hosp Day # 19 PCP Abilio Dorsey MD     Cristiano Obregon is a 53 year old male.   Chief Complaint   Patient presents with    Chest Pain Angina       HPI:      Awake on vent               REVIEW OF SYSTEMS:   A comprehensive 10 point review of systems was completed.  Pertinent positives and negatives noted in the the HPI.       Allergies:  No Known Allergies     Current Meds:    Current Facility-Administered Medications:     NxStage Pure Flow 400 CRRT/PIRRT fluid 5000mL, 2.5 L/hr, CRRT, Continuous **AND** CRRT Replacement Fluid 400, , , Until Discontinued    ceFEPIme (Maxipime) 1 g in sodium chloride 0.9% 100 mL IVPB-MBP, 1 g, Intravenous, Q12H    amiodarone (Pacerone) tab 200 mg, 200 mg, Oral, BID with meals    sodium chloride 0.9% infusion, , Intravenous, Once    dexmedeTOMIDine in sodium chloride 0.9% (Precedex) 400 mcg/100mL infusion premix, 0.2-1.5 mcg/kg/hr (Dosing Weight), Intravenous, Continuous    heparin sodium lock flush 100 UNIT/ML injection 150 Units, 1.5 mL, Intravenous, Once    sodium chloride 0.9% infusion, , Intravenous, Once    sodium chloride 0.9% infusion, , Intravenous, Once    hydrALAzine (Apresoline) 20 mg/mL injection 10 mg, 10 mg, Intravenous, Once    labetalol (Trandate) 5 mg/mL injection 10 mg, 10 mg, Intravenous, Q10 Min PRN    hydrALAzine (Apresoline) 20 mg/mL injection 10 mg, 10 mg, Intravenous, Q2H PRN    prochlorperazine (Compazine) 10 MG/2ML injection 5 mg, 5 mg, Intravenous, Q8H PRN    atorvastatin (Lipitor) tab 40 mg, 40 mg, Oral, Nightly    metoprolol tartrate (Lopressor) tab 50 mg, 50 mg, Oral, 2x Daily(Beta Blocker)    ascorbic acid (Vitamin C) tab 500 mg, 500 mg, Oral, Daily    aspirin chewable tab 81 mg, 81 mg, Oral,  Daily    dextrose 10% infusion (TPN no rate), , Intravenous, Continuous PRN    pancrelipase (Lip-Prot-Amyl) (Zenpep) DR particles cap 10,000 Units, 10,000 Units, Per G Tube, PRN **AND** sodium bicarbonate tab 325 mg, 325 mg, Oral, PRN    hydrALAZINE (Apresoline) tab 25 mg, 25 mg, Oral, Q8H [place]O    alteplase (Activase) 4 mg in sterile water for injection (PF) 2.2 mL IV push to declot line, 4 mg, Intravenous, PRN    insulin regular human (Novolin R, Humulin R) 100 UNIT/ML injection 1-7 Units, 1-7 Units, Subcutaneous, 4 times per day    melatonin tab 3 mg, 3 mg, Oral, Nightly PRN    polyethylene glycol (PEG 3350) (Miralax) 17 g oral packet 17 g, 17 g, Oral, Daily PRN    ondansetron (Zofran) 4 MG/2ML injection 4 mg, 4 mg, Intravenous, Q6H PRN    famotidine (Pepcid) tab 20 mg, 20 mg, Oral, Daily **OR** famotidine (Pepcid) 20 mg/2mL injection 20 mg, 20 mg, Intravenous, Daily    potassium chloride 20 mEq/100mL IVPB premix 20 mEq, 20 mEq, Intravenous, PRN **OR** potassium chloride 40 mEq/100mL IVPB premix (central line) 40 mEq, 40 mEq, Intravenous, PRN    magnesium sulfate in dextrose 5% 1 g/100mL infusion premix 1 g, 1 g, Intravenous, PRN    magnesium sulfate in sterile water for injection 2 g/50mL IVPB premix 2 g, 2 g, Intravenous, PRN    chlorhexidine gluconate (Peridex) 0.12 % oral solution 15 mL, 15 mL, Mouth/Throat, BID    [Held by provider] heparin (Porcine) 5000 UNIT/ML injection 5,000 Units, 5,000 Units, Subcutaneous, 2 times per day    [DISCONTINUED] acetaminophen (Tylenol) tab 650 mg, 650 mg, Oral, Q4H PRN **OR** HYDROcodone-acetaminophen (Norco) 5-325 MG per tab 1 tablet, 1 tablet, Oral, Q4H PRN **OR** [DISCONTINUED] HYDROcodone-acetaminophen (Norco) 5-325 MG per tab 2 tablet, 2 tablet, Oral, Q4H PRN    [Held by provider] clopidogrel (Plavix) tab 75 mg, 75 mg, Oral, Daily    glucose (Dex4) 15 GM/59ML oral liquid 15 g, 15 g, Oral, Q15 Min PRN **OR** glucose (Glutose) 40% oral gel 15 g, 15 g, Oral, Q15 Min PRN  **OR** glucose-vitamin C (Dex-4) chewable tab 4 tablet, 4 tablet, Oral, Q15 Min PRN **OR** dextrose 50% injection 50 mL, 50 mL, Intravenous, Q15 Min PRN **OR** glucose (Dex4) 15 GM/59ML oral liquid 30 g, 30 g, Oral, Q15 Min PRN **OR** glucose (Glutose) 40% oral gel 30 g, 30 g, Oral, Q15 Min PRN **OR** glucose-vitamin C (Dex-4) chewable tab 8 tablet, 8 tablet, Oral, Q15 Min PRN    mineral oil-white petrolatum (Artificial Tears) 83-15 % ophthalmic ointment, , Both Eyes, TID    heparin (Porcine) 1000 UNIT/ML injection 1,500 Units, 1.5 mL, Intracatheter, PRN    acetaminophen (Tylenol) tab 650 mg, 650 mg, Oral, Q4H PRN **OR** acetaminophen (Tylenol) 160 MG/5ML oral liquid 650 mg, 650 mg, Oral, Q4H PRN **OR** [DISCONTINUED] acetaminophen (Tylenol) rectal suppository 650 mg, 650 mg, Rectal, Q4H PRN **OR** acetaminophen (Ofirmev) 10 mg/mL infusion premix 1,000 mg, 1,000 mg, Intravenous, Q6H PRN    senna (Senokot) 8.8 MG/5ML oral syrup 17.6 mg, 10 mL, Oral, Nightly PRN    bisacodyl (Dulcolax) 10 MG rectal suppository 10 mg, 10 mg, Rectal, Daily PRN    fleet enema (Fleet) 7-19 GM/118ML rectal enema 133 mL, 1 enema, Rectal, Once PRN   No current outpatient medications on file.        HISTORY:  History reviewed. No pertinent past medical history.   Past Surgical History:   Procedure Laterality Date    COLONOSCOPY N/A 12/9/2017    Procedure: COLONOSCOPY, POSSIBLE BIOPSY, POSSIBLE POLYPECTOMY 20633;  Surgeon: Byron Plaza MD;  Location: [facility] SURGICAL [facility]        Social history and family history negative related to present illness except as above.    PHYSICAL EXAM:   /72 (BP Location: Left arm)   Pulse 81   Temp 97.9 °F (36.6 °C) (Temporal)   Resp 17   Ht 5' 7\" (1.702 m)   Wt 181 lb 14.1 oz (82.5 kg)   SpO2 100%   BMI 28.49 kg/m²   GENERAL:  Awake, fio2 30%  HEENT:  Normocephalic, no scleral abnormalities.  Oropharynx clear, trach site some serosang purulence  NECK:  Supple, no masses, no  lymphadenopathy.  LUNGS:  less upper rhonchi  CARDIO: RRR S1/S2, no rubs, clicks, heaves, or murmurs.  GI:  Some distension and firmness  EXTREMITIES:  ue > le edema but improved, no clubbing, no cyanosis.  NEURO:  No focal neurologic deficits.  DERM:  Warm, dry, no rashes.    IMPRESSION/PLAN:        Fevers improved; suspect hcap resolving; fio2 30% a positive sign  Continue cefepime day 10 rx; will extend a little longer given trach site purulence seen today  S/p m.I. acute and subacute multiple strokes  Wbc remains elevated at 18.5k  S/p trach and peg  Spoke with  and family  Minimal urine so ongoing dialysis  >35 min               Recent Results (from the past 72 hour(s))   Magnesium    Collection Time: 03/21/24  4:44 PM   Result Value Ref Range    Magnesium 2.1 1.6 - 2.6 mg/dL   Phosphorus    Collection Time: 03/21/24  4:44 PM   Result Value Ref Range    Phosphorus 3.9 2.4 - 5.1 mg/dL   CBC, Platelet; No Differential    Collection Time: 03/21/24  4:44 PM   Result Value Ref Range    WBC 18.5 (H) 4.0 - 11.0 x10(3) uL    RBC 2.43 (L) 4.30 - 5.70 x10(6)uL    HGB 7.1 (L) 13.0 - 17.5 g/dL    HCT 21.8 (L) 39.0 - 53.0 %    MCV 89.7 80.0 - 100.0 fL    MCH 29.2 26.0 - 34.0 pg    MCHC 32.6 31.0 - 37.0 g/dL    RDW 16.2 (H) 11.0 - 15.0 %    RDW-SD 52.0 (H) 35.1 - 46.3 fL    .0 150.0 - 450.0 10(3)uL   POCT Glucose    Collection Time: 03/21/24  6:04 PM   Result Value Ref Range    POC Glucose  129 (H) 70 - 99 mg/dL   Hemoglobin & Hematocrit    Collection Time: 03/21/24  8:42 PM   Result Value Ref Range    HGB 8.0 (L) 13.0 - 17.5 g/dL    HCT 24.4 (L) 39.0 - 53.0 %   POCT Glucose    Collection Time: 03/21/24 11:05 PM   Result Value Ref Range    POC Glucose  138 (H) 70 - 99 mg/dL   CBC, Platelet; No Differential    Collection Time: 03/21/24 11:06 PM   Result Value Ref Range    WBC 16.6 (H) 4.0 - 11.0 x10(3) uL    RBC 2.61 (L) 4.30 - 5.70 x10(6)uL    HGB 7.6 (L) 13.0 - 17.5 g/dL    HCT 23.4 (L) 39.0 - 53.0 %    MCV  89.7 80.0 - 100.0 fL    MCH 29.1 26.0 - 34.0 pg    MCHC 32.5 31.0 - 37.0 g/dL    RDW 15.7 (H) 11.0 - 15.0 %    RDW-SD 50.4 (H) 35.1 - 46.3 fL    .0 (L) 150.0 - 450.0 10(3)uL   Prepare RBC Once    Collection Time: 03/22/24 12:40 AM   Result Value Ref Range    Blood Product R7227S51     Unit Number [id number]     UNIT ABO B     UNIT RH POS     Product Status Presumed Transfused     Expiration Date 282081761216     Blood Type Barcode 7300     Unit Volume 350 ml   POCT Glucose    Collection Time: 03/22/24  5:03 AM   Result Value Ref Range    POC Glucose  139 (H) 70 - 99 mg/dL   Magnesium    Collection Time: 03/22/24  5:10 AM   Result Value Ref Range    Magnesium 2.0 1.6 - 2.6 mg/dL   Phosphorus    Collection Time: 03/22/24  5:10 AM   Result Value Ref Range    Phosphorus 3.3 2.4 - 5.1 mg/dL   CBC, Platelet; No Differential    Collection Time: 03/22/24  5:10 AM   Result Value Ref Range    WBC 21.4 (H) 4.0 - 11.0 x10(3) uL    RBC 2.59 (L) 4.30 - 5.70 x10(6)uL    HGB 7.7 (L) 13.0 - 17.5 g/dL    HCT 22.6 (L) 39.0 - 53.0 %    MCV 87.3 80.0 - 100.0 fL    MCH 29.7 26.0 - 34.0 pg    MCHC 34.1 31.0 - 37.0 g/dL    RDW 15.9 (H) 11.0 - 15.0 %    RDW-SD 49.3 (H) 35.1 - 46.3 fL    .0 150.0 - 450.0 10(3)uL    Immature Platelet Fraction 10.0 (H) 0.0 - 7.0 %   Basic Metabolic Panel (8)    Collection Time: 03/22/24  5:10 AM   Result Value Ref Range    Glucose 128 (H) 70 - 99 mg/dL    Sodium 138 136 - 145 mmol/L    Potassium 4.0 3.5 - 5.1 mmol/L    Chloride 106 98 - 112 mmol/L    CO2 23.0 21.0 - 32.0 mmol/L    Anion Gap 9 0 - 18 mmol/L    BUN 51 (H) 9 - 23 mg/dL    Creatinine 4.11 (H) 0.70 - 1.30 mg/dL    BUN/CREA Ratio 12.4 10.0 - 20.0    Calcium, Total 7.9 (L) 8.7 - 10.4 mg/dL    Calculated Osmolality 301 (H) 275 - 295 mOsm/kg    eGFR-Cr 16 (L) >=60 mL/min/1.73m2   POCT Glucose    Collection Time: 03/22/24 12:19 PM   Result Value Ref Range    POC Glucose  159 (H) 70 - 99 mg/dL   CBC, Platelet; No Differential     Collection Time: 03/22/24 12:30 PM   Result Value Ref Range    WBC 21.2 (H) 4.0 - 11.0 x10(3) uL    RBC 2.50 (L) 4.30 - 5.70 x10(6)uL    HGB 7.2 (L) 13.0 - 17.5 g/dL    HCT 22.2 (L) 39.0 - 53.0 %    MCV 88.8 80.0 - 100.0 fL    MCH 28.8 26.0 - 34.0 pg    MCHC 32.4 31.0 - 37.0 g/dL    RDW 15.8 (H) 11.0 - 15.0 %    RDW-SD 50.2 (H) 35.1 - 46.3 fL    .0 150.0 - 450.0 10(3)uL   Magnesium    Collection Time: 03/22/24  5:49 PM   Result Value Ref Range    Magnesium 1.9 1.6 - 2.6 mg/dL   Phosphorus    Collection Time: 03/22/24  5:49 PM   Result Value Ref Range    Phosphorus 4.7 2.4 - 5.1 mg/dL   CBC, Platelet; No Differential    Collection Time: 03/22/24  5:49 PM   Result Value Ref Range    WBC 18.8 (H) 4.0 - 11.0 x10(3) uL    RBC 2.32 (L) 4.30 - 5.70 x10(6)uL    HGB 6.7 (LL) 13.0 - 17.5 g/dL    HCT 20.9 (L) 39.0 - 53.0 %    MCV 90.1 80.0 - 100.0 fL    MCH 28.9 26.0 - 34.0 pg    MCHC 32.1 31.0 - 37.0 g/dL    RDW 16.3 (H) 11.0 - 15.0 %    RDW-SD 52.0 (H) 35.1 - 46.3 fL    .0 150.0 - 450.0 10(3)uL   Basic Metabolic Panel (8)    Collection Time: 03/22/24  5:49 PM   Result Value Ref Range    Glucose 131 (H) 70 - 99 mg/dL    Sodium 136 136 - 145 mmol/L    Potassium 4.4 3.5 - 5.1 mmol/L    Chloride 103 98 - 112 mmol/L    CO2 24.0 21.0 - 32.0 mmol/L    Anion Gap 9 0 - 18 mmol/L    BUN 57 (H) 9 - 23 mg/dL    Creatinine 5.16 (H) 0.70 - 1.30 mg/dL    BUN/CREA Ratio 11.0 10.0 - 20.0    Calcium, Total 7.6 (L) 8.7 - 10.4 mg/dL    Calculated Osmolality 300 (H) 275 - 295 mOsm/kg    eGFR-Cr 12 (L) >=60 mL/min/1.73m2   MD BLOOD SMEAR CONSULT    Collection Time: 03/22/24  5:49 PM   Result Value Ref Range    MD Blood Smear Consult       Evaluation of the CBC data and the peripheral blood smear demonstrates leukocytosis consisting of neutrophilia, and severe normocytic anemia.  Neutrophils demonstrate reactive features, with a mild left shift including increased myelocytes.  Red blood cells demonstrate mild Bozena leukocytosis, with  macrocytes, microcytes, and occasional ovalocytes.  There are no significant morphologic abnormalities in the other white blood cell subsets or platelets.    Differential causes for an anemia of this type may include chronic inflammatory disorders, chronic infections, neoplasms, hemolysis/hemorrhage, and end organ failure.      General differential considerations for neutrophilia include infection, drugs/hormones, tissue necrosis, inflammatory disorders, acute hemorrhage/hemolysis, and metabolic disorders.      Clinical correlation is recommended.    Reviewed by Pushpa Renner M.D.     POCT Glucose    Collection Time: 03/22/24  6:15 PM   Result Value Ref Range    POC Glucose  145 (H) 70 - 99 mg/dL   Prothrombin Time (PT)    Collection Time: 03/22/24  6:42 PM   Result Value Ref Range    PT 16.4 (H) 11.6 - 14.8 seconds    INR 1.24 (H) 0.80 - 1.20   PTT, Activated    Collection Time: 03/22/24  6:42 PM   Result Value Ref Range    PTT 51.7 (H) 23.3 - 35.6 seconds   POCT Glucose    Collection Time: 03/23/24  1:11 AM   Result Value Ref Range    POC Glucose  114 (H) 70 - 99 mg/dL   Magnesium    Collection Time: 03/23/24  4:49 AM   Result Value Ref Range    Magnesium 1.8 1.6 - 2.6 mg/dL   Phosphorus    Collection Time: 03/23/24  4:49 AM   Result Value Ref Range    Phosphorus 3.4 2.4 - 5.1 mg/dL   Basic Metabolic Panel (8)    Collection Time: 03/23/24  4:49 AM   Result Value Ref Range    Glucose 127 (H) 70 - 99 mg/dL    Sodium 133 (L) 136 - 145 mmol/L    Potassium 4.4 3.5 - 5.1 mmol/L    Chloride 105 98 - 112 mmol/L    CO2 24.0 21.0 - 32.0 mmol/L    Anion Gap 4 0 - 18 mmol/L    BUN 46 (H) 9 - 23 mg/dL    Creatinine 4.12 (H) 0.70 - 1.30 mg/dL    BUN/CREA Ratio 11.2 10.0 - 20.0    Calcium, Total 8.0 (L) 8.7 - 10.4 mg/dL    Calculated Osmolality 289 275 - 295 mOsm/kg    eGFR-Cr 16 (L) >=60 mL/min/1.73m2   CBC W/ DIFFERENTIAL    Collection Time: 03/23/24  4:52 AM   Result Value Ref Range    WBC 23.7 (H) 4.0 - 11.0 x10(3) uL     RBC 2.46 (L) 4.30 - 5.70 x10(6)uL    HGB 7.2 (L) 13.0 - 17.5 g/dL    HCT 21.4 (L) 39.0 - 53.0 %    MCV 87.0 80.0 - 100.0 fL    MCH 29.3 26.0 - 34.0 pg    MCHC 33.6 31.0 - 37.0 g/dL    RDW-SD 47.9 (H) 35.1 - 46.3 fL    RDW 15.8 (H) 11.0 - 15.0 %    .0 (L) 150.0 - 450.0 10(3)uL    Immature Platelet Fraction 10.8 (H) 0.0 - 7.0 %    Neutrophil Absolute Prelim 18.68 (H) 1.50 - 7.70 x10 (3) uL   Manual differential    Collection Time: 03/23/24  4:52 AM   Result Value Ref Range    Neutrophil Absolute Manual 21.09 (H) 1.50 - 7.70 x10(3) uL    Lymphocyte Absolute Manual 1.19 1.00 - 4.00 x10(3) uL    Monocyte Absolute Manual 0.24 0.10 - 1.00 x10(3) uL    Eosinophil Absolute Manual 1.19 (H) 0.00 - 0.70 x10(3) uL    Basophil Absolute Manual 0.00 0.00 - 0.20 x10(3) uL    Neutrophils % Manual 88 %    Band % 1 %    Lymphocyte % Manual 5 %    Monocyte % Manual 1 %    Eosinophil % Manual 5 %    Basophil % Manual 0 %    Total Cells Counted 100     RBC Morphology See morphology below (A) Normal, Slide reviewed, see previous RBC morphology.    Platelet Morphology Normal Normal    Macrocytosis 1+      Basophilic Stippling 1+     POCT Glucose    Collection Time: 03/23/24  5:29 AM   Result Value Ref Range    POC Glucose  146 (H) 70 - 99 mg/dL   POCT Glucose    Collection Time: 03/23/24 12:00 PM   Result Value Ref Range    POC Glucose  137 (H) 70 - 99 mg/dL   CBC, Platelet; No Differential    Collection Time: 03/23/24 12:13 PM   Result Value Ref Range    WBC 18.8 (H) 4.0 - 11.0 x10(3) uL    RBC 2.77 (L) 4.30 - 5.70 x10(6)uL    HGB 8.0 (L) 13.0 - 17.5 g/dL    HCT 23.8 (L) 39.0 - 53.0 %    MCV 85.9 80.0 - 100.0 fL    MCH 28.9 26.0 - 34.0 pg    MCHC 33.6 31.0 - 37.0 g/dL    RDW 15.8 (H) 11.0 - 15.0 %    RDW-SD 47.8 (H) 35.1 - 46.3 fL    .0 (L) 150.0 - 450.0 10(3)uL    Immature Platelet Fraction 10.4 (H) 0.0 - 7.0 %   Basic Metabolic Panel (8)    Collection Time: 03/23/24 12:13 PM   Result Value Ref Range    Glucose 124 (H) 70 -  99 mg/dL    Sodium 133 (L) 136 - 145 mmol/L    Potassium 4.6 3.5 - 5.1 mmol/L    Chloride 104 98 - 112 mmol/L    CO2 24.0 21.0 - 32.0 mmol/L    Anion Gap 5 0 - 18 mmol/L    BUN 45 (H) 9 - 23 mg/dL    Creatinine 4.65 (H) 0.70 - 1.30 mg/dL    BUN/CREA Ratio 9.7 (L) 10.0 - 20.0    Calcium, Total 7.9 (L) 8.7 - 10.4 mg/dL    Calculated Osmolality 289 275 - 295 mOsm/kg    eGFR-Cr 14 (L) >=60 mL/min/1.73m2   Scan slide    Collection Time: 03/23/24 12:13 PM   Result Value Ref Range    Slide Review Slide reviewed,morphology review added    RBC Morphology Scan    Collection Time: 03/23/24 12:13 PM   Result Value Ref Range    RBC Morphology Normal Normal, Slide reviewed, see previous RBC morphology.    Platelet Morphology See morphology below (A) Normal    Giant platelets Few (A)     RAINBOW DRAW GOLD    Collection Time: 03/23/24 12:38 PM   Result Value Ref Range    Hold Gold Auto Resulted    Magnesium    Collection Time: 03/23/24  2:08 PM   Result Value Ref Range    Magnesium 2.2 1.6 - 2.6 mg/dL   Phosphorus    Collection Time: 03/23/24  2:08 PM   Result Value Ref Range    Phosphorus 3.8 2.4 - 5.1 mg/dL   CBC, Platelet; No Differential    Collection Time: 03/23/24  2:08 PM   Result Value Ref Range    WBC 18.9 (H) 4.0 - 11.0 x10(3) uL    RBC 2.96 (L) 4.30 - 5.70 x10(6)uL    HGB 8.9 (L) 13.0 - 17.5 g/dL    HCT 25.1 (L) 39.0 - 53.0 %    MCV 84.8 80.0 - 100.0 fL    MCH 30.1 26.0 - 34.0 pg    MCHC 35.5 31.0 - 37.0 g/dL    RDW 15.9 (H) 11.0 - 15.0 %    RDW-SD 46.2 35.1 - 46.3 fL    .0 (L) 150.0 - 450.0 10(3)uL    Immature Platelet Fraction 8.6 (H) 0.0 - 7.0 %   Scan slide    Collection Time: 03/23/24  2:08 PM   Result Value Ref Range    Slide Review Slide reviewed,morphology review added    RBC Morphology Scan    Collection Time: 03/23/24  2:08 PM   Result Value Ref Range    RBC Morphology Normal Normal, Slide reviewed, see previous RBC morphology.    Platelet Morphology See morphology below (A) Normal    Giant platelets Few  (A)     POCT Glucose    Collection Time: 03/23/24  6:10 PM   Result Value Ref Range    POC Glucose  129 (H) 70 - 99 mg/dL   POCT Glucose    Collection Time: 03/23/24 11:58 PM   Result Value Ref Range    POC Glucose  143 (H) 70 - 99 mg/dL   Prepare RBC Once    Collection Time: 03/24/24 12:40 AM   Result Value Ref Range    Blood Product [id number]     Unit Number [id number]     UNIT ABO B     UNIT RH POS     Product Status Presumed Transfused     Expiration Date [id number]     Blood Type Barcode 7300     Unit Volume 350 ml   Prepare RBC Once    Collection Time: 03/24/24 12:40 AM   Result Value Ref Range    Blood Product [id number]     Unit Number [id number]     UNIT ABO B     UNIT RH POS     Product Status Presumed Transfused     Expiration Date [id number]     Blood Type Barcode 7300     Unit Volume 350 ml   Prepare RBC Once    Collection Time: 03/24/24 12:40 AM   Result Value Ref Range    Blood Product [id number]     Unit Number [id number]     UNIT ABO B     UNIT RH POS     Product Status Presumed Transfused     Expiration Date [id number]     Blood Type Barcode 7300     Unit Volume 350 ml   Magnesium    Collection Time: 03/24/24  5:11 AM   Result Value Ref Range    Magnesium 2.1 1.6 - 2.6 mg/dL   Phosphorus    Collection Time: 03/24/24  5:11 AM   Result Value Ref Range    Phosphorus 3.4 2.4 - 5.1 mg/dL   CBC, Platelet; No Differential    Collection Time: 03/24/24  5:11 AM   Result Value Ref Range    WBC 20.4 (H) 4.0 - 11.0 x10(3) uL    RBC 2.88 (L) 4.30 - 5.70 x10(6)uL    HGB 8.5 (L) 13.0 - 17.5 g/dL    HCT 24.8 (L) 39.0 - 53.0 %    MCV 86.1 80.0 - 100.0 fL    MCH 29.5 26.0 - 34.0 pg    MCHC 34.3 31.0 - 37.0 g/dL    RDW 16.9 (H) 11.0 - 15.0 %    RDW-SD 50.8 (H) 35.1 - 46.3 fL    .0 (L) 150.0 - 450.0 10(3)uL    Immature Platelet Fraction 10.0 (H) 0.0 - 7.0 %   Hepatic Function Panel (7)    Collection Time: 03/24/24  5:11 AM   Result Value Ref Range    AST 26 <=34 U/L    ALT 19 10 - 49 U/L     Alkaline Phosphatase 97 45 - 117 U/L    Bilirubin, Total 0.8 0.3 - 1.2 mg/dL    Bilirubin, Direct 0.3 <=0.3 mg/dL    Total Protein 5.3 (L) 5.7 - 8.2 g/dL    Albumin 3.1 (L) 3.2 - 4.8 g/dL   Scan slide    Collection Time: 03/24/24  5:11 AM   Result Value Ref Range    Slide Review       Platelets reviewed on smear. No giant platelets or platelet clumps observed.   Basic Metabolic Panel (8)    Collection Time: 03/24/24  5:11 AM   Result Value Ref Range    Glucose 114 (H) 70 - 99 mg/dL    Sodium 137 136 - 145 mmol/L    Potassium 4.3 3.5 - 5.1 mmol/L    Chloride 104 98 - 112 mmol/L    CO2 25.0 21.0 - 32.0 mmol/L    Anion Gap 8 0 - 18 mmol/L    BUN 52 (H) 9 - 23 mg/dL    Creatinine 4.83 (H) 0.70 - 1.30 mg/dL    BUN/CREA Ratio 10.8 10.0 - 20.0    Calcium, Total 7.9 (L) 8.7 - 10.4 mg/dL    Calculated Osmolality 299 (H) 275 - 295 mOsm/kg    eGFR-Cr 13 (L) >=60 mL/min/1.73m2   Uric Acid    Collection Time: 03/24/24  5:11 AM   Result Value Ref Range    Uric Acid 3.3 (L) 3.7 - 9.2 mg/dL   Lipase    Collection Time: 03/24/24  5:11 AM   Result Value Ref Range    Lipase 141 (H) 12 - 53 U/L   POCT Glucose    Collection Time: 03/24/24  5:55 AM   Result Value Ref Range    POC Glucose  122 (H) 70 - 99 mg/dL         Ottoniel Bennett MD     CALL [facility] AT [phone] IF QUESTIONS OR CONCERNS  THANKS

## 2024-03-24 NOTE — PLAN OF CARE
Problem: Patient Centered Care  Goal: Patient preferences are identified and integrated in the patient's plan of care  Description: Interventions:  - What would you like us to know as we care for you?   - Provide timely, complete, and accurate information to patient/family  - Incorporate patient and family knowledge, values, beliefs, and cultural backgrounds into the planning and delivery of care  - Encourage patient/family to participate in care and decision-making at the level they choose  - Honor patient and family perspectives and choices  Outcome: Progressing     Problem: RESPIRATORY - ADULT  Goal: Achieves optimal ventilation and oxygenation  Description: INTERVENTIONS:  - Assess for changes in respiratory status  - Assess for changes in mentation and behavior  - Position to facilitate oxygenation and minimize respiratory effort  - Oxygen supplementation based on oxygen saturation or ABGs  - Provide Smoking Cessation handout, if applicable  - Encourage broncho-pulmonary hygiene including cough, deep breathe, Incentive Spirometry  - Assess the need for suctioning and perform as needed  - Assess and instruct to report SOB or any respiratory difficulty  - Respiratory Therapy support as indicated  - Manage/alleviate anxiety  - Monitor for signs/symptoms of CO2 retention  Outcome: Progressing     Problem: CARDIOVASCULAR - ADULT  Goal: Maintains optimal cardiac output and hemodynamic stability  Description: INTERVENTIONS:  - Monitor vital signs, rhythm, and trends  - Monitor for bleeding, hypotension and signs of decreased cardiac output  - Evaluate effectiveness of vasoactive medications to optimize hemodynamic stability  - Monitor arterial and/or venous puncture sites for bleeding and/or hematoma  - Assess quality of pulses, skin color and temperature  - Assess for signs of decreased coronary artery perfusion - ex. Angina  - Evaluate fluid balance, assess for edema, trend weights  Outcome: Progressing  Goal:  Absence of cardiac arrhythmias or at baseline  Description: INTERVENTIONS:  - Continuous cardiac monitoring, monitor vital signs, obtain 12 lead EKG if indicated  - Evaluate effectiveness of antiarrhythmic and heart rate control medications as ordered  - Initiate emergency measures for life threatening arrhythmias  - Monitor electrolytes and administer replacement therapy as ordered  Outcome: Progressing     Problem: METABOLIC/FLUID AND ELECTROLYTES - ADULT  Goal: Hemodynamic stability and optimal renal function maintained  Description: INTERVENTIONS:  - Monitor labs and assess for signs and symptoms of volume excess or deficit  - Monitor intake, output and patient weight  - Monitor urine specific gravity, serum osmolarity and serum sodium as indicated or ordered  - Monitor response to interventions for patient's volume status, including labs, urine output, blood pressure (other measures as available)  - Encourage oral intake as appropriate  - Instruct patient on fluid and nutrition restrictions as appropriate  Outcome: Progressing     Problem: SKIN/TISSUE INTEGRITY - ADULT  Goal: Skin integrity remains intact  Description: INTERVENTIONS  - Assess and document risk factors for pressure ulcer development  - Assess and document skin integrity  - Monitor for areas of redness and/or skin breakdown  - Initiate interventions, skin care algorithm/standards of care as needed  Outcome: Progressing  Goal: Incision(s), wounds(s) or drain site(s) healing without S/S of infection  Description: INTERVENTIONS:  - Assess and document risk factors for pressure ulcer development  - Assess and document skin integrity  - Assess and document dressing/incision, wound bed, drain sites and surrounding tissue  - Implement wound care per orders  - Initiate isolation precautions as appropriate  - Initiate Pressure Ulcer prevention bundle as indicated  Outcome: Progressing  Goal: Oral mucous membranes remain intact  Description:  INTERVENTIONS  - Assess oral mucosa and hygiene practices  - Implement preventative oral hygiene regimen  - Implement oral medicated treatments as ordered  Outcome: Progressing     Problem: NEUROLOGICAL - ADULT  Goal: Achieves stable or improved neurological status  Description: INTERVENTIONS  - Assess for and report changes in neurological status  - Initiate measures to prevent increased intracranial pressure  - Maintain blood pressure and fluid volume within ordered parameters to optimize cerebral perfusion and minimize risk of hemorrhage  - Monitor temperature, glucose, and sodium. Initiate appropriate interventions as ordered  Outcome: Progressing     Problem: HEMATOLOGIC - ADULT  Goal: Free from bleeding injury  Description: (Example usage: patient with low platelets)  INTERVENTIONS:  - Avoid intramuscular injections, enemas and rectal medication administration  - Ensure safe mobilization of patient  - Hold pressure on venipuncture sites to achieve adequate hemostasis  - Assess for signs and symptoms of internal bleeding  - Monitor lab trends  - Patient is to report abnormal signs of bleeding to staff  - Avoid use of toothpicks and dental floss  - Use electric shaver for shaving  - Use soft bristle tooth brush  - Limit straining and forceful nose blowing  Outcome: Progressing    Problem: Safety Risk - Non-Violent Restraints  Goal: Patient will remain free from self-harm  Description: INTERVENTIONS:  - Apply the least restrictive restraint to prevent harm  - Notify patient and family of reasons restraints applied  - Assess for any contributing factors to confusion (electrolyte disturbances, delirium, medications)  - Discontinue any unnecessary medical devices as soon as possible  - Assess the patient's physical comfort, circulation, skin condition, hydration, nutrition and elimination needs   - Reorient and redirection as needed  - Assess for the need to continue restraints  Outcome: Not Progressing     Patient  drowsy/alert intermittently. Restless at times. On 30% fi02. Frequent oral care. Able to nod yes or no to questions. Grasps hands to commands. Denies pain. Bilateral restraints in place for safety. Precedex started due to restlessness. SR. Tolerating tube feeds. BMx1. Bladder scan this am with 90ml.  Repositioning q2hrs.  at bedside

## 2024-03-24 NOTE — PROGRESS NOTES
[facility] Hospitalist Progress Note     CC: Hospital Follow up    PCP: Abilio Dorsey MD       Assessment/Plan:     Principal Problem:    NSTEMI (non-ST elevated myocardial infarction) (HCC)  Active Problems:    Chest pain    Cerebrovascular accident (CVA) due to embolism of left middle cerebral artery (HCC)    Anemia    Cristiano Obregon is a 53 year old male with HLD, tobacco use, presented 3/5/2024 with chest pain. Found to have STEMI with cardiogenic shock requiring Impella placement. Intubated in cath lab, s/p CABG 3/15. Subsequently with tamponade & back to OR 3/16 w CVS for washout. OR course c/b extensive blood loss - required ~20+u blood products letitia-operatively & subsequently coagulopathy w clotting in chest tubes & CRRT - hematology consulted. Stay further c/b: renal failure requiring CRRT - nephro consulted, resp failure w > 2 wks intubated, s/p tracheostomy and PEG 3/22, embolic strokes - neuro following.      STEMI c/b cardiogenic shock w multi-system organ failure   S/p CABG 3/15  Post-op cardiac tamponade s/p washout 3/16  Paroxysmal/post-op a fib  HTN  HLD  - Multivessel disease including chronically occluded large RCA, 95% sequential LAD stenosis on cath 3/5. Flow restored to heavily thrombosed circumflex and OM1, Impella placed (now removed). 3/6 s/p emergent RHC to eval hemodynamics. Worsening renal function noted.   - Cardiology consulted: angiograms as above   - CCM consulted: vent management, sedation  - CV surgery consulted: s/p CABG on 3/15  - cardiac tamponade developed 3/16, s/p mediastinal washout   - amio gtt to po/IV, dobutamine gtt, levophed stopped  - PO amiodarone 200 mg TID  - repeat echo w preserved EF despite hypokinesis     Acute hypoxemic respiratory failure  - intubated in cath lab (3/5 -  )  - vent management per crit care colleagues  - Daily SBT, extubated when able  - s/p tracheostomy on 3/22  - vent weaning per pulm, SBT or trach collar trial?      Anuric acute kidney failure - suspect ATN from shock  Metabolic acidosis  Hyponatremia  Hyperkalemia  - Nephrology consutled  - CRRT initiated 3/7. Catheter exchanged 3/19  - resume CRRT, post CABG now, s/p 20 units of blood products in OR  - Planning for nightly CRRT vs HD     Sepsis 2/2 RML bacterial pneumonia - Persistent fevers 3/10 - 3/15, HCAP/VAP  Leukocytosis - initially from infection, now suspect reactive with strokes, HD, critical illness, etc  - Sputum with Klebsiella and MSSA  - Blood cx neg  - ID consulted  - Cefepime to be completed on 3/23, per ID     Subacute L frontal & occiptal lobe infarcts  - presume cardioembolic  - CTH w subacute L frontal & occipital lobe infarcts  - 3/5 RxFx eval: A1c - 6%, FLP - LDL 64, tele  - TTE noted  - Neuro consulted  - ASA 81 & plavix  - no need for full ac per neuro  - PT/OT/SLP when able  -Reflexive movement of the right upper and lower extremity, not purposeful, will squeeze his left hand and wiggle the toes on his left leg (3/23)  [  ] MRI Brain & C-spine     Hematuria, resolved  -yellow urine noted, minimal, but not bloody      Acute blood loss anemia  Thrombocytopenia - suspect reactive/consumptive  Hypercoagulability & anemia  - recurring clotting in chest tubes & HD. S/p 20+ u blood products this admission  - Hematology consulted, d/w Dr. Styles - hold on hypercoag w/u at this time  - trend CBC     Tobacco use disorder  - Cessation     Elevated liver enzymes  - Likely 2/2 to shock. Hep b neg   - Trend CMP     Nutrition  - 180lbs on admission  - TPN while unable to take PO  - GI consulted - high risk for GI PEG, rec IR  - s/p PEG 3/22 w IR-> TF when ok with IR         ACP: Full Code. Linnea contact:  Reeys  Ppx: DVT: SQH   Dispo  EDoD:  ~ 3/27. Suspect will need LTAC, Dispo TBD clinical course - PT/OT following.     Questions/concerns were discussed with patient and/or family by bedside.    Thank You,  Bud Camarena MD    Hospitalist with [facility]     Subjective:     Able to squeeze his left hand, and wiggle the toes on his left leg and right leg, moving right hand to command and squeezed his right side on command, alert to voice    OBJECTIVE:    Blood pressure 123/79, pulse 86, temperature 98.9 °F (37.2 °C), temperature source Temporal, resp. rate 22, height 5' 7\" (1.702 m), weight 181 lb 14.1 oz (82.5 kg), SpO2 98%.    Temp:  [98.3 °F (36.8 °C)-99.8 °F (37.7 °C)] 98.9 °F (37.2 °C)  Pulse:  [78-99] 86  Resp:  [14-25] 22  BP: ()/(63-84) 123/79  SpO2:  [96 %-100 %] 98 %  FiO2 (%):  [30 %] 30 %      Intake/Output:    Intake/Output Summary (Last 24 hours) at 3/24/2024 0837  Last data filed at 3/24/2024 0629  Gross per 24 hour   Intake 1316 ml   Output 1522 ml   Net -206 ml       Last 3 Weights   03/24/24 0500 181 lb 14.1 oz (82.5 kg)   03/23/24 0600 179 lb 0.2 oz (81.2 kg)   03/22/24 0430 174 lb 13.2 oz (79.3 kg)   03/21/24 0600 181 lb 10.5 oz (82.4 kg)   03/20/24 0400 185 lb 3 oz (84 kg)   03/19/24 1000 189 lb 9.5 oz (86 kg)   03/18/24 0231 185 lb 10 oz (84.2 kg)   03/15/24 0600 188 lb 4.4 oz (85.4 kg)   03/14/24 0600 184 lb 15.5 oz (83.9 kg)   03/12/24 1000 186 lb 4.6 oz (84.5 kg)   03/11/24 1000 192 lb 10.9 oz (87.4 kg)   03/09/24 0500 188 lb 15 oz (85.7 kg)   03/08/24 0600 185 lb 13.6 oz (84.3 kg)   03/07/24 0600 183 lb 3.2 oz (83.1 kg)   03/06/24 0500 180 lb 1.9 oz (81.7 kg)   03/05/24 1324 175 lb 7.8 oz (79.6 kg)   03/05/24 0722 160 lb (72.6 kg)   10/06/21 1541 177 lb 6.4 oz (80.5 kg)   09/09/20 0929 172 lb 9.6 oz (78.3 kg)       Exam    Gen: Alert to voice  Heent: Tracheostomy in place, tunneled line in place  Pulm: Lungs diminished breath sounds at bases  CV: Heart with regular rate and rhythm, trace edema  Abd: Abdomen soft, PEG tube in place, not distended  MSK: no clubbing, no cyanosis  Skin: no rashes or lesions  Neuro: Able to squeeze hands on the left and right side, wiggle toes on the left and right side, L > R      Data Review:        Labs:     Recent Labs   Lab 03/23/24  0452 03/23/24  1213 03/23/24  1408 03/24/24  0511   RBC 2.46* 2.77* 2.96* 2.88*   HGB 7.2* 8.0* 8.9* 8.5*   HCT 21.4* 23.8* 25.1* 24.8*   MCV 87.0 85.9 84.8 86.1   MCH 29.3 28.9 30.1 29.5   MCHC 33.6 33.6 35.5 34.3   RDW 15.8* 15.8* 15.9* 16.9*   NEPRELIM 18.68*  --   --   --    WBC 23.7* 18.8* 18.9* 20.4*   .0* 148.0* 135.0* 142.0*         Recent Labs   Lab 03/22/24  1749 03/23/24  0449 03/23/24  1213   * 127* 124*   BUN 57* 46* 45*   CREATSERUM 5.16* 4.12* 4.65*   EGFRCR 12* 16* 14*   CA 7.6* 8.0* 7.9*    133* 133*   K 4.4 4.4 4.6    105 104   CO2 24.0 24.0 24.0       Recent Labs   Lab 03/18/24  0620 03/19/24  0541 03/20/24  0444 03/24/24  0511   ALT <7* <7*  --  19   AST 28 26  --  26   ALB 2.9* 3.1* 3.1* 3.1*   LDH  --   --  382*  --          Imaging:  IR TUNNELED CV CATH INSERTION EXCHANGE CHECK    Result Date: 3/22/2024  CONCLUSION: 1. Conversion of non tunneled hemodialysis catheter to a tunneled hemodialysis catheter via right internal jugular vein access.  Ready for use. 2. Insertion of 18 [place] gastrostomy tube.  Do not use until cleared by IR tomorrow.    Dictated by (CST): Carlitos Lemon MD on 3/22/2024 at 11:33 PM     Finalized by (CST): Carlitos Lemon MD on 3/22/2024 at 11:39 PM          XR CHEST AP PORTABLE  (CPT=71045)    Result Date: 3/22/2024  CONCLUSION:   Tracheostomy tube tip at the level of the clavicles.  Interval removal of left chest tube and mediastinal drain.  No pneumothorax.  Mild patchy persistent opacity in the right mid lung with mild improved aeration right lung base.  Mild left basilar opacity with suspected trace bilateral pleural effusions.     Dictated by (CST): Jcarlos Harmon MD on 3/22/2024 at 4:45 PM     Finalized by (CST): Jcarlos Harmon MD on 3/22/2024 at 4:48 PM          CT ABDOMEN (CPT=74150)    Result Date: 3/21/2024  CONCLUSION:  1. Stomach appears closely apposed to the ventral  peritoneum.  No evidence of colonic interposition.  No free fluid in the visualized upper abdomen.  2. Postoperative changes from a recent CABG and subsequent mediastinal exploration/washout.  There is a partially imaged lobulated mildly hyperdense mass extending from the visualized anterior mediastinum to the epigastric region that is most compatible with a subacute hematoma. 3. Bilateral pleural effusions, right greater than left, with associated passive atelectasis at both visualized lung bases.  These findings are worse on the right compared to 03/19/2024 suggesting worsening CHF/fluid overload or anasarca.    Dictated by (CST): Bc Edwards MD on 3/21/2024 at 7:08 PM     Finalized by (CST): Bc Edwards MD on 3/21/2024 at 7:15 PM             Meds:      sodium chloride   Intravenous Once    heparin sodium lock flush  1.5 mL Intravenous Once    sodium chloride   Intravenous Once    sodium chloride   Intravenous Once    hydrALAzine  10 mg Intravenous Once    atorvastatin  40 mg Oral Nightly    metoprolol tartrate  50 mg Oral 2x Daily(Beta Blocker)    ascorbic acid  500 mg Oral Daily    aspirin  81 mg Oral Daily    hydrALAZINE  25 mg Oral Q8H CHAYITO    amiodarone  200 mg Oral TID    insulin regular human  1-7 Units Subcutaneous 4 times per day    famotidine  20 mg Oral Daily    Or    famotidine  20 mg Intravenous Daily    chlorhexidine gluconate  15 mL Mouth/Throat BID    [Held by provider] heparin  5,000 Units Subcutaneous 2 times per day    [Held by provider] clopidogrel  75 mg Oral Daily    mineral oil-white petrolatum   Both Eyes TID      dexmedetomidine 0.2 mcg/kg/hr (03/24/24 0807)    NxStage Pure Flow 400 CRRT/PIRRT fluid 5000mL 2.5 L/hr (03/24/24 0554)    dextrose 10% Stopped (03/23/24 6553)     labetalol, hydrALAzine, prochlorperazine, dextrose 10%, lipase-protease-amylase (Lip-Prot-Amyl) **AND** sodium bicarbonate, alteplase (Activase) 4 mg in sterile water for injection (PF) 2.2 mL IV push to  declot line, melatonin, polyethylene glycol (PEG 3350), ondansetron, potassium chloride **OR** potassium chloride, magnesium sulfate in dextrose 5%, magnesium sulfate in sterile water for injection, [DISCONTINUED] acetaminophen **OR** HYDROcodone-acetaminophen **OR** [DISCONTINUED] HYDROcodone-acetaminophen, glucose **OR** glucose **OR** glucose-vitamin C **OR** dextrose **OR** glucose **OR** glucose **OR** glucose-vitamin C, heparin, acetaminophen **OR** acetaminophen **OR** [DISCONTINUED] acetaminophen **OR** acetaminophen, senna, bisacodyl, fleet enema

## 2024-03-24 NOTE — PROGRESS NOTES
[facility]  [address] [place] [address], [address]  [facility] [id number]  [phone]            Cristiano Obregon Patient Status:  Inpatient    1970 MRN [id number]   Location [facility] 2W/SW Attending Carlo Vaughn MD   Hosp Day # 19 PCP Abilio Dorsey MD     Subjective:  Cristiano Obregon is a(n) 53 year old male.    Hospital course to date:     Patient with complicated past medical history, hyperlipidemia, tobacco use, strong family history of coronary disease.  Presented in 2024 with chest pain, and was found to have STEMI with cardiogenic shock requiring Impella placement.     Continued ventilation support with CRRT for the time he was anticoagulated due to Impella placement.     Status post CABG on 15 March.  Postop cardiac tamponade status post washout on 3/16.  Paroxysmal and postoperative fibrillation.     History of hypertension, hyperlipidemia.  Off sedation he was still drowsy and therefore CT of the head was done and found subacute appearing left frontal and left occipital lobe infarcts.     At that point neurology was consulted.     Patient also was noted to have clonus in both feet prior to that.          Objective:  Blood pressure (!) 146/95, pulse 94, temperature 97.9 °F (36.6 °C), temperature source Temporal, resp. rate (!) 31, height 67\", weight 181 lb 14.1 oz (82.5 kg), SpO2 96%.    Physical Exam:  Vitals:    24 0700 24 0800 24 0900 24 1000   BP: 128/79 109/71 150/86 (!) 146/95   Pulse: 83 70 89 94   Resp: 21 14 (!) 31 (!) 31   Temp:  97.9 °F (36.6 °C)     TempSrc:  Temporal     SpO2: 99% 97% 96%    Weight:       Height:           General: No apparent distress, well nourished, well groomed.  Head- Normocephalic, atraumatic  Eyes- No redness or swelling  ENT- Hearing intake, smell preserved, normal glutition  Neck- No masses or adenopathy  Cv: pulses were palpable and normal, no cyanosis or edema      Neurological:       Mental Status- Alert tracheostomy placed, follows commands with squeezing the left hand.    Findings movements of both lower extremities.  But not much commands.     Cranial Nerves:  Tracks with his eyes, possibly inattention on the right side.     VII. Face symmetric, no facial weakness     To the command he squeezed his left hand.  Clonus was 1-2 beats in both feet       Lab Results   Component Value Date    WBC 20.4 (H) 03/24/2024    HGB 8.5 (L) 03/24/2024    HCT 24.8 (L) 03/24/2024    .0 (L) 03/24/2024    CREATSERUM 4.83 (H) 03/24/2024    BUN 52 (H) 03/24/2024     03/24/2024    K 4.3 03/24/2024     03/24/2024    CO2 25.0 03/24/2024     (H) 03/24/2024    CA 7.9 (L) 03/24/2024    ALB 3.1 (L) 03/24/2024    ALKPHO 97 03/24/2024    BILT 0.8 03/24/2024    TP 5.3 (L) 03/24/2024    AST 26 03/24/2024    ALT 19 03/24/2024    PTT 51.7 (H) 03/22/2024    INR 1.24 (H) 03/22/2024     (H) 03/24/2024    PSA 1.95 10/06/2021    DDIMER >20.00 (H) 03/20/2024    MG 2.1 03/24/2024    PHOS 3.4 03/24/2024       IR TUNNELED CV CATH INSERTION EXCHANGE CHECK    Result Date: 3/22/2024  CONCLUSION: 1. Conversion of non tunneled hemodialysis catheter to a tunneled hemodialysis catheter via right internal jugular vein access.  Ready for use. 2. Insertion of 18 [place] gastrostomy tube.  Do not use until cleared by IR tomorrow.    Dictated by (CST): Carlitos Lemon MD on 3/22/2024 at 11:33 PM     Finalized by (CST): Carlitos Lemon MD on 3/22/2024 at 11:39 PM          XR CHEST AP PORTABLE  (CPT=71045)    Result Date: 3/22/2024  CONCLUSION:   Tracheostomy tube tip at the level of the clavicles.  Interval removal of left chest tube and mediastinal drain.  No pneumothorax.  Mild patchy persistent opacity in the right mid lung with mild improved aeration right lung base.  Mild left basilar opacity with suspected trace bilateral pleural effusions.     Dictated by (CST): Jcarlos Harmon MD on 3/22/2024  at 4:45 PM     Finalized by (CST): Jcarlos Harmon MD on 3/22/2024 at 4:48 PM                 Assessment:  Patient Active Problem List   Diagnosis    Hypercholesteremia    Floaters in visual field, bilateral    Acne, unspecified acne type    Eczema, unspecified type    Paresthesia of left thumb    Chest pain    NSTEMI (non-ST elevated myocardial infarction) (HCC)    Cerebrovascular accident (CVA) due to embolism of left middle cerebral artery (HCC)    Anemia     Patient with what appears to be subacute strokes.  Most likely cardioembolic in the setting of Impella placement and complicated stay.  No evidence of atrial fibrillation at this point in the last few days, possibly a fib was provoked.  Continue aspirin and Plavix for now, might consider anticoagulation in the future if atrial fibrillation is confirmed, we typically try to avoid starting anticoagulation immediately after large strokes.  However will need MRI brain brain done to assess for the size of the stroke.  For now patient is on CRRT.      MRI of the brain and MRI of the head will be done.  MRI of the cervical and thoracic spine will be done as well to assess for the possibility of spinal cord involvement since there is bilateral clonus noted.    Arvind Suggs MD

## 2024-03-24 NOTE — PLAN OF CARE
Pt alert and following commands on low dose precedex, VSS, SBT done today and pt did well x 2 hrs, restraints kept for line and pt safety, CRRT con't throughout the shift, plan to turn off for MRI and restart after scan, TF's increased.       Problem: Patient/Family Goals  Goal: Patient/Family Long Term Goal  Description: Patient's Long Term Goal: Go home upon discharge    Interventions:  - Monitor labs  - Administer medications  - Pain management  - Patient centered care  - Keep patient and family informed on plan of care  - See additional Care Plan goals for specific interventions  Outcome: Progressing  Goal: Patient/Family Short Term Goal  Description: Patient's Short Term Goal: extubate    Interventions:   - SAT/SBT  - improve strength  - pulmonary hygeine  - See additional Care Plan goals for specific interventions  Outcome: Progressing     Problem: RESPIRATORY - ADULT  Goal: Achieves optimal ventilation and oxygenation  Description: INTERVENTIONS:  - Assess for changes in respiratory status  - Assess for changes in mentation and behavior  - Position to facilitate oxygenation and minimize respiratory effort  - Oxygen supplementation based on oxygen saturation or ABGs  - Provide Smoking Cessation handout, if applicable  - Encourage broncho-pulmonary hygiene including cough, deep breathe, Incentive Spirometry  - Assess the need for suctioning and perform as needed  - Assess and instruct to report SOB or any respiratory difficulty  - Respiratory Therapy support as indicated  - Manage/alleviate anxiety  - Monitor for signs/symptoms of CO2 retention  Outcome: Progressing     Problem: CARDIOVASCULAR - ADULT  Goal: Maintains optimal cardiac output and hemodynamic stability  Description: INTERVENTIONS:  - Monitor vital signs, rhythm, and trends  - Monitor for bleeding, hypotension and signs of decreased cardiac output  - Evaluate effectiveness of vasoactive medications to optimize hemodynamic stability  - Monitor  arterial and/or venous puncture sites for bleeding and/or hematoma  - Assess quality of pulses, skin color and temperature  - Assess for signs of decreased coronary artery perfusion - ex. Angina  - Evaluate fluid balance, assess for edema, trend weights  Outcome: Progressing     Problem: SKIN/TISSUE INTEGRITY - ADULT  Goal: Skin integrity remains intact  Description: INTERVENTIONS  - Assess and document risk factors for pressure ulcer development  - Assess and document skin integrity  - Monitor for areas of redness and/or skin breakdown  - Initiate interventions, skin care algorithm/standards of care as needed  Outcome: Progressing     Problem: NEUROLOGICAL - ADULT  Goal: Achieves stable or improved neurological status  Description: INTERVENTIONS  - Assess for and report changes in neurological status  - Initiate measures to prevent increased intracranial pressure  - Maintain blood pressure and fluid volume within ordered parameters to optimize cerebral perfusion and minimize risk of hemorrhage  - Monitor temperature, glucose, and sodium. Initiate appropriate interventions as ordered  Outcome: Progressing     Problem: HEMATOLOGIC - ADULT  Goal: Free from bleeding injury  Description: (Example usage: patient with low platelets)  INTERVENTIONS:  - Avoid intramuscular injections, enemas and rectal medication administration  - Ensure safe mobilization of patient  - Hold pressure on venipuncture sites to achieve adequate hemostasis  - Assess for signs and symptoms of internal bleeding  - Monitor lab trends  - Patient is to report abnormal signs of bleeding to staff  - Avoid use of toothpicks and dental floss  - Use electric shaver for shaving  - Use soft bristle tooth brush  - Limit straining and forceful nose blowing  Outcome: Progressing     Problem: Safety Risk - Non-Violent Restraints  Goal: Patient will remain free from self-harm  Description: INTERVENTIONS:  - Apply the least restrictive restraint to prevent  harm  - Notify patient and family of reasons restraints applied  - Assess for any contributing factors to confusion (electrolyte disturbances, delirium, medications)  - Discontinue any unnecessary medical devices as soon as possible  - Assess the patient's physical comfort, circulation, skin condition, hydration, nutrition and elimination needs   - Reorient and redirection as needed  - Assess for the need to continue restraints  Outcome: Not Progressing

## 2024-03-24 NOTE — OCCUPATIONAL THERAPY NOTE
Order received and chart reviewed. Attempted to see pt for OT  today. Per discussion with PATEL Lynch, pt is on CRRT today. Will re-attempt when pt is available and medically appropriate.    Nazanin Trinidad, OTR/L

## 2024-03-24 NOTE — PROGRESS NOTES
NEPHROLOGY DAILY PROGRESS NOTE       SUBJECTIVE:  CRRT restarted around midnight. Tolerating CRRT   Awake, denies SOB or pain     at bedside       OBJECTIVE:    Total Intake/Output:    Intake/Output Summary (Last 24 hours) at 3/24/2024 0942  Last data filed at 3/24/2024 0829  Gross per 24 hour   Intake 1292.2 ml   Output 1764 ml   Net -471.8 ml       PHYSICAL EXAM:  /86 (BP Location: Left arm)   Pulse 89   Temp 97.9 °F (36.6 °C) (Temporal)   Resp (!) 31   Ht 5' 7\" (1.702 m)   Wt 181 lb 14.1 oz (82.5 kg)   SpO2 96%   BMI 28.49 kg/m²   GEN: NAD   HEENT: trached   CHEST: clear anteriorly    CARDIAC: S1S2 normal  ABD: soft, PEG tube   EXT:  mild upper extremity edema noted, no LE edema   NEURO: awake   ACCESS: RIJ tunneled HD cath (3/23)       CURRENT MEDICATIONS:     sodium chloride   Intravenous Once    heparin sodium lock flush  1.5 mL Intravenous Once    sodium chloride   Intravenous Once    sodium chloride   Intravenous Once    hydrALAzine  10 mg Intravenous Once    atorvastatin  40 mg Oral Nightly    metoprolol tartrate  50 mg Oral 2x Daily(Beta Blocker)    ascorbic acid  500 mg Oral Daily    aspirin  81 mg Oral Daily    hydrALAZINE  25 mg Oral Q8H CHAYITO    amiodarone  200 mg Oral TID    insulin regular human  1-7 Units Subcutaneous 4 times per day    famotidine  20 mg Oral Daily    Or    famotidine  20 mg Intravenous Daily    chlorhexidine gluconate  15 mL Mouth/Throat BID    [Held by provider] heparin  5,000 Units Subcutaneous 2 times per day    [Held by provider] clopidogrel  75 mg Oral Daily    mineral oil-white petrolatum   Both Eyes TID         LABS:  Patient Labs Reviewed in Detail. Pertinent Labs as follows:  Recent Labs   Lab 03/23/24  0449 03/23/24  1213 03/24/24  0511   * 124* 114*   BUN 46* 45* 52*   CREATSERUM 4.12* 4.65* 4.83*   EGFRCR 16* 14* 13*   CA 8.0* 7.9* 7.9*   * 133* 137   K 4.4 4.6 4.3    104 104   CO2 24.0 24.0 25.0     Recent Labs   Lab 03/23/24  7141  03/23/24  1213 03/23/24  1408 03/24/24  0511   RBC 2.46* 2.77* 2.96* 2.88*   HGB 7.2* 8.0* 8.9* 8.5*   HCT 21.4* 23.8* 25.1* 24.8*   MCV 87.0 85.9 84.8 86.1   MCH 29.3 28.9 30.1 29.5   MCHC 33.6 33.6 35.5 34.3   RDW 15.8* 15.8* 15.9* 16.9*   NEPRELIM 18.68*  --   --   --    WBC 23.7* 18.8* 18.9* 20.4*   .0* 148.0* 135.0* 142.0*         IMAGING:  IR TUNNELED CV CATH INSERTION EXCHANGE CHECK    Result Date: 3/22/2024  CONCLUSION: 1. Conversion of non tunneled hemodialysis catheter to a tunneled hemodialysis catheter via right internal jugular vein access.  Ready for use. 2. Insertion of 18 Serbian gastrostomy tube.  Do not use until cleared by IR tomorrow.    Dictated by (CST): Carlitos Lemon MD on 3/22/2024 at 11:33 PM     Finalized by (CST): Carlitos Lemon MD on 3/22/2024 at 11:39 PM          XR CHEST AP PORTABLE  (CPT=71045)    Result Date: 3/22/2024  CONCLUSION:   Tracheostomy tube tip at the level of the clavicles.  Interval removal of left chest tube and mediastinal drain.  No pneumothorax.  Mild patchy persistent opacity in the right mid lung with mild improved aeration right lung base.  Mild left basilar opacity with suspected trace bilateral pleural effusions.     Dictated by (CST): Jcarlos Harmon MD on 3/22/2024 at 4:45 PM     Finalized by (CST): Jcarlos Harmon MD on 3/22/2024 at 4:48 PM            ASSESSMENT AND PLAN:   This is a 53 year old male with PMH sig for HLD, tobacco use. Presented with chest pain and was admitted for NSTEMI.  Nephrology is consulted for NORMA      Anuric NORMA  - due to ATN from shock.   - creatinine 1.23 on admission, worsened to 7.19 (3/7)  - initiated on CVVH on 3/7 via RIJ temp HD cath  - RIJ tunneled HD catheter placed (3/22)   - discussed with Dr. Suggs on 3/23 - recommends to continue CRRT at this time.  Neuro will notify nephro when ok to switch to iHD.   - continue CRRT  - renal panel and Mg level every 12 hours while on CRRT   - Maintain adequate  perfusion pressure  - Dose medications for CRRT  - Avoid nephrotoxins.  - follow renal fxn and I/Os  - monitor for renal recovery.     Hyperkalemia   - resolved with CRRT    Metabolic acidosis  - resolved with CRRT     Hyponatremia  - mildly low, will monitor     Dw RN    updated at bedside     Critical care time > 35 mins     Janet Velazco MD  [facility] - Nephrology

## 2024-03-24 NOTE — PROGRESS NOTES
[facility] Pulmonary, Critical Care and Sleep    Cristianosneha Estebanadrien Patient Status:  Inpatient    1970 MRN [id number]   Location [facility] 2W/SW Attending Bud Camarena MD   Hosp Day # 19 PCP Abilio Dorsey MD     Date of Admission: 3/5/2024  7:14 AM    Admission Diagnosis: NSTEMI (non-ST elevated myocardial infarction) ([facility]) [I21.4]    S: No overnight events. Did approx 2 hrs with PS 5. Did well.     Scheduled Medications:     sodium chloride   Intravenous Once    heparin sodium lock flush  1.5 mL Intravenous Once    sodium chloride   Intravenous Once    sodium chloride   Intravenous Once    hydrALAzine  10 mg Intravenous Once    atorvastatin  40 mg Oral Nightly    metoprolol tartrate  50 mg Oral 2x Daily(Beta Blocker)    ascorbic acid  500 mg Oral Daily    aspirin  81 mg Oral Daily    hydrALAZINE  25 mg Oral Q8H CHAYITO    amiodarone  200 mg Oral TID    insulin regular human  1-7 Units Subcutaneous 4 times per day    famotidine  20 mg Oral Daily    Or    famotidine  20 mg Intravenous Daily    chlorhexidine gluconate  15 mL Mouth/Throat BID    [Held by provider] heparin  5,000 Units Subcutaneous 2 times per day    [Held by provider] clopidogrel  75 mg Oral Daily    mineral oil-white petrolatum   Both Eyes TID       Infusing Medications:     NxStage Pure Flow 400 CRRT/PIRRT fluid 5000mL      dexmedetomidine 0.2 mcg/kg/hr (24 0807)    dextrose 10% Stopped (24 1653)       PRN Medications:  labetalol, hydrALAzine, prochlorperazine, dextrose 10%, lipase-protease-amylase (Lip-Prot-Amyl) **AND** sodium bicarbonate, alteplase (Activase) 4 mg in sterile water for injection (PF) 2.2 mL IV push to declot line, melatonin, polyethylene glycol (PEG 3350), ondansetron, potassium chloride **OR** potassium chloride, magnesium sulfate in dextrose 5%, magnesium sulfate in sterile water for injection, [DISCONTINUED] acetaminophen **OR** HYDROcodone-acetaminophen **OR** [DISCONTINUED]  HYDROcodone-acetaminophen, glucose **OR** glucose **OR** glucose-vitamin C **OR** dextrose **OR** glucose **OR** glucose **OR** glucose-vitamin C, heparin, acetaminophen **OR** acetaminophen **OR** [DISCONTINUED] acetaminophen **OR** acetaminophen, senna, bisacodyl, fleet enema    OBJECTIVE:  BP (!) 146/95 (BP Location: Left arm)   Pulse 94   Temp 97.9 °F (36.6 °C) (Temporal)   Resp (!) 31   Ht 170.2 cm (5' 7\")   Wt 181 lb 14.1 oz (82.5 kg)   SpO2 96%   BMI 28.49 kg/m²    Temp (24hrs), Av °F (37.2 °C), Min:97.9 °F (36.6 °C), Max:99.8 °F (37.7 °C)      Vent Mode: VC/AC  FiO2 (%):  [30 %] 30 %  S RR:  [12] 12  S VT:  [450 mL-530 mL] 530 mL  PEEP/CPAP (cm H2O):  [5 cm H20] 5 cm H20  MAP (cm H2O):  [7-11] 11      Wt Readings from Last 3 Encounters:   24 181 lb 14.1 oz (82.5 kg)   10/06/21 177 lb 6.4 oz (80.5 kg)   20 172 lb 9.6 oz (78.3 kg)       I/O last 3 completed shifts:  In: 2242 [I.V.:547; Blood:869; NG/GT:626; IV PIGGYBACK:200]  Out: 3896 [Other:3896]  I/O this shift:  In: 159.9 [I.V.:12.9; NG/GT:147]  Out: 722 [Other:722]     General: trached and NAD.  Neuro: Sedated. Opens eyes and tracks.   HEENT: PERRL  Neck : No LAD  CV: RRR, nl S1, S2, no S4, S3 or murmur.   Lungs: Coarse bilaterally.   Abd: Nontender, non distended.    Ext: No edema.   Skin: No rashes.       Recent Labs   Lab 24  1213 24  1408 24  0511   WBC 18.8* 18.9* 20.4*   HGB 8.0* 8.9* 8.5*   HCT 23.8* 25.1* 24.8*   .0* 135.0* 142.0*     Recent Labs   Lab 24  0620 24  1621 24  0541 24  0444 24  1625 24  0449 24  1213 24  0511   *   < > 145* 151*   < > 127* 124* 114*   BUN 62*   < > 56* 65*   < > 46* 45* 52*   CREATSERUM 4.16*   < > 4.36* 4.87*   < > 4.12* 4.65* 4.83*   CA 7.9*   < > 8.3* 8.2*   < > 8.0* 7.9* 7.9*      < > 137 136   < > 133* 133* 137   K 3.6   < > 3.5 3.7   < > 4.4 4.6 4.3      < > 105 102   < > 105 104 104   CO2 23.0   <  > 21.0 23.0   < > 24.0 24.0 25.0   AST 28  --  26  --   --   --   --  26   ALT <7*  --  <7*  --   --   --   --  19   ALB 2.9*  --  3.1* 3.1*  --   --   --  3.1*    < > = values in this interval not displayed.     Recent Labs   Lab 03/20/24  1625 03/22/24  1842   INR 1.26* 1.24*   PTT 36.5* 51.7*     Recent Labs   Lab 03/18/24  0129 03/19/24  1051   ABGPHT 7.53* 7.49*   ERJUDS3M 30* 30*   QFOLV4A 104* 104*   ABGHCO3 27.0 25.0   SITE Arterial Line Arterial Line   THGB 9.7*  --        COVID-19 Lab Results    COVID-19  Lab Results   Component Value Date    COVID19 Not Detected 03/05/2024       Pro-Calcitonin  No results for input(s): \"PCT\" in the last 168 hours.    Cardiac  No results for input(s): \"TROP\", \"PBNP\" in the last 168 hours.    Creatinine Kinase  No results for input(s): \"CK\" in the last 168 hours.    Inflammatory Markers  Recent Labs   Lab 03/20/24  0444 03/20/24  1625   TREVOR 913.0*  --    *  --    DDIMER  --  >20.00*       Imaging:   Assessment/Plan   Acute respiratory failure - initially due to pulmonary edema. Now with encephalopathy and severe deconditioning. More alert today  - continue full vent support off wean.   - s/p trach 3/22.   - PS trials daily, 2 hrs today. Gradually increase with goal of 4 hrs tomorrow.   NSTEMI - diagnosed with new multivessel CAD s/p angioplasty and cardiogenic shock requiring Impella placement. S/p 3vCABG 3/15. Impella removed 3/16. Back to OR 3/16 AM for tamponade s/p pericardial clot removal  - cardiology and cardiac surgery following  Cardiogenic shock - due to acute coronary syndrome, EF was down and has now improved on repeat echo  - off vasopressors  Atrial fibrillation  - per cardiology  Pneumonia - sputum cultures with Klebsiella and MSSA  - Abx per ID Cefepime  3/20-23  Acute renal failure - suspect from ATN and cardiogenic shock. CRRT with multiple clotted catheters  - RRT per nephrology  Stroke  - MRI pending.   - neurology following  Thrombocytopenia,  anemia  - monitor  FEN  - TFs  - PEG 3/23.   Proph  - subcu heparin  - famotidine  Dispo  - full code  - ICU  LTAC pending HD requirements.   Discussed with family at bedside.   Critical Care Time greater than: 35 minutes    My best regards,         Juaquin Banuelos MD  [facility] Pulmonary, Critical Care and Sleep Medicine

## 2024-03-24 NOTE — PROGRESS NOTES
[facility]  part of [facility]    Progress Note    Cristiano Obregon Patient Status:  Inpatient    1970 MRN [id number]   Location [facility] 2W/SW Attending Bud Camarena MD   Hosp Day # 19 PCP Abilio Dorsey MD     Subjective:  Patient resting in bed, awake on ventilator with , Reyes bedside. Patient moving all four extremities and following commands x4. Nodding to simple questions.    Objective:  /86 (BP Location: Left arm)   Pulse 89   Temp 97.9 °F (36.6 °C) (Temporal)   Resp (!) 31   Ht 170.2 cm (5' 7\")   Wt 82.5 kg (181 lb 14.1 oz)   SpO2 96%   BMI 28.49 kg/m²     Temp (24hrs), Av.1 °F (37.3 °C), Min:97.9 °F (36.6 °C), Max:99.8 °F (37.7 °C)  Telemetry- SR    Intake/Output:    Intake/Output Summary (Last 24 hours) at 3/24/2024 0945  Last data filed at 3/24/2024 0829  Gross per 24 hour   Intake 1292.2 ml   Output 1764 ml   Net -471.8 ml       Wt Readings from Last 3 Encounters:   24 82.5 kg (181 lb 14.1 oz)   10/06/21 80.5 kg (177 lb 6.4 oz)   20 78.3 kg (172 lb 9.6 oz)       Allergies:  No Known Allergies    Labs:  Lab Results   Component Value Date    WBC 20.4 2024    HGB 8.5 2024    HCT 24.8 2024    .0 2024    CREATSERUM 4.83 2024    BUN 52 2024     2024    K 4.3 2024     2024    CO2 25.0 2024     2024    CA 7.9 2024    ALB 3.1 2024    ALKPHO 97 2024    BILT 0.8 2024    TP 5.3 2024    AST 26 2024    ALT 19 2024    MG 2.1 2024    PHOS 3.4 2024       Physical Exam:  Blood pressure 150/86, pulse 89, temperature 97.9 °F (36.6 °C), temperature source Temporal, resp. rate (!) 31, height 170.2 cm (5' 7\"), weight 82.5 kg (181 lb 14.1 oz), SpO2 96%.  General: NAD  Neck: New trach in place  Lungs: Clear with diminished bases   Heart: RRR, S1, S2  Abdomen: Soft/rounded, NT/ND, BS+x4, new PEG in  place, +BM/loose stools  Extremities: Warm, dry, generalized edema  Pulses: 2+ bilat DP  Skin: sternotomy dressing CDI, L leg dressing CDI  Neurological:  Eye opens, tracks, nodding appropriately, moving all extremities to command    Assessment/Plan:  S/P CABG x 3 POD # 9  S/P RE-OP MEDIASTINAL EXPLORATION/WASHOUT on 3/16  Respiratory Failure- Trach placement 3/23 Gen Surgery: Dr. Krishna: consulted. Plavix on hold w/last dose 3/19. SBT per Pulm  Pre op Impella removed post op on 3/16 per Cards. Pt hemodynamically stable. [place]/Ophelia removed 3/18.  Midline cath placed 3/18  Re op mediastinal exploration/washout due to pericardial tamponade. Limited echo 3/16 post exploration w/EF=45-50%/no residual effusion.   Continue ICU status  Pain meds as needed: avoid narcotics to assess neuro status, IV Tylenol as needed  Altered MS post op: drowsy- improved -CT head 3/19=+CVA-Mgt per Neuro/ MRI   Brief pre op AF- Amio protocol for AF prevention- currently maintaining NSR  Plan to increase activity when appropriate -PT/OT   Scds/Heparin Sub Q prophylaxis DVT prevention -heparin held 3/22 for planned procedures, will resume postprocedure when okay with consultants  Pre op NORMA/ATN/CRRT w/mgt per Nephrology. Expected post op volume overload. Pt currently on CRRT w/mgt per Nephrology. Temp catheter insertion per IR placed 3/19.   Tunneled catheter placed 3/23 per IR.  Hematology consulted for freq CRRT line clotting.   [place] removed 3/21 per renal-will monitor bladder scans every 8 hours/as needed  Expected post op anemia w/o clinical significance/stable-Hgb 8.5 s/p 2 unit PRBC yesterday, transfuse if repeat less than 8. Hematology consulted 3/20. Acute blood loss anemia immediate post op w/mediastinal exploration/tamponade.  Loose stools Flexi-seal removed  Nutrition per TF PEG placed 3/22. Resumed TF last night, tolerating.  Plavix/heparin on hold  ID consulted on 3/14: following recs. Continues on ABX. + sputum cx  No hx: DM-  continue correction scale coverage and glucose checks, make adjustments accordingly     Discharge planning: pt lives w/his  and has supportive family. Anticipate rehab: LTAC once medically stable.  /SW will assist w/planning.    Discussed with patient/PATEL Chambers and rounding CV Surgeon Dr. Vega RODRIGUEZ RN  3/24/2024  9:45 AM

## 2024-03-24 NOTE — PROGRESS NOTES
Cardiology Progress Note    Cristianosneha Moralezmesfin Patient Status:  Inpatient    1970 MRN [id number]   Location [facility] 2W/SW Attending Donna Llanes, DO   Hosp Day # 19 PCP Abilio Dorsey MD     Interval Note:  Patient seen and examined  Awake, more alert this morning      --------------------------------------------------------------------------------------------------------------------------------  ROS 12 systems reviewed, pertinent findings above.  ROS    History:  History reviewed. No pertinent past medical history.  Past Surgical History:   Procedure Laterality Date    COLONOSCOPY N/A 2017    Procedure: COLONOSCOPY, POSSIBLE BIOPSY, POSSIBLE POLYPECTOMY 20572;  Surgeon: Byron Plaza MD;  Location: [facility]     History reviewed. No pertinent family history.   reports that he has been smoking cigarettes. He has never used smokeless tobacco. He reports that he does not drink alcohol and does not use drugs.    Objective:   Temp: 98.9 °F (37.2 °C)  Pulse: 86  Resp: 22  BP: 123/79  FiO2 (%): 30 %    Intake/Output:     Intake/Output Summary (Last 24 hours) at 3/24/2024 0806  Last data filed at 3/24/2024 0629  Gross per 24 hour   Intake 1316 ml   Output 1522 ml   Net -206 ml       Physical Exam:    General: Intubated, sedated however responsive and opening eyes  HEENT: Normocephalic, anicteric sclera, neck supple.  Neck: No JVD, carotids 2+, no bruits.  Cardiac: Regular rate and rhythm. S1, S2 normal. No murmur, pericardial rub, S3.  Lungs: Clear without wheezes, rales, rhonchi or dullness.  Normal excursions and effort.  Abdomen: Soft, non-tender. BS-present.  Extremities: Without clubbing, cyanosis or edema.  Peripheral pulses are 2+.  Neurologic: Non-focal  Skin: Warm and dry.       Assessment   STEMI, severe multivessel coronary disease status post CABG  Ventilator dependent respiratory failure, status post tracheostomy 3/22/2024  Acute stroke  Emergent  redo sternotomy 3/16/2024 for tamponade  Cardiogenic shock requiring Impella-resolved  Acute renal failure, on HD  Atrial fibrillation, provoked-resolved  Anemia  Thrombocytopenia  History of tobacco abuse  Status post PEG tube placement, on TF    Plan  Patient remains stable from cardiac standpoint  Patient is awake, tracking movement around the room  Neurology following, discussed atrial fibrillation given his recent stroke. Stroke could be multifactorial as patient had 2 sternotomies, Impella, coagulopathy, shock at time of presentation  Decrease amiodarone from 3 times daily to twice daily   Continue aspirin, statin, metoprolol    Thank you for allowing me to take part in the care of Cristiano Obregon. Please call with any questions of concerns.      Level of care: L4    Zander Cano DO  [facility] [place]   Interventional Cardiac and Vascular Services  March 24, 2024  11:47 AM

## 2024-03-24 NOTE — PLAN OF CARE
Problem: RESPIRATORY - ADULT  Goal: Achieves optimal ventilation and oxygenation  Description: INTERVENTIONS:  - Assess for changes in respiratory status  - Assess for changes in mentation and behavior  - Position to facilitate oxygenation and minimize respiratory effort  - Oxygen supplementation based on oxygen saturation or ABGs  - Provide Smoking Cessation handout, if applicable  - Encourage broncho-pulmonary hygiene including cough, deep breathe, Incentive Spirometry  - Assess the need for suctioning and perform as needed  - Assess and instruct to report SOB or any respiratory difficulty  - Respiratory Therapy support as indicated  - Manage/alleviate anxiety  - Monitor for signs/symptoms of CO2 retention  Outcome: Progressing     RESPIRATORY THERAPY MECHANICAL VENTILATION PROGRESS NOTE    Ventilator Weaning:  Patient meets criteria for weaning? yes Weaning was attempted yes using pressure support 5 cmH2O + PEEP 5 cmH2O. The patient tolerated well for 2 hours. The patient was returned to original ventilator settings at AC 12 530 +5 30% due to Trial complete.     Current Ventilator Data:     03/24/24 0902   Spontaneous Breathing Trial   Spontaneous Breathing Trial Complete Y   Is the FiO2 <= 0.5? (titrated for sats 92-94%) Y   Is the PEEP <= 5? Y   Is the RSBI <=104 Y   Is the patient off pressor and narcotic / sedation drips? Y   Is the patient free of ventricular arrhythmias in the past 24 hours? Y   Is the patient's cough adequate? Y   Is the patient alert (neuro), able to follow commands? Y   Daily Screen Meets All Criteria Yes   Spontaneous Parameters   Sedation holiday (date) 03/24/24   Sedation holiday (time) 0900   Spontaneous RR Rate 21   Spontaneous Minute Volume 7.8   Average Spontaneous Tidal Volume 360   $ Spontaneous Vital Capacity 710   Total RSBI 58   Weaning Trials   Patient self-extubated? No   Compassionate wean? No   Spontaneous Breathing Trial Time Initiated 0900   Spontaneous Breathing  Trial Duration 2 hours   Spontaneous Breathing Trial Method cpap/ps   Spontaneous Breathing Trial Settings 5/5   Pre Trial HR 86   Pre Trial RR 19   Pre Trial SpO2 99 %         Therapist recommendations:   RCP recommends Maintain ventilator settings and monitor.

## 2024-03-24 NOTE — PHYSICAL THERAPY NOTE
Attempted treatment pt is on hold and not appropriate for therapy today. Will follow up tomorrow if appropriate.

## 2024-03-24 NOTE — PROGRESS NOTES
[facility]  [address] [place] [address], [address]  [facility] [id number]  [phone]            Cristiano Obregon Patient Status:  Inpatient    1970 MRN [id number]   Location [facility] 2W/SW Attending Carlo Vaughn MD   Hosp Day # 19 PCP Abilio Dorsey MD     Subjective:  Cristiano Obregon is a(n) 53 year old male.    Hospital course to date:     Patient with complicated past medical history, hyperlipidemia, tobacco use, strong family history of coronary disease.  Presented in 2024 with chest pain, and was found to have STEMI with cardiogenic shock requiring Impella placement.     Continued ventilation support with CRRT for the time he was anticoagulated due to Impella placement.     Status post CABG on 15 March.  Postop cardiac tamponade status post washout on 3/16.  Paroxysmal and postoperative fibrillation.     History of hypertension, hyperlipidemia.  Off sedation he was still drowsy and therefore CT of the head was done and found subacute appearing left frontal and left occipital lobe infarcts.     At that point neurology was consulted.     Patient also was noted to have clonus in both feet prior to that.          Objective:  Blood pressure (!) 146/95, pulse 94, temperature 97.9 °F (36.6 °C), temperature source Temporal, resp. rate (!) 31, height 67\", weight 181 lb 14.1 oz (82.5 kg), SpO2 96%.    Physical Exam:  Vitals:    24 0700 24 0800 24 0900 24 1000   BP: 128/79 109/71 150/86 (!) 146/95   Pulse: 83 70 89 94   Resp: 21 14 (!) 31 (!) 31   Temp:  97.9 °F (36.6 °C)     TempSrc:  Temporal     SpO2: 99% 97% 96%    Weight:       Height:           General: No apparent distress, well nourished, well groomed.  Head- Normocephalic, atraumatic  Eyes- No redness or swelling  ENT- Hearing intake, smell preserved, normal glutition  Neck- No masses or adenopathy  Cv: pulses were palpable and normal, no cyanosis or edema      Neurological:       Mental Status- Alert tracheostomy placed, follows commands with squeezing the left hand.    Findings movements of both lower extremities.  But not much commands.     Cranial Nerves:  Tracks with his eyes, possibly inattention on the right side.     VII. Face symmetric, no facial weakness     To the command he squeezed his left hand.  Clonus was 1-2 beats in both feet       Lab Results   Component Value Date    WBC 20.4 (H) 03/24/2024    HGB 8.5 (L) 03/24/2024    HCT 24.8 (L) 03/24/2024    .0 (L) 03/24/2024    CREATSERUM 4.83 (H) 03/24/2024    BUN 52 (H) 03/24/2024     03/24/2024    K 4.3 03/24/2024     03/24/2024    CO2 25.0 03/24/2024     (H) 03/24/2024    CA 7.9 (L) 03/24/2024    ALB 3.1 (L) 03/24/2024    ALKPHO 97 03/24/2024    BILT 0.8 03/24/2024    TP 5.3 (L) 03/24/2024    AST 26 03/24/2024    ALT 19 03/24/2024    PTT 51.7 (H) 03/22/2024    INR 1.24 (H) 03/22/2024     (H) 03/24/2024    PSA 1.95 10/06/2021    DDIMER >20.00 (H) 03/20/2024    MG 2.1 03/24/2024    PHOS 3.4 03/24/2024       IR TUNNELED CV CATH INSERTION EXCHANGE CHECK    Result Date: 3/22/2024  CONCLUSION: 1. Conversion of non tunneled hemodialysis catheter to a tunneled hemodialysis catheter via right internal jugular vein access.  Ready for use. 2. Insertion of 18 Spanish gastrostomy tube.  Do not use until cleared by IR tomorrow.    Dictated by (CST): Carlitos Lemon MD on 3/22/2024 at 11:33 PM     Finalized by (CST): Carlitos Lemon MD on 3/22/2024 at 11:39 PM          XR CHEST AP PORTABLE  (CPT=71045)    Result Date: 3/22/2024  CONCLUSION:   Tracheostomy tube tip at the level of the clavicles.  Interval removal of left chest tube and mediastinal drain.  No pneumothorax.  Mild patchy persistent opacity in the right mid lung with mild improved aeration right lung base.  Mild left basilar opacity with suspected trace bilateral pleural effusions.     Dictated by (CST): Jcarlos Harmon MD on 3/22/2024  at 4:45 PM     Finalized by (CST): Jcarlos Harmon MD on 3/22/2024 at 4:48 PM                 Assessment:  Patient Active Problem List   Diagnosis    Hypercholesteremia    Floaters in visual field, bilateral    Acne, unspecified acne type    Eczema, unspecified type    Paresthesia of left thumb    Chest pain    NSTEMI (non-ST elevated myocardial infarction) (HCC)    Cerebrovascular accident (CVA) due to embolism of left middle cerebral artery (HCC)    Anemia     Patient with what appears to be subacute strokes.  Most likely cardioembolic in the setting of Impella placement and complicated stay.  No evidence of atrial fibrillation at this point in the last few days, possibly a fib was provoked.  Continue aspirin and Plavix for now, might consider anticoagulation in the future if atrial fibrillation is confirmed, we typically try to avoid starting anticoagulation immediately after large strokes.  However will need MRI brain brain done to assess for the size of the stroke.  For now patient is on CRRT.      MRI of the brain and MRI of the head will be done.  MRI of the cervical and thoracic spine will be done as well to assess for the possibility of spinal cord involvement since there is bilateral clonus noted.    Arvind Suggs MD

## 2024-03-25 PROBLEM — R25.8 CLONUS: Status: ACTIVE | Noted: 2024-03-25

## 2024-03-25 PROBLEM — R25.8 CLONUS: Status: ACTIVE | Noted: 2024-01-01

## 2024-03-25 NOTE — PROGRESS NOTES
[facility]  [address], [address]  [place] [address]  [phone]          INPATIENT STROKE NEUROLOGY   FOLLOW UP PROGRESS NOTE  [facility]  part of [facility]    Cristiano Obregon Patient Status:  Inpatient     1970 MRN [id number]    Location [facility] 2W/SW Attending Bud Camarena MD    Hosp Day # 20 PCP Abilio Dorsey MD    Date of Admission:  3/5/2024  Date of Consult Follow Up:  3/25/2024       Assessment and Plan:   Cristiano Obregon is a 53 year old man  who  was admitted on 3/5/24 for near syncope while exercising found to have a NSTEMI requiring  CP Impella and  soon after CRRT s/p CABG x3 on 3/15  found to have a left frontal infarct on his 3/19 head CT.     Head CT performed b/c was not interacting as expected when sedation was completely held.     Hospital course also c/b  anemia requiring multiple transfusions now seen in follow up for his subacute infarcts.    MRI brain demonstrates subacute infarcts. Only small left frontal acute infarcts.  There is cerebral edema on the MRI of the brain.  Main issues to be addressed by neurology at this time or whether or not he is appropriate for IHD given cerebral edema.  It is unclear why still has persistent edema given that cerebral edema should be 5 days after his infarct.  May be related to the questionable blood products in his left hemisphere.  Repeat head CT on 3/26/2024 to evaluate edema.  Once edema is stable then we will consider IHD.  Long-term also recommend therapeutic anticoagulation.  Not appropriate at this time.  Consider therapeutic anticoagulation once his hemoglobin is stable and oozing has improved.    His last A1c was 6.0. his last LDL was 80.     Not a candidate for tPA b/c he was out of the time window and had a CABG (bleeding at a non compressible site).  Not a candidate for thrombectomy b/c he was likely out of the 24 hour window and last known  well was 3.5.24.    Regarding dialysis:  From Johan et al  \" Predialysis plasma urea concentrations and rate of plasma osmolality change are possible contributors to cerebral edema in dialysis-associated neurovascular injury, independent of dialysis modality...Treatment modifications to limit dialysis-associated neurovascular injury include dialysate sodium modeling, limited solute clearance, weight-based ultrafiltration rates, and cooled dialysate\"      Subacute strokes in the setting of NSTEMI on 3/5 s/p Impella, and CABG on 3/15  Differential Diagnosis for stroke/TIA mechanism:   ?afib  Related to the  use of an impella/MI/periprocedural     Plan    Diagnostics:  Imaging: CT brain WO  for /3/26/2024.  Will follow-up on the final reads of the MRI thoracic spine and MRA head.  Cardiac imaging: Telemetry   Labs:  per primary   Therapeutics:  Blood pressure goal:  MAP  > 65 SBP > 90  Please avoid blood pressure variability. wide fluctuations in BP can lead to stroke expansion.    Do not recommend lowering the blood pressure more than 25% in a 24-hour period.  Please avoid the use of atenolol as this medication is known to cause blood pressure variability.  PRN hydralazine and labetalol for sustained pressures outside of goal  Neuro checks Q4.  Telemetry.NIH stroke scale per protocol.  Blood glucose goal: .  Accuchecks Q6 if patient has DM  closely monitor to prevent hypoglycemia in patients with AIS.  Antithrombotics:  asa 300mg rectal. Plavix held b/c of oozing.  Long-term would recommend therapeutic anticoagulation with DOAC.  At this time given his oozing, NIH stroke scale, and edema associated infarct would avoid anticoagulation with DOAC.  Agree with holding dual antiplatelets..   continue rectal aspirin  Lipid-lowering agents: Cholesterol Meds: atorvastatin - 40 MG; atorvastatin Tabs - 20 MG     Avoid hypotonic fluids as this can worsen cerebral edema.  Physical therapy, Occupational Therapy, speech  therapy evaluations ordered.  maintain oxygen saturation >94%. No supplemental oxygen  in nonhypoxic patients with acute ischemic stroke (AIS).  Tx hyperthermia (temperature >38°C) and identify source  STAT head CT and page neurology if any change in neurological exam or focal deficits.    Long term secondary stroke prevention goals:  Home BP monitoring. Pt instructed to check BP at home in the AM and PM, and bring values to future clinic visits. BP goal is for normotension, < 130/80.  HbA1c goal <7.0  LDL-C goal  <70 mg/dL.  Target total cholesterol < 200 mg/dL Target HDL >45 mg/dL for men, >55 mg/dL for women, Target triglycerides <150 mg/dL  Physical inactivity - target exercise at least 3 times per week of moderate intensity activity for 20 minutes.  Obesity - target ideal body weight and girth <35\" for women, <40\" for men  Smoking - Target smoking cessation           INTERVAL EVENTS  03/25/24:  mris/mras completed. Blood oozing from different sites.  Platelets L.       SUBJECTIVE:     Spoke with the patient's  Reyes on the phone.  Explained the plan.    I reviewed the results of the imaging with the patient and the family in the room.  All explanations were provided in layman's terms.  Explained the pathophysiology/mechanism of stroke and potential different mechanisms for patient's stroke.  Discussed the results of his neurological exam today.  Reyes concerned the patient may have a recognizing individuals in the room.  Explained that patient's blink to threat is intact.  Complains pupillary light reflex is intact.   discussed secondary stroke prevention.    Pertinent positive and negatives per HPI.  All others were reviewed and negative.       Objective   OBJECTIVE:   Last vitals and weight :  Blood pressure 114/72, pulse 88, temperature 98.2 °F (36.8 °C), temperature source Temporal, resp. rate 20, height 67\", weight 172 lb 9.9 oz (78.3 kg), SpO2 100%.   Vitals:    03/25/24 0900 03/25/24 1000 03/25/24  1100 03/25/24 1113   BP: (!) 138/97 128/83 114/72    BP Location: Right arm Right arm Right arm    Pulse: 91 91 84 88   Resp: (!) 31 25 17 20   Temp:       TempSrc:       SpO2: 100% 99% 99% 100%   Weight:       Height:          Exam:  - General: appears stated age and no distress  Tracheostomy in place.  - Pulmonary: no sign of respiratory distress.    Neurologic Exam  - Mental Status: Alert and attentive.  does regard.  He will open his eyes. as he moved from the right to the left.  May have a questionable left gaze preference.  he tracks the examiner's face; questionable left gaze is overcome by tracking examiner's face.  He could not close his eyes to command.  He could not stick out his tongue to command.  With significant delay he wiggles his toes to command.  With delay he gave  give me a thumbs up on the left side.  He is mute.  He has a tracheostomy.  - Cranial Nerves: ?left gaze preference. Visual fields: Blink to threat intact.  He may have anisocoria.  His right pupil may be 1 mm larger than the left with dilation and constriction.  Horizontal gaze is intact.  No nystagmus. No ptosis. V1-V3 intact B/L to light touch.No pathological facial asymmetry. No flattening of the nasolabial fold. .  No obvious pathological facial asymmetry.  He does not grimace to retromandibular pressure.      - Motor: Normal to decreased tone in right arm.  Normal bulk. No interosseous wasting. No flattening of hypothenar eminences.  He is moving all 4 extremities.     He is moving his left arm more than his right.  When he had his right arm taken out of soft restraints he began reaching up towards his tracheostomy site with the right.  He could not keep either arm elevated for 10 seconds.  Both arms fell to the bed immediately.       He IS spontaneously moving his legs.  When his left leg is passively elevated he will try to support it with his right leg underneath it.  It cannot be maintained antigravity.  Drifts down and in  hits the bed.  His right leg also drifts down hits the bed.  CanNOT be maintained antigravity.  Plantar and dorsiflex his feet to command.     Knee extensors 2 2            Leg Drift:      Reflexes:    C5 C6 C7  L4 S1   R 2+ 2+  3+ 2+   L 2+ 2+  3+ 2+   Adductor Spread: No adductor spread noted.     Nonsustained clonus: Present.  He has nearly sustained clonus on the left.  It does fatigue.   Sustained clonus: Absent sustained clonus on the right.   - Sensory: Withdraws to noxious stimuli.  May withdraw more on the left than the right.     - Cerebellum: No truncal ataxia. No titubations. No dysmetria, no dysdiadochokinesis. No overshoot.           NIH Stroke Scale  Person Administering Scale: Michael Winters DO  1a  Level of consciousness: 0 = Alert keenly responsive    1b. LOC questions:  2 = Answers neither questions correctly   1c. LOC commands: 1 = Performs one task correctly     2.  Best Gaze: 1 = Partial Gaze Palsy; gaze is abnormal in 1 or both eyes, but forced deviation or total gaze paresis is not present.     3.  Visual: 0 = No visual loss     4. Facial Palsy: 0 = Normal symmetrical movement     5a.  Motor left arm: 3 = No effort against gravity, limb falls 4 = No movement   5b.  Motor right arm: 3 = No effort against gravity, limb falls 4 = No movement   6a. motor left leg: 3 = No effort against   6b  Motor right leg:  3 = No effort against   7. Limb Ataxia: 0 = Absent     8.  Sensory: 0 = Normal, no sensory loss     9. Best Language:  3 = Mute; global aphasia; no usable speech; or auditory comprehension    10. Dysarthria: 2 = Severe, so slurred as to be unintelligible; mute/anarthric    11. Extinction and Inattention: 0 = No abnormality     Total:   21     Modified kevin scale score:     Medications:   [START ON 4/1/2024] amiodarone  200 mg Oral Daily    cefepime  1 g Intravenous Q12H    amiodarone  200 mg Oral BID with meals    sodium chloride   Intravenous Once    heparin sodium lock flush  1.5 mL  Intravenous Once    sodium chloride   Intravenous Once    sodium chloride   Intravenous Once    hydrALAzine  10 mg Intravenous Once    atorvastatin  40 mg Oral Nightly    metoprolol tartrate  50 mg Oral 2x Daily(Beta Blocker)    ascorbic acid  500 mg Oral Daily    aspirin  81 mg Oral Daily    hydrALAZINE  25 mg Oral Q8H CHAYITO    famotidine  20 mg Oral Daily    Or    famotidine  20 mg Intravenous Daily    chlorhexidine gluconate  15 mL Mouth/Throat BID    [Held by provider] heparin  5,000 Units Subcutaneous 2 times per day    [Held by provider] clopidogrel  75 mg Oral Daily       PRNS: labetalol, hydrALAzine, prochlorperazine, dextrose 10%, lipase-protease-amylase (Lip-Prot-Amyl) **AND** sodium bicarbonate, alteplase (Activase) 4 mg in sterile water for injection (PF) 2.2 mL IV push to declot line, melatonin, polyethylene glycol (PEG 3350), ondansetron, potassium chloride **OR** potassium chloride, magnesium sulfate in dextrose 5%, magnesium sulfate in sterile water for injection, [DISCONTINUED] acetaminophen **OR** HYDROcodone-acetaminophen **OR** [DISCONTINUED] HYDROcodone-acetaminophen, glucose **OR** glucose **OR** glucose-vitamin C **OR** dextrose **OR** glucose **OR** glucose **OR** glucose-vitamin C, heparin, acetaminophen **OR** acetaminophen **OR** [DISCONTINUED] acetaminophen **OR** acetaminophen, senna, bisacodyl, fleet enema    Infusions:    NxStage Pure Flow 400 CRRT/PIRRT fluid 5000mL 2.5 L/hr (03/25/24 0326)    dexmedetomidine 0.2 mcg/kg/hr (03/25/24 0830)    dextrose 10% Stopped (03/23/24 1653)          Results:   Laboratory Data:  Lab Results   Component Value Date    WBC 20.3 (H) 03/25/2024    HGB 7.5 (L) 03/25/2024    HCT 23.4 (L) 03/25/2024    .0 (L) 03/25/2024    CREATSERUM 3.95 (H) 03/25/2024    BUN 42 (H) 03/25/2024     03/25/2024    K 4.4 03/25/2024     03/25/2024    CO2 26.0 03/25/2024     (H) 03/25/2024    CA 7.9 (L) 03/25/2024    ALB 2.9 (L) 03/25/2024    ALKPHO 97  03/24/2024    TP 5.3 (L) 03/24/2024    AST 26 03/24/2024    ALT 19 03/24/2024    PTT 51.7 (H) 03/22/2024    INR 1.22 (H) 03/25/2024    PTP 16.2 (H) 03/25/2024     (H) 03/24/2024    PSA 1.95 10/06/2021    DDIMER >20.00 (H) 03/20/2024    MG 2.0 03/25/2024    PHOS 3.2 03/25/2024     Diabetes:    Lab Results   Component Value Date    A1C 6.0 (H) 03/05/2024    A1C 6.1 (H) 10/06/2021    A1C 6.0 (H) 09/09/2020     03/05/2024     10/06/2021     09/09/2020     Lab Results   Component Value Date    CHOLEST 149 03/19/2024    CHOLEST 138 03/05/2024    CHOLEST 157.00 10/06/2021     Lab Results   Component Value Date    HDL 26 (L) 03/19/2024    HDL 57 03/05/2024    HDL 49 10/06/2021     Lab Results   Component Value Date    LDL 80 03/19/2024    LDL 64 03/05/2024    LDL 55 10/06/2021     Lab Results   Component Value Date    TRIG 261 (H) 03/19/2024    TRIG 82 03/16/2024    TRIG 181 (H) 03/13/2024     Lab Results   Component Value Date    AST 26 03/24/2024    AST 26 03/19/2024    AST 28 03/18/2024     Lab Results   Component Value Date    ALT 19 03/24/2024    ALT <7 (L) 03/19/2024    ALT <7 (L) 03/18/2024              Last A1c was done on 3/5/2024.       Test results/Imaging:   MRI BRAIN (CPT=70551)    Result Date: 3/25/2024  CONCLUSION:  1. Acute/subacute left frontal lobe cortical infarct. 2. A lacunar size acute/subacute infarct in the right parietal white matter. 3. A 1.2 cm focus of cortical signal abnormality in left occipital lobe related to a small infarct which is likely greater than 2 weeks in age. 4. Chronic bilateral basal ganglionic lacunar infarcts and changes in the cerebral white matter related to chronic small vessel disease. 5. Left greater than right mastoid effusions. 6. Bilateral frontal and milder ethmoid sinusitis. 7. Generalized increase in red marrow related to anemia.     Dictated by (CST): Fabian Gay MD on 3/25/2024 at 9:31 AM     Finalized by (CST): Fabian Gay MD on  3/25/2024 at 9:49 AM          MRA CAROTIDS (CPT=70547)    Result Date: 3/25/2024  CONCLUSION:  Markedly artifactually compromised examination. Within these parameters: 1. Suggestion of mild plaque involving the carotid bifurcations without evidence of flow-limiting stenosis or occlusion.  2. The vertebral arteries appear to be contiguously patent, insofar as visualized, without evidence of hemodynamically stenosis.  3. Lesser incidental findings as above.    A preliminary report was issued by the [facility]iology teleradiology service. There are no major discrepancies.   Dictated by (CST): Zachery Dean MD on 3/25/2024 at 8:49 AM     Finalized by (CST): Zachery Dean MD on 3/25/2024 at 8:54 AM               MRI BRAIN (CPT=70551)    Result Date: 3/25/2024  CONCLUSION:  1. Acute/subacute left frontal lobe cortical infarct. 2. A lacunar size acute/subacute infarct in the right parietal white matter. 3. A 1.2 cm focus of cortical signal abnormality in left occipital lobe related to a small infarct which is likely greater than 2 weeks in age. 4. Chronic bilateral basal ganglionic lacunar infarcts and changes in the cerebral white matter related to chronic small vessel disease. 5. Left greater than right mastoid effusions. 6. Bilateral frontal and milder ethmoid sinusitis. 7. Generalized increase in red marrow related to anemia.     Dictated by (CST): Fabian Gay MD on 3/25/2024 at 9:31 AM     Finalized by (CST): Fabian Gay MD on 3/25/2024 at 9:49 AM          CT BRAIN OR HEAD (84745)    Result Date: 3/19/2024  CONCLUSION:   Subacute appearing left frontal and left occipital lobe infarcts.  Brain MRI can be obtained to help stage chronicity.  Pansinusitis  Partial opacification of the bilateral mastoid air cells may be sequela of inflammation. No osseous destruction or bony erosions.   Dictated by (CST): Henri Melgoza MD on 3/19/2024 at 9:46 AM     Finalized by (CST): Henri Melgoza MD on 3/19/2024 at 9:48 AM            Performed an independent visualization of  MRI BRAIN, MRA HEAD, MRA NECK, mri c spine, mri t spine,  Imaging revealed:   AGREE W READ       ONLY  DWI HYPERINTENSITY THAT HAS LOW SIGNAL ON ADC IS THE SMALL INFARCT IN THE LEFT HEMISPHERE. REMAINDER  APPEARS SUBACUTE.  Minimal blood products vs dystrophic calcification noted; see series  15 image 19, series 14 image 18, series 13 image 18.          4.3 mm midline shift on mri; see above.        Disclaimer:   This record was dictated using Dragon software. There may be errors due to voice recognition problems that were not realized and corrected during the completion of the note.      This document is not intended to support charting by exception.  Sections left blank in a completed note should be presumed not to have been done.        Total critical care time spent exceeds 60 minutes.      References:  Elba CLEMENT. Renal and Electrolyte Disorders and the Nervous System. [facility] ([facility]). 2023 Jun 1;29(3):797-825. doi: 10.1212/CON.[id number]. PMID: 62118783.      Thank you.  Michael Winters D.O.   Vascular & General Neurology    3/25/2024  11:37 AM

## 2024-03-25 NOTE — PLAN OF CARE
Problem: RESPIRATORY - ADULT  Goal: Achieves optimal ventilation and oxygenation  Description: INTERVENTIONS:  - Assess for changes in respiratory status  - Assess for changes in mentation and behavior  - Position to facilitate oxygenation and minimize respiratory effort  - Oxygen supplementation based on oxygen saturation or ABGs  - Provide Smoking Cessation handout, if applicable  - Encourage broncho-pulmonary hygiene including cough, deep breathe, Incentive Spirometry  - Assess the need for suctioning and perform as needed  - Assess and instruct to report SOB or any respiratory difficulty  - Respiratory Therapy support as indicated  - Manage/alleviate anxiety  - Monitor for signs/symptoms of CO2 retention  Outcome: Progressing  Patient received with tracheostomy and on ventilator A/C 12/530/+5/30%. Tracheostomy care provided, significant bleeding from tracheostomy site. SBT initiated at 0819, see documentation below. Bilateral breath sounds auscultated. Suction provided when indicated. No acute events during the daytime. RT will continue to monitor.      SBT: PASS/FAIL? (FAIL)  Start time:  0819  Settings:  Pressure Support: 5    CPAP: 5    FiO2:  30  Reason for Failure if present: (Delete ALL reasons that do not apply)  Increased WOB  Respiratory distress       Weaning Parameters:  RR: 28  RSBI: 70  NIF: -  VT: 400  VC: 400      Returned to Full support: (Time:0947)  MD notified: (Physician: Wade)  Current Ventilator Settings:   - Mode: A/C   - Rate: 12   - Tidal Volume:530   - FiO2: 30   - PEEP +5

## 2024-03-25 NOTE — PLAN OF CARE
Problem: RESPIRATORY - ADULT  Goal: Achieves optimal ventilation and oxygenation  Description: INTERVENTIONS:  - Assess for changes in respiratory status  - Assess for changes in mentation and behavior  - Position to facilitate oxygenation and minimize respiratory effort  - Oxygen supplementation based on oxygen saturation or ABGs  - Provide Smoking Cessation handout, if applicable  - Encourage broncho-pulmonary hygiene including cough, deep breathe, Incentive Spirometry  - Assess the need for suctioning and perform as needed  - Assess and instruct to report SOB or any respiratory difficulty  - Respiratory Therapy support as indicated  - Manage/alleviate anxiety  - Monitor for signs/symptoms of CO2 retention  Outcome: Progressing     Patient remains on full vent support via trach, Transported to MRI last night without incident. Trach site oozing blood, site was cleansed and dried. No other issues were noted this shift.

## 2024-03-25 NOTE — DIETARY NOTE
ADULT NUTRITION Reassessment NOTE:     Pt is at high nutrition risk.  Pt does not meet malnutrition criteria.      RECOMMENDATIONS TO MD: See REVISED Nutrition Intervention for EN specifics. (3/25)      ADMITTING DIAGNOSIS:  NSTEMI (non-ST elevated myocardial infarction) (HCC) [I21.4]  PERTINENT PAST MEDICAL HISTORY: History reviewed. No pertinent past medical history.    Update 3/20/24: See previous notes for all past updates.   Pt remains intubated with plans for trach and PEG on Friday 3/22 or when other issues resolved. Has not been able to wean from vent. Per MD chart notes CT showing multiple cardio embolic strokes. Pt is severely deconditioned, weak, intermittently following commands. Continues on CRRT now 7pm - 7am. Line clotted few times again. Will transition to HD when able per jyothi GALVAN. TPN was stopped on 3/18 and TF started. Flexiseal in place with loose BM's (expected since no TF/PO intake x ~2 weeks with gut hypomotility). Currently on Nepro (fiber containing), tolerating at goal rate of 40ml/hr with 2 pkts prosource daily. Minimal FWF on CRRT. Anuric. Continue minimal FWF per clinical/renal status. Total CRRT UF removal = 1596ml. No drips, pressors, or sedation now.     Update 3/25/24: Pt continues on CRRT for now. Plan is to transition to iHD, possibly tomorrow, pending neuro. Pt is much more alert and awake today with SBT. Following commands. Trach to vent.     GASTROINTESTINAL: +BM 3/25; soft, tan, medium.     MEDICATIONS: reviewed. Continuous:    NxStage Pure Flow 400 CRRT/PIRRT fluid 5000mL 2.5 L/hr (03/25/24 1306)    dexmedetomidine 0.2 mcg/kg/hr (03/25/24 1305)    dextrose 10% Stopped (03/23/24 1653)      [START ON 4/1/2024] amiodarone  200 mg Oral Daily    morphINE PF        lidocaine PF  10 mL Infiltration Once    oxidized cellulose  2 each Topical Once    cefepime  1 g Intravenous Q12H    amiodarone  200 mg Oral BID with meals    sodium chloride   Intravenous Once    heparin sodium lock  flush  1.5 mL Intravenous Once    sodium chloride   Intravenous Once    sodium chloride   Intravenous Once    hydrALAzine  10 mg Intravenous Once    atorvastatin  40 mg Oral Nightly    metoprolol tartrate  50 mg Oral 2x Daily(Beta Blocker)    ascorbic acid  500 mg Oral Daily    aspirin  81 mg Oral Daily    hydrALAZINE  25 mg Oral Q8H CHAYITO    famotidine  20 mg Oral Daily    Or    famotidine  20 mg Intravenous Daily    chlorhexidine gluconate  15 mL Mouth/Throat BID    [Held by provider] heparin  5,000 Units Subcutaneous 2 times per day    [Held by provider] clopidogrel  75 mg Oral Daily        LABS: reviewed-  CRRT 7pm-7am. Ashlynmacrina WNL  Recent Labs     03/24/24  0511 03/24/24  1533 03/25/24  0423   * 119* 140*   BUN 52* 42* 42*   CREATSERUM 4.83* 3.97* 3.95*   CA 7.9* 8.5* 7.9*   MG 2.1 2.0 2.0    135* 137   K 4.3 4.3 4.4    104 106   CO2 25.0 25.0 26.0   PHOS 3.4 3.0 3.2   OSMOCALC 299* 292 297*     NUTRITION RELATED PHYSICAL FINDINGS:  - Nutrition Focused Physical Exam (NFPE): Appears well nourished, unable to completed full exam. Hard to fully visualize as pt edematous.   - Fluid Accumulation: nonpitting generalized--> See RN documentation for details  - Skin Integrity: at risk. See RN documentation for details    ANTHROPOMETRICS:  HT: 170.2 cm (5' 7\")  WT: 184 lbs ( 3/14)   BMI: Body mass index is 29.21 kg/m².  BMI CLASSIFICATION: 25-29.9 kg/m2 - overweight  IBW: 148 lbs        118% IBW on admit.   Usual Body Wt: ~175 lbs per EMR      100% UBW    NUTRITION DIAGNOSIS/PROBLEM:   Inadequate oral intake related to Decreased ability to consume sufficient energy  as evidenced by NPO status.     Nutrition Diagnosis Progress: Improvement (unresolved) , Nutrition Support Therapy (NST) --TPN transitioned to TF meeting needs.     NUTRITION INTERVENTION:     NUTRITION PRESCRIPTION:  (Recalculated 3/25)  Estimated Nutrition needs: --dosing wt of 78.3 kg (172#) - wt taken on 3/25 - likely closest to dry  weight  Calories: ~7765-5213 calories/day (22-26 calories per kg Dosing wt)  [place] = 1846 kcal/day  Protein: 100-167  g protein/day (>1.5-2.5 g protein/kg IBW while on CRRT)  Fluid Needs: ~1ml/kcal- adjust per renal tolerance, CRRT    - Enteral Nutrition Updated 3/28: Nepro at goal rate of 45ml/hr via NGT tube. Based on average 22 hour infusion time. Give Prosource 3 pkts daily as med flush (33g protein, 120 kcal, 135ml fluid). Goal rate + Prosource provides 1902 kcal, 113 grams protein, 855ml total fluid. Meets 100% of energy needs and protein needs. Water flushes 30ml q 4 hours (180ml).  Total free fluid daily = 1034ml  Keep FWF minimal per clinical status (oliguric)    - RD Care Plan:  initiated Nutrition Support Therapy (NST)   - Meals and snacks: NPO  - Medical Food Supplements- NPO.   - Vitamin and mineral supplements: vit C  - Feeding assistance: NPO  - Nutrition education: not appropriate   - Coordination of nutrition care: collaboration with other providers  - Discharge and transfer of nutrition care to new setting or provider: monitor plans    MONITOR AND EVALUATE/NUTRITION GOALS:  - Food and Nutrient Intake:      Monitor: for PO initiation when save  - Food and Nutrient Administration:      Monitor: tolerance to enteral nutrition and adequacy of enteral nutrition  - Anthropometric Measurement:    Monitor weight  - Nutrition Goals:      allow wt loss due to fluid losses, tube feed meets greater than 80% of needs, labs within acceptable limits, support body systems, and monitor fluid status    DIETITIAN FOLLOW UP: RD to follow and monitor nutrition status, daily CPN ordering.        Mayela Hallman RD, LDN  Clinical Dietitian  P: [phone]

## 2024-03-25 NOTE — CM/SW NOTE
Department  asked to send updates to [facility] referral for LTAC.    Assigned SW/CM to follow up with patient/family on discharge plan.   Maryam Mason, DSC

## 2024-03-25 NOTE — PROGRESS NOTES
[facility]  part of [facility]    Cardiology Progress Note    Cristiano CHOCO Estebanadrien Patient Status:  Inpatient    1970 MRN [id number]   Location [facility] 2W/SW Attending Bud Camarena MD   Hosp Day # 20 PCP Abilio Dorsey MD     Subjective   Patient awake, more alert  Continuing on vent  No distress       Objective:   Patient Vitals for the past 24 hrs:   BP Temp Temp src Pulse Resp SpO2 Weight   24 0900 (!) 138/97 -- -- 91 (!) 31 100 % --   24 0800 129/87 98.2 °F (36.8 °C) Temporal 89 22 97 % --   24 0727 -- -- -- 87 22 99 % --   24 0700 122/84 -- -- 85 24 98 % --   24 0600 106/73 -- -- 76 22 93 % 172 lb 9.9 oz (78.3 kg)   24 0500 97/69 -- -- 81 21 98 % --   24 0400 91/54 98.3 °F (36.8 °C) Temporal 81 22 95 % --   24 0300 127/85 -- -- 100 26 96 % --   24 0200 128/84 -- -- 92 20 96 % --   24 0100 130/83 -- -- 93 21 95 % --   24 0000 115/68 98.7 °F (37.1 °C) Temporal 86 15 95 % --   24 2342 117/74 -- -- 87 15 95 % --   24 2326 132/74 -- -- 89 16 98 % --   24 2314 (!) 151/96 -- -- 96 23 94 % --   24 2300 -- -- -- -- -- 100 % --   24 2245 138/87 -- -- 96 -- -- --   24 2225 128/89 -- -- 89 -- -- --   24 221 109/73 -- -- 80 -- 100 % --   24 2200 105/60 -- -- 80 -- 100 % --   24 2140 103/63 -- -- 80 -- -- --   24 2125 104/62 -- -- 80 -- 100 % --   24 2108 126/82 -- -- 82 16 99 % --   24 (!) 127/92 98.5 °F (36.9 °C) Temporal 83 19 99 % --   24 1900 121/88 -- -- 75 18 94 % --   24 1800 131/87 -- -- 84 16 98 % --   24 1700 137/81 -- -- 86 15 100 % --   24 1600 (!) 149/104 98.4 °F (36.9 °C) Temporal 94 21 99 % --   24 1500 139/85 -- -- 84 18 100 % --   24 1400 126/88 -- -- 82 20 100 % --   24 1300 124/89 -- -- 81 21 100 % --   24 1200 122/79 97.1 °F (36.2 °C) Temporal 77 20 100 % --   24  1100 118/72 -- -- 81 17 100 % --       Intake/Output:   Last 3 shifts:   Intake/Output                   03/23/24 0700 - 03/24/24 0659 03/24/24 0700 - 03/25/24 0659 03/25/24 0700 - 03/26/24 0659       Intake    I.V.  21  169.9  8    I.V. 5 54.3 --    Volume (mL) Dexmedetomidine 16 115.6 8    Blood  569  --  --    Volume (Transfuse RBC) 309 -- --    Volume (Transfuse RBC) 260 -- --    NG/GT  626  1116  171    Tube Feeding Flushes (mL) 90 120 60    Intake (mL) (Gastrostomy/Enterostomy Gastrostomy 1 18 Fr. LUQ) 536 996 111    IV PIGGYBACK  100  189.1  --    Volume (mL) (ceFEPIme (Maxipime) 1 g in sodium chloride 0.9% 100 mL IVPB-MBP) 100 -- --    Volume (mL) (ceFEPIme (Maxipime) 1 g in sodium chloride 0.9% 100 mL IVPB-MBP) -- 189.1 --    Total Intake 1316 1475 179       Output    Urine  --  --  0    Urine -- -- 0    Incontinent Urine Occurrence 1 x -- --    Other  1522  4287  243    Actual UF removed (History Screen) 1522 4287 243    Stool  --  --  --    Stool Count Calculated for I/O 1 x 5 x --    Total Output 1522 4287 243       Net I/O     -206 2812 -64             Vent Settings: Vent Mode: CPAP;PS  FiO2 (%):  [30 %] 30 %  S RR:  [12] 12  S VT:  [530 mL] 530 mL  PEEP/CPAP (cm H2O):  [5 cm H20] 5 cm H20  MAP (cm H2O):  [7-11] 7    Hemodynamic parameters (last 24 hours):      Scheduled Meds:    cefepime  1 g Intravenous Q12H    amiodarone  200 mg Oral BID with meals    sodium chloride   Intravenous Once    heparin sodium lock flush  1.5 mL Intravenous Once    sodium chloride   Intravenous Once    sodium chloride   Intravenous Once    hydrALAzine  10 mg Intravenous Once    atorvastatin  40 mg Oral Nightly    metoprolol tartrate  50 mg Oral 2x Daily(Beta Blocker)    ascorbic acid  500 mg Oral Daily    aspirin  81 mg Oral Daily    hydrALAZINE  25 mg Oral Q8H CHAYITO    insulin regular human  1-7 Units Subcutaneous 4 times per day    famotidine  20 mg Oral Daily    Or    famotidine  20 mg Intravenous Daily    chlorhexidine  gluconate  15 mL Mouth/Throat BID    [Held by provider] heparin  5,000 Units Subcutaneous 2 times per day    clopidogrel  75 mg Oral Daily       Continuous Infusions:    NxStage Pure Flow 400 CRRT/PIRRT fluid 5000mL 2.5 L/hr (03/25/24 0326)    dexmedetomidine 0.2 mcg/kg/hr (03/25/24 0830)    dextrose 10% Stopped (03/23/24 1653)       Results:     Lab Results   Component Value Date    WBC 20.3 (H) 03/25/2024    HGB 7.5 (L) 03/25/2024    HCT 23.4 (L) 03/25/2024    .0 (L) 03/25/2024    CREATSERUM 3.95 (H) 03/25/2024    BUN 42 (H) 03/25/2024     03/25/2024    K 4.4 03/25/2024     03/25/2024    CO2 26.0 03/25/2024     (H) 03/25/2024    CA 7.9 (L) 03/25/2024    ALB 2.9 (L) 03/25/2024    ALKPHO 97 03/24/2024    BILT 0.8 03/24/2024    TP 5.3 (L) 03/24/2024    AST 26 03/24/2024    ALT 19 03/24/2024    PTT 51.7 (H) 03/22/2024    INR 1.22 (H) 03/25/2024     (H) 03/24/2024    PSA 1.95 10/06/2021    DDIMER >20.00 (H) 03/20/2024    MG 2.0 03/25/2024    PHOS 3.2 03/25/2024       Recent Labs   Lab 03/24/24  0511 03/24/24  1533 03/25/24  0423   * 119* 140*   BUN 52* 42* 42*   CREATSERUM 4.83* 3.97* 3.95*   CA 7.9* 8.5* 7.9*    135* 137   K 4.3 4.3 4.4    104 106   CO2 25.0 25.0 26.0     Recent Labs   Lab 03/23/24  0452 03/23/24  1213 03/23/24  1408 03/24/24  0511 03/25/24  0423   RBC 2.46*   < > 2.96* 2.88* 2.55*   HGB 7.2*   < > 8.9* 8.5* 7.5*   HCT 21.4*   < > 25.1* 24.8* 23.4*   MCV 87.0   < > 84.8 86.1 91.8   MCH 29.3   < > 30.1 29.5 29.4   MCHC 33.6   < > 35.5 34.3 32.1   RDW 15.8*   < > 15.9* 16.9* 16.9*   NEPRELIM 18.68*  --   --   --  17.07*   WBC 23.7*   < > 18.9* 20.4* 20.3*   .0*   < > 135.0* 142.0* 126.0*    < > = values in this interval not displayed.       No results for input(s): \"BNPML\" in the last 168 hours.    No results for input(s): \"TROP\", \"CK\" in the last 168 hours.    MRI BRAIN (CPT=70551)    Result Date: 3/25/2024  CONCLUSION:  1. Acute/subacute left  frontal lobe cortical infarct. 2. A lacunar size acute/subacute infarct in the right parietal white matter. 3. A 1.2 cm focus of cortical signal abnormality in left occipital lobe related to a small infarct which is likely greater than 2 weeks in age. 4. Chronic bilateral basal ganglionic lacunar infarcts and changes in the cerebral white matter related to chronic small vessel disease. 5. Left greater than right mastoid effusions. 6. Bilateral frontal and milder ethmoid sinusitis. 7. Generalized increase in red marrow related to anemia.     Dictated by (CST): Fabian Gay MD on 3/25/2024 at 9:31 AM     Finalized by (CST): Fabian Gay MD on 3/25/2024 at 9:49 AM          MRA CAROTIDS (CPT=70547)    Result Date: 3/25/2024  CONCLUSION:  Markedly artifactually compromised examination. Within these parameters: 1. Suggestion of mild plaque involving the carotid bifurcations without evidence of flow-limiting stenosis or occlusion.  2. The vertebral arteries appear to be contiguously patent, insofar as visualized, without evidence of hemodynamically stenosis.  3. Lesser incidental findings as above.    A preliminary report was issued by the [facility] teleradiology service. There are no major discrepancies.   Dictated by (CST): Zachery Dean MD on 3/25/2024 at 8:49 AM     Finalized by (CST): Zachery Dean MD on 3/25/2024 at 8:54 AM          IR TUNNELED CV CATH INSERTION EXCHANGE CHECK    Result Date: 3/22/2024  CONCLUSION: 1. Conversion of non tunneled hemodialysis catheter to a tunneled hemodialysis catheter via right internal jugular vein access.  Ready for use. 2. Insertion of 18 [place] gastrostomy tube.  Do not use until cleared by IR tomorrow.    Dictated by (CST): Carlitos Lemon MD on 3/22/2024 at 11:33 PM     Finalized by (CST): Carlitos Lemon MD on 3/22/2024 at 11:39 PM          XR CHEST AP PORTABLE  (CPT=71045)    Result Date: 3/22/2024  CONCLUSION:   Tracheostomy tube tip at the level of  the clavicles.  Interval removal of left chest tube and mediastinal drain.  No pneumothorax.  Mild patchy persistent opacity in the right mid lung with mild improved aeration right lung base.  Mild left basilar opacity with suspected trace bilateral pleural effusions.     Dictated by (CST): Jcarlos Harmon MD on 3/22/2024 at 4:45 PM     Finalized by (CST): Jcarlos Harmon MD on 3/22/2024 at 4:48 PM             CARD ECHO LIMITED (CPT=93308)    Result Date: 3/16/2024  Transthoracic Echocardiogram Name:Cristiano Obregon Date: 2024 :  1970 Ht:  (67in)  BP: 108 / 77 MRN:  [id number]    Age:  53years    Wt:  (188lb) HR: 84bpm Loc:  [facility]       Gndr: M          BSA: 1.97m^2 Sonographer: ADEEL Wyman Ordering:    Dima Ponce MD Consulting:  Derick Webb ---------------------------------------------------------------------------- History/Indications:   Coronary artery disease.  Impella placement.  PMH: Myocardial infarction.  Coronary artery bypass grafting (03/15/2024). Performed during the current admission. ---------------------------------------------------------------------------- Procedure information:  A transthoracic echocardiogram, limited study was performed. Additional evaluation included M-mode and limited 2D.  Patient status:  Inpatient.  Location:  Bedside.    Comparison was made to the study of 2024.    This was a routine study. Transthoracic echocardiography for diagnosis, ventricular function evaluation, and postoperative evaluation. Image quality was adequate. ECG rhythm:   Normal sinus ---------------------------------------------------------------------------- Conclusions: 1. Left ventricle: The cavity size was normal. Wall thickness was normal.    Systolic function was mildly reduced. The estimated ejection fraction was    45-50%, by 3D assessment. Unable to assess LV diastolic function. Impella    catheter noted in the left ventricle, placement measures 3.40cm from the     aortic valve. 2. Left ventricle: There is moderate hypokinesis of the mid-apical inferior    and mid inferolateral walls. 3. Right ventricle: Pacer wire noted in the right ventricle. Systolic    function was mildly reduced. 4. Left atrium: The left atrial volume was moderately increased. 5. Right atrium: The atrium was dilated. Pacer wire noted in right atrium. 6. Pericardium, extracardiac: There was no residual pericardial effusion.    There was a left pleural effusion. Impressions:  This study is compared with previous dated 03/11/2024: No significant change since prior study. * ---------------------------------------------------------------------------- * Findings: Left ventricle:  The cavity size was normal. Wall thickness was normal. Systolic function was mildly reduced. The estimated ejection fraction was 45-50%, by 3D assessment. Impella catheter noted in the left ventricle, placement measures 3.40cm from the aortic valve. Regional wall motion:   There is moderate hypokinesis of the mid-apical inferior and mid inferolateral walls.   Unable to assess LV diastolic function. Left atrium:  The left atrial volume was moderately increased. Right ventricle:  The cavity size was normal. Pacer wire noted in the right ventricle. Systolic function was mildly reduced. Right atrium:  The atrium was dilated. Pacer wire noted in right atrium. Mitral valve:  The valve was structurally normal. Leaflet separation was normal. Aortic valve:   The valve was trileaflet.   Cusp separation was normal. Tricuspid valve:  The valve is structurally normal. Leaflet separation was normal. Pulmonic valve:   Poorly visualized. Pericardium:   Post pericardiocentesis:   There was no residual pericardial effusion. Pleura:  There was a left pleural effusion. Pulmonary arteries: Systolic pressure could not be accurately estimated. Systemic veins:  Not visualized. ----------------------------------------------------------------------------  Measurements  Left ventricle         Value        Ref       03/11/2024  LV end-diastolic       122   ml     --------- ----------  volume, 3D  LV end-systolic        65    ml     --------- ----------  volume, 3D  LV ejection        (L) 46    %      52 - 72   ----------  fraction, 3D  LV end-diastolic       62    ml/m^2 <=79      ----------  volume/bsa, 3D  LV end-systolic    (H) 33    ml/m^2 <=32      ----------  volume/bsa, 3D  LV wall mass, 3D       145   g      --------- ----------  LV wall mass/bsa,      74    g/m^2  --------- ----------  3D  IVS thickness, ED,     0.9   cm     0.6 - 1.0 1.0  PLAX  LV ID, ED, PLAX        5.1   cm     4.2 - 5.8 5.0  LV ID, ES, PLAX        3.8   cm     2.5 - 4.0 3.6  LV PW thickness,       0.8   cm     0.6 - 1.0 1.1  ED, PLAX  IVS/LV PW ratio,       1.13         --------- 0.91  ED, PLAX  LV PW/LV ID ratio,     0.16         --------- 0.22  ED, PLAX  LV ejection        (L) 50    %      52 - 72   54  fraction  Left atrium            Value        Ref       03/11/2024  LA volume, S       (H) 101   ml     18 - 58   88  LA volume/bsa, S   (H) 51    ml/m^2 16 - 34   44  LA volume, ES, 1-p (H) 103   ml     18 - 58   95  A4C  LA volume, ES, 1-p (H) 82    ml     18 - 58   81  A2C  LA volume, ES, A/L     106   ml     --------- 95  LA volume/bsa, ES, (H) 54    ml/m^2 16 - 34   48  A/L  LA volume, ES, 3D  (H) 80    ml     18 - 58   ----------  LA volume/bsa, ES,     41    ml/m^2 --------- ----------  3D  Right ventricle        Value        Ref       03/11/2024  TAPSE, MM          (L) 0.92  cm     >=1.70    ----------  RV s', lateral     (L) 6.3   cm/sec >=9.5     ---------- Legend: (L)  and  (H)  liza values outside specified reference range. ---------------------------------------------------------------------------- Prepared and electronically signed by Dima Ponce MD 03/16/2024 10:15     CARD ECHO LIMITED (CPT=93308)    Result Date: 3/11/2024  Transthoracic Echocardiogram Name:Mindi  Cristiano WILHELM Date: 2024 :  1970 Ht:  (67in)  BP: 110 / 76 MRN:  [id number]    Age:  53years    Wt:  (192lb) HR: 75bpm Loc:  [facility]       Gndr: M          BSA: 1.99m^2 Sonographer: Manuel Ordering:    Ruben Ramirez Consulting:  Janet Velazco ---------------------------------------------------------------------------- History/Indications:   Coronary artery disease.  Impella placement. ---------------------------------------------------------------------------- Procedure information:  A transthoracic echocardiogram, limited study was performed. Additional evaluation included M-mode and limited 2D.  Patient status:  Inpatient.  Location:  [facility]    Comparison was made to the study of 2024.    This was a routine study. Transthoracic echocardiography for diagnosis and ventricular function evaluation. Image quality was adequate. ECG rhythm:   Normal sinus ---------------------------------------------------------------------------- Conclusions: 1. Left ventricle: The cavity size was normal. Wall thickness was moderately    increased. The estimated ejection fraction was 50-55%, by visual    assessment. Hypokinesis of the inferior wall. Hypokinesis of the    basal-midlateral wall. Impella catheter noted in the left ventricle,    placement measures 3.30cm from the aortic valve. 2. Right ventricle: Pacer wire noted in the right ventricle. 3. Left atrium: The left atrial volume was moderately increased. 4. Right atrium: Pacer wire noted in right atrium. 5. Pericardium, extracardiac: There was a right pleural effusion. 6. Left ventricle: Impressions:  This study is compared with previous dated 2024: * ---------------------------------------------------------------------------- * Findings: Left ventricle:  The cavity size was normal. Wall thickness was moderately increased. The estimated ejection fraction was 50-55%, by visual assessment. Impella catheter noted in the left ventricle, placement measures 3.30cm from  the aortic valve.  Regional wall motion abnormalities:   Hypokinesis of the inferior wall.  Hypokinesis of the basal-midlateral wall. Left atrium:  The left atrial volume was moderately increased. Right ventricle:  The cavity size was normal. Pacer wire noted in the right ventricle. Systolic function was normal. Right atrium:  The atrium was normal in size. Pacer wire noted in right atrium. Mitral valve:  The valve was structurally normal. Leaflet separation was normal. Aortic valve:  The valve was structurally normal. The valve was trileaflet. Cusp separation was normal. Tricuspid valve:  The valve is structurally normal. Leaflet separation was normal. Pulmonic valve:   Not visualized. Pleura:  There was a right pleural effusion. Pulmonary arteries: Systolic pressure could not be accurately estimated. Systemic veins:  Central venous respirophasic diameter changes are blunted (< 50%). Inferior vena cava: The IVC was normal-sized. ---------------------------------------------------------------------------- Measurements  Left ventricle         Value        Ref       03/08/2024  IVS thickness, ED,     1.0   cm     0.6 - 1.0 1.0  PLAX  LV ID, ED, PLAX        5.0   cm     4.2 - 5.8 5.1  LV ID, ES, PLAX        3.6   cm     2.5 - 4.0 3.8  LV PW thickness,   (H) 1.1   cm     0.6 - 1.0 0.9  ED, PLAX  IVS/LV PW ratio,       0.91         --------- 1.11  ED, PLAX  LV PW/LV ID ratio,     0.22         --------- 0.18  ED, PLAX  LV ejection            54    %      52 - 72   50  fraction  Left atrium            Value        Ref       03/08/2024  LA volume, S       (H) 88    ml     18 - 58   ----------  LA volume/bsa, S   (H) 44    ml/m^2 16 - 34   ----------  LA volume, ES, 1-p (H) 95    ml     18 - 58   ----------  A4C  LA volume, ES, 1-p (H) 81    ml     18 - 58   ----------  A2C  LA volume, ES, A/L     95    ml     --------- ----------  LA volume/bsa, ES, (H) 48    ml/m^2 16 - 34   ----------  A/L  Inferior vena cava     Value         Ref       2024  ID                     1.9   cm     <=2.1     2.5 Legend: (L)  and  (H)  liza values outside specified reference range. ---------------------------------------------------------------------------- Prepared and electronically signed by Ruben Ramirez 2024 19:47     CARD ECHO 2D W/O DOPPLER (CPT=93307)    Result Date: 3/8/2024  Transthoracic Echocardiogram Name:Cristiano Obregon Date: 2024 :  1970 Ht:  (67in)  BP: 127 / 78 MRN:  [id number]    Age:  53years    Wt:  (185lb) HR: 75bpm Loc:  [facility]       Gndr: M          BSA: 1.96m^2 Sonographer: Manuel Ordering:    Carlitos Sandoval Consulting:  Janet Velazco ---------------------------------------------------------------------------- History/Indications:  Impella placement. ---------------------------------------------------------------------------- Procedure information:  A transthoracic echocardiogram, limited study was performed. Additional evaluation included M-mode and limited 2D.  Patient status:  Inpatient.  Location:  [facility]    This was a routine study. Transthoracic echocardiography for diagnosis, ventricular function evaluation, and post closure device deployment evaluation. Image quality was adequate. ECG rhythm:   Normal sinus ---------------------------------------------------------------------------- Conclusions: 1. Left ventricle: The cavity size was normal. Wall thickness was normal.    Systolic function was normal. The estimated ejection fraction was 50-55%,    by visual assessment. Hypokinesis of the anterolateral wall. Unable to    assess LV diastolic function. Impella catheter noted in the left    ventricle, placement measures 3.50cm from the aortic valve. 2. Right ventricle: Pacer wire noted in the right ventricle. 3. Right atrium: Pacer wire noted in right atrium. * ---------------------------------------------------------------------------- * Findings: Left ventricle:  The cavity size was normal. Wall thickness  was normal. Systolic function was normal. The estimated ejection fraction was 50-55%, by visual assessment. Impella catheter noted in the left ventricle, placement measures 3.50cm from the aortic valve.  Regional wall motion abnormalities:  Hypokinesis of the anterolateral wall. Unable to assess LV diastolic function. Right ventricle:  The cavity size was normal. Pacer wire noted in the right ventricle. Systolic function was normal. Right atrium:  Pacer wire noted in right atrium. Mitral valve:  The valve was structurally normal. Leaflet separation was normal. Aortic valve:   The valve was trileaflet. Tricuspid valve:  The valve is structurally normal. Leaflet separation was normal. Pulmonic valve:   Not visualized. Pericardium:   There was no pericardial effusion. Pulmonary arteries: Systolic pressure could not be accurately estimated. Systemic veins:  Central venous respirophasic diameter changes are blunted (< 50%). Inferior vena cava: The IVC was dilated. ---------------------------------------------------------------------------- Measurements  Left ventricle         Value        Ref       03/07/2024  IVS thickness, ED,     1.0   cm     0.6 - 1.0 1.3  PLAX  LV ID, ED, PLAX        5.1   cm     4.2 - 5.8 4.4  LV ID, ES, PLAX        3.8   cm     2.5 - 4.0 3.3  LV PW thickness,       0.9   cm     0.6 - 1.0 1.4  ED, PLAX  IVS/LV PW ratio,       1.11         --------- 0.93  ED, PLAX  LV PW/LV ID ratio,     0.18         --------- 0.32  ED, PLAX  LV ejection        (L) 50    %      52 - 72   50  fraction  Inferior vena cava     Value        Ref       03/07/2024  ID                 (H) 2.5   cm     <=2.1     ----------  Right ventricle        Value        Ref       03/07/2024  TAPSE, 2D              2.39  cm     >=1.70    ----------  TAPSE, MM              2.39  cm     >=1.70    ----------  RV s', lateral         18.8  cm/sec >=9.5     ---------- Legend: (L)  and  (H)  liza values outside specified reference range.  ---------------------------------------------------------------------------- Prepared and electronically signed by Gurjit Elliott 2024 17:42     CARD ECHO 2D W/O DOPPLER (CPT=93307)    Result Date: 3/7/2024  Transthoracic Echocardiogram Name:Cristiano Obregon Date: 2024 :  1970 Ht:  (67in)  BP: 105 / 68 MRN:  [id number]    Age:  53years    Wt:  (183lb) HR: Loc:  [facility]       Gndr: M          BSA: 1.95m^2 Sonographer: Glenna DENIS Ordering:    Carlitos Sandoval Consulting:  Janet Velazco ---------------------------------------------------------------------------- History/Indications:   Impella Placement. ---------------------------------------------------------------------------- Procedure information:  A transthoracic echocardiogram, limited study was performed. Additional evaluation included M-mode and limited 2D.  Patient status:  Inpatient.  Location:  Bedside.    Comparison was made to the study of 2024.    This was a routine study. Transthoracic echocardiography for diagnosis. Image quality was adequate. The study was technically limited due to restricted patient mobility and patient supine. ---------------------------------------------------------------------------- Conclusions: Left ventricle: The cavity size was normal. Wall thickness was mildly increased. Systolic function was mildly reduced. The estimated ejection fraction was 45-50%, by biplane method of disks. Unable to assess LV diastolic function. Impressions:  No significant change since prior study. * ---------------------------------------------------------------------------- * Findings: Left ventricle:  The cavity size was normal. Wall thickness was mildly increased. Systolic function was mildly reduced. The estimated ejection fraction was 45-50%, by biplane method of disks. Unable to assess LV diastolic function. Pericardium:   There was no pericardial effusion. Pleura:  No evidence of pleural fluid accumulation.  ---------------------------------------------------------------------------- Measurements  Left ventricle         Value        Ref       2024  IVS thickness, ED, (H) 1.3   cm     0.6 - 1.0 ----------  PLAX  LV ID, ED, PLAX        4.4   cm     4.2 - 5.8 ----------  LV ID, ES, PLAX        3.3   cm     2.5 - 4.0 ----------  LV PW thickness,   (H) 1.4   cm     0.6 - 1.0 ----------  ED, PLAX  IVS/LV PW ratio,       0.93         --------- ----------  ED, PLAX  LV PW/LV ID ratio,     0.32         --------- ----------  ED, PLAX  LV ejection        (L) 50    %      52 - 72   ----------  fraction  LV end-diastolic       90    ml     69 - 185  82  volume, 1-p A4C  LV ejection            46    %      46 - 74   39  fraction, 1-p A4C  Stroke volume, 1-p     41    ml     --------- 32  A4C  LV end-diastolic       46    ml/m^2 37 - 93   43  volume/bsa, 1-p  A4C  Stroke volume/bsa,     21    ml/m^2 --------- 17  1-p A4C  LV end-diastolic       79    ml     62 - 150  81  volume, 2-p  LV end-systolic        45    ml     21 - 61   49  volume, 2-p  LV ejection        (L) 43    %      52 - 72   40  fraction, 2-p  Stroke volume, 2-p     34    ml     --------- 33  LV end-diastolic       40    ml/m^2 34 - 74   42  volume/bsa, 2-p  LV end-systolic        23    ml/m^2 11 - 31   25  volume/bsa, 2-p  Stroke volume/bsa,     17.2  ml/m^2 --------- 16.9  2-p Legend: (L)  and  (H)  liza values outside specified reference range. ---------------------------------------------------------------------------- Prepared and electronically signed by Carlitos Sandoval 2024 12:43     CARD ECHO 2D W/O DOPPLER (CPT=93307)    Result Date: 3/6/2024  Transthoracic Echocardiogram Name:Cristiano Obregon Date:    2024    :     1970    Ht:     (67in)     BP: MRN:     [id number]       Age:     53years       Wt:     (180lb)    HR: Loc:     [facility]          Gndr:    M             BSA:    1.93m^2 Sonographer: Pao DENIS, RCS Ordering:    Carlitos Sandoval  Consulting:  Janet Velazco ---------------------------------------------------------------------------- Procedure information:  A transthoracic echocardiogram, limited study was performed. Additional evaluation included M-mode and limited 2D.  Image quality was adequate. ECG rhythm:   Normal sinus ---------------------------------------------------------------------------- Conclusions: Left ventricle: The cavity size was normal. Wall thickness was normal. Systolic function was mildly reduced. The estimated ejection fraction was 40-45%, by biplane method of disks. Impella catheter noted in the left ventricle, placement measures 3.70cm from the aortic valve. * ---------------------------------------------------------------------------- * Findings: Left ventricle:  The cavity size was normal. Wall thickness was normal. Systolic function was mildly reduced. The estimated ejection fraction was 40-45%, by biplane method of disks. Impella catheter noted in the left ventricle, placement measures 3.70cm from the aortic valve. Left atrium:  The atrium was normal in size. Right ventricle:  The cavity size was normal. Wall thickness was normal. Systolic function was normal. Right atrium:  The atrium was normal in size. Mitral valve:  The valve was structurally normal. Leaflet separation was normal. Aortic valve:  The valve was structurally normal. The valve was trileaflet. Cusp separation was normal. Tricuspid valve:  The valve is structurally normal. Leaflet separation was normal. Pulmonic valve:   The valve is structurally normal. Cusp separation was normal. Pericardium:   There was no pericardial effusion. Aorta: Aortic root: The aortic root was normal-sized. Pulmonary arteries: The main pulmonary artery was normal-sized. Systemic veins: Inferior vena cava: The IVC was normal-sized. ---------------------------------------------------------------------------- Measurements  Left ventricle                     Value        Ref   LV end-diastolic volume, 1-p       82    ml     69 - 185  A4C  LV ejection fraction, 1-p A4C  (L) 39    %      46 - 74  Stroke volume, 1-p A4C             32    ml     --------  LV end-diastolic volume/bsa,       43    ml/m^2 37 - 93  1-p A4C  Stroke volume/bsa, 1-p A4C         17    ml/m^2 --------  LV end-diastolic volume, 2-p       81    ml     62 - 150  LV end-systolic volume, 2-p        49    ml     21 - 61  LV ejection fraction, 2-p      (L) 40    %      52 - 72  Stroke volume, 2-p                 33    ml     --------  LV end-diastolic volume/bsa,       42    ml/m^2 34 - 74  2-p  LV end-systolic volume/bsa,        25    ml/m^2 11 - 31  2-p  Stroke volume/bsa, 2-p             16.9  ml/m^2 --------  Right ventricle                    Value        Ref  TAPSE, MM                          2.29  cm     >=1.70  RV s', lateral                     16.3  cm/sec >=9.5 Legend: (L)  and  (H)  liza values outside specified reference range. ---------------------------------------------------------------------------- Prepared and electronically signed by Andrea Villeda 2024 11:13     CARD ECHO 2D W/O DOPPLER (CPT=93307)    Result Date: 3/5/2024  Transthoracic Echocardiogram Name:Cristiano Obregon Date: 2024 :  1970 Ht:  (67in)  BP: 107 / 84 MRN:  [id number]    Age:  53years    Wt:  (160lb) HR: 75bpm Loc:  [facility]       Gndr: M          BSA: 1.84m^2 Sonographer: Angelica Ordering:    Carlitos Sandoval Consulting:  Danial Kirk ---------------------------------------------------------------------------- History/Indications:   Coronary artery disease. Impella placement. ---------------------------------------------------------------------------- Procedure information:  A transthoracic echocardiogram, limited study was performed. Additional evaluation included M-mode and limited 2D.  Patient status:  Inpatient.  Location:  Bedside.    Comparison was made to the study of 2024.    This was a STAT study. Transthoracic  echocardiography for diagnosis, ventricular function evaluation, and post intervention evaluation. Image quality was adequate. ECG rhythm:   Frequent ectopies ---------------------------------------------------------------------------- Conclusions: 1. Left ventricle: The cavity size was normal. Wall thickness was normal.    Systolic function was at the lower limits of normal. The estimated    ejection fraction was 50-55%, by visual assessment. Unable to assess LV    diastolic function. Impella catheter noted in the left ventricle,    placement measures 3.50cm from the aortic valve. 2. Left atrium: The atrium was mildly dilated. 3. Right atrium: The atrium was mildly dilated. Impressions:  This study is compared with previous dated 03/05/2024: Compared to the previous study, these findings are new. Interval placement of impella. * ---------------------------------------------------------------------------- * Findings: Left ventricle:  The cavity size was normal. Wall thickness was normal. Systolic function was at the lower limits of normal. The estimated ejection fraction was 50-55%, by visual assessment. Impella catheter noted in the left ventricle, placement measures 3.50cm from the aortic valve. Unable to assess LV diastolic function. Left atrium:  The atrium was mildly dilated. Right ventricle:  The cavity size was normal. Systolic function was normal. Right atrium:  The atrium was mildly dilated. Mitral valve:  The valve was structurally normal. Leaflet separation was normal. Aortic valve:   The valve was trileaflet.   Cusp separation was normal. Tricuspid valve:  The valve is structurally normal. Leaflet separation was normal. Pulmonic valve:   Not well visualized. Pericardium:   There was no pericardial effusion. Pulmonary arteries: Systolic pressure could not be accurately estimated. ---------------------------------------------------------------------------- Measurements  Left ventricle               Value     Ref  IVS thickness, ED, PLAX      1.0   cm 0.6 - 1.0  LV ID, ED, PLAX              4.7   cm 4.2 - 5.8  LV ID, ES, PLAX              3.2   cm 2.5 - 4.0  LV PW thickness, ED, PLAX    0.8   cm 0.6 - 1.0  IVS/LV PW ratio, ED, PLAX    1.25     ---------  LV PW/LV ID ratio, ED, PLAX  0.17     ---------  LV ejection fraction         60    %  52 - 72 Legend: (L)  and  (H)  liza values outside specified reference range. ---------------------------------------------------------------------------- Prepared and electronically signed by Carlitos Sandoval 2024 11:32     CARD ECHO LIMITED (CPT=93308)    Result Date: 3/5/2024  Transthoracic Echocardiogram Name:Cristiano Obregon Date:    2024    :     1970    Ht:     (67in)     BP: MRN:     [id number]       Age:     53years       Wt:     (160lb)    HR: Loc:     [facility]          Gndr:    M             BSA:    1.84m^2 Sonographer: ADEEL Wyman Ordering:    Carlitos Sandoval Consulting:  Richie Llanes ---------------------------------------------------------------------------- History/Indications:   Chest pain.  Coronary artery disease. ---------------------------------------------------------------------------- Procedure information:  A transthoracic echocardiogram, limited study was performed. Additional evaluation included M-mode and limited 2D.  Patient status:  Inpatient.  Location:  Catheterization laboratory.    This was a STAT study. Transthoracic echocardiography for diagnosis and ventricular function evaluation. Image quality was adequate. ---------------------------------------------------------------------------- Conclusions: 1. Left ventricle: Systolic function was mildly reduced. The estimated    ejection fraction was 45-50%, by visual assessment. Unable to assess LV    diastolic function. 2. Left atrium: The atrium was dilated. 3. Right atrium: The atrium was mildly dilated. 4. Aortic valve: There was mild regurgitation. 5. Mitral valve: There was mild  regurgitation. 6. Limited study in cath lab, grossly ejection fraction 45-50%. Wire present    in the left ventricle. Impressions:  No previous study was available for comparison. * ---------------------------------------------------------------------------- * Findings: Left ventricle:  Systolic function was mildly reduced. The estimated ejection fraction was 45-50%, by visual assessment. Unable to assess LV diastolic function. Left atrium:  The atrium was dilated. Right ventricle:  The cavity size was normal. Systolic function was normal. Right atrium:  The atrium was mildly dilated. Mitral valve:  The valve was structurally normal. Leaflet separation was normal.  Doppler:  Transvalvular velocity was within the normal range. There was no evidence for stenosis. There was mild regurgitation. Aortic valve:  Not well visualized.  Doppler:  There was mild regurgitation. Tricuspid valve:  The valve is structurally normal. Leaflet separation was normal.  Doppler:  Transvalvular velocity was within the normal range. There was no evidence for stenosis. There was mild regurgitation. Pulmonic valve:   Not visualized. Pericardium:   There was no pericardial effusion. Pulmonary arteries: Systolic pressure could not be accurately estimated. Systemic veins:  Not visualized. ---------------------------------------------------------------------------- Prepared and electronically signed by Carlitos Sandoval 03/05/2024 09:46        Exam:     Physical Exam:  General: Alert and oriented x 3. No apparent distress.   HEENT: Normocephalic, anicteric sclera, neck supple, no thyromegaly or adenopathy.  Neck: No JVD, carotids 2+, no bruits.  Cardiac: Regular rate and rhythm. S1, S2 normal. No murmur, pericardial rub, S3, or extra cardiac sounds.  Lungs: Clear without wheezes, rales, rhonchi or dullness.  Normal excursions and effort.  Abdomen: Soft, non-tender. No organosplenomegally, mass or rebound, BS-present.  Extremities: Without clubbing or  cyanosis. No edema.    Neurologic: Alert and oriented, normal affect. No focal defects  Skin: Warm and dry.     Assessmenty:  STEMI, severe multivessel coronary disease status post CABG  Ventilator dependent respiratory failure, status post tracheostomy 3/22/2024  Acute stroke  Emergent redo sternotomy 3/16/2024 for tamponade  Cardiogenic shock requiring Impella-resolved  Acute renal failure, on HD  Atrial fibrillation, provoked-resolved  Anemia  Thrombocytopenia  History of tobacco abuse  Status post PEG tube placement,      Plan:  Patient remains stable from cardiac standpoint  Patient is awake, tracking movement around the room  Neurology following given his recent stroke.   Atrial fibrillation   Amiodarone 200 mg twice daily   Continue aspirin, statin, metoprolol     Mulugeta Corcoran MD  [facility]  3/25/2024  L3

## 2024-03-25 NOTE — PROGRESS NOTES
NEPHROLOGY DAILY PROGRESS NOTE       SUBJECTIVE:    Having oozing from trach and tunneled HD cath   Tolerating CRRT   Awake, denies SOB or pain        OBJECTIVE:    Total Intake/Output:    Intake/Output Summary (Last 24 hours) at 3/25/2024 1104  Last data filed at 3/25/2024 1030  Gross per 24 hour   Intake 1654.3 ml   Output 4055 ml   Net -2400.7 ml       PHYSICAL EXAM:  /83 (BP Location: Right arm)   Pulse 91   Temp 98.2 °F (36.8 °C) (Temporal)   Resp 25   Ht 5' 7\" (1.702 m)   Wt 172 lb 9.9 oz (78.3 kg)   SpO2 99%   BMI 27.04 kg/m²   GEN: NAD   HEENT: trached   CHEST: clear anteriorly    CARDIAC: S1S2 normal  ABD: soft, PEG tube   EXT:  no LE edema   NEURO: awake   ACCESS: RIJ tunneled HD cath (3/23)       CURRENT MEDICATIONS:     [START ON 4/1/2024] amiodarone  200 mg Oral Daily    cefepime  1 g Intravenous Q12H    amiodarone  200 mg Oral BID with meals    sodium chloride   Intravenous Once    heparin sodium lock flush  1.5 mL Intravenous Once    sodium chloride   Intravenous Once    sodium chloride   Intravenous Once    hydrALAzine  10 mg Intravenous Once    atorvastatin  40 mg Oral Nightly    metoprolol tartrate  50 mg Oral 2x Daily(Beta Blocker)    ascorbic acid  500 mg Oral Daily    aspirin  81 mg Oral Daily    hydrALAZINE  25 mg Oral Q8H CHAYITO    famotidine  20 mg Oral Daily    Or    famotidine  20 mg Intravenous Daily    chlorhexidine gluconate  15 mL Mouth/Throat BID    [Held by provider] heparin  5,000 Units Subcutaneous 2 times per day    [Held by provider] clopidogrel  75 mg Oral Daily         LABS:  Patient Labs Reviewed in Detail. Pertinent Labs as follows:  Recent Labs   Lab 03/24/24  0511 03/24/24  1533 03/25/24  0423   * 119* 140*   BUN 52* 42* 42*   CREATSERUM 4.83* 3.97* 3.95*   EGFRCR 13* 17* 17*   CA 7.9* 8.5* 7.9*    135* 137   K 4.3 4.3 4.4    104 106   CO2 25.0 25.0 26.0     Recent Labs   Lab 03/23/24  0452 03/23/24  1213 03/23/24  1408 03/24/24  0511  03/25/24  0423   RBC 2.46*   < > 2.96* 2.88* 2.55*   HGB 7.2*   < > 8.9* 8.5* 7.5*   HCT 21.4*   < > 25.1* 24.8* 23.4*   MCV 87.0   < > 84.8 86.1 91.8   MCH 29.3   < > 30.1 29.5 29.4   MCHC 33.6   < > 35.5 34.3 32.1   RDW 15.8*   < > 15.9* 16.9* 16.9*   NEPRELIM 18.68*  --   --   --  17.07*   WBC 23.7*   < > 18.9* 20.4* 20.3*   .0*   < > 135.0* 142.0* 126.0*    < > = values in this interval not displayed.         IMAGING:  MRI BRAIN (CPT=70551)    Result Date: 3/25/2024  CONCLUSION:  1. Acute/subacute left frontal lobe cortical infarct. 2. A lacunar size acute/subacute infarct in the right parietal white matter. 3. A 1.2 cm focus of cortical signal abnormality in left occipital lobe related to a small infarct which is likely greater than 2 weeks in age. 4. Chronic bilateral basal ganglionic lacunar infarcts and changes in the cerebral white matter related to chronic small vessel disease. 5. Left greater than right mastoid effusions. 6. Bilateral frontal and milder ethmoid sinusitis. 7. Generalized increase in red marrow related to anemia.     Dictated by (CST): Fabian Gay MD on 3/25/2024 at 9:31 AM     Finalized by (CST): Fabian Gay MD on 3/25/2024 at 9:49 AM          MRA CAROTIDS (CPT=70547)    Result Date: 3/25/2024  CONCLUSION:  Markedly artifactually compromised examination. Within these parameters: 1. Suggestion of mild plaque involving the carotid bifurcations without evidence of flow-limiting stenosis or occlusion.  2. The vertebral arteries appear to be contiguously patent, insofar as visualized, without evidence of hemodynamically stenosis.  3. Lesser incidental findings as above.    A preliminary report was issued by the [facility] teleradiology service. There are no major discrepancies.   Dictated by (CST): Zachery Dean MD on 3/25/2024 at 8:49 AM     Finalized by (CST): Zachery Dean MD on 3/25/2024 at 8:54 AM            ASSESSMENT AND PLAN:   This is a 53 year old male with PMH  sig for HLD, tobacco use. Presented with chest pain and was admitted for NSTEMI.  Nephrology is consulted for NORMA      Anuric NORMA  - due to ATN from shock.   - creatinine 1.23 on admission, worsened to 7.19 (3/7)  - initiated on CVVH on 3/7 via RIJ temp HD cath  - RIJ tunneled HD catheter placed (3/22)   - discussed with Dr. Suggs on 3/23 - recommends to continue CRRT at this time.  Will check with neuro today to see if patient can transition to iHD.  If able to stop CRRT today, plan for iHD tomorrow.   - renal panel and Mg level every 12 hours while on CRRT   - Maintain adequate perfusion pressure  - Dose medications for CRRT  - Avoid nephrotoxins.  - follow renal fxn and I/Os  - monitor for renal recovery.     Hyperkalemia   - resolved with CRRT    Metabolic acidosis  - resolved with CRRT     Hyponatremia  - mildly low, will monitor     Dw RN     We will continue to follow.     Critical care time > 35 mins     Janet Velazco MD  [facility]

## 2024-03-25 NOTE — PROGRESS NOTES
Attempted follow up PT treatment. Patient on CRRT. Will reschedule.     Thank you,  Celia Lima, PT, DPT

## 2024-03-25 NOTE — PROGRESS NOTES
GS    some oozing from trach  Started on plavix and heparin  Packed  but still ooze      Silk stitch placed and stopped     obs

## 2024-03-25 NOTE — PROGRESS NOTES
Critical Care Progress Note     Assessment / Plan:  Acute respiratory failure - initially due to pulmonary edema then encephalopathy and severe deconditioning, s/p tracheostomy 3/22  - continue full vent support  - precedex as needed  - PS trials daily, try for 4 hours today  NSTEMI - diagnosed with new multivessel CAD s/p angioplasty and cardiogenic shock requiring Impella placement. S/p 3vCABG 3/15. Impella removed 3/16. Back to OR 3/16 AM for tamponade s/p pericardial clot removal  - cardiology and cardiac surgery following  Cardiogenic shock - due to acute coronary syndrome, EF was down and has now improved on repeat echo  - off vasopressors  Atrial fibrillation  - per cardiology  Pneumonia - sputum cultures with Klebsiella and MSSA  - antibiotics per ID  - on cefepime  Acute renal failure - suspect from ATN and cardiogenic shock  - RRT per nephrology  - possible transition from CRRT to HD today  Stroke  - MRI completed, read pending  - neurology following  Thrombocytopenia, anemia - HIT antibody was negative  - monitor  FEN  - TFs  - PEG 3/23.   Proph  - subcutaneous haparin  - famotidine  Dispo  - full code  - ICU, discharge to LTAC pending HD requirements    Discussed with patient, family at bedside, and RN    Critical care time: 38 minutes    Farooq Olvera MD  Pulmonary & Critical Care Medicine  [facility]      Subjective:  No acute events overnight  Awake this morning and denies pain    Objective:  Vitals:    03/25/24 0500 03/25/24 0600 03/25/24 0700 03/25/24 0727   BP: 97/69 106/73 122/84    BP Location: Right arm Right arm Right arm    Pulse: 81 76 85 87   Resp: 21 22 24 22   Temp:       TempSrc:       SpO2: 98% 93% 98% 99%   Weight:  172 lb 9.9 oz (78.3 kg)     Height:         Physical Exam:  General: laying in bed, trach in place  Respiratory: clear bilaterally, compliant with ventilator  Cardiovascular: regular rate and rhythm, no m/r/g  Abdomen: soft, NTND  Extremities: no LE  edema  Mental status: awake, following commands    Medications:  Reviewed in EMR    Lab Data:  Reviewed in EMR    Imaging:  I independently visualized all relevant chest imaging in PACS and agree with radiology interpretation except where noted.

## 2024-03-25 NOTE — PROGRESS NOTES
[facility]  part of [facility] Infectious Disease  Progress Note    Cristiano Obregon Patient Status:  Inpatient    1970 MRN [id number]   Location [facility] 2W/SW Attending Bud Camarena MD   Hosp Day # 20 PCP Abilio Dorsey MD     Cristiano Obregon is a 53 year old male.   Chief Complaint   Patient presents with    Chest Pain Angina       HPI:      Remains vent dependent  Bloody oozing from various sites and anticoagulation put on hold               REVIEW OF SYSTEMS:   A comprehensive 10 point review of systems was completed.  Pertinent positives and negatives noted in the the HPI.       Allergies:  No Known Allergies     Current Meds:    Current Facility-Administered Medications:     [START ON 2024] amiodarone (Pacerone) tab 200 mg, 200 mg, Oral, Daily    NxStage Pure Flow 400 CRRT/PIRRT fluid 5000mL, 2.5 L/hr, CRRT, Continuous **AND** CRRT Replacement Fluid 400, , , Until Discontinued    ceFEPIme (Maxipime) 1 g in sodium chloride 0.9% 100 mL IVPB-MBP, 1 g, Intravenous, Q12H    amiodarone (Pacerone) tab 200 mg, 200 mg, Oral, BID with meals    sodium chloride 0.9% infusion, , Intravenous, Once    dexmedeTOMIDine in sodium chloride 0.9% (Precedex) 400 mcg/100mL infusion premix, 0.2-1.5 mcg/kg/hr (Dosing Weight), Intravenous, Continuous    heparin sodium lock flush 100 UNIT/ML injection 150 Units, 1.5 mL, Intravenous, Once    sodium chloride 0.9% infusion, , Intravenous, Once    sodium chloride 0.9% infusion, , Intravenous, Once    hydrALAzine (Apresoline) 20 mg/mL injection 10 mg, 10 mg, Intravenous, Once    labetalol (Trandate) 5 mg/mL injection 10 mg, 10 mg, Intravenous, Q10 Min PRN    hydrALAzine (Apresoline) 20 mg/mL injection 10 mg, 10 mg, Intravenous, Q2H PRN    prochlorperazine (Compazine) 10 MG/2ML injection 5 mg, 5 mg, Intravenous, Q8H PRN    atorvastatin (Lipitor) tab 40 mg, 40 mg, Oral, Nightly    metoprolol tartrate (Lopressor)  tab 50 mg, 50 mg, Oral, 2x Daily(Beta Blocker)    ascorbic acid (Vitamin C) tab 500 mg, 500 mg, Oral, Daily    aspirin chewable tab 81 mg, 81 mg, Oral, Daily    dextrose 10% infusion (TPN no rate), , Intravenous, Continuous PRN    pancrelipase (Lip-Prot-Amyl) (Zenpep) DR particles cap 10,000 Units, 10,000 Units, Per G Tube, PRN **AND** sodium bicarbonate tab 325 mg, 325 mg, Oral, PRN    hydrALAZINE (Apresoline) tab 25 mg, 25 mg, Oral, Q8H CHAYITO    alteplase (Activase) 4 mg in sterile water for injection (PF) 2.2 mL IV push to declot line, 4 mg, Intravenous, PRN    melatonin tab 3 mg, 3 mg, Oral, Nightly PRN    polyethylene glycol (PEG 3350) (Miralax) 17 g oral packet 17 g, 17 g, Oral, Daily PRN    ondansetron (Zofran) 4 MG/2ML injection 4 mg, 4 mg, Intravenous, Q6H PRN    famotidine (Pepcid) tab 20 mg, 20 mg, Oral, Daily **OR** famotidine (Pepcid) 20 mg/2mL injection 20 mg, 20 mg, Intravenous, Daily    potassium chloride 20 mEq/100mL IVPB premix 20 mEq, 20 mEq, Intravenous, PRN **OR** potassium chloride 40 mEq/100mL IVPB premix (central line) 40 mEq, 40 mEq, Intravenous, PRN    magnesium sulfate in dextrose 5% 1 g/100mL infusion premix 1 g, 1 g, Intravenous, PRN    magnesium sulfate in sterile water for injection 2 g/50mL IVPB premix 2 g, 2 g, Intravenous, PRN    chlorhexidine gluconate (Peridex) 0.12 % oral solution 15 mL, 15 mL, Mouth/Throat, BID    [Held by provider] heparin (Porcine) 5000 UNIT/ML injection 5,000 Units, 5,000 Units, Subcutaneous, 2 times per day    [DISCONTINUED] acetaminophen (Tylenol) tab 650 mg, 650 mg, Oral, Q4H PRN **OR** HYDROcodone-acetaminophen (Norco) 5-325 MG per tab 1 tablet, 1 tablet, Oral, Q4H PRN **OR** [DISCONTINUED] HYDROcodone-acetaminophen (Norco) 5-325 MG per tab 2 tablet, 2 tablet, Oral, Q4H PRN    [Held by provider] clopidogrel (Plavix) tab 75 mg, 75 mg, Oral, Daily    glucose (Dex4) 15 GM/59ML oral liquid 15 g, 15 g, Oral, Q15 Min PRN **OR** glucose (Glutose) 40% oral gel 15  g, 15 g, Oral, Q15 Min PRN **OR** glucose-vitamin C (Dex-4) chewable tab 4 tablet, 4 tablet, Oral, Q15 Min PRN **OR** dextrose 50% injection 50 mL, 50 mL, Intravenous, Q15 Min PRN **OR** glucose (Dex4) 15 GM/59ML oral liquid 30 g, 30 g, Oral, Q15 Min PRN **OR** glucose (Glutose) 40% oral gel 30 g, 30 g, Oral, Q15 Min PRN **OR** glucose-vitamin C (Dex-4) chewable tab 8 tablet, 8 tablet, Oral, Q15 Min PRN    heparin (Porcine) 1000 UNIT/ML injection 1,500 Units, 1.5 mL, Intracatheter, PRN    acetaminophen (Tylenol) tab 650 mg, 650 mg, Oral, Q4H PRN **OR** acetaminophen (Tylenol) 160 MG/5ML oral liquid 650 mg, 650 mg, Oral, Q4H PRN **OR** [DISCONTINUED] acetaminophen (Tylenol) rectal suppository 650 mg, 650 mg, Rectal, Q4H PRN **OR** acetaminophen (Ofirmev) 10 mg/mL infusion premix 1,000 mg, 1,000 mg, Intravenous, Q6H PRN    senna (Senokot) 8.8 MG/5ML oral syrup 17.6 mg, 10 mL, Oral, Nightly PRN    bisacodyl (Dulcolax) 10 MG rectal suppository 10 mg, 10 mg, Rectal, Daily PRN    fleet enema (Fleet) 7-19 GM/118ML rectal enema 133 mL, 1 enema, Rectal, Once PRN   No current outpatient medications on file.        HISTORY:  History reviewed. No pertinent past medical history.   Past Surgical History:   Procedure Laterality Date    COLONOSCOPY N/A 12/9/2017    Procedure: COLONOSCOPY, POSSIBLE BIOPSY, POSSIBLE POLYPECTOMY 96249;  Surgeon: Byron Plaza MD;  Location: [facility]        Social history and family history negative related to present illness except as above.    PHYSICAL EXAM:   /72 (BP Location: Right arm)   Pulse 88   Temp 98.2 °F (36.8 °C) (Temporal)   Resp 20   Ht 5' 7\" (1.702 m)   Wt 172 lb 9.9 oz (78.3 kg)   SpO2 100%   BMI 27.04 kg/m²   GENERAL:  Awake, alert, oriented x3. Non-tox, non-septic and in NAD.fio2 30%  HEENT:  Normocephalic, no scleral abnormalities.  Oropharynx clear, trach site serosang drainage  NECK:  Supple, no masses, no lymphadenopathy.  LUNGS:  upper  rhonchi  CARDIO: RRR S1/S2, no rubs, clicks, heaves, or murmurs.  GI:  Soft NT/ND, BS present x4 quadrants, no HSM.  EXTREMITIES: ue > le edema improved, no clubbing, no cyanosis.  NEURO:  No focal neurologic deficits.  DERM:  Warm, dry, no rashes.    IMPRESSION/PLAN:        Fevers improved; suspect hcap resolving; fio2 30% a positive sign  Continue cefepime day 11 rx; will extend a little longer given trach site purulence seen again today  S/p m.I. acute and subacute multiple strokes  Wbc remains elevated at 20k  S/p trach and peg  Spoke with rn  Minimal urine so ongoing dialysis  >35 min               Recent Results (from the past 72 hour(s))   POCT Glucose    Collection Time: 03/22/24 12:19 PM   Result Value Ref Range    POC Glucose  159 (H) 70 - 99 mg/dL   CBC, Platelet; No Differential    Collection Time: 03/22/24 12:30 PM   Result Value Ref Range    WBC 21.2 (H) 4.0 - 11.0 x10(3) uL    RBC 2.50 (L) 4.30 - 5.70 x10(6)uL    HGB 7.2 (L) 13.0 - 17.5 g/dL    HCT 22.2 (L) 39.0 - 53.0 %    MCV 88.8 80.0 - 100.0 fL    MCH 28.8 26.0 - 34.0 pg    MCHC 32.4 31.0 - 37.0 g/dL    RDW 15.8 (H) 11.0 - 15.0 %    RDW-SD 50.2 (H) 35.1 - 46.3 fL    .0 150.0 - 450.0 10(3)uL   Magnesium    Collection Time: 03/22/24  5:49 PM   Result Value Ref Range    Magnesium 1.9 1.6 - 2.6 mg/dL   Phosphorus    Collection Time: 03/22/24  5:49 PM   Result Value Ref Range    Phosphorus 4.7 2.4 - 5.1 mg/dL   CBC, Platelet; No Differential    Collection Time: 03/22/24  5:49 PM   Result Value Ref Range    WBC 18.8 (H) 4.0 - 11.0 x10(3) uL    RBC 2.32 (L) 4.30 - 5.70 x10(6)uL    HGB 6.7 (LL) 13.0 - 17.5 g/dL    HCT 20.9 (L) 39.0 - 53.0 %    MCV 90.1 80.0 - 100.0 fL    MCH 28.9 26.0 - 34.0 pg    MCHC 32.1 31.0 - 37.0 g/dL    RDW 16.3 (H) 11.0 - 15.0 %    RDW-SD 52.0 (H) 35.1 - 46.3 fL    .0 150.0 - 450.0 10(3)uL   Basic Metabolic Panel (8)    Collection Time: 03/22/24  5:49 PM   Result Value Ref Range    Glucose 131 (H) 70 - 99 mg/dL     Sodium 136 136 - 145 mmol/L    Potassium 4.4 3.5 - 5.1 mmol/L    Chloride 103 98 - 112 mmol/L    CO2 24.0 21.0 - 32.0 mmol/L    Anion Gap 9 0 - 18 mmol/L    BUN 57 (H) 9 - 23 mg/dL    Creatinine 5.16 (H) 0.70 - 1.30 mg/dL    BUN/CREA Ratio 11.0 10.0 - 20.0    Calcium, Total 7.6 (L) 8.7 - 10.4 mg/dL    Calculated Osmolality 300 (H) 275 - 295 mOsm/kg    eGFR-Cr 12 (L) >=60 mL/min/1.73m2   MD BLOOD SMEAR CONSULT    Collection Time: 03/22/24  5:49 PM   Result Value Ref Range    MD Blood Smear Consult       Evaluation of the CBC data and the peripheral blood smear demonstrates leukocytosis consisting of neutrophilia, and severe normocytic anemia.  Neutrophils demonstrate reactive features, with a mild left shift including increased myelocytes.  Red blood cells demonstrate mild Bozena leukocytosis, with macrocytes, microcytes, and occasional ovalocytes.  There are no significant morphologic abnormalities in the other white blood cell subsets or platelets.    Differential causes for an anemia of this type may include chronic inflammatory disorders, chronic infections, neoplasms, hemolysis/hemorrhage, and end organ failure.      General differential considerations for neutrophilia include infection, drugs/hormones, tissue necrosis, inflammatory disorders, acute hemorrhage/hemolysis, and metabolic disorders.      Clinical correlation is recommended.    Reviewed by Pushpa Renner M.D.     POCT Glucose    Collection Time: 03/22/24  6:15 PM   Result Value Ref Range    POC Glucose  145 (H) 70 - 99 mg/dL   Prothrombin Time (PT)    Collection Time: 03/22/24  6:42 PM   Result Value Ref Range    PT 16.4 (H) 11.6 - 14.8 seconds    INR 1.24 (H) 0.80 - 1.20   PTT, Activated    Collection Time: 03/22/24  6:42 PM   Result Value Ref Range    PTT 51.7 (H) 23.3 - 35.6 seconds   POCT Glucose    Collection Time: 03/23/24  1:11 AM   Result Value Ref Range    POC Glucose  114 (H) 70 - 99 mg/dL   Magnesium    Collection Time: 03/23/24  4:49 AM    Result Value Ref Range    Magnesium 1.8 1.6 - 2.6 mg/dL   Phosphorus    Collection Time: 03/23/24  4:49 AM   Result Value Ref Range    Phosphorus 3.4 2.4 - 5.1 mg/dL   Basic Metabolic Panel (8)    Collection Time: 03/23/24  4:49 AM   Result Value Ref Range    Glucose 127 (H) 70 - 99 mg/dL    Sodium 133 (L) 136 - 145 mmol/L    Potassium 4.4 3.5 - 5.1 mmol/L    Chloride 105 98 - 112 mmol/L    CO2 24.0 21.0 - 32.0 mmol/L    Anion Gap 4 0 - 18 mmol/L    BUN 46 (H) 9 - 23 mg/dL    Creatinine 4.12 (H) 0.70 - 1.30 mg/dL    BUN/CREA Ratio 11.2 10.0 - 20.0    Calcium, Total 8.0 (L) 8.7 - 10.4 mg/dL    Calculated Osmolality 289 275 - 295 mOsm/kg    eGFR-Cr 16 (L) >=60 mL/min/1.73m2   CBC W/ DIFFERENTIAL    Collection Time: 03/23/24  4:52 AM   Result Value Ref Range    WBC 23.7 (H) 4.0 - 11.0 x10(3) uL    RBC 2.46 (L) 4.30 - 5.70 x10(6)uL    HGB 7.2 (L) 13.0 - 17.5 g/dL    HCT 21.4 (L) 39.0 - 53.0 %    MCV 87.0 80.0 - 100.0 fL    MCH 29.3 26.0 - 34.0 pg    MCHC 33.6 31.0 - 37.0 g/dL    RDW-SD 47.9 (H) 35.1 - 46.3 fL    RDW 15.8 (H) 11.0 - 15.0 %    .0 (L) 150.0 - 450.0 10(3)uL    Immature Platelet Fraction 10.8 (H) 0.0 - 7.0 %    Neutrophil Absolute Prelim 18.68 (H) 1.50 - 7.70 x10 (3) uL   Manual differential    Collection Time: 03/23/24  4:52 AM   Result Value Ref Range    Neutrophil Absolute Manual 21.09 (H) 1.50 - 7.70 x10(3) uL    Lymphocyte Absolute Manual 1.19 1.00 - 4.00 x10(3) uL    Monocyte Absolute Manual 0.24 0.10 - 1.00 x10(3) uL    Eosinophil Absolute Manual 1.19 (H) 0.00 - 0.70 x10(3) uL    Basophil Absolute Manual 0.00 0.00 - 0.20 x10(3) uL    Neutrophils % Manual 88 %    Band % 1 %    Lymphocyte % Manual 5 %    Monocyte % Manual 1 %    Eosinophil % Manual 5 %    Basophil % Manual 0 %    Total Cells Counted 100     RBC Morphology See morphology below (A) Normal, Slide reviewed, see previous RBC morphology.    Platelet Morphology Normal Normal    Macrocytosis 1+      Basophilic Stippling 1+     POCT  Glucose    Collection Time: 03/23/24  5:29 AM   Result Value Ref Range    POC Glucose  146 (H) 70 - 99 mg/dL   POCT Glucose    Collection Time: 03/23/24 12:00 PM   Result Value Ref Range    POC Glucose  137 (H) 70 - 99 mg/dL   CBC, Platelet; No Differential    Collection Time: 03/23/24 12:13 PM   Result Value Ref Range    WBC 18.8 (H) 4.0 - 11.0 x10(3) uL    RBC 2.77 (L) 4.30 - 5.70 x10(6)uL    HGB 8.0 (L) 13.0 - 17.5 g/dL    HCT 23.8 (L) 39.0 - 53.0 %    MCV 85.9 80.0 - 100.0 fL    MCH 28.9 26.0 - 34.0 pg    MCHC 33.6 31.0 - 37.0 g/dL    RDW 15.8 (H) 11.0 - 15.0 %    RDW-SD 47.8 (H) 35.1 - 46.3 fL    .0 (L) 150.0 - 450.0 10(3)uL    Immature Platelet Fraction 10.4 (H) 0.0 - 7.0 %   Basic Metabolic Panel (8)    Collection Time: 03/23/24 12:13 PM   Result Value Ref Range    Glucose 124 (H) 70 - 99 mg/dL    Sodium 133 (L) 136 - 145 mmol/L    Potassium 4.6 3.5 - 5.1 mmol/L    Chloride 104 98 - 112 mmol/L    CO2 24.0 21.0 - 32.0 mmol/L    Anion Gap 5 0 - 18 mmol/L    BUN 45 (H) 9 - 23 mg/dL    Creatinine 4.65 (H) 0.70 - 1.30 mg/dL    BUN/CREA Ratio 9.7 (L) 10.0 - 20.0    Calcium, Total 7.9 (L) 8.7 - 10.4 mg/dL    Calculated Osmolality 289 275 - 295 mOsm/kg    eGFR-Cr 14 (L) >=60 mL/min/1.73m2   Scan slide    Collection Time: 03/23/24 12:13 PM   Result Value Ref Range    Slide Review Slide reviewed,morphology review added    RBC Morphology Scan    Collection Time: 03/23/24 12:13 PM   Result Value Ref Range    RBC Morphology Normal Normal, Slide reviewed, see previous RBC morphology.    Platelet Morphology See morphology below (A) Normal    Giant platelets Few (A)     Blood Culture    Collection Time: 03/23/24 12:32 PM    Specimen: Blood,peripheral   Result Value Ref Range    Blood Culture Result No Growth 1 Day    RAINBOW DRAW GOLD    Collection Time: 03/23/24 12:38 PM   Result Value Ref Range    Hold Gold Auto Resulted    Magnesium    Collection Time: 03/23/24  2:08 PM   Result Value Ref Range    Magnesium 2.2 1.6  - 2.6 mg/dL   Phosphorus    Collection Time: 03/23/24  2:08 PM   Result Value Ref Range    Phosphorus 3.8 2.4 - 5.1 mg/dL   CBC, Platelet; No Differential    Collection Time: 03/23/24  2:08 PM   Result Value Ref Range    WBC 18.9 (H) 4.0 - 11.0 x10(3) uL    RBC 2.96 (L) 4.30 - 5.70 x10(6)uL    HGB 8.9 (L) 13.0 - 17.5 g/dL    HCT 25.1 (L) 39.0 - 53.0 %    MCV 84.8 80.0 - 100.0 fL    MCH 30.1 26.0 - 34.0 pg    MCHC 35.5 31.0 - 37.0 g/dL    RDW 15.9 (H) 11.0 - 15.0 %    RDW-SD 46.2 35.1 - 46.3 fL    .0 (L) 150.0 - 450.0 10(3)uL    Immature Platelet Fraction 8.6 (H) 0.0 - 7.0 %   Scan slide    Collection Time: 03/23/24  2:08 PM   Result Value Ref Range    Slide Review Slide reviewed,morphology review added    RBC Morphology Scan    Collection Time: 03/23/24  2:08 PM   Result Value Ref Range    RBC Morphology Normal Normal, Slide reviewed, see previous RBC morphology.    Platelet Morphology See morphology below (A) Normal    Giant platelets Few (A)     Blood Culture    Collection Time: 03/23/24  2:13 PM    Specimen: Blood,peripheral   Result Value Ref Range    Blood Culture Result No Growth 1 Day    POCT Glucose    Collection Time: 03/23/24  6:10 PM   Result Value Ref Range    POC Glucose  129 (H) 70 - 99 mg/dL   POCT Glucose    Collection Time: 03/23/24 11:58 PM   Result Value Ref Range    POC Glucose  143 (H) 70 - 99 mg/dL   Prepare RBC Once    Collection Time: 03/24/24 12:40 AM   Result Value Ref Range    Blood Product E2266F04     Unit Number [id number]     UNIT ABO B     UNIT RH POS     Product Status Presumed Transfused     Expiration Date 048637140172     Blood Type Barcode 7300     Unit Volume 350 ml   Prepare RBC Once    Collection Time: 03/24/24 12:40 AM   Result Value Ref Range    Blood Product X0401I02     Unit Number [id number]     UNIT ABO B     UNIT RH POS     Product Status Presumed Transfused     Expiration Date 244260940936     Blood Type Barcode 7300     Unit Volume 350 ml   Prepare  RBC Once    Collection Time: 03/24/24 12:40 AM   Result Value Ref Range    Blood Product G3430X31     Unit Number [id number]     UNIT ABO B     UNIT RH POS     Product Status Presumed Transfused     Expiration Date [id number]     Blood Type Barcode 7300     Unit Volume 350 ml   Magnesium    Collection Time: 03/24/24  5:11 AM   Result Value Ref Range    Magnesium 2.1 1.6 - 2.6 mg/dL   Phosphorus    Collection Time: 03/24/24  5:11 AM   Result Value Ref Range    Phosphorus 3.4 2.4 - 5.1 mg/dL   CBC, Platelet; No Differential    Collection Time: 03/24/24  5:11 AM   Result Value Ref Range    WBC 20.4 (H) 4.0 - 11.0 x10(3) uL    RBC 2.88 (L) 4.30 - 5.70 x10(6)uL    HGB 8.5 (L) 13.0 - 17.5 g/dL    HCT 24.8 (L) 39.0 - 53.0 %    MCV 86.1 80.0 - 100.0 fL    MCH 29.5 26.0 - 34.0 pg    MCHC 34.3 31.0 - 37.0 g/dL    RDW 16.9 (H) 11.0 - 15.0 %    RDW-SD 50.8 (H) 35.1 - 46.3 fL    .0 (L) 150.0 - 450.0 10(3)uL    Immature Platelet Fraction 10.0 (H) 0.0 - 7.0 %   Hepatic Function Panel (7)    Collection Time: 03/24/24  5:11 AM   Result Value Ref Range    AST 26 <=34 U/L    ALT 19 10 - 49 U/L    Alkaline Phosphatase 97 45 - 117 U/L    Bilirubin, Total 0.8 0.3 - 1.2 mg/dL    Bilirubin, Direct 0.3 <=0.3 mg/dL    Total Protein 5.3 (L) 5.7 - 8.2 g/dL    Albumin 3.1 (L) 3.2 - 4.8 g/dL   Scan slide    Collection Time: 03/24/24  5:11 AM   Result Value Ref Range    Slide Review       Platelets reviewed on smear. No giant platelets or platelet clumps observed.   Basic Metabolic Panel (8)    Collection Time: 03/24/24  5:11 AM   Result Value Ref Range    Glucose 114 (H) 70 - 99 mg/dL    Sodium 137 136 - 145 mmol/L    Potassium 4.3 3.5 - 5.1 mmol/L    Chloride 104 98 - 112 mmol/L    CO2 25.0 21.0 - 32.0 mmol/L    Anion Gap 8 0 - 18 mmol/L    BUN 52 (H) 9 - 23 mg/dL    Creatinine 4.83 (H) 0.70 - 1.30 mg/dL    BUN/CREA Ratio 10.8 10.0 - 20.0    Calcium, Total 7.9 (L) 8.7 - 10.4 mg/dL    Calculated Osmolality 299 (H) 275 - 295 mOsm/kg     eGFR-Cr 13 (L) >=60 mL/min/1.73m2   Uric Acid    Collection Time: 03/24/24  5:11 AM   Result Value Ref Range    Uric Acid 3.3 (L) 3.7 - 9.2 mg/dL   Lipase    Collection Time: 03/24/24  5:11 AM   Result Value Ref Range    Lipase 141 (H) 12 - 53 U/L   POCT Glucose    Collection Time: 03/24/24  5:55 AM   Result Value Ref Range    POC Glucose  122 (H) 70 - 99 mg/dL   POCT Glucose    Collection Time: 03/24/24 12:42 PM   Result Value Ref Range    POC Glucose  125 (H) 70 - 99 mg/dL   Renal Function Panel    Collection Time: 03/24/24  3:33 PM   Result Value Ref Range    Glucose 119 (H) 70 - 99 mg/dL    Sodium 135 (L) 136 - 145 mmol/L    Potassium 4.3 3.5 - 5.1 mmol/L    Chloride 104 98 - 112 mmol/L    CO2 25.0 21.0 - 32.0 mmol/L    Anion Gap 6 0 - 18 mmol/L    BUN 42 (H) 9 - 23 mg/dL    Creatinine 3.97 (H) 0.70 - 1.30 mg/dL    BUN/CREA Ratio 10.6 10.0 - 20.0    Calcium, Total 8.5 (L) 8.7 - 10.4 mg/dL    Calculated Osmolality 292 275 - 295 mOsm/kg    eGFR-Cr 17 (L) >=60 mL/min/1.73m2    Albumin 3.4 3.2 - 4.8 g/dL    Phosphorus 3.0 2.4 - 5.1 mg/dL   Magnesium    Collection Time: 03/24/24  3:33 PM   Result Value Ref Range    Magnesium 2.0 1.6 - 2.6 mg/dL   POCT Glucose    Collection Time: 03/24/24  5:27 PM   Result Value Ref Range    POC Glucose  124 (H) 70 - 99 mg/dL   POCT Glucose    Collection Time: 03/24/24  8:09 PM   Result Value Ref Range    POC Glucose  123 (H) 70 - 99 mg/dL   POCT Glucose    Collection Time: 03/24/24 11:57 PM   Result Value Ref Range    POC Glucose  134 (H) 70 - 99 mg/dL   Prothrombin Time (PT)    Collection Time: 03/25/24  4:23 AM   Result Value Ref Range    PT 16.2 (H) 11.6 - 14.8 seconds    INR 1.22 (H) 0.80 - 1.20   Renal Function Panel    Collection Time: 03/25/24  4:23 AM   Result Value Ref Range    Glucose 140 (H) 70 - 99 mg/dL    Sodium 137 136 - 145 mmol/L    Potassium 4.4 3.5 - 5.1 mmol/L    Chloride 106 98 - 112 mmol/L    CO2 26.0 21.0 - 32.0 mmol/L    Anion Gap 5 0 - 18 mmol/L    BUN 42  (H) 9 - 23 mg/dL    Creatinine 3.95 (H) 0.70 - 1.30 mg/dL    BUN/CREA Ratio 10.6 10.0 - 20.0    Calcium, Total 7.9 (L) 8.7 - 10.4 mg/dL    Calculated Osmolality 297 (H) 275 - 295 mOsm/kg    eGFR-Cr 17 (L) >=60 mL/min/1.73m2    Albumin 2.9 (L) 3.2 - 4.8 g/dL    Phosphorus 3.2 2.4 - 5.1 mg/dL   CBC W/ DIFFERENTIAL    Collection Time: 03/25/24  4:23 AM   Result Value Ref Range    WBC 20.3 (H) 4.0 - 11.0 x10(3) uL    RBC 2.55 (L) 4.30 - 5.70 x10(6)uL    HGB 7.5 (L) 13.0 - 17.5 g/dL    HCT 23.4 (L) 39.0 - 53.0 %    MCV 91.8 80.0 - 100.0 fL    MCH 29.4 26.0 - 34.0 pg    MCHC 32.1 31.0 - 37.0 g/dL    RDW-SD 55.0 (H) 35.1 - 46.3 fL    RDW 16.9 (H) 11.0 - 15.0 %    .0 (L) 150.0 - 450.0 10(3)uL    Immature Platelet Fraction 11.0 (H) 0.0 - 7.0 %    Neutrophil Absolute Prelim 17.07 (H) 1.50 - 7.70 x10 (3) uL   Manual differential    Collection Time: 03/25/24  4:23 AM   Result Value Ref Range    Neutrophil Absolute Manual 17.86 (H) 1.50 - 7.70 x10(3) uL    Lymphocyte Absolute Manual 0.81 (L) 1.00 - 4.00 x10(3) uL    Monocyte Absolute Manual 0.20 0.10 - 1.00 x10(3) uL    Eosinophil Absolute Manual 1.02 (H) 0.00 - 0.70 x10(3) uL    Basophil Absolute Manual 0.41 (H) 0.00 - 0.20 x10(3) uL    Neutrophils % Manual 87 %    Band % 1 %    Lymphocyte % Manual 4 %    Monocyte % Manual 1 %    Eosinophil % Manual 5 %    Basophil % Manual 2 %    Total Cells Counted 100     RBC Morphology See morphology below (A) Normal, Slide reviewed, see previous RBC morphology.    Platelet Morphology Normal Normal    Pappenheimer Bodies Present (A) (none)    Macrocytosis 1+      Polychromasia 1+      Schistocytes 1+      Basophilic Stippling 2+ (A)      Arriola-Jolly Bodies Present (A) (none)   Magnesium    Collection Time: 03/25/24  4:23 AM   Result Value Ref Range    Magnesium 2.0 1.6 - 2.6 mg/dL   POCT Glucose    Collection Time: 03/25/24  5:02 AM   Result Value Ref Range    POC Glucose  143 (H) 70 - 99 mg/dL         Ottoniel Bennett MD     CALL  [facility] AT [phone] IF QUESTIONS OR CONCERNS  THANKS

## 2024-03-25 NOTE — OCCUPATIONAL THERAPY NOTE
OT order received and chart reviewed. Attempted to see pt for OT today. Pt is on CRRT today. Will re-attempt when pt is available and medically appropriate.     Marley Benito MS, OTR/L

## 2024-03-25 NOTE — PROGRESS NOTES
[facility] Hospitalist Progress Note     CC: Hospital Follow up    PCP: Abilio Dorsey MD       Assessment/Plan:     Principal Problem:    NSTEMI (non-ST elevated myocardial infarction) (HCC)  Active Problems:    Chest pain    Cerebrovascular accident (CVA) due to embolism of left middle cerebral artery (HCC)    Anemia    Cristiano Obregon is a 53 year old male with HLD, tobacco use, presented 3/5/2024 with chest pain. Found to have STEMI with cardiogenic shock requiring Impella placement. Intubated in cath lab, s/p CABG 3/15. Subsequently with tamponade & back to OR 3/16 w CVS for washout. OR course c/b extensive blood loss - required ~20+u blood products letitia-operatively & subsequently coagulopathy w clotting in chest tubes & CRRT - hematology consulted. Stay further c/b: renal failure requiring CRRT - nephro consulted, resp failure w > 2 wks intubated, s/p tracheostomy and PEG 3/22, embolic strokes - neuro following.      STEMI c/b cardiogenic shock w multi-system organ failure   S/p CABG 3/15  Post-op cardiac tamponade s/p washout 3/16  Paroxysmal/post-op a fib  HTN  HLD  - Multivessel disease including chronically occluded large RCA, 95% sequential LAD stenosis on cath 3/5. Flow restored to heavily thrombosed circumflex and OM1, Impella placed (now removed). 3/6 s/p emergent RHC to eval hemodynamics. Worsening renal function noted.   - Cardiology consulted: angiograms as above   - CCM consulted: vent management, sedation  - CV surgery consulted: s/p CABG on 3/15  - cardiac tamponade developed 3/16, s/p mediastinal washout   - amio gtt to po/IV, dobutamine gtt, levophed stopped  - PO amiodarone 200 mg BID  - repeat echo w preserved EF despite hypokinesis     Acute hypoxemic respiratory failure  - intubated in cath lab (3/5 -  )  - vent management per crit care colleagues  - Daily SBT, extubated when able  - s/p tracheostomy on 3/22  - vent weaning per pulm, SBT or trach collar trial?      Anuric acute kidney failure - suspect ATN from shock  Metabolic acidosis  Hyponatremia  Hyperkalemia  - Nephrology consutled  - CRRT initiated 3/7. Catheter exchanged 3/19  - resume CRRT, post CABG now, s/p 20 units of blood products in OR  - Planning for nightly CRRT vs HD     Sepsis 2/2 RML bacterial pneumonia - Persistent fevers 3/10 - 3/15, HCAP/VAP  Leukocytosis - initially from infection, now suspect reactive with strokes, HD, critical illness, etc  - Sputum with Klebsiella and MSSA  - Blood cx neg  - ID consulted  - Cefepime to be extended per ID     Subacute L frontal & occiptal lobe infarcts  - presume cardioembolic  - CTH w subacute L frontal & occipital lobe infarcts  - 3/5 RxFx eval: A1c - 6%, FLP - LDL 64, tele  - TTE noted  - Neuro consulted  - ASA 81 & plavix  - no need for full ac per neuro  - PT/OT/SLP when able  -Reflexive movement of the right upper and lower extremity, not purposeful, will squeeze his left hand and wiggle the toes on his left leg (3/23)  - MRI Brain -acute/subacute left frontal lobe infarct, lacunar size subacute infarct in the right parietal white matter, left occipital lobe infarct, MRA with mild plaque in the carotids without significant stenosis  - MRI C-spine -pending     Hematuria, resolved  -yellow urine noted, minimal, but not bloody      Acute blood loss anemia  Thrombocytopenia - suspect reactive/consumptive  Hypercoagulability & anemia  - recurring clotting in chest tubes & HD. S/p 20+ u blood products this admission  - Hematology consulted, d/w Dr. Styles - hold on hypercoag w/u at this time  - trend CBC     Tobacco use disorder  - Cessation     Elevated liver enzymes  - Likely 2/2 to shock. Hep b neg   - Trend CMP     Nutrition  - 180lbs on admission  - TPN while unable to take PO  - GI consulted - high risk for GI PEG, rec IR  - s/p PEG 3/22 w IR-> TF when ok with IR         ACP: Full Code. Linnea contact:  Reyes  Ppx: DVT: SQH (hold)  Dispo  EDoD:  ~ 3/27.  Suspect will need LTAC, Dispo TBD clinical course - PT/OT following.     Questions/concerns were discussed with patient and/or family by bedside.    Thank You,  Bud Camarena MD    Hospitalist with [facility]     Subjective:     Much more alert and awake today on breathing trial, tracking, following commands, nodding to questions appropriately    OBJECTIVE:    Blood pressure 114/72, pulse 88, temperature 98.2 °F (36.8 °C), temperature source Temporal, resp. rate 20, height 5' 7\" (1.702 m), weight 172 lb 9.9 oz (78.3 kg), SpO2 100%.    Temp:  [98.2 °F (36.8 °C)-98.7 °F (37.1 °C)] 98.2 °F (36.8 °C)  Pulse:  [] 88  Resp:  [15-31] 20  BP: ()/() 114/72  SpO2:  [93 %-100 %] 100 %  FiO2 (%):  [30 %] 30 %      Intake/Output:    Intake/Output Summary (Last 24 hours) at 3/25/2024 1203  Last data filed at 3/25/2024 1030  Gross per 24 hour   Intake 1575.3 ml   Output 3817 ml   Net -2241.7 ml       Last 3 Weights   03/25/24 0600 172 lb 9.9 oz (78.3 kg)   03/24/24 0500 181 lb 14.1 oz (82.5 kg)   03/23/24 0600 179 lb 0.2 oz (81.2 kg)   03/22/24 0430 174 lb 13.2 oz (79.3 kg)   03/21/24 0600 181 lb 10.5 oz (82.4 kg)   03/20/24 0400 185 lb 3 oz (84 kg)   03/19/24 1000 189 lb 9.5 oz (86 kg)   03/18/24 0231 185 lb 10 oz (84.2 kg)   03/15/24 0600 188 lb 4.4 oz (85.4 kg)   03/14/24 0600 184 lb 15.5 oz (83.9 kg)   03/12/24 1000 186 lb 4.6 oz (84.5 kg)   03/11/24 1000 192 lb 10.9 oz (87.4 kg)   03/09/24 0500 188 lb 15 oz (85.7 kg)   03/08/24 0600 185 lb 13.6 oz (84.3 kg)   03/07/24 0600 183 lb 3.2 oz (83.1 kg)   03/06/24 0500 180 lb 1.9 oz (81.7 kg)   03/05/24 1324 175 lb 7.8 oz (79.6 kg)   03/05/24 0722 160 lb (72.6 kg)   10/06/21 1541 177 lb 6.4 oz (80.5 kg)   09/09/20 0929 172 lb 9.6 oz (78.3 kg)       Exam    Gen: Alert to voice  Heent: Tracheostomy in place, tunneled line in place  Pulm: Lungs diminished breath sounds at bases  CV: Heart with regular rate and rhythm, trace edema  Abd: Abdomen soft, PEG tube in  place, not distended  MSK: no clubbing, no cyanosis  Skin: no rashes or lesions  Neuro: Able to squeeze hands on the left and right side, wiggle toes on the left and right side, L > R      Data Review:       Labs:     Recent Labs   Lab 03/23/24  0452 03/23/24  1213 03/23/24  1408 03/24/24  0511 03/25/24  0423   RBC 2.46*   < > 2.96* 2.88* 2.55*   HGB 7.2*   < > 8.9* 8.5* 7.5*   HCT 21.4*   < > 25.1* 24.8* 23.4*   MCV 87.0   < > 84.8 86.1 91.8   MCH 29.3   < > 30.1 29.5 29.4   MCHC 33.6   < > 35.5 34.3 32.1   RDW 15.8*   < > 15.9* 16.9* 16.9*   NEPRELIM 18.68*  --   --   --  17.07*   WBC 23.7*   < > 18.9* 20.4* 20.3*   .0*   < > 135.0* 142.0* 126.0*    < > = values in this interval not displayed.         Recent Labs   Lab 03/24/24  0511 03/24/24  1533 03/25/24  0423   * 119* 140*   BUN 52* 42* 42*   CREATSERUM 4.83* 3.97* 3.95*   EGFRCR 13* 17* 17*   CA 7.9* 8.5* 7.9*    135* 137   K 4.3 4.3 4.4    104 106   CO2 25.0 25.0 26.0       Recent Labs   Lab 03/19/24  0541 03/20/24  0444 03/24/24  0511 03/24/24  1533 03/25/24  0423   ALT <7*  --  19  --   --    AST 26  --  26  --   --    ALB 3.1* 3.1* 3.1* 3.4 2.9*   LDH  --  382*  --   --   --          Imaging:  MRI BRAIN (CPT=70551)    Result Date: 3/25/2024  CONCLUSION:  1. Acute/subacute left frontal lobe cortical infarct. 2. A lacunar size acute/subacute infarct in the right parietal white matter. 3. A 1.2 cm focus of cortical signal abnormality in left occipital lobe related to a small infarct which is likely greater than 2 weeks in age. 4. Chronic bilateral basal ganglionic lacunar infarcts and changes in the cerebral white matter related to chronic small vessel disease. 5. Left greater than right mastoid effusions. 6. Bilateral frontal and milder ethmoid sinusitis. 7. Generalized increase in red marrow related to anemia.     Dictated by (CST): Fabian Gay MD on 3/25/2024 at 9:31 AM     Finalized by (CST): Fabian Gay MD on 3/25/2024 at  9:49 AM          MRA CAROTIDS (CPT=70547)    Result Date: 3/25/2024  CONCLUSION:  Markedly artifactually compromised examination. Within these parameters: 1. Suggestion of mild plaque involving the carotid bifurcations without evidence of flow-limiting stenosis or occlusion.  2. The vertebral arteries appear to be contiguously patent, insofar as visualized, without evidence of hemodynamically stenosis.  3. Lesser incidental findings as above.    A preliminary report was issued by the [facility] teleradiology service. There are no major discrepancies.   Dictated by (CST): Zachery Dean MD on 3/25/2024 at 8:49 AM     Finalized by (CST): Zachery Dean MD on 3/25/2024 at 8:54 AM          IR TUNNELED CV CATH INSERTION EXCHANGE CHECK    Result Date: 3/22/2024  CONCLUSION: 1. Conversion of non tunneled hemodialysis catheter to a tunneled hemodialysis catheter via right internal jugular vein access.  Ready for use. 2. Insertion of 18 Lao gastrostomy tube.  Do not use until cleared by IR tomorrow.    Dictated by (CST): Carlitos Lemon MD on 3/22/2024 at 11:33 PM     Finalized by (CST): Carlitos Lemon MD on 3/22/2024 at 11:39 PM          XR CHEST AP PORTABLE  (CPT=71045)    Result Date: 3/22/2024  CONCLUSION:   Tracheostomy tube tip at the level of the clavicles.  Interval removal of left chest tube and mediastinal drain.  No pneumothorax.  Mild patchy persistent opacity in the right mid lung with mild improved aeration right lung base.  Mild left basilar opacity with suspected trace bilateral pleural effusions.     Dictated by (CST): Jcarlos aHrmon MD on 3/22/2024 at 4:45 PM     Finalized by (CST): Jcarlos Harmon MD on 3/22/2024 at 4:48 PM             Meds:      [START ON 4/1/2024] amiodarone  200 mg Oral Daily    cefepime  1 g Intravenous Q12H    amiodarone  200 mg Oral BID with meals    sodium chloride   Intravenous Once    heparin sodium lock flush  1.5 mL Intravenous Once    sodium chloride    Intravenous Once    sodium chloride   Intravenous Once    hydrALAzine  10 mg Intravenous Once    atorvastatin  40 mg Oral Nightly    metoprolol tartrate  50 mg Oral 2x Daily(Beta Blocker)    ascorbic acid  500 mg Oral Daily    aspirin  81 mg Oral Daily    hydrALAZINE  25 mg Oral Q8H CHAYITO    famotidine  20 mg Oral Daily    Or    famotidine  20 mg Intravenous Daily    chlorhexidine gluconate  15 mL Mouth/Throat BID    [Held by provider] heparin  5,000 Units Subcutaneous 2 times per day    [Held by provider] clopidogrel  75 mg Oral Daily      NxStage Pure Flow 400 CRRT/PIRRT fluid 5000mL 2.5 L/hr (03/25/24 0326)    dexmedetomidine 0.2 mcg/kg/hr (03/25/24 0830)    dextrose 10% Stopped (03/23/24 1653)     labetalol, hydrALAzine, prochlorperazine, dextrose 10%, lipase-protease-amylase (Lip-Prot-Amyl) **AND** sodium bicarbonate, alteplase (Activase) 4 mg in sterile water for injection (PF) 2.2 mL IV push to declot line, melatonin, polyethylene glycol (PEG 3350), ondansetron, potassium chloride **OR** potassium chloride, magnesium sulfate in dextrose 5%, magnesium sulfate in sterile water for injection, [DISCONTINUED] acetaminophen **OR** HYDROcodone-acetaminophen **OR** [DISCONTINUED] HYDROcodone-acetaminophen, glucose **OR** glucose **OR** glucose-vitamin C **OR** dextrose **OR** glucose **OR** glucose **OR** glucose-vitamin C, heparin, acetaminophen **OR** acetaminophen **OR** [DISCONTINUED] acetaminophen **OR** acetaminophen, senna, bisacodyl, fleet enema

## 2024-03-25 NOTE — PLAN OF CARE
Cristiano is alert, tracks, intermittently and inconsistently follows commands - neuro plan = CT head in AM, then decision re: CRRT vs HD. Frequently reaches for trach, unable to be redirected - soft BUE restraints remain in place for tube/line safety. Lasted approx 60 min on SBT today until he became diaphoretic/tachypneic. CRRT ran smoothly today with net neg goal /hr - currently euvolemic per historic intake/output record. Anuric - 40cc on bladder scan mid-day.     Major issue today with bleeding around trach site, and oozing blood from HD site and (less so) PEG site. Hourly dressing changes, Dr. Krishna aware and did place sutures around trach site at bedside this evening - however, continues slow bloody ooze around trach site - instructed to pack overnight with dressing changes as needed. CT surgery held plavix, Hospitalist held subQ heparin. Hemodynamically stable this shift.       Problem: HEMATOLOGIC - ADULT  Goal: Free from bleeding injury  Description: (Example usage: patient with low platelets)  INTERVENTIONS:  - Avoid intramuscular injections, enemas and rectal medication administration  - Ensure safe mobilization of patient  - Hold pressure on venipuncture sites to achieve adequate hemostasis  - Assess for signs and symptoms of internal bleeding  - Monitor lab trends  - Patient is to report abnormal signs of bleeding to staff  - Avoid use of toothpicks and dental floss  - Use electric shaver for shaving  - Use soft bristle tooth brush  - Limit straining and forceful nose blowing  Outcome: Not Progressing     Problem: RESPIRATORY - ADULT  Goal: Achieves optimal ventilation and oxygenation  Description: INTERVENTIONS:  - Assess for changes in respiratory status  - Assess for changes in mentation and behavior  - Position to facilitate oxygenation and minimize respiratory effort  - Oxygen supplementation based on oxygen saturation or ABGs  - Provide Smoking Cessation handout, if applicable  - Encourage  broncho-pulmonary hygiene including cough, deep breathe, Incentive Spirometry  - Assess the need for suctioning and perform as needed  - Assess and instruct to report SOB or any respiratory difficulty  - Respiratory Therapy support as indicated  - Manage/alleviate anxiety  - Monitor for signs/symptoms of CO2 retention  Outcome: Progressing     Problem: CARDIOVASCULAR - ADULT  Goal: Maintains optimal cardiac output and hemodynamic stability  Description: INTERVENTIONS:  - Monitor vital signs, rhythm, and trends  - Monitor for bleeding, hypotension and signs of decreased cardiac output  - Evaluate effectiveness of vasoactive medications to optimize hemodynamic stability  - Monitor arterial and/or venous puncture sites for bleeding and/or hematoma  - Assess quality of pulses, skin color and temperature  - Assess for signs of decreased coronary artery perfusion - ex. Angina  - Evaluate fluid balance, assess for edema, trend weights  Outcome: Progressing     Problem: METABOLIC/FLUID AND ELECTROLYTES - ADULT  Goal: Hemodynamic stability and optimal renal function maintained  Description: INTERVENTIONS:  - Monitor labs and assess for signs and symptoms of volume excess or deficit  - Monitor intake, output and patient weight  - Monitor urine specific gravity, serum osmolarity and serum sodium as indicated or ordered  - Monitor response to interventions for patient's volume status, including labs, urine output, blood pressure (other measures as available)  - Encourage oral intake as appropriate  - Instruct patient on fluid and nutrition restrictions as appropriate  Outcome: Progressing     Problem: SKIN/TISSUE INTEGRITY - ADULT  Goal: Skin integrity remains intact  Description: INTERVENTIONS  - Assess and document risk factors for pressure ulcer development  - Assess and document skin integrity  - Monitor for areas of redness and/or skin breakdown  - Initiate interventions, skin care algorithm/standards of care as  needed  Outcome: Progressing  Goal: Incision(s), wounds(s) or drain site(s) healing without S/S of infection  Description: INTERVENTIONS:  - Assess and document risk factors for pressure ulcer development  - Assess and document skin integrity  - Assess and document dressing/incision, wound bed, drain sites and surrounding tissue  - Implement wound care per orders  - Initiate isolation precautions as appropriate  - Initiate Pressure Ulcer prevention bundle as indicated  Outcome: Progressing  Goal: Oral mucous membranes remain intact  Description: INTERVENTIONS  - Assess oral mucosa and hygiene practices  - Implement preventative oral hygiene regimen  - Implement oral medicated treatments as ordered  Outcome: Progressing     Problem: NEUROLOGICAL - ADULT  Goal: Achieves stable or improved neurological status  Description: INTERVENTIONS  - Assess for and report changes in neurological status  - Initiate measures to prevent increased intracranial pressure  - Maintain blood pressure and fluid volume within ordered parameters to optimize cerebral perfusion and minimize risk of hemorrhage  - Monitor temperature, glucose, and sodium. Initiate appropriate interventions as ordered  Outcome: Progressing     Problem: Safety Risk - Non-Violent Restraints  Goal: Patient will remain free from self-harm  Description: INTERVENTIONS:  - Apply the least restrictive restraint to prevent harm  - Notify patient and family of reasons restraints applied  - Assess for any contributing factors to confusion (electrolyte disturbances, delirium, medications)  - Discontinue any unnecessary medical devices as soon as possible  - Assess the patient's physical comfort, circulation, skin condition, hydration, nutrition and elimination needs   - Reorient and redirection as needed  - Assess for the need to continue restraints  Outcome: Progressing

## 2024-03-25 NOTE — PLAN OF CARE
Problem: RESPIRATORY - ADULT  Goal: Achieves optimal ventilation and oxygenation  Description: INTERVENTIONS:  - Assess for changes in respiratory status  - Assess for changes in mentation and behavior  - Position to facilitate oxygenation and minimize respiratory effort  - Oxygen supplementation based on oxygen saturation or ABGs  - Provide Smoking Cessation handout, if applicable  - Encourage broncho-pulmonary hygiene including cough, deep breathe, Incentive Spirometry  - Assess the need for suctioning and perform as needed  - Assess and instruct to report SOB or any respiratory difficulty  - Respiratory Therapy support as indicated  - Manage/alleviate anxiety  - Monitor for signs/symptoms of CO2 retention  Outcome: Progressing     Problem: CARDIOVASCULAR - ADULT  Goal: Maintains optimal cardiac output and hemodynamic stability  Description: INTERVENTIONS:  - Monitor vital signs, rhythm, and trends  - Monitor for bleeding, hypotension and signs of decreased cardiac output  - Evaluate effectiveness of vasoactive medications to optimize hemodynamic stability  - Monitor arterial and/or venous puncture sites for bleeding and/or hematoma  - Assess quality of pulses, skin color and temperature  - Assess for signs of decreased coronary artery perfusion - ex. Angina  - Evaluate fluid balance, assess for edema, trend weights  Outcome: Progressing  Goal: Absence of cardiac arrhythmias or at baseline  Description: INTERVENTIONS:  - Continuous cardiac monitoring, monitor vital signs, obtain 12 lead EKG if indicated  - Evaluate effectiveness of antiarrhythmic and heart rate control medications as ordered  - Initiate emergency measures for life threatening arrhythmias  - Monitor electrolytes and administer replacement therapy as ordered  Outcome: Progressing     Problem: METABOLIC/FLUID AND ELECTROLYTES - ADULT  Goal: Hemodynamic stability and optimal renal function maintained  Description: INTERVENTIONS:  - Monitor labs and  assess for signs and symptoms of volume excess or deficit  - Monitor intake, output and patient weight  - Monitor urine specific gravity, serum osmolarity and serum sodium as indicated or ordered  - Monitor response to interventions for patient's volume status, including labs, urine output, blood pressure (other measures as available)  - Encourage oral intake as appropriate  - Instruct patient on fluid and nutrition restrictions as appropriate  Outcome: Progressing     Problem: SKIN/TISSUE INTEGRITY - ADULT  Goal: Skin integrity remains intact  Description: INTERVENTIONS  - Assess and document risk factors for pressure ulcer development  - Assess and document skin integrity  - Monitor for areas of redness and/or skin breakdown  - Initiate interventions, skin care algorithm/standards of care as needed  Outcome: Progressing  Goal: Incision(s), wounds(s) or drain site(s) healing without S/S of infection  Description: INTERVENTIONS:  - Assess and document risk factors for pressure ulcer development  - Assess and document skin integrity  - Assess and document dressing/incision, wound bed, drain sites and surrounding tissue  - Implement wound care per orders  - Initiate isolation precautions as appropriate  - Initiate Pressure Ulcer prevention bundle as indicated  Outcome: Progressing  Goal: Oral mucous membranes remain intact  Description: INTERVENTIONS  - Assess oral mucosa and hygiene practices  - Implement preventative oral hygiene regimen  - Implement oral medicated treatments as ordered  Outcome: Progressing     Problem: NEUROLOGICAL - ADULT  Goal: Achieves stable or improved neurological status  Description: INTERVENTIONS  - Assess for and report changes in neurological status  - Initiate measures to prevent increased intracranial pressure  - Maintain blood pressure and fluid volume within ordered parameters to optimize cerebral perfusion and minimize risk of hemorrhage  - Monitor temperature, glucose, and sodium.  Initiate appropriate interventions as ordered  Outcome: Not Progressing     Problem: HEMATOLOGIC - ADULT  Goal: Free from bleeding injury  Description: (Example usage: patient with low platelets)  INTERVENTIONS:  - Avoid intramuscular injections, enemas and rectal medication administration  - Ensure safe mobilization of patient  - Hold pressure on venipuncture sites to achieve adequate hemostasis  - Assess for signs and symptoms of internal bleeding  - Monitor lab trends  - Patient is to report abnormal signs of bleeding to staff  - Avoid use of toothpicks and dental floss  - Use electric shaver for shaving  - Use soft bristle tooth brush  - Limit straining and forceful nose blowing  Outcome: Not Progressing    Problem: Safety Risk - Non-Violent Restraints  Goal: Patient will remain free from self-harm  Description: INTERVENTIONS:  - Apply the least restrictive restraint to prevent harm  - Notify patient and family of reasons restraints applied  - Assess for any contributing factors to confusion (electrolyte disturbances, delirium, medications)  - Discontinue any unnecessary medical devices as soon as possible  - Assess the patient's physical comfort, circulation, skin condition, hydration, nutrition and elimination needs   - Reorient and redirection as needed  - Assess for the need to continue restraints  3/25/2024 0643 by Lety Sánchez, RN  Outcome: Not Progressing   Patient intermittently restless/agitated. MRI Brain/MRI cervical, thoracic, MRA Brain, MRA carotids all done last night. Ativan and versed were given. Remained restless overnight. Titrating precedex. Fio2 30%. SR/ST. SBP 90-100s. Tolerating tube feeds. Anuric. Bladder scanned with 31ml. BMx3. CRRT was stopped last night for scans and restarted after. Tolerating CRRT overnight. Trach & GT site oozing blood.  updated this am/at bedside.

## 2024-03-25 NOTE — CM/SW NOTE
Cristiano remains on CRRT.  Once he transitions to HD and we have a schedule, CM then can begin process with facility to start insurance auth.  Reyes aware of this update.  Fiona liaison from [facility] reached out to Reyes today.    Mary Jane GAYTANA BSN RN CRRN   RN Case Manager  [phone]

## 2024-03-25 NOTE — PROGRESS NOTES
Pt   [facility]  part of [facility]    Progress Note    Cristiano Obregon Patient Status:  Inpatient    1970 MRN [id number]   Location [facility] 2W/SW Attending Bud Camarena MD   Hosp Day # 20 PCP Abilio Dorsey MD     Subjective:  Pt resting in bed w/his , Reyes, at bedside. He nods his head to simple questions. Moves ext; weaker on right.     Objective:  BP (!) 138/97 (BP Location: Right arm)   Pulse 91   Temp 98.2 °F (36.8 °C) (Temporal)   Resp (!) 31   Ht 5' 7\" (1.702 m)   Wt 172 lb 9.9 oz (78.3 kg)   SpO2 100%   BMI 27.04 kg/m²     Temp (24hrs), Av.2 °F (36.8 °C), Min:97.1 °F (36.2 °C), Max:98.7 °F (37.1 °C)      Intake/Output:    Intake/Output Summary (Last 24 hours) at 3/25/2024 1017  Last data filed at 3/25/2024 0830  Gross per 24 hour   Intake 1530.1 ml   Output 3808 ml   Net -2277.9 ml       Wt Readings from Last 3 Encounters:   24 172 lb 9.9 oz (78.3 kg)   10/06/21 177 lb 6.4 oz (80.5 kg)   20 172 lb 9.6 oz (78.3 kg)       Allergies:  No Known Allergies    Labs:  Lab Results   Component Value Date    WBC 20.3 2024    HGB 7.5 2024    HCT 23.4 2024    .0 2024    CREATSERUM 3.95 2024    BUN 42 2024     2024    K 4.4 2024     2024    CO2 26.0 2024     2024    CA 7.9 2024    ALB 2.9 2024    INR 1.22 2024    MG 2.0 2024    PHOS 3.2 2024       Physical Exam:  Blood pressure (!) 138/97, pulse 91, temperature 98.2 °F (36.8 °C), temperature source Temporal, resp. rate (!) 31, height 5' 7\" (1.702 m), weight 172 lb 9.9 oz (78.3 kg), SpO2 100%.  General: NAD  Neck: No JVD  Lungs: mildly coarse bilaterally anteriorly   Heart: RRR, S1, S2  Abdomen: Soft/Round, NT/ND, BS+x4/+BM  Extremities: Warm, dry, 1+LE edema bilat  Pulses: 2+ bilat DP  Skin: sternotomy incision ANIKET=CDI/Left leg incisions ANIKET-intact  Neurological:  awake,  calm, nods head to simple questions/moves ext; weaker on right    Assessment/Plan:  S/P CABG x 3 POD # 10  S/P RE-OP MEDIASTINAL EXPLORATION/WASHOUT on 3/16  Respiratory Failure- Trach placed 3/22 per Gen Surgery. SBT per Pulm  Pre op Impella removed post op on 3/16 per Cards. Pt hemodynamically stable. Ransom/Ophelia removed 3/18.  Midline cath placed 3/18  Re op mediastinal exploration/washout due to pericardial tamponade. Limited echo 3/16 post exploration w/EF=45-50%/no residual effusion.   Continue ICU status  Pain meds as needed: avoid narcotics to assess neuro status  Altered MS post op: CT head 3/19/MRI/MRA 3/24=+CVA-Mgt per Neuro  Brief pre op AF- Amio protocol for AF prevention- currently SR  Plan to increase activity when appropriate  Scds/Heparin Sub Q prophylaxis DVT prevention -Heparin on hold due to increase bleeding noted around Trach area. Hold Plavix.   Pre op NORMA/ATN/CRRT w/mgt per Nephrology. Expected post op volume overload. Pt currently on CRRT w/mgt per Nephrology/plan for eventual HD. Temp catheter insertion per IR placed 3/19.  Hematology consulted for freq CRRT line clotting.   Expected post op anemia w/o clinical significance/stable-recheck Hb @ noon today. Hematology consulted 3/20. Acute blood loss anemia immediate post op w/mediastinal exploration/tamponade.  Nutrition per TF via PEG placed 3/22  ID consulted on 3/14: following recs. Continues on ABX. + sputum cx  No hx: DM-can discontinue correction scale and glucose checks.   Discharge planning: pt lives w/his  and has supportive family. Anticipate rehab: LTAC. /SW will assist w/planning.     Plan of care discussed w/patient//RN and CV Surgeon: Dr. Shraddha Adams, APRN  3/25/2024  10:17 AM

## 2024-03-26 NOTE — PROGRESS NOTES
03/25/24 1947   Vent Information   Interface Invasive   Vent Type AV   Vent plugged into main power? Yes   Vent ID    Vent Mode VC/AC   Settings   FiO2 (%) 30 %   Resp Rate (Set) 12   Vt (Set, mL) 530 mL   Waveform Decelerating ramp   PEEP/CPAP (cm H2O) 5 cm H20   PEEP Low (cm H2O) 2 cm H2O   Peak Flow LPM 60   Trigger Sensitivity Flow (L/min) 2 L/min   Trigger Sensitivity Pressure (cm H2O) 3 cm H2O   Humidification Heater   H2O Bag Level 3/4 Full   Heater Temperature 98.6 °F (37 °C)   Readings   Total RR 21   Minute Ventilation (L/min) 11 L/min   Expiratory Tidal Volume 520 mL   PIP Observed (cm H2O) 23 cm H2O   MAP (cm H2O) 10   PEEP Low (cm H2O) 2 cm H2O   I/E Ratio 1:2.6     No changes on PM shift,RT will continue monitor.

## 2024-03-26 NOTE — RESPIRATORY THERAPY NOTE
Patient with tracheostomy received on ventilator support. AC/VC 12/530/+5/30%. Patient has bloody trach site, dressings and trach ties changed multiple times. Respiratory status stable and tolerating ventilator well, suctioned as needed.

## 2024-03-26 NOTE — PROGRESS NOTES
PT note: PT is on vent support unable to participate with PT at this time. PT will continue to follow and progress as medical progress allows.

## 2024-03-26 NOTE — OCCUPATIONAL THERAPY NOTE
Attempted follow up OT treatment. Patient continues to be on CRRT and pending further medical workup. Will re-attempt when appropriate. Thank you.    Marley Benito MS, OTR/L

## 2024-03-26 NOTE — PROGRESS NOTES
[facility]  part of [facility] Infectious Disease  Progress Note    Cristiano Obregon Patient Status:  Inpatient    1970 MRN [id number]   Location [facility] 2W/SW Attending Bud Camarena MD   Hosp Day # 21 PCP Abilio Dorsey MD     Cristiano Obregon is a 53 year old male.   Chief Complaint   Patient presents with    Chest Pain Angina       HPI:      Awake but sedated on morphine  Intermittent cpap trials               REVIEW OF SYSTEMS:   A comprehensive 10 point review of systems was completed.  Pertinent positives and negatives noted in the the HPI.       Allergies:  No Known Allergies     Current Meds:    Current Facility-Administered Medications:     NxStage Pure Flow 400 CRRT/PIRRT fluid 5000mL, 2.5 L/hr, CRRT, Continuous **AND** CRRT Replacement Fluid 400, , , Until Discontinued    [START ON 2024] amiodarone (Pacerone) tab 200 mg, 200 mg, Oral, Daily    morphINE PF 2 MG/ML injection 1 mg, 1 mg, Intravenous, Q4H PRN    oxidized cellulose (Surgical Snow) external sheet 1 Package, 1 each, Topical, PRN    ceFEPIme (Maxipime) 1 g in sodium chloride 0.9% 100 mL IVPB-MBP, 1 g, Intravenous, Q12H    amiodarone (Pacerone) tab 200 mg, 200 mg, Oral, BID with meals    sodium chloride 0.9% infusion, , Intravenous, Once    dexmedeTOMIDine in sodium chloride 0.9% (Precedex) 400 mcg/100mL infusion premix, 0.2-1.5 mcg/kg/hr (Dosing Weight), Intravenous, Continuous    heparin sodium lock flush 100 UNIT/ML injection 150 Units, 1.5 mL, Intravenous, Once    sodium chloride 0.9% infusion, , Intravenous, Once    sodium chloride 0.9% infusion, , Intravenous, Once    hydrALAzine (Apresoline) 20 mg/mL injection 10 mg, 10 mg, Intravenous, Once    labetalol (Trandate) 5 mg/mL injection 10 mg, 10 mg, Intravenous, Q10 Min PRN    hydrALAzine (Apresoline) 20 mg/mL injection 10 mg, 10 mg, Intravenous, Q2H PRN    prochlorperazine (Compazine) 10 MG/2ML injection 5 mg, 5  mg, Intravenous, Q8H PRN    atorvastatin (Lipitor) tab 40 mg, 40 mg, Oral, Nightly    metoprolol tartrate (Lopressor) tab 50 mg, 50 mg, Oral, 2x Daily(Beta Blocker)    ascorbic acid (Vitamin C) tab 500 mg, 500 mg, Oral, Daily    aspirin chewable tab 81 mg, 81 mg, Oral, Daily    dextrose 10% infusion (TPN no rate), , Intravenous, Continuous PRN    pancrelipase (Lip-Prot-Amyl) (Zenpep) DR particles cap 10,000 Units, 10,000 Units, Per G Tube, PRN **AND** sodium bicarbonate tab 325 mg, 325 mg, Oral, PRN    hydrALAZINE (Apresoline) tab 25 mg, 25 mg, Oral, Q8H CHAYITO    alteplase (Activase) 4 mg in sterile water for injection (PF) 2.2 mL IV push to declot line, 4 mg, Intravenous, PRN    melatonin tab 3 mg, 3 mg, Oral, Nightly PRN    polyethylene glycol (PEG 3350) (Miralax) 17 g oral packet 17 g, 17 g, Oral, Daily PRN    ondansetron (Zofran) 4 MG/2ML injection 4 mg, 4 mg, Intravenous, Q6H PRN    famotidine (Pepcid) tab 20 mg, 20 mg, Oral, Daily **OR** famotidine (Pepcid) 20 mg/2mL injection 20 mg, 20 mg, Intravenous, Daily    potassium chloride 20 mEq/100mL IVPB premix 20 mEq, 20 mEq, Intravenous, PRN **OR** potassium chloride 40 mEq/100mL IVPB premix (central line) 40 mEq, 40 mEq, Intravenous, PRN    magnesium sulfate in dextrose 5% 1 g/100mL infusion premix 1 g, 1 g, Intravenous, PRN    magnesium sulfate in sterile water for injection 2 g/50mL IVPB premix 2 g, 2 g, Intravenous, PRN    chlorhexidine gluconate (Peridex) 0.12 % oral solution 15 mL, 15 mL, Mouth/Throat, BID    [Held by provider] heparin (Porcine) 5000 UNIT/ML injection 5,000 Units, 5,000 Units, Subcutaneous, 2 times per day    [DISCONTINUED] acetaminophen (Tylenol) tab 650 mg, 650 mg, Oral, Q4H PRN **OR** HYDROcodone-acetaminophen (Norco) 5-325 MG per tab 1 tablet, 1 tablet, Oral, Q4H PRN **OR** [DISCONTINUED] HYDROcodone-acetaminophen (Norco) 5-325 MG per tab 2 tablet, 2 tablet, Oral, Q4H PRN    [Held by provider] clopidogrel (Plavix) tab 75 mg, 75 mg, Oral,  Daily    glucose (Dex4) 15 GM/59ML oral liquid 15 g, 15 g, Oral, Q15 Min PRN **OR** glucose (Glutose) 40% oral gel 15 g, 15 g, Oral, Q15 Min PRN **OR** glucose-vitamin C (Dex-4) chewable tab 4 tablet, 4 tablet, Oral, Q15 Min PRN **OR** dextrose 50% injection 50 mL, 50 mL, Intravenous, Q15 Min PRN **OR** glucose (Dex4) 15 GM/59ML oral liquid 30 g, 30 g, Oral, Q15 Min PRN **OR** glucose (Glutose) 40% oral gel 30 g, 30 g, Oral, Q15 Min PRN **OR** glucose-vitamin C (Dex-4) chewable tab 8 tablet, 8 tablet, Oral, Q15 Min PRN    heparin (Porcine) 1000 UNIT/ML injection 1,500 Units, 1.5 mL, Intracatheter, PRN    acetaminophen (Tylenol) tab 650 mg, 650 mg, Oral, Q4H PRN **OR** acetaminophen (Tylenol) 160 MG/5ML oral liquid 650 mg, 650 mg, Oral, Q4H PRN **OR** [DISCONTINUED] acetaminophen (Tylenol) rectal suppository 650 mg, 650 mg, Rectal, Q4H PRN **OR** acetaminophen (Ofirmev) 10 mg/mL infusion premix 1,000 mg, 1,000 mg, Intravenous, Q6H PRN    senna (Senokot) 8.8 MG/5ML oral syrup 17.6 mg, 10 mL, Oral, Nightly PRN    bisacodyl (Dulcolax) 10 MG rectal suppository 10 mg, 10 mg, Rectal, Daily PRN    fleet enema (Fleet) 7-19 GM/118ML rectal enema 133 mL, 1 enema, Rectal, Once PRN   No current outpatient medications on file.        HISTORY:  History reviewed. No pertinent past medical history.   Past Surgical History:   Procedure Laterality Date    COLONOSCOPY N/A 12/9/2017    Procedure: COLONOSCOPY, POSSIBLE BIOPSY, POSSIBLE POLYPECTOMY 93336;  Surgeon: Byron Plaza MD;  Location: [facility]        Social history and family history negative related to present illness except as above.    PHYSICAL EXAM:   /70 (BP Location: Right arm)   Pulse 96   Temp 98.6 °F (37 °C) (Temporal)   Resp 21   Ht 5' 7\" (1.702 m)   Wt 165 lb 12.6 oz (75.2 kg)   SpO2 99%   BMI 25.97 kg/m²   GENERAL:  Awake, alert, oriented x3. Non-tox, non-septic and in NAD.  HEENT:  Normocephalic, no scleral abnormalities.  Oropharynx  clear, trach serosang drainage  NECK:  Supple, no masses, no lymphadenopathy.  LUNGS:  Clear to auscultation b/l, no rhonchi, rales, or wheezes.  CARDIO: RRR S1/S2, no rubs, clicks, heaves, or murmurs.  GI:  Soft NT/ND, BS present x4 quadrants, no HSM.; blood via g tube site  EXTREMITIES:  more edema today compared to 3/25 , no clubbing, no cyanosis.  NEURO:  No focal neurologic deficits.  DERM:  Warm, dry, no rashes.  IV site bleeding too    IMPRESSION/PLAN:        Fevers improved; suspect hcap resolving; fio2 30% a positive sign  Continue cefepime day 12 rx; will extend a little longer given trach site purulence smore serosanguinous today  S/p m.I. acute and subacute multiple strokes  Wbc remains elevated at 21k  S/p trach and peg  Spoke with rn  Minimal urine so ongoing dialysis  Ct head with some evolution and midline shift noted  >35 min               Recent Results (from the past 72 hour(s))   POCT Glucose    Collection Time: 03/23/24 12:00 PM   Result Value Ref Range    POC Glucose  137 (H) 70 - 99 mg/dL   CBC, Platelet; No Differential    Collection Time: 03/23/24 12:13 PM   Result Value Ref Range    WBC 18.8 (H) 4.0 - 11.0 x10(3) uL    RBC 2.77 (L) 4.30 - 5.70 x10(6)uL    HGB 8.0 (L) 13.0 - 17.5 g/dL    HCT 23.8 (L) 39.0 - 53.0 %    MCV 85.9 80.0 - 100.0 fL    MCH 28.9 26.0 - 34.0 pg    MCHC 33.6 31.0 - 37.0 g/dL    RDW 15.8 (H) 11.0 - 15.0 %    RDW-SD 47.8 (H) 35.1 - 46.3 fL    .0 (L) 150.0 - 450.0 10(3)uL    Immature Platelet Fraction 10.4 (H) 0.0 - 7.0 %   Basic Metabolic Panel (8)    Collection Time: 03/23/24 12:13 PM   Result Value Ref Range    Glucose 124 (H) 70 - 99 mg/dL    Sodium 133 (L) 136 - 145 mmol/L    Potassium 4.6 3.5 - 5.1 mmol/L    Chloride 104 98 - 112 mmol/L    CO2 24.0 21.0 - 32.0 mmol/L    Anion Gap 5 0 - 18 mmol/L    BUN 45 (H) 9 - 23 mg/dL    Creatinine 4.65 (H) 0.70 - 1.30 mg/dL    BUN/CREA Ratio 9.7 (L) 10.0 - 20.0    Calcium, Total 7.9 (L) 8.7 - 10.4 mg/dL    Calculated  Osmolality 289 275 - 295 mOsm/kg    eGFR-Cr 14 (L) >=60 mL/min/1.73m2   Scan slide    Collection Time: 03/23/24 12:13 PM   Result Value Ref Range    Slide Review Slide reviewed,morphology review added    RBC Morphology Scan    Collection Time: 03/23/24 12:13 PM   Result Value Ref Range    RBC Morphology Normal Normal, Slide reviewed, see previous RBC morphology.    Platelet Morphology See morphology below (A) Normal    Giant platelets Few (A)     Blood Culture    Collection Time: 03/23/24 12:32 PM    Specimen: Blood,peripheral   Result Value Ref Range    Blood Culture Result No Growth 2 Days    RAINBOW DRAW GOLD    Collection Time: 03/23/24 12:38 PM   Result Value Ref Range    Hold Gold Auto Resulted    Magnesium    Collection Time: 03/23/24  2:08 PM   Result Value Ref Range    Magnesium 2.2 1.6 - 2.6 mg/dL   Phosphorus    Collection Time: 03/23/24  2:08 PM   Result Value Ref Range    Phosphorus 3.8 2.4 - 5.1 mg/dL   CBC, Platelet; No Differential    Collection Time: 03/23/24  2:08 PM   Result Value Ref Range    WBC 18.9 (H) 4.0 - 11.0 x10(3) uL    RBC 2.96 (L) 4.30 - 5.70 x10(6)uL    HGB 8.9 (L) 13.0 - 17.5 g/dL    HCT 25.1 (L) 39.0 - 53.0 %    MCV 84.8 80.0 - 100.0 fL    MCH 30.1 26.0 - 34.0 pg    MCHC 35.5 31.0 - 37.0 g/dL    RDW 15.9 (H) 11.0 - 15.0 %    RDW-SD 46.2 35.1 - 46.3 fL    .0 (L) 150.0 - 450.0 10(3)uL    Immature Platelet Fraction 8.6 (H) 0.0 - 7.0 %   Scan slide    Collection Time: 03/23/24  2:08 PM   Result Value Ref Range    Slide Review Slide reviewed,morphology review added    RBC Morphology Scan    Collection Time: 03/23/24  2:08 PM   Result Value Ref Range    RBC Morphology Normal Normal, Slide reviewed, see previous RBC morphology.    Platelet Morphology See morphology below (A) Normal    Giant platelets Few (A)     Blood Culture    Collection Time: 03/23/24  2:13 PM    Specimen: Blood,peripheral   Result Value Ref Range    Blood Culture Result No Growth 2 Days    POCT Glucose     Collection Time: 03/23/24  6:10 PM   Result Value Ref Range    POC Glucose  129 (H) 70 - 99 mg/dL   POCT Glucose    Collection Time: 03/23/24 11:58 PM   Result Value Ref Range    POC Glucose  143 (H) 70 - 99 mg/dL   Prepare RBC Once    Collection Time: 03/24/24 12:40 AM   Result Value Ref Range    Blood Product [id number]     Unit Number [id number]     UNIT ABO B     UNIT RH POS     Product Status Presumed Transfused     Expiration Date 069846891879     Blood Type Barcode 7300     Unit Volume 350 ml   Prepare RBC Once    Collection Time: 03/24/24 12:40 AM   Result Value Ref Range    Blood Product [id number]     Unit Number [id number]     UNIT ABO B     UNIT RH POS     Product Status Presumed Transfused     Expiration Date 551185672516     Blood Type Barcode 7300     Unit Volume 350 ml   Prepare RBC Once    Collection Time: 03/24/24 12:40 AM   Result Value Ref Range    Blood Product [id number]     Unit Number [id number]     UNIT ABO B     UNIT RH POS     Product Status Presumed Transfused     Expiration Date 602690041171     Blood Type Barcode 7300     Unit Volume 350 ml   Magnesium    Collection Time: 03/24/24  5:11 AM   Result Value Ref Range    Magnesium 2.1 1.6 - 2.6 mg/dL   Phosphorus    Collection Time: 03/24/24  5:11 AM   Result Value Ref Range    Phosphorus 3.4 2.4 - 5.1 mg/dL   CBC, Platelet; No Differential    Collection Time: 03/24/24  5:11 AM   Result Value Ref Range    WBC 20.4 (H) 4.0 - 11.0 x10(3) uL    RBC 2.88 (L) 4.30 - 5.70 x10(6)uL    HGB 8.5 (L) 13.0 - 17.5 g/dL    HCT 24.8 (L) 39.0 - 53.0 %    MCV 86.1 80.0 - 100.0 fL    MCH 29.5 26.0 - 34.0 pg    MCHC 34.3 31.0 - 37.0 g/dL    RDW 16.9 (H) 11.0 - 15.0 %    RDW-SD 50.8 (H) 35.1 - 46.3 fL    .0 (L) 150.0 - 450.0 10(3)uL    Immature Platelet Fraction 10.0 (H) 0.0 - 7.0 %   Hepatic Function Panel (7)    Collection Time: 03/24/24  5:11 AM   Result Value Ref Range    AST 26 <=34 U/L    ALT 19 10 - 49 U/L    Alkaline Phosphatase 97 45 -  117 U/L    Bilirubin, Total 0.8 0.3 - 1.2 mg/dL    Bilirubin, Direct 0.3 <=0.3 mg/dL    Total Protein 5.3 (L) 5.7 - 8.2 g/dL    Albumin 3.1 (L) 3.2 - 4.8 g/dL   Scan slide    Collection Time: 03/24/24  5:11 AM   Result Value Ref Range    Slide Review       Platelets reviewed on smear. No giant platelets or platelet clumps observed.   Basic Metabolic Panel (8)    Collection Time: 03/24/24  5:11 AM   Result Value Ref Range    Glucose 114 (H) 70 - 99 mg/dL    Sodium 137 136 - 145 mmol/L    Potassium 4.3 3.5 - 5.1 mmol/L    Chloride 104 98 - 112 mmol/L    CO2 25.0 21.0 - 32.0 mmol/L    Anion Gap 8 0 - 18 mmol/L    BUN 52 (H) 9 - 23 mg/dL    Creatinine 4.83 (H) 0.70 - 1.30 mg/dL    BUN/CREA Ratio 10.8 10.0 - 20.0    Calcium, Total 7.9 (L) 8.7 - 10.4 mg/dL    Calculated Osmolality 299 (H) 275 - 295 mOsm/kg    eGFR-Cr 13 (L) >=60 mL/min/1.73m2   Uric Acid    Collection Time: 03/24/24  5:11 AM   Result Value Ref Range    Uric Acid 3.3 (L) 3.7 - 9.2 mg/dL   Lipase    Collection Time: 03/24/24  5:11 AM   Result Value Ref Range    Lipase 141 (H) 12 - 53 U/L   POCT Glucose    Collection Time: 03/24/24  5:55 AM   Result Value Ref Range    POC Glucose  122 (H) 70 - 99 mg/dL   POCT Glucose    Collection Time: 03/24/24 12:42 PM   Result Value Ref Range    POC Glucose  125 (H) 70 - 99 mg/dL   Renal Function Panel    Collection Time: 03/24/24  3:33 PM   Result Value Ref Range    Glucose 119 (H) 70 - 99 mg/dL    Sodium 135 (L) 136 - 145 mmol/L    Potassium 4.3 3.5 - 5.1 mmol/L    Chloride 104 98 - 112 mmol/L    CO2 25.0 21.0 - 32.0 mmol/L    Anion Gap 6 0 - 18 mmol/L    BUN 42 (H) 9 - 23 mg/dL    Creatinine 3.97 (H) 0.70 - 1.30 mg/dL    BUN/CREA Ratio 10.6 10.0 - 20.0    Calcium, Total 8.5 (L) 8.7 - 10.4 mg/dL    Calculated Osmolality 292 275 - 295 mOsm/kg    eGFR-Cr 17 (L) >=60 mL/min/1.73m2    Albumin 3.4 3.2 - 4.8 g/dL    Phosphorus 3.0 2.4 - 5.1 mg/dL   Magnesium    Collection Time: 03/24/24  3:33 PM   Result Value Ref Range     Magnesium 2.0 1.6 - 2.6 mg/dL   POCT Glucose    Collection Time: 03/24/24  5:27 PM   Result Value Ref Range    POC Glucose  124 (H) 70 - 99 mg/dL   POCT Glucose    Collection Time: 03/24/24  8:09 PM   Result Value Ref Range    POC Glucose  123 (H) 70 - 99 mg/dL   POCT Glucose    Collection Time: 03/24/24 11:57 PM   Result Value Ref Range    POC Glucose  134 (H) 70 - 99 mg/dL   Prothrombin Time (PT)    Collection Time: 03/25/24  4:23 AM   Result Value Ref Range    PT 16.2 (H) 11.6 - 14.8 seconds    INR 1.22 (H) 0.80 - 1.20   Renal Function Panel    Collection Time: 03/25/24  4:23 AM   Result Value Ref Range    Glucose 140 (H) 70 - 99 mg/dL    Sodium 137 136 - 145 mmol/L    Potassium 4.4 3.5 - 5.1 mmol/L    Chloride 106 98 - 112 mmol/L    CO2 26.0 21.0 - 32.0 mmol/L    Anion Gap 5 0 - 18 mmol/L    BUN 42 (H) 9 - 23 mg/dL    Creatinine 3.95 (H) 0.70 - 1.30 mg/dL    BUN/CREA Ratio 10.6 10.0 - 20.0    Calcium, Total 7.9 (L) 8.7 - 10.4 mg/dL    Calculated Osmolality 297 (H) 275 - 295 mOsm/kg    eGFR-Cr 17 (L) >=60 mL/min/1.73m2    Albumin 2.9 (L) 3.2 - 4.8 g/dL    Phosphorus 3.2 2.4 - 5.1 mg/dL   CBC W/ DIFFERENTIAL    Collection Time: 03/25/24  4:23 AM   Result Value Ref Range    WBC 20.3 (H) 4.0 - 11.0 x10(3) uL    RBC 2.55 (L) 4.30 - 5.70 x10(6)uL    HGB 7.5 (L) 13.0 - 17.5 g/dL    HCT 23.4 (L) 39.0 - 53.0 %    MCV 91.8 80.0 - 100.0 fL    MCH 29.4 26.0 - 34.0 pg    MCHC 32.1 31.0 - 37.0 g/dL    RDW-SD 55.0 (H) 35.1 - 46.3 fL    RDW 16.9 (H) 11.0 - 15.0 %    .0 (L) 150.0 - 450.0 10(3)uL    Immature Platelet Fraction 11.0 (H) 0.0 - 7.0 %    Neutrophil Absolute Prelim 17.07 (H) 1.50 - 7.70 x10 (3) uL   Manual differential    Collection Time: 03/25/24  4:23 AM   Result Value Ref Range    Neutrophil Absolute Manual 17.86 (H) 1.50 - 7.70 x10(3) uL    Lymphocyte Absolute Manual 0.81 (L) 1.00 - 4.00 x10(3) uL    Monocyte Absolute Manual 0.20 0.10 - 1.00 x10(3) uL    Eosinophil Absolute Manual 1.02 (H) 0.00 - 0.70 x10(3)  uL    Basophil Absolute Manual 0.41 (H) 0.00 - 0.20 x10(3) uL    Neutrophils % Manual 87 %    Band % 1 %    Lymphocyte % Manual 4 %    Monocyte % Manual 1 %    Eosinophil % Manual 5 %    Basophil % Manual 2 %    Total Cells Counted 100     RBC Morphology See morphology below (A) Normal, Slide reviewed, see previous RBC morphology.    Platelet Morphology Normal Normal    Pappenheimer Bodies Present (A) (none)    Macrocytosis 1+      Polychromasia 1+      Schistocytes 1+      Basophilic Stippling 2+ (A)      Arriola-Jolly Bodies Present (A) (none)   Magnesium    Collection Time: 03/25/24  4:23 AM   Result Value Ref Range    Magnesium 2.0 1.6 - 2.6 mg/dL   POCT Glucose    Collection Time: 03/25/24  5:02 AM   Result Value Ref Range    POC Glucose  143 (H) 70 - 99 mg/dL   Hemoglobin & Hematocrit    Collection Time: 03/25/24 12:01 PM   Result Value Ref Range    HGB 8.3 (L) 13.0 - 17.5 g/dL    HCT 25.6 (L) 39.0 - 53.0 %   Renal Function Panel    Collection Time: 03/25/24  4:01 PM   Result Value Ref Range    Glucose 124 (H) 70 - 99 mg/dL    Sodium 137 136 - 145 mmol/L    Potassium 4.5 3.5 - 5.1 mmol/L    Chloride 104 98 - 112 mmol/L    CO2 26.0 21.0 - 32.0 mmol/L    Anion Gap 7 0 - 18 mmol/L    BUN 38 (H) 9 - 23 mg/dL    Creatinine 3.21 (H) 0.70 - 1.30 mg/dL    BUN/CREA Ratio 11.8 10.0 - 20.0    Calcium, Total 8.7 8.7 - 10.4 mg/dL    Calculated Osmolality 294 275 - 295 mOsm/kg    eGFR-Cr 22 (L) >=60 mL/min/1.73m2    Albumin 3.2 3.2 - 4.8 g/dL    Phosphorus 2.0 (L) 2.4 - 5.1 mg/dL   Magnesium    Collection Time: 03/25/24  4:01 PM   Result Value Ref Range    Magnesium 1.8 1.6 - 2.6 mg/dL   Hemoglobin & Hematocrit    Collection Time: 03/26/24 12:16 AM   Result Value Ref Range    HGB 6.3 (LL) 13.0 - 17.5 g/dL    HCT 20.3 (L) 39.0 - 53.0 %   ABORH (Blood Type)    Collection Time: 03/26/24 12:45 AM   Result Value Ref Range    ABO BLOOD TYPE B     RH BLOOD TYPE Positive    Antibody Screen    Collection Time: 03/26/24 12:45 AM    Result Value Ref Range    Antibody Screen Negative    Prepare RBC Once    Collection Time: 03/26/24  1:31 AM   Result Value Ref Range    Blood Product [id number]     Unit Number [id number]     UNIT ABO B     UNIT RH POS     Product Status Issued     Expiration Date 701012919414     Blood Type Barcode 7300     Unit Volume 350 ml   Magnesium    Collection Time: 03/26/24  5:28 AM   Result Value Ref Range    Magnesium 1.8 1.6 - 2.6 mg/dL   Phosphorus    Collection Time: 03/26/24  5:28 AM   Result Value Ref Range    Phosphorus 3.1 2.4 - 5.1 mg/dL   Renal Function Panel    Collection Time: 03/26/24  5:28 AM   Result Value Ref Range    Glucose 135 (H) 70 - 99 mg/dL    Sodium 139 136 - 145 mmol/L    Potassium 4.4 3.5 - 5.1 mmol/L    Chloride 106 98 - 112 mmol/L    CO2 26.0 21.0 - 32.0 mmol/L    Anion Gap 7 0 - 18 mmol/L    BUN 45 (H) 9 - 23 mg/dL    Creatinine 3.24 (H) 0.70 - 1.30 mg/dL    BUN/CREA Ratio 13.9 10.0 - 20.0    Calcium, Total 8.1 (L) 8.7 - 10.4 mg/dL    Calculated Osmolality 302 (H) 275 - 295 mOsm/kg    eGFR-Cr 22 (L) >=60 mL/min/1.73m2    Albumin 2.9 (L) 3.2 - 4.8 g/dL    Phosphorus 3.1 2.4 - 5.1 mg/dL   Prothrombin Time (PT)    Collection Time: 03/26/24  5:28 AM   Result Value Ref Range    PT 17.3 (H) 11.6 - 14.8 seconds    INR 1.33 (H) 0.80 - 1.20   PTT, Activated    Collection Time: 03/26/24  5:28 AM   Result Value Ref Range    PTT 49.0 (H) 23.3 - 35.6 seconds   CBC W/ DIFFERENTIAL    Collection Time: 03/26/24  5:28 AM   Result Value Ref Range    WBC 21.1 (H) 4.0 - 11.0 x10(3) uL    RBC 2.46 (L) 4.30 - 5.70 x10(6)uL    HGB 7.4 (L) 13.0 - 17.5 g/dL    HCT 22.3 (L) 39.0 - 53.0 %    MCV 90.7 80.0 - 100.0 fL    MCH 30.1 26.0 - 34.0 pg    MCHC 33.2 31.0 - 37.0 g/dL    RDW-SD 54.5 (H) 35.1 - 46.3 fL    RDW 16.9 (H) 11.0 - 15.0 %    .0 (L) 150.0 - 450.0 10(3)uL    Neutrophil Absolute Prelim 16.18 (H) 1.50 - 7.70 x10 (3) uL   Manual differential    Collection Time: 03/26/24  5:28 AM   Result Value Ref  Range    Neutrophil Absolute Manual 18.99 (H) 1.50 - 7.70 x10(3) uL    Lymphocyte Absolute Manual 0.63 (L) 1.00 - 4.00 x10(3) uL    Monocyte Absolute Manual 0.63 0.10 - 1.00 x10(3) uL    Eosinophil Absolute Manual 0.84 (H) 0.00 - 0.70 x10(3) uL    Basophil Absolute Manual 0.00 0.00 - 0.20 x10(3) uL    Neutrophils % Manual 90 %    Lymphocyte % Manual 3 %    Monocyte % Manual 3 %    Eosinophil % Manual 4 %    Basophil % Manual 0 %    Total Cells Counted 100     RBC Morphology Slide reviewed, see previous RBC morphology. Normal, Slide reviewed, see previous RBC morphology.    Platelet Morphology Normal Normal   Fibrinogen Activity    Collection Time: 03/26/24  5:28 AM   Result Value Ref Range    Fibrinogen 235 180 - 480 mg/dL         Ottoniel Bennett MD     CALL [facility] AT [phone] IF QUESTIONS OR CONCERNS  THANKS

## 2024-03-26 NOTE — PROGRESS NOTES
[facility] Hospitalist Progress Note     CC: Hospital Follow up    PCP: Abilio Dorsey MD       Assessment/Plan:     Principal Problem:    NSTEMI (non-ST elevated myocardial infarction) (HCC)  Active Problems:    Chest pain    Cerebrovascular accident (CVA) due to embolism of left middle cerebral artery (HCC)    Anemia    Cristiano Freedman is a 53 year old male with HLD, tobacco use, presented 3/5/2024 with chest pain. Found to have STEMI with cardiogenic shock requiring Impella placement. Intubated in cath lab, s/p CABG 3/15. Subsequently with tamponade & back to OR 3/16 w CVS for washout. OR course c/b extensive blood loss - required ~20+u blood products letitia-operatively & subsequently coagulopathy w clotting in chest tubes & CRRT - hematology consulted. Stay further c/b: renal failure requiring CRRT - nephro consulted, resp failure w > 2 wks intubated, s/p tracheostomy and PEG 3/22, embolic strokes - neuro following.      STEMI c/b cardiogenic shock w multi-system organ failure   S/p CABG 3/15  Post-op cardiac tamponade s/p washout 3/16  Paroxysmal/post-op a fib  HTN  HLD  - Multivessel disease including chronically occluded large RCA, 95% sequential LAD stenosis on cath 3/5. Flow restored to heavily thrombosed circumflex and OM1, Impella placed (now removed). 3/6 s/p emergent RHC to eval hemodynamics. Worsening renal function noted.   - Cardiology consulted: angiograms as above   - CCM consulted: vent management, sedation  - CV surgery consulted: s/p CABG on 3/15  - cardiac tamponade developed 3/16, s/p mediastinal washout   - amio gtt to po/IV, dobutamine gtt, levophed stopped  - PO amiodarone 200 mg BID  - repeat echo w preserved EF despite hypokinesis     Acute hypoxemic respiratory failure  - intubated in cath lab (3/5 -  )  - vent management per crit care colleagues  - Daily SBT, extubated when able  - s/p tracheostomy on 3/22  - vent weaning per pulm, SBT as able    Acute blood  loss anemia  Thrombocytopenia - suspect reactive/consumptive  Hypercoagulability & anemia  - recurring clotting in chest tubes & HD. S/p 20+ u blood products this admission  - Hematology consulted, d/w Dr. Styles - hold on hypercoag w/u at this time  - trend CBC  - now with ozzing from multiple sites  - mildly uremic, plts lower, INR midly elevated,   - fibrinogen ok,  - will check HIT, repeat INR, CRRT resumed in case of plts dysfunction can consider DDAVP   - discussed with pulm and CV surgery team  - will reach out to Heme      Anuric acute kidney failure - suspect ATN from shock  Metabolic acidosis  Hyponatremia  Hyperkalemia  - Nephrology consutled  - CRRT initiated 3/7. Catheter exchanged 3/19  - resume CRRT, post CABG now, s/p 20 units of blood products in OR  - continue CRRT per Renal and Neuro     Sepsis 2/2 RML bacterial pneumonia - Persistent fevers 3/10 - 3/15, HCAP/VAP  Leukocytosis - initially from infection, now suspect reactive with strokes, HD, critical illness, etc  - Sputum with Klebsiella and MSSA  - Blood cx neg  - ID consulted  - Cefepime to be extended per ID     Subacute L frontal & occiptal lobe infarcts  - presume cardioembolic  - CTH w subacute L frontal & occipital lobe infarcts  - 3/5 RxFx eval: A1c - 6%, FLP - LDL 64, tele  - TTE noted  - Neuro consulted  - ASA 81, but holding plavix  - no need for full ac per neuro at this time  - PT/OT/SLP when able  -Reflexive movement of the right upper and lower extremity, not purposeful, will squeeze his left hand and wiggle the toes on his left leg (3/23)  - MRI Brain -acute/subacute left frontal lobe infarct, lacunar size subacute infarct in the right parietal white matter, left occipital lobe infarct, MRA with mild plaque in the carotids without significant stenosis  - MRI C-spine -pending  - CT brain 3/26 with edema and midline shift, discussed with Neuro, continue CRRT, hold on IHD     Hematuria, resolved  -yellow urine noted, minimal, but not  bloody           Tobacco use disorder  - Cessation     Elevated liver enzymes  - Likely 2/2 to shock. Hep b neg   - Trend CMP     Nutrition  - 180lbs on admission  - TPN while unable to take PO  - GI consulted - high risk for GI PEG, rec IR  - s/p PEG 3/22 w IR-> TF when ok with IR         ACP: Full Code. Linnea contact:  Reyes  Ppx: DVT: SQH (hold)  Dispo  EDoD: pending clinical course      Questions/concerns were discussed with patient and/or family by bedside.    Thank You,  Bud Camarena MD    Hospitalist with [facility]     Subjective:     Alert and tracking, following simple commands    OBJECTIVE:    Blood pressure (!) 86/63, pulse 83, temperature 98.1 °F (36.7 °C), temperature source Temporal, resp. rate 13, height 5' 7\" (1.702 m), weight 165 lb 12.6 oz (75.2 kg), SpO2 100%.    Temp:  [98.1 °F (36.7 °C)-99 °F (37.2 °C)] 98.1 °F (36.7 °C)  Pulse:  [] 83  Resp:  [13-35] 13  BP: ()/(51-97) 86/63  SpO2:  [93 %-100 %] 100 %  FiO2 (%):  [30 %] 30 %      Intake/Output:    Intake/Output Summary (Last 24 hours) at 3/26/2024 0840  Last data filed at 3/26/2024 0800  Gross per 24 hour   Intake 1929 ml   Output 3253 ml   Net -1324 ml       Last 3 Weights   03/26/24 0725 165 lb 12.6 oz (75.2 kg)   03/25/24 0600 172 lb 9.9 oz (78.3 kg)   03/24/24 0500 181 lb 14.1 oz (82.5 kg)   03/23/24 0600 179 lb 0.2 oz (81.2 kg)   03/22/24 0430 174 lb 13.2 oz (79.3 kg)   03/21/24 0600 181 lb 10.5 oz (82.4 kg)   03/20/24 0400 185 lb 3 oz (84 kg)   03/19/24 1000 189 lb 9.5 oz (86 kg)   03/18/24 0231 185 lb 10 oz (84.2 kg)   03/15/24 0600 188 lb 4.4 oz (85.4 kg)   03/14/24 0600 184 lb 15.5 oz (83.9 kg)   03/12/24 1000 186 lb 4.6 oz (84.5 kg)   03/11/24 1000 192 lb 10.9 oz (87.4 kg)   03/09/24 0500 188 lb 15 oz (85.7 kg)   03/08/24 0600 185 lb 13.6 oz (84.3 kg)   03/07/24 0600 183 lb 3.2 oz (83.1 kg)   03/06/24 0500 180 lb 1.9 oz (81.7 kg)   03/05/24 1324 175 lb 7.8 oz (79.6 kg)   03/05/24 0722 160 lb (72.6 kg)    10/06/21 1541 177 lb 6.4 oz (80.5 kg)   09/09/20 0929 172 lb 9.6 oz (78.3 kg)       Exam    Gen: Alert to voice  Heent: Tracheostomy in place, tunneled line in place  Pulm: Lungs without wheezing  CV: Heart with regular rate and rhythm, trace edema  Abd: Abdomen soft, PEG tube in place, not distended  MSK: no clubbing, no cyanosis  Skin: no rashes or lesions  Neuro: Able to squeeze hands on the left and right side, wiggle toes on the left and right side, L > R      Data Review:       Labs:     Recent Labs   Lab 03/23/24  0452 03/23/24  1213 03/24/24  0511 03/25/24  0423 03/25/24  1201 03/26/24  0016 03/26/24  0528   RBC 2.46*   < > 2.88* 2.55*  --   --  2.46*   HGB 7.2*   < > 8.5* 7.5* 8.3* 6.3* 7.4*   HCT 21.4*   < > 24.8* 23.4* 25.6* 20.3* 22.3*   MCV 87.0   < > 86.1 91.8  --   --  90.7   MCH 29.3   < > 29.5 29.4  --   --  30.1   MCHC 33.6   < > 34.3 32.1  --   --  33.2   RDW 15.8*   < > 16.9* 16.9*  --   --  16.9*   NEPRELIM 18.68*  --   --  17.07*  --   --  16.18*   WBC 23.7*   < > 20.4* 20.3*  --   --  21.1*   .0*   < > 142.0* 126.0*  --   --  112.0*    < > = values in this interval not displayed.         Recent Labs   Lab 03/25/24  0423 03/25/24  1601 03/26/24  0528   * 124* 135*   BUN 42* 38* 45*   CREATSERUM 3.95* 3.21* 3.24*   EGFRCR 17* 22* 22*   CA 7.9* 8.7 8.1*    137 139   K 4.4 4.5 4.4    104 106   CO2 26.0 26.0 26.0       Recent Labs   Lab 03/20/24  0444 03/24/24  0511 03/24/24  1533 03/25/24  0423 03/25/24  1601 03/26/24  0528   ALT  --  19  --   --   --   --    AST  --  26  --   --   --   --    ALB 3.1* 3.1* 3.4 2.9* 3.2 2.9*   *  --   --   --   --   --          Imaging:  CT BRAIN OR HEAD ([id number])    Addendum Date: 3/26/2024    ADDENDUM:  Findings were called to Dr. Winters.   Dictated by (CST): Fabian Gay MD on 3/26/2024 at 6:53 AM     Finalized by (CST): Fabian Gay MD on 3/26/2024 at 6:53 AM             Result Date: 3/26/2024  CONCLUSION:  1. Continued  evolution of left frontal lobe infarct with a 7 cm region of edema and patchy parenchymal hemorrhage.  Mild mass effect with approximately 4 mm of midline shift towards the right.    Dictated by (CST): Fabian Gay MD on 3/26/2024 at 6:34 AM     Finalized by (CST): Fabian Gay MD on 3/26/2024 at 6:46 AM          MRI BRAIN (CPT=70551)    Addendum Date: 3/26/2024    ADDENDUM:  Small amount of petechial hemorrhage present in the left frontal lobe infarct.   Dictated by (CST): Fabian Gay MD on 3/26/2024 at 6:47 AM     Finalized by (CST): Fabian Gay MD on 3/26/2024 at 6:47 AM             Result Date: 3/26/2024  CONCLUSION:  1. Acute/subacute left frontal lobe cortical infarct. 2. A lacunar size acute/subacute infarct in the right parietal white matter. 3. A 1.2 cm focus of cortical signal abnormality in left occipital lobe related to a small infarct which is likely greater than 2 weeks in age. 4. Chronic bilateral basal ganglionic lacunar infarcts and changes in the cerebral white matter related to chronic small vessel disease. 5. Left greater than right mastoid effusions. 6. Bilateral frontal and milder ethmoid sinusitis. 7. Generalized increase in red marrow related to anemia.     Dictated by (CST): Fabian Gay MD on 3/25/2024 at 9:31 AM     Finalized by (CST): Fabian Gay MD on 3/25/2024 at 9:49 AM          MRA CAROTIDS (CPT=70547)    Result Date: 3/25/2024  CONCLUSION:  Markedly artifactually compromised examination. Within these parameters: 1. Suggestion of mild plaque involving the carotid bifurcations without evidence of flow-limiting stenosis or occlusion.  2. The vertebral arteries appear to be contiguously patent, insofar as visualized, without evidence of hemodynamically stenosis.  3. Lesser incidental findings as above.    A preliminary report was issued by the [email or web address] teleradiology service. There are no major discrepancies.   Dictated by (CST): Zachery Dean MD on 3/25/2024 at  8:49 AM     Finalized by (CST): Zachery Dean MD on 3/25/2024 at 8:54 AM             Meds:      [START ON 4/1/2024] amiodarone  200 mg Oral Daily    cefepime  1 g Intravenous Q12H    amiodarone  200 mg Oral BID with meals    sodium chloride   Intravenous Once    heparin sodium lock flush  1.5 mL Intravenous Once    sodium chloride   Intravenous Once    sodium chloride   Intravenous Once    hydrALAzine  10 mg Intravenous Once    atorvastatin  40 mg Oral Nightly    metoprolol tartrate  50 mg Oral 2x Daily(Beta Blocker)    ascorbic acid  500 mg Oral Daily    aspirin  81 mg Oral Daily    hydrALAZINE  25 mg Oral Q8H CHAYITO    famotidine  20 mg Oral Daily    Or    famotidine  20 mg Intravenous Daily    chlorhexidine gluconate  15 mL Mouth/Throat BID    [Held by provider] heparin  5,000 Units Subcutaneous 2 times per day    [Held by provider] clopidogrel  75 mg Oral Daily      NxStage Pure Flow 400 CRRT/PIRRT fluid 5000mL 2.5 L/hr (03/25/24 1809)    dexmedetomidine Stopped (03/26/24 0659)    dextrose 10% Stopped (03/23/24 1653)     morphINE, oxidized cellulose, labetalol, hydrALAzine, prochlorperazine, dextrose 10%, lipase-protease-amylase (Lip-Prot-Amyl) **AND** sodium bicarbonate, alteplase (Activase) 4 mg in sterile water for injection (PF) 2.2 mL IV push to declot line, melatonin, polyethylene glycol (PEG 3350), ondansetron, potassium chloride **OR** potassium chloride, magnesium sulfate in dextrose 5%, magnesium sulfate in sterile water for injection, [DISCONTINUED] acetaminophen **OR** HYDROcodone-acetaminophen **OR** [DISCONTINUED] HYDROcodone-acetaminophen, glucose **OR** glucose **OR** glucose-vitamin C **OR** dextrose **OR** glucose **OR** glucose **OR** glucose-vitamin C, heparin, acetaminophen **OR** acetaminophen **OR** [DISCONTINUED] acetaminophen **OR** acetaminophen, senna, bisacodyl, fleet enema

## 2024-03-26 NOTE — PROGRESS NOTES
Cardiology Progress Note    Cristianosneha Estebanadrien Patient Status:  Inpatient    1970 MRN [id number]   Location [facility] 2W/SW Attending Donna Llanes, DO   Hosp Day # 21 PCP Abilio Dorsey MD     Interval Note:  Patient seen and examined  Awake, more alert this morning  Patient with bleeding from trach site, bloody suction and around trach site as well as bleeding around subclavian dialysis catheter  Hemoglobin dropped, required  1 unit of PRBC  DIC panel showed normal fibrinogen  INR 1.3, platelets 112, white count up    --------------------------------------------------------------------------------------------------------------------------------  ROS 12 systems reviewed, pertinent findings above.  ROS    History:  History reviewed. No pertinent past medical history.  Past Surgical History:   Procedure Laterality Date    COLONOSCOPY N/A 2017    Procedure: COLONOSCOPY, POSSIBLE BIOPSY, POSSIBLE POLYPECTOMY 30612;  Surgeon: Byron Plaza MD;  [place] [facility] [place] [facility]     History reviewed. No pertinent family history.   reports that he has been smoking cigarettes. He has never used smokeless tobacco. He reports that he does not drink alcohol and does not use drugs.    Objective:   Temp: 98.6 °F (37 °C)  Pulse: 92  Resp: 18  BP: 105/68  FiO2 (%): 30 %    Intake/Output:     Intake/Output Summary (Last 24 hours) at 3/26/2024 0910  Last data filed at 3/26/2024 0800  Gross per 24 hour   Intake 1929 ml   Output 3253 ml   Net -1324 ml       Physical Exam:    General: Intubated, sedated however responsive and opening eyes  HEENT: Normocephalic, anicteric sclera, neck supple.  Neck: No JVD, carotids 2+, no bruits.  Cardiac: Regular rate and rhythm. S1, S2 normal. No murmur, pericardial rub, S3.  Lungs: Clear without wheezes, rales, rhonchi or dullness.  Normal excursions and effort.  Abdomen: Soft, non-tender. BS-present.  Extremities: Without clubbing, cyanosis or edema.   Peripheral pulses are 2+.  Neurologic: Non-focal  Skin: Warm and dry.       Assessment   Generalized bleeding/oozing  STEMI, severe multivessel coronary disease status post CABG  Ventilator dependent respiratory failure, status post tracheostomy 3/22/2024  Acute stroke  Emergent redo sternotomy 3/16/2024 for tamponade  Cardiogenic shock requiring Impella-resolved  Acute renal failure, on HD  Atrial fibrillation, provoked-resolved  Anemia  Thrombocytopenia  History of tobacco abuse  Status post PEG tube placement, on TF    Plan  Hemodynamics remained stable  Concern for diffuse oozing/bleeding, required 1 unit PRBC this morning  Fibrinogen is normal, INR slightly up  Consider dose of vitamin K if INR continues to trend up  Check haptoglobin, alkaline phosphatase, peripheral smear    Thank you for allowing me to take part in the care of Cristiano Obregon. Please call with any questions of concerns.      Level of care: L3    Zander Cano DO  [facility]   Interventional Cardiac and Vascular Services    March 26, 2024  10:13 AM

## 2024-03-26 NOTE — PROGRESS NOTES
[facility]  part of [facility]    Progress Note    Cristiano Obregon Patient Status:  Inpatient    1970 MRN [id number]   Location [facility] 2W/SW Attending Bud Camarena MD   Hosp Day # 21 PCP Abilio Dorsey MD     Subjective:  Pt resting in bed. Nods head to simple questions: denies pain and SOB. Follows simple commands. No visitors currently at bedside.     Objective:  /84 (BP Location: Right arm)   Pulse 100   Temp 98.6 °F (37 °C) (Temporal)   Resp 22   Ht 5' 7\" (1.702 m)   Wt 165 lb 12.6 oz (75.2 kg)   SpO2 98%   BMI 25.97 kg/m²     Temp (24hrs), Av.5 °F (36.9 °C), Min:98.1 °F (36.7 °C), Max:99 °F (37.2 °C)      Intake/Output:    Intake/Output Summary (Last 24 hours) at 3/26/2024 1116  Last data filed at 3/26/2024 1100  Gross per 24 hour   Intake 1976.3 ml   Output 2797 ml   Net -820.7 ml       Wt Readings from Last 3 Encounters:   24 165 lb 12.6 oz (75.2 kg)   10/06/21 177 lb 6.4 oz (80.5 kg)   20 172 lb 9.6 oz (78.3 kg)       Allergies:  No Known Allergies    Labs:  Lab Results   Component Value Date    WBC 21.1 2024    HGB 7.4 2024    HCT 22.3 2024    .0 2024    CREATSERUM 3.24 2024    BUN 45 2024     2024    K 4.4 2024     2024    CO2 26.0 2024     2024    CA 8.1 2024    ALB 2.9 2024    PTT 49.0 2024    INR 1.33 2024    MG 1.8 2024    PHOS 3.1 2024    PHOS 3.1 2024       Physical Exam:  Blood pressure 127/84, pulse 100, temperature 98.6 °F (37 °C), temperature source Temporal, resp. rate 22, height 5' 7\" (1.702 m), weight 165 lb 12.6 oz (75.2 kg), SpO2 98%.  General: NAD  Neck: No JVD  Lungs: Clear bilaterally anteriorly/laterally  Heart: RRR, S1, S2  Abdomen: Soft/Round, NT/ND, BS+x4/+BM(s)  Extremities: Warm, dry,trace UE & LE edema bilat  Pulses: 2+ bilat DP  Skin: sternotomy incision ANIKET=CDI/Left  leg incisions ANIKET=CDI   Neurological:  awake, calm, nods head to simple questions/follow simple commands. Right side weaker than left    Assessment/Plan:  S/P CABG x 3 POD # 11  S/P RE-OP MEDIASTINAL EXPLORATION/WASHOUT on 3/16  Respiratory Failure- Trach placed 3/22 per Gen Surgery. SBT per Pulm  Pre op Impella removed post op on 3/16 per Cards. Pt hemodynamically stable. [place]helia removed 3/18.  Midline cath placed 3/18  Re op mediastinal exploration/washout due to pericardial tamponade. Limited echo 3/16 post exploration w/EF=45-50%/no residual effusion.   Continue ICU status  Pain meds as needed: avoid narcotics to assess neuro status  Altered MS post op: CT head 3/19/MRI/MRA 3/24=+CVA-Mgt per Neuro  Brief pre op AF- Amio protocol for AF prevention- currently SR  Plan to increase activity when appropriate  Scds/Heparin Sub Q prophylaxis DVT prevention -Heparin on hold due to increase bleeding noted around Trach which is improved today. Plavix also on hold.   Pre op NORMA/ATN/CRRT w/mgt per Nephrology. Expected post op volume overload. CRRT w/mgt per Nephrology/plan for eventual HD when okay w/Neuro: needs edema w/petechial hemorrhage improved. Temp catheter insertion per IR placed 3/19.  Hematology consulted for freq CRRT line clotting.   Expected post op anemia w/o clinical significance/stable-PRBCs given overnight. Hematology consulted 3/20. Acute blood loss anemia immediate post op w/mediastinal exploration/tamponade.  Nutrition per TF via PEG placed 3/22  ID consulted on 3/14: following recs. Continues on ABX. + sputum cx  Discharge planning: pt lives w/his  and has supportive family. Anticipate rehab: LTAC. /SW will assist w/planning.      Plan of care discussed w/patient/RN and CV Surgeon: Dr. Llanes.   Emily Adams, APRN  3/26/2024  11:16 AM

## 2024-03-26 NOTE — PROGRESS NOTES
No ihd until edema w petechial  hemorrhage improves. The    Petechial hemorrhage explains the persistent edema. Will monitor w serial scans.  Full pn to follow.

## 2024-03-26 NOTE — RESPIRATORY THERAPY NOTE
Problem: RESPIRATORY - ADULT  Goal: Achieves optimal ventilation and oxygenation  Description: INTERVENTIONS:  - Assess for changes in respiratory status  - Assess for changes in mentation and behavior  - Position to facilitate oxygenation and minimize respiratory effort  - Oxygen supplementation based on oxygen saturation or ABGs  - Provide Smoking Cessation handout, if applicable  - Encourage broncho-pulmonary hygiene including cough, deep breathe, Incentive Spirometry  - Assess the need for suctioning and perform as needed  - Assess and instruct to report SOB or any respiratory difficulty  - Respiratory Therapy support as indicated  - Manage/alleviate anxiety  - Monitor for signs/symptoms of CO2 retention  Outcome: Progressing    Patient received with tracheostomy and on ventilator. Tracheostomy care provided. Patient requires chronic ventilator support and is not a candidate for SBT. Bilateral breath sounds auscultated. Suction provided when indicated. No acute events. RT will continue to monitor.

## 2024-03-26 NOTE — PROGRESS NOTES
Gen Surg  Still oozing  Application of surgical spray    And later flow seal    Obs   no specific site  INR 2  Pt 23.9  Hgb 7.8  post last transfustion  Bleeding seems more from coag dysfunction   Obs   hem consult?

## 2024-03-26 NOTE — RESPIRATORY THERAPY NOTE
RESPIRATORY THERAPY PATIENT TRANSPORT NOTE    Transported from: PCU  Transported to: CT      Oxygen utilized during transport:yes device Transport vent at 15 L/M  Patient is intubated?:yes  Transport ventilator used?:yes  Resuscitation bag used during transport?:no   ETT placement and ventilator function were verified post-transport. yes

## 2024-03-26 NOTE — PROGRESS NOTES
Critical Care Progress Note     Assessment / Plan:  Acute respiratory failure - initially due to pulmonary edema then encephalopathy and severe deconditioning, s/p tracheostomy 3/22  - continue full vent support  - precedex as needed  - PS trials daily, did almost 2 hours yesterday  NSTEMI - diagnosed with new multivessel CAD s/p angioplasty and cardiogenic shock requiring Impella placement. S/p 3vCABG 3/15. Impella removed 3/16. Back to OR 3/16 AM for tamponade s/p pericardial clot removal  - cardiology and cardiac surgery following  Cardiogenic shock - due to acute coronary syndrome, EF was down and has now improved on repeat echo  - off vasopressors  Atrial fibrillation  - per cardiology  Pneumonia - sputum cultures with Klebsiella and MSSA  - antibiotics per ID  - on cefepime  Acute renal failure - suspect from ATN and cardiogenic shock  - RRT per nephrology  - transition from CRRt to iHD when ok with neurology  Stroke  - MRI with evolution of stroke and edema with patchy parenchymal hemorrhage  - neurology following  Thrombocytopenia, anemia - HIT antibody was negative, oozing from trach and g-tube site  - trend CBC and transfuse for hgb < 7, plts < 50  - check fibrinogen  - holding plavix and subcutaneous heparin  - monitor  FEN  - PEG placed 3/23  - continue TFs  Proph  - subcutaneous heparin on hold for bleeding  - famotidine  Dispo  - full code  - ICU, discharge to LTAC pending HD requirements    Discussed with patient and RN    Critical care time: 35 minutes    Farooq Olvera MD  Pulmonary & Critical Care Medicine  [facility]      Subjective:    Oozing from trach overnight  Required 1u pRBCs  Improved with surgicell    Objective:  Vitals:    03/26/24 0650 03/26/24 0700 03/26/24 0725 03/26/24 0800   BP: 95/51 (!) 85/56  (!) 86/63   BP Location:  Right arm  Right arm   Pulse: (!) 46 80  83   Resp: 21 14  13   Temp:       TempSrc:       SpO2: 97% 96%  100%   Weight:   165 lb 12.6 oz (75.2 kg)     Height:         Physical Exam:  General: laying in bed, trach in place  Respiratory: clear bilaterally, compliant with ventilator  Cardiovascular: regular rate and rhythm, no m/r/g  Abdomen: soft, NTND  Extremities: no LE edema  Mental status: awake, following commands    Medications:  Reviewed in EMR    Lab Data:  Reviewed in EMR    Imaging:  I independently visualized all relevant chest imaging in PACS and agree with radiology interpretation except where noted.

## 2024-03-26 NOTE — PROGRESS NOTES
NEPHROLOGY DAILY PROGRESS NOTE       SUBJECTIVE:   Off CRRT for CT head done earlier this AM  Still having oozing from trach and HD cath   Awake, denies SOB or pain     OBJECTIVE:    Total Intake/Output:    Intake/Output Summary (Last 24 hours) at 3/26/2024 0924  Last data filed at 3/26/2024 0800  Gross per 24 hour   Intake 1929 ml   Output 3253 ml   Net -1324 ml       PHYSICAL EXAM:  /68 (BP Location: Right arm)   Pulse 92   Temp 98.6 °F (37 °C) (Temporal)   Resp 18   Ht 5' 7\" (1.702 m)   Wt 165 lb 12.6 oz (75.2 kg)   SpO2 98%   BMI 25.97 kg/m²   GEN: NAD   HEENT: trached   CHEST: clear anteriorly    CARDIAC: S1S2 normal  ABD: soft, ND, PEG tube   EXT:  no LE edema   NEURO: awake, nods to questions, following commands    ACCESS: RIJ tunneled HD cath (3/22)       CURRENT MEDICATIONS:     [START ON 4/1/2024] amiodarone  200 mg Oral Daily    cefepime  1 g Intravenous Q12H    amiodarone  200 mg Oral BID with meals    sodium chloride   Intravenous Once    heparin sodium lock flush  1.5 mL Intravenous Once    sodium chloride   Intravenous Once    sodium chloride   Intravenous Once    hydrALAzine  10 mg Intravenous Once    atorvastatin  40 mg Oral Nightly    metoprolol tartrate  50 mg Oral 2x Daily(Beta Blocker)    ascorbic acid  500 mg Oral Daily    aspirin  81 mg Oral Daily    hydrALAZINE  25 mg Oral Q8H CHAYITO    famotidine  20 mg Oral Daily    Or    famotidine  20 mg Intravenous Daily    chlorhexidine gluconate  15 mL Mouth/Throat BID    [Held by provider] heparin  5,000 Units Subcutaneous 2 times per day    [Held by provider] clopidogrel  75 mg Oral Daily         LABS:  Patient Labs Reviewed in Detail. Pertinent Labs as follows:  Recent Labs   Lab 03/25/24  0423 03/25/24  1601 03/26/24  0528   * 124* 135*   BUN 42* 38* 45*   CREATSERUM 3.95* 3.21* 3.24*   EGFRCR 17* 22* 22*   CA 7.9* 8.7 8.1*    137 139   K 4.4 4.5 4.4    104 106   CO2 26.0 26.0 26.0     Recent Labs   Lab 03/23/24  0383  03/23/24  1213 03/24/24  0511 03/25/24  0423 03/25/24  1201 03/26/24  0016 03/26/24  0528   RBC 2.46*   < > 2.88* 2.55*  --   --  2.46*   HGB 7.2*   < > 8.5* 7.5* 8.3* 6.3* 7.4*   HCT 21.4*   < > 24.8* 23.4* 25.6* 20.3* 22.3*   MCV 87.0   < > 86.1 91.8  --   --  90.7   MCH 29.3   < > 29.5 29.4  --   --  30.1   MCHC 33.6   < > 34.3 32.1  --   --  33.2   RDW 15.8*   < > 16.9* 16.9*  --   --  16.9*   NEPRELIM 18.68*  --   --  17.07*  --   --  16.18*   WBC 23.7*   < > 20.4* 20.3*  --   --  21.1*   .0*   < > 142.0* 126.0*  --   --  112.0*    < > = values in this interval not displayed.         IMAGING:  CT BRAIN OR HEAD (67763)    Addendum Date: 3/26/2024    ADDENDUM:  Findings were called to Dr. Winters.   Dictated by (CST): Fabian Gay MD on 3/26/2024 at 6:53 AM     Finalized by (CST): Fabian Gay MD on 3/26/2024 at 6:53 AM             Result Date: 3/26/2024  CONCLUSION:  1. Continued evolution of left frontal lobe infarct with a 7 cm region of edema and patchy parenchymal hemorrhage.  Mild mass effect with approximately 4 mm of midline shift towards the right.    Dictated by (CST): Fabian Gay MD on 3/26/2024 at 6:34 AM     Finalized by (CST): Fabian Gay MD on 3/26/2024 at 6:46 AM          MRI BRAIN (CPT=70551)    Addendum Date: 3/26/2024    ADDENDUM:  Small amount of petechial hemorrhage present in the left frontal lobe infarct.   Dictated by (CST): Fabian Gay MD on 3/26/2024 at 6:47 AM     Finalized by (CST): Fabian Gay MD on 3/26/2024 at 6:47 AM             Result Date: 3/26/2024  CONCLUSION:  1. Acute/subacute left frontal lobe cortical infarct. 2. A lacunar size acute/subacute infarct in the right parietal white matter. 3. A 1.2 cm focus of cortical signal abnormality in left occipital lobe related to a small infarct which is likely greater than 2 weeks in age. 4. Chronic bilateral basal ganglionic lacunar infarcts and changes in the cerebral white matter related to chronic small vessel  disease. 5. Left greater than right mastoid effusions. 6. Bilateral frontal and milder ethmoid sinusitis. 7. Generalized increase in red marrow related to anemia.     Dictated by (CST): Fabian Gay MD on 3/25/2024 at 9:31 AM     Finalized by (CST): Fabian Gay MD on 3/25/2024 at 9:49 AM          MRA CAROTIDS (CPT=70547)    Result Date: 3/25/2024  CONCLUSION:  Markedly artifactually compromised examination. Within these parameters: 1. Suggestion of mild plaque involving the carotid bifurcations without evidence of flow-limiting stenosis or occlusion.  2. The vertebral arteries appear to be contiguously patent, insofar as visualized, without evidence of hemodynamically stenosis.  3. Lesser incidental findings as above.    A preliminary report was issued by the [facility] teleradiology service. There are no major discrepancies.   Dictated by (CST): Zachery Dean MD on 3/25/2024 at 8:49 AM     Finalized by (CST): Zachery Dean MD on 3/25/2024 at 8:54 AM            ASSESSMENT AND PLAN:   This is a 53 year old male with PMH sig for HLD, tobacco use. Presented with chest pain and was admitted for NSTEMI.  Nephrology is consulted for NORMA      Anuric NORMA  - due to ATN from shock.   - creatinine 1.23 on admission, worsened to 7.19 (3/7)  - initiated on CVVH on 3/7 via RIJ temp HD cath  - RIJ tunneled HD catheter placed (3/22)   - continue CRRT until cleared by neurology to transition to iHD.  Cerebral edema with midline shift noted on CT head (3/26)  - renal panel and Mg level every 12 hours while on CRRT   - Maintain adequate perfusion pressure  - Dose medications for CRRT  - Avoid nephrotoxins.  - follow renal fxn and I/Os  - monitor for renal recovery.     Hyperkalemia   - resolved with CRRT    Metabolic acidosis  - resolved with CRRT     Hyponatremia  - resolved     Dw RN and CV surgery     We will continue to follow.     Critical care time > 35 mins     Janet Velazco MD  [facility] - Nephrology

## 2024-03-27 NOTE — PROGRESS NOTES
[facility] Hematology/Oncology Group  Inpatient Progress Note    Cristiano Obregon Patient Status:  Inpatient    1970 MRN [id number]   Location [facility] 2W/SW Attending Bud Camarena MD   Hosp Day # 22 PCP Abilio Dorsey MD     Subjective:   Cristiano Obregon is a 53 year old male with a history of tobacco use and hyperlipidemia who was admitted 3/5/2024 with an MI.  His hospital stay has been extremely complicated.  He developed respiratory failure and cardiogenic shock requiring ventilation and Impella placement.  He underwent three-vessel CABG 3/15/2024 complicated by cardiac tamponade.  He required vasopressors and remained intubated.  He has bilateral chest tubes.  He developed ventilator associated pneumonia and acute renal failure and is on CVVH per nephrology.  He developed thrombocytopenia and anemia and required multiple transfusions.  He was also found to have a subacute appearing left frontal and left occipital lobe infarcts.  He remains intubated, on tube feeds and underwent tracheostomy and G-tube placement 3/22/2024.  Hematology was initially consulted for refractory anemia and concern for hypercoagulable condition.     Over the last couple of days the patient has had increasing bloody oozing from his tracheostomy site.  Yesterday there was some oozing from his HD catheter site and a little from his G-tube but these have both resolved.  Hematology consulted with concerns for coagulopathy.  On 3/26, PTT 49, PT 17.3, INR 1.3, fibrinogen 235.  And today 3/27 PTT 42.8, INR 1.25, PTT 16.5 fibrinogen 255.  Hemoglobin 6.9, patient receiving blood transfusion.  He has received multiple transfusions, including 1 unit today, 2 units on 3/26 and last before that was on 3/23.     Review of Systems:  Hematology/Oncology ROS performed and negative except as above in HPI     NxStage Pure Flow 400 CRRT/PIRRT fluid 5000mL  2.5 L/hr CRRT Continuous    [COMPLETED] sodium chloride 0.9%  infusion   Intravenous Once    phytonadione (Aqua-Mephton) 10 mg in sodium chloride 0.9% 50 mL IVPB  10 mg Intravenous Once    calcium gluconate 2 g in sodium chloride 0.9% 100 mL IVPB  2 g Intravenous Once    [COMPLETED] sodium chloride 0.9% infusion   Intravenous Once    [START ON 2024] amiodarone (Pacerone) tab 200 mg  200 mg Oral Daily    [COMPLETED] lidocaine PF (Xylocaine-MPF) 1% injection  10 mL Infiltration Once    [COMPLETED] oxidized cellulose (Surgical Snow) external sheet 2 Package  2 each Topical Once    [COMPLETED] morphINE PF 2 MG/ML injection 1 mg  1 mg Intravenous Once    [COMPLETED] midazolam (Versed) 2 MG/2ML injection 4 mg  4 mg Intravenous Once    morphINE PF 2 MG/ML injection 1 mg  1 mg Intravenous Q4H PRN    [COMPLETED] sodium phosphate 20.1 mmol in sodium chloride 0.9% 100 mL IVPB  20.1 mmol Intravenous Once    oxidized cellulose (Surgical Snow) external sheet 1 Package  1 each Topical PRN    ceFEPIme (Maxipime) 1 g in sodium chloride 0.9% 100 mL IVPB-MBP  1 g Intravenous Q12H    amiodarone (Pacerone) tab 200 mg  200 mg Oral BID with meals    [COMPLETED] LORazepam (Ativan) 2 mg/mL injection 1 mg  1 mg Intravenous Once    [] diazepam (Valium) 5 mg/mL injection        [COMPLETED] diazepam (Valium) 5 mg/mL injection 5 mg  5 mg Intravenous Once    [COMPLETED] LORazepam (Ativan) 2 mg/mL injection 1 mg  1 mg Intravenous Once    sodium chloride 0.9% infusion   Intravenous Once    [COMPLETED] sodium chloride 0.9% infusion   Intravenous Once    [COMPLETED] magnesium sulfate in dextrose 5% 1 g/100mL infusion premix 1 g  1 g Intravenous Once    dexmedeTOMIDine in sodium chloride 0.9% (Precedex) 400 mcg/100mL infusion premix  0.2-1.5 mcg/kg/hr (Dosing Weight) Intravenous Continuous    [COMPLETED] heparin (Porcine) 1000 UNIT/ML injection        [COMPLETED] heparin in sodium chloride 0.9% (Porcine) 2 Units/mL flush bag premix        [COMPLETED] lidocaine (Xylocaine) 2 % injection         [COMPLETED] lidocaine (Xylocaine) 2 % injection        [COMPLETED] iopamidol (ISOVUE-300) 61 % injection 100 mL  100 mL Injection ONCE PRN    heparin sodium lock flush 100 UNIT/ML injection 150 Units  1.5 mL Intravenous Once    sodium chloride 0.9% infusion   Intravenous Once    sodium chloride 0.9% infusion   Intravenous Once    [COMPLETED] ceFEPIme (Maxipime) 1 g in sodium chloride 0.9% 100 mL IVPB-MBP  1 g Intravenous Q12H    [COMPLETED] iron sucrose (Venofer) 20 mg/mL injection 200 mg  200 mg Intravenous Daily    [COMPLETED] sodium chloride 0.9% infusion   Intravenous Once    hydrALAzine (Apresoline) 20 mg/mL injection 10 mg  10 mg Intravenous Once    [COMPLETED] labetalol (Trandate) 5 mg/mL injection 10 mg  10 mg Intravenous Once    [COMPLETED] heparin (Porcine) 1000 UNIT/ML injection        [COMPLETED] heparin in sodium chloride 0.9% (Porcine) 2 Units/mL flush bag premix        [COMPLETED] lidocaine PF (Xylocaine-MPF) 2 % injection        [COMPLETED] heparin sodium lock flush 100 UNIT/ML injection 150 Units  1.5 mL Intravenous Once    labetalol (Trandate) 5 mg/mL injection 10 mg  10 mg Intravenous Q10 Min PRN    hydrALAzine (Apresoline) 20 mg/mL injection 10 mg  10 mg Intravenous Q2H PRN    prochlorperazine (Compazine) 10 MG/2ML injection 5 mg  5 mg Intravenous Q8H PRN    atorvastatin (Lipitor) tab 40 mg  40 mg Oral Nightly    metoprolol tartrate (Lopressor) tab 50 mg  50 mg Oral 2x Daily(Beta Blocker)    ascorbic acid (Vitamin C) tab 500 mg  500 mg Oral Daily    aspirin chewable tab 81 mg  81 mg Oral Daily    [COMPLETED] metoprolol (Lopressor) 5 mg/5mL injection 5 mg  5 mg Intravenous Once    dextrose 10% infusion (TPN no rate)   Intravenous Continuous PRN    pancrelipase (Lip-Prot-Amyl) (Zenpep) DR particles cap 10,000 Units  10,000 Units Per G Tube PRN    And    sodium bicarbonate tab 325 mg  325 mg Oral PRN    hydrALAZINE (Apresoline) tab 25 mg  25 mg Oral Q8H CHAYITO    [COMPLETED] hydrALAzine (Apresoline) 20  mg/mL injection 10 mg  10 mg Intravenous Once    [COMPLETED] alteplase (Activase) 2 mg in sterile water for injection (PF) 2.2 mL IV push to declot line  2 mg Intravenous Once    [] adult 3 in 1 TPN   Intravenous Continuous TPN    alteplase (Activase) 4 mg in sterile water for injection (PF) 2.2 mL IV push to declot line  4 mg Intravenous PRN    [] clevidipine (Cleviprex) 25 MG/50ML IV infusion  1-6 mg/hr Intravenous Continuous    [] adult 3 in 1 TPN   Intravenous Continuous TPN    [COMPLETED] acetaminophen (Ofirmev) 10 mg/mL infusion premix 1,000 mg  1,000 mg Intravenous Q8H    [COMPLETED] alteplase (Activase) 4 mg in sterile water for injection (PF) 2.2 mL IV push to declot line  4 mg Intravenous Once    [COMPLETED] digoxin (Lanoxin) 250 MCG/ML injection 125 mcg  125 mcg Intravenous Once    [COMPLETED] aminocaproic acid 5 g BOLUS FROM BAG infusion  5 g Intravenous Once    And    [COMPLETED] aminocaproic acid (Amicar) 10 g in sodium chloride 0.9% 250 mL infusion  1 g/hr Intravenous Once    [] norepinephrine (Levophed) 4 mg/250mL infusion premix        melatonin tab 3 mg  3 mg Oral Nightly PRN    polyethylene glycol (PEG 3350) (Miralax) 17 g oral packet 17 g  17 g Oral Daily PRN    ondansetron (Zofran) 4 MG/2ML injection 4 mg  4 mg Intravenous Q6H PRN    famotidine (Pepcid) tab 20 mg  20 mg Oral Daily    Or    famotidine (Pepcid) 20 mg/2mL injection 20 mg  20 mg Intravenous Daily    potassium chloride 20 mEq/100mL IVPB premix 20 mEq  20 mEq Intravenous PRN    Or    potassium chloride 40 mEq/100mL IVPB premix (central line) 40 mEq  40 mEq Intravenous PRN    magnesium sulfate in dextrose 5% 1 g/100mL infusion premix 1 g  1 g Intravenous PRN    magnesium sulfate in sterile water for injection 2 g/50mL IVPB premix 2 g  2 g Intravenous PRN    [COMPLETED] mupirocin (Bactroban) 2% nasal ointment 1 Application  1 Application Nasal BID    chlorhexidine gluconate (Peridex) 0.12 % oral solution 15  mL  15 mL Mouth/Throat BID    [] acetaminophen (Ofirmev) 10 mg/mL infusion premix 1,000 mg  1,000 mg Intravenous Q8H    [Held by provider] heparin (Porcine) 5000 UNIT/ML injection 5,000 Units  5,000 Units Subcutaneous 2 times per day    HYDROcodone-acetaminophen (Norco) 5-325 MG per tab 1 tablet  1 tablet Oral Q4H PRN    [Held by provider] clopidogrel (Plavix) tab 75 mg  75 mg Oral Daily    glucose (Dex4) 15 GM/59ML oral liquid 15 g  15 g Oral Q15 Min PRN    Or    glucose (Glutose) 40% oral gel 15 g  15 g Oral Q15 Min PRN    Or    glucose-vitamin C (Dex-4) chewable tab 4 tablet  4 tablet Oral Q15 Min PRN    Or    dextrose 50% injection 50 mL  50 mL Intravenous Q15 Min PRN    Or    glucose (Dex4) 15 GM/59ML oral liquid 30 g  30 g Oral Q15 Min PRN    Or    glucose (Glutose) 40% oral gel 30 g  30 g Oral Q15 Min PRN    Or    glucose-vitamin C (Dex-4) chewable tab 8 tablet  8 tablet Oral Q15 Min PRN    [COMPLETED] desmopressin (DDAVP) 25.6 mcg in sodium chloride 0.9% 50 mL IVPB  0.3 mcg/kg Intravenous Once    [COMPLETED] albumin human (Albumin) 5% injection 12.5 g  12.5 g Intravenous Once    [] cangrelor (Kengreal) 50 mg in sodium chloride 0.9% 250 mL IVPB - BRIDGING/MAINTENANCE dose  0.75 mcg/kg/min Intravenous Continuous    [] adult 3 in 1 TPN   Intravenous Continuous TPN    [COMPLETED] fluconazole (Diflucan) 2 mg/mL IVPB (6 hour duration) 800 mg  800 mg Intravenous Once    [COMPLETED] ceFAZolin (Ancef) 2 g in 20mL IV syringe premix  2 g Intravenous On Call    [COMPLETED] sodium chloride 0.9% infusion   Intravenous Once    [COMPLETED] mupirocin (Bactroban) 2% nasal ointment 1 Application  1 Application Nasal Now then every 0500    [COMPLETED] ceFAZolin (Ancef) 2 g in 20mL IV syringe premix  2 g Intravenous On Call    [] adult 3 in 1 TPN   Intravenous Continuous TPN    [] adult 3 in 1 TPN   Intravenous Continuous TPN    [COMPLETED] sodium chloride 0.9% infusion   Intravenous Once     [] adult 3 in 1 TPN   Intravenous Continuous TPN    [] adult 3 in 1 TPN   Intravenous Continuous TPN    [] adult 3 in 1 TPN   Intravenous Continuous TPN    [COMPLETED] diazepam (Valium) 5 mg/mL injection 10 mg  10 mg Intravenous Once    [COMPLETED] magnesium sulfate in sterile water for injection 2 g/50mL IVPB premix 2 g  2 g Intravenous Once    [COMPLETED] sodium chloride 0.9% infusion   Intravenous Once    heparin (Porcine) 1000 UNIT/ML injection 1,500 Units  1.5 mL Intracatheter PRN    [COMPLETED] heparin (Porcine) 1000 UNIT/ML injection        [COMPLETED] heparin in sodium chloride 0.9% (Porcine) 2 Units/mL flush bag premix        [COMPLETED] lidocaine PF (Xylocaine-MPF) 2 % injection        [COMPLETED] magnesium sulfate in sterile water for injection 2 g/50mL IVPB premix 2 g  2 g Intravenous Once    [COMPLETED] sodium chloride 0.9 % IV bolus 500 mL  500 mL Intravenous Once    [COMPLETED] heparin in sodium chloride 0.9% (Porcine) 2 Units/mL flush bag premix        [COMPLETED] propofol (Diprivan) 10 MG/ML injection        [COMPLETED] furosemide (Lasix) 10 mg/mL injection 80 mg  80 mg Intravenous Once    [COMPLETED] heparin (Porcine) 1000 UNIT/ML injection - BOLUS  Units  10 Units/kg Intravenous Once    [COMPLETED] sodium chloride 0.9 % IV bolus 500 mL  500 mL Intravenous Once    [COMPLETED] sodium bicarbonate injection 50 mEq  50 mEq Intravenous Once    [COMPLETED] midazolam (Versed) 2 MG/2ML injection        [COMPLETED] fentaNYL (Sublimaze) 50 mcg/mL injection        [COMPLETED] aspirin chewable tab 324 mg  324 mg Oral Once    [COMPLETED] cangrelor (Kengreal) 50 MG injection        [COMPLETED] amiodarone (Cordarone) 150 MG/3ML injection        [COMPLETED] heparin (Porcine) 25,000 Units/250mL infusion premix        [COMPLETED] midazolam (Versed) 2 MG/2ML injection        [COMPLETED] midazolam (Versed) 2 MG/2ML injection        [COMPLETED] heparin (Porcine) 1000 UNIT/ML injection         [COMPLETED] iohexol (Omnipaque) 300 MG/ML injection 150 mL  150 mL Intravenous ONCE PRN    [] cangrelor (Kengreal) 50 mg in sodium chloride 0.9% 250 mL IVPB - BRIDGING/MAINTENANCE dose  0.75 mcg/kg/min Intravenous Continuous    [COMPLETED] midazolam (Versed) 2 MG/2ML injection        [COMPLETED] lidocaine PF (Xylocaine-MPF) 2 % injection        [COMPLETED] heparin in sodium chloride 0.9% (Porcine) 2 Units/mL flush bag premix        [COMPLETED] heparin in sodium chloride 0.9% (Porcine) 2 Units/mL flush bag premix        acetaminophen (Tylenol) tab 650 mg  650 mg Oral Q4H PRN    Or    acetaminophen (Tylenol) 160 MG/5ML oral liquid 650 mg  650 mg Oral Q4H PRN    Or    acetaminophen (Ofirmev) 10 mg/mL infusion premix 1,000 mg  1,000 mg Intravenous Q6H PRN    senna (Senokot) 8.8 MG/5ML oral syrup 17.6 mg  10 mL Oral Nightly PRN    bisacodyl (Dulcolax) 10 MG rectal suppository 10 mg  10 mg Rectal Daily PRN    fleet enema (Fleet) 7-19 GM/118ML rectal enema 133 mL  1 enema Rectal Once PRN    [COMPLETED] furosemide (Lasix) 10 mg/mL injection 20 mg  20 mg Intravenous Once    [] furosemide (Lasix) 10 mg/mL injection 20 mg  20 mg Intravenous Once PRN    [COMPLETED] lidocaine PF (Xylocaine-MPF) 2 % injection        [COMPLETED] heparin in sodium chloride 0.9% (Porcine) 2 Units/mL flush bag premix        [COMPLETED] potassium chloride 20 mEq/100mL IVPB premix 20 mEq  20 mEq Intravenous Once    [COMPLETED] heparin (Porcine) 1000 UNIT/ML injection - BOLUS  Units  10 Units/kg Intravenous Once       Objective:    /65   Pulse 79   Temp 98.6 °F (37 °C) (Temporal)   Resp 22   Ht 1.702 m (5' 7\")   Wt 75 kg (165 lb 5.5 oz)   SpO2 98%   BMI 25.90 kg/m²   Physical Exam:  General: Opening eyes spontaneously  HEENT: PERRL  Neck: Trach in place, packing around it and some blood in tube  CV: RRR  Pulm: normal effort  Extremities: no edema  Neurological: Grossly intact    Labs:  Lab Results   Component Value Date     WBC 20.9 03/27/2024    HGB 6.9 03/27/2024    HCT 21.2 03/27/2024    .0 03/27/2024    .0 03/27/2024    CREATSERUM 3.18 03/27/2024    BUN 46 03/27/2024     03/27/2024    K 4.9 03/27/2024     03/27/2024    CO2 27.0 03/27/2024     03/27/2024    CA 8.5 03/27/2024    ALB 3.0 03/27/2024    ALKPHO 85 03/27/2024    BILT 0.9 03/27/2024    TP 5.2 03/27/2024    AST 26 03/27/2024    ALT 18 03/27/2024    PTT 42.8 03/27/2024    INR 1.25 03/27/2024    DDIMER >20.00 03/27/2024    MG 1.9 03/27/2024    PHOS 2.8 03/27/2024     Imaging:  XR CHEST AP PORTABLE  (CPT=71045)    Result Date: 3/27/2024  CONCLUSION:  1. Heart size upper limits of normal, and there is moderate pulmonary edema pattern which has worsened somewhat since previous study.  Worsening bibasilar opacification suggesting increasing bilateral pleural effusions left more than right and probable left basal atelectasis, although underlying pneumonia not excluded.  Status post extubation and feeding tube removal.  No pneumothorax.  Follow-up studies are advised.    Dictated by (CST): Moisés Delgadillo MD on 3/27/2024 at 8:36 AM     Finalized by (CST): Moisés Delgadillo MD on 3/27/2024 at 8:39 AM            Assessment & Plan:    Pt is a 53 year old male with male with a history of tobacco use and hyperlipidemia who was admitted 3/5/2024 with an MI.  His hospital stay has been extremely complicated.  He developed respiratory failure and cardiogenic shock requiring ventilation and Impella placement.  He underwent three-vessel CABG 3/15/2024 complicated by cardiac tamponade.  He required vasopressors and remained intubated.  He has bilateral chest tubes.  He developed ventilator associated pneumonia and acute renal failure and is on CVVH per nephrology.  He developed thrombocytopenia and anemia and required multiple transfusions.  He was also found to have a subacute appearing left frontal and left occipital lobe infarcts.  He remains intubated, on  tube feeds and underwent tracheostomy and G-tube placement 3/22/2024.  Hematology was initially consulted for refractory anemia and concern for hypercoagulable condition.     Over the last couple of days the patient has had increasing bloody oozing from his tracheostomy site.  Yesterday there was some oozing from his HD catheter site and a little from his G-tube but these have both resolved.  Hematology consulted with concerns for coagulopathy.  On 3/26, PTT 49, PT 17.3, INR 1.3, fibrinogen 235.  And today 3/27 PTT 42.8, INR 1.25, PTT 16.5 fibrinogen 255.  Hemoglobin 6.9, patient receiving blood transfusion.  He has received multiple transfusions, including 1 unit today, 2 units on 3/26 and last before that was on 3/23.     Continued oozing from tracheostomy site.  Tunneled dialysis catheter and G-tube oozing have resolved.  Coags reassuring with regards to concern for coagulopathy.  However, with mildly elevated PTT and PT we can proceed with a dose of vitamin K given his prolonged hospitalization.  In addition, the patient has received multiple blood transfusions and although calcium level normal today we can proceed with IV calcium in an attempt to improve the efficiency of clotting cascade.  My suspicion for an underlying coagulopathy is low and if bleeding persists would have a low threshold to rule out anatomical etiologies.    High risk    DO Ruben P[facility]ardo[facility]  [facility]    This note was created using a voice-recognition transcribing system. Incorrect words or phrases may have been missed during proofreading. Please interpret accordingly.

## 2024-03-27 NOTE — PROGRESS NOTES
[facility]  part of [facility] Infectious Disease  Progress Note    Cristiano Obregon Patient Status:  Inpatient    1970 MRN [id number]   Location [facility] 2W/SW Attending Bud Camarena MD   Hosp Day # 22 PCP Abilio Dorsey MD     Cristiano Obregon is a 53 year old male.   Chief Complaint   Patient presents with    Chest Pain Angina       HPI:      Bleeding from every orifice               REVIEW OF SYSTEMS:   A comprehensive 10 point review of systems was completed.  Pertinent positives and negatives noted in the the HPI.       Allergies:  No Known Allergies     Current Meds:    Current Facility-Administered Medications:     NxStage Pure Flow 400 CRRT/PIRRT fluid 5000mL, 2.5 L/hr, CRRT, Continuous **AND** CRRT Replacement Fluid 400, , , Until Discontinued    [START ON 2024] amiodarone (Pacerone) tab 200 mg, 200 mg, Oral, Daily    morphINE PF 2 MG/ML injection 1 mg, 1 mg, Intravenous, Q4H PRN    oxidized cellulose (Surgical Snow) external sheet 1 Package, 1 each, Topical, PRN    ceFEPIme (Maxipime) 1 g in sodium chloride 0.9% 100 mL IVPB-MBP, 1 g, Intravenous, Q12H    amiodarone (Pacerone) tab 200 mg, 200 mg, Oral, BID with meals    sodium chloride 0.9% infusion, , Intravenous, Once    dexmedeTOMIDine in sodium chloride 0.9% (Precedex) 400 mcg/100mL infusion premix, 0.2-1.5 mcg/kg/hr (Dosing Weight), Intravenous, Continuous    heparin sodium lock flush 100 UNIT/ML injection 150 Units, 1.5 mL, Intravenous, Once    sodium chloride 0.9% infusion, , Intravenous, Once    sodium chloride 0.9% infusion, , Intravenous, Once    hydrALAzine (Apresoline) 20 mg/mL injection 10 mg, 10 mg, Intravenous, Once    labetalol (Trandate) 5 mg/mL injection 10 mg, 10 mg, Intravenous, Q10 Min PRN    hydrALAzine (Apresoline) 20 mg/mL injection 10 mg, 10 mg, Intravenous, Q2H PRN    prochlorperazine (Compazine) 10 MG/2ML injection 5 mg, 5 mg, Intravenous, Q8H PRN     atorvastatin (Lipitor) tab 40 mg, 40 mg, Oral, Nightly    metoprolol tartrate (Lopressor) tab 50 mg, 50 mg, Oral, 2x Daily(Beta Blocker)    ascorbic acid (Vitamin C) tab 500 mg, 500 mg, Oral, Daily    aspirin chewable tab 81 mg, 81 mg, Oral, Daily    dextrose 10% infusion (TPN no rate), , Intravenous, Continuous PRN    pancrelipase (Lip-Prot-Amyl) (Zenpep) DR particles cap 10,000 Units, 10,000 Units, Per G Tube, PRN **AND** sodium bicarbonate tab 325 mg, 325 mg, Oral, PRN    hydrALAZINE (Apresoline) tab 25 mg, 25 mg, Oral, Q8H CHAYITO    alteplase (Activase) 4 mg in sterile water for injection (PF) 2.2 mL IV push to declot line, 4 mg, Intravenous, PRN    melatonin tab 3 mg, 3 mg, Oral, Nightly PRN    polyethylene glycol (PEG 3350) (Miralax) 17 g oral packet 17 g, 17 g, Oral, Daily PRN    ondansetron (Zofran) 4 MG/2ML injection 4 mg, 4 mg, Intravenous, Q6H PRN    famotidine (Pepcid) tab 20 mg, 20 mg, Oral, Daily **OR** famotidine (Pepcid) 20 mg/2mL injection 20 mg, 20 mg, Intravenous, Daily    potassium chloride 20 mEq/100mL IVPB premix 20 mEq, 20 mEq, Intravenous, PRN **OR** potassium chloride 40 mEq/100mL IVPB premix (central line) 40 mEq, 40 mEq, Intravenous, PRN    magnesium sulfate in dextrose 5% 1 g/100mL infusion premix 1 g, 1 g, Intravenous, PRN    magnesium sulfate in sterile water for injection 2 g/50mL IVPB premix 2 g, 2 g, Intravenous, PRN    chlorhexidine gluconate (Peridex) 0.12 % oral solution 15 mL, 15 mL, Mouth/Throat, BID    [Held by provider] heparin (Porcine) 5000 UNIT/ML injection 5,000 Units, 5,000 Units, Subcutaneous, 2 times per day    [DISCONTINUED] acetaminophen (Tylenol) tab 650 mg, 650 mg, Oral, Q4H PRN **OR** HYDROcodone-acetaminophen (Norco) 5-325 MG per tab 1 tablet, 1 tablet, Oral, Q4H PRN **OR** [DISCONTINUED] HYDROcodone-acetaminophen (Norco) 5-325 MG per tab 2 tablet, 2 tablet, Oral, Q4H PRN    [Held by provider] clopidogrel (Plavix) tab 75 mg, 75 mg, Oral, Daily    glucose (Dex4) 15  GM/59ML oral liquid 15 g, 15 g, Oral, Q15 Min PRN **OR** glucose (Glutose) 40% oral gel 15 g, 15 g, Oral, Q15 Min PRN **OR** glucose-vitamin C (Dex-4) chewable tab 4 tablet, 4 tablet, Oral, Q15 Min PRN **OR** dextrose 50% injection 50 mL, 50 mL, Intravenous, Q15 Min PRN **OR** glucose (Dex4) 15 GM/59ML oral liquid 30 g, 30 g, Oral, Q15 Min PRN **OR** glucose (Glutose) 40% oral gel 30 g, 30 g, Oral, Q15 Min PRN **OR** glucose-vitamin C (Dex-4) chewable tab 8 tablet, 8 tablet, Oral, Q15 Min PRN    heparin (Porcine) 1000 UNIT/ML injection 1,500 Units, 1.5 mL, Intracatheter, PRN    acetaminophen (Tylenol) tab 650 mg, 650 mg, Oral, Q4H PRN **OR** acetaminophen (Tylenol) 160 MG/5ML oral liquid 650 mg, 650 mg, Oral, Q4H PRN **OR** [DISCONTINUED] acetaminophen (Tylenol) rectal suppository 650 mg, 650 mg, Rectal, Q4H PRN **OR** acetaminophen (Ofirmev) 10 mg/mL infusion premix 1,000 mg, 1,000 mg, Intravenous, Q6H PRN    senna (Senokot) 8.8 MG/5ML oral syrup 17.6 mg, 10 mL, Oral, Nightly PRN    bisacodyl (Dulcolax) 10 MG rectal suppository 10 mg, 10 mg, Rectal, Daily PRN    fleet enema (Fleet) 7-19 GM/118ML rectal enema 133 mL, 1 enema, Rectal, Once PRN   No current outpatient medications on file.        HISTORY:  History reviewed. No pertinent past medical history.   Past Surgical History:   Procedure Laterality Date    COLONOSCOPY N/A 12/9/2017    Procedure: COLONOSCOPY, POSSIBLE BIOPSY, POSSIBLE POLYPECTOMY 71254;  Surgeon: Byron Plaza MD;  Location: [facility], [facility]        Social history and family history negative related to present illness except as above.    PHYSICAL EXAM:   /68 (BP Location: Right arm)   Pulse 88   Temp 97.9 °F (36.6 °C) (Temporal)   Resp 19   Ht 5' 7\" (1.702 m)   Wt 165 lb 5.5 oz (75 kg)   SpO2 100%   BMI 25.90 kg/m²   GENERAL:  Awake, agitated  HEENT:  Normocephalic, no scleral abnormalities.  Oropharynx clear, trachea ML.  NECK:  Supple, no masses, no  lymphadenopathy.  LUNGS:  Clear to auscultation b/l, no rhonchi, rales, or wheezes.  CARDIO: RRR S1/S2, no rubs, clicks, heaves, or murmurs.  GI:  Soft NT/ND, BS present x4 quadrants, no HSM.  EXTREMITIES: + edema, no clubbing, no cyanosis.  NEURO:  No focal neurologic deficits.  DERM:  Warm, dry, no rashes.    IMPRESSION/PLAN:        Fevers improved; suspect hcap resolving; fio2 30% a positive sign; but a clear problem is bleeding diathesis  Continue cefepime day 13 rx; will extend a little longer given trach site purulence smore serosanguinous today  S/p m.I. acute and subacute multiple strokes  Wbc remains elevated at 21k  S/p trach and peg  Spoke with rn  Minimal urine so ongoing dialysis  Ct head with some evolution and midline shift noted  >35 min                  Recent Results (from the past 72 hour(s))   POCT Glucose    Collection Time: 03/24/24 12:42 PM   Result Value Ref Range    POC Glucose  125 (H) 70 - 99 mg/dL   Renal Function Panel    Collection Time: 03/24/24  3:33 PM   Result Value Ref Range    Glucose 119 (H) 70 - 99 mg/dL    Sodium 135 (L) 136 - 145 mmol/L    Potassium 4.3 3.5 - 5.1 mmol/L    Chloride 104 98 - 112 mmol/L    CO2 25.0 21.0 - 32.0 mmol/L    Anion Gap 6 0 - 18 mmol/L    BUN 42 (H) 9 - 23 mg/dL    Creatinine 3.97 (H) 0.70 - 1.30 mg/dL    BUN/CREA Ratio 10.6 10.0 - 20.0    Calcium, Total 8.5 (L) 8.7 - 10.4 mg/dL    Calculated Osmolality 292 275 - 295 mOsm/kg    eGFR-Cr 17 (L) >=60 mL/min/1.73m2    Albumin 3.4 3.2 - 4.8 g/dL    Phosphorus 3.0 2.4 - 5.1 mg/dL   Magnesium    Collection Time: 03/24/24  3:33 PM   Result Value Ref Range    Magnesium 2.0 1.6 - 2.6 mg/dL   POCT Glucose    Collection Time: 03/24/24  5:27 PM   Result Value Ref Range    POC Glucose  124 (H) 70 - 99 mg/dL   POCT Glucose    Collection Time: 03/24/24  8:09 PM   Result Value Ref Range    POC Glucose  123 (H) 70 - 99 mg/dL   POCT Glucose    Collection Time: 03/24/24 11:57 PM   Result Value Ref Range    POC Glucose   134 (H) 70 - 99 mg/dL   Prothrombin Time (PT)    Collection Time: 03/25/24  4:23 AM   Result Value Ref Range    PT 16.2 (H) 11.6 - 14.8 seconds    INR 1.22 (H) 0.80 - 1.20   Renal Function Panel    Collection Time: 03/25/24  4:23 AM   Result Value Ref Range    Glucose 140 (H) 70 - 99 mg/dL    Sodium 137 136 - 145 mmol/L    Potassium 4.4 3.5 - 5.1 mmol/L    Chloride 106 98 - 112 mmol/L    CO2 26.0 21.0 - 32.0 mmol/L    Anion Gap 5 0 - 18 mmol/L    BUN 42 (H) 9 - 23 mg/dL    Creatinine 3.95 (H) 0.70 - 1.30 mg/dL    BUN/CREA Ratio 10.6 10.0 - 20.0    Calcium, Total 7.9 (L) 8.7 - 10.4 mg/dL    Calculated Osmolality 297 (H) 275 - 295 mOsm/kg    eGFR-Cr 17 (L) >=60 mL/min/1.73m2    Albumin 2.9 (L) 3.2 - 4.8 g/dL    Phosphorus 3.2 2.4 - 5.1 mg/dL   CBC W/ DIFFERENTIAL    Collection Time: 03/25/24  4:23 AM   Result Value Ref Range    WBC 20.3 (H) 4.0 - 11.0 x10(3) uL    RBC 2.55 (L) 4.30 - 5.70 x10(6)uL    HGB 7.5 (L) 13.0 - 17.5 g/dL    HCT 23.4 (L) 39.0 - 53.0 %    MCV 91.8 80.0 - 100.0 fL    MCH 29.4 26.0 - 34.0 pg    MCHC 32.1 31.0 - 37.0 g/dL    RDW-SD 55.0 (H) 35.1 - 46.3 fL    RDW 16.9 (H) 11.0 - 15.0 %    .0 (L) 150.0 - 450.0 10(3)uL    Immature Platelet Fraction 11.0 (H) 0.0 - 7.0 %    Neutrophil Absolute Prelim 17.07 (H) 1.50 - 7.70 x10 (3) uL   Manual differential    Collection Time: 03/25/24  4:23 AM   Result Value Ref Range    Neutrophil Absolute Manual 17.86 (H) 1.50 - 7.70 x10(3) uL    Lymphocyte Absolute Manual 0.81 (L) 1.00 - 4.00 x10(3) uL    Monocyte Absolute Manual 0.20 0.10 - 1.00 x10(3) uL    Eosinophil Absolute Manual 1.02 (H) 0.00 - 0.70 x10(3) uL    Basophil Absolute Manual 0.41 (H) 0.00 - 0.20 x10(3) uL    Neutrophils % Manual 87 %    Band % 1 %    Lymphocyte % Manual 4 %    Monocyte % Manual 1 %    Eosinophil % Manual 5 %    Basophil % Manual 2 %    Total Cells Counted 100     RBC Morphology See morphology below (A) Normal, Slide reviewed, see previous RBC morphology.    Platelet Morphology  Normal Normal    Pappenheimer Bodies Present (A) (none)    Macrocytosis 1+      Polychromasia 1+      Schistocytes 1+      Basophilic Stippling 2+ (A)      Arriola-Jolly Bodies Present (A) (none)   Magnesium    Collection Time: 03/25/24  4:23 AM   Result Value Ref Range    Magnesium 2.0 1.6 - 2.6 mg/dL   POCT Glucose    Collection Time: 03/25/24  5:02 AM   Result Value Ref Range    POC Glucose  143 (H) 70 - 99 mg/dL   Hemoglobin & Hematocrit    Collection Time: 03/25/24 12:01 PM   Result Value Ref Range    HGB 8.3 (L) 13.0 - 17.5 g/dL    HCT 25.6 (L) 39.0 - 53.0 %   Renal Function Panel    Collection Time: 03/25/24  4:01 PM   Result Value Ref Range    Glucose 124 (H) 70 - 99 mg/dL    Sodium 137 136 - 145 mmol/L    Potassium 4.5 3.5 - 5.1 mmol/L    Chloride 104 98 - 112 mmol/L    CO2 26.0 21.0 - 32.0 mmol/L    Anion Gap 7 0 - 18 mmol/L    BUN 38 (H) 9 - 23 mg/dL    Creatinine 3.21 (H) 0.70 - 1.30 mg/dL    BUN/CREA Ratio 11.8 10.0 - 20.0    Calcium, Total 8.7 8.7 - 10.4 mg/dL    Calculated Osmolality 294 275 - 295 mOsm/kg    eGFR-Cr 22 (L) >=60 mL/min/1.73m2    Albumin 3.2 3.2 - 4.8 g/dL    Phosphorus 2.0 (L) 2.4 - 5.1 mg/dL   Magnesium    Collection Time: 03/25/24  4:01 PM   Result Value Ref Range    Magnesium 1.8 1.6 - 2.6 mg/dL   Hemoglobin & Hematocrit    Collection Time: 03/26/24 12:16 AM   Result Value Ref Range    HGB 6.3 (LL) 13.0 - 17.5 g/dL    HCT 20.3 (L) 39.0 - 53.0 %   ABORH (Blood Type)    Collection Time: 03/26/24 12:45 AM   Result Value Ref Range    ABO BLOOD TYPE B     RH BLOOD TYPE Positive    Antibody Screen    Collection Time: 03/26/24 12:45 AM   Result Value Ref Range    Antibody Screen Negative    Magnesium    Collection Time: 03/26/24  5:28 AM   Result Value Ref Range    Magnesium 1.8 1.6 - 2.6 mg/dL   Phosphorus    Collection Time: 03/26/24  5:28 AM   Result Value Ref Range    Phosphorus 3.1 2.4 - 5.1 mg/dL   Renal Function Panel    Collection Time: 03/26/24  5:28 AM   Result Value Ref Range     Glucose 135 (H) 70 - 99 mg/dL    Sodium 139 136 - 145 mmol/L    Potassium 4.4 3.5 - 5.1 mmol/L    Chloride 106 98 - 112 mmol/L    CO2 26.0 21.0 - 32.0 mmol/L    Anion Gap 7 0 - 18 mmol/L    BUN 45 (H) 9 - 23 mg/dL    Creatinine 3.24 (H) 0.70 - 1.30 mg/dL    BUN/CREA Ratio 13.9 10.0 - 20.0    Calcium, Total 8.1 (L) 8.7 - 10.4 mg/dL    Calculated Osmolality 302 (H) 275 - 295 mOsm/kg    eGFR-Cr 22 (L) >=60 mL/min/1.73m2    Albumin 2.9 (L) 3.2 - 4.8 g/dL    Phosphorus 3.1 2.4 - 5.1 mg/dL   Prothrombin Time (PT)    Collection Time: 03/26/24  5:28 AM   Result Value Ref Range    PT 17.3 (H) 11.6 - 14.8 seconds    INR 1.33 (H) 0.80 - 1.20   PTT, Activated    Collection Time: 03/26/24  5:28 AM   Result Value Ref Range    PTT 49.0 (H) 23.3 - 35.6 seconds   CBC W/ DIFFERENTIAL    Collection Time: 03/26/24  5:28 AM   Result Value Ref Range    WBC 21.1 (H) 4.0 - 11.0 x10(3) uL    RBC 2.46 (L) 4.30 - 5.70 x10(6)uL    HGB 7.4 (L) 13.0 - 17.5 g/dL    HCT 22.3 (L) 39.0 - 53.0 %    MCV 90.7 80.0 - 100.0 fL    MCH 30.1 26.0 - 34.0 pg    MCHC 33.2 31.0 - 37.0 g/dL    RDW-SD 54.5 (H) 35.1 - 46.3 fL    RDW 16.9 (H) 11.0 - 15.0 %    .0 (L) 150.0 - 450.0 10(3)uL    Neutrophil Absolute Prelim 16.18 (H) 1.50 - 7.70 x10 (3) uL   Manual differential    Collection Time: 03/26/24  5:28 AM   Result Value Ref Range    Neutrophil Absolute Manual 18.99 (H) 1.50 - 7.70 x10(3) uL    Lymphocyte Absolute Manual 0.63 (L) 1.00 - 4.00 x10(3) uL    Monocyte Absolute Manual 0.63 0.10 - 1.00 x10(3) uL    Eosinophil Absolute Manual 0.84 (H) 0.00 - 0.70 x10(3) uL    Basophil Absolute Manual 0.00 0.00 - 0.20 x10(3) uL    Neutrophils % Manual 90 %    Lymphocyte % Manual 3 %    Monocyte % Manual 3 %    Eosinophil % Manual 4 %    Basophil % Manual 0 %    Total Cells Counted 100     RBC Morphology Slide reviewed, see previous RBC morphology. Normal, Slide reviewed, see previous RBC morphology.    Platelet Morphology Normal Normal   Fibrinogen Activity     Collection Time: 03/26/24  5:28 AM   Result Value Ref Range    Fibrinogen 235 180 - 480 mg/dL   Hemoglobin & Hematocrit    Collection Time: 03/26/24  1:08 PM   Result Value Ref Range    HGB 6.7 (LL) 13.0 - 17.5 g/dL    HCT 20.3 (L) 39.0 - 53.0 %   Heparin Induced Platelet    Collection Time: 03/26/24  2:56 PM   Result Value Ref Range    Heparin Induced Plt Antibody Negative Negative    Heparin Dependent Platelet AB Interp         No evidence of heparin-induced platelet aggregation is identified from platelet aggregation studies using the patient's serum in the presence of heparin.     A single negative result does not rule out the presence of a heparin-induced platelet antibody.     If clinical suspicion of heparin-induced thrombocytopenia persists, a repeat study or additional studies (serotonin-release assay, PF4 ALICIA, etc.) are recommended.    Reviewed by Chris Casarez M.D.       Renal Function Panel    Collection Time: 03/26/24  4:04 PM   Result Value Ref Range    Glucose 146 (H) 70 - 99 mg/dL    Sodium 139 136 - 145 mmol/L    Potassium 4.6 3.5 - 5.1 mmol/L    Chloride 108 98 - 112 mmol/L    CO2 24.0 21.0 - 32.0 mmol/L    Anion Gap 7 0 - 18 mmol/L    BUN 61 (H) 9 - 23 mg/dL    Creatinine 4.17 (H) 0.70 - 1.30 mg/dL    BUN/CREA Ratio 14.6 10.0 - 20.0    Calcium, Total 7.7 (L) 8.7 - 10.4 mg/dL    Calculated Osmolality 308 (H) 275 - 295 mOsm/kg    eGFR-Cr 16 (L) >=60 mL/min/1.73m2    Albumin 2.6 (L) 3.2 - 4.8 g/dL    Phosphorus 3.7 2.4 - 5.1 mg/dL   Magnesium    Collection Time: 03/26/24  4:04 PM   Result Value Ref Range    Magnesium 1.8 1.6 - 2.6 mg/dL   Prothrombin Time (PT)    Collection Time: 03/26/24  4:04 PM   Result Value Ref Range    PT 23.9 (H) 11.6 - 14.8 seconds    INR 2.00 (H) 0.80 - 1.20   Hemoglobin & Hematocrit    Collection Time: 03/26/24  6:08 PM   Result Value Ref Range    HGB 7.8 (L) 13.0 - 17.5 g/dL    HCT 23.9 (L) 39.0 - 53.0 %   POCT Glucose    Collection Time: 03/26/24  6:27 PM   Result  Value Ref Range    POC Glucose  123 (H) 70 - 99 mg/dL   EKG 12 Lead    Collection Time: 03/26/24 11:31 PM   Result Value Ref Range    Ventricular rate 89 BPM    Atrial rate  BPM    P-R Interval  ms    QRS Duration 108 ms    Q-T Interval 370 ms    QTC Calculation (Bezet) 450 ms    P Axis  degrees    R Axis 75 degrees    T Axis -25 degrees   Hemoglobin & Hematocrit    Collection Time: 03/27/24 12:10 AM   Result Value Ref Range    HGB 7.5 (L) 13.0 - 17.5 g/dL    HCT 22.7 (L) 39.0 - 53.0 %   Prepare RBC Once    Collection Time: 03/27/24 12:40 AM   Result Value Ref Range    Blood Product [id number]     Unit Number [id number]     UNIT ABO B     UNIT RH POS     Product Status Presumed Transfused     Expiration Date 953022836069     Blood Type Barcode 7300     Unit Volume 350 ml   Prepare RBC Once    Collection Time: 03/27/24 12:40 AM   Result Value Ref Range    Blood Product I3105V23     Unit Number [id number]     UNIT ABO B     UNIT RH POS     Product Status Presumed Transfused     Expiration Date [id number]     Blood Type Barcode 7300     Unit Volume 350 ml   Magnesium    Collection Time: 03/27/24  5:46 AM   Result Value Ref Range    Magnesium 1.9 1.6 - 2.6 mg/dL   Phosphorus    Collection Time: 03/27/24  5:46 AM   Result Value Ref Range    Phosphorus 2.8 2.4 - 5.1 mg/dL   CBC, Platelet; No Differential    Collection Time: 03/27/24  5:46 AM   Result Value Ref Range    WBC 20.9 (H) 4.0 - 11.0 x10(3) uL    RBC 2.55 (L) 4.30 - 5.70 x10(6)uL    HGB 7.6 (L) 13.0 - 17.5 g/dL    HCT 23.2 (L) 39.0 - 53.0 %    MCV 91.0 80.0 - 100.0 fL    MCH 29.8 26.0 - 34.0 pg    MCHC 32.8 31.0 - 37.0 g/dL    RDW 16.4 (H) 11.0 - 15.0 %    RDW-SD 53.5 (H) 35.1 - 46.3 fL    .0 (L) 150.0 - 450.0 10(3)uL   Comp Metabolic Panel (14)    Collection Time: 03/27/24  5:46 AM   Result Value Ref Range    Glucose 128 (H) 70 - 99 mg/dL    Sodium 139 136 - 145 mmol/L    Potassium 4.9 3.5 - 5.1 mmol/L    Chloride 107 98 - 112 mmol/L    CO2 27.0  21.0 - 32.0 mmol/L    Anion Gap 5 0 - 18 mmol/L    BUN 46 (H) 9 - 23 mg/dL    Creatinine 3.18 (H) 0.70 - 1.30 mg/dL    BUN/CREA Ratio 14.5 10.0 - 20.0    Calcium, Total 8.5 (L) 8.7 - 10.4 mg/dL    Calculated Osmolality 302 (H) 275 - 295 mOsm/kg    eGFR-Cr 22 (L) >=60 mL/min/1.73m2    ALT 18 10 - 49 U/L    AST 26 <=34 U/L    Alkaline Phosphatase 85 45 - 117 U/L    Bilirubin, Total 0.9 0.3 - 1.2 mg/dL    Total Protein 5.2 (L) 5.7 - 8.2 g/dL    Albumin 3.0 (L) 3.2 - 4.8 g/dL    Globulin  2.2 (L) 2.8 - 4.4 g/dL    A/G Ratio 1.4 1.0 - 2.0   Prothrombin Time (PT)    Collection Time: 03/27/24  5:46 AM   Result Value Ref Range    PT 16.5 (H) 11.6 - 14.8 seconds    INR 1.25 (H) 0.80 - 1.20   Calcium, Ionized    Collection Time: 03/27/24  5:46 AM   Result Value Ref Range    Sample Type Venous     Ionized Calcium 1.16 0.95 - 1.32 mmol/L    Puncture Charge No     Blood Gas Analyzer [id number] CCU    MD BLOOD SMEAR CONSULT    Collection Time: 03/27/24  5:46 AM   Result Value Ref Range    MD Blood Smear Consult         Evaluation of CBC data and the peripheral blood smear (per request of Dr. LAURA Styles) demonstrates significant normochromic normocytic anemia with decreased absolute RBC count, moderate leukocytosis, and mild/grade 1 thrombocytopenia.    Red blood cells show anisopoikilocytosis with scattered macrocytes, scattered ovalocytes, few microcytes, and minimal/mild polychromasia.    Platelets show anisocytosis with scattered large forms and occasional giant forms seen.    200-cell manual leukocyte differential demonstrates neutrophilia (17.10 k/ul), mild monocytosis (1.25 k/ul), mild eosinophilia (1.04 k/ul) and normal lymphocyte count (1.05 k/ul).    Neutrophils consist predominantly of mature forms and scattered band forms without evidence of significant reactive and/or dysplastic cytologic atypia.    Monocytes appear mature and show occasional mild reactive features.    Eosinophils appear mature and show no significant  morphologic abnormalities.    No significant morphologic abnormalities  are seen for the other leukocyte subsets. No morphologic evidence of leukocyte dysplasia, atypical lymphoid cell infiltrates, or circulating immature blast forms is identified.    Differential causes for an anemia of this type may include hemorrhage, chronic inflammatory disorders (rheumatoid arthritis, SLE, inflammatory bowel disease, sarcoidosis, trauma, etc.), chronic infections (HIV, other viral infections, tuberculosis, pyelonephritis, osteomyelitis, chronic fungal infections, subacute bacterial endocarditis, etc.), neoplasms (lymphoma, carcinomas, chronic leukemias and occasionally myelodysplastic syndrome), and end organ failure (chronic liver disease, endocrinopathies, etc.).     Differential considerations for thrombocytopenia may include failure of bone marrow production (infections, drugs, fibrosis, myelodysplasia, alcohol suppression, congenital, etc.), autoantibody immune thrombocytopenia (ITP, SLE, lymphomas, drugs, infections), alloantibody immune thrombocytopenia (post  transfusion purpura), non-immune conditions (DIC, TTP, mechanical), splenic sequestration and hemodilution.      General differential considerations for neutrophilia may include infection/sepsis (most commonly bacterial, others), drugs/hormones (G-CSF, corticosteroids, epinephrine, lithium, toxins/poisons/venoms), tissue necrosis (infarct, trauma, burns, acute gout), inflammatory disorders (collagen vascular/autoimmune), acute hemorrhage/hemolysis, and metabolic disorders (uremia, ketoacidosis, others).     Possible causes of monocytosis may include acute/chronic infections (tuberculosis, Listeria, syphilis, subacute bacterial endocarditis, protozoal and rickettsial organisms), Hodgkin's disease, collagen vascular disorders, immune-mediated gastrointestinal disorders (ulcerative colitis, regional enteritis), non-Hodgkin's lymphomas, sarcoidosis, multiple myeloma,  hemolytic anemia, post-splenectomy, immune thrombocytopenic purpura and myelodysplastic/myeloproliferative neoplasms.      Differential considerations for eosinophilia may include allergic and hypersensitivity reactions, drug allergies, cutaneous disorders, collagen vascular/connective tissue diseases, infections (parasitic, fungal, others), immunodeficiency disorders, sarcoidosis and some neoplastic conditions (Hodgkin's lymphoma, non-Hodgkin's lymphoma, carcinoma, acute lymphocytic leukemia, myeloproliferative neoplasms, others).      Clinical correlation is recommended.     Reviewed by Chris Casarez M.D.       LDH    Collection Time: 03/27/24  5:46 AM   Result Value Ref Range     (H) 120 - 246 U/L   Platelet Count    Collection Time: 03/27/24  5:46 AM   Result Value Ref Range    .0 (L) 150.0 - 450.0 10(3)uL   PTT, Activated    Collection Time: 03/27/24  5:46 AM   Result Value Ref Range    PTT 42.8 (H) 23.3 - 35.6 seconds   Fibrinogen Activity    Collection Time: 03/27/24  5:46 AM   Result Value Ref Range    Fibrinogen 255 180 - 480 mg/dL   D-Dimer    Collection Time: 03/27/24  5:46 AM   Result Value Ref Range    D-Dimer >20.00 (H) <0.55 ug/mL HENNA Bennett MD     CALL [facility] AT [phone] IF QUESTIONS OR CONCERNS  THANKS

## 2024-03-27 NOTE — PROGRESS NOTES
[facility] Hospitalist Progress Note     CC: Hospital Follow up    PCP: Abilio Dorsey MD       Assessment/Plan:     Principal Problem:    NSTEMI (non-ST elevated myocardial infarction) (HCC)  Active Problems:    Chest pain    Cerebrovascular accident (CVA) due to embolism of left middle cerebral artery (HCC)    Anemia    Cristiano Freedman is a 53 year old male with HLD, tobacco use, presented 3/5/2024 with chest pain. Found to have STEMI with cardiogenic shock requiring Impella placement. Intubated in cath lab, s/p CABG 3/15. Subsequently with tamponade & back to OR 3/16 w CVS for washout. OR course c/b extensive blood loss - required ~20+u blood products letitia-operatively & subsequently coagulopathy w clotting in chest tubes & CRRT - hematology consulted. Stay further c/b: renal failure requiring CRRT - nephro consulted, resp failure w > 2 wks intubated, s/p tracheostomy and PEG 3/22, embolic strokes - neuro following.      STEMI c/b cardiogenic shock w multi-system organ failure   S/p CABG 3/15  Post-op cardiac tamponade s/p washout 3/16  Paroxysmal/post-op a fib  HTN  HLD  - Multivessel disease including chronically occluded large RCA, 95% sequential LAD stenosis on cath 3/5. Flow restored to heavily thrombosed circumflex and OM1, Impella placed (now removed). 3/6 s/p emergent RHC to eval hemodynamics. Worsening renal function noted.   - Cardiology consulted: angiograms as above   - CCM consulted: vent management, sedation  - CV surgery consulted: s/p CABG on 3/15  - cardiac tamponade developed 3/16, s/p mediastinal washout   - amio gtt to po/IV, dobutamine gtt, levophed stopped  - PO amiodarone 200 mg BID  - repeat echo w preserved EF despite hypokinesis     Acute hypoxemic respiratory failure  - intubated in cath lab (3/5 -  )  - vent management per crit care colleagues  - Daily SBT, extubated when able  - s/p tracheostomy on 3/22  - vent weaning per pulm, SBT as able    Acute blood  loss anemia  Thrombocytopenia - suspect reactive/consumptive  Hypercoagulability & anemia  - recurring clotting in chest tubes & HD. S/p 20+ u blood products this admission  - Hematology consulted, d/w Dr. Styles - hold on hypercoag w/u at this time  - trend CBC  - now with ozzing from multiple sites  - mildly uremic, plts lower, INR midly elevated,   - fibrinogen ok,  - will check HIT, repeat INR, CRRT resumed in case of plts dysfunction can consider DDAVP   - discussed with pulm and CV surgery team  - Discussed with Heme on 3/26, DIC panel ordered, will see today  - hgb 7.6 following transfusion on 3/26, still ozzing from trach site, but HD site and peg tube site without bleeding      Anuric acute kidney failure - suspect ATN from shock  Metabolic acidosis  Hyponatremia  Hyperkalemia  - Nephrology consutled  - CRRT initiated 3/7. Catheter exchanged 3/19  - resume CRRT, post CABG now, s/p 20 units of blood products in OR  - continue CRRT per Renal and Neuro     Sepsis 2/2 RML bacterial pneumonia - Persistent fevers 3/10 - 3/15, HCAP/VAP  Leukocytosis - initially from infection, now suspect reactive with strokes, HD, critical illness, etc  - Sputum with Klebsiella and MSSA  - Blood cx neg  - ID consulted  - Cefepime to be extended per ID     Subacute L frontal & occiptal lobe infarcts  - presume cardioembolic  - CTH w subacute L frontal & occipital lobe infarcts  - 3/5 RxFx eval: A1c - 6%, FLP - LDL 64, tele  - TTE noted  - Neuro consulted  - ASA 81, but holding plavix  - no need for full ac per neuro at this time  - PT/OT/SLP when able  -Reflexive movement of the right upper and lower extremity, not purposeful, will squeeze his left hand and wiggle the toes on his left leg (3/23)  - MRI Brain -acute/subacute left frontal lobe infarct, lacunar size subacute infarct in the right parietal white matter, left occipital lobe infarct, MRA with mild plaque in the carotids without significant stenosis  - MRI C-spine  -pending  - CT brain 3/26 with edema and midline shift, discussed with Neuro, continue CRRT, hold on IHD     Hematuria, resolved  -yellow urine noted, minimal, but not bloody         Tobacco use disorder  - Cessation     Elevated liver enzymes  - Likely 2/2 to shock. Hep b neg   - Trend CMP     Nutrition  - 180lbs on admission  - tube feeds, tolerating this well  - s/p PEG 3/22 w IR-> TF tolerating this well         ACP: Full Code. Linnea contact:  Reyes  Ppx: DVT: SQH (hold)  Dispo  EDoD: pending clinical course      Questions/concerns were discussed with patient and/or family by bedside.    Thank You,  Bud Camarena MD    Hospitalist with [facility]     Subjective:     Alert and tracking, following simple commands  Lots of bleeding from trach site    OBJECTIVE:    Blood pressure 104/62, pulse 69, temperature 97.9 °F (36.6 °C), temperature source Temporal, resp. rate 18, height 5' 7\" (1.702 m), weight 165 lb 5.5 oz (75 kg), SpO2 100%.    Temp:  [97.9 °F (36.6 °C)-98.6 °F (37 °C)] 97.9 °F (36.6 °C)  Pulse:  [] 69  Resp:  [13-25] 18  BP: ()/(53-91) 104/62  SpO2:  [90 %-100 %] 100 %  FiO2 (%):  [30 %] 30 %      Intake/Output:    Intake/Output Summary (Last 24 hours) at 3/27/2024 0756  Last data filed at 3/27/2024 0633  Gross per 24 hour   Intake 1606.6 ml   Output 1850 ml   Net -243.4 ml       Last 3 Weights   03/27/24 0500 165 lb 5.5 oz (75 kg)   03/26/24 0725 165 lb 12.6 oz (75.2 kg)   03/25/24 0600 172 lb 9.9 oz (78.3 kg)   03/24/24 0500 181 lb 14.1 oz (82.5 kg)   03/23/24 0600 179 lb 0.2 oz (81.2 kg)   03/22/24 0430 174 lb 13.2 oz (79.3 kg)   03/21/24 0600 181 lb 10.5 oz (82.4 kg)   03/20/24 0400 185 lb 3 oz (84 kg)   03/19/24 1000 189 lb 9.5 oz (86 kg)   03/18/24 0231 185 lb 10 oz (84.2 kg)   03/15/24 0600 188 lb 4.4 oz (85.4 kg)   03/14/24 0600 184 lb 15.5 oz (83.9 kg)   03/12/24 1000 186 lb 4.6 oz (84.5 kg)   03/11/24 1000 192 lb 10.9 oz (87.4 kg)   03/09/24 0500 188 lb 15 oz (85.7 kg)    03/08/24 0600 185 lb 13.6 oz (84.3 kg)   03/07/24 0600 183 lb 3.2 oz (83.1 kg)   03/06/24 0500 180 lb 1.9 oz (81.7 kg)   03/05/24 1324 175 lb 7.8 oz (79.6 kg)   03/05/24 0722 160 lb (72.6 kg)   10/06/21 1541 177 lb 6.4 oz (80.5 kg)   09/09/20 0929 172 lb 9.6 oz (78.3 kg)       Exam    Gen: Alert to voice  Heent: Tracheostomy in place, bleeding noted around site, tunneled line in place  Pulm: Lungs without wheezing  CV: Heart with regular rate and rhythm, trace edema  Abd: Abdomen soft, PEG tube in place, not distended  MSK: no clubbing, no cyanosis  Skin: no rashes or lesions  Neuro: Able to squeeze hands on the left and right side, wiggle toes on the left and right side, L > R      Data Review:       Labs:     Recent Labs   Lab 03/23/24  0452 03/23/24  1213 03/25/24  0423 03/25/24  1201 03/26/24  0528 03/26/24  1308 03/26/24  1808 03/27/24  0010 03/27/24  0546   RBC 2.46*   < > 2.55*  --  2.46*  --   --   --  2.55*   HGB 7.2*   < > 7.5*   < > 7.4*   < > 7.8* 7.5* 7.6*   HCT 21.4*   < > 23.4*   < > 22.3*   < > 23.9* 22.7* 23.2*   MCV 87.0   < > 91.8  --  90.7  --   --   --  91.0   MCH 29.3   < > 29.4  --  30.1  --   --   --  29.8   MCHC 33.6   < > 32.1  --  33.2  --   --   --  32.8   RDW 15.8*   < > 16.9*  --  16.9*  --   --   --  16.4*   NEPRELIM 18.68*  --  17.07*  --  16.18*  --   --   --   --    WBC 23.7*   < > 20.3*  --  21.1*  --   --   --  20.9*   .0*   < > 126.0*  --  112.0*  --   --   --  124.0*  124.0*    < > = values in this interval not displayed.         Recent Labs   Lab 03/26/24  0528 03/26/24  1604 03/27/24  0546   * 146* 128*   BUN 45* 61* 46*   CREATSERUM 3.24* 4.17* 3.18*   EGFRCR 22* 16* 22*   CA 8.1* 7.7* 8.5*    139 139   K 4.4 4.6 4.9    108 107   CO2 26.0 24.0 27.0       Recent Labs   Lab 03/24/24  0511 03/24/24  1533 03/25/24  0423 03/25/24  1601 03/26/24  0528 03/26/24  1604 03/27/24  0546   ALT 19  --   --   --   --   --  18   AST 26  --   --   --   --   --  26    ALB 3.1*   < > 2.9* 3.2 2.9* 2.6* 3.0*   LDH  --   --   --   --   --   --  393*    < > = values in this interval not displayed.         Imaging:  CT BRAIN OR HEAD ([id number])    Addendum Date: 3/26/2024    ADDENDUM:  Findings were called to Dr. Winters.   Dictated by (CST): Fabian Gay MD on 3/26/2024 at 6:53 AM     Finalized by (CST): Fabian Gay MD on 3/26/2024 at 6:53 AM             Result Date: 3/26/2024  CONCLUSION:  1. Continued evolution of left frontal lobe infarct with a 7 cm region of edema and patchy parenchymal hemorrhage.  Mild mass effect with approximately 4 mm of midline shift towards the right.    Dictated by (CST): Fabian Gay MD on 3/26/2024 at 6:34 AM     Finalized by (CST): Fabian Gay MD on 3/26/2024 at 6:46 AM          MRI BRAIN (CPT=70551)    Addendum Date: 3/26/2024    ADDENDUM:  Small amount of petechial hemorrhage present in the left frontal lobe infarct.   Dictated by (CST): Fabian Gay MD on 3/26/2024 at 6:47 AM     Finalized by (CST): Fabian Gay MD on 3/26/2024 at 6:47 AM             Result Date: 3/26/2024  CONCLUSION:  1. Acute/subacute left frontal lobe cortical infarct. 2. A lacunar size acute/subacute infarct in the right parietal white matter. 3. A 1.2 cm focus of cortical signal abnormality in left occipital lobe related to a small infarct which is likely greater than 2 weeks in age. 4. Chronic bilateral basal ganglionic lacunar infarcts and changes in the cerebral white matter related to chronic small vessel disease. 5. Left greater than right mastoid effusions. 6. Bilateral frontal and milder ethmoid sinusitis. 7. Generalized increase in red marrow related to anemia.     Dictated by (CST): Fabian Gay MD on 3/25/2024 at 9:31 AM     Finalized by (CST): Fabian Gay MD on 3/25/2024 at 9:49 AM          MRA CAROTIDS (CPT=70547)    Result Date: 3/25/2024  CONCLUSION:  Markedly artifactually compromised examination. Within these parameters: 1. Suggestion of mild  plaque involving the carotid bifurcations without evidence of flow-limiting stenosis or occlusion.  2. The vertebral arteries appear to be contiguously patent, insofar as visualized, without evidence of hemodynamically stenosis.  3. Lesser incidental findings as above.    A preliminary report was issued by the [facility] teleradiology service. There are no major discrepancies.   Dictated by (CST): Zachery Dean MD on 3/25/2024 at 8:49 AM     Finalized by (CST): Zachery Dean MD on 3/25/2024 at 8:54 AM             Meds:      [START ON 4/1/2024] amiodarone  200 mg Oral Daily    cefepime  1 g Intravenous Q12H    amiodarone  200 mg Oral BID with meals    sodium chloride   Intravenous Once    heparin sodium lock flush  1.5 mL Intravenous Once    sodium chloride   Intravenous Once    sodium chloride   Intravenous Once    hydrALAzine  10 mg Intravenous Once    atorvastatin  40 mg Oral Nightly    metoprolol tartrate  50 mg Oral 2x Daily(Beta Blocker)    ascorbic acid  500 mg Oral Daily    aspirin  81 mg Oral Daily    hydrALAZINE  25 mg Oral Q8H CHAYITO    famotidine  20 mg Oral Daily    Or    famotidine  20 mg Intravenous Daily    chlorhexidine gluconate  15 mL Mouth/Throat BID    [Held by provider] heparin  5,000 Units Subcutaneous 2 times per day    [Held by provider] clopidogrel  75 mg Oral Daily      NxStage Pure Flow 400 CRRT/PIRRT fluid 5000mL 2.5 L/hr (03/27/24 0223)    dexmedetomidine 0.5 mcg/kg/hr (03/27/24 0657)    dextrose 10% Stopped (03/23/24 1653)     morphINE, oxidized cellulose, labetalol, hydrALAzine, prochlorperazine, dextrose 10%, lipase-protease-amylase (Lip-Prot-Amyl) **AND** sodium bicarbonate, alteplase (Activase) 4 mg in sterile water for injection (PF) 2.2 mL IV push to declot line, melatonin, polyethylene glycol (PEG 3350), ondansetron, potassium chloride **OR** potassium chloride, magnesium sulfate in dextrose 5%, magnesium sulfate in sterile water for injection, [DISCONTINUED] acetaminophen  **OR** HYDROcodone-acetaminophen **OR** [DISCONTINUED] HYDROcodone-acetaminophen, glucose **OR** glucose **OR** glucose-vitamin C **OR** dextrose **OR** glucose **OR** glucose **OR** glucose-vitamin C, heparin, acetaminophen **OR** acetaminophen **OR** [DISCONTINUED] acetaminophen **OR** acetaminophen, senna, bisacodyl, fleet enema

## 2024-03-27 NOTE — PROGRESS NOTES
[facility]  part of [facility]    Progress Note    Cristiano Obregon Patient Status:  Inpatient    1970 MRN [id number]   Location [facility] 2W/SW Attending Bud Camarena MD   Hosp Day # 22 PCP Abilio Dorsey MD     Subjective:  Pt resting in bed. Nods head to simple questions: denies pain and SOB. Follows simple commands.  Still with oozing/bleeding around trach site. no visitors currently at bedside.     Objective:  /68 (BP Location: Right arm)   Pulse 88   Temp 97.9 °F (36.6 °C) (Temporal)   Resp 19   Ht 5' 7\" (1.702 m)   Wt 165 lb 5.5 oz (75 kg)   SpO2 100%   BMI 25.90 kg/m²     Temp (24hrs), Av.2 °F (36.8 °C), Min:97.9 °F (36.6 °C), Max:98.6 °F (37 °C)      Intake/Output:    Intake/Output Summary (Last 24 hours) at 3/27/2024 1156  Last data filed at 3/27/2024 1100  Gross per 24 hour   Intake 1371.6 ml   Output 2278 ml   Net -906.4 ml       Wt Readings from Last 3 Encounters:   24 165 lb 5.5 oz (75 kg)   10/06/21 177 lb 6.4 oz (80.5 kg)   20 172 lb 9.6 oz (78.3 kg)       Allergies:  No Known Allergies    Labs:  Lab Results   Component Value Date    WBC 20.9 2024    HGB 7.6 2024    HCT 23.2 2024    .0 2024    .0 2024    CREATSERUM 3.18 2024    BUN 46 2024     2024    K 4.9 2024     2024    CO2 27.0 2024     2024    CA 8.5 2024    ALB 3.0 2024    ALKPHO 85 2024    BILT 0.9 2024    TP 5.2 2024    AST 26 2024    ALT 18 2024    PTT 42.8 2024    INR 1.25 2024    DDIMER >20.00 2024    MG 1.9 2024    PHOS 2.8 2024       Physical Exam:  Blood pressure 117/68, pulse 88, temperature 97.9 °F (36.6 °C), temperature source Temporal, resp. rate 19, height 5' 7\" (1.702 m), weight 165 lb 5.5 oz (75 kg), SpO2 100%.  General: NAD  Neck: Unable to assess with trach collar/body habitus.   Bleeding/oozing around trach site  Lungs: Few scattered rhonchi anteriorly, diminished laterally  Heart: RRR, S1, S2  Abdomen: Soft/Round, NT/ND, BS+x4/+BM(s)  Extremities: Warm, dry, trace generalized edema  Pulses: 2+ bilat DP  Skin: sternotomy incision ANIKET=CDI/Left leg incisions ANIKET=CDI   Neurological:  awake, calm, nods head to simple questions/follow simple commands. Right side weaker than left    Assessment/Plan:  S/P CABG x 3 POD # 12  S/P RE-OP MEDIASTINAL EXPLORATION/WASHOUT on 3/16  Respiratory Failure- Trach placed 3/22 per Gen Surgery. SBT per Pulm  Pre op Impella removed post op on 3/16 per Cards. Pt hemodynamically stable. [place]/Ophelia removed 3/18.  Midline cath placed 3/18  Re op mediastinal exploration/washout due to pericardial tamponade. Limited echo 3/16 post exploration w/EF=45-50%/no residual effusion.   Continue ICU status  Pain meds as needed: avoid narcotics to assess neuro status  Altered MS post op: CT head 3/19/MRI/MRA 3/24=+CVA-Mgt per Neuro  Brief pre op AF- Amio protocol for AF prevention- currently SR  Plan to increase activity when appropriate  Scds/Heparin Sub Q prophylaxis DVT prevention -Heparin on hold due to increase bleeding noted around Trach which is slowly improving.  Plavix also on hold.   Pre op NORMA/ATN/CRRT w/mgt per Nephrology. Expected post op volume overload. CRRT w/mgt per Nephrology/plan for eventual HD when okay w/Neuro: needs edema w/petechial hemorrhage improved. Temp catheter insertion per IR placed 3/19.    Hematology consulted for freq CRRT line clotting, also with D-dimer greater than 20, continuous oozing from trach/line sites.  PEG site improved.  Expected post op anemia w/o clinical significance/stable-Hematology consulted 3/20. Acute blood loss anemia immediate post op w/mediastinal exploration/tamponade.  Nutrition per TF via PEG placed 3/22  ID consulted on 3/14: following recs. Continues on ABX. + sputum cx  Discharge planning: pt lives w/his  and  has supportive family. Anticipate rehab: LTAC, Once patient able to tolerate HD.  Mgr/SW will assist w/planning.      Plan of care discussed w/patient/RN and CV Surgeon: Dr. Llanes.   Soumya Henderson, APRN  3/27/2024  12:00    Addendum 1600  Continued oozing around trach site.  Dr. Krishna at bedside-plan of care discussed.  Repeat hemoglobin 6.9-plan to transfuse 2 units PRBC today.  Will stop tube feeds at midnight, continue n.p.o. status in case patient needs to return to the OR for exploration of trach site with Dr. Krishna/Dr. Llanes.  Hold ASA/Plavix/antiplatelets currently

## 2024-03-27 NOTE — RESPIRATORY THERAPY NOTE
Patient's Problems: Respiratory Failure now on trach/ ventilator     Respiratory Assessment :   Trach patient is on ventilator/ full support.   Breath Sounds: were coarse  bilateral.   Secretions: moderate bloody secretions. Trach side is bleeding.  Patient was suctioned as needed.     Ventilator Settings: AC/VC  RR 12 ,  , PEEP + 5, FIO2 30%    Airway: Portex 7    Plan of Care:  Not SBT today, patient is bleeding from his trach, Patient needs to continue on ventilator, respiratory status stable and tolerating ventilator well. Patient was suction as need it, RT to continue to monitor this patient.

## 2024-03-27 NOTE — PROGRESS NOTES
Cardiology Progress Note    Cristianosneha Moralezmesfin Patient Status:  Inpatient    1970 MRN [id number]   Location [facility] 2W/SW Attending Donna Llanes, DO   Hosp Day # 22 PCP Abilio Dorsey MD     Interval Note:  Pt awake, alert  Labs show abnormal coags, anemia   Afib overnight    --------------------------------------------------------------------------------------------------------------------------------  ROS 12 systems reviewed, pertinent findings above.  ROS    History:  History reviewed. No pertinent past medical history.  Past Surgical History:   Procedure Laterality Date    COLONOSCOPY N/A 2017    Procedure: COLONOSCOPY, POSSIBLE BIOPSY, POSSIBLE POLYPECTOMY 57045;  Surgeon: Byron Plaza MD;  Location: [facility]     History reviewed. No pertinent family history.   reports that he has been smoking cigarettes. He has never used smokeless tobacco. He reports that he does not drink alcohol and does not use drugs.    Objective:   Temp: 97.9 °F (36.6 °C)  Pulse: 88  Resp: 19  BP: 117/68  FiO2 (%): 30 %    Intake/Output:     Intake/Output Summary (Last 24 hours) at 3/27/2024 1204  Last data filed at 3/27/2024 1100  Gross per 24 hour   Intake 1286.6 ml   Output 2278 ml   Net -991.4 ml       Physical Exam:    General: on Vent/trach, awake/alert  HEENT: Normocephalic, anicteric sclera, neck supple.  Neck: No JVD, carotids 2+, no bruits.  Cardiac: Regular rate and rhythm. S1, S2 normal. No murmur, pericardial rub, S3.  Lungs: Clear without wheezes, rales, rhonchi or dullness.  Normal excursions and effort.  Abdomen: Soft, non-tender. BS-present.  Extremities: Without clubbing, cyanosis or edema.  Peripheral pulses are 2+.  Neurologic: Non-focal  Skin: Warm and dry.       Assessment   Generalized bleeding/oozing  STEMI, severe multivessel coronary disease status post CABG  Ventilator dependent respiratory failure, status post tracheostomy 3/22/2024  Acute  stroke  Emergent redo sternotomy 3/16/2024 for tamponade  Cardiogenic shock requiring Impella-resolved  Acute renal failure, on HD  Atrial fibrillation, paroxysmal  Anemia  Thrombocytopenia  History of tobacco abuse  Status post PEG tube placement, on TF    Plan  - remains on ventilator and CRRT  - mild coagulopathy and persistent oozing from trach, peripheral smear reviewed  - paroxysms of afib - continue amior and metoprolol, cannot anticoagulate due to bleeding  - on ASA but plavix on hold    Thank you for allowing me to take part in the care of Cristiano Obregon. Please call with any questions of concerns.      Level of care: L3    Zander Cano DO  [facility]   Interventional Cardiac and Vascular Services    March 27, 2024  10:28 AM

## 2024-03-27 NOTE — PLAN OF CARE
Pt remained on 0.5 mcg Precedex overnight, restless at times, moves FARAZ lower extremities freely/against gravity. Pt can follow commands and nod yes/no to questions.    Pt continues to ooze blood from trach site, Dr. Krishna notified at start of shift. Q6H H&H ordered. Most recent hgb this AM --> 7.6. Surgical snow applied x2 overnight to slow bleeding. Dressing reinforced/changed as needed. Bloody in-line secretions obtained during suctioning.    Pt went into atrial fibrillation for about 30 minutes overnight, rates were well-controlled, BP stable. Ibeth LYNNE from [facility] notified, no new orders. Pt converted back to NSR. BP stable, no acute events.    Bed locked in lowest position, safety maintained. CHG cloth bath provided.    Problem: RESPIRATORY - ADULT  Goal: Achieves optimal ventilation and oxygenation  Description: INTERVENTIONS:  - Assess for changes in respiratory status  - Assess for changes in mentation and behavior  - Position to facilitate oxygenation and minimize respiratory effort  - Oxygen supplementation based on oxygen saturation or ABGs  - Provide Smoking Cessation handout, if applicable  - Encourage broncho-pulmonary hygiene including cough, deep breathe, Incentive Spirometry  - Assess the need for suctioning and perform as needed  - Assess and instruct to report SOB or any respiratory difficulty  - Respiratory Therapy support as indicated  - Manage/alleviate anxiety  - Monitor for signs/symptoms of CO2 retention  Outcome: Progressing     Problem: CARDIOVASCULAR - ADULT  Goal: Maintains optimal cardiac output and hemodynamic stability  Description: INTERVENTIONS:  - Monitor vital signs, rhythm, and trends  - Monitor for bleeding, hypotension and signs of decreased cardiac output  - Evaluate effectiveness of vasoactive medications to optimize hemodynamic stability  - Monitor arterial and/or venous puncture sites for bleeding and/or hematoma  - Assess quality of pulses, skin color and  temperature  - Assess for signs of decreased coronary artery perfusion - ex. Angina  - Evaluate fluid balance, assess for edema, trend weights  Outcome: Progressing     Problem: METABOLIC/FLUID AND ELECTROLYTES - ADULT  Goal: Hemodynamic stability and optimal renal function maintained  Description: INTERVENTIONS:  - Monitor labs and assess for signs and symptoms of volume excess or deficit  - Monitor intake, output and patient weight  - Monitor urine specific gravity, serum osmolarity and serum sodium as indicated or ordered  - Monitor response to interventions for patient's volume status, including labs, urine output, blood pressure (other measures as available)  - Encourage oral intake as appropriate  - Instruct patient on fluid and nutrition restrictions as appropriate  Outcome: Progressing     Problem: HEMATOLOGIC - ADULT  Goal: Free from bleeding injury  Description: (Example usage: patient with low platelets)  INTERVENTIONS:  - Avoid intramuscular injections, enemas and rectal medication administration  - Ensure safe mobilization of patient  - Hold pressure on venipuncture sites to achieve adequate hemostasis  - Assess for signs and symptoms of internal bleeding  - Monitor lab trends  - Patient is to report abnormal signs of bleeding to staff  - Avoid use of toothpicks and dental floss  - Use electric shaver for shaving  - Use soft bristle tooth brush  - Limit straining and forceful nose blowing  Outcome: Not Progressing

## 2024-03-27 NOTE — PROGRESS NOTES
Notified by primary RN-Geena, pt converted to afib-rate controlled. Patient with previous episodes of afib during this hospitalization. No change to plan of care at this time.

## 2024-03-27 NOTE — PROGRESS NOTES
Gen Surg  Hgb 6.9     Long discussion with attendings  Etiology of bleed ?   Hematology seeing  Doubt mediastinal source   If continues then may have to re-exploration   Very  hi risk   with possibility of upper sternal re-opening

## 2024-03-27 NOTE — PLAN OF CARE
Pt remains in bilateral soft wrist restraints. Safety maintained.    Problem: Safety Risk - Non-Violent Restraints  Goal: Patient will remain free from self-harm  Description: INTERVENTIONS:  - Apply the least restrictive restraint to prevent harm  - Notify patient and family of reasons restraints applied  - Assess for any contributing factors to confusion (electrolyte disturbances, delirium, medications)  - Discontinue any unnecessary medical devices as soon as possible  - Assess the patient's physical comfort, circulation, skin condition, hydration, nutrition and elimination needs   - Reorient and redirection as needed  - Assess for the need to continue restraints  Outcome: Not Progressing

## 2024-03-27 NOTE — PLAN OF CARE
Problem: Patient Centered Care  Goal: Patient preferences are identified and integrated in the patient's plan of care  Description: Interventions:  - What would you like us to know as we care for you? Pain, comfort levels  - Provide timely, complete, and accurate information to patient/family  - Incorporate patient and family knowledge, values, beliefs, and cultural backgrounds into the planning and delivery of care  - Encourage patient/family to participate in care and decision-making at the level they choose  - Honor patient and family perspectives and choices  Outcome: Progressing     Problem: RESPIRATORY - ADULT  Goal: Achieves optimal ventilation and oxygenation  Description: INTERVENTIONS:  - Assess for changes in respiratory status  - Assess for changes in mentation and behavior  - Position to facilitate oxygenation and minimize respiratory effort  - Oxygen supplementation based on oxygen saturation or ABGs  - Provide Smoking Cessation handout, if applicable  - Encourage broncho-pulmonary hygiene including cough, deep breathe, Incentive Spirometry  - Assess the need for suctioning and perform as needed  - Assess and instruct to report SOB or any respiratory difficulty  - Respiratory Therapy support as indicated  - Manage/alleviate anxiety  - Monitor for signs/symptoms of CO2 retention  Outcome: Progressing     Problem: CARDIOVASCULAR - ADULT  Goal: Maintains optimal cardiac output and hemodynamic stability  Description: INTERVENTIONS:  - Monitor vital signs, rhythm, and trends  - Monitor for bleeding, hypotension and signs of decreased cardiac output  - Evaluate effectiveness of vasoactive medications to optimize hemodynamic stability  - Monitor arterial and/or venous puncture sites for bleeding and/or hematoma  - Assess quality of pulses, skin color and temperature  - Assess for signs of decreased coronary artery perfusion - ex. Angina  - Evaluate fluid balance, assess for edema, trend weights  Outcome:  Progressing  Goal: Absence of cardiac arrhythmias or at baseline  Description: INTERVENTIONS:  - Continuous cardiac monitoring, monitor vital signs, obtain 12 lead EKG if indicated  - Evaluate effectiveness of antiarrhythmic and heart rate control medications as ordered  - Initiate emergency measures for life threatening arrhythmias  - Monitor electrolytes and administer replacement therapy as ordered  Outcome: Progressing     Problem: METABOLIC/FLUID AND ELECTROLYTES - ADULT  Goal: Hemodynamic stability and optimal renal function maintained  Description: INTERVENTIONS:  - Monitor labs and assess for signs and symptoms of volume excess or deficit  - Monitor intake, output and patient weight  - Monitor urine specific gravity, serum osmolarity and serum sodium as indicated or ordered  - Monitor response to interventions for patient's volume status, including labs, urine output, blood pressure (other measures as available)  - Encourage oral intake as appropriate  - Instruct patient on fluid and nutrition restrictions as appropriate  Outcome: Progressing     Problem: SKIN/TISSUE INTEGRITY - ADULT  Goal: Skin integrity remains intact  Description: INTERVENTIONS  - Assess and document risk factors for pressure ulcer development  - Assess and document skin integrity  - Monitor for areas of redness and/or skin breakdown  - Initiate interventions, skin care algorithm/standards of care as needed  Outcome: Progressing  Goal: Incision(s), wounds(s) or drain site(s) healing without S/S of infection  Description: INTERVENTIONS:  - Assess and document risk factors for pressure ulcer development  - Assess and document skin integrity  - Assess and document dressing/incision, wound bed, drain sites and surrounding tissue  - Implement wound care per orders  - Initiate isolation precautions as appropriate  - Initiate Pressure Ulcer prevention bundle as indicated  Outcome: Progressing  Goal: Oral mucous membranes remain  intact  Description: INTERVENTIONS  - Assess oral mucosa and hygiene practices  - Implement preventative oral hygiene regimen  - Implement oral medicated treatments as ordered  Outcome: Progressing     Problem: NEUROLOGICAL - ADULT  Goal: Achieves stable or improved neurological status  Description: INTERVENTIONS  - Assess for and report changes in neurological status  - Initiate measures to prevent increased intracranial pressure  - Maintain blood pressure and fluid volume within ordered parameters to optimize cerebral perfusion and minimize risk of hemorrhage  - Monitor temperature, glucose, and sodium. Initiate appropriate interventions as ordered  Outcome: Progressing     Problem: Safety Risk - Non-Violent Restraints  Goal: Patient will remain free from self-harm  Description: INTERVENTIONS:  - Apply the least restrictive restraint to prevent harm  - Notify patient and family of reasons restraints applied  - Assess for any contributing factors to confusion (electrolyte disturbances, delirium, medications)  - Discontinue any unnecessary medical devices as soon as possible  - Assess the patient's physical comfort, circulation, skin condition, hydration, nutrition and elimination needs   - Reorient and redirection as needed  - Assess for the need to continue restraints  Outcome: Progressing     Problem: HEMATOLOGIC - ADULT  Goal: Free from bleeding injury  Description: (Example usage: patient with low platelets)  INTERVENTIONS:  - Avoid intramuscular injections, enemas and rectal medication administration  - Ensure safe mobilization of patient  - Hold pressure on venipuncture sites to achieve adequate hemostasis  - Assess for signs and symptoms of internal bleeding  - Monitor lab trends  - Patient is to report abnormal signs of bleeding to staff  - Avoid use of toothpicks and dental floss  - Use electric shaver for shaving  - Use soft bristle tooth brush  - Limit straining and forceful nose blowing  Outcome: Not  Progressing

## 2024-03-27 NOTE — PLAN OF CARE
Problem: RESPIRATORY - ADULT  Goal: Achieves optimal ventilation and oxygenation  Description: INTERVENTIONS:  - Assess for changes in respiratory status  - Assess for changes in mentation and behavior  - Position to facilitate oxygenation and minimize respiratory effort  - Oxygen supplementation based on oxygen saturation or ABGs  - Provide Smoking Cessation handout, if applicable  - Encourage broncho-pulmonary hygiene including cough, deep breathe, Incentive Spirometry  - Assess the need for suctioning and perform as needed  - Assess and instruct to report SOB or any respiratory difficulty  - Respiratory Therapy support as indicated  - Manage/alleviate anxiety  - Monitor for signs/symptoms of CO2 retention  Outcome: Progressing     Patient remains on full vent support via trach. Significant blood was noted from trach site, team is aware. Emergency equipment is at bedside.

## 2024-03-27 NOTE — RESPIRATORY THERAPY NOTE
Patient with tracheostomy received on ventilator support. AC/VC 12/530/+5/30%. Patient has bloody trach site, dressings changed as needed. Respiratory status stable and tolerating ventilator well, pt refused suction at this time.

## 2024-03-27 NOTE — PROGRESS NOTES
NEPHROLOGY DAILY PROGRESS NOTE       SUBJECTIVE:   Awake, tolerating CRRT     OBJECTIVE:    Total Intake/Output:    Intake/Output Summary (Last 24 hours) at 3/27/2024 0854  Last data filed at 3/27/2024 0633  Gross per 24 hour   Intake 1576.6 ml   Output 1981 ml   Net -404.4 ml       PHYSICAL EXAM:  /74 (BP Location: Right arm)   Pulse 89   Temp 97.9 °F (36.6 °C) (Temporal)   Resp (!) 29   Ht 5' 7\" (1.702 m)   Wt 165 lb 5.5 oz (75 kg)   SpO2 99%   BMI 25.90 kg/m²   GEN: NAD   HEENT: trached   CHEST: clear anteriorly      CARDIAC: S1S2 normal  ABD: soft, ND, PEG tube   EXT:  no LE edema   NEURO: awake  ACCESS: RIJ tunneled HD cath (3/22)       CURRENT MEDICATIONS:     [START ON 4/1/2024] amiodarone  200 mg Oral Daily    cefepime  1 g Intravenous Q12H    amiodarone  200 mg Oral BID with meals    sodium chloride   Intravenous Once    heparin sodium lock flush  1.5 mL Intravenous Once    sodium chloride   Intravenous Once    sodium chloride   Intravenous Once    hydrALAzine  10 mg Intravenous Once    atorvastatin  40 mg Oral Nightly    metoprolol tartrate  50 mg Oral 2x Daily(Beta Blocker)    ascorbic acid  500 mg Oral Daily    aspirin  81 mg Oral Daily    hydrALAZINE  25 mg Oral Q8H CHAYITO    famotidine  20 mg Oral Daily    Or    famotidine  20 mg Intravenous Daily    chlorhexidine gluconate  15 mL Mouth/Throat BID    [Held by provider] heparin  5,000 Units Subcutaneous 2 times per day    [Held by provider] clopidogrel  75 mg Oral Daily         LABS:  Patient Labs Reviewed in Detail. Pertinent Labs as follows:  Recent Labs   Lab 03/26/24  0528 03/26/24  1604 03/27/24  0546   * 146* 128*   BUN 45* 61* 46*   CREATSERUM 3.24* 4.17* 3.18*   EGFRCR 22* 16* 22*   CA 8.1* 7.7* 8.5*    139 139   K 4.4 4.6 4.9    108 107   CO2 26.0 24.0 27.0     Recent Labs   Lab 03/23/24  0452 03/23/24  1213 03/25/24  0423 03/25/24  1201 03/26/24  0528 03/26/24  1308 03/26/24  1808 03/27/24  0010 03/27/24  0546    RBC 2.46*   < > 2.55*  --  2.46*  --   --   --  2.55*   HGB 7.2*   < > 7.5*   < > 7.4*   < > 7.8* 7.5* 7.6*   HCT 21.4*   < > 23.4*   < > 22.3*   < > 23.9* 22.7* 23.2*   MCV 87.0   < > 91.8  --  90.7  --   --   --  91.0   MCH 29.3   < > 29.4  --  30.1  --   --   --  29.8   MCHC 33.6   < > 32.1  --  33.2  --   --   --  32.8   RDW 15.8*   < > 16.9*  --  16.9*  --   --   --  16.4*   NEPRELIM 18.68*  --  17.07*  --  16.18*  --   --   --   --    WBC 23.7*   < > 20.3*  --  21.1*  --   --   --  20.9*   .0*   < > 126.0*  --  112.0*  --   --   --  124.0*  124.0*    < > = values in this interval not displayed.         IMAGING:  XR CHEST AP PORTABLE  (CPT=71045)    Result Date: 3/27/2024  CONCLUSION:  1. Heart size upper limits of normal, and there is moderate pulmonary edema pattern which has worsened somewhat since previous study.  Worsening bibasilar opacification suggesting increasing bilateral pleural effusions left more than right and probable left basal atelectasis, although underlying pneumonia not excluded.  Status post extubation and feeding tube removal.  No pneumothorax.  Follow-up studies are advised.    Dictated by (CST): Moisés Delgadillo MD on 3/27/2024 at 8:36 AM     Finalized by (CST): Moisés Delgadillo MD on 3/27/2024 at 8:39 AM          CT BRAIN OR HEAD (84314)    Addendum Date: 3/26/2024    ADDENDUM:  Findings were called to Dr. Winters.   Dictated by (CST): Fabian Gay MD on 3/26/2024 at 6:53 AM     Finalized by (CST): Fabian Gay MD on 3/26/2024 at 6:53 AM             Result Date: 3/26/2024  CONCLUSION:  1. Continued evolution of left frontal lobe infarct with a 7 cm region of edema and patchy parenchymal hemorrhage.  Mild mass effect with approximately 4 mm of midline shift towards the right.    Dictated by (CST): Fabian Gay MD on 3/26/2024 at 6:34 AM     Finalized by (CST): Fabian Gay MD on 3/26/2024 at 6:46 AM            ASSESSMENT AND PLAN:   This is a 53 year old male with PMH sig  for HLD, tobacco use. Presented with chest pain and was admitted for NSTEMI.  Nephrology is consulted for NORMA      Anuric NORMA  - due to ATN from shock.   - creatinine 1.23 on admission, worsened to 7.19 (3/7)  - initiated on CVVH on 3/7 via RIJ temp HD cath  - RIJ tunneled HD catheter placed (3/22)   - continue CRRT until cleared by neurology to transition to iHD.  Cerebral edema with midline shift noted on CT head (3/26)  - keep net even  - renal panel and Mg level every 12 hours while on CRRT   - Maintain adequate perfusion pressure  - Dose medications for CRRT  - Avoid nephrotoxins.  - follow renal fxn and I/Os  - monitor for renal recovery.     Hyperkalemia   - resolved with CRRT    Metabolic acidosis  - resolved with CRRT     Hyponatremia  - resolved     Dw RN     We will continue to follow.     Critical care time > 35 mins     Janet Velazco MD  [facility] - Nephrology

## 2024-03-27 NOTE — PROGRESS NOTES
Critical Care Progress Note     Assessment / Plan:  Acute respiratory failure - initially due to pulmonary edema then encephalopathy and severe deconditioning, s/p tracheostomy 3/22  - continue full vent support  - precedex as needed  - PS trials daily  NSTEMI - diagnosed with new multivessel CAD s/p angioplasty and cardiogenic shock requiring Impella placement. S/p 3vCABG 3/15. Impella removed 3/16. Back to OR 3/16 AM for tamponade s/p pericardial clot removal  - cardiology and cardiac surgery following  Cardiogenic shock - due to acute coronary syndrome, EF was down and has now improved on repeat echo  - off vasopressors  Atrial fibrillation  - per cardiology  Pneumonia - sputum cultures with Klebsiella and MSSA  - antibiotics per ID  - on cefepime  Acute renal failure - suspect from ATN and cardiogenic shock  - RRT per nephrology  - continuing on CRRT and holding iHD per neurology due to cerebral edema  Stroke  - MRI with evolution of stroke and edema with patchy parenchymal hemorrhage  - repeat CT with edema, patchy hemorrhage, and 4mm midline shift  - neurology following  Thrombocytopenia, anemia - HIT antibody was negative, continued oozing from trach and g-tube site despite relatively unremarkable coags  - INR and fibrinogen normal and plts are above 100k  - trend CBC and transfuse for hgb < 7, plts < 50  - surgicell to oozing sites  - holding plavix and subcutaneous heparin  - repeat HIT antibody sent, though initial was negative  - heme following  - monitor  FEN  - PEG placed 3/23  - continue TFs  Proph  - subcutaneous heparin on hold for bleeding  - famotidine  Dispo  - full code  - ICU, will follow    Discussed with patient and RN    Critical care time: 36 minutes    Farooq Olvera MD  Pulmonary & Critical Care Medicine  [facility]      Subjective:  More oozing yesterday  Received additional unit pRBCs in afternoon  Continued oozing at trach site    Objective:  Vitals:    03/27/24 0400  03/27/24 0500 03/27/24 0600 03/27/24 0700   BP: 122/80 128/74 117/72 104/62   BP Location: Right arm Right arm Right arm Right arm   Pulse: 94 85 80 69   Resp: 21 23 20 18   Temp: 97.9 °F (36.6 °C)      TempSrc: Temporal      SpO2: 98% 99% 100% 100%   Weight:  165 lb 5.5 oz (75 kg)     Height:         Physical Exam:  General: laying in bed, trach in place  Respiratory: clear bilaterally, compliant with ventilator  Cardiovascular: regular rate and rhythm, no m/r/g  Abdomen: g-tube in place, soft, NTND  Extremities: no LE edema  Mental status: awake, following commands    Medications:  Reviewed in EMR    Lab Data:  Reviewed in EMR    Imaging:  I independently visualized all relevant chest imaging in PACS and agree with radiology interpretation except where noted.

## 2024-03-27 NOTE — PROGRESS NOTES
[facility]  part of [facility]    Progress Note    Cristiano Obregon Patient Status:  Inpatient    1970 MRN [id number]   Location [facility] 2W/SW Attending Bud Camarena MD   Hosp Day # 22 PCP Abilio Dorsey MD     Subjective:  Events reviewed   still some slow ooze  from trach  G tube site ok   catheter sites some min blood    Objective/Physical Exam:  General: Alert, orientated x3.  Cooperative.  No apparent distress.  Vital Signs:  Blood pressure (!) 85/51, pulse 65, temperature 97.9 °F (36.6 °C), temperature source Temporal, resp. rate 17, height 5' 7\" (1.702 m), weight 165 lb 5.5 oz (75 kg), SpO2 99%.  Neck: some ooze  possible some clot    Labs:  Lab Results   Component Value Date    WBC 20.9 (H) 2024    HGB 7.6 (L) 2024    HCT 23.2 (L) 2024    .0 (L) 2024    .0 (L) 2024    CREATSERUM 3.18 (H) 2024    BUN 46 (H) 2024     2024    K 4.9 2024     2024    CO2 27.0 2024     (H) 2024    CA 8.5 (L) 2024    ALB 3.0 (L) 2024    ALKPHO 85 2024    BILT 0.9 2024    TP 5.2 (L) 2024    AST 26 2024    ALT 18 2024    PTT 42.8 (H) 2024    INR 1.25 (H) 2024     (H) 2024    PSA 1.95 10/06/2021    DDIMER >20.00 (H) 2024    MG 1.9 2024    PHOS 2.8 2024       Imaging:  XR CHEST AP PORTABLE  (CPT=71045)    Result Date: 3/27/2024  PROCEDURE: XR CHEST AP PORTABLE  (CPT=71045) TIME: 0821 hours.   COMPARISON: [facility], XR CHEST AP PORTABLE (CPT=71045), 3/05/2024, 9:26 AM.  [facility], XR CHEST AP PORTABLE (CPT=71045), 3/19/2024, 10:14 AM.  [facility], XR CHEST AP PORTABLE (CPT=71045), 3/22/2024, 4:21 PM.  INDICATIONS: Shortness of breath.  TECHNIQUE:   Single view.   FINDINGS:  CARDIAC/VASC: Heart size upper limits of normal, and there is moderate  pulmonary edema pattern which has worsened somewhat since previous study.  Status post sternotomy and coronary bypass.  Fracture of the 4th and 5th sternal wire sutures noted. MEDIAST/ANTONI:   No visible mass or adenopathy. LUNGS/PLEURA: Moderate pulmonary edema pattern worsened from previous study.  Worsening bibasilar opacification suggesting increasing bilateral pleural effusions and probable left basal atelectasis although underlying pneumonia not excluded.  Status post  extubation.  Right internal jugular central line ends in the cavoatrial junction.  Feeding tube is been removed.  No pneumothorax noted. BONES: No fracture or visible bony lesion. OTHER: Negative.          CONCLUSION:  1. Heart size upper limits of normal, and there is moderate pulmonary edema pattern which has worsened somewhat since previous study.  Worsening bibasilar opacification suggesting increasing bilateral pleural effusions left more than right and probable left basal atelectasis, although underlying pneumonia not excluded.  Status post extubation and feeding tube removal.  No pneumothorax.  Follow-up studies are advised.    Dictated by (CST): Moisés Delgadillo MD on 3/27/2024 at 8:36 AM     Finalized by (CST): Moisés Delgadillo MD on 3/27/2024 at 8:39 AM          CT BRAIN OR HEAD ([id number])    Addendum Date: 3/26/2024    ADDENDUM:  Findings were called to Dr. Winters.   Dictated by (CST): Fabian Gay MD on 3/26/2024 at 6:53 AM     Finalized by (CST): Fabian Gay MD on 3/26/2024 at 6:53 AM             Result Date: 3/26/2024  PROCEDURE: CT BRAIN OR HEAD (CPT=70450)  COMPARISON: [facility], CT BRAIN OR HEAD (CPT=70450), 3/19/2024, 9:10 AM.  [facility], MRA BRAIN (CPT=70544), 3/24/2024, 8:54 PM.  [facility], MRI BRAIN (CPT=70551), 3/24/2024, 8:54 PM.  INDICATIONS: Follow-up stroke.  repeat head ct to evaluate cerebral edema/?midline shift before starting dialysis  TECHNIQUE: CT images were  obtained without contrast material.  Automated exposure control for dose reduction was used.  Dose information is transmitted to the ACR (American College of Radiology) NRDR (National Radiology Data Registry) which includes the Dose Index Registry.  FINDINGS:  CEREBRUM: 7 x 4.6 cm region of edema in the left frontal lobe with small amount of patchy parenchymal hemorrhage.  Mild changes of chronic small vessel disease in cerebral white matter. CEREBELLUM: No edema, hemorrhage, mass or acute infarct. BRAINSTEM: No edema, hemorrhage, mass or acute infarct. CSF SPACES: No hydrocephalus .  Approximately 4 mm of midline shift towards the right. SKULL: No mass or other significant visible lesion.  SINUSES: Bilateral frontal and left ethmoid sinusitis.  Small left greater than right mastoid effusions. ORBITS: Limited views are unremarkable.  OTHER: Atherosclerotic calcification cavernous carotid arteries.         CONCLUSION:  1. Continued evolution of left frontal lobe infarct with a 7 cm region of edema and patchy parenchymal hemorrhage.  Mild mass effect with approximately 4 mm of midline shift towards the right.    Dictated by (CST): Fabian Gay MD on 3/26/2024 at 6:34 AM     Finalized by (CST): Fabian Gay MD on 3/26/2024 at 6:46 AM          MRI BRAIN (CPT=70551)    Addendum Date: 3/26/2024    ADDENDUM:  Small amount of petechial hemorrhage present in the left frontal lobe infarct.   Dictated by (CST): Fabian Gay MD on 3/26/2024 at 6:47 AM     Finalized by (CST): Fabian Gay MD on 3/26/2024 at 6:47 AM             Result Date: 3/26/2024  PROCEDURE: MRI BRAIN (CPT=70551)  COMPARISON: [facility], CT BRAIN OR HEAD (CPT=70450), 3/19/2024, 9:10 AM.  [facility], MRA BRAIN (CPT=70544), 3/24/2024, 8:54 PM.  INDICATIONS: stroke  TECHNIQUE: A variety of imaging planes and parameters were utilized for visualization of suspected pathology.  Images were performed without contrast.   FINDINGS:  CEREBRUM: Approximately 6 x 4.5 cm region of cortical edema in the left frontal lobe with restricted diffusion.  A 6 mm focus of restricted diffusion in the right parietal white matter.  A 1.2 cm focus of cortical signal abnormality in the left occipital  lobe without restricted diffusion.  Scattered foci of FLAIR hyperintensity in the subcortical white matter in both cerebral hemispheres and to a lesser extent in the frontal periventricular white matter.  Small chronic bilateral basal ganglionic lacunar  infarcts. CEREBELLUM: No edema, hemorrhage, mass or acute infarct. BRAINSTEM: No edema, hemorrhage, mass or acute infarct. CSF SPACES: No hydrocephalus, subarachnoid hemorrhage, or mass.  SKULL: No mass or other significant visible lesion.  Generalized increase in red marrow.  SINUSES: Bilateral left greater than right mastoid effusions.  Fluid partially opacifying frontal sinuses and trace fluid or thickening in ethmoid sinuses and sphenoid sinus. ORBITS: Limited views are unremarkable.  OTHER: Flow is present in the anterior and posterior circulation vessels.         CONCLUSION:  1. Acute/subacute left frontal lobe cortical infarct. 2. A lacunar size acute/subacute infarct in the right parietal white matter. 3. A 1.2 cm focus of cortical signal abnormality in left occipital lobe related to a small infarct which is likely greater than 2 weeks in age. 4. Chronic bilateral basal ganglionic lacunar infarcts and changes in the cerebral white matter related to chronic small vessel disease. 5. Left greater than right mastoid effusions. 6. Bilateral frontal and milder ethmoid sinusitis. 7. Generalized increase in red marrow related to anemia.     Dictated by (CST): Fabian Gay MD on 3/25/2024 at 9:31 AM     Finalized by (CST): Fabian Gay MD on 3/25/2024 at 9:49 AM          MRA CAROTIDS (CPT=70547)    Result Date: 3/25/2024  PROCEDURE: MRA CAROTIDS (CPT=70547)  COMPARISON: [facility], MRA  BRAIN (LYL=68038), 3/24/2024, 8:54 PM.  INDICATIONS: Focal psychomotor deficit. Bilateral clonus.  TECHNIQUE: MR angiography was performed without and with intravenous gadolinium, with creation and interpretation of 2D and 3D/multi-planar images of the carotid and vertebral arteries.  Evaluation of internal carotid stenosis is based on NASCET Criteria.   FINDINGS:  COMMENT: Overall examination quality is substantially compromised by patient motion artifact.  RIGHT INTERNAL CAROTID: Minimal plaque of the proximal internal carotid artery is suggested. No hemodynamically significant stenosis or dissection.  EXTERNAL CAROTID: No hemodynamically significant stenosis or dissection.  COMMON CAROTID: No hemodynamically significant stenosis or dissection.  VERTEBRAL: No hemodynamically significant stenosis or dissection.   LEFT INTERNAL CAROTID: Minimal plaque is suggested at the carotid bifurcation. No hemodynamically significant stenosis or dissection.  EXTERNAL CAROTID: No hemodynamically significant stenosis or dissection.  COMMON CAROTID: No hemodynamically significant stenosis or dissection.  VERTEBRAL: No hemodynamically significant stenosis or dissection.   OTHER: Extensive susceptibility artifact the sternum is compatible with the sternal wires. Foci of susceptibility artifact are present in the ventral subcutaneous tissues, compatible with prior sternotomy incision.  There is partial visualization of bilateral pleural effusions. Scattered mucosal thickening is demonstrated.         CONCLUSION:  Markedly artifactually compromised examination. Within these parameters: 1. Suggestion of mild plaque involving the carotid bifurcations without evidence of flow-limiting stenosis or occlusion.  2. The vertebral arteries appear to be contiguously patent, insofar as visualized, without evidence of hemodynamically stenosis.  3. Lesser incidental findings as above.    A preliminary report was issued by the [facility]  teleradiology service. There are no major discrepancies.   Dictated by (CST): Zachery Dean MD on 3/25/2024 at 8:49 AM     Finalized by (CST): Zachery Dean MD on 3/25/2024 at 8:54 AM          IR TUNNELED CV CATH INSERTION EXCHANGE CHECK    Result Date: 3/22/2024  PROCEDURE: 1. IR CONVERSION OF NON TUNNELED HEMODIALYSIS CATHETER TO TUNNELED HEMODIALYSIS CATHETER 2. IR GASTROSTOMY TUBE INSERTION  INDICATIONS:  NSTEMI and CABG with complicated hospital course, in the ICU needing enteric access for feeds and venous access for long-term hemodialysis.  COMPARISON: None.  (S): Ion  ANESTHESIA/SEDATION: Level of anesthesia/sedation: Moderate sedation (conscious sedation) Anesthesia/sedation administered by: Nurse or other independent trained observer who has no other duties with continuous monitoring of the patient's level of consciousness and physiologic status, under the personal supervision of the attending physician. Total intra-service sedation time: 40 min  FLUOROSCOPY TIME:  5 min AIR KERMA:  35 mGy  ESTIMATED BLOOD LOSS: Less than 10 mL  COMPLICATIONS: None  FINDINGS:  The right neck and chest was sterilely prepped and draped.   fluoroscopy demonstrated the existing non tunneled right internal jugular vein hemodialysis catheter in expected position. Under fluoroscopic guidance, a 0.035 inch wire was advanced through the catheter into the inferior vena cava. A peel-away sheath was advanced over the wire and secured.  Local Lidocaine was administered. A short skin incision was made in the right chest several cm from the venotomy. A 19 cm tunneled dialysis catheter was tunneled from the skin incision to the venotomy. The catheter was then advanced through the peel-away  sheath to the superior cavoatrial junction..  The catheter aspirated and flushed well. The catheter was flushed with heparin and secured to the skin.  The patient's left abdomen was then sterilely prepped and draped.  A  nasogastric tube was present.  Under fluoroscopic guidance, air was insufflated into the stomach distended.  Local lidocaine was administered.  Under fluoroscopic guidance, 3 T-fasteners were advanced into the stomach.  In between the T-fasteners, a Yueh needle/catheter was advanced into the stomach.  Through the catheter, a wire was coiled in the stomach.  Using a 4 millimeter balloon, the tract was dilated, and an 18 [place]  gastrostomy tube was placed.  Contrast injection confirmed appropriate positioning.  The retention balloon was inflated.         CONCLUSION: 1. Conversion of non tunneled hemodialysis catheter to a tunneled hemodialysis catheter via right internal jugular vein access.  Ready for use. 2. Insertion of 18 [place] gastrostomy tube.  Do not use until cleared by IR tomorrow.    Dictated by (CST): Carlitos Lemon MD on 3/22/2024 at 11:33 PM     Finalized by (CST): Carlitos Lemon MD on 3/22/2024 at 11:39 PM          IR G-TUBE PROCEDURE    Result Date: 3/22/2024  See separate dictation \"IR TUNNELED CV CATHETER INSERTION\" for procedure details. Finalized by (CST): Carlitos Lemon MD on 3/22/2024 at 11:32 PM          XR CHEST AP PORTABLE  (CPT=71045)    Result Date: 3/22/2024  PROCEDURE: XR CHEST AP PORTABLE  (CPT=71045) TIME: 1621 hours.   COMPARISON: [facility], XR CHEST AP PORTABLE (CPT=71045), 3/19/2024, 10:14 AM.  [facility], XR CHEST AP PORTABLE (CPT=71045), 3/19/2024, 8:02 AM.  [facility], XR CHEST AP PORTABLE (CPT=71045), 3/17/2024, 6:21 AM.  INDICATIONS: Evaluation post tracheostomy flat plate  TECHNIQUE:   Single view.   FINDINGS:  CARDIAC/MEDIAST: Heart is stable in size.  Prior median sternotomy and CABG.  LUNGS/PLEURA:  Patchy opacity in the right mid lung noted mild improved aeration in the right lung base.  Mild left basilar opacity.  Suspected trace bilateral pleural effusions.  No pneumothorax. OTHER:  Osseous structures are  unchanged.  Interval removal of previously seen mediastinal drain and left chest tube.  Stable appearance of right jugular central venous catheter and enteric tube.  Tracheostomy tube identified with the tip at the level of  the clavicles.  Oral contrast seen partially imaged within the stomach.         CONCLUSION:   Tracheostomy tube tip at the level of the clavicles.  Interval removal of left chest tube and mediastinal drain.  No pneumothorax.  Mild patchy persistent opacity in the right mid lung with mild improved aeration right lung base.  Mild left basilar opacity with suspected trace bilateral pleural effusions.     Dictated by (CST): Jcarlos Harmon MD on 3/22/2024 at 4:45 PM     Finalized by (CST): Jcarlos Harmon MD on 3/22/2024 at 4:48 PM          CT ABDOMEN (CPT=74150)    Result Date: 3/21/2024  PROCEDURE:   CT ABDOMEN (CPT=74150)  COMPARISON: [facility], XR CHEST AP PORTABLE (CPT=71045), 3/19/2024, 10:14 AM.  [facility], US KIDNEYS (CPT=76775), 3/06/2024, 2:20 PM.  INDICATIONS: G tube placement planning.  Admitted with myocardial infarction complicated by respiratory failure and cardiogenic shock, status post CABG on 03/15/2024 with mediastinal exploration/washout on 03/16/2024.  Hospital course complicated by thrombocytopenia, acute stroke and acute kidney injury.  TECHNIQUE: Multidetector CT images of the abdomen were obtained without non-ionic intravenous contrast material. Automated exposure control for dose reduction was used. Adjustment of the mA and/or kV was done based on the patient's size. Iterative reconstruction technique for dose reduction was employed. Dose information was transmitted to the ACR (American College of Radiology) NRDR (National Radiology Data Registry), which includes the Dose Index Registry.   FINDINGS: COMMENT: Evaluation of the vasculature and of the abdominal viscera for the presence of intraparenchymal pathology is suboptimal in the  absence of contrast infusion. LUNG BASES: The heart remains enlarged.  Coronary artery calcifications are noted.  Partially imaged postoperative changes are seen from a recent CABG and subsequent mediastinal exploration.  Partially imaged lobulated circumscribed lesions are seen in the anterior mediastinal fat pad extending caudally to the visualized upper abdomen protruding anterior to the left hepatic lobe measuring approximately 12.8 x 3.8 x 1.9 cm in total (series 2, image 37 and series 5, image 17), probably representing a subacute hematoma.  A moderate right pleural effusion appears to have increased in size compared to the 03/19/2024 chest x-ray and is associated with worsening passive atelectasis in the dependent aspect of the right lower lobe.  A small left pleural effusion and milder passive atelectasis in the left lower lobe are unchanged.  LIVER: No enlargement, atrophy, abnormal density, or significant focal lesion is identified within the parameters of this noncontrast study.  BILIARY: The gallbladder is nondilated.  The biliary tree is normal in caliber. PANCREAS: Negative unenhanced appearance for fluid collection, ductal dilatation, or atrophy.  SPLEEN: No enlargement.  ADRENALS:   No defined mass or abnormal enlargement.  KIDNEYS:   No renal or ureteral calculi are identified. There is no hydronephrosis or hydroureter. No perinephric or periureteric fat stranding is appreciated.  GI/MESENTERY:  Enteric tube tip terminates in the distal gastric body.  Evaluation of the gastrointestinal tract is limited without enteric contrast.  No dilated gas-filled loops of bowel are seen to suggest obstruction.  The anterior wall the stomach appears closely apposed to the ventral peritoneum.  There is no evidence of colonic interposition. VASCULATURE:   No aneurysm is detected.  There is moderate calcific atherosclerosis. RETROPERITONEUM: No mass or lymphadenopathy is apparent.  BONES:   No significant bony  lesion or fracture.  Partially imaged sternotomy changes are noted.  There are mild-to-moderate spondylotic changes in the visualized thoracolumbar spine. ABDOMINAL WALL: There is generalized body wall edema suggesting anasarca. OTHER: No free air or fluid is seen in the abdomen.          CONCLUSION:  1. Stomach appears closely apposed to the ventral peritoneum.  No evidence of colonic interposition.  No free fluid in the visualized upper abdomen.  2. Postoperative changes from a recent CABG and subsequent mediastinal exploration/washout.  There is a partially imaged lobulated mildly hyperdense mass extending from the visualized anterior mediastinum to the epigastric region that is most compatible with a subacute hematoma. 3. Bilateral pleural effusions, right greater than left, with associated passive atelectasis at both visualized lung bases.  These findings are worse on the right compared to 03/19/2024 suggesting worsening CHF/fluid overload or anasarca.    Dictated by (CST): Bc Edwards MD on 3/21/2024 at 7:08 PM     Finalized by (CST): Bc Edwards MD on 3/21/2024 at 7:15 PM          IR NON-TUNNELED CATHETER    Result Date: 3/19/2024  PROCEDURE: IR NON-TUNNELLED CATHETER  INDICATIONS:  Critically ill patient on CRRT via non tunneled hemodialysis catheter.  Catheter not functioning for dialysis.  Chest x-ray demonstrates catheter terminating in the mid superior vena cava.  (S): Ion  ANESTHESIA/SEDATION: Level of anesthesia/sedation: None (Lidocaine only) Anesthesia/sedation administered by: Not applicable  ESTIMATED BLOOD LOSS: Less than 5 mL  COMPLICATIONS: None  FINDINGS:  Informed consent was obtained. The procedure was performed at bedside. The right neck including the existing 15 centimeter double-lumen catheter was sterilely prepped and draped.  Of note, the catheter was retracted several centimeters out of the venotomy site.  Local Lidocaine was administered.  A 0.035 inch wire  was advanced through one of the lumens.  The catheter was then exchanged for a new 20 centimeter double-lumen catheter approximately 1 centimeter out of the venotomy. The catheter was secured to the skin. Follow-up chest radiograph demonstrated the catheter terminating at the superior cavoatrial junction.         CONCLUSION:  Exchange of existing non-tunneled central venous catheter via internal jugular vein at bedside.  Ready for use.    Dictated by (CST): Carlitos Lemon MD on 3/19/2024 at 6:09 PM     Finalized by (CST): Carlitos Lemon MD on 3/19/2024 at 6:11 PM          XR CHEST AP PORTABLE  (CPT=71045)    Result Date: 3/19/2024  PROCEDURE: XR CHEST AP PORTABLE  (CPT=71045) TIME: 0802 hours.   COMPARISON: [facility], XR CHEST AP PORTABLE (CPT=71045), 3/17/2024, 6:21  INDICATIONS: NG tube placement. Coronary artery bypass graft on 3/15/2024.  TECHNIQUE:   Single view.   FINDINGS:  CARDIAC/VASC: Cardiomegaly.  ET tube in satisfactory position.  MEDIAST/ANTONI:   Atherosclerotic aorta with no visible aneurysm.  CABG.  Double-lumen right IJ catheter with the tip in the SVC.  Right IJ sheath with the tip in the SVC.  Pamplico-Ophelia catheter has been removed.  No pneumothorax.  Right paramediastinal left basilar chest tubes present.  Pericardial drainage catheter suspected at the base of the heart. LUNGS/PLEURA: Bilateral perihilar lower lobe pulmonary congestion with improved aeration left basilar retrocardiac region BONES: Mild scoliosis with mild degenerative disc disease and spondylosis.  OTHER: Nasogastric tube within the body of the stomach.  Adjacent metallic tip enteric tube also within the body of the stomach..  Cardiac monitoring leads electrodes overlie the chest.  Epicardial leads suspected         CONCLUSION:  1. Cardiomegaly.  Pulmonary venous distention.  CABG 2. Bilateral perihilar and lower lobe pulmonary congestive changes with improved aeration left basilar retrocardiac region .   Support tubes and catheters are satisfactory.  No pneumothorax 3. Metallic tip enteric tube within the body of the stomach alongside the indwelling nasogastric tube    Dictated by (CST): Wily Stephenson MD on 3/19/2024 at 11:41 AM     Finalized by (CST): Wily Stephenson MD on 3/19/2024 at 11:46 AM          XR CHEST AP PORTABLE  (CPT=71045)    Result Date: 3/19/2024  PROCEDURE: XR CHEST AP PORTABLE  (CPT=71045) TIME: 1019.   COMPARISON: [facility], XR CHEST AP PORTABLE (CPT=71045), 3/16/2024, 6:51 AM.  [facility], XR CHEST AP PORTABLE (CPT=71045), 3/17/2024, 6:21 AM.  [facility], XR CHEST AP PORTABLE (CPT=71045), 3/19/2024, 8:02 AM.  INDICATIONS: Right internal jugular dialysis catheter placement confirmation.  TECHNIQUE:   Single view.   FINDINGS:  DEVICES: There is a right-sided large-bore dual-lumen hemodialysis catheter with tip projecting near the cavoatrial junction. An endotracheal tube terminates approximately 7.2 cm above the pham. Enteric tubes extend caudally beyond the field of view. Bilateral chest tubes are evident. CARDIAC/VASC: The cardiomediastinal silhouette is accentuated by AP technique, but likely stably enlarged. There is mild tortuosity of the thoracic aorta with peripheral atherosclerotic vascular calcification. The pulmonary vascularity is mildly congested. MEDIAST/ANTONI: Surgical clips are present. LUNGS/PLEURA: Patchy opacities are demonstrated bilaterally, right worse than left, and may reflect some combination of accumulating pleural effusions, compressive atelectasis, and/or superimposed airspace consolidation. No pneumothorax is evident.  BONES: Median sternotomy wires are demonstrated. Mild scoliosis and multilevel degenerative changes of the thoracic spine are apparent. There are degenerative changes of shoulders bilaterally. OTHER: Leads overlie the chest and obscure underlying detail.           CONCLUSION:  1. Right-sided  hemodialysis catheter with tip projecting near the cavoatrial junction.  2. Postthoracotomy chest with cardiomegaly and pulmonary vascular congestion.  3. Bibasilar opacities, which may reflect some combination of pleural effusions, atelectasis, and/or superimposed pneumonia.   4. Additional support tubes and lines as above.   Dictated by (CST): Zachery Dean MD on 3/19/2024 at 10:57 AM     Finalized by (CST): Zachery Dean MD on 3/19/2024 at 11:01 AM          CT BRAIN OR HEAD ([id number])    Result Date: 3/19/2024  PROCEDURE: CT BRAIN OR HEAD (CPT=70450)  COMPARISON: None.  INDICATIONS: AMS  TECHNIQUE: CT images were obtained without contrast material.  Automated exposure control for dose reduction was used.  Dose information is transmitted to the ACR (American College of Radiology) NRDR (National Radiology Data Registry) which includes the Dose Index Registry.  FINDINGS:  CSF SPACES: Ventricles, cisterns, and sulci are appropriate for age.  No hydrocephalus, subarachnoid hemorrhage, or mass.  No midline shift.  CEREBRUM: Areas of volume loss with blurring of the gray-white differentiation seen involving the left frontal lobe and left posterior occipital lobe. WHITE MATTER: Normal white matter.  CEREBELLUM: No edema, hemorrhage, mass, acute infarction, or significant atrophy.  BRAINSTEM: No edema, hemorrhage, mass, acute infarction, or significant atrophy.  CALVARIUM: No apparent fracture, mass, or other significant visible lesion.  SINUSES: There is mild mucosal thickening of the ethmoid air cells and sphenoid sinuses.  Moderate mucosal thickening of the frontal sinuses. Mild partial opacification of the bilateral mastoid air cells. ORBITS: Limited views are unremarkable.   OTHER: Negative.          CONCLUSION:   Subacute appearing left frontal and left occipital lobe infarcts.  Brain MRI can be obtained to help stage chronicity.  Pansinusitis  Partial opacification of the bilateral mastoid air cells may be sequela  of inflammation. No osseous destruction or bony erosions.   Dictated by (CST): Henri Melgoza MD on 3/19/2024 at 9:46 AM     Finalized by (CST): Henri Melgoza MD on 3/19/2024 at 9:48 AM          XR CHEST AP PORTABLE  (CPT=71045)    Result Date: 3/17/2024  PROCEDURE: XR CHEST AP PORTABLE  (CPT=71045) TIME: 628 hours.   COMPARISON: [facility], XR CHEST AP PORTABLE (CPT=71045), 3/16/2024, 6:51 AM.  [facility], XR CHEST AP PORTABLE (CPT=71045), 3/15/2024, 4:44 PM.  [facility], XR CHEST AP PORTABLE (CPT=71045), 3/12/2024, 7:06 AM.  INDICATIONS: Follow up shortness of breath.  TECHNIQUE:   Single view.   FINDINGS:  CARDIAC/VASC: The cardiomediastinal silhouette is unchanged in size.  Postoperative changes from recent CABG.  There are sternotomy wires. MEDIAST/ANTONI:   No mass. LUNGS/PLEURA: Mildly low lung volumes.  Left retrocardiac opacity is present.  Slight blunting of the left costophrenic angle.  Right lung and pleural spaces are clear. BONES: The osseous structures are unchanged. OTHER: The enteric tube courses below the diaphragm with the distal tip out of the field of view.  Mediastinal drains and left basilar chest tube are unchanged.  There is a right internal jugular approach [place]-Ophelia catheter, with the tip projecting over the distal left lower lobe pulmonary artery The endotracheal tube, right IJ central venous catheter, and Impella device are in unchanged position         CONCLUSION:   Distal tip of the [place]-Ophelia catheter projects over the left lower lobe.  Recommend retraction.  Unchanged trace left pleural effusion.  Unchanged left retrocardiac opacity.    Dictated by (CST): Emily Álvarez MD on 3/17/2024 at 12:22 PM     Finalized by (CST): Emily Álvarez MD on 3/17/2024 at 12:24 PM          US ARTERIAL DUPLEX UPPER EXTREMITY LEFT (CPT=93931)    Result Date: 3/16/2024  PROCEDURE: US ARTERIAL DUPLEX UPPER EXTREMITY LEFT (CPT=93931)  COMPARISON: None.   INDICATIONS: 53-year-old man with left upper extremity edema and diminished distal pulses after recent arterial catheterization.  FINDINGS: There are multiphasic waveforms throughout the left subclavian artery with peak systolic velocity of 122 centimeters/second proximally.  There are multiphasic waveforms throughout the left axillary artery with peak systolic velocity of 87 centimeters/second.  There are multiphasic waveforms throughout the left brachial artery with peak systolic velocity of 95 centimeters/second proximally.  There are multiphasic waveforms throughout the left radial artery, including at the wrist.  Peak systolic velocity within the proximal left radial artery is 83 centimeters/second, while peak systolic velocity within the distal radial artery is 88 centimeters/second.  There is no arterial pseudoaneurysm or dissection.  There are multiphasic waveforms throughout the left ulnar artery with peak systolic velocity of 91 centimeters/second proximally.         CONCLUSION:  1. Widely patent left upper extremity arterial vasculature, including radial artery without pseudoaneurysm or dissection.    Dictated by (CST): Guille Chan MD on 3/16/2024 at 12:56 PM     Finalized by (CST): Guille Chan MD on 3/16/2024 at 12:57 PM          CARD ECHO LIMITED (CPT=93308)    Result Date: 3/16/2024  Transthoracic Echocardiogram Name:Cristiano Obregon Date: 2024 :  1970 Ht:  (67in)  BP: 108 / 77 MRN:  [id number]    Age:  53years    Wt:  (188lb) HR: 84bpm Loc:  [facility]       Gndr: M          BSA: 1.97m^2 Sonographer: ADEEL Wyman Ordering:    Dima Ponce MD Consulting:  Derick Webb ---------------------------------------------------------------------------- History/Indications:   Coronary artery disease.  Impella placement.  PMH: Myocardial infarction.  Coronary artery bypass grafting (03/15/2024). Performed during the current admission.  ---------------------------------------------------------------------------- Procedure information:  A transthoracic echocardiogram, limited study was performed. Additional evaluation included M-mode and limited 2D.  Patient status:  Inpatient.  Location:  Bedside.    Comparison was made to the study of 03/11/2024.    This was a routine study. Transthoracic echocardiography for diagnosis, ventricular function evaluation, and postoperative evaluation. Image quality was adequate. ECG rhythm:   Normal sinus ---------------------------------------------------------------------------- Conclusions: 1. Left ventricle: The cavity size was normal. Wall thickness was normal.    Systolic function was mildly reduced. The estimated ejection fraction was    45-50%, by 3D assessment. Unable to assess LV diastolic function. Impella    catheter noted in the left ventricle, placement measures 3.40cm from the    aortic valve. 2. Left ventricle: There is moderate hypokinesis of the mid-apical inferior    and mid inferolateral walls. 3. Right ventricle: Pacer wire noted in the right ventricle. Systolic    function was mildly reduced. 4. Left atrium: The left atrial volume was moderately increased. 5. Right atrium: The atrium was dilated. Pacer wire noted in right atrium. 6. Pericardium, extracardiac: There was no residual pericardial effusion.    There was a left pleural effusion. Impressions:  This study is compared with previous dated 03/11/2024: No significant change since prior study. * ---------------------------------------------------------------------------- * Findings: Left ventricle:  The cavity size was normal. Wall thickness was normal. Systolic function was mildly reduced. The estimated ejection fraction was 45-50%, by 3D assessment. Impella catheter noted in the left ventricle, placement measures 3.40cm from the aortic valve. Regional wall motion:   There is moderate hypokinesis of the mid-apical inferior and mid  inferolateral walls.   Unable to assess LV diastolic function. Left atrium:  The left atrial volume was moderately increased. Right ventricle:  The cavity size was normal. Pacer wire noted in the right ventricle. Systolic function was mildly reduced. Right atrium:  The atrium was dilated. Pacer wire noted in right atrium. Mitral valve:  The valve was structurally normal. Leaflet separation was normal. Aortic valve:   The valve was trileaflet.   Cusp separation was normal. Tricuspid valve:  The valve is structurally normal. Leaflet separation was normal. Pulmonic valve:   Poorly visualized. Pericardium:   Post pericardiocentesis:   There was no residual pericardial effusion. Pleura:  There was a left pleural effusion. Pulmonary arteries: Systolic pressure could not be accurately estimated. Systemic veins:  Not visualized. ---------------------------------------------------------------------------- Measurements  Left ventricle         Value        Ref       03/11/2024  LV end-diastolic       122   ml     --------- ----------  volume, 3D  LV end-systolic        65    ml     --------- ----------  volume, 3D  LV ejection        (L) 46    %      52 - 72   ----------  fraction, 3D  LV end-diastolic       62    ml/m^2 <=79      ----------  volume/bsa, 3D  LV end-systolic    (H) 33    ml/m^2 <=32      ----------  volume/bsa, 3D  LV wall mass, 3D       145   g      --------- ----------  LV wall mass/bsa,      74    g/m^2  --------- ----------  3D  IVS thickness, ED,     0.9   cm     0.6 - 1.0 1.0  PLAX  LV ID, ED, PLAX        5.1   cm     4.2 - 5.8 5.0  LV ID, ES, PLAX        3.8   cm     2.5 - 4.0 3.6  LV PW thickness,       0.8   cm     0.6 - 1.0 1.1  ED, PLAX  IVS/LV PW ratio,       1.13         --------- 0.91  ED, PLAX  LV PW/LV ID ratio,     0.16         --------- 0.22  ED, PLAX  LV ejection        (L) 50    %      52 - 72   54  fraction  Left atrium            Value        Ref       03/11/2024  LA volume, S       (H)  101   ml     18 - 58   88  LA volume/bsa, S   (H) 51    ml/m^2 16 - 34   44  LA volume, ES, 1-p (H) 103   ml     18 - 58   95  A4C  LA volume, ES, 1-p (H) 82    ml     18 - 58   81  A2C  LA volume, ES, A/L     106   ml     --------- 95  LA volume/bsa, ES, (H) 54    ml/m^2 16 - 34   48  A/L  LA volume, ES, 3D  (H) 80    ml     18 - 58   ----------  LA volume/bsa, ES,     41    ml/m^2 --------- ----------  3D  Right ventricle        Value        Ref       03/11/2024  TAPSE, MM          (L) 0.92  cm     >=1.70    ----------  RV s', lateral     (L) 6.3   cm/sec >=9.5     ---------- Legend: (L)  and  (H)  liza values outside specified reference range. ---------------------------------------------------------------------------- Prepared and electronically signed by Dima Ponce MD 03/16/2024 10:15     XR CHEST AP PORTABLE  (CPT=71045)    Result Date: 3/16/2024  PROCEDURE: XR CHEST AP PORTABLE  (CPT=71045) TIME: 656 hours.   COMPARISON: [facility], XR CHEST AP PORTABLE (CPT=71045), 3/15/2024, 4:44 PM.  [facility], XR CHEST AP PORTABLE (CPT=71045), 3/12/2024, 7:06 AM.  [facility], XR CHEST AP PORTABLE (CPT=71045), 3/11/2024, 7:59 AM.  INDICATIONS: S/P Surgery  TECHNIQUE:   Single view.   FINDINGS:  CARDIAC/VASC: The cardiomediastinal silhouette is unchanged in size.  Postoperative changes from recent CABG.  There are sternotomy wires. MEDIAST/ANTONI:   No mass. LUNGS/PLEURA: Mildly low lung volumes.  Left retrocardiac opacity is present.  Slight blunting of the left costophrenic angle.  Right lung and pleural spaces are clear. BONES: The osseous structures are unchanged. OTHER: The enteric tube courses below the diaphragm with the distal tip terminating the body of the stomach.  Mediastinal drains and left basilar chest tube are unchanged.  There is a right internal jugular approach [place]-Ophelia catheter, with the tip projecting over the distal left lower lobe pulmonary artery The  endotracheal tube, right IJ central venous catheter, and Impella device are in unchanged position         CONCLUSION:   Distal tip of the [place]-Ophelia catheter remains within the left lower lobe pulmonary artery.  Recommend retraction.  A secure message was sent to Emily DixonFroid on 03/16/2024 at 8:31 a.m.  Trace left pleural effusion.  Left retrocardiac opacity , which may be secondary to subsegmental atelectasis, edema, and/or infection.  Resolution of previously seen right pleural effusion and basilar opacity.    Dictated by (CST): Emily Álvarez MD on 3/16/2024 at 8:26 AM     Finalized by (CST): Emily Álvarez MD on 3/16/2024 at 8:30 AM          XR CHEST AP PORTABLE  (CPT=71045)    Result Date: 3/15/2024  PROCEDURE: XR CHEST AP PORTABLE  (CPT=71045) TIME: 16:44.   COMPARISON: [facility], XR CHEST AP PORTABLE (CPT=71045), 3/12/2024, 7:06 AM.  [facility], XR CHEST AP PORTABLE (CPT=71045), 3/11/2024, 7:59 AM.  [facility], XR CHEST AP PORTABLE (CPT=71045), 3/08/2024, 11:50 AM.  INDICATIONS: og tube placement  TECHNIQUE:   Single view.   FINDINGS:  CARDIAC/VASC: The cardiomediastinal silhouette is unchanged in size.  Postoperative changes from recent CABG.  There are sternotomy wires. MEDIAST/ANTONI:   No mass. LUNGS/PLEURA: The right pleural effusion and associated right basilar airspace opacity have almost entirely resolved.  No significant change in the pulmonary vascular congestion.  No pneumothorax.  There are low lung volumes. BONES: The osseous structures are unchanged. OTHER: The enteric tube courses below the diaphragm with the distal tip terminating the body of the stomach.  There is been interval placement of a mediastinal left basilar chest tube.  The endotracheal tube, right IJ central venous catheter, right IJ [place] Ophelia catheter and Impella device are in unchanged position         CONCLUSION:   1. Postoperative changes from recent CABG. 2. Well-positioned  enteric tube, which terminates in the body of the stomach. 3. Interval placement of mediastinal and left basilar chest tubes.  No pneumothorax. 4. Remaining support devices are in unchanged positioning.  5. Near complete resolution of the right pleural effusion and associated right lower lobe airspace opacity. 6. No significant change in the pulmonary vascular congestion.     Dictated by (CST): David Hendrickson MD on 3/15/2024 at 5:11 PM     Finalized by (CST): David Hendrickson MD on 3/15/2024 at 5:17 PM          XR CHEST AP PORTABLE  (CPT=71045)    Result Date: 3/12/2024  PROCEDURE: XR CHEST AP PORTABLE  (CPT=71045) TIME: 0708 hours  COMPARISON: [facility], XR CHEST AP PORTABLE (CPT=71045), 3/11/2024, 7:59  INDICATIONS: Acute myocardial infarction.  Severe three-vessel CAD status post balloon angioplasty.  Assess Impella catheter placement.  TECHNIQUE:   Single view.   FINDINGS:  CARDIAC/VASC: Cardiomegaly.  Pulmonary venous distention.  ET tube in satisfactory position  MEDIAST/ANTONI:   Atherosclerotic aorta with no visible aneurysm.  Left ventricular assist Impella catheter with the tip in the left ventricle.  Right IJ [place]-Ophelia catheter with the tip in the left descending pulmonary intralobar artery.  Additional right IJ catheter with the tip in the SVC.  No pneumothorax. LUNGS/PLEURA: Mild bilateral perihilar basilar pulmonary congestive changes.  Small right-sided effusion. BONES: S-shaped scoliosis dorsal lumbar spine OTHER: Negative.          CONCLUSION:  1. Cardiomegaly pulmonary venous distention. 2. Support tubes and catheters are satisfactory.  No pneumothorax 3. Mild perihilar and basilar congestive changes worse on the right has progressed.  4. Small right-sided effusion.    Dictated by (CST): Wily Stephenson MD on 3/12/2024 at 8:51 AM     Finalized by (CST): Wily Stephenson MD on 3/12/2024 at 8:56 AM          CARD ECHO LIMITED (CPT=93308)    Result Date: 3/11/2024  Transthoracic  Echocardiogram Name:Cristiano Obregon Date: 2024 :  1970 Ht:  (67in)  BP: 110 / 76 MRN:  [id number]    Age:  53years    Wt:  (192lb) HR: 75bpm Loc:  [facility]       Gndr: M          BSA: 1.99m^2 Sonographer: Manuel Ordering:    Ruben Ramirez Consulting:  Janet Velazco ---------------------------------------------------------------------------- History/Indications:   Coronary artery disease.  Impella placement. ---------------------------------------------------------------------------- Procedure information:  A transthoracic echocardiogram, limited study was performed. Additional evaluation included M-mode and limited 2D.  Patient status:  Inpatient.  Location:  [facility]    Comparison was made to the study of 2024.    This was a routine study. Transthoracic echocardiography for diagnosis and ventricular function evaluation. Image quality was adequate. ECG rhythm:   Normal sinus ---------------------------------------------------------------------------- Conclusions: 1. Left ventricle: The cavity size was normal. Wall thickness was moderately    increased. The estimated ejection fraction was 50-55%, by visual    assessment. Hypokinesis of the inferior wall. Hypokinesis of the    basal-midlateral wall. Impella catheter noted in the left ventricle,    placement measures 3.30cm from the aortic valve. 2. Right ventricle: Pacer wire noted in the right ventricle. 3. Left atrium: The left atrial volume was moderately increased. 4. Right atrium: Pacer wire noted in right atrium. 5. Pericardium, extracardiac: There was a right pleural effusion. 6. Left ventricle: Impressions:  This study is compared with previous dated 2024: * ---------------------------------------------------------------------------- * Findings: Left ventricle:  The cavity size was normal. Wall thickness was moderately increased. The estimated ejection fraction was 50-55%, by visual assessment. Impella catheter noted in the left ventricle,  placement measures 3.30cm from the aortic valve.  Regional wall motion abnormalities:   Hypokinesis of the inferior wall.  Hypokinesis of the basal-midlateral wall. Left atrium:  The left atrial volume was moderately increased. Right ventricle:  The cavity size was normal. Pacer wire noted in the right ventricle. Systolic function was normal. Right atrium:  The atrium was normal in size. Pacer wire noted in right atrium. Mitral valve:  The valve was structurally normal. Leaflet separation was normal. Aortic valve:  The valve was structurally normal. The valve was trileaflet. Cusp separation was normal. Tricuspid valve:  The valve is structurally normal. Leaflet separation was normal. Pulmonic valve:   Not visualized. Pleura:  There was a right pleural effusion. Pulmonary arteries: Systolic pressure could not be accurately estimated. Systemic veins:  Central venous respirophasic diameter changes are blunted (< 50%). Inferior vena cava: The IVC was normal-sized. ---------------------------------------------------------------------------- Measurements  Left ventricle         Value        Ref       03/08/2024  IVS thickness, ED,     1.0   cm     0.6 - 1.0 1.0  PLAX  LV ID, ED, PLAX        5.0   cm     4.2 - 5.8 5.1  LV ID, ES, PLAX        3.6   cm     2.5 - 4.0 3.8  LV PW thickness,   (H) 1.1   cm     0.6 - 1.0 0.9  ED, PLAX  IVS/LV PW ratio,       0.91         --------- 1.11  ED, PLAX  LV PW/LV ID ratio,     0.22         --------- 0.18  ED, PLAX  LV ejection            54    %      52 - 72   50  fraction  Left atrium            Value        Ref       03/08/2024  LA volume, S       (H) 88    ml     18 - 58   ----------  LA volume/bsa, S   (H) 44    ml/m^2 16 - 34   ----------  LA volume, ES, 1-p (H) 95    ml     18 - 58   ----------  A4C  LA volume, ES, 1-p (H) 81    ml     18 - 58   ----------  A2C  LA volume, ES, A/L     95    ml     --------- ----------  LA volume/bsa, ES, (H) 48    ml/m^2 16 - 34   ----------  A/L   Inferior vena cava     Value        Ref       03/08/2024  ID                     1.9   cm     <=2.1     2.5 Legend: (L)  and  (H)  liza values outside specified reference range. ---------------------------------------------------------------------------- Prepared and electronically signed by Ruben Ramirez 03/11/2024 19:47     XR CHEST AP PORTABLE  (CPT=71045)    Result Date: 3/11/2024  PROCEDURE: XR CHEST AP PORTABLE  (CPT=71045) TIME: 759 a.   COMPARISON: [facility], XR CHEST AP PORTABLE (CPT=71045), 3/05/2024, 9:26 AM.  [facility], XR CHEST AP PORTABLE (CPT=71045), 3/07/2024, 10:58 AM.  [facility], XR CHEST AP PORTABLE (CPT=71045), 3/08/2024, 11:50 AM.  INDICATIONS: Impella and intubated, assess hardware.  TECHNIQUE:   Single view.   FINDINGS:  CARDIAC/VASC: Heart size upper limits of normal to mildly enlarged, and there is mild interstitial edema suggesting mild pulmonary congestion/fluid overload. MEDIAST/ANTONI:   No visible mass or adenopathy. LUNGS/PLEURA: ET tube in the trachea at the level the clavicles.  NG tube has been removed.  Right internal jugular central line sheath ends in the SVC.  [place]-Ophelia catheter is in the descending left pulmonary artery.  Impella device in profile with the left ventricle.  Blunting of the right costophrenic angle may suggest small amount of right pleural fluid. BONES: No fracture or visible bony lesion. OTHER: Negative.          CONCLUSION:  1. Heart size upper limits of normal to mildly enlarged, and there is mild interstitial edema suggesting mild cardiac failure/fluid overload.  ET tube in the trachea at the level of the clavicles.  Impella device in profile with the left ventricle.  [place]-Ophelia catheter in the descending left pulmonary artery.  No pneumothorax.  Small right pleural effusion suggested.    Dictated by (CST): Moisés Delgadillo MD on 3/11/2024 at 8:29 AM     Finalized by (CST): Moisés Delgadillo MD on 3/11/2024 at 8:32 AM           CARD ECHO 2D W/O DOPPLER (CPT=93307)    Result Date: 3/8/2024  Transthoracic Echocardiogram Name:Cristiano Obregon Date: 2024 :  1970 Ht:  (67in)  BP: 127 / 78 MRN:  [id number]    Age:  53years    Wt:  (185lb) HR: 75bpm Loc:  [facility]       Gndr: M          BSA: 1.96m^2 Sonographer: Manuel Ordering:    Carlitos Sandoval Consulting:  Janet Velazco ---------------------------------------------------------------------------- History/Indications:  Impella placement. ---------------------------------------------------------------------------- Procedure information:  A transthoracic echocardiogram, limited study was performed. Additional evaluation included M-mode and limited 2D.  Patient status:  Inpatient.  Location:  [facility]    This was a routine study. Transthoracic echocardiography for diagnosis, ventricular function evaluation, and post closure device deployment evaluation. Image quality was adequate. ECG rhythm:   Normal sinus ---------------------------------------------------------------------------- Conclusions: 1. Left ventricle: The cavity size was normal. Wall thickness was normal.    Systolic function was normal. The estimated ejection fraction was 50-55%,    by visual assessment. Hypokinesis of the anterolateral wall. Unable to    assess LV diastolic function. Impella catheter noted in the left    ventricle, placement measures 3.50cm from the aortic valve. 2. Right ventricle: Pacer wire noted in the right ventricle. 3. Right atrium: Pacer wire noted in right atrium. * ---------------------------------------------------------------------------- * Findings: Left ventricle:  The cavity size was normal. Wall thickness was normal. Systolic function was normal. The estimated ejection fraction was 50-55%, by visual assessment. Impella catheter noted in the left ventricle, placement measures 3.50cm from the aortic valve.  Regional wall motion abnormalities:  Hypokinesis of the anterolateral wall. Unable  to assess LV diastolic function. Right ventricle:  The cavity size was normal. Pacer wire noted in the right ventricle. Systolic function was normal. Right atrium:  Pacer wire noted in right atrium. Mitral valve:  The valve was structurally normal. Leaflet separation was normal. Aortic valve:   The valve was trileaflet. Tricuspid valve:  The valve is structurally normal. Leaflet separation was normal. Pulmonic valve:   Not visualized. Pericardium:   There was no pericardial effusion. Pulmonary arteries: Systolic pressure could not be accurately estimated. Systemic veins:  Central venous respirophasic diameter changes are blunted (< 50%). Inferior vena cava: The IVC was dilated. ---------------------------------------------------------------------------- Measurements  Left ventricle         Value        Ref       03/07/2024  IVS thickness, ED,     1.0   cm     0.6 - 1.0 1.3  PLAX  LV ID, ED, PLAX        5.1   cm     4.2 - 5.8 4.4  LV ID, ES, PLAX        3.8   cm     2.5 - 4.0 3.3  LV PW thickness,       0.9   cm     0.6 - 1.0 1.4  ED, PLAX  IVS/LV PW ratio,       1.11         --------- 0.93  ED, PLAX  LV PW/LV ID ratio,     0.18         --------- 0.32  ED, PLAX  LV ejection        (L) 50    %      52 - 72   50  fraction  Inferior vena cava     Value        Ref       03/07/2024  ID                 (H) 2.5   cm     <=2.1     ----------  Right ventricle        Value        Ref       03/07/2024  TAPSE, 2D              2.39  cm     >=1.70    ----------  TAPSE, MM              2.39  cm     >=1.70    ----------  RV s', lateral         18.8  cm/sec >=9.5     ---------- Legend: (L)  and  (H)  liza values outside specified reference range. ---------------------------------------------------------------------------- Prepared and electronically signed by Gurjit Elliott 03/08/2024 17:42     XR CHEST AP PORTABLE  (CPT=71045)    Result Date: 3/8/2024  PROCEDURE: XR CHEST AP PORTABLE  (CPT=71045) TIME: 12:05.    COMPARISON: [facility], XR CHEST AP PORTABLE (CPT=71045), 3/05/2024, 9:26 AM.  [facility], XR CHEST AP PORTABLE (CPT=71045), 3/06/2024, 6:13 AM.  [facility], XR CHEST AP PORTABLE (CPT=71045), 3/07/2024, 10:58 AM.  INDICATIONS: Verify confirm NG tube placement for tube feeding.  Myocardial infarction, cardiogenic shock, acute hypoxic respiratory failure.  TECHNIQUE:   Single view.   FINDINGS:  CARDIAC/VASC: The cardiac silhouette is not enlarged.  Unremarkable pulmonary vasculature.  MEDIAST/ANTONI:   No visible mass or adenopathy. LUNGS/PLEURA: Subsegmental atelectasis has developed in the lower right lung.  No pleural effusion or pneumothorax. BONES: The osseous structures are unchanged. OTHER: An endotracheal tube tip projects 6.7 cm above the pham.  There is a new enteric tube with tip projecting over the expected location of the gastroesophageal junction.  A right internal jugular approach central venous catheter tip again projects over the SVC.  A right internal jugular approach [place] catheter tip again projects over the left interlobar artery. An Impella device again projects over the left ventricular apex.         CONCLUSION:  1. New enteric tube tip appears to terminate about the gastroesophageal junction.  Recommend advancing at least 9.0 cm distally into the stomach. 2. Additional lines/tubes in stable customary positioning. 3. Development of subsegmental atelectasis in the lower right lung.   Results of this examination were discussed with the patient's nurse, Mayela Ch, by Dr. Edwards at 12:40 on 03/08/2024.  Dictated by (CST): Bc Edwards MD on 3/08/2024 at 12:36 PM     Finalized by (CST): Bc Edwards MD on 3/08/2024 at 12:42 PM          IR CENTRAL VENOUS ACCESS    Result Date: 3/7/2024  PROCEDURE: IR ULTRASOUND-GUIDED NON TUNNELED HEMODIALYSIS CATHETER INSERTION  INDICATIONS:  53-year-old man with acute kidney insufficiency  requiring CRRT.  (S): MD Dustin  ANESTHESIA/SEDATION: Level of anesthesia/sedation: None (Lidocaine only) Anesthesia/sedation administered by: Not applicable  ESTIMATED BLOOD LOSS: Less than 5 mL  COMPLICATIONS: None  FINDINGS:  Informed consent was obtained from the patient's power-of-. The procedure was performed at bedside. The right side of the neck was sterilely prepped and draped. Preliminary ultrasound demonstrated a patent right internal jugular vein despite the presence of a [place]-Ophelia catheter. Local Lidocaine was administered. Under ultrasound guidance, the vein was accessed with a micropuncture set; an image was saved.  A 0.035 inch wire was advanced through the dilator. The access was dilated. A 15 cm temporary hemodialysis catheter was placed with its tip in the expected location of the superior vena cava. The catheter was secured to the skin. Follow-up chest radiograph demonstrated the catheter terminating in expected position.         CONCLUSION: Placement of non-tunneled central venous catheter via right internal jugular vein at bedside.  The catheter is ready for use.    Dictated by (CST): Guilel Chan MD on 3/07/2024 at 12:46 PM     Finalized by (CST): Guille Chan MD on 3/07/2024 at 12:47 PM          CARD ECHO 2D W/O DOPPLER (CPT=93307)    Result Date: 3/7/2024  Transthoracic Echocardiogram Name:Cristiano Obregon Date: 2024 :  1970 Ht:  (67in)  BP: 105 / 68 MRN:  [id number]    Age:  53years    Wt:  (183lb) HR: Loc:  EM       Gndr: M          BSA: 1.95m^2 Sonographer: Glenna DENIS Ordering:    Carlitos Sandoval Consulting:  Janet Velazco ---------------------------------------------------------------------------- History/Indications:   Impella Placement. ---------------------------------------------------------------------------- Procedure information:  A transthoracic echocardiogram, limited study was performed. Additional evaluation included M-mode and  limited 2D.  Patient status:  Inpatient.  Location:  Bedside.    Comparison was made to the study of 03/06/2024.    This was a routine study. Transthoracic echocardiography for diagnosis. Image quality was adequate. The study was technically limited due to restricted patient mobility and patient supine. ---------------------------------------------------------------------------- Conclusions: Left ventricle: The cavity size was normal. Wall thickness was mildly increased. Systolic function was mildly reduced. The estimated ejection fraction was 45-50%, by biplane method of disks. Unable to assess LV diastolic function. Impressions:  No significant change since prior study. * ---------------------------------------------------------------------------- * Findings: Left ventricle:  The cavity size was normal. Wall thickness was mildly increased. Systolic function was mildly reduced. The estimated ejection fraction was 45-50%, by biplane method of disks. Unable to assess LV diastolic function. Pericardium:   There was no pericardial effusion. Pleura:  No evidence of pleural fluid accumulation. ---------------------------------------------------------------------------- Measurements  Left ventricle         Value        Ref       03/06/2024  IVS thickness, ED, (H) 1.3   cm     0.6 - 1.0 ----------  PLAX  LV ID, ED, PLAX        4.4   cm     4.2 - 5.8 ----------  LV ID, ES, PLAX        3.3   cm     2.5 - 4.0 ----------  LV PW thickness,   (H) 1.4   cm     0.6 - 1.0 ----------  ED, PLAX  IVS/LV PW ratio,       0.93         --------- ----------  ED, PLAX  LV PW/LV ID ratio,     0.32         --------- ----------  ED, PLAX  LV ejection        (L) 50    %      52 - 72   ----------  fraction  LV end-diastolic       90    ml     69 - 185  82  volume, 1-p A4C  LV ejection            46    %      46 - 74   39  fraction, 1-p A4C  Stroke volume, 1-p     41    ml     --------- 32  A4C  LV end-diastolic       46    ml/m^2 37 - 93   43   volume/bsa, 1-p  A4C  Stroke volume/bsa,     21    ml/m^2 --------- 17  1-p A4C  LV end-diastolic       79    ml     62 - 150  81  volume, 2-p  LV end-systolic        45    ml     21 - 61   49  volume, 2-p  LV ejection        (L) 43    %      52 - 72   40  fraction, 2-p  Stroke volume, 2-p     34    ml     --------- 33  LV end-diastolic       40    ml/m^2 34 - 74   42  volume/bsa, 2-p  LV end-systolic        23    ml/m^2 11 - 31   25  volume/bsa, 2-p  Stroke volume/bsa,     17.2  ml/m^2 --------- 16.9  2-p Legend: (L)  and  (H)  liza values outside specified reference range. ---------------------------------------------------------------------------- Prepared and electronically signed by Carlitos Sandoval 03/07/2024 12:43     XR CHEST AP PORTABLE  (CPT=71045)    Result Date: 3/7/2024         PROCEDURE: XR CHEST AP PORTABLE  (CPT=71045) TIME: 1058  COMPARISON: [facility], XR CHEST AP PORTABLE (CPT=71045), 3/06/2024, 6:13 AM.  INDICATIONS: Verify Temporary Hemodialysis Catheter placement.  TECHNIQUE:   Single view.   Findings and impression:  Right IJ dialysis catheter in the lower SVC.  No pneumothorax  Interval PA catheter in the left inter lobar artery  Stable ETT and Impella.  Enteric tube remains in the lower esophagus  Increasing vascular congestion with mild basilar predominant edema     Dictated by (CST): Reyes Rodriguez MD on 3/07/2024 at 11:51 AM     Finalized by (CST): Reyes Rodriguez MD on 3/07/2024 at 11:52 AM          US KIDNEYS (CPT=76775)    Result Date: 3/6/2024  PROCEDURE: US KIDNEYS (CPT=76775)  COMPARISON: None.  INDICATIONS: Acute renal failure  TECHNIQUE: Ultrasound examination was performed to visualize the kidneys and bladder.   FINDINGS:  RIGHT KIDNEY: 11 cm.  No mass or calculus or hydronephrosis.  LEFT KIDNEY: 10.5 cm. No mass or calculus or hydronephrosis.  BLADDER: Partly decompressed around a [place] catheter.  Bladder contains 40 cc of urine.  CONCLUSION: No hydronephrosis      DICTATED BY ([facility]): JOSEPH ZUNIGA MD ON 3/06/2024 AT 3:00 PM     FINALIZED BY (CST): JOSEPH ZUNIGA MD ON 3/06/2024 AT 3:01 PM             CARD ECHO 2D W/O DOPPLER (CPT=93307)    Result Date: 3/6/2024  Transthoracic Echocardiogram Name:Cristiano Obregon Date:    2024    :     1970    Ht:     (67in)     BP: MRN:     [id number]       Age:     53years       Wt:     (180lb)    HR: Loc:     EM          Gndr:    M             BSA:    1.93m^2 Sonographer: Pao DENIS [facility] Ordering:    Carlitos Sandoval Consulting:  Janet Velazco ---------------------------------------------------------------------------- Procedure information:  A transthoracic echocardiogram, limited study was performed. Additional evaluation included M-mode and limited 2D.  Image quality was adequate. ECG rhythm:   Normal sinus ---------------------------------------------------------------------------- Conclusions: Left ventricle: The cavity size was normal. Wall thickness was normal. Systolic function was mildly reduced. The estimated ejection fraction was 40-45%, by biplane method of disks. Impella catheter noted in the left ventricle, placement measures 3.70cm from the aortic valve. * ---------------------------------------------------------------------------- * Findings: Left ventricle:  The cavity size was normal. Wall thickness was normal. Systolic function was mildly reduced. The estimated ejection fraction was 40-45%, by biplane method of disks. Impella catheter noted in the left ventricle, placement measures 3.70cm from the aortic valve. Left atrium:  The atrium was normal in size. Right ventricle:  The cavity size was normal. Wall thickness was normal. Systolic function was normal. Right atrium:  The atrium was normal in size. Mitral valve:  The valve was structurally normal. Leaflet separation was normal. Aortic valve:  The valve was structurally normal. The valve was trileaflet. Cusp separation was normal. Tricuspid valve:  The valve  is structurally normal. Leaflet separation was normal. Pulmonic valve:   The valve is structurally normal. Cusp separation was normal. Pericardium:   There was no pericardial effusion. Aorta: Aortic root: The aortic root was normal-sized. Pulmonary arteries: The main pulmonary artery was normal-sized. Systemic veins: Inferior vena cava: The IVC was normal-sized. ---------------------------------------------------------------------------- Measurements  Left ventricle                     Value        Ref  LV end-diastolic volume, 1-p       82    ml     69 - 185  A4C  LV ejection fraction, 1-p A4C  (L) 39    %      46 - 74  Stroke volume, 1-p A4C             32    ml     --------  LV end-diastolic volume/bsa,       43    ml/m^2 37 - 93  1-p A4C  Stroke volume/bsa, 1-p A4C         17    ml/m^2 --------  LV end-diastolic volume, 2-p       81    ml     62 - 150  LV end-systolic volume, 2-p        49    ml     21 - 61  LV ejection fraction, 2-p      (L) 40    %      52 - 72  Stroke volume, 2-p                 33    ml     --------  LV end-diastolic volume/bsa,       42    ml/m^2 34 - 74  2-p  LV end-systolic volume/bsa,        25    ml/m^2 11 - 31  2-p  Stroke volume/bsa, 2-p             16.9  ml/m^2 --------  Right ventricle                    Value        Ref  TAPSE, MM                          2.29  cm     >=1.70  RV s', lateral                     16.3  cm/sec >=9.5 Legend: (L)  and  (H)  liza values outside specified reference range. ---------------------------------------------------------------------------- Prepared and electronically signed by Andrea Villeda 03/06/2024 11:13     CATH SWAN WAYNE INSERTION    Result Date: 3/6/2024  This exam has been completed. Please refer to Notes for the results to this procedure.    XR CHEST AP PORTABLE  (CPT=71045)    Addendum Date: 3/6/2024    ADDENDUM:  Enteric tube remains positioned in the distal esophagus approximately 3 cm above the expected location of the GE junction    Dictated by (CST): Reyes Rodriguez MD on 3/06/2024 at 7:45 AM     Finalized by (CST): Reyes Rodriguez MD on 3/06/2024 at 7:46 AM             Result Date: 3/6/2024         PROCEDURE: XR CHEST AP PORTABLE  (CPT=71045) TIME: 613  COMPARISON: [facility], XR CHEST AP PORTABLE (CPT=71045), 3/05/2024, 9:26 AM.  [facility], XR CHEST AP PORTABLE (CPT=71045), 3/05/2024, 6:37 PM.  INDICATIONS: Follow up chest pain.  TECHNIQUE:   Single view.   Findings and impression:  Impella was advanced further into the left ventricle  ETT 5.5 cm above the pham  Stable normal heart size.  Stable borderline vascular congestion but there is no edema  Minimal basilar atelectasis.  No consolidation  No pneumothorax or significant effusion     Dictated by (CST): Reyes Rodriguez MD on 3/06/2024 at 7:41 AM     Finalized by (CST): Reyes Rodriguez MD on 3/06/2024 at 7:44 AM          CATH VENTRICULAR ASSIST DEVICE    Result Date: 3/5/2024  This exam has been completed. Please refer to Notes for the results to this procedure.    XR CHEST AP PORTABLE  (CPT=71045)    Result Date: 3/5/2024  PROCEDURE: XR CHEST AP PORTABLE  (CPT=71045) TIME: 1659.   COMPARISON: [facility], XR CHEST AP PORTABLE (CPT=71045), 3/05/2024, 3:17 PM.  [facility], XR CHEST AP PORTABLE (CPT=71045), 3/05/2024, 9:26 AM.  INDICATIONS: Verify OG placement.  TECHNIQUE:   Single view.   FINDINGS:          CONCLUSION:   Nasogastric tube with tip within the distal esophagus and side hole within the mid esophagus.  Advancement recommended.  Remainder of the lines and tubes are otherwise unchanged.    Dictated by (CST): Henri Melgoza MD on 3/05/2024 at 7:30 PM     Finalized by (CST): Henri Melgoza MD on 3/05/2024 at 7:32 PM          XR CHEST AP PORTABLE  (CPT=71045)    Result Date: 3/5/2024  PROCEDURE: XR CHEST AP PORTABLE  (CPT=71045) TIME: 1842 hours  COMPARISON: [facility], XR CHEST AP PORTABLE (CPT=71045), 3/05/2024,  4:59 PM.  INDICATIONS: Impella placement.  Follow-up abnormal chest x-ray.  TECHNIQUE:   Single view.   FINDINGS:  CARDIAC/VASC: Heart size and pulmonary vascularity normal. MEDIAST/ANTONI:   No visible mass or adenopathy. LUNGS/PLEURA: No consolidation or pleural effusion. BONES: No fracture or visible bony lesion. OTHER: Pigtail from the Impella device is probably in the LVOT and outlet pump in aortic arch.  ET tube in the thoracic inlet approximately 6 cm above the pham.  NG tube tip in distal esophagus just above the diaphragm.         CONCLUSION:  1. Pigtail of Impella device is probably in the LVOT, and the outlet pump is probably in the aortic arch. 2. High position of ET tube approximately 6 cm above pham. 3. High position of nasogastric tube with tip in the distal esophagus just above GE junction.     Dictated by (CST): Fabian Gay MD on 3/05/2024 at 6:54 PM     Finalized by (CST): Fabian Gay MD on 3/05/2024 at 7:07 PM          XR CHEST AP PORTABLE  (CPT=71045)    Result Date: 3/5/2024  PROCEDURE: XR CHEST AP PORTABLE  (CPT=71045) TIME: 1517 hours.   COMPARISON: [facility], XR CHEST AP PORTABLE (CPT=71045), 3/05/2024, 9:26 AM.  INDICATIONS: OG placement  TECHNIQUE:   Single view.   FINDINGS:  CARDIAC/VASC: Normal heart size and mildly prominent pulmonary vascularity with minimal interstitial edema improved somewhat since previous study.  Impella device in profile with the left side of the heart. MEDIAST/ANTONI:   No visible mass or adenopathy. LUNGS/PLEURA: ET tube in the trachea the level the clavicles.  No pneumothorax.  No acute airspace consolidation.  Mild interstitial edema but improved somewhat since previous study.  NG tube terminates at the GE junction and should be advanced further into the stomach by at least 10 cm. BONES: No fracture or visible bony lesion. OTHER: Negative.          CONCLUSION:  1. NG tube terminates at the GE junction should be advanced further into the  stomach by at least 10 cm. 2. Normal heart size with mild interstitial edema but improved somewhat since previous study.  No large airspace consolidation or pleural effusion.  No pneumothorax.  ET tube in the trachea the level the clavicles.    Dictated by (CST): Moisés Delgadillo MD on 3/05/2024 at 3:48 PM     Finalized by (CST): Moisés Delgadillo MD on 3/05/2024 at 3:50 PM          US CAROTID DOPPLER BILAT - DIAG IMG (CPT=93880)    Result Date: 3/5/2024  PROCEDURE: US CAROTID DOPPLER BILAT-DIAG IMG (CPT=93880)  COMPARISON: None.  INDICATIONS: Pre op cabg  TECHNIQUE: Color duplex Doppler ultrasound and pulsed Doppler analysis were performed to evaluate the cervical carotid arteries and vertebral flow.  All measurements for carotid artery narrowing or stenosis were obtained using the ipsilateral distal internal carotid artery as the reference value.  (NASCET criteria)  FINDINGS:  PEAK FLOW VELOCITIES (cm/sec):  RIGHT CAROTID: Mild ICA atherosclerotic plaque.  CCA Peak systolic: 40  ICA Peak systolic: 27.  ICA End diastolic: 15.   ECA Peak systolic:  32.   VICA/VCCA: 0.9.   LEFT CAROTID: Distal ICA not visualized.  Mild atherosclerotic plaque bifurcation and ICA.  CCA Peak systolic: 37.   ICA Peak systolic: 27.   ICA End diastolic: 19.   ECA Peak systolic:  29.   VICA/VCCA: 0.8.   VERTEBRALS: Antegrade flow on the left.  Right vertebral artery not visualized.  Left Peak systolic: 29.   OTHER pectral Doppler US Thresholds (Reference: Isauro EG, et al. Radiology 2000; 214:247-252)      Stenosis (%)       PSV (cm/sec)       VICA/VCCA           0-49                    <150                     <2.5           50-69                  150-225                2.5-4.0             CONCLUSION:   Distal left internal carotid artery not visualized.  Otherwise no evidence of bilateral hemodynamically significant stenosis.  Nonvisualization right vertebral artery.  Antegrade flow left vertebral artery.    Dictated by (CST): Jcarlos Harmon,  MD on 3/05/2024 at 2:31 PM     Finalized by (CST): Jcarlos Harmon MD on 3/05/2024 at 2:35 PM          CARD ECHO 2D W/O DOPPLER (CPT=93307)    Result Date: 3/5/2024  Transthoracic Echocardiogram Name:Cristiano Obregon Date: 2024 :  1970 Ht:  (67in)  BP: 107 / 84 MRN:  [id number]    Age:  53years    Wt:  (160lb) HR: 75bpm Loc:  [facility]       Gndr: M          BSA: 1.84m^2 Sonographer: Angelica Ordering:    Carlitos Sandoval Consulting:  Danial Kirk ---------------------------------------------------------------------------- History/Indications:   Coronary artery disease. Impella placement. ---------------------------------------------------------------------------- Procedure information:  A transthoracic echocardiogram, limited study was performed. Additional evaluation included M-mode and limited 2D.  Patient status:  Inpatient.  Location:  Bedside.    Comparison was made to the study of 2024.    This was a STAT study. Transthoracic echocardiography for diagnosis, ventricular function evaluation, and post intervention evaluation. Image quality was adequate. ECG rhythm:   Frequent ectopies ---------------------------------------------------------------------------- Conclusions: 1. Left ventricle: The cavity size was normal. Wall thickness was normal.    Systolic function was at the lower limits of normal. The estimated    ejection fraction was 50-55%, by visual assessment. Unable to assess LV    diastolic function. Impella catheter noted in the left ventricle,    placement measures 3.50cm from the aortic valve. 2. Left atrium: The atrium was mildly dilated. 3. Right atrium: The atrium was mildly dilated. Impressions:  This study is compared with previous dated 2024: Compared to the previous study, these findings are new. Interval placement of impella. * ---------------------------------------------------------------------------- * Findings: Left ventricle:  The cavity size was normal. Wall thickness  was normal. Systolic function was at the lower limits of normal. The estimated ejection fraction was 50-55%, by visual assessment. Impella catheter noted in the left ventricle, placement measures 3.50cm from the aortic valve. Unable to assess LV diastolic function. Left atrium:  The atrium was mildly dilated. Right ventricle:  The cavity size was normal. Systolic function was normal. Right atrium:  The atrium was mildly dilated. Mitral valve:  The valve was structurally normal. Leaflet separation was normal. Aortic valve:   The valve was trileaflet.   Cusp separation was normal. Tricuspid valve:  The valve is structurally normal. Leaflet separation was normal. Pulmonic valve:   Not well visualized. Pericardium:   There was no pericardial effusion. Pulmonary arteries: Systolic pressure could not be accurately estimated. ---------------------------------------------------------------------------- Measurements  Left ventricle               Value    Ref  IVS thickness, ED, PLAX      1.0   cm 0.6 - 1.0  LV ID, ED, PLAX              4.7   cm 4.2 - 5.8  LV ID, ES, PLAX              3.2   cm 2.5 - 4.0  LV PW thickness, ED, PLAX    0.8   cm 0.6 - 1.0  IVS/LV PW ratio, ED, PLAX    1.25     ---------  LV PW/LV ID ratio, ED, PLAX  0.17     ---------  LV ejection fraction         60    %  52 - 72 Legend: (L)  and  (H)  liza values outside specified reference range. ---------------------------------------------------------------------------- Prepared and electronically signed by Carlitos Sandoval 03/05/2024 11:32     XR CHEST AP PORTABLE  (CPT=71045)    Result Date: 3/5/2024  PROCEDURE: XR CHEST AP PORTABLE  (CPT=71045) TIME: 0926 hours  COMPARISON: None.  INDICATIONS: Chest pain, post intubation  TECHNIQUE:   Single view.   FINDINGS:  CARDIAC/VASC: Normal heart size with mild to moderate pulmonary congestive change suggesting acute cardiac failure/fluid overload.  Correlate clinically.  Cardiac device projected in profile with the  left ventricle and aortic outflow tract. MEDIAST/ANTONI:   No visible mass or adenopathy. LUNGS/PLEURA: ET tube in the trachea at the level of the clavicles.  No pneumothorax.  No large airspace consolidation.  Mild to moderate pulmonary congestive change. BONES: No fracture or visible bony lesion. OTHER: Negative.          CONCLUSION:  1. Normal heart size with mild to moderate pulmonary congestive change suggesting acute cardiac failure/fluid overload.  Correlate clinically.  ET tube in the trachea the level of the clavicles.  No pneumothorax or large airspace consolidation.    Dictated by (CST): Moisés Delgadillo MD on 3/05/2024 at 9:47 AM     Finalized by (CST): Moisés Delgadillo MD on 3/05/2024 at 9:50 AM          CARD ECHO LIMITED (CPT=93308)    Result Date: 3/5/2024  Transthoracic Echocardiogram Name:Cristiano Obregon Date:    2024    :     1970    Ht:     (67in)     BP: MRN:     [id number]       Age:     53years       Wt:     (160lb)    HR: Loc:     [facility]          Gndr:    M             BSA:    1.84m^2 Sonographer: ADEEL Wyman Ordering:    Carlitos Sandoval Consulting:  Richie Llanes ---------------------------------------------------------------------------- History/Indications:   Chest pain.  Coronary artery disease. ---------------------------------------------------------------------------- Procedure information:  A transthoracic echocardiogram, limited study was performed. Additional evaluation included M-mode and limited 2D.  Patient status:  Inpatient.  Location:  Catheterization laboratory.    This was a STAT study. Transthoracic echocardiography for diagnosis and ventricular function evaluation. Image quality was adequate. ---------------------------------------------------------------------------- Conclusions: 1. Left ventricle: Systolic function was mildly reduced. The estimated    ejection fraction was 45-50%, by visual assessment. Unable to assess LV    diastolic function. 2. Left atrium: The  atrium was dilated. 3. Right atrium: The atrium was mildly dilated. 4. Aortic valve: There was mild regurgitation. 5. Mitral valve: There was mild regurgitation. 6. Limited study in cath lab, grossly ejection fraction 45-50%. Wire present    in the left ventricle. Impressions:  No previous study was available for comparison. * ---------------------------------------------------------------------------- * Findings: Left ventricle:  Systolic function was mildly reduced. The estimated ejection fraction was 45-50%, by visual assessment. Unable to assess LV diastolic function. Left atrium:  The atrium was dilated. Right ventricle:  The cavity size was normal. Systolic function was normal. Right atrium:  The atrium was mildly dilated. Mitral valve:  The valve was structurally normal. Leaflet separation was normal.  Doppler:  Transvalvular velocity was within the normal range. There was no evidence for stenosis. There was mild regurgitation. Aortic valve:  Not well visualized.  Doppler:  There was mild regurgitation. Tricuspid valve:  The valve is structurally normal. Leaflet separation was normal.  Doppler:  Transvalvular velocity was within the normal range. There was no evidence for stenosis. There was mild regurgitation. Pulmonic valve:   Not visualized. Pericardium:   There was no pericardial effusion. Pulmonary arteries: Systolic pressure could not be accurately estimated. Systemic veins:  Not visualized. ---------------------------------------------------------------------------- Prepared and electronically signed by Carlitos Sandoval 03/05/2024 09:46     CATH LEFT HEART CATH W/INTERVENTION    Result Date: 3/5/2024  This exam has been completed. Please refer to Notes for the results to this procedure.      Assessment/Plan:  Patient Active Problem List   Diagnosis    Hypercholesteremia    Floaters in visual field, bilateral    Acne, unspecified acne type    Eczema, unspecified type    Paresthesia of left thumb    Chest  pain    NSTEMI (non-ST elevated myocardial infarction) (HCC)    Cerebrovascular accident (CVA) due to embolism of left middle cerebral artery (HCC)    Anemia    Clonus   16.5 PT  INR 1.25  Ptt 42.8    Hgb 7.6  Plate 124  Dr. Styles in consult - hematology  await  suggestions  Advise minimal manipulation   Bleeding seems to more systemic issue  Possible re-explore but hi risk and most likely may increase bleeding unless direct site of bleed is seen        STEPHEN VILLA MD  3/27/2024  9:23 AM

## 2024-03-28 PROBLEM — R23.3 PETECHIAL HEMORRHAGE: Status: ACTIVE | Noted: 2024-03-28

## 2024-03-28 PROBLEM — R23.3 PETECHIAL HEMORRHAGE: Status: ACTIVE | Noted: 2024-01-01

## 2024-03-28 PROBLEM — R90.89 MIDLINE SHIFT OF BRAIN: Status: ACTIVE | Noted: 2024-03-28

## 2024-03-28 PROBLEM — R90.89 MIDLINE SHIFT OF BRAIN: Status: ACTIVE | Noted: 2024-01-01

## 2024-03-28 PROBLEM — G93.6 CEREBRAL EDEMA (HCC): Status: ACTIVE | Noted: 2024-03-28

## 2024-03-28 PROBLEM — G93.6 CEREBRAL EDEMA (HCC): Status: ACTIVE | Noted: 2024-01-01

## 2024-03-28 NOTE — RESPIRATORY THERAPY NOTE
Patient started on CPAP/PS 5/5 SBT today, tolerated well all shift, transported from CCU to CT and back without incident. Respiratory status stable and tolerating ventilator well, suctioned as needed.

## 2024-03-28 NOTE — PROGRESS NOTES
[facility] Hospitalist Progress Note     CC: Hospital Follow up    PCP: Abilio Dorsey MD       Assessment/Plan:     Principal Problem:    NSTEMI (non-ST elevated myocardial infarction) (HCC)  Active Problems:    Chest pain    Cerebrovascular accident (CVA) due to embolism of left middle cerebral artery (HCC)    Anemia    Cristiano Freedman is a 53 year old male with HLD, tobacco use, presented 3/5/2024 with chest pain. Found to have STEMI with cardiogenic shock requiring Impella placement. Intubated in cath lab, s/p CABG 3/15. Subsequently with tamponade & back to OR 3/16 w CVS for washout. OR course c/b extensive blood loss - required ~20+u blood products letitia-operatively & subsequently coagulopathy w clotting in chest tubes & CRRT - hematology consulted. Stay further c/b: renal failure requiring CRRT - nephro consulted, resp failure w > 2 wks intubated, s/p tracheostomy and PEG 3/22, embolic strokes - neuro following.      STEMI c/b cardiogenic shock w multi-system organ failure   S/p CABG 3/15  Post-op cardiac tamponade s/p washout 3/16  Paroxysmal/post-op a fib  HTN  HLD  - Multivessel disease including chronically occluded large RCA, 95% sequential LAD stenosis on cath 3/5. Flow restored to heavily thrombosed circumflex and OM1, Impella placed (now removed). 3/6 s/p emergent RHC to eval hemodynamics. Worsening renal function noted.   - Cardiology consulted: angiograms as above   - CCM consulted: vent management, sedation  - CV surgery consulted: s/p CABG on 3/15  - cardiac tamponade developed 3/16, s/p mediastinal washout   - amio gtt to po/IV, dobutamine gtt, levophed stopped  - PO amiodarone 200 mg BID  - repeat echo w preserved EF despite hypokinesis     Acute hypoxemic respiratory failure  - intubated in cath lab (3/5 -  )  - vent management per crit care colleagues  - Daily SBT, extubated when able  - s/p tracheostomy on 3/22  - vent weaning per pulm, SBT as able    Acute blood  loss anemia  Thrombocytopenia - suspect reactive/consumptive  Hypercoagulability & anemia  - recurring clotting in chest tubes & HD. S/p 20+ u blood products this admission  - Hematology consulted, d/w Dr. Styles - hold on hypercoag w/u at this time  - trend CBC  - now with ozzing from multiple sites  - mildly uremic, plts lower, INR midly elevated,   - fibrinogen ok,  - will check HIT, repeat INR, CRRT resumed in case of plts dysfunction can consider DDAVP   - discussed with pulm and CV surgery team  - Discussed with Heme who evaluated, did not think DIC or coagulopathy, Vit k was given per heme   - hgb 7.7 this am, trach site appears to be without active bleeding this morning      Anuric acute kidney failure - suspect ATN from shock  Metabolic acidosis  Hyponatremia  Hyperkalemia  - Nephrology consutled  - CRRT initiated 3/7. Catheter exchanged 3/19  - resume CRRT, post CABG now, s/p 20 units of blood products in OR  - continue CRRT per Renal and Neuro     Sepsis 2/2 RML bacterial pneumonia - Persistent fevers 3/10 - 3/15, HCAP/VAP  Leukocytosis - initially from infection, now suspect reactive with strokes, HD, critical illness, etc  - Sputum with Klebsiella and MSSA  - Blood cx neg  - ID consulted  - Cefepime to be extended per ID     Subacute L frontal & occiptal lobe infarcts  - presume cardioembolic  - CTH w subacute L frontal & occipital lobe infarcts  - 3/5 RxFx eval: A1c - 6%, FLP - LDL 64, tele  - TTE noted  - Neuro consulted  - ASA 81, but holding plavix  - no need for full ac per neuro at this time  - PT/OT/SLP when able  -Reflexive movement of the right upper and lower extremity, not purposeful, will squeeze his left hand and wiggle the toes on his left leg (3/23)  - MRI Brain -acute/subacute left frontal lobe infarct, lacunar size subacute infarct in the right parietal white matter, left occipital lobe infarct, MRA with mild plaque in the carotids without significant stenosis  - MRI C-spine -pending  -  CT brain 3/26 with edema and midline shift, discussed with Neuro, continue CRRT, hold on IHD     Hematuria, resolved  -yellow urine noted, minimal, but not bloody         Tobacco use disorder  - Cessation     Elevated liver enzymes  - Likely 2/2 to shock. Hep b neg   - Trend CMP     Nutrition  - 180lbs on admission  - tube feeds, tolerating this well  - s/p PEG 3/22 w IR-> TF tolerating this well         ACP: Full Code. Linnea contact:  Reyes  Ppx: DVT: SQH (hold)  Dispo  EDoD: pending clinical course      Questions/concerns were discussed with patient and/or family by bedside.    Thank You,  Bud Camarena MD    Hospitalist with [facility]     Subjective:     Alert and tracking, following simple commands  No bleeding this morning at trach site    OBJECTIVE:    Blood pressure 120/73, pulse 89, temperature 97.7 °F (36.5 °C), temperature source Temporal, resp. rate 21, height 5' 7\" (1.702 m), weight 164 lb 14.5 oz (74.8 kg), SpO2 100%.    Temp:  [97.7 °F (36.5 °C)-98.6 °F (37 °C)] 97.7 °F (36.5 °C)  Pulse:  [] 89  Resp:  [0-28] 21  BP: ()/(51-82) 120/73  SpO2:  [95 %-100 %] 100 %  FiO2 (%):  [30 %] 30 %      Intake/Output:    Intake/Output Summary (Last 24 hours) at 3/28/2024 0833  Last data filed at 3/28/2024 0800  Gross per 24 hour   Intake 1743.25 ml   Output 1646 ml   Net 97.25 ml       Last 3 Weights   03/28/24 0131 164 lb 14.5 oz (74.8 kg)   03/27/24 0500 165 lb 5.5 oz (75 kg)   03/26/24 0725 165 lb 12.6 oz (75.2 kg)   03/25/24 0600 172 lb 9.9 oz (78.3 kg)   03/24/24 0500 181 lb 14.1 oz (82.5 kg)   03/23/24 0600 179 lb 0.2 oz (81.2 kg)   03/22/24 0430 174 lb 13.2 oz (79.3 kg)   03/21/24 0600 181 lb 10.5 oz (82.4 kg)   03/20/24 0400 185 lb 3 oz (84 kg)   03/19/24 1000 189 lb 9.5 oz (86 kg)   03/18/24 0231 185 lb 10 oz (84.2 kg)   03/15/24 0600 188 lb 4.4 oz (85.4 kg)   03/14/24 0600 184 lb 15.5 oz (83.9 kg)   03/12/24 1000 186 lb 4.6 oz (84.5 kg)   03/11/24 1000 192 lb 10.9 oz (87.4 kg)    03/09/24 0500 188 lb 15 oz (85.7 kg)   03/08/24 0600 185 lb 13.6 oz (84.3 kg)   03/07/24 0600 183 lb 3.2 oz (83.1 kg)   03/06/24 0500 180 lb 1.9 oz (81.7 kg)   03/05/24 1324 175 lb 7.8 oz (79.6 kg)   03/05/24 0722 160 lb (72.6 kg)   10/06/21 1541 177 lb 6.4 oz (80.5 kg)   09/09/20 0929 172 lb 9.6 oz (78.3 kg)       Exam    Gen: Alert to voice  Heent: Tracheostomy in place, old blood noted around site, tunneled line in place  Pulm: Lungs without wheezing  CV: Heart with regular rate and rhythm, trace edema  Abd: Abdomen soft, PEG tube in place, not distended  MSK: no clubbing, no cyanosis  Skin: no rashes or lesions  Neuro: Able to squeeze hands on the left and right side, wiggle toes on the left and right side, L > R      Data Review:       Labs:     Recent Labs   Lab 03/23/24  0452 03/23/24  1213 03/25/24  0423 03/25/24  1201 03/26/24  0528 03/26/24  1308 03/27/24  0546 03/27/24  1156 03/27/24 2004 03/28/24  0010 03/28/24  0500   RBC 2.46*   < > 2.55*  --  2.46*  --  2.55*  --   --   --  2.49*   HGB 7.2*   < > 7.5*   < > 7.4*   < > 7.6* 6.9* 8.2* 8.3* 7.7*  7.7*   HCT 21.4*   < > 23.4*   < > 22.3*   < > 23.2* 21.2*  --   --  22.5*   MCV 87.0   < > 91.8  --  90.7  --  91.0  --   --   --  90.4   MCH 29.3   < > 29.4  --  30.1  --  29.8  --   --   --  30.9   MCHC 33.6   < > 32.1  --  33.2  --  32.8  --   --   --  34.2   RDW 15.8*   < > 16.9*  --  16.9*  --  16.4*  --   --   --  16.8*   NEPRELIM 18.68*  --  17.07*  --  16.18*  --   --   --   --   --   --    WBC 23.7*   < > 20.3*  --  21.1*  --  20.9*  --   --   --  17.8*   .0*   < > 126.0*  --  112.0*  --  124.0*  124.0*  --   --   --  107.0*    < > = values in this interval not displayed.         Recent Labs   Lab 03/27/24  0546 03/27/24  1700 03/28/24  0500   * 141* 169*   BUN 46* 46* 47*   CREATSERUM 3.18* 3.29* 3.52*   EGFRCR 22* 21* 20*   CA 8.5* 8.1* 8.3*    137 139   K 4.9 4.9 5.0    108 108   CO2 27.0 27.0 25.0       Recent Labs    Lab 03/24/24  0511 03/24/24  1533 03/26/24  0528 03/26/24  1604 03/27/24  0546 03/27/24  1700 03/28/24  0500   ALT 19  --   --   --  18  --  12   AST 26  --   --   --  26  --  20   ALB 3.1*   < > 2.9* 2.6* 3.0* 2.8* 2.6*   LDH  --   --   --   --  393*  --   --     < > = values in this interval not displayed.         Imaging:  XR CHEST AP PORTABLE  (CPT=71045)    Result Date: 3/27/2024  CONCLUSION:  1. Heart size upper limits of normal, and there is moderate pulmonary edema pattern which has worsened somewhat since previous study.  Worsening bibasilar opacification suggesting increasing bilateral pleural effusions left more than right and probable left basal atelectasis, although underlying pneumonia not excluded.  Status post extubation and feeding tube removal.  No pneumothorax.  Follow-up studies are advised.    Dictated by (CST): Moisés Delgadillo MD on 3/27/2024 at 8:36 AM     Finalized by (CST): Moisés Delgadillo MD on 3/27/2024 at 8:39 AM          CT BRAIN OR HEAD (35827)    Addendum Date: 3/26/2024    ADDENDUM:  Findings were called to Dr. Winters.   Dictated by (CST): Fabian Gay MD on 3/26/2024 at 6:53 AM     Finalized by (CST): Fabian Gay MD on 3/26/2024 at 6:53 AM             Result Date: 3/26/2024  CONCLUSION:  1. Continued evolution of left frontal lobe infarct with a 7 cm region of edema and patchy parenchymal hemorrhage.  Mild mass effect with approximately 4 mm of midline shift towards the right.    Dictated by (CST): Fabian Gay MD on 3/26/2024 at 6:34 AM     Finalized by (CST): Fabian Gay MD on 3/26/2024 at 6:46 AM             Meds:      sodium chloride   Intravenous Once    [START ON 4/1/2024] amiodarone  200 mg Oral Daily    cefepime  1 g Intravenous Q12H    amiodarone  200 mg Oral BID with meals    sodium chloride   Intravenous Once    heparin sodium lock flush  1.5 mL Intravenous Once    sodium chloride   Intravenous Once    sodium chloride   Intravenous Once    hydrALAzine  10 mg  Intravenous Once    atorvastatin  40 mg Oral Nightly    metoprolol tartrate  50 mg Oral 2x Daily(Beta Blocker)    ascorbic acid  500 mg Oral Daily    [Held by provider] aspirin  81 mg Oral Daily    hydrALAZINE  25 mg Oral Q8H CHAYITO    famotidine  20 mg Oral Daily    Or    famotidine  20 mg Intravenous Daily    chlorhexidine gluconate  15 mL Mouth/Throat BID    [Held by provider] heparin  5,000 Units Subcutaneous 2 times per day    [Held by provider] clopidogrel  75 mg Oral Daily      NxStage Pure Flow 400 CRRT/PIRRT fluid 5000mL 2.5 L/hr (03/27/24 1824)    dexmedetomidine 1.2 mcg/kg/hr (03/28/24 0815)    dextrose 10% 45 mL/hr at 03/28/24 0019     morphINE, oxidized cellulose, labetalol, hydrALAzine, prochlorperazine, dextrose 10%, lipase-protease-amylase (Lip-Prot-Amyl) **AND** sodium bicarbonate, alteplase (Activase) 4 mg in sterile water for injection (PF) 2.2 mL IV push to declot line, melatonin, polyethylene glycol (PEG 3350), ondansetron, potassium chloride **OR** potassium chloride, magnesium sulfate in dextrose 5%, magnesium sulfate in sterile water for injection, [DISCONTINUED] acetaminophen **OR** HYDROcodone-acetaminophen **OR** [DISCONTINUED] HYDROcodone-acetaminophen, glucose **OR** glucose **OR** glucose-vitamin C **OR** dextrose **OR** glucose **OR** glucose **OR** glucose-vitamin C, heparin, acetaminophen **OR** acetaminophen **OR** [DISCONTINUED] acetaminophen **OR** acetaminophen, senna, bisacodyl, fleet enema

## 2024-03-28 NOTE — PROGRESS NOTES
03/27/24 2009   Surgical Airway Portex 7 mm   Placement Date/Time: 03/22/24 1730   Placed By: (c) Surgeon  Brand: Portex  Airway size (mm): 7 mm   Status Secured   Site Assessment Bleeding;Drainage;Red   Suctioned? Y   Ties Assessment Intact;Secure   Req'd equipment at bedside Bag mask;Trach tube     Pt with large blood clot around trach site, RN aware

## 2024-03-28 NOTE — PROGRESS NOTES
NEPHROLOGY DAILY PROGRESS NOTE       SUBJECTIVE:   Tolerating CRRT   Awake, nods to questions  Denies SOB or pain     OBJECTIVE:    Total Intake/Output:    Intake/Output Summary (Last 24 hours) at 3/28/2024 0930  Last data filed at 3/28/2024 0900  Gross per 24 hour   Intake 1803.85 ml   Output 1692 ml   Net 111.85 ml       PHYSICAL EXAM:  /74 (BP Location: Right arm)   Pulse 96   Temp 97.9 °F (36.6 °C) (Temporal)   Resp (!) 28   Ht 5' 7\" (1.702 m)   Wt 164 lb 14.5 oz (74.8 kg)   SpO2 100%   BMI 25.83 kg/m²   GEN: NAD   HEENT: trached   CHEST: clear anteriorly      CARDIAC: S1S2 normal  ABD: soft, ND, PEG tube   EXT:  no LE edema   NEURO: awake, nods to questions    ACCESS: RIJ tunneled HD cath (3/22)       CURRENT MEDICATIONS:     sodium chloride   Intravenous Once    [START ON 4/1/2024] amiodarone  200 mg Oral Daily    cefepime  1 g Intravenous Q12H    amiodarone  200 mg Oral BID with meals    sodium chloride   Intravenous Once    heparin sodium lock flush  1.5 mL Intravenous Once    sodium chloride   Intravenous Once    sodium chloride   Intravenous Once    hydrALAzine  10 mg Intravenous Once    atorvastatin  40 mg Oral Nightly    metoprolol tartrate  50 mg Oral 2x Daily(Beta Blocker)    ascorbic acid  500 mg Oral Daily    [Held by provider] aspirin  81 mg Oral Daily    hydrALAZINE  25 mg Oral Q8H CHAYITO    famotidine  20 mg Oral Daily    Or    famotidine  20 mg Intravenous Daily    chlorhexidine gluconate  15 mL Mouth/Throat BID    [Held by provider] heparin  5,000 Units Subcutaneous 2 times per day    [Held by provider] clopidogrel  75 mg Oral Daily         LABS:  Patient Labs Reviewed in Detail. Pertinent Labs as follows:  Recent Labs   Lab 03/27/24  0546 03/27/24  1700 03/28/24  0500   * 141* 169*   BUN 46* 46* 47*   CREATSERUM 3.18* 3.29* 3.52*   EGFRCR 22* 21* 20*   CA 8.5* 8.1* 8.3*    137 139   K 4.9 4.9 5.0    108 108   CO2 27.0 27.0 25.0     Recent Labs   Lab 03/23/24  4905  03/23/24  1213 03/25/24  0423 03/25/24  1201 03/26/24  0528 03/26/24  1308 03/27/24  0546 03/27/24  1156 03/27/24 2004 03/28/24  0010 03/28/24  0500   RBC 2.46*   < > 2.55*  --  2.46*  --  2.55*  --   --   --  2.49*   HGB 7.2*   < > 7.5*   < > 7.4*   < > 7.6* 6.9* 8.2* 8.3* 7.7*  7.7*   HCT 21.4*   < > 23.4*   < > 22.3*   < > 23.2* 21.2*  --   --  22.5*   MCV 87.0   < > 91.8  --  90.7  --  91.0  --   --   --  90.4   MCH 29.3   < > 29.4  --  30.1  --  29.8  --   --   --  30.9   MCHC 33.6   < > 32.1  --  33.2  --  32.8  --   --   --  34.2   RDW 15.8*   < > 16.9*  --  16.9*  --  16.4*  --   --   --  16.8*   NEPRELIM 18.68*  --  17.07*  --  16.18*  --   --   --   --   --   --    WBC 23.7*   < > 20.3*  --  21.1*  --  20.9*  --   --   --  17.8*   .0*   < > 126.0*  --  112.0*  --  124.0*  124.0*  --   --   --  107.0*    < > = values in this interval not displayed.         IMAGING:  XR CHEST AP PORTABLE  (CPT=71045)    Result Date: 3/27/2024  CONCLUSION:  1. Heart size upper limits of normal, and there is moderate pulmonary edema pattern which has worsened somewhat since previous study.  Worsening bibasilar opacification suggesting increasing bilateral pleural effusions left more than right and probable left basal atelectasis, although underlying pneumonia not excluded.  Status post extubation and feeding tube removal.  No pneumothorax.  Follow-up studies are advised.    Dictated by (CST): Moisés Delgadillo MD on 3/27/2024 at 8:36 AM     Finalized by (CST): Moisés Delgadillo MD on 3/27/2024 at 8:39 AM            ASSESSMENT AND PLAN:   This is a 53 year old male with PMH sig for HLD, tobacco use. Presented with chest pain and was admitted for NSTEMI.  Nephrology is consulted for NORMA      Anuric NORMA  - due to ATN from shock.   - creatinine 1.23 on admission, worsened to 7.19 (3/7)  - initiated on CVVH on 3/7 via RIJ temp HD cath  - RIJ tunneled HD catheter placed (3/22)   - continue CRRT until cleared by neurology to  transition to iHD.  Cerebral edema with midline shift noted on CT head (3/26)  - keep net even  - renal panel and Mg level every 12 hours while on CRRT   - Maintain adequate perfusion pressure  - Dose medications for CRRT  - Avoid nephrotoxins.  - follow renal fxn and I/Os  - monitor for renal recovery.     Hyperkalemia   - K slowly rising, up to 5.0 today, change to 2K bags     Metabolic acidosis  - resolved with CRRT     Hyponatremia  - resolved     Dw RN     We will continue to follow.     Critical care time > 35 mins     Janet Velazco MD  [facility]

## 2024-03-28 NOTE — PROGRESS NOTES
[facility]  [address], [address]  [facility] [address]  [phone]          INPATIENT STROKE NEUROLOGY   FOLLOW UP PROGRESS NOTE  [facility]  part of [facility]    Cristiano Obregon Patient Status:  Inpatient     1970 MRN [id number]    Location [facility] 2W/SW Attending Bud Camarena MD    Hosp Day # 23 PCP Abilio Dorsey MD    Date of Admission:  3/5/2024  Date of Consult Follow Up:  24         Assessment and Plan:   Cristiano Obregon is a 53 year old man  who  was admitted on 3/5/24 for near syncope while exercising found to have a NSTEMI requiring  CP Impella and  soon after CRRT s/p CABG x3 on 3/15  found to have a left frontal infarct on his 3/19 head CT, performed due to neurological deficits discovered  during recovery.    Hospital course  c/b  anemia requiring multiple transfusions now seen in follow up for his subacute infarcts. Pt was transfused on 24      MRI brain demonstrated subacute infarcts and persistent cerebral edema on the MRI of the brain, likely related to petechial hemorrhage seen in the infarct bed.  .  Main issues to be addressed by neurology at this time or whether or not he is appropriate for IHD given cerebral edema.    Repeat head CT on 3/28/2024 to evaluate edema.  Once edema is stable then likely okay for iHD.  Long-term also recommend therapeutic anticoagulation.  Not appropriate at this time. Resume antiplatelet as soon as medically possible.     His last A1c was 6.0. his last LDL was 80.     Not a candidate for tPA b/c he was out of the time window and had a CABG (bleeding at a non compressible site).  Not a candidate for thrombectomy b/c he was likely out of the 24 hour window and last known well was 3.5.24.    Regarding dialysis:  From Johan et al  \" Predialysis plasma urea concentrations and rate of plasma osmolality change are possible contributors to cerebral edema in  dialysis-associated neurovascular injury, independent of dialysis modality...Treatment modifications to limit dialysis-associated neurovascular injury include dialysate sodium modeling, limited solute clearance, weight-based ultrafiltration rates, and cooled dialysate\"      Subacute strokes in the setting of NSTEMI on 3/5 s/p Impella, and CABG on 3/15  Differential Diagnosis for stroke/TIA mechanism:   ?afib  Related to the  use of an impella/MI/periprocedural     Plan    Diagnostics:  Imaging: CT brain WO  for  03/28/24    Cardiac imaging: Telemetry   Labs:  per primary   Therapeutics:  Blood pressure goal:  MAP  > 65 SBP > 90  Please avoid blood pressure variability. wide fluctuations in BP can lead to stroke expansion.    Do not recommend lowering the blood pressure more than 25% in a 24-hour period.  Please avoid the use of atenolol as this medication is known to cause blood pressure variability.  PRN hydralazine and labetalol for sustained pressures outside of goal  Neuro checks Q4.  Telemetry.NIH stroke scale per protocol.  Blood glucose goal: .  Accuchecks Q6 if patient has DM  closely monitor to prevent hypoglycemia in patients with AIS.  Antithrombotics:   on hold given his oozing  Lipid-lowering agents: Cholesterol Meds: atorvastatin - 40 MG; atorvastatin Tabs - 20 MG     Avoid hypotonic fluids as this can worsen cerebral edema.  Physical therapy, Occupational Therapy, speech therapy evaluations ordered.  maintain oxygen saturation >94%. No supplemental oxygen  in nonhypoxic patients with acute ischemic stroke (AIS).  Tx hyperthermia (temperature >38°C) and identify source  STAT head CT and page neurology if any change in neurological exam or focal deficits.    Long term secondary stroke prevention goals:  Home BP monitoring. Pt instructed to check BP at home in the AM and PM, and bring values to future clinic visits. BP goal is for normotension, < 130/80.  HbA1c goal <7.0  LDL-C goal  <70  mg/dL.  Target total cholesterol < 200 mg/dL Target HDL >45 mg/dL for men, >55 mg/dL for women, Target triglycerides <150 mg/dL  Physical inactivity - target exercise at least 3 times per week of moderate intensity activity for 20 minutes.  Obesity - target ideal body weight and girth <35\" for women, <40\" for men  Smoking - Target smoking cessation           INTERVAL EVENTS  03/25/24:  mris/mras completed. Blood oozing from different sites.  03/28/24         SUBJECTIVE:     Spoke with the patient's family at the bedside  He denies being in pain   Family aware of plaN       Pertinent positive and negatives per HPI.  All others were reviewed and negative.       Objective   OBJECTIVE:   Last vitals and weight :  Blood pressure 109/69, pulse 86, temperature 97.9 °F (36.6 °C), temperature source Temporal, resp. rate 22, height 67\", weight 164 lb 14.5 oz (74.8 kg), SpO2 100%.   Vitals:    03/28/24 1200 03/28/24 1300 03/28/24 1330 03/28/24 1400   BP: 104/73 112/75 115/80 109/69   BP Location: Right arm Right arm  Right arm   Pulse: 81 81 81 86   Resp: 23 24 24 22   Temp: 98 °F (36.7 °C)  97.9 °F (36.6 °C)    TempSrc: Temporal  Temporal    SpO2: 95% 100% 99% 100%   Weight:       Height:          Exam:  - General: appears stated age and no distress  Tracheostomy in place.  - Pulmonary: no sign of respiratory distress.    Neurologic Exam  - Mental Status: Alert and attentive.  does regard.  EYES open. Sitting up in chair. More bright/interactive compared to prior exam.  May have a questionable left gaze preference.  he tracks the examiner's face; questionable left gaze is overcome by tracking examiner's face.    He is globally aphasic.  He does not follow most commands consistently.  He can't repeat b/c of his tracheostomy.   He is mute.  He could not stick out his tongue to command.   he wiggled his toes to command. .  He has a tracheostomy.  - Cranial Nerves: ?left gaze preference. Visual fields: Blink to threat intact.  ?field cut.  He may have anisocoria.  His right pupil may be 1 mm larger than the left with dilation and constriction.  Horizontal gaze is intact.  No nystagmus. No ptosis. V1-V3 intact B/L to light touch.No pathological facial asymmetry. No flattening of the nasolabial fold. .  No obvious pathological facial asymmetry.  He does not grimace to retromandibular pressure.      - Motor: Normal to decreased tone in right arm.  Normal bulk. No interosseous wasting. No flattening of hypothenar eminences.  He is moving all 4 extremities.     He is moving his left arm more than his right.  When he had his right arm taken out of soft restraints he began reaching up towards his tracheostomy site with the right.   keep  LEFT arm elevated for 10 seconds. Strengthintact distally in his R UE; spontaneously moving his right arm         Leg Drift:  maintains L LE for 5 seconds. R LE drifts down        Nonsustained clonus: Present.  He has nearly sustained clonus on the left.  It does fatigue.   Sustained clonus: Absent sustained clonus on the right.   - Sensory: Withdraws to noxious stimuli.  May withdraw more on the left than the right.               NIH Stroke Scale  Person Administering Scale: Michael Winters DO  1a  Level of consciousness: 0 = Alert keenly responsive    1b. LOC questions:  2 = Answers neither questions correctly   1c. LOC commands: 1 = Performs one task correctly     2.  Best Gaze: 1 = Partial Gaze Palsy; gaze is abnormal in 1 or both eyes, but forced deviation or total gaze paresis is not present.     3.  Visual: 0 = No visual loss     4. Facial Palsy: 0 = Normal symmetrical movement     5a.  Motor left arm: 0 = No drift; limb holds 90º(or 45º) for full 10 seconds   5b.  Motor right arm: 2 = Some effort against gravity, limb cannot get to or maintain (if cued 90ºor 45º) drifts down to bed, but has some effort against gravity. 3 = No effort against gravity, limb falls 4 = No movement   6a. motor left le = No  drift; leg holds 30º for full 5 seconds     6b  Motor right le = Drift, leg falls but does not hit bed     7. Limb Ataxia: 0 = Absent     8.  Sensory: 0 = Normal, no sensory loss     9. Best Language:  3 = Mute; global aphasia; no usable speech; or auditory comprehension    10. Dysarthria: 2 = Severe, so slurred as to be unintelligible; mute/anarthric    11. Extinction and Inattention: 0 = No abnormality     Total:   12     Modified kevin scale score:     Medications:   [START ON 2024] amiodarone  200 mg Oral Daily    cefepime  1 g Intravenous Q12H    amiodarone  200 mg Oral BID with meals    sodium chloride   Intravenous Once    heparin sodium lock flush  1.5 mL Intravenous Once    sodium chloride   Intravenous Once    sodium chloride   Intravenous Once    hydrALAzine  10 mg Intravenous Once    atorvastatin  40 mg Oral Nightly    metoprolol tartrate  50 mg Oral 2x Daily(Beta Blocker)    ascorbic acid  500 mg Oral Daily    [Held by provider] aspirin  81 mg Oral Daily    hydrALAZINE  25 mg Oral Q8H CHAYITO    famotidine  20 mg Oral Daily    Or    famotidine  20 mg Intravenous Daily    chlorhexidine gluconate  15 mL Mouth/Throat BID    [Held by provider] heparin  5,000 Units Subcutaneous 2 times per day    [Held by provider] clopidogrel  75 mg Oral Daily       PRNS: morphINE, oxidized cellulose, labetalol, hydrALAzine, prochlorperazine, dextrose 10%, lipase-protease-amylase (Lip-Prot-Amyl) **AND** sodium bicarbonate, alteplase (Activase) 4 mg in sterile water for injection (PF) 2.2 mL IV push to declot line, melatonin, polyethylene glycol (PEG 3350), ondansetron, potassium chloride **OR** potassium chloride, magnesium sulfate in dextrose 5%, magnesium sulfate in sterile water for injection, [DISCONTINUED] acetaminophen **OR** HYDROcodone-acetaminophen **OR** [DISCONTINUED] HYDROcodone-acetaminophen, glucose **OR** glucose **OR** glucose-vitamin C **OR** dextrose **OR** glucose **OR** glucose **OR**  glucose-vitamin C, heparin, acetaminophen **OR** acetaminophen **OR** [DISCONTINUED] acetaminophen **OR** acetaminophen, senna, bisacodyl, fleet enema    Infusions:    NxStage Pure Flow 400 CRRT/PIRRT fluid 5000mL 2.5 L/hr (03/28/24 1023)    dexmedetomidine 1.2 mcg/kg/hr (03/28/24 1417)    dextrose 10% 45 mL/hr at 03/28/24 0019          Results:   Laboratory Data:  Lab Results   Component Value Date    WBC 17.8 (H) 03/28/2024    HGB 9.1 (L) 03/28/2024    HCT 22.5 (L) 03/28/2024    .0 (L) 03/28/2024    CREATSERUM 3.52 (H) 03/28/2024    BUN 47 (H) 03/28/2024     03/28/2024    K 5.0 03/28/2024     03/28/2024    CO2 25.0 03/28/2024     (H) 03/28/2024    CA 8.3 (L) 03/28/2024    ALB 2.6 (L) 03/28/2024    ALKPHO 73 03/28/2024    TP 4.6 (L) 03/28/2024    AST 20 03/28/2024    ALT 12 03/28/2024    PTT 40.3 (H) 03/28/2024    INR 1.23 (H) 03/28/2024    PTP 16.3 (H) 03/28/2024     (H) 03/24/2024    PSA 1.95 10/06/2021    DDIMER >20.00 (H) 03/27/2024    MG 1.9 03/28/2024    PHOS 3.6 03/28/2024     Diabetes:    Lab Results   Component Value Date    A1C 6.0 (H) 03/05/2024    A1C 6.1 (H) 10/06/2021    A1C 6.0 (H) 09/09/2020     03/05/2024     10/06/2021     09/09/2020     Lab Results   Component Value Date    CHOLEST 149 03/19/2024    CHOLEST 138 03/05/2024    CHOLEST 157.00 10/06/2021     Lab Results   Component Value Date    HDL 26 (L) 03/19/2024    HDL 57 03/05/2024    HDL 49 10/06/2021     Lab Results   Component Value Date    LDL 80 03/19/2024    LDL 64 03/05/2024    LDL 55 10/06/2021     Lab Results   Component Value Date    TRIG 261 (H) 03/19/2024    TRIG 82 03/16/2024    TRIG 181 (H) 03/13/2024     Lab Results   Component Value Date    AST 20 03/28/2024    AST 26 03/27/2024    AST 26 03/24/2024     Lab Results   Component Value Date    ALT 12 03/28/2024    ALT 18 03/27/2024    ALT 19 03/24/2024              Last A1c was done on 3/5/2024.       Test results/Imaging:   XR  CHEST AP PORTABLE  (CPT=71045)    Result Date: 3/27/2024  CONCLUSION:  1. Heart size upper limits of normal, and there is moderate pulmonary edema pattern which has worsened somewhat since previous study.  Worsening bibasilar opacification suggesting increasing bilateral pleural effusions left more than right and probable left basal atelectasis, although underlying pneumonia not excluded.  Status post extubation and feeding tube removal.  No pneumothorax.  Follow-up studies are advised.    Dictated by (CST): Moisés Delgadillo MD on 3/27/2024 at 8:36 AM     Finalized by (CST): Moisés Delgadillo MD on 3/27/2024 at 8:39 AM         EKG 12 Lead    Result Date: 3/27/2024  Atrial fibrillation Possible Inferior infarct (cited on or before 15-MAR-2024) Abnormal ECG When compared with ECG of 16-MAR-2024 05:45, Atrial fibrillation has replaced Sinus rhythm (RBBB and left posterior fascicular block) is no longer Present Confirmed by NAOMI CASTELLANOS ([id number]) on 3/27/2024 2:47:36 PM     CT BRAIN OR HEAD ([id number])    Addendum Date: 3/26/2024    ADDENDUM:  Findings were called to Dr. Winters.   Dictated by (CST): Fabian Gay MD on 3/26/2024 at 6:53 AM     Finalized by (CST): Fabian Gay MD on 3/26/2024 at 6:53 AM             Result Date: 3/26/2024  CONCLUSION:  1. Continued evolution of left frontal lobe infarct with a 7 cm region of edema and patchy parenchymal hemorrhage.  Mild mass effect with approximately 4 mm of midline shift towards the right.    Dictated by (CST): Fabian Gay MD on 3/26/2024 at 6:34 AM     Finalized by (CST): Fabian Gay MD on 3/26/2024 at 6:46 AM          MRI BRAIN (CPT=70551)    Addendum Date: 3/26/2024    ADDENDUM:  Small amount of petechial hemorrhage present in the left frontal lobe infarct.   Dictated by (CST): Fabian Gay MD on 3/26/2024 at 6:47 AM     Finalized by (CST): Fabian Gay MD on 3/26/2024 at 6:47 AM             Result Date: 3/26/2024  CONCLUSION:  1. Acute/subacute left frontal lobe  cortical infarct. 2. A lacunar size acute/subacute infarct in the right parietal white matter. 3. A 1.2 cm focus of cortical signal abnormality in left occipital lobe related to a small infarct which is likely greater than 2 weeks in age. 4. Chronic bilateral basal ganglionic lacunar infarcts and changes in the cerebral white matter related to chronic small vessel disease. 5. Left greater than right mastoid effusions. 6. Bilateral frontal and milder ethmoid sinusitis. 7. Generalized increase in red marrow related to anemia.     Dictated by (CST): Fabian Gay MD on 3/25/2024 at 9:31 AM     Finalized by (CST): Fabian Gay MD on 3/25/2024 at 9:49 AM          CT BRAIN OR HEAD ([id number])    Result Date: 3/19/2024  CONCLUSION:   Subacute appearing left frontal and left occipital lobe infarcts.  Brain MRI can be obtained to help stage chronicity.  Pansinusitis  Partial opacification of the bilateral mastoid air cells may be sequela of inflammation. No osseous destruction or bony erosions.   Dictated by (CST): Henri Melgoza MD on 3/19/2024 at 9:46 AM     Finalized by (CST): Henri Melgoza MD on 3/19/2024 at 9:48 AM           Performed an independent visualization of  MRI BRAIN, MRA HEAD, MRA NECK, mri c spine, mri t spine,  Imaging revealed:   AGREE W READ       ONLY  DWI HYPERINTENSITY THAT HAS LOW SIGNAL ON ADC IS THE SMALL INFARCT IN THE LEFT HEMISPHERE. REMAINDER  APPEARS SUBACUTE.  Minimal blood products vs dystrophic calcification noted; see series  15 image 19, series 14 image 18, series 13 image 18.          4.3 mm midline shift on mri; see above.        Disclaimer:   This record was dictated using Dragon software. There may be errors due to voice recognition problems that were not realized and corrected during the completion of the note.      This document is not intended to support charting by exception.  Sections left blank in a completed note should be presumed not to have been done.        Total critical care  time spent exceeds 35 minutes.      References:  Elba CLEMENT. Renal and Electrolyte Disorders and the Nervous System. Formerly [facility] ([facility]). 2023 Jun 1;29(3):797-825. doi: 10.1212/CON.[id number]. PMID: [id number].      Thank you.  Michael Winters D.O.   Vascular & General Neurology    03/28/24

## 2024-03-28 NOTE — PROGRESS NOTES
[facility]  part of [facility]    Progress Note    Cristiano Obregon Patient Status:  Inpatient    1970 MRN [id number]   Location [facility] 2W/SW Attending Bud Camarena MD   Hosp Day # 23 PCP Abilio Dorsey MD     Subjective:  Chart reviewed      seems comfortable    Objective/Physical Exam:  General: Alert, orientated x3.  Cooperative.  No apparent distress.  Vital Signs:  Blood pressure 126/74, pulse 96, temperature 97.9 °F (36.6 °C), temperature source Temporal, resp. rate (!) 28, height 5' 7\" (1.702 m), weight 164 lb 14.5 oz (74.8 kg), SpO2 100%.  Neck: trach site   clean   no active bleeding  Labs:  Lab Results   Component Value Date    WBC 17.8 (H) 2024    HGB 7.7 (L) 2024    HGB 7.7 (L) 2024    HCT 22.5 (L) 2024    .0 (L) 2024    CREATSERUM 3.52 (H) 2024    BUN 47 (H) 2024     2024    K 5.0 2024     2024    CO2 25.0 2024     (H) 2024    CA 8.3 (L) 2024    ALB 2.6 (L) 2024    ALKPHO 73 2024    BILT 0.9 2024    TP 4.6 (L) 2024    AST 20 2024    ALT 12 2024    PTT 40.3 (H) 2024    INR 1.23 (H) 2024     (H) 2024    PSA 1.95 10/06/2021    DDIMER >20.00 (H) 2024    MG 1.9 2024    PHOS 3.6 2024       Imaging:  XR CHEST AP PORTABLE  (CPT=71045)    Result Date: 3/27/2024  PROCEDURE: XR CHEST AP PORTABLE  (CPT=71045) TIME: 0821 hours.   COMPARISON: [facility], XR CHEST AP PORTABLE (CPT=71045), 3/05/2024, 9:26 AM.  [facility], XR CHEST AP PORTABLE (CPT=71045), 3/19/2024, 10:14 AM.  [facility], XR CHEST AP PORTABLE (CPT=71045), 3/22/2024, 4:21 PM.  INDICATIONS: Shortness of breath.  TECHNIQUE:   Single view.   FINDINGS:  CARDIAC/VASC: Heart size upper limits of normal, and there is moderate pulmonary edema pattern which has worsened somewhat since  previous study.  Status post sternotomy and coronary bypass.  Fracture of the 4th and 5th sternal wire sutures noted. MEDIAST/ANTONI:   No visible mass or adenopathy. LUNGS/PLEURA: Moderate pulmonary edema pattern worsened from previous study.  Worsening bibasilar opacification suggesting increasing bilateral pleural effusions and probable left basal atelectasis although underlying pneumonia not excluded.  Status post  extubation.  Right internal jugular central line ends in the cavoatrial junction.  Feeding tube is been removed.  No pneumothorax noted. BONES: No fracture or visible bony lesion. OTHER: Negative.          CONCLUSION:  1. Heart size upper limits of normal, and there is moderate pulmonary edema pattern which has worsened somewhat since previous study.  Worsening bibasilar opacification suggesting increasing bilateral pleural effusions left more than right and probable left basal atelectasis, although underlying pneumonia not excluded.  Status post extubation and feeding tube removal.  No pneumothorax.  Follow-up studies are advised.    Dictated by (CST): Moisés Delgadillo MD on 3/27/2024 at 8:36 AM     Finalized by (CST): Moisés Delgadillo MD on 3/27/2024 at 8:39 AM          CT BRAIN OR HEAD (58540)    Addendum Date: 3/26/2024    ADDENDUM:  Findings were called to Dr. Winters.   Dictated by (CST): Fabian Gay MD on 3/26/2024 at 6:53 AM     Finalized by (CST): Fabian Gay MD on 3/26/2024 at 6:53 AM             Result Date: 3/26/2024  PROCEDURE: CT BRAIN OR HEAD (CPT=70450)  COMPARISON: [facility], CT BRAIN OR HEAD (CPT=70450), 3/19/2024, 9:10 AM.  [facility], MRA BRAIN (CPT=70544), 3/24/2024, 8:54 PM.  [facility], MRI BRAIN (CPT=70551), 3/24/2024, 8:54 PM.  INDICATIONS: Follow-up stroke.  repeat head ct to evaluate cerebral edema/?midline shift before starting dialysis  TECHNIQUE: CT images were obtained without contrast material.  Automated exposure control  for dose reduction was used.  Dose information is transmitted to the ACR (American College of Radiology) NRDR (National Radiology Data Registry) which includes the Dose Index Registry.  FINDINGS:  CEREBRUM: 7 x 4.6 cm region of edema in the left frontal lobe with small amount of patchy parenchymal hemorrhage.  Mild changes of chronic small vessel disease in cerebral white matter. CEREBELLUM: No edema, hemorrhage, mass or acute infarct. BRAINSTEM: No edema, hemorrhage, mass or acute infarct. CSF SPACES: No hydrocephalus .  Approximately 4 mm of midline shift towards the right. SKULL: No mass or other significant visible lesion.  SINUSES: Bilateral frontal and left ethmoid sinusitis.  Small left greater than right mastoid effusions. ORBITS: Limited views are unremarkable.  OTHER: Atherosclerotic calcification cavernous carotid arteries.         CONCLUSION:  1. Continued evolution of left frontal lobe infarct with a 7 cm region of edema and patchy parenchymal hemorrhage.  Mild mass effect with approximately 4 mm of midline shift towards the right.    Dictated by (CST): Fabian Gay MD on 3/26/2024 at 6:34 AM     Finalized by (CST): Fabian Gay MD on 3/26/2024 at 6:46 AM          MRI BRAIN (CPT=70551)    Addendum Date: 3/26/2024    ADDENDUM:  Small amount of petechial hemorrhage present in the left frontal lobe infarct.   Dictated by (CST): Fabian Gay MD on 3/26/2024 at 6:47 AM     Finalized by (CST): Fabian Gay MD on 3/26/2024 at 6:47 AM             Result Date: 3/26/2024  PROCEDURE: MRI BRAIN (CPT=70551)  COMPARISON: [facility], CT BRAIN OR HEAD (CPT=70450), 3/19/2024, 9:10 AM.  [facility], MRA BRAIN (CPT=70544), 3/24/2024, 8:54 PM.  INDICATIONS: stroke  TECHNIQUE: A variety of imaging planes and parameters were utilized for visualization of suspected pathology.  Images were performed without contrast.  FINDINGS:  CEREBRUM: Approximately 6 x 4.5 cm region of cortical edema  in the left frontal lobe with restricted diffusion.  A 6 mm focus of restricted diffusion in the right parietal white matter.  A 1.2 cm focus of cortical signal abnormality in the left occipital  lobe without restricted diffusion.  Scattered foci of FLAIR hyperintensity in the subcortical white matter in both cerebral hemispheres and to a lesser extent in the frontal periventricular white matter.  Small chronic bilateral basal ganglionic lacunar  infarcts. CEREBELLUM: No edema, hemorrhage, mass or acute infarct. BRAINSTEM: No edema, hemorrhage, mass or acute infarct. CSF SPACES: No hydrocephalus, subarachnoid hemorrhage, or mass.  SKULL: No mass or other significant visible lesion.  Generalized increase in red marrow.  SINUSES: Bilateral left greater than right mastoid effusions.  Fluid partially opacifying frontal sinuses and trace fluid or thickening in ethmoid sinuses and sphenoid sinus. ORBITS: Limited views are unremarkable.  OTHER: Flow is present in the anterior and posterior circulation vessels.         CONCLUSION:  1. Acute/subacute left frontal lobe cortical infarct. 2. A lacunar size acute/subacute infarct in the right parietal white matter. 3. A 1.2 cm focus of cortical signal abnormality in left occipital lobe related to a small infarct which is likely greater than 2 weeks in age. 4. Chronic bilateral basal ganglionic lacunar infarcts and changes in the cerebral white matter related to chronic small vessel disease. 5. Left greater than right mastoid effusions. 6. Bilateral frontal and milder ethmoid sinusitis. 7. Generalized increase in red marrow related to anemia.     Dictated by (CST): Fabian Gay MD on 3/25/2024 at 9:31 AM     Finalized by (CST): Fabian Gay MD on 3/25/2024 at 9:49 AM          MRA CAROTIDS (CPT=70547)    Result Date: 3/25/2024  PROCEDURE: MRA CAROTIDS (CPT=70547)  COMPARISON: [facility], MRA BRAIN (CPT=70544), 3/24/2024, 8:54 PM.  INDICATIONS: Focal psychomotor  deficit. Bilateral clonus.  TECHNIQUE: MR angiography was performed without and with intravenous gadolinium, with creation and interpretation of 2D and 3D/multi-planar images of the carotid and vertebral arteries.  Evaluation of internal carotid stenosis is based on NASCET Criteria.   FINDINGS:  COMMENT: Overall examination quality is substantially compromised by patient motion artifact.  RIGHT INTERNAL CAROTID: Minimal plaque of the proximal internal carotid artery is suggested. No hemodynamically significant stenosis or dissection.  EXTERNAL CAROTID: No hemodynamically significant stenosis or dissection.  COMMON CAROTID: No hemodynamically significant stenosis or dissection.  VERTEBRAL: No hemodynamically significant stenosis or dissection.   LEFT INTERNAL CAROTID: Minimal plaque is suggested at the carotid bifurcation. No hemodynamically significant stenosis or dissection.  EXTERNAL CAROTID: No hemodynamically significant stenosis or dissection.  COMMON CAROTID: No hemodynamically significant stenosis or dissection.  VERTEBRAL: No hemodynamically significant stenosis or dissection.   OTHER: Extensive susceptibility artifact the sternum is compatible with the sternal wires. Foci of susceptibility artifact are present in the ventral subcutaneous tissues, compatible with prior sternotomy incision.  There is partial visualization of bilateral pleural effusions. Scattered mucosal thickening is demonstrated.         CONCLUSION:  Markedly artifactually compromised examination. Within these parameters: 1. Suggestion of mild plaque involving the carotid bifurcations without evidence of flow-limiting stenosis or occlusion.  2. The vertebral arteries appear to be contiguously patent, insofar as visualized, without evidence of hemodynamically stenosis.  3. Lesser incidental findings as above.    A preliminary report was issued by the [facility] teleradiology service. There are no major discrepancies.   Dictated by  (CST): Zachery Dean MD on 3/25/2024 at 8:49 AM     Finalized by (CST): Zachery Dean MD on 3/25/2024 at 8:54 AM          IR TUNNELED CV CATH INSERTION EXCHANGE CHECK    Result Date: 3/22/2024  PROCEDURE: 1. IR CONVERSION OF NON TUNNELED HEMODIALYSIS CATHETER TO TUNNELED HEMODIALYSIS CATHETER 2. IR GASTROSTOMY TUBE INSERTION  INDICATIONS:  NSTEMI and CABG with complicated hospital course, in the ICU needing enteric access for feeds and venous access for long-term hemodialysis.  COMPARISON: None.  (S): Ion  ANESTHESIA/SEDATION: Level of anesthesia/sedation: Moderate sedation (conscious sedation) Anesthesia/sedation administered by: Nurse or other independent trained observer who has no other duties with continuous monitoring of the patient's level of consciousness and physiologic status, under the personal supervision of the attending physician. Total intra-service sedation time: 40 min  FLUOROSCOPY TIME:  5 min AIR KERMA:  35 mGy  ESTIMATED BLOOD LOSS: Less than 10 mL  COMPLICATIONS: None  FINDINGS:  The right neck and chest was sterilely prepped and draped.   fluoroscopy demonstrated the existing non tunneled right internal jugular vein hemodialysis catheter in expected position. Under fluoroscopic guidance, a 0.035 inch wire was advanced through the catheter into the inferior vena cava. A peel-away sheath was advanced over the wire and secured.  Local Lidocaine was administered. A short skin incision was made in the right chest several cm from the venotomy. A 19 cm tunneled dialysis catheter was tunneled from the skin incision to the venotomy. The catheter was then advanced through the peel-away  sheath to the superior cavoatrial junction..  The catheter aspirated and flushed well. The catheter was flushed with heparin and secured to the skin.  The patient's left abdomen was then sterilely prepped and draped.  A nasogastric tube was present.  Under fluoroscopic guidance, air was insufflated  into the stomach distended.  Local lidocaine was administered.  Under fluoroscopic guidance, 3 T-fasteners were advanced into the stomach.  In between the T-fasteners, a Yueh needle/catheter was advanced into the stomach.  Through the catheter, a wire was coiled in the stomach.  Using a 4 millimeter balloon, the tract was dilated, and an 18 Indonesian  gastrostomy tube was placed.  Contrast injection confirmed appropriate positioning.  The retention balloon was inflated.         CONCLUSION: 1. Conversion of non tunneled hemodialysis catheter to a tunneled hemodialysis catheter via right internal jugular vein access.  Ready for use. 2. Insertion of 18 Indonesian gastrostomy tube.  Do not use until cleared by IR tomorrow.    Dictated by (CST): Carlitos Lemon MD on 3/22/2024 at 11:33 PM     Finalized by (CST): Carlitos Lemon MD on 3/22/2024 at 11:39 PM          IR G-TUBE PROCEDURE    Result Date: 3/22/2024  See separate dictation \"IR TUNNELED CV CATHETER INSERTION\" for procedure details. Finalized by (CST): Carlitos Lemon MD on 3/22/2024 at 11:32 PM          XR CHEST AP PORTABLE  (CPT=71045)    Result Date: 3/22/2024  PROCEDURE: XR CHEST AP PORTABLE  (CPT=71045) TIME: 1621 hours.   COMPARISON: [facility], XR CHEST AP PORTABLE (CPT=71045), 3/19/2024, 10:14 AM.  [facility], XR CHEST AP PORTABLE (CPT=71045), 3/19/2024, 8:02 AM.  [facility], XR CHEST AP PORTABLE (CPT=71045), 3/17/2024, 6:21 AM.  INDICATIONS: Evaluation post tracheostomy flat plate  TECHNIQUE:   Single view.   FINDINGS:  CARDIAC/MEDIAST: Heart is stable in size.  Prior median sternotomy and CABG.  LUNGS/PLEURA:  Patchy opacity in the right mid lung noted mild improved aeration in the right lung base.  Mild left basilar opacity.  Suspected trace bilateral pleural effusions.  No pneumothorax. OTHER:  Osseous structures are unchanged.  Interval removal of previously seen mediastinal drain and left chest  tube.  Stable appearance of right jugular central venous catheter and enteric tube.  Tracheostomy tube identified with the tip at the level of  the clavicles.  Oral contrast seen partially imaged within the stomach.         CONCLUSION:   Tracheostomy tube tip at the level of the clavicles.  Interval removal of left chest tube and mediastinal drain.  No pneumothorax.  Mild patchy persistent opacity in the right mid lung with mild improved aeration right lung base.  Mild left basilar opacity with suspected trace bilateral pleural effusions.     Dictated by (CST): Jcarlos Harmon MD on 3/22/2024 at 4:45 PM     Finalized by (CST): Jcarlos Harmon MD on 3/22/2024 at 4:48 PM          CT ABDOMEN (CPT=74150)    Result Date: 3/21/2024  PROCEDURE:   CT ABDOMEN (CPT=74150)  COMPARISON: [facility], XR CHEST AP PORTABLE (CPT=71045), 3/19/2024, 10:14 AM.  [facility], US KIDNEYS (CPT=76775), 3/06/2024, 2:20 PM.  INDICATIONS: G tube placement planning.  Admitted with myocardial infarction complicated by respiratory failure and cardiogenic shock, status post CABG on 03/15/2024 with mediastinal exploration/washout on 03/16/2024.  Hospital course complicated by thrombocytopenia, acute stroke and acute kidney injury.  TECHNIQUE: Multidetector CT images of the abdomen were obtained without non-ionic intravenous contrast material. Automated exposure control for dose reduction was used. Adjustment of the mA and/or kV was done based on the patient's size. Iterative reconstruction technique for dose reduction was employed. Dose information was transmitted to the ACR (American College of Radiology) NRDR (National Radiology Data Registry), which includes the Dose Index Registry.   FINDINGS: COMMENT: Evaluation of the vasculature and of the abdominal viscera for the presence of intraparenchymal pathology is suboptimal in the absence of contrast infusion. LUNG BASES: The heart remains enlarged.  Coronary  artery calcifications are noted.  Partially imaged postoperative changes are seen from a recent CABG and subsequent mediastinal exploration.  Partially imaged lobulated circumscribed lesions are seen in the anterior mediastinal fat pad extending caudally to the visualized upper abdomen protruding anterior to the left hepatic lobe measuring approximately 12.8 x 3.8 x 1.9 cm in total (series 2, image 37 and series 5, image 17), probably representing a subacute hematoma.  A moderate right pleural effusion appears to have increased in size compared to the 03/19/2024 chest x-ray and is associated with worsening passive atelectasis in the dependent aspect of the right lower lobe.  A small left pleural effusion and milder passive atelectasis in the left lower lobe are unchanged.  LIVER: No enlargement, atrophy, abnormal density, or significant focal lesion is identified within the parameters of this noncontrast study.  BILIARY: The gallbladder is nondilated.  The biliary tree is normal in caliber. PANCREAS: Negative unenhanced appearance for fluid collection, ductal dilatation, or atrophy.  SPLEEN: No enlargement.  ADRENALS:   No defined mass or abnormal enlargement.  KIDNEYS:   No renal or ureteral calculi are identified. There is no hydronephrosis or hydroureter. No perinephric or periureteric fat stranding is appreciated.  GI/MESENTERY:  Enteric tube tip terminates in the distal gastric body.  Evaluation of the gastrointestinal tract is limited without enteric contrast.  No dilated gas-filled loops of bowel are seen to suggest obstruction.  The anterior wall the stomach appears closely apposed to the ventral peritoneum.  There is no evidence of colonic interposition. VASCULATURE:   No aneurysm is detected.  There is moderate calcific atherosclerosis. RETROPERITONEUM: No mass or lymphadenopathy is apparent.  BONES:   No significant bony lesion or fracture.  Partially imaged sternotomy changes are noted.  There are  mild-to-moderate spondylotic changes in the visualized thoracolumbar spine. ABDOMINAL WALL: There is generalized body wall edema suggesting anasarca. OTHER: No free air or fluid is seen in the abdomen.          CONCLUSION:  1. Stomach appears closely apposed to the ventral peritoneum.  No evidence of colonic interposition.  No free fluid in the visualized upper abdomen.  2. Postoperative changes from a recent CABG and subsequent mediastinal exploration/washout.  There is a partially imaged lobulated mildly hyperdense mass extending from the visualized anterior mediastinum to the epigastric region that is most compatible with a subacute hematoma. 3. Bilateral pleural effusions, right greater than left, with associated passive atelectasis at both visualized lung bases.  These findings are worse on the right compared to 03/19/2024 suggesting worsening CHF/fluid overload or anasarca.    Dictated by (CST): Bc Edwards MD on 3/21/2024 at 7:08 PM     Finalized by (CST): Bc Edwards MD on 3/21/2024 at 7:15 PM          IR NON-TUNNELED CATHETER    Result Date: 3/19/2024  PROCEDURE: IR NON-TUNNELLED CATHETER  INDICATIONS:  Critically ill patient on CRRT via non tunneled hemodialysis catheter.  Catheter not functioning for dialysis.  Chest x-ray demonstrates catheter terminating in the mid superior vena cava.  (S): Ion  ANESTHESIA/SEDATION: Level of anesthesia/sedation: None (Lidocaine only) Anesthesia/sedation administered by: Not applicable  ESTIMATED BLOOD LOSS: Less than 5 mL  COMPLICATIONS: None  FINDINGS:  Informed consent was obtained. The procedure was performed at bedside. The right neck including the existing 15 centimeter double-lumen catheter was sterilely prepped and draped.  Of note, the catheter was retracted several centimeters out of the venotomy site.  Local Lidocaine was administered.  A 0.035 inch wire was advanced through one of the lumens.  The catheter was then exchanged for a  new 20 centimeter double-lumen catheter approximately 1 centimeter out of the venotomy. The catheter was secured to the skin. Follow-up chest radiograph demonstrated the catheter terminating at the superior cavoatrial junction.         CONCLUSION:  Exchange of existing non-tunneled central venous catheter via internal jugular vein at bedside.  Ready for use.    Dictated by (CST): Carlitos Lemon MD on 3/19/2024 at 6:09 PM     Finalized by (CST): Carlitos Lemon MD on 3/19/2024 at 6:11 PM          XR CHEST AP PORTABLE  (CPT=71045)    Result Date: 3/19/2024  PROCEDURE: XR CHEST AP PORTABLE  (CPT=71045) TIME: 0802 hours.   COMPARISON: [facility], XR CHEST AP PORTABLE (CPT=71045), 3/17/2024, 6:21  INDICATIONS: NG tube placement. Coronary artery bypass graft on 3/15/2024.  TECHNIQUE:   Single view.   FINDINGS:  CARDIAC/VASC: Cardiomegaly.  ET tube in satisfactory position.  MEDIAST/ANTONI:   Atherosclerotic aorta with no visible aneurysm.  CABG.  Double-lumen right IJ catheter with the tip in the SVC.  Right IJ sheath with the tip in the SVC.  [place]-Ophelia catheter has been removed.  No pneumothorax.  Right paramediastinal left basilar chest tubes present.  Pericardial drainage catheter suspected at the base of the heart. LUNGS/PLEURA: Bilateral perihilar lower lobe pulmonary congestion with improved aeration left basilar retrocardiac region BONES: Mild scoliosis with mild degenerative disc disease and spondylosis.  OTHER: Nasogastric tube within the body of the stomach.  Adjacent metallic tip enteric tube also within the body of the stomach..  Cardiac monitoring leads electrodes overlie the chest.  Epicardial leads suspected         CONCLUSION:  1. Cardiomegaly.  Pulmonary venous distention.  CABG 2. Bilateral perihilar and lower lobe pulmonary congestive changes with improved aeration left basilar retrocardiac region .  Support tubes and catheters are satisfactory.  No pneumothorax 3. Metallic tip  enteric tube within the body of the stomach alongside the indwelling nasogastric tube    Dictated by (CST): Wily Stephenson MD on 3/19/2024 at 11:41 AM     Finalized by (CST): Wily Stephenson MD on 3/19/2024 at 11:46 AM          XR CHEST AP PORTABLE  (CPT=71045)    Result Date: 3/19/2024  PROCEDURE: XR CHEST AP PORTABLE  (CPT=71045) TIME: 1019.   COMPARISON: [facility], XR CHEST AP PORTABLE (CPT=71045), 3/16/2024, 6:51 AM.  [facility], XR CHEST AP PORTABLE (CPT=71045), 3/17/2024, 6:21 AM.  [facility], XR CHEST AP PORTABLE (CPT=71045), 3/19/2024, 8:02 AM.  INDICATIONS: Right internal jugular dialysis catheter placement confirmation.  TECHNIQUE:   Single view.   FINDINGS:  DEVICES: There is a right-sided large-bore dual-lumen hemodialysis catheter with tip projecting near the cavoatrial junction. An endotracheal tube terminates approximately 7.2 cm above the pham. Enteric tubes extend caudally beyond the field of view. Bilateral chest tubes are evident. CARDIAC/VASC: The cardiomediastinal silhouette is accentuated by AP technique, but likely stably enlarged. There is mild tortuosity of the thoracic aorta with peripheral atherosclerotic vascular calcification. The pulmonary vascularity is mildly congested. MEDIAST/ANTONI: Surgical clips are present. LUNGS/PLEURA: Patchy opacities are demonstrated bilaterally, right worse than left, and may reflect some combination of accumulating pleural effusions, compressive atelectasis, and/or superimposed airspace consolidation. No pneumothorax is evident.  BONES: Median sternotomy wires are demonstrated. Mild scoliosis and multilevel degenerative changes of the thoracic spine are apparent. There are degenerative changes of shoulders bilaterally. OTHER: Leads overlie the chest and obscure underlying detail.           CONCLUSION:  1. Right-sided hemodialysis catheter with tip projecting near the cavoatrial junction.  2.  Postthoracotomy chest with cardiomegaly and pulmonary vascular congestion.  3. Bibasilar opacities, which may reflect some combination of pleural effusions, atelectasis, and/or superimposed pneumonia.   4. Additional support tubes and lines as above.   Dictated by (CST): Zachery Dean MD on 3/19/2024 at 10:57 AM     Finalized by (CST): Zachery Dean MD on 3/19/2024 at 11:01 AM          CT BRAIN OR HEAD (32021)    Result Date: 3/19/2024  PROCEDURE: CT BRAIN OR HEAD (CPT=70450)  COMPARISON: None.  INDICATIONS: AMS  TECHNIQUE: CT images were obtained without contrast material.  Automated exposure control for dose reduction was used.  Dose information is transmitted to the ACR (American College of Radiology) NRDR (National Radiology Data Registry) which includes the Dose Index Registry.  FINDINGS:  CSF SPACES: Ventricles, cisterns, and sulci are appropriate for age.  No hydrocephalus, subarachnoid hemorrhage, or mass.  No midline shift.  CEREBRUM: Areas of volume loss with blurring of the gray-white differentiation seen involving the left frontal lobe and left posterior occipital lobe. WHITE MATTER: Normal white matter.  CEREBELLUM: No edema, hemorrhage, mass, acute infarction, or significant atrophy.  BRAINSTEM: No edema, hemorrhage, mass, acute infarction, or significant atrophy.  CALVARIUM: No apparent fracture, mass, or other significant visible lesion.  SINUSES: There is mild mucosal thickening of the ethmoid air cells and sphenoid sinuses.  Moderate mucosal thickening of the frontal sinuses. Mild partial opacification of the bilateral mastoid air cells. ORBITS: Limited views are unremarkable.   OTHER: Negative.          CONCLUSION:   Subacute appearing left frontal and left occipital lobe infarcts.  Brain MRI can be obtained to help stage chronicity.  Pansinusitis  Partial opacification of the bilateral mastoid air cells may be sequela of inflammation. No osseous destruction or bony erosions.   Dictated by  (CST): Henri Melgoza MD on 3/19/2024 at 9:46 AM     Finalized by (CST): Henri Melgoza MD on 3/19/2024 at 9:48 AM          XR CHEST AP PORTABLE  (CPT=71045)    Result Date: 3/17/2024  PROCEDURE: XR CHEST AP PORTABLE  (CPT=71045) TIME: 628 hours.   COMPARISON: [facility], XR CHEST AP PORTABLE (CPT=71045), 3/16/2024, 6:51 AM.  [facility], XR CHEST AP PORTABLE (CPT=71045), 3/15/2024, 4:44 PM.  [facility], XR CHEST AP PORTABLE (CPT=71045), 3/12/2024, 7:06 AM.  INDICATIONS: Follow up shortness of breath.  TECHNIQUE:   Single view.   FINDINGS:  CARDIAC/VASC: The cardiomediastinal silhouette is unchanged in size.  Postoperative changes from recent CABG.  There are sternotomy wires. MEDIAST/ANTONI:   No mass. LUNGS/PLEURA: Mildly low lung volumes.  Left retrocardiac opacity is present.  Slight blunting of the left costophrenic angle.  Right lung and pleural spaces are clear. BONES: The osseous structures are unchanged. OTHER: The enteric tube courses below the diaphragm with the distal tip out of the field of view.  Mediastinal drains and left basilar chest tube are unchanged.  There is a right internal jugular approach [place]-Ophelia catheter, with the tip projecting over the distal left lower lobe pulmonary artery The endotracheal tube, right IJ central venous catheter, and Impella device are in unchanged position         CONCLUSION:   Distal tip of the [place]-Ophelia catheter projects over the left lower lobe.  Recommend retraction.  Unchanged trace left pleural effusion.  Unchanged left retrocardiac opacity.    Dictated by (CST): Emily Álvarez MD on 3/17/2024 at 12:22 PM     Finalized by (CST): Emily Álvarez MD on 3/17/2024 at 12:24 PM          US ARTERIAL DUPLEX UPPER EXTREMITY LEFT (CPT=93931)    Result Date: 3/16/2024  PROCEDURE: US ARTERIAL DUPLEX UPPER EXTREMITY LEFT (CPT=93931)  COMPARISON: None.  INDICATIONS: 53-year-old man with left upper extremity edema and diminished  distal pulses after recent arterial catheterization.  FINDINGS: There are multiphasic waveforms throughout the left subclavian artery with peak systolic velocity of 122 centimeters/second proximally.  There are multiphasic waveforms throughout the left axillary artery with peak systolic velocity of 87 centimeters/second.  There are multiphasic waveforms throughout the left brachial artery with peak systolic velocity of 95 centimeters/second proximally.  There are multiphasic waveforms throughout the left radial artery, including at the wrist.  Peak systolic velocity within the proximal left radial artery is 83 centimeters/second, while peak systolic velocity within the distal radial artery is 88 centimeters/second.  There is no arterial pseudoaneurysm or dissection.  There are multiphasic waveforms throughout the left ulnar artery with peak systolic velocity of 91 centimeters/second proximally.         CONCLUSION:  1. Widely patent left upper extremity arterial vasculature, including radial artery without pseudoaneurysm or dissection.    Dictated by (CST): Guille Chan MD on 3/16/2024 at 12:56 PM     Finalized by (CST): Guille Chan MD on 3/16/2024 at 12:57 PM          CARD ECHO LIMITED (CPT=93308)    Result Date: 3/16/2024  Transthoracic Echocardiogram Name:Cristiano Obregon Date: 2024 :  1970 Ht:  (67in)  BP: 108 / 77 MRN:  [id number]    Age:  53years    Wt:  (188lb) HR: 84bpm Loc:  [facility]       Gndr: M          BSA: 1.97m^2 Sonographer: ADEEL Wyman Ordering:    Dima Ponce MD Consulting:  Derick Webb ---------------------------------------------------------------------------- History/Indications:   Coronary artery disease.  Impella placement.  PMH: Myocardial infarction.  Coronary artery bypass grafting (03/15/2024). Performed during the current admission. ---------------------------------------------------------------------------- Procedure information:  A transthoracic  echocardiogram, limited study was performed. Additional evaluation included M-mode and limited 2D.  Patient status:  Inpatient.  Location:  Bedside.    Comparison was made to the study of 03/11/2024.    This was a routine study. Transthoracic echocardiography for diagnosis, ventricular function evaluation, and postoperative evaluation. Image quality was adequate. ECG rhythm:   Normal sinus ---------------------------------------------------------------------------- Conclusions: 1. Left ventricle: The cavity size was normal. Wall thickness was normal.    Systolic function was mildly reduced. The estimated ejection fraction was    45-50%, by 3D assessment. Unable to assess LV diastolic function. Impella    catheter noted in the left ventricle, placement measures 3.40cm from the    aortic valve. 2. Left ventricle: There is moderate hypokinesis of the mid-apical inferior    and mid inferolateral walls. 3. Right ventricle: Pacer wire noted in the right ventricle. Systolic    function was mildly reduced. 4. Left atrium: The left atrial volume was moderately increased. 5. Right atrium: The atrium was dilated. Pacer wire noted in right atrium. 6. Pericardium, extracardiac: There was no residual pericardial effusion.    There was a left pleural effusion. Impressions:  This study is compared with previous dated 03/11/2024: No significant change since prior study. * ---------------------------------------------------------------------------- * Findings: Left ventricle:  The cavity size was normal. Wall thickness was normal. Systolic function was mildly reduced. The estimated ejection fraction was 45-50%, by 3D assessment. Impella catheter noted in the left ventricle, placement measures 3.40cm from the aortic valve. Regional wall motion:   There is moderate hypokinesis of the mid-apical inferior and mid inferolateral walls.   Unable to assess LV diastolic function. Left atrium:  The left atrial volume was moderately increased.  Right ventricle:  The cavity size was normal. Pacer wire noted in the right ventricle. Systolic function was mildly reduced. Right atrium:  The atrium was dilated. Pacer wire noted in right atrium. Mitral valve:  The valve was structurally normal. Leaflet separation was normal. Aortic valve:   The valve was trileaflet.   Cusp separation was normal. Tricuspid valve:  The valve is structurally normal. Leaflet separation was normal. Pulmonic valve:   Poorly visualized. Pericardium:   Post pericardiocentesis:   There was no residual pericardial effusion. Pleura:  There was a left pleural effusion. Pulmonary arteries: Systolic pressure could not be accurately estimated. Systemic veins:  Not visualized. ---------------------------------------------------------------------------- Measurements  Left ventricle         Value        Ref       03/11/2024  LV end-diastolic       122   ml     --------- ----------  volume, 3D  LV end-systolic        65    ml     --------- ----------  volume, 3D  LV ejection        (L) 46    %      52 - 72   ----------  fraction, 3D  LV end-diastolic       62    ml/m^2 <=79      ----------  volume/bsa, 3D  LV end-systolic    (H) 33    ml/m^2 <=32      ----------  volume/bsa, 3D  LV wall mass, 3D       145   g      --------- ----------  LV wall mass/bsa,      74    g/m^2  --------- ----------  3D  IVS thickness, ED,     0.9   cm     0.6 - 1.0 1.0  PLAX  LV ID, ED, PLAX        5.1   cm     4.2 - 5.8 5.0  LV ID, ES, PLAX        3.8   cm     2.5 - 4.0 3.6  LV PW thickness,       0.8   cm     0.6 - 1.0 1.1  ED, PLAX  IVS/LV PW ratio,       1.13         --------- 0.91  ED, PLAX  LV PW/LV ID ratio,     0.16         --------- 0.22  ED, PLAX  LV ejection        (L) 50    %      52 - 72   54  fraction  Left atrium            Value        Ref       03/11/2024  LA volume, S       (H) 101   ml     18 - 58   88  LA volume/bsa, S   (H) 51    ml/m^2 16 - 34   44  LA volume, ES, 1-p (H) 103   ml     18 - 58   95   A4C  LA volume, ES, 1-p (H) 82    ml     18 - 58   81  A2C  LA volume, ES, A/L     106   ml     --------- 95  LA volume/bsa, ES, (H) 54    ml/m^2 16 - 34   48  A/L  LA volume, ES, 3D  (H) 80    ml     18 - 58   ----------  LA volume/bsa, ES,     41    ml/m^2 --------- ----------  3D  Right ventricle        Value        Ref       03/11/2024  TAPSE, MM          (L) 0.92  cm     >=1.70    ----------  RV s', lateral     (L) 6.3   cm/sec >=9.5     ---------- Legend: (L)  and  (H)  liza values outside specified reference range. ---------------------------------------------------------------------------- Prepared and electronically signed by Dima Ponce MD 03/16/2024 10:15     XR CHEST AP PORTABLE  (CPT=71045)    Result Date: 3/16/2024  PROCEDURE: XR CHEST AP PORTABLE  (CPT=71045) TIME: 656 hours.   COMPARISON: [facility], XR CHEST AP PORTABLE (CPT=71045), 3/15/2024, 4:44 PM.  [facility], XR CHEST AP PORTABLE (CPT=71045), 3/12/2024, 7:06 AM.  [facility], XR CHEST AP PORTABLE (CPT=71045), 3/11/2024, 7:59 AM.  INDICATIONS: S/P Surgery  TECHNIQUE:   Single view.   FINDINGS:  CARDIAC/VASC: The cardiomediastinal silhouette is unchanged in size.  Postoperative changes from recent CABG.  There are sternotomy wires. MEDIAST/ANTONI:   No mass. LUNGS/PLEURA: Mildly low lung volumes.  Left retrocardiac opacity is present.  Slight blunting of the left costophrenic angle.  Right lung and pleural spaces are clear. BONES: The osseous structures are unchanged. OTHER: The enteric tube courses below the diaphragm with the distal tip terminating the body of the stomach.  Mediastinal drains and left basilar chest tube are unchanged.  There is a right internal jugular approach [place]-Ophelia catheter, with the tip projecting over the distal left lower lobe pulmonary artery The endotracheal tube, right IJ central venous catheter, and Impella device are in unchanged position         CONCLUSION:   Distal  tip of the [place]-Ophelia catheter remains within the left lower lobe pulmonary artery.  Recommend retraction.  A secure message was sent to Emily [place] on 03/16/2024 at 8:31 a.m.  Trace left pleural effusion.  Left retrocardiac opacity , which may be secondary to subsegmental atelectasis, edema, and/or infection.  Resolution of previously seen right pleural effusion and basilar opacity.    Dictated by (CST): Emily Álvarez MD on 3/16/2024 at 8:26 AM     Finalized by (CST): Emily Álvarez MD on 3/16/2024 at 8:30 AM          XR CHEST AP PORTABLE  (CPT=71045)    Result Date: 3/15/2024  PROCEDURE: XR CHEST AP PORTABLE  (CPT=71045) TIME: 16:44.   COMPARISON: [facility], XR CHEST AP PORTABLE (CPT=71045), 3/12/2024, 7:06 AM.  [facility], XR CHEST AP PORTABLE (CPT=71045), 3/11/2024, 7:59 AM.  [facility], XR CHEST AP PORTABLE (CPT=71045), 3/08/2024, 11:50 AM.  INDICATIONS: og tube placement  TECHNIQUE:   Single view.   FINDINGS:  CARDIAC/VASC: The cardiomediastinal silhouette is unchanged in size.  Postoperative changes from recent CABG.  There are sternotomy wires. MEDIAST/ANTONI:   No mass. LUNGS/PLEURA: The right pleural effusion and associated right basilar airspace opacity have almost entirely resolved.  No significant change in the pulmonary vascular congestion.  No pneumothorax.  There are low lung volumes. BONES: The osseous structures are unchanged. OTHER: The enteric tube courses below the diaphragm with the distal tip terminating the body of the stomach.  There is been interval placement of a mediastinal left basilar chest tube.  The endotracheal tube, right IJ central venous catheter, right IJ swans Ophelia catheter and Impella device are in unchanged position         CONCLUSION:   1. Postoperative changes from recent CABG. 2. Well-positioned enteric tube, which terminates in the body of the stomach. 3. Interval placement of mediastinal and left basilar chest tubes.   No pneumothorax. 4. Remaining support devices are in unchanged positioning.  5. Near complete resolution of the right pleural effusion and associated right lower lobe airspace opacity. 6. No significant change in the pulmonary vascular congestion.     Dictated by (CST): David Hendrickson MD on 3/15/2024 at 5:11 PM     Finalized by (CST): David Hendrickson MD on 3/15/2024 at 5:17 PM          XR CHEST AP PORTABLE  (CPT=71045)    Result Date: 3/12/2024  PROCEDURE: XR CHEST AP PORTABLE  (CPT=71045) TIME: 0708 hours  COMPARISON: [facility], XR CHEST AP PORTABLE (CPT=71045), 3/11/2024, 7:59  INDICATIONS: Acute myocardial infarction.  Severe three-vessel CAD status post balloon angioplasty.  Assess Impella catheter placement.  TECHNIQUE:   Single view.   FINDINGS:  CARDIAC/VASC: Cardiomegaly.  Pulmonary venous distention.  ET tube in satisfactory position  MEDIAST/ANTONI:   Atherosclerotic aorta with no visible aneurysm.  Left ventricular assist Impella catheter with the tip in the left ventricle.  Right IJ [place]-Ophelia catheter with the tip in the left descending pulmonary intralobar artery.  Additional right IJ catheter with the tip in the SVC.  No pneumothorax. LUNGS/PLEURA: Mild bilateral perihilar basilar pulmonary congestive changes.  Small right-sided effusion. BONES: S-shaped scoliosis dorsal lumbar spine OTHER: Negative.          CONCLUSION:  1. Cardiomegaly pulmonary venous distention. 2. Support tubes and catheters are satisfactory.  No pneumothorax 3. Mild perihilar and basilar congestive changes worse on the right has progressed.  4. Small right-sided effusion.    Dictated by (CST): Wily Stephenson MD on 3/12/2024 at 8:51 AM     Finalized by (CST): Wily Stephenson MD on 3/12/2024 at 8:56 AM          CARD ECHO LIMITED (CPT=93308)    Result Date: 3/11/2024  Transthoracic Echocardiogram Name:Cristiano Obregon Date: 2024 :  1970 Ht:  (67in)  BP: 110 / 76 MRN:  [id number]    Age:   53years    Wt:  (192lb) HR: 75bpm Loc:  [facility]       Gndr: M          BSA: 1.99m^2 Sonographer: Manuel Ordering:    Ruben Ramirez Consulting:  Janet Velazco ---------------------------------------------------------------------------- History/Indications:   Coronary artery disease.  Impella placement. ---------------------------------------------------------------------------- Procedure information:  A transthoracic echocardiogram, limited study was performed. Additional evaluation included M-mode and limited 2D.  Patient status:  Inpatient.  Location:  CCU    Comparison was made to the study of 03/08/2024.    This was a routine study. Transthoracic echocardiography for diagnosis and ventricular function evaluation. Image quality was adequate. ECG rhythm:   Normal sinus ---------------------------------------------------------------------------- Conclusions: 1. Left ventricle: The cavity size was normal. Wall thickness was moderately    increased. The estimated ejection fraction was 50-55%, by visual    assessment. Hypokinesis of the inferior wall. Hypokinesis of the    basal-midlateral wall. Impella catheter noted in the left ventricle,    placement measures 3.30cm from the aortic valve. 2. Right ventricle: Pacer wire noted in the right ventricle. 3. Left atrium: The left atrial volume was moderately increased. 4. Right atrium: Pacer wire noted in right atrium. 5. Pericardium, extracardiac: There was a right pleural effusion. 6. Left ventricle: Impressions:  This study is compared with previous dated 03/08/2024: * ---------------------------------------------------------------------------- * Findings: Left ventricle:  The cavity size was normal. Wall thickness was moderately increased. The estimated ejection fraction was 50-55%, by visual assessment. Impella catheter noted in the left ventricle, placement measures 3.30cm from the aortic valve.  Regional wall motion abnormalities:   Hypokinesis of the inferior wall.   Hypokinesis of the basal-midlateral wall. Left atrium:  The left atrial volume was moderately increased. Right ventricle:  The cavity size was normal. Pacer wire noted in the right ventricle. Systolic function was normal. Right atrium:  The atrium was normal in size. Pacer wire noted in right atrium. Mitral valve:  The valve was structurally normal. Leaflet separation was normal. Aortic valve:  The valve was structurally normal. The valve was trileaflet. Cusp separation was normal. Tricuspid valve:  The valve is structurally normal. Leaflet separation was normal. Pulmonic valve:   Not visualized. Pleura:  There was a right pleural effusion. Pulmonary arteries: Systolic pressure could not be accurately estimated. Systemic veins:  Central venous respirophasic diameter changes are blunted (< 50%). Inferior vena cava: The IVC was normal-sized. ---------------------------------------------------------------------------- Measurements  Left ventricle         Value        Ref       03/08/2024  IVS thickness, ED,     1.0   cm     0.6 - 1.0 1.0  PLAX  LV ID, ED, PLAX        5.0   cm     4.2 - 5.8 5.1  LV ID, ES, PLAX        3.6   cm     2.5 - 4.0 3.8  LV PW thickness,   (H) 1.1   cm     0.6 - 1.0 0.9  ED, PLAX  IVS/LV PW ratio,       0.91         --------- 1.11  ED, PLAX  LV PW/LV ID ratio,     0.22         --------- 0.18  ED, PLAX  LV ejection            54    %      52 - 72   50  fraction  Left atrium            Value        Ref       03/08/2024  LA volume, S       (H) 88    ml     18 - 58   ----------  LA volume/bsa, S   (H) 44    ml/m^2 16 - 34   ----------  LA volume, ES, 1-p (H) 95    ml     18 - 58   ----------  A4C  LA volume, ES, 1-p (H) 81    ml     18 - 58   ----------  A2C  LA volume, ES, A/L     95    ml     --------- ----------  LA volume/bsa, ES, (H) 48    ml/m^2 16 - 34   ----------  A/L  Inferior vena cava     Value        Ref       03/08/2024  ID                     1.9   cm     <=2.1     2.5 Legend: (L)   and  (H)  liza values outside specified reference range. ---------------------------------------------------------------------------- Prepared and electronically signed by Ruben Ramirez 03/11/2024 19:47     XR CHEST AP PORTABLE  (CPT=71045)    Result Date: 3/11/2024  PROCEDURE: XR CHEST AP PORTABLE  (CPT=71045) TIME: 759 a.   COMPARISON: [facility], XR CHEST AP PORTABLE (CPT=71045), 3/05/2024, 9:26 AM.  [facility], XR CHEST AP PORTABLE (CPT=71045), 3/07/2024, 10:58 AM.  [facility], XR CHEST AP PORTABLE (CPT=71045), 3/08/2024, 11:50 AM.  INDICATIONS: Impella and intubated, assess hardware.  TECHNIQUE:   Single view.   FINDINGS:  CARDIAC/VASC: Heart size upper limits of normal to mildly enlarged, and there is mild interstitial edema suggesting mild pulmonary congestion/fluid overload. MEDIAST/ANTONI:   No visible mass or adenopathy. LUNGS/PLEURA: ET tube in the trachea at the level the clavicles.  NG tube has been removed.  Right internal jugular central line sheath ends in the SVC.  [place]-Ophelia catheter is in the descending left pulmonary artery.  Impella device in profile with the left ventricle.  Blunting of the right costophrenic angle may suggest small amount of right pleural fluid. BONES: No fracture or visible bony lesion. OTHER: Negative.          CONCLUSION:  1. Heart size upper limits of normal to mildly enlarged, and there is mild interstitial edema suggesting mild cardiac failure/fluid overload.  ET tube in the trachea at the level of the clavicles.  Impella device in profile with the left ventricle.  [place]-Ophelia catheter in the descending left pulmonary artery.  No pneumothorax.  Small right pleural effusion suggested.    Dictated by (CST): Moisés Delgadillo MD on 3/11/2024 at 8:29 AM     Finalized by (CST): Moisés Delgadillo MD on 3/11/2024 at 8:32 AM          CARD ECHO 2D W/O DOPPLER (CPT=93307)    Result Date: 3/8/2024  Transthoracic Echocardiogram Name:Mindi  Cristiano WILHELM Date: 2024 :  1970 Ht:  (67in)  BP: 127 / 78 MRN:  [id number]    Age:  53years    Wt:  (185lb) HR: 75bpm Loc:  [facility]       Gndr: M          BSA: 1.96m^2 Sonographer: Manuel Ordering:    Carlitos Sandoval Consulting:  Janet Velazco ---------------------------------------------------------------------------- History/Indications:  Impella placement. ---------------------------------------------------------------------------- Procedure information:  A transthoracic echocardiogram, limited study was performed. Additional evaluation included M-mode and limited 2D.  Patient status:  Inpatient.  Location:  U    This was a routine study. Transthoracic echocardiography for diagnosis, ventricular function evaluation, and post closure device deployment evaluation. Image quality was adequate. ECG rhythm:   Normal sinus ---------------------------------------------------------------------------- Conclusions: 1. Left ventricle: The cavity size was normal. Wall thickness was normal.    Systolic function was normal. The estimated ejection fraction was 50-55%,    by visual assessment. Hypokinesis of the anterolateral wall. Unable to    assess LV diastolic function. Impella catheter noted in the left    ventricle, placement measures 3.50cm from the aortic valve. 2. Right ventricle: Pacer wire noted in the right ventricle. 3. Right atrium: Pacer wire noted in right atrium. * ---------------------------------------------------------------------------- * Findings: Left ventricle:  The cavity size was normal. Wall thickness was normal. Systolic function was normal. The estimated ejection fraction was 50-55%, by visual assessment. Impella catheter noted in the left ventricle, placement measures 3.50cm from the aortic valve.  Regional wall motion abnormalities:  Hypokinesis of the anterolateral wall. Unable to assess LV diastolic function. Right ventricle:  The cavity size was normal. Pacer wire noted in the right  ventricle. Systolic function was normal. Right atrium:  Pacer wire noted in right atrium. Mitral valve:  The valve was structurally normal. Leaflet separation was normal. Aortic valve:   The valve was trileaflet. Tricuspid valve:  The valve is structurally normal. Leaflet separation was normal. Pulmonic valve:   Not visualized. Pericardium:   There was no pericardial effusion. Pulmonary arteries: Systolic pressure could not be accurately estimated. Systemic veins:  Central venous respirophasic diameter changes are blunted (< 50%). Inferior vena cava: The IVC was dilated. ---------------------------------------------------------------------------- Measurements  Left ventricle         Value        Ref       03/07/2024  IVS thickness, ED,     1.0   cm     0.6 - 1.0 1.3  PLAX  LV ID, ED, PLAX        5.1   cm     4.2 - 5.8 4.4  LV ID, ES, PLAX        3.8   cm     2.5 - 4.0 3.3  LV PW thickness,       0.9   cm     0.6 - 1.0 1.4  ED, PLAX  IVS/LV PW ratio,       1.11         --------- 0.93  ED, PLAX  LV PW/LV ID ratio,     0.18         --------- 0.32  ED, PLAX  LV ejection        (L) 50    %      52 - 72   50  fraction  Inferior vena cava     Value        Ref       03/07/2024  ID                 (H) 2.5   cm     <=2.1     ----------  Right ventricle        Value        Ref       03/07/2024  TAPSE, 2D              2.39  cm     >=1.70    ----------  TAPSE, MM              2.39  cm     >=1.70    ----------  RV s', lateral         18.8  cm/sec >=9.5     ---------- Legend: (L)  and  (H)  liza values outside specified reference range. ---------------------------------------------------------------------------- Prepared and electronically signed by Gurjit Elliott 03/08/2024 17:42     XR CHEST AP PORTABLE  (CPT=71045)    Result Date: 3/8/2024  PROCEDURE: XR CHEST AP PORTABLE  (CPT=71045) TIME: 12:05.   COMPARISON: [facility], XR CHEST AP PORTABLE (CPT=71045), 3/05/2024, 9:26 AM.  [facility], XR  CHEST AP PORTABLE (CPT=71045), 3/06/2024, 6:13 AM.  [facility], XR CHEST AP PORTABLE (CPT=71045), 3/07/2024, 10:58 AM.  INDICATIONS: Verify confirm NG tube placement for tube feeding.  Myocardial infarction, cardiogenic shock, acute hypoxic respiratory failure.  TECHNIQUE:   Single view.   FINDINGS:  CARDIAC/VASC: The cardiac silhouette is not enlarged.  Unremarkable pulmonary vasculature.  MEDIAST/ANTONI:   No visible mass or adenopathy. LUNGS/PLEURA: Subsegmental atelectasis has developed in the lower right lung.  No pleural effusion or pneumothorax. BONES: The osseous structures are unchanged. OTHER: An endotracheal tube tip projects 6.7 cm above the pham.  There is a new enteric tube with tip projecting over the expected location of the gastroesophageal junction.  A right internal jugular approach central venous catheter tip again projects over the SVC.  A right internal jugular approach Harris-Ophelia catheter tip again projects over the left interlobar artery. An Impella device again projects over the left ventricular apex.         CONCLUSION:  1. New enteric tube tip appears to terminate about the gastroesophageal junction.  Recommend advancing at least 9.0 cm distally into the stomach. 2. Additional lines/tubes in stable customary positioning. 3. Development of subsegmental atelectasis in the lower right lung.   Results of this examination were discussed with the patient's nurse, Mayela Ch, by Dr. Edwards at 12:40 on 03/08/2024.  Dictated by (CST): Bc Edwards MD on 3/08/2024 at 12:36 PM     Finalized by (CST): Bc Edwards MD on 3/08/2024 at 12:42 PM          IR CENTRAL VENOUS ACCESS    Result Date: 3/7/2024  PROCEDURE: IR ULTRASOUND-GUIDED NON TUNNELED HEMODIALYSIS CATHETER INSERTION  INDICATIONS:  53-year-old man with acute kidney insufficiency requiring CRRT.  (S): MD Dustin  ANESTHESIA/SEDATION: Level of anesthesia/sedation: None (Lidocaine only)  Anesthesia/sedation administered by: Not applicable  ESTIMATED BLOOD LOSS: Less than 5 mL  COMPLICATIONS: None  FINDINGS:  Informed consent was obtained from the patient's power-of-. The procedure was performed at bedside. The right side of the neck was sterilely prepped and draped. Preliminary ultrasound demonstrated a patent right internal jugular vein despite the presence of a Mann[place]-Ophelia catheter. Local Lidocaine was administered. Under ultrasound guidance, the vein was accessed with a micropuncture set; an image was saved.  A 0.035 inch wire was advanced through the dilator. The access was dilated. A 15 cm temporary hemodialysis catheter was placed with its tip in the expected location of the superior vena cava. The catheter was secured to the skin. Follow-up chest radiograph demonstrated the catheter terminating in expected position.         CONCLUSION: Placement of non-tunneled central venous catheter via right internal jugular vein at bedside.  The catheter is ready for use.    Dictated by (CST): Guille Chan MD on 3/07/2024 at 12:46 PM     Finalized by (CST): Guille Chan MD on 3/07/2024 at 12:47 PM          CARD ECHO 2D W/O DOPPLER (CPT=93307)    Result Date: 3/7/2024  Transthoracic Echocardiogram Name:Cristiano Obregon Date: 2024 :  1970 Ht:  (67in)  BP: 105 / 68 MRN:  [id number]    Age:  53years    Wt:  (183lb) HR: Loc:  [facility]       Gndr: M          BSA: 1.95m^2 Sonographer: Glenna DENIS Ordering:    Carlitos Sandoval Consulting:  Janet Velazco ---------------------------------------------------------------------------- History/Indications:   Impella Placement. ---------------------------------------------------------------------------- Procedure information:  A transthoracic echocardiogram, limited study was performed. Additional evaluation included M-mode and limited 2D.  Patient status:  Inpatient.  Location:  Bedside.    Comparison was made to the study of 2024.    This  was a routine study. Transthoracic echocardiography for diagnosis. Image quality was adequate. The study was technically limited due to restricted patient mobility and patient supine. ---------------------------------------------------------------------------- Conclusions: Left ventricle: The cavity size was normal. Wall thickness was mildly increased. Systolic function was mildly reduced. The estimated ejection fraction was 45-50%, by biplane method of disks. Unable to assess LV diastolic function. Impressions:  No significant change since prior study. * ---------------------------------------------------------------------------- * Findings: Left ventricle:  The cavity size was normal. Wall thickness was mildly increased. Systolic function was mildly reduced. The estimated ejection fraction was 45-50%, by biplane method of disks. Unable to assess LV diastolic function. Pericardium:   There was no pericardial effusion. Pleura:  No evidence of pleural fluid accumulation. ---------------------------------------------------------------------------- Measurements  Left ventricle         Value        Ref       03/06/2024  IVS thickness, ED, (H) 1.3   cm     0.6 - 1.0 ----------  PLAX  LV ID, ED, PLAX        4.4   cm     4.2 - 5.8 ----------  LV ID, ES, PLAX        3.3   cm     2.5 - 4.0 ----------  LV PW thickness,   (H) 1.4   cm     0.6 - 1.0 ----------  ED, PLAX  IVS/LV PW ratio,       0.93         --------- ----------  ED, PLAX  LV PW/LV ID ratio,     0.32         --------- ----------  ED, PLAX  LV ejection        (L) 50    %      52 - 72   ----------  fraction  LV end-diastolic       90    ml     69 - 185  82  volume, 1-p A4C  LV ejection            46    %      46 - 74   39  fraction, 1-p A4C  Stroke volume, 1-p     41    ml     --------- 32  A4C  LV end-diastolic       46    ml/m^2 37 - 93   43  volume/bsa, 1-p  A4C  Stroke volume/bsa,     21    ml/m^2 --------- 17  1-p A4C  LV end-diastolic       79    ml     62 -  150  81  volume, 2-p  LV end-systolic        45    ml     21 - 61   49  volume, 2-p  LV ejection        (L) 43    %      52 - 72   40  fraction, 2-p  Stroke volume, 2-p     34    ml     --------- 33  LV end-diastolic       40    ml/m^2 34 - 74   42  volume/bsa, 2-p  LV end-systolic        23    ml/m^2 11 - 31   25  volume/bsa, 2-p  Stroke volume/bsa,     17.2  ml/m^2 --------- 16.9  2-p Legend: (L)  and  (H)  liza values outside specified reference range. ---------------------------------------------------------------------------- Prepared and electronically signed by Carlitos Sandoval 03/07/2024 12:43     XR CHEST AP PORTABLE  (CPT=71045)    Result Date: 3/7/2024         PROCEDURE: XR CHEST AP PORTABLE  (CPT=71045) TIME: 1058  COMPARISON: [facility], XR CHEST AP PORTABLE (CPT=71045), 3/06/2024, 6:13 AM.  INDICATIONS: Verify Temporary Hemodialysis Catheter placement.  TECHNIQUE:   Single view.   Findings and impression:  Right IJ dialysis catheter in the lower SVC.  No pneumothorax  Interval PA catheter in the left inter lobar artery  Stable ETT and Impella.  Enteric tube remains in the lower esophagus  Increasing vascular congestion with mild basilar predominant edema     Dictated by (CST): Joseph Rodriguez MD on 3/07/2024 at 11:51 AM     Finalized by (CST): Joseph Rodriguez MD on 3/07/2024 at 11:52 AM          US KIDNEYS (CPT=76775)    Result Date: 3/6/2024  PROCEDURE: US KIDNEYS (CPT=76775)  COMPARISON: None.  INDICATIONS: Acute renal failure  TECHNIQUE: Ultrasound examination was performed to visualize the kidneys and bladder.   FINDINGS:  RIGHT KIDNEY: 11 cm.  No mass or calculus or hydronephrosis.  LEFT KIDNEY: 10.5 cm. No mass or calculus or hydronephrosis.  BLADDER: Partly decompressed around a [place] catheter.  Bladder contains 40 cc of urine.  CONCLUSION: No hydronephrosis     DICTATED BY (CST): JOSEPH RODRIGUEZ MD ON 3/06/2024 AT 3:00 PM     FINALIZED BY (CST): JOSEPH RODRIGUEZ MD ON 3/06/2024 AT 3:01 PM              CARD ECHO 2D W/O DOPPLER (CPT=93307)    Result Date: 3/6/2024  Transthoracic Echocardiogram Name:Cristiano Obregon Date:    2024    :     1970    Ht:     (67in)     BP: MRN:     [id number]       Age:     53years       Wt:     (180lb)    HR: Loc:     [facility]          Gndr:    M             BSA:    1.93m^2 Sonographer: BELKIS Cedeno RDCS Ordering:    Carlitos Sandoval Consulting:  Janet Velazco ---------------------------------------------------------------------------- Procedure information:  A transthoracic echocardiogram, limited study was performed. Additional evaluation included M-mode and limited 2D.  Image quality was adequate. ECG rhythm:   Normal sinus ---------------------------------------------------------------------------- Conclusions: Left ventricle: The cavity size was normal. Wall thickness was normal. Systolic function was mildly reduced. The estimated ejection fraction was 40-45%, by biplane method of disks. Impella catheter noted in the left ventricle, placement measures 3.70cm from the aortic valve. * ---------------------------------------------------------------------------- * Findings: Left ventricle:  The cavity size was normal. Wall thickness was normal. Systolic function was mildly reduced. The estimated ejection fraction was 40-45%, by biplane method of disks. Impella catheter noted in the left ventricle, placement measures 3.70cm from the aortic valve. Left atrium:  The atrium was normal in size. Right ventricle:  The cavity size was normal. Wall thickness was normal. Systolic function was normal. Right atrium:  The atrium was normal in size. Mitral valve:  The valve was structurally normal. Leaflet separation was normal. Aortic valve:  The valve was structurally normal. The valve was trileaflet. Cusp separation was normal. Tricuspid valve:  The valve is structurally normal. Leaflet separation was normal. Pulmonic valve:   The valve is structurally normal. Cusp separation was  normal. Pericardium:   There was no pericardial effusion. Aorta: Aortic root: The aortic root was normal-sized. Pulmonary arteries: The main pulmonary artery was normal-sized. Systemic veins: Inferior vena cava: The IVC was normal-sized. ---------------------------------------------------------------------------- Measurements  Left ventricle                     Value        Ref  LV end-diastolic volume, 1-p       82    ml     69 - 185  A4C  LV ejection fraction, 1-p A4C  (L) 39    %      46 - 74  Stroke volume, 1-p A4C             32    ml     --------  LV end-diastolic volume/bsa,       43    ml/m^2 37 - 93  1-p A4C  Stroke volume/bsa, 1-p A4C         17    ml/m^2 --------  LV end-diastolic volume, 2-p       81    ml     62 - 150  LV end-systolic volume, 2-p        49    ml     21 - 61  LV ejection fraction, 2-p      (L) 40    %      52 - 72  Stroke volume, 2-p                 33    ml     --------  LV end-diastolic volume/bsa,       42    ml/m^2 34 - 74  2-p  LV end-systolic volume/bsa,        25    ml/m^2 11 - 31  2-p  Stroke volume/bsa, 2-p             16.9  ml/m^2 --------  Right ventricle                    Value        Ref  TAPSE, MM                          2.29  cm     >=1.70  RV s', lateral                     16.3  cm/sec >=9.5 Legend: (L)  and  (H)  liza values outside specified reference range. ---------------------------------------------------------------------------- Prepared and electronically signed by Andrea Villeda 03/06/2024 11:13     CATH SWAN WAYNE INSERTION    Result Date: 3/6/2024  This exam has been completed. Please refer to Notes for the results to this procedure.    XR CHEST AP PORTABLE  (CPT=71045)    Addendum Date: 3/6/2024    ADDENDUM:  Enteric tube remains positioned in the distal esophagus approximately 3 cm above the expected location of the GE junction   Dictated by (CST): Reyes Rodriguez MD on 3/06/2024 at 7:45 AM     Finalized by (CST): Reyes Rodriguez MD on 3/06/2024 at 7:46 AM              Result Date: 3/6/2024         PROCEDURE: XR CHEST AP PORTABLE  (CPT=71045) TIME: 613  COMPARISON: [facility], XR CHEST AP PORTABLE (CPT=71045), 3/05/2024, 9:26 AM.  [facility], XR CHEST AP PORTABLE (CPT=71045), 3/05/2024, 6:37 PM.  INDICATIONS: Follow up chest pain.  TECHNIQUE:   Single view.   Findings and impression:  Impella was advanced further into the left ventricle  ETT 5.5 cm above the pham  Stable normal heart size.  Stable borderline vascular congestion but there is no edema  Minimal basilar atelectasis.  No consolidation  No pneumothorax or significant effusion     Dictated by (CST): Reyes Rodriguez MD on 3/06/2024 at 7:41 AM     Finalized by (CST): Reyes Rodriguez MD on 3/06/2024 at 7:44 AM          CATH VENTRICULAR ASSIST DEVICE    Result Date: 3/5/2024  This exam has been completed. Please refer to Notes for the results to this procedure.    XR CHEST AP PORTABLE  (CPT=71045)    Result Date: 3/5/2024  PROCEDURE: XR CHEST AP PORTABLE  (CPT=71045) TIME: 1659.   COMPARISON: [facility], XR CHEST AP PORTABLE (CPT=71045), 3/05/2024, 3:17 PM.  [facility], XR CHEST AP PORTABLE (CPT=71045), 3/05/2024, 9:26 AM.  INDICATIONS: Verify OG placement.  TECHNIQUE:   Single view.   FINDINGS:          CONCLUSION:   Nasogastric tube with tip within the distal esophagus and side hole within the mid esophagus.  Advancement recommended.  Remainder of the lines and tubes are otherwise unchanged.    Dictated by (CST): Henri Melgoza MD on 3/05/2024 at 7:30 PM     Finalized by (CST): Henri Melgoza MD on 3/05/2024 at 7:32 PM          XR CHEST AP PORTABLE  (CPT=71045)    Result Date: 3/5/2024  PROCEDURE: XR CHEST AP PORTABLE  (CPT=71045) TIME: 1842 hours  COMPARISON: [facility], XR CHEST AP PORTABLE (CPT=71045), 3/05/2024, 4:59 PM.  INDICATIONS: Impella placement.  Follow-up abnormal chest x-ray.  TECHNIQUE:   Single view.   FINDINGS:   CARDIAC/VASC: Heart size and pulmonary vascularity normal. MEDIAST/ANTONI:   No visible mass or adenopathy. LUNGS/PLEURA: No consolidation or pleural effusion. BONES: No fracture or visible bony lesion. OTHER: Pigtail from the Impella device is probably in the LVOT and outlet pump in aortic arch.  ET tube in the thoracic inlet approximately 6 cm above the pham.  NG tube tip in distal esophagus just above the diaphragm.         CONCLUSION:  1. Pigtail of Impella device is probably in the LVOT, and the outlet pump is probably in the aortic arch. 2. High position of ET tube approximately 6 cm above pham. 3. High position of nasogastric tube with tip in the distal esophagus just above GE junction.     Dictated by (CST): Fabian Gay MD on 3/05/2024 at 6:54 PM     Finalized by (CST): Fabian Gay MD on 3/05/2024 at 7:07 PM          XR CHEST AP PORTABLE  (CPT=71045)    Result Date: 3/5/2024  PROCEDURE: XR CHEST AP PORTABLE  (CPT=71045) TIME: 1517 hours.   COMPARISON: [facility], XR CHEST AP PORTABLE (CPT=71045), 3/05/2024, 9:26 AM.  INDICATIONS: OG placement  TECHNIQUE:   Single view.   FINDINGS:  CARDIAC/VASC: Normal heart size and mildly prominent pulmonary vascularity with minimal interstitial edema improved somewhat since previous study.  Impella device in profile with the left side of the heart. MEDIAST/ANTONI:   No visible mass or adenopathy. LUNGS/PLEURA: ET tube in the trachea the level the clavicles.  No pneumothorax.  No acute airspace consolidation.  Mild interstitial edema but improved somewhat since previous study.  NG tube terminates at the GE junction and should be advanced further into the stomach by at least 10 cm. BONES: No fracture or visible bony lesion. OTHER: Negative.          CONCLUSION:  1. NG tube terminates at the GE junction should be advanced further into the stomach by at least 10 cm. 2. Normal heart size with mild interstitial edema but improved somewhat since previous  study.  No large airspace consolidation or pleural effusion.  No pneumothorax.  ET tube in the trachea the level the clavicles.    Dictated by (CST): Moisés Delgadillo MD on 3/05/2024 at 3:48 PM     Finalized by (CST): Moisés Delgadillo MD on 3/05/2024 at 3:50 PM          US CAROTID DOPPLER BILAT - DIAG IMG (CPT=93880)    Result Date: 3/5/2024  PROCEDURE: US CAROTID DOPPLER BILAT-DIAG IMG (CPT=93880)  COMPARISON: None.  INDICATIONS: Pre op cabg  TECHNIQUE: Color duplex Doppler ultrasound and pulsed Doppler analysis were performed to evaluate the cervical carotid arteries and vertebral flow.  All measurements for carotid artery narrowing or stenosis were obtained using the ipsilateral distal internal carotid artery as the reference value.  (NASCET criteria)  FINDINGS:  PEAK FLOW VELOCITIES (cm/sec):  RIGHT CAROTID: Mild ICA atherosclerotic plaque.  CCA Peak systolic: 40  ICA Peak systolic: 27.  ICA End diastolic: 15.   ECA Peak systolic:  32.   VICA/VCCA: 0.9.   LEFT CAROTID: Distal ICA not visualized.  Mild atherosclerotic plaque bifurcation and ICA.  CCA Peak systolic: 37.   ICA Peak systolic: 27.   ICA End diastolic: 19.   ECA Peak systolic:  29.   VICA/VCCA: 0.8.   VERTEBRALS: Antegrade flow on the left.  Right vertebral artery not visualized.  Left Peak systolic: 29.   OTHER ectral Doppler US Thresholds (Reference: Isauro EG, et al. Radiology 2000; 214:247-252)      Stenosis (%)       PSV (cm/sec)       VICA/VCCA           0-49                    <150                     <2.5           50-69                  150-225                2.5-4.0           >    CONCLUSION:   Distal left internal carotid artery not visualized.  Otherwise no evidence of bilateral hemodynamically significant stenosis.  Nonvisualization right vertebral artery.  Antegrade flow left vertebral artery.    Dictated by (CST): Jcarlos Harmon MD on 3/05/2024 at 2:31 PM     Finalized by (CST): Jcarlos Harmon MD on 3/05/2024 at 2:35 PM          CARD  ECHO 2D W/O DOPPLER (CPT=93307)    Result Date: 3/5/2024  Transthoracic Echocardiogram Name:Cristiano Obregon Date: 2024 :  1970 Ht:  (67in)  BP: 107 / 84 MRN:  [id number]    Age:  53years    Wt:  (160lb) HR: 75bpm Loc:  [facility]       Gndr: M          BSA: 1.84m^2 Sonographer: Angelica Ordering:    Carlitos Sandoval Consulting:  Danial Kirk ---------------------------------------------------------------------------- History/Indications:   Coronary artery disease. Impella placement. ---------------------------------------------------------------------------- Procedure information:  A transthoracic echocardiogram, limited study was performed. Additional evaluation included M-mode and limited 2D.  Patient status:  Inpatient.  Location:  Bedside.    Comparison was made to the study of 2024.    This was a STAT study. Transthoracic echocardiography for diagnosis, ventricular function evaluation, and post intervention evaluation. Image quality was adequate. ECG rhythm:   Frequent ectopies ---------------------------------------------------------------------------- Conclusions: 1. Left ventricle: The cavity size was normal. Wall thickness was normal.    Systolic function was at the lower limits of normal. The estimated    ejection fraction was 50-55%, by visual assessment. Unable to assess LV    diastolic function. Impella catheter noted in the left ventricle,    placement measures 3.50cm from the aortic valve. 2. Left atrium: The atrium was mildly dilated. 3. Right atrium: The atrium was mildly dilated. Impressions:  This study is compared with previous dated 2024: Compared to the previous study, these findings are new. Interval placement of impella. * ---------------------------------------------------------------------------- * Findings: Left ventricle:  The cavity size was normal. Wall thickness was normal. Systolic function was at the lower limits of normal. The estimated ejection fraction was 50-55%,  by visual assessment. Impella catheter noted in the left ventricle, placement measures 3.50cm from the aortic valve. Unable to assess LV diastolic function. Left atrium:  The atrium was mildly dilated. Right ventricle:  The cavity size was normal. Systolic function was normal. Right atrium:  The atrium was mildly dilated. Mitral valve:  The valve was structurally normal. Leaflet separation was normal. Aortic valve:   The valve was trileaflet.   Cusp separation was normal. Tricuspid valve:  The valve is structurally normal. Leaflet separation was normal. Pulmonic valve:   Not well visualized. Pericardium:   There was no pericardial effusion. Pulmonary arteries: Systolic pressure could not be accurately estimated. ---------------------------------------------------------------------------- Measurements  Left ventricle               Value    Ref  IVS thickness, ED, PLAX      1.0   cm 0.6 - 1.0  LV ID, ED, PLAX              4.7   cm 4.2 - 5.8  LV ID, ES, PLAX              3.2   cm 2.5 - 4.0  LV PW thickness, ED, PLAX    0.8   cm 0.6 - 1.0  IVS/LV PW ratio, ED, PLAX    1.25     ---------  LV PW/LV ID ratio, ED, PLAX  0.17     ---------  LV ejection fraction         60    %  52 - 72 Legend: (L)  and  (H)  liza values outside specified reference range. ---------------------------------------------------------------------------- Prepared and electronically signed by Carlitos Sandoval 03/05/2024 11:32     XR CHEST AP PORTABLE  (CPT=71045)    Result Date: 3/5/2024  PROCEDURE: XR CHEST AP PORTABLE  (CPT=71045) TIME: 0926 hours  COMPARISON: None.  INDICATIONS: Chest pain, post intubation  TECHNIQUE:   Single view.   FINDINGS:  CARDIAC/VASC: Normal heart size with mild to moderate pulmonary congestive change suggesting acute cardiac failure/fluid overload.  Correlate clinically.  Cardiac device projected in profile with the left ventricle and aortic outflow tract. MEDIAST/ANTONI:   No visible mass or adenopathy. LUNGS/PLEURA: ET tube in  the trachea at the level of the clavicles.  No pneumothorax.  No large airspace consolidation.  Mild to moderate pulmonary congestive change. BONES: No fracture or visible bony lesion. OTHER: Negative.          CONCLUSION:  1. Normal heart size with mild to moderate pulmonary congestive change suggesting acute cardiac failure/fluid overload.  Correlate clinically.  ET tube in the trachea the level of the clavicles.  No pneumothorax or large airspace consolidation.    Dictated by (CST): Moisés Delgadillo MD on 3/05/2024 at 9:47 AM     Finalized by (CST): Moisés Delgadillo MD on 3/05/2024 at 9:50 AM          CARD ECHO LIMITED (CPT=93308)    Result Date: 3/5/2024  Transthoracic Echocardiogram Name:Cristiano Obregon Date:    2024    :     1970    Ht:     (67in)     BP: MRN:     [id number]       Age:     53years       Wt:     (160lb)    HR: Loc:     [facility]          Gndr:    M             BSA:    1.84m^2 Sonographer: ADEEL Wyman Ordering:    Carlitos Sandoval Consulting:  Richie Llanes ---------------------------------------------------------------------------- History/Indications:   Chest pain.  Coronary artery disease. ---------------------------------------------------------------------------- Procedure information:  A transthoracic echocardiogram, limited study was performed. Additional evaluation included M-mode and limited 2D.  Patient status:  Inpatient.  Location:  Catheterization laboratory.    This was a STAT study. Transthoracic echocardiography for diagnosis and ventricular function evaluation. Image quality was adequate. ---------------------------------------------------------------------------- Conclusions: 1. Left ventricle: Systolic function was mildly reduced. The estimated    ejection fraction was 45-50%, by visual assessment. Unable to assess LV    diastolic function. 2. Left atrium: The atrium was dilated. 3. Right atrium: The atrium was mildly dilated. 4. Aortic valve: There was mild regurgitation.  5. Mitral valve: There was mild regurgitation. 6. Limited study in cath lab, grossly ejection fraction 45-50%. Wire present    in the left ventricle. Impressions:  No previous study was available for comparison. * ---------------------------------------------------------------------------- * Findings: Left ventricle:  Systolic function was mildly reduced. The estimated ejection fraction was 45-50%, by visual assessment. Unable to assess LV diastolic function. Left atrium:  The atrium was dilated. Right ventricle:  The cavity size was normal. Systolic function was normal. Right atrium:  The atrium was mildly dilated. Mitral valve:  The valve was structurally normal. Leaflet separation was normal.  Doppler:  Transvalvular velocity was within the normal range. There was no evidence for stenosis. There was mild regurgitation. Aortic valve:  Not well visualized.  Doppler:  There was mild regurgitation. Tricuspid valve:  The valve is structurally normal. Leaflet separation was normal.  Doppler:  Transvalvular velocity was within the normal range. There was no evidence for stenosis. There was mild regurgitation. Pulmonic valve:   Not visualized. Pericardium:   There was no pericardial effusion. Pulmonary arteries: Systolic pressure could not be accurately estimated. Systemic veins:  Not visualized. ---------------------------------------------------------------------------- Prepared and electronically signed by Carlitos Sandoval 03/05/2024 09:46     CATH LEFT HEART CATH W/INTERVENTION    Result Date: 3/5/2024  This exam has been completed. Please refer to Notes for the results to this procedure.      Assessment/Plan:  Patient Active Problem List   Diagnosis    Hypercholesteremia    Floaters in visual field, bilateral    Acne, unspecified acne type    Eczema, unspecified type    Paresthesia of left thumb    Chest pain    NSTEMI (non-ST elevated myocardial infarction) (HCC)    Cerebrovascular accident (CVA) due to embolism of left  middle cerebral artery (HCC)    Anemia    Clonus   107 plate  7.7 hgb  Stable from previous bleed around trach  obs    STEPHEN VILLA MD  3/28/2024  9:33 AM

## 2024-03-28 NOTE — PROGRESS NOTES
Critical Care Progress Note     Assessment / Plan:  Acute respiratory failure - initially due to pulmonary edema then encephalopathy and severe deconditioning, s/p tracheostomy 3/22  - continue full vent support  - precedex as needed  - PS trials daily, had been on hold due to bleeding from trach site, can restart today  NSTEMI - diagnosed with new multivessel CAD s/p angioplasty and cardiogenic shock requiring Impella placement. S/p 3vCABG 3/15. Impella removed 3/16. Back to OR 3/16 AM for tamponade s/p pericardial clot removal  - cardiology and cardiac surgery following  Cardiogenic shock - due to acute coronary syndrome, EF was down and has now improved on repeat echo  - off vasopressors  Atrial fibrillation  - per cardiology  Pneumonia - sputum cultures with Klebsiella and MSSA  - antibiotics per ID  - on cefepime  Acute renal failure - suspect from ATN and cardiogenic shock  - RRT per nephrology  - continuing on CRRT and holding iHD per neurology due to cerebral edema  Stroke  - MRI with evolution of stroke and edema with patchy parenchymal hemorrhage  - repeat CT with edema, patchy hemorrhage, and 4mm midline shift  - neurology following  Thrombocytopenia, anemia - HIT antibody was negative, continued oozing from trach and g-tube site despite relatively unremarkable coags  - INR 1.23, s/p vitamin k 3/27  - fibrinogen has been normal  - plts remain above 100k  - trend CBC and transfuse for hgb < 7, plts < 50  - surgicell to oozing sites  - holding plavix and subcutaneous heparin  - HIT antibody negative x2  - heme following  - appreciate surgery assistance, may need to re-explore trach site if bleeding continues despite correction of mild coagulopathy  FEN  - PEG placed 3/23  - continue TFs  Proph  - subcutaneous heparin on hold for bleeding  - famotidine  Dispo  - full code  - ICU, will follow    Discussed with patient and RN    Critical care time: 37 minutes    Farooq Olvera MD  Pulmonary & Critical Care  Medicine  [facility]      Subjective:  No acute events overnight  Last transfusion was yesterday afternoon with 1u pRBCs  Trach site oozing improved today  Awake and following commands  Denies pain    Objective:  Vitals:    03/28/24 0400 03/28/24 0444 03/28/24 0500 03/28/24 0600   BP: (!) 87/53 106/65 113/73 133/76   BP Location: Right arm  Right arm Right arm   Pulse: 53 73 73 112   Resp: 17 23 22 26   Temp: 97.7 °F (36.5 °C)      TempSrc: Temporal      SpO2: 97% 100% 100% 99%   Weight:       Height:         Physical Exam:  General: laying in bed, trach in place  Respiratory: clear bilaterally, compliant with ventilator  Cardiovascular: regular rate and rhythm, no m/r/g  Abdomen: g-tube in place, soft, NTND  Extremities: no LE edema  Mental status: awake, following commands    Medications:  Reviewed in EMR    Lab Data:  Reviewed in EMR    Imaging:  I independently visualized all relevant chest imaging in PACS and agree with radiology interpretation except where noted.

## 2024-03-28 NOTE — PLAN OF CARE
Problem: Patient Centered Care  Goal: Patient preferences are identified and integrated in the patient's plan of care  Description: Interventions:  - What would you like us to know as we care for you? Pain, comfort levels  - Provide timely, complete, and accurate information to patient/family  - Incorporate patient and family knowledge, values, beliefs, and cultural backgrounds into the planning and delivery of care  - Encourage patient/family to participate in care and decision-making at the level they choose  - Honor patient and family perspectives and choices  Outcome: Progressing     Problem: RESPIRATORY - ADULT  Goal: Achieves optimal ventilation and oxygenation  Description: INTERVENTIONS:  - Assess for changes in respiratory status  - Assess for changes in mentation and behavior  - Position to facilitate oxygenation and minimize respiratory effort  - Oxygen supplementation based on oxygen saturation or ABGs  - Provide Smoking Cessation handout, if applicable  - Encourage broncho-pulmonary hygiene including cough, deep breathe, Incentive Spirometry  - Assess the need for suctioning and perform as needed  - Assess and instruct to report SOB or any respiratory difficulty  - Respiratory Therapy support as indicated  - Manage/alleviate anxiety  - Monitor for signs/symptoms of CO2 retention  Outcome: Progressing     Problem: CARDIOVASCULAR - ADULT  Goal: Maintains optimal cardiac output and hemodynamic stability  Description: INTERVENTIONS:  - Monitor vital signs, rhythm, and trends  - Monitor for bleeding, hypotension and signs of decreased cardiac output  - Evaluate effectiveness of vasoactive medications to optimize hemodynamic stability  - Monitor arterial and/or venous puncture sites for bleeding and/or hematoma  - Assess quality of pulses, skin color and temperature  - Assess for signs of decreased coronary artery perfusion - ex. Angina  - Evaluate fluid balance, assess for edema, trend weights  Outcome:  Progressing  Goal: Absence of cardiac arrhythmias or at baseline  Description: INTERVENTIONS:  - Continuous cardiac monitoring, monitor vital signs, obtain 12 lead EKG if indicated  - Evaluate effectiveness of antiarrhythmic and heart rate control medications as ordered  - Initiate emergency measures for life threatening arrhythmias  - Monitor electrolytes and administer replacement therapy as ordered  Outcome: Progressing     Problem: SKIN/TISSUE INTEGRITY - ADULT  Goal: Skin integrity remains intact  Description: INTERVENTIONS  - Assess and document risk factors for pressure ulcer development  - Assess and document skin integrity  - Monitor for areas of redness and/or skin breakdown  - Initiate interventions, skin care algorithm/standards of care as needed  Outcome: Progressing  Goal: Incision(s), wounds(s) or drain site(s) healing without S/S of infection  Description: INTERVENTIONS:  - Assess and document risk factors for pressure ulcer development  - Assess and document skin integrity  - Assess and document dressing/incision, wound bed, drain sites and surrounding tissue  - Implement wound care per orders  - Initiate isolation precautions as appropriate  - Initiate Pressure Ulcer prevention bundle as indicated  Outcome: Progressing  Goal: Oral mucous membranes remain intact  Description: INTERVENTIONS  - Assess oral mucosa and hygiene practices  - Implement preventative oral hygiene regimen  - Implement oral medicated treatments as ordered  Outcome: Progressing     Problem: NEUROLOGICAL - ADULT  Goal: Achieves stable or improved neurological status  Description: INTERVENTIONS  - Assess for and report changes in neurological status  - Initiate measures to prevent increased intracranial pressure  - Maintain blood pressure and fluid volume within ordered parameters to optimize cerebral perfusion and minimize risk of hemorrhage  - Monitor temperature, glucose, and sodium. Initiate appropriate interventions as  ordered  Outcome: Progressing     Problem: Safety Risk - Non-Violent Restraints  Goal: Patient will remain free from self-harm  Description: INTERVENTIONS:  - Apply the least restrictive restraint to prevent harm  - Notify patient and family of reasons restraints applied  - Assess for any contributing factors to confusion (electrolyte disturbances, delirium, medications)  - Discontinue any unnecessary medical devices as soon as possible  - Assess the patient's physical comfort, circulation, skin condition, hydration, nutrition and elimination needs   - Reorient and redirection as needed  - Assess for the need to continue restraints  Outcome: Progressing     Problem: Delirium  Goal: Minimize duration of delirium  Description: Interventions:  - Encourage use of hearing aids, eye glasses  - Promote highest level of mobility daily  - Provide frequent reorientation  - Promote wakefulness i.e. lights on, blinds open  - Promote sleep, encourage patient's normal rest cycle i.e. lights off, TV off, minimize noise and interruptions  - Encourage family to assist in orientation and promotion of home routines  Outcome: Not Progressing     Problem: METABOLIC/FLUID AND ELECTROLYTES - ADULT  Goal: Hemodynamic stability and optimal renal function maintained  Description: INTERVENTIONS:  - Monitor labs and assess for signs and symptoms of volume excess or deficit  - Monitor intake, output and patient weight  - Monitor urine specific gravity, serum osmolarity and serum sodium as indicated or ordered  - Monitor response to interventions for patient's volume status, including labs, urine output, blood pressure (other measures as available)  - Encourage oral intake as appropriate  - Instruct patient on fluid and nutrition restrictions as appropriate  Outcome: Not Progressing     Problem: HEMATOLOGIC - ADULT  Goal: Free from bleeding injury  Description: (Example usage: patient with low platelets)  INTERVENTIONS:  - Avoid intramuscular  injections, enemas and rectal medication administration  - Ensure safe mobilization of patient  - Hold pressure on venipuncture sites to achieve adequate hemostasis  - Assess for signs and symptoms of internal bleeding  - Monitor lab trends  - Patient is to report abnormal signs of bleeding to staff  - Avoid use of toothpicks and dental floss  - Use electric shaver for shaving  - Use soft bristle tooth brush  - Limit straining and forceful nose blowing  Outcome: Not Progressing

## 2024-03-28 NOTE — OCCUPATIONAL THERAPY NOTE
Attempted to see pt for OT  today. Pt is in chair per RN but still on CRRT. Will re-attempt when pt is available.    Nazanin Trinidad, OTR/L

## 2024-03-28 NOTE — PROGRESS NOTES
Neurology progress note    6:07 PM-contacted by patient's RN that head CT had been completed.  Repeat head CT reviewed.  Edema is  worse, likely due to the increased blood products in the infarct bed.  Edema will improve as the blood products breakdown and there is resolution of the petechial hemorrhage.  Patient's antiplatelets still on hold.      6:32 PM-reviewed his vitals after he was seen earlier in the day; most recent SBP was in the 150s.   Recommended systolic blood pressure <140 and  ordered a nicardipine drip.    6:41 PM RN noted that patient was more drowsy after CT and now was unresponsive.  Exam performed earlier in the day was while on Precedex.  BP was 192/98 pulse 83.  Blood sugar 183.  Patient was now diaphoretic and appeared as if he was posturing per RN.  Pupils also noted to be anisocoric irregular and unresponsive.  Left pupil is 4 mm right pupil was 2 mm..  Ordered repeat STAT CT head. Called stroke alert.  Recommend   as needed hydralazine and labetalol for BP control pending nicardipine.    I called Dr. Vo from neurosurgery who was about to begin a case and kindly came and evaluated the patient immediatly at the bedside.      Dr. Lyons   was at the bedside immediately and recommended hypertonic therapy pending CT. Dr. Vo reviewed the CT scan and the CT control room.  Patient had expansion of his intracranial hemorrhage with subfalcine and uncal  herniation.  Given his coagulopathy there was no role for any surgical intervention.    It was explained to family the patient had irreversible neurological injury from his hemorrhage and herniation and that the likelihood he would have any meaningful recovery was very low.    Pt's hospitalist Dr. Camarena also evaluated pt and discussed goals of care. Pt's family elected comfort care.    ICH score: 3      Michael Winters,   Staff Vascular & General Neurology

## 2024-03-28 NOTE — PROGRESS NOTES
Cardiology Progress Note    Cristiano Moralezmesfin Patient Status:  Inpatient    1970 MRN [id number]   Location [facility] 2W/SW Attending Donna Llanes,    Hosp Day # 23 PCP Abilio Dorsey MD     Assessment   Generalized bleeding/oozing: Anemia and thrombocytopenia, likely consumptive.  HIT and DIC panel negative.    STEMI, severe multivessel coronary disease status post CABG: Last echocardiogram 3/16 while Impella was in place, ejection fraction 45 to 50%.  Emergent redo sternotomy 3/16/2024 for tamponade    Cardiogenic shock requiring Impella-resolved    Ventilator dependent respiratory failure, status post tracheostomy 3/22/2024, has had generalized oozing and bleeding.  General surgery following.    Acute stroke: Left frontal lobe infarct associate with edema and mild hemorrhagic conversion with mild mass effect.    Acute renal failure, on CRRT based on neuro recommendations due to cerebral edema.    Atrial fibrillation, paroxysmal    Status post PEG tube placement, on TF    Plan  Repeat echocardiogram  Trend blood counts and give blood products as needed  Continue CRRT per renal  Management of tracheostomy per general surgery      Carlitos Sandoval MD  Interventional Cardiology  [facility]  ----------------------------------------------------------------------------------------------------------  ROS 12 systems reviewed, pertinent findings above.  ROS    History:  History reviewed. No pertinent past medical history.  Past Surgical History:   Procedure Laterality Date    COLONOSCOPY N/A 2017    Procedure: COLONOSCOPY, POSSIBLE BIOPSY, POSSIBLE POLYPECTOMY 42397;  Surgeon: Byron Plaza MD;  Location: [facility]     History reviewed. No pertinent family history.   reports that he has been smoking cigarettes. He has never used smokeless tobacco. He reports that he does not drink alcohol and does not use drugs.    Objective:   Temp: 97.7 °F  (36.5 °C)  Pulse: 89  Resp: 21  BP: 120/73  FiO2 (%): 30 %    Intake/Output:     Intake/Output Summary (Last 24 hours) at 3/28/2024 0837  Last data filed at 3/28/2024 0800  Gross per 24 hour   Intake 1743.25 ml   Output 1646 ml   Net 97.25 ml       Physical Exam:    General: on Vent/trach, awake/alert  HEENT: Normocephalic, anicteric sclera, neck supple.  Neck: No JVD, carotids 2+, no bruits.  Cardiac: Regular rate and rhythm. S1, S2 normal. No murmur, pericardial rub, S3.  Lungs: Clear without wheezes, rales, rhonchi or dullness.  Normal excursions and effort.  Abdomen: Soft, non-tender. BS-present.  Extremities: Without clubbing, cyanosis or edema.  Peripheral pulses are 2+.  Neurologic: Non-focal  Skin: Warm and dry.

## 2024-03-28 NOTE — RESPIRATORY THERAPY NOTE
Pt received last night at 2300 on VC/AC ventilation, rate 12, Vt 530, PEEP +5, FiO2 30% tolerating well. Pt has trach Portex size 7. Bilat BS auscultated, pt suctioned as needed with small amounts of blood tinged secretions. No changes were made to the vent overnight. Trach care was done at 0021 and 0349 this morning. Pt has large amounts of bloody secretions around the trach site. Emergency trach supplies at the bedside. RT will continue to monitor.

## 2024-03-28 NOTE — PROGRESS NOTES
[facility]  part of [facility] Infectious Disease  Progress Note    Cristiano Obregon Patient Status:  Inpatient    1970 MRN [id number]   Location [facility] 2W/SW Attending Bud Camarena MD   Hosp Day # 23 PCP Abilio Dorsey MD     Cristiano Obregon is a 53 year old male.   Chief Complaint   Patient presents with    Chest Pain Angina       HPI:      Awake on vent  Less bleeding               REVIEW OF SYSTEMS:   A comprehensive 10 point review of systems was completed.  Pertinent positives and negatives noted in the the HPI.       Allergies:  No Known Allergies     Current Meds:    Current Facility-Administered Medications:     NxStage Pure Flow 400 CRRT/PIRRT fluid 5000mL, 2.5 L/hr, CRRT, Continuous **AND** CRRT Replacement Fluid 400, , , Until Discontinued    dexmedeTOMIDine (Precedex) 800 mcg in sodium chloride 0.9% 100 mL infusion, 0.2-1.5 mcg/kg/hr, Intravenous, Continuous    [START ON 2024] amiodarone (Pacerone) tab 200 mg, 200 mg, Oral, Daily    morphINE PF 2 MG/ML injection 1 mg, 1 mg, Intravenous, Q4H PRN    oxidized cellulose (Surgical Snow) external sheet 1 Package, 1 each, Topical, PRN    ceFEPIme (Maxipime) 1 g in sodium chloride 0.9% 100 mL IVPB-MBP, 1 g, Intravenous, Q12H    amiodarone (Pacerone) tab 200 mg, 200 mg, Oral, BID with meals    sodium chloride 0.9% infusion, , Intravenous, Once    heparin sodium lock flush 100 UNIT/ML injection 150 Units, 1.5 mL, Intravenous, Once    sodium chloride 0.9% infusion, , Intravenous, Once    sodium chloride 0.9% infusion, , Intravenous, Once    hydrALAzine (Apresoline) 20 mg/mL injection 10 mg, 10 mg, Intravenous, Once    labetalol (Trandate) 5 mg/mL injection 10 mg, 10 mg, Intravenous, Q10 Min PRN    hydrALAzine (Apresoline) 20 mg/mL injection 10 mg, 10 mg, Intravenous, Q2H PRN    prochlorperazine (Compazine) 10 MG/2ML injection 5 mg, 5 mg, Intravenous, Q8H PRN    atorvastatin (Lipitor)  tab 40 mg, 40 mg, Oral, Nightly    metoprolol tartrate (Lopressor) tab 50 mg, 50 mg, Oral, 2x Daily(Beta Blocker)    ascorbic acid (Vitamin C) tab 500 mg, 500 mg, Oral, Daily    [Held by provider] aspirin chewable tab 81 mg, 81 mg, Oral, Daily    dextrose 10% infusion (TPN no rate), , Intravenous, Continuous PRN    pancrelipase (Lip-Prot-Amyl) (Zenpep) DR particles cap 10,000 Units, 10,000 Units, Per G Tube, PRN **AND** sodium bicarbonate tab 325 mg, 325 mg, Oral, PRN    hydrALAZINE (Apresoline) tab 25 mg, 25 mg, Oral, Q8H CHAYITO    alteplase (Activase) 4 mg in sterile water for injection (PF) 2.2 mL IV push to declot line, 4 mg, Intravenous, PRN    melatonin tab 3 mg, 3 mg, Oral, Nightly PRN    polyethylene glycol (PEG 3350) (Miralax) 17 g oral packet 17 g, 17 g, Oral, Daily PRN    ondansetron (Zofran) 4 MG/2ML injection 4 mg, 4 mg, Intravenous, Q6H PRN    famotidine (Pepcid) tab 20 mg, 20 mg, Oral, Daily **OR** famotidine (Pepcid) 20 mg/2mL injection 20 mg, 20 mg, Intravenous, Daily    potassium chloride 20 mEq/100mL IVPB premix 20 mEq, 20 mEq, Intravenous, PRN **OR** potassium chloride 40 mEq/100mL IVPB premix (central line) 40 mEq, 40 mEq, Intravenous, PRN    magnesium sulfate in dextrose 5% 1 g/100mL infusion premix 1 g, 1 g, Intravenous, PRN    magnesium sulfate in sterile water for injection 2 g/50mL IVPB premix 2 g, 2 g, Intravenous, PRN    chlorhexidine gluconate (Peridex) 0.12 % oral solution 15 mL, 15 mL, Mouth/Throat, BID    [Held by provider] heparin (Porcine) 5000 UNIT/ML injection 5,000 Units, 5,000 Units, Subcutaneous, 2 times per day    [DISCONTINUED] acetaminophen (Tylenol) tab 650 mg, 650 mg, Oral, Q4H PRN **OR** HYDROcodone-acetaminophen (Norco) 5-325 MG per tab 1 tablet, 1 tablet, Oral, Q4H PRN **OR** [DISCONTINUED] HYDROcodone-acetaminophen (Norco) 5-325 MG per tab 2 tablet, 2 tablet, Oral, Q4H PRN    [Held by provider] clopidogrel (Plavix) tab 75 mg, 75 mg, Oral, Daily    glucose (Dex4) 15  GM/59ML oral liquid 15 g, 15 g, Oral, Q15 Min PRN **OR** glucose (Glutose) 40% oral gel 15 g, 15 g, Oral, Q15 Min PRN **OR** glucose-vitamin C (Dex-4) chewable tab 4 tablet, 4 tablet, Oral, Q15 Min PRN **OR** dextrose 50% injection 50 mL, 50 mL, Intravenous, Q15 Min PRN **OR** glucose (Dex4) 15 GM/59ML oral liquid 30 g, 30 g, Oral, Q15 Min PRN **OR** glucose (Glutose) 40% oral gel 30 g, 30 g, Oral, Q15 Min PRN **OR** glucose-vitamin C (Dex-4) chewable tab 8 tablet, 8 tablet, Oral, Q15 Min PRN    heparin (Porcine) 1000 UNIT/ML injection 1,500 Units, 1.5 mL, Intracatheter, PRN    acetaminophen (Tylenol) tab 650 mg, 650 mg, Oral, Q4H PRN **OR** acetaminophen (Tylenol) 160 MG/5ML oral liquid 650 mg, 650 mg, Oral, Q4H PRN **OR** [DISCONTINUED] acetaminophen (Tylenol) rectal suppository 650 mg, 650 mg, Rectal, Q4H PRN **OR** acetaminophen (Ofirmev) 10 mg/mL infusion premix 1,000 mg, 1,000 mg, Intravenous, Q6H PRN    senna (Senokot) 8.8 MG/5ML oral syrup 17.6 mg, 10 mL, Oral, Nightly PRN    bisacodyl (Dulcolax) 10 MG rectal suppository 10 mg, 10 mg, Rectal, Daily PRN    fleet enema (Fleet) 7-19 GM/118ML rectal enema 133 mL, 1 enema, Rectal, Once PRN   No current outpatient medications on file.        HISTORY:  History reviewed. No pertinent past medical history.   Past Surgical History:   Procedure Laterality Date    COLONOSCOPY N/A 12/9/2017    Procedure: COLONOSCOPY, POSSIBLE BIOPSY, POSSIBLE POLYPECTOMY 88286;  Surgeon: Byron Plaza MD;  Location: [facility], [facility]        Social history and family history negative related to present illness except as above.    PHYSICAL EXAM:   /73   Pulse 81   Temp 98 °F (36.7 °C) (Temporal)   Resp 23   Ht 5' 7\" (1.702 m)   Wt 164 lb 14.5 oz (74.8 kg)   SpO2 95%   BMI 25.83 kg/m²   GENERAL:  Awake, alert, oriented x3. Non-tox, non-septic and in NAD.  HEENT:  Normocephalic, no scleral abnormalities.  Oropharynx clear, trachea ML.  NECK:  Supple, no masses, no  lymphadenopathy.  LUNGS:  Clear to auscultation b/l, no rhonchi, rales, or wheezes.  CARDIO: RRR S1/S2, no rubs, clicks, heaves, or murmurs.  GI:  Soft NT/ND, BS present x4 quadrants, no HSM.  EXTREMITIES:  No edema, no clubbing, no cyanosis.  NEURO:  No focal neurologic deficits.  DERM:  Warm, dry, no rashes.    IMPRESSION/PLAN:        Fevers improved; suspect hcap resolving; fio2 30% a positive sign; but a clear problem is bleeding diathesis; improved overnite  finished cefepime day 14 rx; observe off rx  S/p m.I. acute and subacute multiple strokes  Wbc remains elevated at 17k which is improved [along with improved bleeding issues]  S/p trach and peg  Spoke with rn  Minimal urine so ongoing dialysis  Ct head with some evolution and midline shift noted  >35 min               Recent Results (from the past 72 hour(s))   Renal Function Panel    Collection Time: 03/25/24  4:01 PM   Result Value Ref Range    Glucose 124 (H) 70 - 99 mg/dL    Sodium 137 136 - 145 mmol/L    Potassium 4.5 3.5 - 5.1 mmol/L    Chloride 104 98 - 112 mmol/L    CO2 26.0 21.0 - 32.0 mmol/L    Anion Gap 7 0 - 18 mmol/L    BUN 38 (H) 9 - 23 mg/dL    Creatinine 3.21 (H) 0.70 - 1.30 mg/dL    BUN/CREA Ratio 11.8 10.0 - 20.0    Calcium, Total 8.7 8.7 - 10.4 mg/dL    Calculated Osmolality 294 275 - 295 mOsm/kg    eGFR-Cr 22 (L) >=60 mL/min/1.73m2    Albumin 3.2 3.2 - 4.8 g/dL    Phosphorus 2.0 (L) 2.4 - 5.1 mg/dL   Magnesium    Collection Time: 03/25/24  4:01 PM   Result Value Ref Range    Magnesium 1.8 1.6 - 2.6 mg/dL   Hemoglobin & Hematocrit    Collection Time: 03/26/24 12:16 AM   Result Value Ref Range    HGB 6.3 (LL) 13.0 - 17.5 g/dL    HCT 20.3 (L) 39.0 - 53.0 %   ABORH (Blood Type)    Collection Time: 03/26/24 12:45 AM   Result Value Ref Range    ABO BLOOD TYPE B     RH BLOOD TYPE Positive    Antibody Screen    Collection Time: 03/26/24 12:45 AM   Result Value Ref Range    Antibody Screen Negative    Magnesium    Collection Time: 03/26/24  5:28  AM   Result Value Ref Range    Magnesium 1.8 1.6 - 2.6 mg/dL   Phosphorus    Collection Time: 03/26/24  5:28 AM   Result Value Ref Range    Phosphorus 3.1 2.4 - 5.1 mg/dL   Renal Function Panel    Collection Time: 03/26/24  5:28 AM   Result Value Ref Range    Glucose 135 (H) 70 - 99 mg/dL    Sodium 139 136 - 145 mmol/L    Potassium 4.4 3.5 - 5.1 mmol/L    Chloride 106 98 - 112 mmol/L    CO2 26.0 21.0 - 32.0 mmol/L    Anion Gap 7 0 - 18 mmol/L    BUN 45 (H) 9 - 23 mg/dL    Creatinine 3.24 (H) 0.70 - 1.30 mg/dL    BUN/CREA Ratio 13.9 10.0 - 20.0    Calcium, Total 8.1 (L) 8.7 - 10.4 mg/dL    Calculated Osmolality 302 (H) 275 - 295 mOsm/kg    eGFR-Cr 22 (L) >=60 mL/min/1.73m2    Albumin 2.9 (L) 3.2 - 4.8 g/dL    Phosphorus 3.1 2.4 - 5.1 mg/dL   Prothrombin Time (PT)    Collection Time: 03/26/24  5:28 AM   Result Value Ref Range    PT 17.3 (H) 11.6 - 14.8 seconds    INR 1.33 (H) 0.80 - 1.20   PTT, Activated    Collection Time: 03/26/24  5:28 AM   Result Value Ref Range    PTT 49.0 (H) 23.3 - 35.6 seconds   CBC W/ DIFFERENTIAL    Collection Time: 03/26/24  5:28 AM   Result Value Ref Range    WBC 21.1 (H) 4.0 - 11.0 x10(3) uL    RBC 2.46 (L) 4.30 - 5.70 x10(6)uL    HGB 7.4 (L) 13.0 - 17.5 g/dL    HCT 22.3 (L) 39.0 - 53.0 %    MCV 90.7 80.0 - 100.0 fL    MCH 30.1 26.0 - 34.0 pg    MCHC 33.2 31.0 - 37.0 g/dL    RDW-SD 54.5 (H) 35.1 - 46.3 fL    RDW 16.9 (H) 11.0 - 15.0 %    .0 (L) 150.0 - 450.0 10(3)uL    Neutrophil Absolute Prelim 16.18 (H) 1.50 - 7.70 x10 (3) uL   Manual differential    Collection Time: 03/26/24  5:28 AM   Result Value Ref Range    Neutrophil Absolute Manual 18.99 (H) 1.50 - 7.70 x10(3) uL    Lymphocyte Absolute Manual 0.63 (L) 1.00 - 4.00 x10(3) uL    Monocyte Absolute Manual 0.63 0.10 - 1.00 x10(3) uL    Eosinophil Absolute Manual 0.84 (H) 0.00 - 0.70 x10(3) uL    Basophil Absolute Manual 0.00 0.00 - 0.20 x10(3) uL    Neutrophils % Manual 90 %    Lymphocyte % Manual 3 %    Monocyte % Manual 3 %     Eosinophil % Manual 4 %    Basophil % Manual 0 %    Total Cells Counted 100     RBC Morphology Slide reviewed, see previous RBC morphology. Normal, Slide reviewed, see previous RBC morphology.    Platelet Morphology Normal Normal   Fibrinogen Activity    Collection Time: 03/26/24  5:28 AM   Result Value Ref Range    Fibrinogen 235 180 - 480 mg/dL   Hemoglobin & Hematocrit    Collection Time: 03/26/24  1:08 PM   Result Value Ref Range    HGB 6.7 (LL) 13.0 - 17.5 g/dL    HCT 20.3 (L) 39.0 - 53.0 %   Heparin Induced Platelet    Collection Time: 03/26/24  2:56 PM   Result Value Ref Range    Heparin Induced Plt Antibody Negative Negative    Heparin Dependent Platelet AB Interp         No evidence of heparin-induced platelet aggregation is identified from platelet aggregation studies using the patient's serum in the presence of heparin.     A single negative result does not rule out the presence of a heparin-induced platelet antibody.     If clinical suspicion of heparin-induced thrombocytopenia persists, a repeat study or additional studies (serotonin-release assay, PF4 ALICIA, etc.) are recommended.    Reviewed by Chris Casarez M.D.       Renal Function Panel    Collection Time: 03/26/24  4:04 PM   Result Value Ref Range    Glucose 146 (H) 70 - 99 mg/dL    Sodium 139 136 - 145 mmol/L    Potassium 4.6 3.5 - 5.1 mmol/L    Chloride 108 98 - 112 mmol/L    CO2 24.0 21.0 - 32.0 mmol/L    Anion Gap 7 0 - 18 mmol/L    BUN 61 (H) 9 - 23 mg/dL    Creatinine 4.17 (H) 0.70 - 1.30 mg/dL    BUN/CREA Ratio 14.6 10.0 - 20.0    Calcium, Total 7.7 (L) 8.7 - 10.4 mg/dL    Calculated Osmolality 308 (H) 275 - 295 mOsm/kg    eGFR-Cr 16 (L) >=60 mL/min/1.73m2    Albumin 2.6 (L) 3.2 - 4.8 g/dL    Phosphorus 3.7 2.4 - 5.1 mg/dL   Magnesium    Collection Time: 03/26/24  4:04 PM   Result Value Ref Range    Magnesium 1.8 1.6 - 2.6 mg/dL   Prothrombin Time (PT)    Collection Time: 03/26/24  4:04 PM   Result Value Ref Range    PT 23.9 (H) 11.6 -  14.8 seconds    INR 2.00 (H) 0.80 - 1.20   Hemoglobin & Hematocrit    Collection Time: 03/26/24  6:08 PM   Result Value Ref Range    HGB 7.8 (L) 13.0 - 17.5 g/dL    HCT 23.9 (L) 39.0 - 53.0 %   POCT Glucose    Collection Time: 03/26/24  6:27 PM   Result Value Ref Range    POC Glucose  123 (H) 70 - 99 mg/dL   EKG 12 Lead    Collection Time: 03/26/24 11:31 PM   Result Value Ref Range    Ventricular rate 89 BPM    Atrial rate  BPM    P-R Interval  ms    QRS Duration 108 ms    Q-T Interval 370 ms    QTC Calculation (Bezet) 450 ms    P Axis  degrees    R Axis 75 degrees    T Axis -25 degrees   Hemoglobin & Hematocrit    Collection Time: 03/27/24 12:10 AM   Result Value Ref Range    HGB 7.5 (L) 13.0 - 17.5 g/dL    HCT 22.7 (L) 39.0 - 53.0 %   Prepare RBC Once    Collection Time: 03/27/24 12:40 AM   Result Value Ref Range    Blood Product D1091T35     Unit Number [id number]     UNIT ABO B     UNIT RH POS     Product Status Presumed Transfused     Expiration Date 877211030860     Blood Type Barcode 7300     Unit Volume 350 ml   Prepare RBC Once    Collection Time: 03/27/24 12:40 AM   Result Value Ref Range    Blood Product T1161D25     Unit Number [id number]     UNIT ABO B     UNIT RH POS     Product Status Presumed Transfused     Expiration Date [id number]     Blood Type Barcode 7300     Unit Volume 350 ml   Magnesium    Collection Time: 03/27/24  5:46 AM   Result Value Ref Range    Magnesium 1.9 1.6 - 2.6 mg/dL   Phosphorus    Collection Time: 03/27/24  5:46 AM   Result Value Ref Range    Phosphorus 2.8 2.4 - 5.1 mg/dL   CBC, Platelet; No Differential    Collection Time: 03/27/24  5:46 AM   Result Value Ref Range    WBC 20.9 (H) 4.0 - 11.0 x10(3) uL    RBC 2.55 (L) 4.30 - 5.70 x10(6)uL    HGB 7.6 (L) 13.0 - 17.5 g/dL    HCT 23.2 (L) 39.0 - 53.0 %    MCV 91.0 80.0 - 100.0 fL    MCH 29.8 26.0 - 34.0 pg    MCHC 32.8 31.0 - 37.0 g/dL    RDW 16.4 (H) 11.0 - 15.0 %    RDW-SD 53.5 (H) 35.1 - 46.3 fL    .0 (L)  150.0 - 450.0 10(3)uL   Comp Metabolic Panel (14)    Collection Time: 03/27/24  5:46 AM   Result Value Ref Range    Glucose 128 (H) 70 - 99 mg/dL    Sodium 139 136 - 145 mmol/L    Potassium 4.9 3.5 - 5.1 mmol/L    Chloride 107 98 - 112 mmol/L    CO2 27.0 21.0 - 32.0 mmol/L    Anion Gap 5 0 - 18 mmol/L    BUN 46 (H) 9 - 23 mg/dL    Creatinine 3.18 (H) 0.70 - 1.30 mg/dL    BUN/CREA Ratio 14.5 10.0 - 20.0    Calcium, Total 8.5 (L) 8.7 - 10.4 mg/dL    Calculated Osmolality 302 (H) 275 - 295 mOsm/kg    eGFR-Cr 22 (L) >=60 mL/min/1.73m2    ALT 18 10 - 49 U/L    AST 26 <=34 U/L    Alkaline Phosphatase 85 45 - 117 U/L    Bilirubin, Total 0.9 0.3 - 1.2 mg/dL    Total Protein 5.2 (L) 5.7 - 8.2 g/dL    Albumin 3.0 (L) 3.2 - 4.8 g/dL    Globulin  2.2 (L) 2.8 - 4.4 g/dL    A/G Ratio 1.4 1.0 - 2.0   Prothrombin Time (PT)    Collection Time: 03/27/24  5:46 AM   Result Value Ref Range    PT 16.5 (H) 11.6 - 14.8 seconds    INR 1.25 (H) 0.80 - 1.20   Calcium, Ionized    Collection Time: 03/27/24  5:46 AM   Result Value Ref Range    Sample Type Venous     Ionized Calcium 1.16 0.95 - 1.32 mmol/L    Puncture Charge No     Blood Gas Analyzer [id number] CCU    MD BLOOD SMEAR CONSULT    Collection Time: 03/27/24  5:46 AM   Result Value Ref Range    MD Blood Smear Consult         Evaluation of CBC data and the peripheral blood smear (per request of Dr. LAURA Styles) demonstrates significant normochromic normocytic anemia with decreased absolute RBC count, moderate leukocytosis, and mild/grade 1 thrombocytopenia.    Red blood cells show anisopoikilocytosis with scattered macrocytes, scattered ovalocytes, few microcytes, and minimal/mild polychromasia.    Platelets show anisocytosis with scattered large forms and occasional giant forms seen.    200-cell manual leukocyte differential demonstrates neutrophilia (17.10 k/ul), mild monocytosis (1.25 k/ul), mild eosinophilia (1.04 k/ul) and normal lymphocyte count (1.05 k/ul).    Neutrophils consist  predominantly of mature forms and scattered band forms without evidence of significant reactive and/or dysplastic cytologic atypia.    Monocytes appear mature and show occasional mild reactive features.    Eosinophils appear mature and show no significant morphologic abnormalities.    No significant morphologic abnormalities  are seen for the other leukocyte subsets. No morphologic evidence of leukocyte dysplasia, atypical lymphoid cell infiltrates, or circulating immature blast forms is identified.    Differential causes for an anemia of this type may include hemorrhage, chronic inflammatory disorders (rheumatoid arthritis, SLE, inflammatory bowel disease, sarcoidosis, trauma, etc.), chronic infections (HIV, other viral infections, tuberculosis, pyelonephritis, osteomyelitis, chronic fungal infections, subacute bacterial endocarditis, etc.), neoplasms (lymphoma, carcinomas, chronic leukemias and occasionally myelodysplastic syndrome), and end organ failure (chronic liver disease, endocrinopathies, etc.).     Differential considerations for thrombocytopenia may include failure of bone marrow production (infections, drugs, fibrosis, myelodysplasia, alcohol suppression, congenital, etc.), autoantibody immune thrombocytopenia (ITP, SLE, lymphomas, drugs, infections), alloantibody immune thrombocytopenia (post  transfusion purpura), non-immune conditions (DIC, TTP, mechanical), splenic sequestration and hemodilution.      General differential considerations for neutrophilia may include infection/sepsis (most commonly bacterial, others), drugs/hormones (G-CSF, corticosteroids, epinephrine, lithium, toxins/poisons/venoms), tissue necrosis (infarct, trauma, burns, acute gout), inflammatory disorders (collagen vascular/autoimmune), acute hemorrhage/hemolysis, and metabolic disorders (uremia, ketoacidosis, others).     Possible causes of monocytosis may include acute/chronic infections (tuberculosis, Listeria, syphilis,  subacute bacterial endocarditis, protozoal and rickettsial organisms), Hodgkin's disease, collagen vascular disorders, immune-mediated gastrointestinal disorders (ulcerative colitis, regional enteritis), non-Hodgkin's lymphomas, sarcoidosis, multiple myeloma, hemolytic anemia, post-splenectomy, immune thrombocytopenic purpura and myelodysplastic/myeloproliferative neoplasms.      Differential considerations for eosinophilia may include allergic and hypersensitivity reactions, drug allergies, cutaneous disorders, collagen vascular/connective tissue diseases, infections (parasitic, fungal, others), immunodeficiency disorders, sarcoidosis and some neoplastic conditions (Hodgkin's lymphoma, non-Hodgkin's lymphoma, carcinoma, acute lymphocytic leukemia, myeloproliferative neoplasms, others).      Clinical correlation is recommended.     Reviewed by Chris Casarez M.D.       LDH    Collection Time: 03/27/24  5:46 AM   Result Value Ref Range     (H) 120 - 246 U/L   Platelet Count    Collection Time: 03/27/24  5:46 AM   Result Value Ref Range    .0 (L) 150.0 - 450.0 10(3)uL   PTT, Activated    Collection Time: 03/27/24  5:46 AM   Result Value Ref Range    PTT 42.8 (H) 23.3 - 35.6 seconds   Fibrinogen Activity    Collection Time: 03/27/24  5:46 AM   Result Value Ref Range    Fibrinogen 255 180 - 480 mg/dL   D-Dimer    Collection Time: 03/27/24  5:46 AM   Result Value Ref Range    D-Dimer >20.00 (H) <0.55 ug/mL FEU   Hemoglobin & Hematocrit    Collection Time: 03/27/24 11:56 AM   Result Value Ref Range    HGB 6.9 (LL) 13.0 - 17.5 g/dL    HCT 21.2 (L) 39.0 - 53.0 %   Renal Function Panel    Collection Time: 03/27/24  5:00 PM   Result Value Ref Range    Glucose 141 (H) 70 - 99 mg/dL    Sodium 137 136 - 145 mmol/L    Potassium 4.9 3.5 - 5.1 mmol/L    Chloride 108 98 - 112 mmol/L    CO2 27.0 21.0 - 32.0 mmol/L    Anion Gap 2 0 - 18 mmol/L    BUN 46 (H) 9 - 23 mg/dL    Creatinine 3.29 (H) 0.70 - 1.30 mg/dL     BUN/CREA Ratio 14.0 10.0 - 20.0    Calcium, Total 8.1 (L) 8.7 - 10.4 mg/dL    Calculated Osmolality 298 (H) 275 - 295 mOsm/kg    eGFR-Cr 21 (L) >=60 mL/min/1.73m2    Albumin 2.8 (L) 3.2 - 4.8 g/dL    Phosphorus 3.3 2.4 - 5.1 mg/dL   Magnesium    Collection Time: 03/27/24  5:00 PM   Result Value Ref Range    Magnesium 1.8 1.6 - 2.6 mg/dL   Hemoglobin    Collection Time: 03/27/24  8:04 PM   Result Value Ref Range    HGB 8.2 (L) 13.0 - 17.5 g/dL   Hemoglobin    Collection Time: 03/28/24 12:10 AM   Result Value Ref Range    HGB 8.3 (L) 13.0 - 17.5 g/dL   Prepare RBC Once    Collection Time: 03/28/24 12:40 AM   Result Value Ref Range    Blood Product B1050S59     Unit Number [id number]     UNIT ABO B     UNIT RH POS     Product Status Presumed Transfused     Expiration Date [id number]     Blood Type Barcode 7300     Unit Volume 350 ml   Magnesium    Collection Time: 03/28/24  5:00 AM   Result Value Ref Range    Magnesium 1.9 1.6 - 2.6 mg/dL   Phosphorus    Collection Time: 03/28/24  5:00 AM   Result Value Ref Range    Phosphorus 3.6 2.4 - 5.1 mg/dL   CBC, Platelet; No Differential    Collection Time: 03/28/24  5:00 AM   Result Value Ref Range    WBC 17.8 (H) 4.0 - 11.0 x10(3) uL    RBC 2.49 (L) 4.30 - 5.70 x10(6)uL    HGB 7.7 (L) 13.0 - 17.5 g/dL    HCT 22.5 (L) 39.0 - 53.0 %    MCV 90.4 80.0 - 100.0 fL    MCH 30.9 26.0 - 34.0 pg    MCHC 34.2 31.0 - 37.0 g/dL    RDW 16.8 (H) 11.0 - 15.0 %    RDW-SD 54.7 (H) 35.1 - 46.3 fL    .0 (L) 150.0 - 450.0 10(3)uL    Immature Platelet Fraction 13.7 (H) 0.0 - 7.0 %   Comp Metabolic Panel (14)    Collection Time: 03/28/24  5:00 AM   Result Value Ref Range    Glucose 169 (H) 70 - 99 mg/dL    Sodium 139 136 - 145 mmol/L    Potassium 5.0 3.5 - 5.1 mmol/L    Chloride 108 98 - 112 mmol/L    CO2 25.0 21.0 - 32.0 mmol/L    Anion Gap 6 0 - 18 mmol/L    BUN 47 (H) 9 - 23 mg/dL    Creatinine 3.52 (H) 0.70 - 1.30 mg/dL    BUN/CREA Ratio 13.4 10.0 - 20.0    Calcium, Total 8.3 (L)  8.7 - 10.4 mg/dL    Calculated Osmolality 304 (H) 275 - 295 mOsm/kg    eGFR-Cr 20 (L) >=60 mL/min/1.73m2    ALT 12 10 - 49 U/L    AST 20 <=34 U/L    Alkaline Phosphatase 73 45 - 117 U/L    Bilirubin, Total 0.9 0.3 - 1.2 mg/dL    Total Protein 4.6 (L) 5.7 - 8.2 g/dL    Albumin 2.6 (L) 3.2 - 4.8 g/dL    Globulin  2.0 (L) 2.8 - 4.4 g/dL    A/G Ratio 1.3 1.0 - 2.0   Prothrombin Time (PT)    Collection Time: 03/28/24  5:00 AM   Result Value Ref Range    PT 16.3 (H) 11.6 - 14.8 seconds    INR 1.23 (H) 0.80 - 1.20   Hemoglobin    Collection Time: 03/28/24  5:00 AM   Result Value Ref Range    HGB 7.7 (L) 13.0 - 17.5 g/dL   PTT, Activated    Collection Time: 03/28/24  5:00 AM   Result Value Ref Range    PTT 40.3 (H) 23.3 - 35.6 seconds   Iron And Tibc    Collection Time: 03/28/24  5:00 AM   Result Value Ref Range    Iron 42 (L) 65 - 175 ug/dL    Transferrin 128 (L) 215 - 365 mg/dL    Total Iron Binding Capacity 191 (L) 250 - 425 ug/dL    % Saturation 22 20 - 50 %   Ferritin    Collection Time: 03/28/24  5:00 AM   Result Value Ref Range    Ferritin 1,472.5 (H) 30.0 - 530.0 ng/mL   Prepare RBC Once    Collection Time: 03/28/24 10:55 AM   Result Value Ref Range    Blood Product Q3177M55     Unit Number [id number]     UNIT ABO B     UNIT RH POS     Product Status Issued     Expiration Date [id number]     Blood Type Barcode 7300     Unit Volume 350 ml         Ottoniel Bennett MD     CALL [facility] AT [phone] IF QUESTIONS OR CONCERNS  THANKS

## 2024-03-28 NOTE — PHYSICAL THERAPY NOTE
PT follow-up treatment attempted. Patient sitting in bedside chair, still on CRRT. Will re-attempt as medically appropriate.     Jenna Haase, PT, DPT  [facility]  Ext: [phone]

## 2024-03-28 NOTE — PROGRESS NOTES
[facility]  part of [facility]    Progress Note    Cristiano bOregon Patient Status:  Inpatient    1970 MRN [id number]   Location [facility] 2W/SW Attending Bud Camarena MD   Hosp Day # 23 PCP Abilio Dorsey MD     Subjective:  Pt resting in bed. He is awake and calm. He nods his head to simple questions. Denies pain and SOB. Moving legs in the bed. Currently on SBT. Slight oozing/bleeding from around trach site. No visitors at bedside.     Objective:  /74 (BP Location: Right arm)   Pulse 96   Temp 97.9 °F (36.6 °C) (Temporal)   Resp (!) 28   Ht 5' 7\" (1.702 m)   Wt 164 lb 14.5 oz (74.8 kg)   SpO2 100%   BMI 25.83 kg/m²     Temp (24hrs), Av.1 °F (36.7 °C), Min:97.7 °F (36.5 °C), Max:98.6 °F (37 °C)      Intake/Output:    Intake/Output Summary (Last 24 hours) at 3/28/2024 0913  Last data filed at 3/28/2024 0800  Gross per 24 hour   Intake 1743.25 ml   Output 1547 ml   Net 196.25 ml       Wt Readings from Last 3 Encounters:   24 164 lb 14.5 oz (74.8 kg)   10/06/21 177 lb 6.4 oz (80.5 kg)   20 172 lb 9.6 oz (78.3 kg)       Allergies:  No Known Allergies    Labs:  Lab Results   Component Value Date    WBC 17.8 2024    HGB 7.7 2024    HGB 7.7 2024    HCT 22.5 2024    .0 2024    CREATSERUM 3.52 2024    BUN 47 2024     2024    K 5.0 2024     2024    CO2 25.0 2024     2024    CA 8.3 2024    ALB 2.6 2024    ALKPHO 73 2024    BILT 0.9 2024    TP 4.6 2024    AST 20 2024    ALT 12 2024    PTT 40.3 2024    INR 1.23 2024    MG 1.9 2024    PHOS 3.6 2024       Physical Exam:  Blood pressure 126/74, pulse 96, temperature 97.9 °F (36.6 °C), temperature source Temporal, resp. rate (!) 28, height 5' 7\" (1.702 m), weight 164 lb 14.5 oz (74.8 kg), SpO2 100%.  General: NAD  Lungs: Mildly coarse  anteriorly/laterally   Heart: RRR, S1, S2  Abdomen: Soft/Round, NT/ND, BS+x4/+BM(s)  Extremities: Warm, dry, trace generalized UE &LE edema bilat  Pulses: 2+ bilat DP  Skin: sternotomy incision ANIKET=CDI/Left leg incisions ANIKET=CDI   Neurological: awake, calm, nods head to simple questions/follow simple commands. Right side weaker than left    Assessment/Plan:  S/P CABG x 3 POD # 13  S/P RE-OP MEDIASTINAL EXPLORATION/WASHOUT on 3/16  Respiratory Failure- Trach placed 3/22 w/placement & mgt per Gen Surgery. SBT per Pulm.  Pre op Impella removed post op on 3/16 per Cards. Pt hemodynamically stable. [place] removed 3/18.  Midline cath placed 3/18  Re op mediastinal exploration/washout due to pericardial tamponade. Limited echo 3/16 post exploration w/EF=45-50%/no residual effusion.   Repeat Echo today per Cards: pending  Continue ICU status  Pain meds as needed: avoid narcotics to assess neuro status  Altered MS post op: CT head 3/19/MRI/MRA 3/24=+CVA-Mgt per Neuro  Brief pre op AF- Amio protocol for AF prevention- currently SR  Plan to increase activity when appropriate-will ask PT/OT to eval/treat. May attempt to get OOB to chair today.   Scds/Heparin Sub Q prophylaxis DVT prevention -Heparin on hold due to increase bleeding noted around Trach which is slowly improving.  Plavix also on hold. ASA also on hold. HIPA 3/13 & 3/26=negative  Pre op NORMA/ATN/CRRT w/mgt per Nephrology. Expected post op volume overload. CRRT w/mgt per Nephrology/plan for eventual HD when okay w/Neuro: needs edema w/petechial hemorrhage improved. Temp catheter insertion per IR placed 3/19.    Hematology consulted for freq CRRT line clotting, 3/27 w/ D-dimer >20.  Expected post op anemia w/o clinical significance/stable-Hematology consulted 3/20-follow recs. Transfuse one unit PRBCs today.  Acute blood loss anemia immediate post op w/mediastinal exploration/tamponade.  Nutrition per TF via PEG placed 3/22  ID consulted on 3/14: following recs.  Continues on ABX. + sputum cx  Discharge planning: pt lives w/his  and has supportive family. Anticipate rehab: LTAC, once patient able to tolerate HD.  Mgr/SW will assist w/planning.     Plan of care discussed w/patient/RN and CV Surgeon: Dr. Llanes    Addendum @ 6325:   Updated patient's  on plan of care via telephone. He stated patient's brother will be visiting today from [place]. All questions answered.     Emily Adams, APRN  3/28/2024  9:13 AM

## 2024-03-28 NOTE — PROGRESS NOTES
[facility]  Inpatient Progress Note    Cristiano Obregon Patient Status:  Inpatient    1970 MRN [id number]   Location [facility] 2W/SW Attending Bud Camarena MD   Hosp Day # 23 PCP Abiilo Dorsey MD     Subjective:   Cristiano Obregon is a 53 year old male with a history of tobacco use and hyperlipidemia who was admitted 3/5/2024 with an MI.  His hospital stay has been extremely complicated.  He developed respiratory failure and cardiogenic shock requiring ventilation and Impella placement.  He underwent three-vessel CABG 3/15/2024 complicated by cardiac tamponade.  He required vasopressors and remained intubated.  He has bilateral chest tubes.  He developed ventilator associated pneumonia and acute renal failure and is on CVVH per nephrology.  He developed thrombocytopenia and anemia and required multiple transfusions.  He was also found to have a subacute appearing left frontal and left occipital lobe infarcts.  He remains intubated, on tube feeds and underwent tracheostomy and G-tube placement 3/22/2024.  Hematology was initially consulted for refractory anemia and concern for hypercoagulable condition.     Over the last couple of days the patient has had increasing bloody oozing from his tracheostomy site.  I evaluated the patient 3/27.  He was given vitamin K and calcium.    Bleeding has resolved, no oozing from trach site today.  He is up in chair with family visiting this afternoon.    Review of Systems:  Hematology/Oncology ROS performed and negative except as above in HPI     [COMPLETED] midazolam (Versed) 2 MG/2ML injection 1 mg  1 mg Intravenous Once    [COMPLETED] sodium chloride 0.9% infusion   Intravenous Once    NxStage Pure Flow 400 CRRT/PIRRT fluid 5000mL  2.5 L/hr CRRT Continuous    [COMPLETED] sodium chloride 0.9% infusion   Intravenous Once    [COMPLETED] phytonadione (Aqua-Mephton) 10 mg in sodium chloride 0.9% 50 mL IVPB  10 mg  Intravenous Once    [COMPLETED] calcium gluconate 2 g in sodium chloride 0.9% 100 mL IVPB  2 g Intravenous Once    dexmedeTOMIDine (Precedex) 800 mcg in sodium chloride 0.9% 100 mL infusion  0.2-1.5 mcg/kg/hr Intravenous Continuous    [COMPLETED] sodium chloride 0.9% infusion   Intravenous Once    [START ON 2024] amiodarone (Pacerone) tab 200 mg  200 mg Oral Daily    [COMPLETED] lidocaine PF (Xylocaine-MPF) 1% injection  10 mL Infiltration Once    [COMPLETED] oxidized cellulose (Surgical Snow) external sheet 2 Package  2 each Topical Once    [COMPLETED] morphINE PF 2 MG/ML injection 1 mg  1 mg Intravenous Once    [COMPLETED] midazolam (Versed) 2 MG/2ML injection 4 mg  4 mg Intravenous Once    morphINE PF 2 MG/ML injection 1 mg  1 mg Intravenous Q4H PRN    [COMPLETED] sodium phosphate 20.1 mmol in sodium chloride 0.9% 100 mL IVPB  20.1 mmol Intravenous Once    oxidized cellulose (Surgical Snow) external sheet 1 Package  1 each Topical PRN    ceFEPIme (Maxipime) 1 g in sodium chloride 0.9% 100 mL IVPB-MBP  1 g Intravenous Q12H    amiodarone (Pacerone) tab 200 mg  200 mg Oral BID with meals    [COMPLETED] LORazepam (Ativan) 2 mg/mL injection 1 mg  1 mg Intravenous Once    [] diazepam (Valium) 5 mg/mL injection        [COMPLETED] diazepam (Valium) 5 mg/mL injection 5 mg  5 mg Intravenous Once    [COMPLETED] LORazepam (Ativan) 2 mg/mL injection 1 mg  1 mg Intravenous Once    sodium chloride 0.9% infusion   Intravenous Once    [COMPLETED] sodium chloride 0.9% infusion   Intravenous Once    [COMPLETED] magnesium sulfate in dextrose 5% 1 g/100mL infusion premix 1 g  1 g Intravenous Once    [COMPLETED] heparin (Porcine) 1000 UNIT/ML injection        [COMPLETED] heparin in sodium chloride 0.9% (Porcine) 2 Units/mL flush bag premix        [COMPLETED] lidocaine (Xylocaine) 2 % injection        [COMPLETED] lidocaine (Xylocaine) 2 % injection        [COMPLETED] iopamidol (ISOVUE-300) 61 % injection 100 mL  100 mL  Injection ONCE PRN    heparin sodium lock flush 100 UNIT/ML injection 150 Units  1.5 mL Intravenous Once    sodium chloride 0.9% infusion   Intravenous Once    sodium chloride 0.9% infusion   Intravenous Once    [COMPLETED] ceFEPIme (Maxipime) 1 g in sodium chloride 0.9% 100 mL IVPB-MBP  1 g Intravenous Q12H    [COMPLETED] iron sucrose (Venofer) 20 mg/mL injection 200 mg  200 mg Intravenous Daily    [COMPLETED] sodium chloride 0.9% infusion   Intravenous Once    hydrALAzine (Apresoline) 20 mg/mL injection 10 mg  10 mg Intravenous Once    [COMPLETED] labetalol (Trandate) 5 mg/mL injection 10 mg  10 mg Intravenous Once    [COMPLETED] heparin (Porcine) 1000 UNIT/ML injection        [COMPLETED] heparin in sodium chloride 0.9% (Porcine) 2 Units/mL flush bag premix        [COMPLETED] lidocaine PF (Xylocaine-MPF) 2 % injection        [COMPLETED] heparin sodium lock flush 100 UNIT/ML injection 150 Units  1.5 mL Intravenous Once    labetalol (Trandate) 5 mg/mL injection 10 mg  10 mg Intravenous Q10 Min PRN    hydrALAzine (Apresoline) 20 mg/mL injection 10 mg  10 mg Intravenous Q2H PRN    prochlorperazine (Compazine) 10 MG/2ML injection 5 mg  5 mg Intravenous Q8H PRN    atorvastatin (Lipitor) tab 40 mg  40 mg Oral Nightly    metoprolol tartrate (Lopressor) tab 50 mg  50 mg Oral 2x Daily(Beta Blocker)    ascorbic acid (Vitamin C) tab 500 mg  500 mg Oral Daily    [Held by provider] aspirin chewable tab 81 mg  81 mg Oral Daily    [COMPLETED] metoprolol (Lopressor) 5 mg/5mL injection 5 mg  5 mg Intravenous Once    dextrose 10% infusion (TPN no rate)   Intravenous Continuous PRN    pancrelipase (Lip-Prot-Amyl) (Zenpep) DR particles cap 10,000 Units  10,000 Units Per G Tube PRN    And    sodium bicarbonate tab 325 mg  325 mg Oral PRN    hydrALAZINE (Apresoline) tab 25 mg  25 mg Oral Q8H CH[place]O    [COMPLETED] hydrALAzine (Apresoline) 20 mg/mL injection 10 mg  10 mg Intravenous Once    [COMPLETED] alteplase (Activase) 2 mg in sterile  water for injection (PF) 2.2 mL IV push to declot line  2 mg Intravenous Once    [] adult 3 in 1 TPN   Intravenous Continuous TPN    alteplase (Activase) 4 mg in sterile water for injection (PF) 2.2 mL IV push to declot line  4 mg Intravenous PRN    [] clevidipine (Cleviprex) 25 MG/50ML IV infusion  1-6 mg/hr Intravenous Continuous    [] adult 3 in 1 TPN   Intravenous Continuous TPN    [COMPLETED] acetaminophen (Ofirmev) 10 mg/mL infusion premix 1,000 mg  1,000 mg Intravenous Q8H    [COMPLETED] alteplase (Activase) 4 mg in sterile water for injection (PF) 2.2 mL IV push to declot line  4 mg Intravenous Once    [COMPLETED] digoxin (Lanoxin) 250 MCG/ML injection 125 mcg  125 mcg Intravenous Once    [COMPLETED] aminocaproic acid 5 g BOLUS FROM BAG infusion  5 g Intravenous Once    And    [COMPLETED] aminocaproic acid (Amicar) 10 g in sodium chloride 0.9% 250 mL infusion  1 g/hr Intravenous Once    [] norepinephrine (Levophed) 4 mg/250mL infusion premix        melatonin tab 3 mg  3 mg Oral Nightly PRN    polyethylene glycol (PEG 3350) (Miralax) 17 g oral packet 17 g  17 g Oral Daily PRN    ondansetron (Zofran) 4 MG/2ML injection 4 mg  4 mg Intravenous Q6H PRN    famotidine (Pepcid) tab 20 mg  20 mg Oral Daily    Or    famotidine (Pepcid) 20 mg/2mL injection 20 mg  20 mg Intravenous Daily    potassium chloride 20 mEq/100mL IVPB premix 20 mEq  20 mEq Intravenous PRN    Or    potassium chloride 40 mEq/100mL IVPB premix (central line) 40 mEq  40 mEq Intravenous PRN    magnesium sulfate in dextrose 5% 1 g/100mL infusion premix 1 g  1 g Intravenous PRN    magnesium sulfate in sterile water for injection 2 g/50mL IVPB premix 2 g  2 g Intravenous PRN    [COMPLETED] mupirocin (Bactroban) 2% nasal ointment 1 Application  1 Application Nasal BID    chlorhexidine gluconate (Peridex) 0.12 % oral solution 15 mL  15 mL Mouth/Throat BID    [] acetaminophen (Ofirmev) 10 mg/mL infusion premix 1,000 mg   1,000 mg Intravenous Q8H    [Held by provider] heparin (Porcine) 5000 UNIT/ML injection 5,000 Units  5,000 Units Subcutaneous 2 times per day    HYDROcodone-acetaminophen (Norco) 5-325 MG per tab 1 tablet  1 tablet Oral Q4H PRN    [Held by provider] clopidogrel (Plavix) tab 75 mg  75 mg Oral Daily    glucose (Dex4) 15 GM/59ML oral liquid 15 g  15 g Oral Q15 Min PRN    Or    glucose (Glutose) 40% oral gel 15 g  15 g Oral Q15 Min PRN    Or    glucose-vitamin C (Dex-4) chewable tab 4 tablet  4 tablet Oral Q15 Min PRN    Or    dextrose 50% injection 50 mL  50 mL Intravenous Q15 Min PRN    Or    glucose (Dex4) 15 GM/59ML oral liquid 30 g  30 g Oral Q15 Min PRN    Or    glucose (Glutose) 40% oral gel 30 g  30 g Oral Q15 Min PRN    Or    glucose-vitamin C (Dex-4) chewable tab 8 tablet  8 tablet Oral Q15 Min PRN    [COMPLETED] desmopressin (DDAVP) 25.6 mcg in sodium chloride 0.9% 50 mL IVPB  0.3 mcg/kg Intravenous Once    [COMPLETED] albumin human (Albumin) 5% injection 12.5 g  12.5 g Intravenous Once    [] cangrelor (Kengreal) 50 mg in sodium chloride 0.9% 250 mL IVPB - BRIDGING/MAINTENANCE dose  0.75 mcg/kg/min Intravenous Continuous    [] adult 3 in 1 TPN   Intravenous Continuous TPN    [COMPLETED] fluconazole (Diflucan) 2 mg/mL IVPB (6 hour duration) 800 mg  800 mg Intravenous Once    [COMPLETED] ceFAZolin (Ancef) 2 g in 20mL IV syringe premix  2 g Intravenous On Call    [COMPLETED] sodium chloride 0.9% infusion   Intravenous Once    [COMPLETED] mupirocin (Bactroban) 2% nasal ointment 1 Application  1 Application Nasal Now then every 0500    [COMPLETED] ceFAZolin (Ancef) 2 g in 20mL IV syringe premix  2 g Intravenous On Call    [] adult 3 in 1 TPN   Intravenous Continuous TPN    [] adult 3 in 1 TPN   Intravenous Continuous TPN    [COMPLETED] sodium chloride 0.9% infusion   Intravenous Once    [] adult 3 in 1 TPN   Intravenous Continuous TPN    [] adult 3 in 1 TPN   Intravenous  Continuous TPN    [] adult 3 in 1 TPN   Intravenous Continuous TPN    [COMPLETED] diazepam (Valium) 5 mg/mL injection 10 mg  10 mg Intravenous Once    [COMPLETED] magnesium sulfate in sterile water for injection 2 g/50mL IVPB premix 2 g  2 g Intravenous Once    [COMPLETED] sodium chloride 0.9% infusion   Intravenous Once    heparin (Porcine) 1000 UNIT/ML injection 1,500 Units  1.5 mL Intracatheter PRN    [COMPLETED] heparin (Porcine) 1000 UNIT/ML injection        [COMPLETED] heparin in sodium chloride 0.9% (Porcine) 2 Units/mL flush bag premix        [COMPLETED] lidocaine PF (Xylocaine-MPF) 2 % injection        [COMPLETED] magnesium sulfate in sterile water for injection 2 g/50mL IVPB premix 2 g  2 g Intravenous Once    [COMPLETED] sodium chloride 0.9 % IV bolus 500 mL  500 mL Intravenous Once    [COMPLETED] heparin in sodium chloride 0.9% (Porcine) 2 Units/mL flush bag premix        [COMPLETED] propofol (Diprivan) 10 MG/ML injection        [COMPLETED] furosemide (Lasix) 10 mg/mL injection 80 mg  80 mg Intravenous Once    [COMPLETED] heparin (Porcine) 1000 UNIT/ML injection - BOLUS  Units  10 Units/kg Intravenous Once    [COMPLETED] sodium chloride 0.9 % IV bolus 500 mL  500 mL Intravenous Once    [COMPLETED] sodium bicarbonate injection 50 mEq  50 mEq Intravenous Once    [COMPLETED] midazolam (Versed) 2 MG/2ML injection        [COMPLETED] fentaNYL (Sublimaze) 50 mcg/mL injection        [COMPLETED] aspirin chewable tab 324 mg  324 mg Oral Once    [COMPLETED] cangrelor (Kengreal) 50 MG injection        [COMPLETED] amiodarone (Cordarone) 150 MG/3ML injection        [COMPLETED] heparin (Porcine) 25,000 Units/250mL infusion premix        [COMPLETED] midazolam (Versed) 2 MG/2ML injection        [COMPLETED] midazolam (Versed) 2 MG/2ML injection        [COMPLETED] heparin (Porcine) 1000 UNIT/ML injection        [COMPLETED] iohexol (Omnipaque) 300 MG/ML injection 150 mL  150 mL Intravenous ONCE PRN    []  cangrelor (Kengreal) 50 mg in sodium chloride 0.9% 250 mL IVPB - BRIDGING/MAINTENANCE dose  0.75 mcg/kg/min Intravenous Continuous    [COMPLETED] midazolam (Versed) 2 MG/2ML injection        [COMPLETED] lidocaine PF (Xylocaine-MPF) 2 % injection        [COMPLETED] heparin in sodium chloride 0.9% (Porcine) 2 Units/mL flush bag premix        [COMPLETED] heparin in sodium chloride 0.9% (Porcine) 2 Units/mL flush bag premix        acetaminophen (Tylenol) tab 650 mg  650 mg Oral Q4H PRN    Or    acetaminophen (Tylenol) 160 MG/5ML oral liquid 650 mg  650 mg Oral Q4H PRN    Or    acetaminophen (Ofirmev) 10 mg/mL infusion premix 1,000 mg  1,000 mg Intravenous Q6H PRN    senna (Senokot) 8.8 MG/5ML oral syrup 17.6 mg  10 mL Oral Nightly PRN    bisacodyl (Dulcolax) 10 MG rectal suppository 10 mg  10 mg Rectal Daily PRN    fleet enema (Fleet) 7-19 GM/118ML rectal enema 133 mL  1 enema Rectal Once PRN    [COMPLETED] furosemide (Lasix) 10 mg/mL injection 20 mg  20 mg Intravenous Once    [] furosemide (Lasix) 10 mg/mL injection 20 mg  20 mg Intravenous Once PRN    [COMPLETED] lidocaine PF (Xylocaine-MPF) 2 % injection        [COMPLETED] heparin in sodium chloride 0.9% (Porcine) 2 Units/mL flush bag premix        [COMPLETED] potassium chloride 20 mEq/100mL IVPB premix 20 mEq  20 mEq Intravenous Once    [COMPLETED] heparin (Porcine) 1000 UNIT/ML injection - BOLUS  Units  10 Units/kg Intravenous Once       Objective:    /85 (BP Location: Right arm)   Pulse 91   Temp 97.9 °F (36.6 °C) (Temporal)   Resp 25   Ht 1.702 m (5' 7\")   Wt 74.8 kg (164 lb 14.5 oz)   SpO2 97%   BMI 25.83 kg/m²   Physical Exam:  General: Opening eyes spontaneously, sitting in chair  HEENT: PERRL  Neck: Trach in place, no bleeding and ETT  CV: RRR  Pulm: normal effort  Extremities: no edema  Neurological: Grossly intact    Labs:  Lab Results   Component Value Date    WBC 17.8 2024    HGB 9.1 2024    HCT 22.5 2024    PLT  107.0 03/28/2024    CREATSERUM 2.81 03/28/2024    BUN 36 03/28/2024     03/28/2024    K 4.7 03/28/2024     03/28/2024    CO2 25.0 03/28/2024     03/28/2024    CA 8.5 03/28/2024    ALB 3.1 03/28/2024    ALKPHO 73 03/28/2024    BILT 0.9 03/28/2024    TP 4.6 03/28/2024    AST 20 03/28/2024    ALT 12 03/28/2024    PTT 40.3 03/28/2024    INR 1.23 03/28/2024    MG 1.8 03/28/2024    PHOS 2.8 03/28/2024     Imaging:  No results found.     Assessment & Plan:    Pt is a 53 year old male with male with a history of tobacco use and hyperlipidemia who was admitted 3/5/2024 with an MI.  His hospital stay has been extremely complicated.  He developed respiratory failure and cardiogenic shock requiring ventilation and Impella placement.  He underwent three-vessel CABG 3/15/2024 complicated by cardiac tamponade.  He required vasopressors and remained intubated.  He has bilateral chest tubes.  He developed ventilator associated pneumonia and acute renal failure and is on CVVH per nephrology.  He developed thrombocytopenia and anemia and required multiple transfusions.  He was also found to have a subacute appearing left frontal and left occipital lobe infarcts.  He remains intubated, on tube feeds and underwent tracheostomy and G-tube placement 3/22/2024.  Hematology was initially consulted for refractory anemia and concern for hypercoagulable condition.     Over the last couple of days the patient has had increasing bloody oozing from his tracheostomy site.  I evaluated the patient 3/27.  He was given IV vitamin K and calcium.  Bleeding has not resolved.  Possibly mild coagulopathy from vitamin K deficiency.  Coags improved today and clinically bleeding has resolved.  We will monitor coags tomorrow and I will follow periodically.    Multifactorial anemia.  Nephrology could consider THANG.    CAROLA Styles DO    [facility]  [facility]    This note  was created using a voice-recognition transcribing system. Incorrect words or phrases may have been missed during proofreading. Please interpret accordingly.   MARICRUZ

## 2024-03-28 NOTE — PLAN OF CARE
Sinus rhythm on tele monitor,VSS,  pt follows commands w/delay, nods appropriately, SBT all day and tolerating well, up to chair w/sling all of shift, CRRT stopped around 1800 for head ct, plan to resume based on CT result, TF's con't.       Problem: Patient/Family Goals  Goal: Patient/Family Long Term Goal  Description: Patient's Long Term Goal: Go home upon discharge    Interventions:  - Monitor labs  - Administer medications  - Pain management  - Patient centered care  - Keep patient and family informed on plan of care  - See additional Care Plan goals for specific interventions  Outcome: Progressing  Goal: Patient/Family Short Term Goal  Description: Patient's Short Term Goal: extubate    Interventions:   - SAT/SBT  - improve strength  - pulmonary hygeine  - See additional Care Plan goals for specific interventions  Outcome: Progressing     Problem: RESPIRATORY - ADULT  Goal: Achieves optimal ventilation and oxygenation  Description: INTERVENTIONS:  - Assess for changes in respiratory status  - Assess for changes in mentation and behavior  - Position to facilitate oxygenation and minimize respiratory effort  - Oxygen supplementation based on oxygen saturation or ABGs  - Provide Smoking Cessation handout, if applicable  - Encourage broncho-pulmonary hygiene including cough, deep breathe, Incentive Spirometry  - Assess the need for suctioning and perform as needed  - Assess and instruct to report SOB or any respiratory difficulty  - Respiratory Therapy support as indicated  - Manage/alleviate anxiety  - Monitor for signs/symptoms of CO2 retention  Outcome: Progressing     Problem: CARDIOVASCULAR - ADULT  Goal: Maintains optimal cardiac output and hemodynamic stability  Description: INTERVENTIONS:  - Monitor vital signs, rhythm, and trends  - Monitor for bleeding, hypotension and signs of decreased cardiac output  - Evaluate effectiveness of vasoactive medications to optimize hemodynamic stability  - Monitor  arterial and/or venous puncture sites for bleeding and/or hematoma  - Assess quality of pulses, skin color and temperature  - Assess for signs of decreased coronary artery perfusion - ex. Angina  - Evaluate fluid balance, assess for edema, trend weights  Outcome: Progressing     Problem: METABOLIC/FLUID AND ELECTROLYTES - ADULT  Goal: Hemodynamic stability and optimal renal function maintained  Description: INTERVENTIONS:  - Monitor labs and assess for signs and symptoms of volume excess or deficit  - Monitor intake, output and patient weight  - Monitor urine specific gravity, serum osmolarity and serum sodium as indicated or ordered  - Monitor response to interventions for patient's volume status, including labs, urine output, blood pressure (other measures as available)  - Encourage oral intake as appropriate  - Instruct patient on fluid and nutrition restrictions as appropriate  Outcome: Progressing     Problem: SKIN/TISSUE INTEGRITY - ADULT  Goal: Skin integrity remains intact  Description: INTERVENTIONS  - Assess and document risk factors for pressure ulcer development  - Assess and document skin integrity  - Monitor for areas of redness and/or skin breakdown  - Initiate interventions, skin care algorithm/standards of care as needed  Outcome: Progressing     Problem: NEUROLOGICAL - ADULT  Goal: Achieves stable or improved neurological status  Description: INTERVENTIONS  - Assess for and report changes in neurological status  - Initiate measures to prevent increased intracranial pressure  - Maintain blood pressure and fluid volume within ordered parameters to optimize cerebral perfusion and minimize risk of hemorrhage  - Monitor temperature, glucose, and sodium. Initiate appropriate interventions as ordered  Outcome: Progressing     Problem: HEMATOLOGIC - ADULT  Goal: Free from bleeding injury  Description: (Example usage: patient with low platelets)  INTERVENTIONS:  - Avoid intramuscular injections, enemas and  rectal medication administration  - Ensure safe mobilization of patient  - Hold pressure on venipuncture sites to achieve adequate hemostasis  - Assess for signs and symptoms of internal bleeding  - Monitor lab trends  - Patient is to report abnormal signs of bleeding to staff  - Avoid use of toothpicks and dental floss  - Use electric shaver for shaving  - Use soft bristle tooth brush  - Limit straining and forceful nose blowing  Outcome: Progressing     Problem: Safety Risk - Non-Violent Restraints  Goal: Patient will remain free from self-harm  Description: INTERVENTIONS:  - Apply the least restrictive restraint to prevent harm  - Notify patient and family of reasons restraints applied  - Assess for any contributing factors to confusion (electrolyte disturbances, delirium, medications)  - Discontinue any unnecessary medical devices as soon as possible  - Assess the patient's physical comfort, circulation, skin condition, hydration, nutrition and elimination needs   - Reorient and redirection as needed  - Assess for the need to continue restraints  Outcome: Not Progressing

## 2024-03-28 NOTE — PLAN OF CARE
CRRT clotted overnight , awaiting fresenius for restart. Pt agitated throughout shift, currently receiving precedex and a dose of versed. Trach site remains oozing with blood , dressings changed as needed. Pt made NPO overnight ,       Problem: Safety Risk - Non-Violent Restraints  Goal: Patient will remain free from self-harm  Description: INTERVENTIONS:  - Apply the least restrictive restraint to prevent harm  - Notify patient and family of reasons restraints applied  - Assess for any contributing factors to confusion (electrolyte disturbances, delirium, medications)  - Discontinue any unnecessary medical devices as soon as possible  - Assess the patient's physical comfort, circulation, skin condition, hydration, nutrition and elimination needs   - Reorient and redirection as needed  - Assess for the need to continue restraints  Outcome: Progressing     Problem: Patient Centered Care  Goal: Patient preferences are identified and integrated in the patient's plan of care  Description: Interventions:  - What would you like us to know as we care for you  - Provide timely, complete, and accurate information to patient/family  - Incorporate patient and family knowledge, values, beliefs, and cultural backgrounds into the planning and delivery of care  - Encourage patient/family to participate in care and decision-making at the level they choose  - Honor patient and family perspectives and choices  Outcome: Progressing     Problem: Patient/Family Goals  Goal: Patient/Family Long Term Goal  Description: Patient's Long Term Goal: Go home upon discharge    Interventions:  - Monitor labs  - Administer medications  - Pain management  - Patient centered care  - Keep patient and family informed on plan of care  - See additional Care Plan goals for specific interventions  Outcome: Progressing  Goal: Patient/Family Short Term Goal  Description: Patient's Short Term Goal: extubate    Interventions:   - SAT/SBT  - improve  strength  - pulmonary hygeine  - See additional Care Plan goals for specific interventions  Outcome: Progressing     Problem: RESPIRATORY - ADULT  Goal: Achieves optimal ventilation and oxygenation  Description: INTERVENTIONS:  - Assess for changes in respiratory status  - Assess for changes in mentation and behavior  - Position to facilitate oxygenation and minimize respiratory effort  - Oxygen supplementation based on oxygen saturation or ABGs  - Provide Smoking Cessation handout, if applicable  - Encourage broncho-pulmonary hygiene including cough, deep breathe, Incentive Spirometry  - Assess the need for suctioning and perform as needed  - Assess and instruct to report SOB or any respiratory difficulty  - Respiratory Therapy support as indicated  - Manage/alleviate anxiety  - Monitor for signs/symptoms of CO2 retention  Outcome: Progressing     Problem: CARDIOVASCULAR - ADULT  Goal: Maintains optimal cardiac output and hemodynamic stability  Description: INTERVENTIONS:  - Monitor vital signs, rhythm, and trends  - Monitor for bleeding, hypotension and signs of decreased cardiac output  - Evaluate effectiveness of vasoactive medications to optimize hemodynamic stability  - Monitor arterial and/or venous puncture sites for bleeding and/or hematoma  - Assess quality of pulses, skin color and temperature  - Assess for signs of decreased coronary artery perfusion - ex. Angina  - Evaluate fluid balance, assess for edema, trend weights  Outcome: Progressing  Goal: Absence of cardiac arrhythmias or at baseline  Description: INTERVENTIONS:  - Continuous cardiac monitoring, monitor vital signs, obtain 12 lead EKG if indicated  - Evaluate effectiveness of antiarrhythmic and heart rate control medications as ordered  - Initiate emergency measures for life threatening arrhythmias  - Monitor electrolytes and administer replacement therapy as ordered  Outcome: Progressing     Problem: METABOLIC/FLUID AND ELECTROLYTES -  ADULT  Goal: Hemodynamic stability and optimal renal function maintained  Description: INTERVENTIONS:  - Monitor labs and assess for signs and symptoms of volume excess or deficit  - Monitor intake, output and patient weight  - Monitor urine specific gravity, serum osmolarity and serum sodium as indicated or ordered  - Monitor response to interventions for patient's volume status, including labs, urine output, blood pressure (other measures as available)  - Encourage oral intake as appropriate  - Instruct patient on fluid and nutrition restrictions as appropriate  Outcome: Progressing     Problem: SKIN/TISSUE INTEGRITY - ADULT  Goal: Skin integrity remains intact  Description: INTERVENTIONS  - Assess and document risk factors for pressure ulcer development  - Assess and document skin integrity  - Monitor for areas of redness and/or skin breakdown  - Initiate interventions, skin care algorithm/standards of care as needed  Outcome: Progressing  Goal: Incision(s), wounds(s) or drain site(s) healing without S/S of infection  Description: INTERVENTIONS:  - Assess and document risk factors for pressure ulcer development  - Assess and document skin integrity  - Assess and document dressing/incision, wound bed, drain sites and surrounding tissue  - Implement wound care per orders  - Initiate isolation precautions as appropriate  - Initiate Pressure Ulcer prevention bundle as indicated  Outcome: Progressing  Goal: Oral mucous membranes remain intact  Description: INTERVENTIONS  - Assess oral mucosa and hygiene practices  - Implement preventative oral hygiene regimen  - Implement oral medicated treatments as ordered  Outcome: Progressing     Problem: NEUROLOGICAL - ADULT  Goal: Achieves stable or improved neurological status  Description: INTERVENTIONS  - Assess for and report changes in neurological status  - Initiate measures to prevent increased intracranial pressure  - Maintain blood pressure and fluid volume within  ordered parameters to optimize cerebral perfusion and minimize risk of hemorrhage  - Monitor temperature, glucose, and sodium. Initiate appropriate interventions as ordered  Outcome: Progressing     Problem: Delirium  Goal: Minimize duration of delirium  Description: Interventions:  - Encourage use of hearing aids, eye glasses  - Promote highest level of mobility daily  - Provide frequent reorientation  - Promote wakefulness i.e. lights on, blinds open  - Promote sleep, encourage patient's normal rest cycle i.e. lights off, TV off, minimize noise and interruptions  - Encourage family to assist in orientation and promotion of home routines  Outcome: Progressing     Problem: HEMATOLOGIC - ADULT  Goal: Free from bleeding injury  Description: (Example usage: patient with low platelets)  INTERVENTIONS:  - Avoid intramuscular injections, enemas and rectal medication administration  - Ensure safe mobilization of patient  - Hold pressure on venipuncture sites to achieve adequate hemostasis  - Assess for signs and symptoms of internal bleeding  - Monitor lab trends  - Patient is to report abnormal signs of bleeding to staff  - Avoid use of toothpicks and dental floss  - Use electric shaver for shaving  - Use soft bristle tooth brush  - Limit straining and forceful nose blowing  Outcome: Progressing

## 2024-03-29 NOTE — PROGRESS NOTES
NEPHROLOGY DAILY PROGRESS NOTE       SUBJECTIVE:     Events noted overnight.  ICH with acute mental status changes.  Transitioned to comfort measures select.    OBJECTIVE:    Total Intake/Output:    Intake/Output Summary (Last 24 hours) at 3/29/2024 1010  Last data filed at 3/29/2024 0700  Gross per 24 hour   Intake 1370.25 ml   Output 833 ml   Net 537.25 ml       PHYSICAL EXAM:  BP (!) 158/102 (BP Location: Left arm)   Pulse 80   Temp 96.8 °F (36 °C) (Temporal)   Resp 15   Ht 5' 7\" (1.702 m)   Wt 166 lb 3.6 oz (75.4 kg)   SpO2 100%   BMI 26.03 kg/m²   GEN: resting   HEENT: trached   CHEST: clear anteriorly      CARDIAC: S1S2 normal  ABD: soft, ND, PEG tube   EXT:  no LE edema   NEURO: resting sleeping  ACCESS: RIJ tunneled HD cath (3/22)       CURRENT MEDICATIONS:            LABS:  Patient Labs Reviewed in Detail. Pertinent Labs as follows:  Recent Labs   Lab 03/28/24  0500 03/28/24  1640 03/29/24  0045   * 145* 169*   BUN 47* 36* 43*   CREATSERUM 3.52* 2.81* 3.62*   EGFRCR 20* 26* 19*   CA 8.3* 8.5* 8.6*    136 136   K 5.0 4.7 5.4*    105 105   CO2 25.0 25.0 22.0     Recent Labs   Lab 03/25/24  0423 03/25/24  1201 03/26/24  0528 03/26/24  1308 03/27/24  0546 03/27/24  1156 03/28/24  0500 03/28/24  1420 03/28/24  1733 03/29/24  0045   RBC 2.55*  --  2.46*  --  2.55*  --  2.49*  --   --  2.85*   HGB 7.5*   < > 7.4*   < > 7.6*   < > 7.7*  7.7* 9.1* 8.9* 8.5*   HCT 23.4*   < > 22.3*   < > 23.2*   < > 22.5*  --  26.9* 25.8*   MCV 91.8  --  90.7  --  91.0  --  90.4  --   --  90.5   MCH 29.4  --  30.1  --  29.8  --  30.9  --   --  29.8   MCHC 32.1  --  33.2  --  32.8  --  34.2  --   --  32.9   RDW 16.9*  --  16.9*  --  16.4*  --  16.8*  --   --  16.3*   NEPRELIM 17.07*  --  16.18*  --   --   --   --   --   --  21.53*   WBC 20.3*  --  21.1*  --  20.9*  --  17.8*  --   --  25.2*   .0*  --  112.0*  --  124.0*  124.0*  --  107.0*  --   --  146.0*  146.0*    < > = values in this interval  not displayed.         IMAGING:  XR CHEST AP PORTABLE  (CPT=71045)    Result Date: 3/29/2024  CONCLUSION:   Persistent pulmonary edema and bilateral pleural effusions.    Preliminary report was given by [facility].  There are no clinically significant discrepancies.    Dictated by (CST): Damon Griggs MD on 3/29/2024 at 8:15 AM     Finalized by (CST): Damon Griggs MD on 3/29/2024 at 8:22 AM          CT STROKE BRAIN (NO IV)(CPT=70450)    Result Date: 3/28/2024  CONCLUSION:  1. Enlarging left frontal intraparenchymal hemorrhage with substantial interval worsening and surrounding vasogenic edema. There is resultant uncal and subfalcine herniation.  2. Basilar cistern effacement with mass effect exerted on the midbrain/jeancarlos and suspected associated severe edema.   3. Worsening left-to-right midline shift.  4. Senescent changes of parenchymal volume loss with sequela of chronic microvascular ischemic disease. There is also large vessel atherosclerosis.   5. Scattered paranasal sinus disease.  6. Subtotal left mastoid effusion.  7. Lesser incidental findings as above.    Results of this examination were discussed with the patient's neurosurgeon physician via extension [id number] by Dr. Dean at 1920 on 03/28/2024.   Dictated by (CST): Zachery Dean MD on 3/28/2024 at 7:20 PM     Finalized by (CST): Zachery Dean MD on 3/28/2024 at 7:29 PM          CT BRAIN OR HEAD ([id number])    Result Date: 3/28/2024  CONCLUSION:   Evolving left frontal lobe infarct redemonstrated with increased 8.7 cm region of edema and increased patchy parenchymal hemorrhage.  There is mild increased associated mass effect with left-to-right midline shift now measuring up to 0.6 cm, previously 0.4 cm.     Dictated by (CST): Jcarlos Harmon MD on 3/28/2024 at 5:41 PM     Finalized by (CST): Jcarlos Harmon MD on 3/28/2024 at 5:48 PM            ASSESSMENT AND PLAN:   This is a 53 year old male with PMH sig for HLD, tobacco use. Presented with  chest pain and was admitted for NSTEMI.  Nephrology is consulted for NORMA      Anuric NORMA  - due to ATN from shock.   - creatinine 1.23 on admission, worsened to 7.19 (3/7)  - initiated on CVVH on 3/7 via RIJ temp HD cath  - RIJ tunneled HD catheter placed (3/22)   - Will resume CRRT at this time as per [place] and GOC.  - keep net even  - renal panel and Mg level every 12 hours while on CRRT   - Maintain adequate perfusion pressure  - Dose medications for CRRT  - Avoid nephrotoxins.  - follow renal fxn and I/Os  - monitor for renal recovery.     Hyperkalemia   - 2K bath on CRRT    Metabolic acidosis  - resolved with CRRT     Hyponatremia  - resolved     Critical Care time >35 minutes    Jerome Gonzalez MD  [facility]  Nephrology

## 2024-03-29 NOTE — DISCHARGE SUMMARY
General Medicine Discharge Summary   Brain Death  Patient ID:  Cristiano Obregon  53 year old  1970    Admit date: 3/5/2024    Discharge date and time: 3/29/2024    Attending Physician: Bud Camarena MD     Consults: IP CONSULT TO CARDIOTHORACIC SURGERY  IP CONSULT TO PHARMACY - STRESS ULCER PROPHYLAXIS  IP CONSULT TO PULMONOLOGY  IP CONSULT TO FOOD AND NUTRITION SERVICES  IP CONSULT TO NEPHROLOGY  IP CONSULT TO PHARMACY  IP CONSULT TO PHARMACY  NURSING CONSULT TO DIETITIAN  IP CONSULT TO DIETARY FOR TPN  IP CONSULT TO PHARMACY  IP CONSULT TO CARDIAC REHAB  IP CONSULT TO PHARMACY  IP CONSULT TO SOCIAL WORK  IP CONSULT TO CARDIAC REHAB  IP CONSULT TO RESPIRATORY CARE  IP CONSULT TO INFECTIOUS DISEASE  IP CONSULT TO RESPIRATORY CARE  IP CONSULT TO CARDIAC REHAB  IP CONSULT TO FOOD AND NUTRITION SERVICES  IP CONSULT TO SOCIAL WORK  IP CONSULT TO PHARMACY  IP CONSULT TO RESPIRATORY CARE  IP CONSULT TO DIETARY FOR TPN  IP CONSULT TO PHARMACY  IP CONSULT TO FOOD AND NUTRITION SERVICES  IP CONSULT TO INTERVENTIONAL RADIOLOGY  IP CONSULT TO NEUROLOGY  IP CONSULT TO SOCIAL WORK  IP CONSULT TO CASE MANAGEMENT  IP CONSULT TO GASTROENTEROLOGY  IP CONSULT TO HEMATOLOGY  IP CONSULT TO PULMONOLOGY  IP CONSULT TO SPIRITUAL CARE  IP CONSULT TO NEUROSURGERY  IP CONSULT TO PHARMACY    Primary Care Physician: Abilio Dorsey MD     Reason for admission: Stemi      Discharge Diagnoses: NSTEMI (non-ST elevated myocardial infarction) (HCC) [I21.4]  See Additional Discharge Diagnoses in Hospital Course    Discharged Condition:        HPI:   Per Dr. Hernandez:  History of Present Illness:   This is a 53-year-old male with a history of hyperlipidemia, tobacco use, who presents to the hospital for evaluation of acute chest pain.  Majority of history is taken by chart review as patient is currently intubated.  It appears that earlier this morning the patient was exercising on his rowing machine when he developed chest pain.  In  the emergency room his symptoms persisted.  He had an EKG which showed signs of acute MI.  Cardiac alert was called and patient was emergently taken to the cardiac Cath Lab.  Postcardiac cath he is now in the ICU on the ventilator, with balloon pump, and pending CV surgery evaluation.  He did have his RCA ballooned, see cath report for further details.  Currently he is not requiring vasopressors.       Hospital Course:   Critsiano Obregon is a 53 year old male with HLD, tobacco use, presented 3/5/2024 with chest pain. Found to have STEMI with cardiogenic shock requiring Impella placement. Intubated in cath lab, s/p CABG 3/15. Subsequently with tamponade & back to OR 3/16 w CVS for washout. OR course c/b extensive blood loss - required ~20+u blood products letitia-operatively & subsequently coagulopathy w clotting in chest tubes & CRRT - hematology consulted. Stay further c/b: renal failure requiring CRRT - nephro consulted, resp failure w > 2 wks intubated, s/p tracheostomy and PEG 3/22, embolic strokes - neuro following, clinically improved, cognitive state improved, however, acutely altered on evening 3/28 repeat CT brain (CT done 2 hours prior) showed massive ICH with herniation and patient with active signs of herniation, Neurosurgery/neurology evaluated, not a surgical candidate, family opting for comfort measures, [facility] notified and poss organ donation candidate, patient was candidate for organ donation, Neurology evaluated patient for brain death, protocol followed, patient met criteria for brain death, official time of death 1629 on 3/29/2024.       STEMI c/b cardiogenic shock w multi-system organ failure   S/p CABG 3/15  Post-op cardiac tamponade s/p washout 3/16  Paroxysmal/post-op a fib  HTN  HLD  Acute hypoxemic respiratory failure  Acute blood loss anemia  Thrombocytopenia   Hypercoagulability & anemia  Anuric acute kidney failure - suspect ATN from shock  Metabolic  acidosis  Hyponatremia  Hyperkalemia  Sepsis 2/2 RML bacterial pneumonia   Leukocytosis  Subacute L frontal & occiptal lobe infarcts     Hematuria   Tobacco use disorder  Elevated liver enzymes  Nutrition       Operative Procedures: Procedure(s) (LRB):  Tracheostomy (N/A)     Imaging: XR CHEST AP PORTABLE  (CPT=71045)    Result Date: 3/29/2024  CONCLUSION:  1. Redemonstration of normal heart size with mild pulmonary vascular congestive change and mild interstitial edema improved slightly since previous study.  Persistent bilateral pleural effusions and left basal opacification may suggest left basal atelectasis.  Underlying pneumonia not excluded.  Follow-up studies are advised.    Dictated by (CST): Moisés Delgadillo MD on 3/29/2024 at 2:29 PM     Finalized by (CST): Moisés Delgadillo MD on 3/29/2024 at 2:34 PM          XR CHEST AP PORTABLE  (CPT=71045)    Result Date: 3/29/2024  CONCLUSION:   Persistent pulmonary edema and bilateral pleural effusions.    Preliminary report was given by [facility].  There are no clinically significant discrepancies.    Dictated by (CST): Damon Griggs MD on 3/29/2024 at 8:15 AM     Finalized by (CST): Damon Griggs MD on 3/29/2024 at 8:22 AM          CT STROKE BRAIN (NO IV)(CPT=70450)    Result Date: 3/28/2024  CONCLUSION:  1. Enlarging left frontal intraparenchymal hemorrhage with substantial interval worsening and surrounding vasogenic edema. There is resultant uncal and subfalcine herniation.  2. Basilar cistern effacement with mass effect exerted on the midbrain/jeancarlos and suspected associated severe edema.   3. Worsening left-to-right midline shift.  4. Senescent changes of parenchymal volume loss with sequela of chronic microvascular ischemic disease. There is also large vessel atherosclerosis.   5. Scattered paranasal sinus disease.  6. Subtotal left mastoid effusion.  7. Lesser incidental findings as above.    Results of this examination were discussed with the patient's  neurosurgeon physician via extension [id number] by Dr. Dean at 1920 on 03/28/2024.   Dictated by (CST): Zachery Dean MD on 3/28/2024 at 7:20 PM     Finalized by (CST): Zachery Dean MD on 3/28/2024 at 7:29 PM          CT BRAIN OR HEAD ([id number])    Result Date: 3/28/2024  CONCLUSION:   Evolving left frontal lobe infarct redemonstrated with increased 8.7 cm region of edema and increased patchy parenchymal hemorrhage.  There is mild increased associated mass effect with left-to-right midline shift now measuring up to 0.6 cm, previously 0.4 cm.     Dictated by (CST): Jcarlos Harmon MD on 3/28/2024 at 5:41 PM     Finalized by (CST): Jcarlos Harmon MD on 3/28/2024 at 5:48 PM          XR CHEST AP PORTABLE  (CPT=71045)    Result Date: 3/27/2024  CONCLUSION:  1. Heart size upper limits of normal, and there is moderate pulmonary edema pattern which has worsened somewhat since previous study.  Worsening bibasilar opacification suggesting increasing bilateral pleural effusions left more than right and probable left basal atelectasis, although underlying pneumonia not excluded.  Status post extubation and feeding tube removal.  No pneumothorax.  Follow-up studies are advised.    Dictated by (CST): Moisés Delgadillo MD on 3/27/2024 at 8:36 AM     Finalized by (CST): Moisés Delgadillo MD on 3/27/2024 at 8:39 AM             Thank You,    Bud Camarena MD   Hospitalist with [facility] and [facility]

## 2024-03-29 NOTE — PROGRESS NOTES
[facility] Hospitalist Progress Note     CC: Hospital Follow up    PCP: Abilio Dorsey MD       Assessment/Plan:     Principal Problem:    NSTEMI (non-ST elevated myocardial infarction) (HCC)  Active Problems:    Chest pain    Cerebrovascular accident (CVA) due to embolism of left middle cerebral artery (HCC)    Anemia    Clonus    Cerebral edema (HCC)    Petechial hemorrhage    Midline shift of brain    Cristiano Obregon is a 53 year old male with HLD, tobacco use, presented 3/5/2024 with chest pain. Found to have STEMI with cardiogenic shock requiring Impella placement. Intubated in cath lab, s/p CABG 3/15. Subsequently with tamponade & back to OR 3/16 w CVS for washout. OR course c/b extensive blood loss - required ~20+u blood products letitia-operatively & subsequently coagulopathy w clotting in chest tubes & CRRT - hematology consulted. Stay further c/b: renal failure requiring CRRT - nephro consulted, resp failure w > 2 wks intubated, s/p tracheostomy and PEG 3/22, embolic strokes - neuro following, clinically improved, cognitive state improved, however, acutely altered on evening 3/28 repeat CT brain (CT done 2 hours prior) showed massive ICH with herniation and patient with active signs of herniation, Neurosurgery/neurology evaluated, not a surgical candidate, family opting for comfort measures, [facility] notified and poss organ donation candidate.       STEMI c/b cardiogenic shock w multi-system organ failure   S/p CABG 3/15  Post-op cardiac tamponade s/p washout 3/16  Paroxysmal/post-op a fib  HTN  HLD  Acute hypoxemic respiratory failure  Acute blood loss anemia  Thrombocytopenia   Hypercoagulability & anemia  Anuric acute kidney failure - suspect ATN from shock  Metabolic acidosis  Hyponatremia  Hyperkalemia  Sepsis 2/2 RML bacterial pneumonia   Leukocytosis  Subacute L frontal & occiptal lobe infarcts     Hematuria   Tobacco use disorder  Elevated liver enzymes  Nutrition        Questions/concerns were discussed with patient and/or family by bedside.    Thank You,  Bud Camarena MD    Hospitalist with [facility]     Subjective:     Non responsive    OBJECTIVE:    Blood pressure (!) 149/101, pulse 78, temperature 96.8 °F (36 °C), temperature source Temporal, resp. rate 14, height 5' 7\" (1.702 m), weight 166 lb 3.6 oz (75.4 kg), SpO2 100%.    Temp:  [96.8 °F (36 °C)-98.3 °F (36.8 °C)] 96.8 °F (36 °C)  Pulse:  [71-98] 78  Resp:  [14-31] 14  BP: ()/() 149/101  SpO2:  [93 %-100 %] 100 %  AO: (130-158)/(60-79) 156/73  FiO2 (%):  [30 %-100 %] 30 %      Intake/Output:    Intake/Output Summary (Last 24 hours) at 3/29/2024 0932  Last data filed at 3/29/2024 0700  Gross per 24 hour   Intake 1426.35 ml   Output 978 ml   Net 448.35 ml       Last 3 Weights   03/29/24 0500 166 lb 3.6 oz (75.4 kg)   03/28/24 0131 164 lb 14.5 oz (74.8 kg)   03/27/24 0500 165 lb 5.5 oz (75 kg)   03/26/24 0725 165 lb 12.6 oz (75.2 kg)   03/25/24 0600 172 lb 9.9 oz (78.3 kg)   03/24/24 0500 181 lb 14.1 oz (82.5 kg)   03/23/24 0600 179 lb 0.2 oz (81.2 kg)   03/22/24 0430 174 lb 13.2 oz (79.3 kg)   03/21/24 0600 181 lb 10.5 oz (82.4 kg)   03/20/24 0400 185 lb 3 oz (84 kg)   03/19/24 1000 189 lb 9.5 oz (86 kg)   03/18/24 0231 185 lb 10 oz (84.2 kg)   03/15/24 0600 188 lb 4.4 oz (85.4 kg)   03/14/24 0600 184 lb 15.5 oz (83.9 kg)   03/12/24 1000 186 lb 4.6 oz (84.5 kg)   03/11/24 1000 192 lb 10.9 oz (87.4 kg)   03/09/24 0500 188 lb 15 oz (85.7 kg)   03/08/24 0600 185 lb 13.6 oz (84.3 kg)   03/07/24 0600 183 lb 3.2 oz (83.1 kg)   03/06/24 0500 180 lb 1.9 oz (81.7 kg)   03/05/24 1324 175 lb 7.8 oz (79.6 kg)   03/05/24 0722 160 lb (72.6 kg)   10/06/21 1541 177 lb 6.4 oz (80.5 kg)   09/09/20 0929 172 lb 9.6 oz (78.3 kg)       Exam    Gen: non responsive  Heent: Tracheostomy in place   Pulm: Lungs without wheezing  CV: Heart with regular rate and rhythm,    Abd: Abdomen soft, PEG tube in place, not  distended        Data Review:       Labs:     Recent Labs   Lab 03/25/24  0423 03/25/24  1201 03/26/24  0528 03/26/24  1308 03/27/24  0546 03/27/24  1156 03/28/24  0500 03/28/24  1420 03/28/24  1733 03/29/24  0045   RBC 2.55*  --  2.46*  --  2.55*  --  2.49*  --   --  2.85*   HGB 7.5*   < > 7.4*   < > 7.6*   < > 7.7*  7.7* 9.1* 8.9* 8.5*   HCT 23.4*   < > 22.3*   < > 23.2*   < > 22.5*  --  26.9* 25.8*   MCV 91.8  --  90.7  --  91.0  --  90.4  --   --  90.5   MCH 29.4  --  30.1  --  29.8  --  30.9  --   --  29.8   MCHC 32.1  --  33.2  --  32.8  --  34.2  --   --  32.9   RDW 16.9*  --  16.9*  --  16.4*  --  16.8*  --   --  16.3*   NEPRELIM 17.07*  --  16.18*  --   --   --   --   --   --  21.53*   WBC 20.3*  --  21.1*  --  20.9*  --  17.8*  --   --  25.2*   .0*  --  112.0*  --  124.0*  124.0*  --  107.0*  --   --  146.0*  146.0*    < > = values in this interval not displayed.         Recent Labs   Lab 03/28/24  0500 03/28/24  1640 03/29/24  0045   * 145* 169*   BUN 47* 36* 43*   CREATSERUM 3.52* 2.81* 3.62*   EGFRCR 20* 26* 19*   CA 8.3* 8.5* 8.6*    136 136   K 5.0 4.7 5.4*    105 105   CO2 25.0 25.0 22.0       Recent Labs   Lab 03/24/24  0511 03/24/24  1533 03/27/24  0546 03/27/24  1700 03/28/24  0500 03/28/24  1640 03/29/24  0045   ALT 19  --  18  --  12  --  11   AST 26  --  26  --  20  --  27   ALB 3.1*   < > 3.0* 2.8* 2.6* 3.1* 3.2   LDH  --   --  393*  --   --   --  414*    < > = values in this interval not displayed.         Imaging:  XR CHEST AP PORTABLE  (CPT=71045)    Result Date: 3/29/2024  CONCLUSION:   Persistent pulmonary edema and bilateral pleural effusions.    Preliminary report was given by [facility].  There are no clinically significant discrepancies.    Dictated by (CST): Damon Griggs MD on 3/29/2024 at 8:15 AM     Finalized by (CST): Damon Griggs MD on 3/29/2024 at 8:22 AM          CT STROKE BRAIN (NO IV)(CPT=70450)    Result Date: 3/28/2024  CONCLUSION:  1.  Enlarging left frontal intraparenchymal hemorrhage with substantial interval worsening and surrounding vasogenic edema. There is resultant uncal and subfalcine herniation.  2. Basilar cistern effacement with mass effect exerted on the midbrain/jeancarlos and suspected associated severe edema.   3. Worsening left-to-right midline shift.  4. Senescent changes of parenchymal volume loss with sequela of chronic microvascular ischemic disease. There is also large vessel atherosclerosis.   5. Scattered paranasal sinus disease.  6. Subtotal left mastoid effusion.  7. Lesser incidental findings as above.    Results of this examination were discussed with the patient's neurosurgeon physician via extension [phone] by Dr. Dean at 1920 on 03/28/2024.   Dictated by (CST): Zachery Dean MD on 3/28/2024 at 7:20 PM     Finalized by (CST): Zachery Dean MD on 3/28/2024 at 7:29 PM          CT BRAIN OR HEAD (55120)    Result Date: 3/28/2024  CONCLUSION:   Evolving left frontal lobe infarct redemonstrated with increased 8.7 cm region of edema and increased patchy parenchymal hemorrhage.  There is mild increased associated mass effect with left-to-right midline shift now measuring up to 0.6 cm, previously 0.4 cm.     Dictated by (CST): Jcarlos Harmon MD on 3/28/2024 at 5:41 PM     Finalized by (CST): Jcarlos Harmon MD on 3/28/2024 at 5:48 PM          XR CHEST AP PORTABLE  (CPT=71045)    Result Date: 3/27/2024  CONCLUSION:  1. Heart size upper limits of normal, and there is moderate pulmonary edema pattern which has worsened somewhat since previous study.  Worsening bibasilar opacification suggesting increasing bilateral pleural effusions left more than right and probable left basal atelectasis, although underlying pneumonia not excluded.  Status post extubation and feeding tube removal.  No pneumothorax.  Follow-up studies are advised.    Dictated by (CST): Moisés Delgadillo MD on 3/27/2024 at 8:36 AM     Finalized by (CST): Moisés Delgadillo  MD [facility] on 3/27/2024 at 8:39 AM             Meds:          NxStage Pure Flow 400 CRRT/PIRRT fluid 5000mL 5 L/hr (03/29/24 7659)    niCARdipine      morphINE in sodium chloride 0.9%      dexmedetomidine Stopped (03/28/24 2621)     sodium chloride 0.9%, LORazepam **OR** LORazepam **OR** LORazepam, prochlorperazine **OR** prochlorperazine, ondansetron **OR** ondansetron, atropine, furosemide **OR** furosemide, morphINE in sodium chloride 0.9%, glycopyrrolate, morphINE, diazepam, acetaminophen **OR** acetaminophen, melatonin, ondansetron, acetaminophen **OR** acetaminophen **OR** [DISCONTINUED] acetaminophen **OR** acetaminophen

## 2024-03-29 NOTE — PLAN OF CARE
[facility] came in to evaluate pt, Pt is an organ donor candidate. [facility] talked to  and family, agreeable with organ donation. Pt's code status changed back to [facility] for [facility]. Dr Monroe, Dr South, Dr Tafoya and Dr Gonzalez updated about pt's status re: [facility] status. Labs drawn per [facility], shows decline in kidney function, requested to restart CRRT-Dr Gonzalez notified with order to restart CRRT, family made aware. Pt remains unresponsive and does not follow commands and bilateral pupils are now both fixed. VSS. Trache to vent with spo2 above 92%, no bleeding noted fr trache site. CRRT started at approximately 0450 with no issures encountered and tolerating fluid removal goal with no pressors required. Anuric overnight. Bed bath given. Questions answered and updates given to family.Frequent updates about pt's condition given to [facility] Nurse.     Problem: Patient Centered Care  Goal: Patient preferences are identified and integrated in the patient's plan of care  Description: Interventions:  - What would you like us to know as we care for you?   - Provide timely, complete, and accurate information to patient/family  - Incorporate patient and family knowledge, values, beliefs, and cultural backgrounds into the planning and delivery of care  - Encourage patient/family to participate in care and decision-making at the level they choose  - Honor patient and family perspectives and choices  Outcome: Progressing     Problem: Patient/Family Goals  Goal: Patient/Family Long Term Goal  Description: Patient's Long Term Goal: Go home upon discharge    Interventions:  - Monitor labs  - Administer medications  - Pain management  - Patient centered care  - Keep patient and family informed on plan of care  - See additional Care Plan goals for specific interventions  Outcome: Not Progressing  Goal: Patient/Family Short Term Goal  Description: Patient's Short Term Goal: extubate    Interventions:   - SAT/SBT  - improve strength  - pulmonary  hygeine  - See additional Care Plan goals for specific interventions  Outcome: Progressing     Problem: RESPIRATORY - ADULT  Goal: Achieves optimal ventilation and oxygenation  Description: INTERVENTIONS:  - Assess for changes in respiratory status  - Assess for changes in mentation and behavior  - Position to facilitate oxygenation and minimize respiratory effort  - Oxygen supplementation based on oxygen saturation or ABGs  - Provide Smoking Cessation handout, if applicable  - Encourage broncho-pulmonary hygiene including cough, deep breathe, Incentive Spirometry  - Assess the need for suctioning and perform as needed  - Assess and instruct to report SOB or any respiratory difficulty  - Respiratory Therapy support as indicated  - Manage/alleviate anxiety  - Monitor for signs/symptoms of CO2 retention  Outcome: Progressing     Problem: CARDIOVASCULAR - ADULT  Goal: Maintains optimal cardiac output and hemodynamic stability  Description: INTERVENTIONS:  - Monitor vital signs, rhythm, and trends  - Monitor for bleeding, hypotension and signs of decreased cardiac output  - Evaluate effectiveness of vasoactive medications to optimize hemodynamic stability  - Monitor arterial and/or venous puncture sites for bleeding and/or hematoma  - Assess quality of pulses, skin color and temperature  - Assess for signs of decreased coronary artery perfusion - ex. Angina  - Evaluate fluid balance, assess for edema, trend weights  Outcome: Progressing  Goal: Absence of cardiac arrhythmias or at baseline  Description: INTERVENTIONS:  - Continuous cardiac monitoring, monitor vital signs, obtain 12 lead EKG if indicated  - Evaluate effectiveness of antiarrhythmic and heart rate control medications as ordered  - Initiate emergency measures for life threatening arrhythmias  - Monitor electrolytes and administer replacement therapy as ordered  Outcome: Progressing     Problem: METABOLIC/FLUID AND ELECTROLYTES - ADULT  Goal: Hemodynamic  stability and optimal renal function maintained  Description: INTERVENTIONS:  - Monitor labs and assess for signs and symptoms of volume excess or deficit  - Monitor intake, output and patient weight  - Monitor urine specific gravity, serum osmolarity and serum sodium as indicated or ordered  - Monitor response to interventions for patient's volume status, including labs, urine output, blood pressure (other measures as available)  - Encourage oral intake as appropriate  - Instruct patient on fluid and nutrition restrictions as appropriate  Outcome: Progressing     Problem: SKIN/TISSUE INTEGRITY - ADULT  Goal: Skin integrity remains intact  Description: INTERVENTIONS  - Assess and document risk factors for pressure ulcer development  - Assess and document skin integrity  - Monitor for areas of redness and/or skin breakdown  - Initiate interventions, skin care algorithm/standards of care as needed  Outcome: Progressing  Goal: Incision(s), wounds(s) or drain site(s) healing without S/S of infection  Description: INTERVENTIONS:  - Assess and document risk factors for pressure ulcer development  - Assess and document skin integrity  - Assess and document dressing/incision, wound bed, drain sites and surrounding tissue  - Implement wound care per orders  - Initiate isolation precautions as appropriate  - Initiate Pressure Ulcer prevention bundle as indicated  Outcome: Progressing  Goal: Oral mucous membranes remain intact  Description: INTERVENTIONS  - Assess oral mucosa and hygiene practices  - Implement preventative oral hygiene regimen  - Implement oral medicated treatments as ordered  Outcome: Progressing     Problem: NEUROLOGICAL - ADULT  Goal: Achieves stable or improved neurological status  Description: INTERVENTIONS  - Assess for and report changes in neurological status  - Initiate measures to prevent increased intracranial pressure  - Maintain blood pressure and fluid volume within ordered parameters to optimize  cerebral perfusion and minimize risk of hemorrhage  - Monitor temperature, glucose, and sodium. Initiate appropriate interventions as ordered  Outcome: Not Progressing     Problem: Delirium  Goal: Minimize duration of delirium  Description: Interventions:  - Encourage use of hearing aids, eye glasses  - Promote highest level of mobility daily  - Provide frequent reorientation  - Promote wakefulness i.e. lights on, blinds open  - Promote sleep, encourage patient's normal rest cycle i.e. lights off, TV off, minimize noise and interruptions  - Encourage family to assist in orientation and promotion of home routines  Outcome: Progressing     Problem: HEMATOLOGIC - ADULT  Goal: Free from bleeding injury  Description: (Example usage: patient with low platelets)  INTERVENTIONS:  - Avoid intramuscular injections, enemas and rectal medication administration  - Ensure safe mobilization of patient  - Hold pressure on venipuncture sites to achieve adequate hemostasis  - Assess for signs and symptoms of internal bleeding  - Monitor lab trends  - Patient is to report abnormal signs of bleeding to staff  - Avoid use of toothpicks and dental floss  - Use electric shaver for shaving  - Use soft bristle tooth brush  - Limit straining and forceful nose blowing  Outcome: Progressing

## 2024-03-29 NOTE — PROGRESS NOTES
Cristiano Obregon Patient Status:  Inpatient    1970 MRN [id number]   Location [facility] 2W/SW Attending Bud Camarena MD   Hosp Day # 24 PCP Abilio Dorsey MD     Critical Care Progress Note     Date of Admission: 3/5/2024  7:14 AM  Admission Diagnosis: NSTEMI (non-ST elevated myocardial infarction) (HCC) [I21.4]     S: overnight events reviewed.  Pt suffered devastating ICH overnight now with active herniation.  Family notified by medical team overnight and is pt is now DNAR select.  Plans for gift of hope ongoing.      Current Medications:    Current Facility-Administered Medications:     heparin (Porcine) 1000 UNIT/ML injection 1,500 Units, 1.5 mL, Intracatheter, PRN    alteplase (Activase) 2 mg in sterile water for injection (PF) 2.2 mL IV push to declot line, 2 mg, Intracatheter, Once PRN    NxStage Pure Flow 400 CRRT/PIRRT fluid 5000mL, 5 L/hr, CRRT, Continuous **AND** CRRT Replacement Fluid 400, , , Until Discontinued    sodium chloride 0.9% 0.9% flush injection 10 mL, 10 mL, Intravenous, PRN    LORazepam (Ativan) 2 mg/mL injection 0.5 mg, 0.5 mg, Intravenous, Q4H PRN **OR** LORazepam (Ativan) 2 mg/mL injection 1 mg, 1 mg, Intravenous, Q4H PRN **OR** LORazepam (Ativan) 2 mg/mL injection 2 mg, 2 mg, Intravenous, Q4H PRN    prochlorperazine (Compazine) tab 10 mg, 10 mg, Oral, Q4H PRN **OR** prochlorperazine (Compazine) 25 MG rectal suppository 25 mg, 25 mg, Rectal, Q6H PRN    ondansetron (Zofran-ODT) disintegrating tab 4 mg, 4 mg, Oral, Q6H PRN **OR** ondansetron (Zofran) 4 MG/2ML injection 4 mg, 4 mg, Intravenous, Q6H PRN    atropine (Atropine-Care) 1 % ophthalmic solution 2 drop, 2 drop, Oral, Q2H PRN    furosemide (Lasix) tab 40 mg, 40 mg, Oral, Q8H PRN **OR** furosemide (Lasix) 10 mg/mL injection 40 mg, 40 mg, Intravenous, Q8H PRN    morphINE in sodium chloride 0.9% 100 mg/100mL infusion premix, 1 mg/hr, Intravenous, Continuous PRN    glycopyrrolate (Robinul) 0.2 MG/ML injection  0.2 mg, 0.2 mg, Intravenous, Q3H PRN    morphINE PF 2 MG/ML injection 2 mg, 2 mg, Intravenous, Q1H PRN    diazepam (Valium) 5 mg/mL injection 5 mg, 5 mg, Intravenous, Q6H PRN    acetaminophen (Tylenol) 160 MG/5ML oral liquid 650 mg, 650 mg, Oral, Q6H PRN **OR** acetaminophen (Tylenol) rectal suppository 650 mg, 650 mg, Rectal, Q6H PRN    dexmedeTOMIDine (Precedex) 800 mcg in sodium chloride 0.9% 100 mL infusion, 0.2-1.5 mcg/kg/hr, Intravenous, Continuous    melatonin tab 3 mg, 3 mg, Oral, Nightly PRN    ondansetron (Zofran) 4 MG/2ML injection 4 mg, 4 mg, Intravenous, Q6H PRN    acetaminophen (Tylenol) tab 650 mg, 650 mg, Oral, Q4H PRN **OR** acetaminophen (Tylenol) 160 MG/5ML oral liquid 650 mg, 650 mg, Oral, Q4H PRN **OR** [DISCONTINUED] acetaminophen (Tylenol) rectal suppository 650 mg, 650 mg, Rectal, Q4H PRN **OR** acetaminophen (Ofirmev) 10 mg/mL infusion premix 1,000 mg, 1,000 mg, Intravenous, Q6H PRN     OBJECTIVE:  /86 (BP Location: Left arm)   Pulse 74   Temp 97 °F (36.1 °C) (Temporal)   Resp 17   Ht 170.2 cm (5' 7\")   Wt 166 lb 3.6 oz (75.4 kg)   SpO2 100%   BMI 26.03 kg/m²      Vent Mode: VC/AC  FiO2 (%):  [30 %-100 %] 30 %  S RR:  [12] 12  S VT:  [530 mL] 530 mL  PEEP/CPAP (cm H2O):  [5 cm H20] 5 cm H20  MAP (cm H2O):  [8-9] 8      Wt Readings from Last 3 Encounters:   03/29/24 166 lb 3.6 oz (75.4 kg)   10/06/21 177 lb 6.4 oz (80.5 kg)   09/09/20 172 lb 9.6 oz (78.3 kg)        I/O last 3 completed shifts:  In: 2551.5 [I.V.:1633.3; Blood:431.3; NG/GT:457; IV PIGGYBACK:29.9]  Out: 2556 [Other:2556]  No intake/output data recorded.     General appearance: appears stated age and trached, unresponsive  Lungs:  coarse ant bilaterally  Heart: regular rate and rhythm  Abdomen: soft, non-tender; bowel sounds normal; no masses,  no organomegaly  Extremities: edema +1 c/w anasarca     Lab Results   Component Value Date    WBC 25.2 03/29/2024    RBC 2.85 03/29/2024    HGB 8.5 03/29/2024    HCT 25.8  03/29/2024    MCV 90.5 03/29/2024    MCH 29.8 03/29/2024    MCHC 32.9 03/29/2024    RDW 16.3 03/29/2024    .0 03/29/2024    .0 03/29/2024     Lab Results   Component Value Date     03/29/2024    K 5.4 03/29/2024     03/29/2024    CO2 22.0 03/29/2024    BUN 43 03/29/2024    CREATSERUM 3.62 03/29/2024     03/29/2024    CA 8.6 03/29/2024    ALKPHO 80 03/29/2024    ALT 11 03/29/2024    AST 27 03/29/2024    BILT 1.0 03/29/2024    ALB 3.2 03/29/2024    TP 5.6 03/29/2024     Lab Results   Component Value Date    INR 1.25 (H) 03/29/2024    INR 1.21 (H) 03/29/2024    INR 1.23 (H) 03/28/2024         Recent Labs   Lab 03/29/24  0054   ABGPHT 7.45   IKOITO6A 32*   OQLCL3E 418*   ABGHCO3 23.9   SITE Arterial Line       Imaging: reviewed. Per EMR  CXR: bilateral layering effusions, CMG, pulm edema  CT brain: large ICH with edema and herniation     Assessment / Plan:  Acute respiratory failure - initially due to pulmonary edema then encephalopathy and severe deconditioning, s/p tracheostomy 3/22.  Now with devastating ICH and herniation.  Will continue with full vent support for now. [place] will need vent adjustments prior to organ recruitment, if that is what the family decides.    - currently, pt has a weak cough and is breathing over ventilator, hence, he is not brain dead.  - sedation prn  - hold all SBT  NSTEMI - diagnosed with new multivessel CAD s/p angioplasty and subsequent cardiogenic shock requiring Impella placement. S/p 3vCABG 3/15. Impella removed 3/16. Back to OR 3/16 AM for tamponade s/p pericardial clot removal  - cardiology and cardiac surgery following  Cardiogenic shock - due to acute coronary syndrome, EF was down and has now improved on repeat echo  - off vasopressors  Atrial fibrillation  - per cardiology  Pneumonia - sputum cultures with Klebsiella and MSSA  - antibiotics per ID  - on cefepime  Acute renal failure - from ATN and cardiogenic shock  - RRT per nephrology  - CRRT  per renal service  Neuro- CVA with evolution and more acutely, large ICH. ? Conversion vs new event  - most recent CT with edema, hemorrhage and herniation  - not a surgical candidate  - seizure precautions  - likely will progress to brain death.  Will proceed with apnea test when appropriate  - neurology following  Thrombocytopenia, anemia - HIT antibody was negative, continued oozing from trach and g-tube site despite relatively unremarkable coags  - INR 1.23, s/p vitamin k 3/27  - fibrinogen has been normal  - plts remain above 100k  - surgicell to oozing sites  - holding all anticoagulants  - HIT antibody negative x2  - trach site currently not bleeding  FEN  - PEG placed 3/23  - TFs on hold   - replace lytes prn  Proph  - subcutaneous heparin on hold for bleeding, SCT  - famotidine  Dispo - full code  - ICU, will follow    Critical Care Time: 35 minutes    Rajendra Davis MD  3/29/2024  7:53 AM

## 2024-03-29 NOTE — PROGRESS NOTES
Evening addendum:    Patient clinically improved during the day today, cognitive state improved, sitting up in chair earlier today.    Repeat CT brain done for interval check at 5:45.    Upon arrival to floor shortly thereafter became more obtunded, confused, posturing, Code stroke called, neuro ordered repeat CT.      Repeat CT showing massive left frontal lobe ICU with significant vasogenic edema, with evidence of herniation.    Neurosurgery evaluated immediately at bedside, given devastating bleed and active herniation, meaningful recovery not deemed possible.    Discussed with family at bedside agreeable to comfort measures.    Comfort measures initiated.      Updated Neuro/CV surgery/Pulm/Renal/cardiology.      Bud Camarena MD

## 2024-03-29 NOTE — PROGRESS NOTES
CV Surgery    Patient seen earlier today in chair, with family.  Went for surveillance head CT, upon returning had acute change in neuro exam with posturing.  CT head repeated and massive left sided intraparenchymal hemorrhage with midline shift.  Neurosurgery evaluated, no role for intervention given size of bleed with midline shift and posturing on exam.  Comfort care measures initiated.  Met with family at bedside updated of condition.      Richie Llanes MD  Cardiothoracic Surgery  [facility]

## 2024-03-29 NOTE — SPIRITUAL CARE NOTE
Spiritual Care Visit Note    Patient Name: Cristiano Obregon Date of Spiritual Care Visit: 24   : 1970 Primary Dx: NSTEMI (non-ST elevated myocardial infarction) (HCC)       Referred By: Referral From: Nurse;Family    Spiritual Care Taxonomy:    Intended Effects: Promote a sense of peace    Methods: Offer emotional support    Interventions: Acknowledge response to difficult experience;[place]    Visit Type/Summary:     - Spiritual Care: Responded to a request via the on call phone Offered empathic listening and emotional support. Provided resources related to grief and grieving. Provided support for Patient's spiritual/Shinto requests. Offered prayer.  remains available for follow up.    Spiritual Care support can be requested via an Epic consult. For urgent/immediate needs, please contact the On Call  at: [place]: ext [phone]    Chaplain Lillie.

## 2024-03-29 NOTE — RESPIRATORY THERAPY NOTE
PT  with trach received on  the vent  with  settings  AC/VC/ 12/530/ 5 / 30%.  ABG done and   SX done as needed and continue  to monitor the PT

## 2024-03-29 NOTE — CONSULTS
[facility]  part of [facility]    Neurosurgery Consultation    Cristiano Obregon Patient Status:  Inpatient    1970 MRN [id number]   Location [facility] 2W/SW Attending Bud Camarena MD   Hosp Day # 23 PCP Abilio Dorsey MD     Date of Admission:  3/5/2024  Date of Consult:  3/28/2024    Reason for Consultation:  Evolving left frontal parenchymal hemorrhage  Pupils irregular and fixed    History of Present Illness:  Cristiano Obregon is a a(n) 53 year old male with past medical history of hyperlipidemia, tobacco use who was admitted to the hospital on 3/5/2024 for NSTEMI complicated by respiratory failure and cardiogenic shock and now status post CABG x 3 on 3/15/2024 and mediastinal exploration/washout 3/16/2024. Hospital course further complicated by thrombocytopenia, acute stroke, kidney injury requiring CRRT, ventilator associated pneumonia, bilateral chest tubes.      On 3/19 patient found to have left frontal infarct after neurological deficits found during recovery.  He has been serially imaged and on 3/28 found to have increasing region of edema and increased patchy parenchymal hemorrhage.  Shortly after patient found to have transient alteration of mental status and unequal pupils.  Repeat CT with enlarging left frontal intraparenchymal hemorrhage with uncal and subfalcine herniation.    History:  History reviewed. No pertinent past medical history.  Past Surgical History:   Procedure Laterality Date    COLONOSCOPY N/A 2017    Procedure: COLONOSCOPY, POSSIBLE BIOPSY, POSSIBLE POLYPECTOMY 77021;  Surgeon: Byron Plaza MD;  Location: [facility]     History reviewed. No pertinent family history.   reports that he has been smoking cigarettes. He has never used smokeless tobacco. He reports that he does not drink alcohol and does not use drugs.    Allergies:  No Known Allergies    Medications:    Current Facility-Administered Medications:      sodium chloride 0.9% 0.9% flush injection 10 mL, 10 mL, Intravenous, PRN    acetaminophen (Tylenol) 160 MG/5ML oral liquid 650 mg, 650 mg, Oral, 4 times per day **OR** acetaminophen (Tylenol) rectal suppository 650 mg, 650 mg, Rectal, 4 times per day    LORazepam (Ativan) 2 mg/mL injection 0.5 mg, 0.5 mg, Intravenous, Q4H PRN **OR** LORazepam (Ativan) 2 mg/mL injection 1 mg, 1 mg, Intravenous, Q4H PRN **OR** LORazepam (Ativan) 2 mg/mL injection 2 mg, 2 mg, Intravenous, Q4H PRN    prochlorperazine (Compazine) tab 10 mg, 10 mg, Oral, Q4H PRN **OR** prochlorperazine (Compazine) 25 MG rectal suppository 25 mg, 25 mg, Rectal, Q6H PRN    ondansetron (Zofran-ODT) disintegrating tab 4 mg, 4 mg, Oral, Q6H PRN **OR** ondansetron (Zofran) 4 MG/2ML injection 4 mg, 4 mg, Intravenous, Q6H PRN    atropine (Atropine-Care) 1 % ophthalmic solution 2 drop, 2 drop, Oral, Q2H PRN    furosemide (Lasix) tab 40 mg, 40 mg, Oral, Q8H PRN **OR** furosemide (Lasix) 10 mg/mL injection 40 mg, 40 mg, Intravenous, Q8H PRN    morphINE in sodium chloride 0.9% 100 mg/100mL infusion premix, 1 mg/hr, Intravenous, Continuous PRN    glycopyrrolate (Robinul) 0.2 MG/ML injection 0.2 mg, 0.2 mg, Intravenous, Q3H PRN    dexmedeTOMIDine (Precedex) 800 mcg in sodium chloride 0.9% 100 mL infusion, 0.2-1.5 mcg/kg/hr, Intravenous, Continuous    morphINE PF 2 MG/ML injection 1 mg, 1 mg, Intravenous, Q4H PRN    melatonin tab 3 mg, 3 mg, Oral, Nightly PRN    ondansetron (Zofran) 4 MG/2ML injection 4 mg, 4 mg, Intravenous, Q6H PRN    acetaminophen (Tylenol) tab 650 mg, 650 mg, Oral, Q4H PRN **OR** acetaminophen (Tylenol) 160 MG/5ML oral liquid 650 mg, 650 mg, Oral, Q4H PRN **OR** [DISCONTINUED] acetaminophen (Tylenol) rectal suppository 650 mg, 650 mg, Rectal, Q4H PRN **OR** acetaminophen (Ofirmev) 10 mg/mL infusion premix 1,000 mg, 1,000 mg, Intravenous, Q6H PRN    Review of Systems:  A 10-point system was reviewed.  Pertinent positives and negatives are noted in  HPI.      Physical Exam:  Temp:  [97.7 °F (36.5 °C)-98.3 °F (36.8 °C)] 98.3 °F (36.8 °C)  Pulse:  [] 87  Resp:  [0-31] 28  BP: ()/() 164/101  SpO2:  [93 %-100 %] 93 %  FiO2 (%):  [30 %] 30 %  I/O last 3 completed shifts:  In: 2936.2 [I.V.:1469.3; Blood:1100; NG/GT:337; IV PIGGYBACK:29.9]  Out: 2925 [Other:2925]      Neurological Exam:  Tracheostomy in placed  Obtunded  Nonresponsive  Pupils irregular and fixed  Posturing    Abdomen:  Soft, non-distended, non-tender, with no rebound or guarding.  No peritoneal signs.     Extremities:  Non-tender, no lower extremity edema noted.      Labs:  Lab Results   Component Value Date    WBC 17.8 (H) 03/28/2024    HGB 8.9 (L) 03/28/2024    .0 (L) 03/28/2024    BUN 36 (H) 03/28/2024     03/28/2024    K 4.7 03/28/2024    CO2 25.0 03/28/2024     (H) 03/28/2024    ALB 3.1 (L) 03/28/2024    PTT 40.3 (H) 03/28/2024    INR 1.23 (H) 03/28/2024       Imaging:  CT STROKE BRAIN (NO IV)(CPT=70450)    Result Date: 3/28/2024  CONCLUSION:  1. Enlarging left frontal intraparenchymal hemorrhage with substantial interval worsening and surrounding vasogenic edema. There is resultant uncal and subfalcine herniation.  2. Basilar cistern effacement with mass effect exerted on the midbrain/jeancarlos and suspected associated severe edema.   3. Worsening left-to-right midline shift.  4. Senescent changes of parenchymal volume loss with sequela of chronic microvascular ischemic disease. There is also large vessel atherosclerosis.   5. Scattered paranasal sinus disease.  6. Subtotal left mastoid effusion.  7. Lesser incidental findings as above.    Results of this examination were discussed with the patient's neurosurgeon physician via extension [phone] by Dr. Dean at 1920 on 03/28/2024.   Dictated by (CST): Zachery Dean MD on 3/28/2024 at 7:20 PM     Finalized by (CST): Zachery Dean MD on 3/28/2024 at 7:29 PM          CT BRAIN OR HEAD (54780)    Result Date:  3/28/2024  CONCLUSION:   Evolving left frontal lobe infarct redemonstrated with increased 8.7 cm region of edema and increased patchy parenchymal hemorrhage.  There is mild increased associated mass effect with left-to-right midline shift now measuring up to 0.6 cm, previously 0.4 cm.     Dictated by (CST): Jcarlos Harmon MD on 3/28/2024 at 5:41 PM     Finalized by (CST): Jcarlos Harmon MD on 3/28/2024 at 5:48 PM          XR CHEST AP PORTABLE  (CPT=71045)    Result Date: 3/27/2024  CONCLUSION:  1. Heart size upper limits of normal, and there is moderate pulmonary edema pattern which has worsened somewhat since previous study.  Worsening bibasilar opacification suggesting increasing bilateral pleural effusions left more than right and probable left basal atelectasis, although underlying pneumonia not excluded.  Status post extubation and feeding tube removal.  No pneumothorax.  Follow-up studies are advised.    Dictated by (CST): Moisés Delgadillo MD on 3/27/2024 at 8:36 AM     Finalized by (CST): Moisés Delgadillo MD on 3/27/2024 at 8:39 AM          CT BRAIN OR HEAD ([id number])    Addendum Date: 3/26/2024    ADDENDUM:  Findings were called to Dr. Winters.   Dictated by (CST): Fabian Gay MD on 3/26/2024 at 6:53 AM     Finalized by (CST): Fabian Gay MD on 3/26/2024 at 6:53 AM             Result Date: 3/26/2024  CONCLUSION:  1. Continued evolution of left frontal lobe infarct with a 7 cm region of edema and patchy parenchymal hemorrhage.  Mild mass effect with approximately 4 mm of midline shift towards the right.    Dictated by (CST): Fabian Gay MD on 3/26/2024 at 6:34 AM     Finalized by (CST): Fabain Gay MD on 3/26/2024 at 6:46 AM              Assessment/Plan:  Principal Problem:    NSTEMI (non-ST elevated myocardial infarction) (HCC)  Active Problems:    Chest pain    Cerebrovascular accident (CVA) due to embolism of left middle cerebral artery (HCC)    Anemia    Clonus    Cerebral edema (HCC)     Petechial hemorrhage    Midline shift of brain    Mr. Obregon is a 53-year-old male with a complicated hospital course who is clinically improving yesterday prior to the evening.  Interval repeat CT head around 6 PM showed increasing edema and shortly after patient became obtunded, confused, with irregular pupils and posturing.  Patient taken directly back to CT and found to have a devastating intraparenchymal hemorrhage with uncal and subfalcine herniation.   - Not a neurosurgical candidate  - Family electing for comfort care  - Gift of Hope eligible    All questions and concerns were addressed. We appreciate the opportunity to participate in the care of this patient. Please do not hesitate to call our office ([phone]) with any issues.      Félix Hyde PA-C  3/28/2024  7:34 PM

## 2024-03-29 NOTE — PROGRESS NOTES
Cardiology Progress Note    Cristiano Moralezmesfin Patient Status:  Inpatient    1970 MRN [id number]   Location [facility] 2W/SW Attending Donna Llanes,    Hosp Day # 24 PCP Abilio Dorsey MD     Assessment   Comfort care due to hemorrhagic conversion of stroke, herniation and brain death.  Gift of Hope involved.    Generalized bleeding/oozing: Anemia and thrombocytopenia, likely consumptive.  HIT and DIC panel negative.    STEMI, severe multivessel coronary disease status post CABG: Last echocardiogram 3/16 while Impella was in place, ejection fraction 45 to 50%.  Emergent redo sternotomy 3/16/2024 for tamponade    Cardiogenic shock requiring Impella-resolved    Ventilator dependent respiratory failure, status post tracheostomy 3/22/2024, has had generalized oozing and bleeding.  General surgery following.    Acute stroke: Left frontal lobe infarct associate with edema and mild hemorrhagic conversion with mild mass effect.    Acute renal failure, on CRRT based on neuro recommendations due to cerebral edema.    Atrial fibrillation, paroxysmal    Status post PEG tube placement, on TF    Plan  Await gift of hope evaluation, family aware.        Carlitos Sandoval MD  Interventional Cardiology  [facility]  ----------------------------------------------------------------------------------------------------------  ROS 12 systems reviewed, pertinent findings above.  ROS    History:  History reviewed. No pertinent past medical history.  Past Surgical History:   Procedure Laterality Date    COLONOSCOPY N/A 2017    Procedure: COLONOSCOPY, POSSIBLE BIOPSY, POSSIBLE POLYPECTOMY 60350;  Surgeon: Byron Plaza MD;  Location: [facility]     History reviewed. No pertinent family history.   reports that he has been smoking cigarettes. He has never used smokeless tobacco. He reports that he does not drink alcohol and does not use drugs.    Objective:   Temp: 96.8  °F (36 °C)  Pulse: 78  Resp: 14  BP: 149/101  AO: 156/73  FiO2 (%): 30 %    Intake/Output:     Intake/Output Summary (Last 24 hours) at 3/29/2024 0859  Last data filed at 3/29/2024 0700  Gross per 24 hour   Intake 1486.95 ml   Output 1119 ml   Net 367.95 ml       Physical Exam:    General: on Vent/trach, awake/alert  HEENT: Normocephalic, anicteric sclera, neck supple.  Neck: No JVD, carotids 2+, no bruits.  Cardiac: Regular rate and rhythm. S1, S2 normal. No murmur, pericardial rub, S3.  Lungs: Clear without wheezes, rales, rhonchi or dullness.  Normal excursions and effort.  Abdomen: Soft, non-tender. BS-present.  Extremities: Without clubbing, cyanosis or edema.  Peripheral pulses are 2+.  Neurologic: Non-focal  Skin: Warm and dry.

## 2024-03-29 NOTE — SPIRITUAL CARE NOTE
Spiritual Care Visit Note    Patient Name: Cristiano Obregon Date of Spiritual Care Visit: 24   : 1970 Primary Dx: NSTEMI (non-ST elevated myocardial infarction) (HCC)       Referred By: RRT    Spiritual Care Taxonomy:      Visit Type/Summary:     responded to RRT. Patient taken to CT, Family at bedside speaking with MD.    - Spiritual Care:  remains available as needed for follow up.    Spiritual Care support can be requested via an Epic consult. For urgent/immediate needs, please contact the On Call  at: [place]: ext [phone]    Chaplain Lillie.

## 2024-03-29 NOTE — RESTORATIVE THERAPY
Procedure: Arterial Line  Procedure Start: 3/28 @ 2310  Procedure End:  3/28 @ 2320       Patient was prepped for A-line, sterile technique was used.  Artery was visualized on ultrasound, needle was inserted and catheter was advanced. No adverse effects or hematoma noted. Site was dressed and line was secured. Positive waveform was seen on the monitor.   Bilateral Helical Rim Advancement Flap Text: The defect edges were debeveled with a #15 blade scalpel.  Given the location of the defect and the proximity to free margins (helical rim) a bilateral helical rim advancement flap was deemed most appropriate.  Using a sterile surgical marker, the appropriate advancement flaps were drawn incorporating the defect and placing the expected incisions between the helical rim and antihelix where possible.  The area thus outlined was incised through and through with a #15 scalpel blade.  With a skin hook and iris scissors, the flaps were gently and sharply undermined and freed up.

## 2024-03-29 NOTE — SIGNIFICANT EVENT
[facility]  [facility] of [facility]     Cristiano Obregon Patient Status:  Inpatient    1970 MRN [id number]   Location [facility] 2W/SW Attending Bud Camarena MD   Hosp Day # 23 PCP Abilio Dorsey MD     Chief Complaint:   Chief Complaint   Patient presents with    Chest Pain Angina        Subjective:     RRT/stroke alert called.  Arrived at bedside shortly after.  Per RN, patient with mental status changes.  History unable to be obtained by patient as he was intubated and attempted end of interview.  Per history from RN, patient with some mild changes in mental status earlier this evening.  Patient had follow-up CT brain completed around 530.  After returning to the floor, patient's mental status continues to decline.  Eventually, patient became obtunded, unable to be awakened.  RN also noted pupillary changes and posturing.    Objective:      Vital signs:  Vitals:    24 1500 24 1600 24 1700 24 1800   BP: 118/79 126/85 103/62 (!) 164/101   BP Location: Right arm Right arm Right arm Right arm   Pulse: 87 91 76 87   Resp: (!) 27 25 21 (!) 28   Temp:  98.3 °F (36.8 °C)     TempSrc:  Temporal     SpO2: 100% 97% 96% 93%   Weight:       Height:           Intake/Output Summary (Last 24 hours) at 3/28/2024 1900  Last data filed at 3/28/2024 1600  Gross per 24 hour   Intake 2267.45 ml   Output 2118 ml   Net 149.45 ml           Physical Exam:    GENERAL:  intubated, obtunded  HEART:  Regular rhythm, regular rate  LUNGS:  Air entry was decreased. agonal breathing   NEUROLOGICAL:  Obtunded. Pupils unequal, dilated on L and pinpoint on R, fixed. Posturing of ext.     Diagnostic Data:    Labs:    Recent Labs   Lab 24  0452 24  1213 24  0423 24  1201 24  0528 24  1308 24  1604 24  1808 24  0546 24  1156 24  0500 24  1420 24  1733   WBC 23.7*   < > 20.3*  --   21.1*  --   --   --  20.9*  --  17.8*  --   --    HGB 7.2*   < > 7.5*   < > 7.4*   < >  --    < > 7.6*   < > 7.7*  7.7* 9.1* 8.9*   MCV 87.0   < > 91.8  --  90.7  --   --   --  91.0  --  90.4  --   --    .0*   < > 126.0*  --  112.0*  --   --   --  124.0*  124.0*  --  107.0*  --   --    BAND 1  --  1  --   --   --   --   --   --   --   --   --   --    INR  --   --  1.22*  --  1.33*  --  2.00*  --  1.25*  --  1.23*  --   --     < > = values in this interval not displayed.       Recent Labs   Lab 03/24/24  0511 03/24/24  1533 03/27/24  0546 03/27/24  1700 03/28/24  0500 03/28/24  1640   *   < > 128* 141* 169* 145*   BUN 52*   < > 46* 46* 47* 36*   CREATSERUM 4.83*   < > 3.18* 3.29* 3.52* 2.81*   CA 7.9*   < > 8.5* 8.1* 8.3* 8.5*   ALB 3.1*   < > 3.0* 2.8* 2.6* 3.1*      < > 139 137 139 136   K 4.3   < > 4.9 4.9 5.0 4.7      < > 107 108 108 105   CO2 25.0   < > 27.0 27.0 25.0 25.0   ALKPHO 97  --  85  --  73  --    AST 26  --  26  --  20  --    ALT 19  --  18  --  12  --    BILT 0.8  --  0.9  --  0.9  --    TP 5.3*  --  5.2*  --  4.6*  --     < > = values in this interval not displayed.           Estimated Creatinine Clearance: 27.8 mL/min (A) (based on SCr of 2.81 mg/dL (H)).    Recent Labs   Lab 03/26/24  1604 03/27/24  0546 03/28/24  0500   PTP 23.9* 16.5* 16.3*   INR 2.00* 1.25* 1.23*            COVID-19  Lab Results   Component Value Date    COVID19 Not Detected 03/05/2024       Pro-Calcitonin  No results for input(s): \"PCT\" in the last 168 hours.    Cardiac  No results for input(s): \"TROP\", \"PBNP\" in the last 168 hours.    Inflammatory Markers  Recent Labs   Lab 03/27/24  0546 03/28/24  0500   TREVOR  --  1,472.5*   *  --    DDIMER >20.00*  --        Culture:  Hospital Encounter on 03/05/24   1. Blood Culture     Status: None    Collection Time: 03/23/24  2:13 PM    Specimen: Blood,peripheral   Result Value Ref Range    Blood Culture Result No Growth 5 Days N/A       CT BRAIN OR  HEAD (75510)    Result Date: 3/28/2024  CONCLUSION:   Evolving left frontal lobe infarct redemonstrated with increased 8.7 cm region of edema and increased patchy parenchymal hemorrhage.  There is mild increased associated mass effect with left-to-right midline shift now measuring up to 0.6 cm, previously 0.4 cm.     Dictated by (CST): Jcarlos Harmon MD on 3/28/2024 at 5:41 PM     Finalized by (CST): Jcarlos Harmon MD on 3/28/2024 at 5:48 PM          XR CHEST AP PORTABLE  (CPT=71045)    Result Date: 3/27/2024  CONCLUSION:  1. Heart size upper limits of normal, and there is moderate pulmonary edema pattern which has worsened somewhat since previous study.  Worsening bibasilar opacification suggesting increasing bilateral pleural effusions left more than right and probable left basal atelectasis, although underlying pneumonia not excluded.  Status post extubation and feeding tube removal.  No pneumothorax.  Follow-up studies are advised.    Dictated by (CST): Moisés Delgadillo MD on 3/27/2024 at 8:36 AM     Finalized by (CST): Moisés Delgadillo MD on 3/27/2024 at 8:39 AM          CT BRAIN OR HEAD ([id number])    Addendum Date: 3/26/2024    ADDENDUM:  Findings were called to Dr. Winters.   Dictated by (CST): Fabian Gay MD on 3/26/2024 at 6:53 AM     Finalized by (CST): Fabian Gay MD on 3/26/2024 at 6:53 AM             Result Date: 3/26/2024  CONCLUSION:  1. Continued evolution of left frontal lobe infarct with a 7 cm region of edema and patchy parenchymal hemorrhage.  Mild mass effect with approximately 4 mm of midline shift towards the right.    Dictated by (CST): Fabian Gay MD on 3/26/2024 at 6:34 AM     Finalized by (CST): Fabian Gay MD on 3/26/2024 at 6:46 AM           EKG 12 Lead    Result Date: 3/27/2024  Atrial fibrillation Possible Inferior infarct (cited on or before 15-MAR-2024) Abnormal ECG When compared with ECG of 16-MAR-2024 05:45, Atrial fibrillation has replaced Sinus rhythm (RBBB and left  posterior fascicular block) is no longer Present Confirmed by NAOMI CASTELLANOS ([id number]) on 3/27/2024 2:47:36 PM     Medications:    [START ON 4/1/2024] amiodarone  200 mg Oral Daily    amiodarone  200 mg Oral BID with meals    sodium chloride   Intravenous Once    heparin sodium lock flush  1.5 mL Intravenous Once    sodium chloride   Intravenous Once    sodium chloride   Intravenous Once    hydrALAzine  10 mg Intravenous Once    atorvastatin  40 mg Oral Nightly    metoprolol tartrate  50 mg Oral 2x Daily(Beta Blocker)    ascorbic acid  500 mg Oral Daily    [Held by provider] aspirin  81 mg Oral Daily    hydrALAZINE  25 mg Oral Q8H CHAYITO    famotidine  20 mg Oral Daily    Or    famotidine  20 mg Intravenous Daily    chlorhexidine gluconate  15 mL Mouth/Throat BID    [Held by provider] heparin  5,000 Units Subcutaneous 2 times per day    [Held by provider] clopidogrel  75 mg Oral Daily       Assessment & Plan:         Cerebrovascular accident     Cerebral edema     Midline shift of brain  -Acute mental status changes  -Signs consistent with increased intracranial pressure and evolving stroke.  -Discussed with NS and Neurology, plan for stat repeat imaging.   -Started on Nicardipine for SBP control  -Considering hypertonic saline/mannitol, defer to NS/Neuro  -Updated Primary care physician     NSTEMI (non-ST elevated myocardial infarction)     Addendum: CT brain reviewed.  Noted enlarging left frontal hemorrhage with substantial interval worsening and surrounding vasogenic edema.      CC time, greater than 35 minutes spent evaluating pt, reviewing labs, discussing pt with other providers. Time does not include procedures.      Guerrero Lyons MD          This note was prepared using Dragon [employer] voice recognition dictation software. As a result errors may occur. When identified these errors have been corrected. While every attempt is made to correct errors during dictation discrepancies may still exist

## 2024-03-29 NOTE — PROGRESS NOTES
Accompanied patient to CT Scan during shift change r/t Stroke Alert, Dr Vo came in to CT to check result, CT shows enlarging left frontal hemorrhage with substantial interval worsening and surrounding vasogenic edema with MLS/herniation. Pt not candidate for surgery d/t size of bleed. Dr Vo and Dr Camarena talked to family, Family decided to make pt-comfort care. [facility] notified with referral number [id number] potential candidate for organ donation.

## 2024-03-29 NOTE — PROGRESS NOTES
Pt returned from CT and this writer noticed pt not responsive, irregular breathing, pupils irregular and fixed, feet posturing, bp elevated. Stroke alert called, pt taken to stat head ct, precedex off, cardene gtt ordered to keep systolic bp < 140. CRRT being restarted once back from CT.

## 2024-03-29 NOTE — PROGRESS NOTES
Neurology progress note    Cristiano Obregon  is 53 year old man who was admitted on 3/5/24 for near syncope while exercising found to have a NSTEMI requiring  CP Impella and  soon after CRRT for acute renal failure s/p CABG x3 on 3/15  found to have a left frontal infarct on his 3/19 head CT.  Hospital course complicated by anemia and  coagulopathy with hemorrhagic transformation of petechial hemorrhage leading to subfalcine and uncal herniation on 3/28/2024.  Head CT is consistent with an irreversible cause of brain death.      There was no severe electrolyte, acid-base or endocrine disbalance, there was also no ongoing neuromuscular blocking agent  No alcohol, drug intoxication or poisoning.    The patient has normal temperature, vital signs and oxygen saturations and is not on any medications at doses that would mimic the findings of brain death.    Objective  vitals  Blood pressure 102/80, pulse 66, temperature 97.1 °F (36.2 °C), temperature source Skin     Neurological exam  - Mental status: Comatose (not awake, not arousable, not aware). Eyes are closed. Does not regard. Mute.  No grimacing to the pressure on the supraorbital ridge.  does not  grimace to noxious stimuli.    does not resist passive eyelid opening.No gaze preference.  - Cranial nerves: Pupils are 6 mm bilaterally.  Pupils are dilated midposition and fixed.  absent corneal reflex.. No forced gaze deviation.. Absent oculovestibular reflex.  Occulocephalics: absent. Facial grimace to retromandibular pressure appears symmetrical.  No gag.  No cough. Not overbreathing the  ventilator before it was disconnected.  - Motor/sensory: No response.  No posturing.  Does not localize to noxious stimuli centrally.  No spinal reflexes observed either.    No movement of the chest during apnea testing.    Apnea test performed; patient without spontaneous respiratory effort; pCO2 drawn before start of apnea test, and pCO2 drawn after 5 minutes of apnea.  pO2  remained over 200 throughout the test, while pCO2 samuel over 20 points, to be over 60 mmHg    Patient pronounced dead  4:29 PM.  Family made aware of diagnosis.       Michael Winters DO  [facility] Neurology

## 2024-03-29 NOTE — RESPIRATORY THERAPY NOTE
Patient's Problems: NSTEMI , now on TRACH ,    Respiratory Assessment :     Trach  patient is on ventilator/ full support.   Breath Sounds: were coarse and Rhonchi bilateral.   Secretions: Small  white and thick secretions.   Patient was suctioned as needed, not gag reflex.      Ventilator Settings: AC/VC  RR 10 ,  , PEEP + 5, FIO2 100%    Airway: PORTEX 7    Plan of Care:  Neurology MD Singh Rodriguez     At 16:20 Apnea Test  ; ABG Base line 100% on ventilator   03/29/24 16:21   ABG PH 7.42   ABG PCO2 42   ABG PO2 502 (H)   ABG HCO3 26.9   ABG O2 SATURATION >99.0   Blood Gas Base Excess 2.5 (H)   Oxygen Delivery Device Vent   Blood Gas Vent Mode A/C   Blood Gas Respiration Rate 10   Oxygen Content 12.4 (L)   FiO2 100.00   SAMPLE SITE Arterial Line   TIDAL VOLUME 500.0   PEEP 5.0     Apnea test second ABG At 16: 30 , 100% 15 L  on Oxygen tubing,      03/29/24 16:30   ABG PH 7.27 (L)   ABG PCO2 67 (HH)   ABG PO2 417 (H)   ABG HCO3 27.3 (H)   ABG O2 SATURATION >99.0   Blood Gas Base Excess 3.1 (H)   Oxygen Delivery Device Other   Oxygen Content 12.2 (L)   FiO2 100.00   L/M 15.00   SAMPLE SITE Arterial Line     -CO2 increased from 42 to 67 in 8 minutes; results given to Neurologist MD Singh Rodriguez.  Patient needs to continue on ventilator . Patient was suction as need it,RT to continue to monitor this patient.

## 2024-03-29 NOTE — PHYSICAL THERAPY NOTE
Physical Therapy Discharge Note    Orders received and chart reviewed. Physical Therapy services are discontinued secondary to patient S/P RRT for ICH overnight, now with active herniation. Per chart review, patient changed to DNAR, plans for gift of hope ongoing, continuing CRRT with end of life care and comfort measures ordered. Physical therapy to sign off at this time.     03/29/24 0837   VISIT TYPE   PT Inpatient Visit Type (Documentation Required) Attempted Treatment  (medical decline)        Ryann Ring, PT, DPT  [facility]  Inpatient Rehabilitation  Physical Therapy  [phone]

## 2024-03-29 NOTE — PROGRESS NOTES
[facility]  part of [facility] Infectious Disease  Progress Note    Cristiano Obregon Patient Status:  Inpatient    1970 MRN [id number]   Location [facility] 2W/SW Attending Bud Camarena MD   Hosp Day # 24 PCP Abilio Dorsey MD     Cristiano Obregon is a 53 year old male.   Chief Complaint   Patient presents with    Chest Pain Angina       HPI:      New neurologic deteriorization               REVIEW OF SYSTEMS:   A comprehensive 10 point review of systems was completed.  Pertinent positives and negatives noted in the the HPI.       Allergies:  No Known Allergies     Current Meds:    Current Facility-Administered Medications:     NxStage Pure Flow 400 CRRT/PIRRT fluid 5000mL, 5 L/hr, CRRT, Continuous **AND** CRRT Replacement Fluid 400, , , Until Discontinued    niCARdipine in sodium chloride 0.86% (carDENE) 20 mg/200mL infusion premix, 5-15 mg/hr, Intravenous, Continuous    norepinephrine (Levophed) 4 mg/250mL infusion premix, , ,     norepinephrine (Levophed) 4 mg/250mL infusion premix, 0.5-30 mcg/min, Intravenous, Continuous    sodium chloride 0.9% 0.9% flush injection 10 mL, 10 mL, Intravenous, PRN    LORazepam (Ativan) 2 mg/mL injection 0.5 mg, 0.5 mg, Intravenous, Q4H PRN **OR** LORazepam (Ativan) 2 mg/mL injection 1 mg, 1 mg, Intravenous, Q4H PRN **OR** LORazepam (Ativan) 2 mg/mL injection 2 mg, 2 mg, Intravenous, Q4H PRN    prochlorperazine (Compazine) tab 10 mg, 10 mg, Oral, Q4H PRN **OR** prochlorperazine (Compazine) 25 MG rectal suppository 25 mg, 25 mg, Rectal, Q6H PRN    ondansetron (Zofran-ODT) disintegrating tab 4 mg, 4 mg, Oral, Q6H PRN **OR** ondansetron (Zofran) 4 MG/2ML injection 4 mg, 4 mg, Intravenous, Q6H PRN    atropine (Atropine-Care) 1 % ophthalmic solution 2 drop, 2 drop, Oral, Q2H PRN    furosemide (Lasix) tab 40 mg, 40 mg, Oral, Q8H PRN **OR** furosemide (Lasix) 10 mg/mL injection 40 mg, 40 mg, Intravenous, Q8H PRN     morphINE in sodium chloride 0.9% 100 mg/100mL infusion premix, 1 mg/hr, Intravenous, Continuous PRN    glycopyrrolate (Robinul) 0.2 MG/ML injection 0.2 mg, 0.2 mg, Intravenous, Q3H PRN    morphINE PF 2 MG/ML injection 2 mg, 2 mg, Intravenous, Q1H PRN    diazepam (Valium) 5 mg/mL injection 5 mg, 5 mg, Intravenous, Q6H PRN    acetaminophen (Tylenol) 160 MG/5ML oral liquid 650 mg, 650 mg, Oral, Q6H PRN **OR** acetaminophen (Tylenol) rectal suppository 650 mg, 650 mg, Rectal, Q6H PRN    dexmedeTOMIDine (Precedex) 800 mcg in sodium chloride 0.9% 100 mL infusion, 0.2-1.5 mcg/kg/hr, Intravenous, Continuous    melatonin tab 3 mg, 3 mg, Oral, Nightly PRN    ondansetron (Zofran) 4 MG/2ML injection 4 mg, 4 mg, Intravenous, Q6H PRN    acetaminophen (Tylenol) tab 650 mg, 650 mg, Oral, Q4H PRN **OR** acetaminophen (Tylenol) 160 MG/5ML oral liquid 650 mg, 650 mg, Oral, Q4H PRN **OR** [DISCONTINUED] acetaminophen (Tylenol) rectal suppository 650 mg, 650 mg, Rectal, Q4H PRN **OR** acetaminophen (Ofirmev) 10 mg/mL infusion premix 1,000 mg, 1,000 mg, Intravenous, Q6H PRN   No current outpatient medications on file.        HISTORY:  History reviewed. No pertinent past medical history.   Past Surgical History:   Procedure Laterality Date    COLONOSCOPY N/A 12/9/2017    Procedure: COLONOSCOPY, POSSIBLE BIOPSY, POSSIBLE POLYPECTOMY 59911;  Surgeon: Byron Plaza MD;  Location: [facility] [place] [facility]        Social history and family history negative related to present illness except as above.    PHYSICAL EXAM:   /88 (BP Location: Left arm)   Pulse 81   Temp 96.8 °F (36 °C) (Temporal)   Resp 14   Ht 5' 7\" (1.702 m)   Wt 166 lb 3.6 oz (75.4 kg)   SpO2 98%   BMI 26.03 kg/m²   GENERAL:  unresponsive      IMPRESSION/PLAN:   New cns bleed  Off antibiotics  Comfort care planned after organ harvesting  Spoke with family rn  Signing off            Recent Results (from the past 72 hour(s))   Hemoglobin & Hematocrit    Collection  Time: 03/26/24  1:08 PM   Result Value Ref Range    HGB 6.7 (LL) 13.0 - 17.5 g/dL    HCT 20.3 (L) 39.0 - 53.0 %   Heparin Induced Platelet    Collection Time: 03/26/24  2:56 PM   Result Value Ref Range    Heparin Induced Plt Antibody Negative Negative    Heparin Dependent Platelet AB Interp         No evidence of heparin-induced platelet aggregation is identified from platelet aggregation studies using the patient's serum in the presence of heparin.     A single negative result does not rule out the presence of a heparin-induced platelet antibody.     If clinical suspicion of heparin-induced thrombocytopenia persists, a repeat study or additional studies (serotonin-release assay, PF4 ALICIA, etc.) are recommended.    Reviewed by Chris Casarez M.D.       Renal Function Panel    Collection Time: 03/26/24  4:04 PM   Result Value Ref Range    Glucose 146 (H) 70 - 99 mg/dL    Sodium 139 136 - 145 mmol/L    Potassium 4.6 3.5 - 5.1 mmol/L    Chloride 108 98 - 112 mmol/L    CO2 24.0 21.0 - 32.0 mmol/L    Anion Gap 7 0 - 18 mmol/L    BUN 61 (H) 9 - 23 mg/dL    Creatinine 4.17 (H) 0.70 - 1.30 mg/dL    BUN/CREA Ratio 14.6 10.0 - 20.0    Calcium, Total 7.7 (L) 8.7 - 10.4 mg/dL    Calculated Osmolality 308 (H) 275 - 295 mOsm/kg    eGFR-Cr 16 (L) >=60 mL/min/1.73m2    Albumin 2.6 (L) 3.2 - 4.8 g/dL    Phosphorus 3.7 2.4 - 5.1 mg/dL   Magnesium    Collection Time: 03/26/24  4:04 PM   Result Value Ref Range    Magnesium 1.8 1.6 - 2.6 mg/dL   Prothrombin Time (PT)    Collection Time: 03/26/24  4:04 PM   Result Value Ref Range    PT 23.9 (H) 11.6 - 14.8 seconds    INR 2.00 (H) 0.80 - 1.20   Hemoglobin & Hematocrit    Collection Time: 03/26/24  6:08 PM   Result Value Ref Range    HGB 7.8 (L) 13.0 - 17.5 g/dL    HCT 23.9 (L) 39.0 - 53.0 %   POCT Glucose    Collection Time: 03/26/24  6:27 PM   Result Value Ref Range    POC Glucose  123 (H) 70 - 99 mg/dL   EKG 12 Lead    Collection Time: 03/26/24 11:31 PM   Result Value Ref Range     Ventricular rate 89 BPM    Atrial rate  BPM    P-R Interval  ms    QRS Duration 108 ms    Q-T Interval 370 ms    QTC Calculation (Bezet) 450 ms    P Axis  degrees    R Axis 75 degrees    T Axis -25 degrees   Hemoglobin & Hematocrit    Collection Time: 03/27/24 12:10 AM   Result Value Ref Range    HGB 7.5 (L) 13.0 - 17.5 g/dL    HCT 22.7 (L) 39.0 - 53.0 %   Prepare RBC Once    Collection Time: 03/27/24 12:40 AM   Result Value Ref Range    Blood Product [id number]     Unit Number [id number]     UNIT ABO B     UNIT RH POS     Product Status Presumed Transfused     Expiration Date 660492896215     Blood Type Barcode 7300     Unit Volume 350 ml   Prepare RBC Once    Collection Time: 03/27/24 12:40 AM   Result Value Ref Range    Blood Product [id number]     Unit Number [id number]     UNIT ABO B     UNIT RH POS     Product Status Presumed Transfused     Expiration Date [id number]     Blood Type Barcode 7300     Unit Volume 350 ml   Magnesium    Collection Time: 03/27/24  5:46 AM   Result Value Ref Range    Magnesium 1.9 1.6 - 2.6 mg/dL   Phosphorus    Collection Time: 03/27/24  5:46 AM   Result Value Ref Range    Phosphorus 2.8 2.4 - 5.1 mg/dL   CBC, Platelet; No Differential    Collection Time: 03/27/24  5:46 AM   Result Value Ref Range    WBC 20.9 (H) 4.0 - 11.0 x10(3) uL    RBC 2.55 (L) 4.30 - 5.70 x10(6)uL    HGB 7.6 (L) 13.0 - 17.5 g/dL    HCT 23.2 (L) 39.0 - 53.0 %    MCV 91.0 80.0 - 100.0 fL    MCH 29.8 26.0 - 34.0 pg    MCHC 32.8 31.0 - 37.0 g/dL    RDW 16.4 (H) 11.0 - 15.0 %    RDW-SD 53.5 (H) 35.1 - 46.3 fL    .0 (L) 150.0 - 450.0 10(3)uL   Comp Metabolic Panel (14)    Collection Time: 03/27/24  5:46 AM   Result Value Ref Range    Glucose 128 (H) 70 - 99 mg/dL    Sodium 139 136 - 145 mmol/L    Potassium 4.9 3.5 - 5.1 mmol/L    Chloride 107 98 - 112 mmol/L    CO2 27.0 21.0 - 32.0 mmol/L    Anion Gap 5 0 - 18 mmol/L    BUN 46 (H) 9 - 23 mg/dL    Creatinine 3.18 (H) 0.70 - 1.30 mg/dL    BUN/CREA Ratio  14.5 10.0 - 20.0    Calcium, Total 8.5 (L) 8.7 - 10.4 mg/dL    Calculated Osmolality 302 (H) 275 - 295 mOsm/kg    eGFR-Cr 22 (L) >=60 mL/min/1.73m2    ALT 18 10 - 49 U/L    AST 26 <=34 U/L    Alkaline Phosphatase 85 45 - 117 U/L    Bilirubin, Total 0.9 0.3 - 1.2 mg/dL    Total Protein 5.2 (L) 5.7 - 8.2 g/dL    Albumin 3.0 (L) 3.2 - 4.8 g/dL    Globulin  2.2 (L) 2.8 - 4.4 g/dL    A/G Ratio 1.4 1.0 - 2.0   Prothrombin Time (PT)    Collection Time: 03/27/24  5:46 AM   Result Value Ref Range    PT 16.5 (H) 11.6 - 14.8 seconds    INR 1.25 (H) 0.80 - 1.20   Calcium, Ionized    Collection Time: 03/27/24  5:46 AM   Result Value Ref Range    Sample Type Venous     Ionized Calcium 1.16 0.95 - 1.32 mmol/L    Puncture Charge No     Blood Gas Analyzer [id number] CCU    MD BLOOD SMEAR CONSULT    Collection Time: 03/27/24  5:46 AM   Result Value Ref Range    MD Blood Smear Consult         Evaluation of CBC data and the peripheral blood smear (per request of Dr. LAURA Styles) demonstrates significant normochromic normocytic anemia with decreased absolute RBC count, moderate leukocytosis, and mild/grade 1 thrombocytopenia.    Red blood cells show anisopoikilocytosis with scattered macrocytes, scattered ovalocytes, few microcytes, and minimal/mild polychromasia.    Platelets show anisocytosis with scattered large forms and occasional giant forms seen.    200-cell manual leukocyte differential demonstrates neutrophilia (17.10 k/ul), mild monocytosis (1.25 k/ul), mild eosinophilia (1.04 k/ul) and normal lymphocyte count (1.05 k/ul).    Neutrophils consist predominantly of mature forms and scattered band forms without evidence of significant reactive and/or dysplastic cytologic atypia.    Monocytes appear mature and show occasional mild reactive features.    Eosinophils appear mature and show no significant morphologic abnormalities.    No significant morphologic abnormalities  are seen for the other leukocyte subsets. No morphologic evidence  of leukocyte dysplasia, atypical lymphoid cell infiltrates, or circulating immature blast forms is identified.    Differential causes for an anemia of this type may include hemorrhage, chronic inflammatory disorders (rheumatoid arthritis, SLE, inflammatory bowel disease, sarcoidosis, trauma, etc.), chronic infections (HIV, other viral infections, tuberculosis, pyelonephritis, osteomyelitis, chronic fungal infections, subacute bacterial endocarditis, etc.), neoplasms (lymphoma, carcinomas, chronic leukemias and occasionally myelodysplastic syndrome), and end organ failure (chronic liver disease, endocrinopathies, etc.).     Differential considerations for thrombocytopenia may include failure of bone marrow production (infections, drugs, fibrosis, myelodysplasia, alcohol suppression, congenital, etc.), autoantibody immune thrombocytopenia (ITP, SLE, lymphomas, drugs, infections), alloantibody immune thrombocytopenia (post  transfusion purpura), non-immune conditions (DIC, TTP, mechanical), splenic sequestration and hemodilution.      General differential considerations for neutrophilia may include infection/sepsis (most commonly bacterial, others), drugs/hormones (G-CSF, corticosteroids, epinephrine, lithium, toxins/poisons/venoms), tissue necrosis (infarct, trauma, burns, acute gout), inflammatory disorders (collagen vascular/autoimmune), acute hemorrhage/hemolysis, and metabolic disorders (uremia, ketoacidosis, others).     Possible causes of monocytosis may include acute/chronic infections (tuberculosis, Listeria, syphilis, subacute bacterial endocarditis, protozoal and rickettsial organisms), Hodgkin's disease, collagen vascular disorders, immune-mediated gastrointestinal disorders (ulcerative colitis, regional enteritis), non-Hodgkin's lymphomas, sarcoidosis, multiple myeloma, hemolytic anemia, post-splenectomy, immune thrombocytopenic purpura and myelodysplastic/myeloproliferative neoplasms.      Differential  considerations for eosinophilia may include allergic and hypersensitivity reactions, drug allergies, cutaneous disorders, collagen vascular/connective tissue diseases, infections (parasitic, fungal, others), immunodeficiency disorders, sarcoidosis and some neoplastic conditions (Hodgkin's lymphoma, non-Hodgkin's lymphoma, carcinoma, acute lymphocytic leukemia, myeloproliferative neoplasms, others).      Clinical correlation is recommended.     Reviewed by Chris Casarez M.D.       LDH    Collection Time: 03/27/24  5:46 AM   Result Value Ref Range     (H) 120 - 246 U/L   Platelet Count    Collection Time: 03/27/24  5:46 AM   Result Value Ref Range    .0 (L) 150.0 - 450.0 10(3)uL   PTT, Activated    Collection Time: 03/27/24  5:46 AM   Result Value Ref Range    PTT 42.8 (H) 23.3 - 35.6 seconds   Fibrinogen Activity    Collection Time: 03/27/24  5:46 AM   Result Value Ref Range    Fibrinogen 255 180 - 480 mg/dL   D-Dimer    Collection Time: 03/27/24  5:46 AM   Result Value Ref Range    D-Dimer >20.00 (H) <0.55 ug/mL FEU   Hemoglobin & Hematocrit    Collection Time: 03/27/24 11:56 AM   Result Value Ref Range    HGB 6.9 (LL) 13.0 - 17.5 g/dL    HCT 21.2 (L) 39.0 - 53.0 %   Renal Function Panel    Collection Time: 03/27/24  5:00 PM   Result Value Ref Range    Glucose 141 (H) 70 - 99 mg/dL    Sodium 137 136 - 145 mmol/L    Potassium 4.9 3.5 - 5.1 mmol/L    Chloride 108 98 - 112 mmol/L    CO2 27.0 21.0 - 32.0 mmol/L    Anion Gap 2 0 - 18 mmol/L    BUN 46 (H) 9 - 23 mg/dL    Creatinine 3.29 (H) 0.70 - 1.30 mg/dL    BUN/CREA Ratio 14.0 10.0 - 20.0    Calcium, Total 8.1 (L) 8.7 - 10.4 mg/dL    Calculated Osmolality 298 (H) 275 - 295 mOsm/kg    eGFR-Cr 21 (L) >=60 mL/min/1.73m2    Albumin 2.8 (L) 3.2 - 4.8 g/dL    Phosphorus 3.3 2.4 - 5.1 mg/dL   Magnesium    Collection Time: 03/27/24  5:00 PM   Result Value Ref Range    Magnesium 1.8 1.6 - 2.6 mg/dL   Hemoglobin    Collection Time: 03/27/24  8:04 PM   Result  Value Ref Range    HGB 8.2 (L) 13.0 - 17.5 g/dL   Hemoglobin    Collection Time: 03/28/24 12:10 AM   Result Value Ref Range    HGB 8.3 (L) 13.0 - 17.5 g/dL   Magnesium    Collection Time: 03/28/24  5:00 AM   Result Value Ref Range    Magnesium 1.9 1.6 - 2.6 mg/dL   Phosphorus    Collection Time: 03/28/24  5:00 AM   Result Value Ref Range    Phosphorus 3.6 2.4 - 5.1 mg/dL   CBC, Platelet; No Differential    Collection Time: 03/28/24  5:00 AM   Result Value Ref Range    WBC 17.8 (H) 4.0 - 11.0 x10(3) uL    RBC 2.49 (L) 4.30 - 5.70 x10(6)uL    HGB 7.7 (L) 13.0 - 17.5 g/dL    HCT 22.5 (L) 39.0 - 53.0 %    MCV 90.4 80.0 - 100.0 fL    MCH 30.9 26.0 - 34.0 pg    MCHC 34.2 31.0 - 37.0 g/dL    RDW 16.8 (H) 11.0 - 15.0 %    RDW-SD 54.7 (H) 35.1 - 46.3 fL    .0 (L) 150.0 - 450.0 10(3)uL    Immature Platelet Fraction 13.7 (H) 0.0 - 7.0 %   Comp Metabolic Panel (14)    Collection Time: 03/28/24  5:00 AM   Result Value Ref Range    Glucose 169 (H) 70 - 99 mg/dL    Sodium 139 136 - 145 mmol/L    Potassium 5.0 3.5 - 5.1 mmol/L    Chloride 108 98 - 112 mmol/L    CO2 25.0 21.0 - 32.0 mmol/L    Anion Gap 6 0 - 18 mmol/L    BUN 47 (H) 9 - 23 mg/dL    Creatinine 3.52 (H) 0.70 - 1.30 mg/dL    BUN/CREA Ratio 13.4 10.0 - 20.0    Calcium, Total 8.3 (L) 8.7 - 10.4 mg/dL    Calculated Osmolality 304 (H) 275 - 295 mOsm/kg    eGFR-Cr 20 (L) >=60 mL/min/1.73m2    ALT 12 10 - 49 U/L    AST 20 <=34 U/L    Alkaline Phosphatase 73 45 - 117 U/L    Bilirubin, Total 0.9 0.3 - 1.2 mg/dL    Total Protein 4.6 (L) 5.7 - 8.2 g/dL    Albumin 2.6 (L) 3.2 - 4.8 g/dL    Globulin  2.0 (L) 2.8 - 4.4 g/dL    A/G Ratio 1.3 1.0 - 2.0   Prothrombin Time (PT)    Collection Time: 03/28/24  5:00 AM   Result Value Ref Range    PT 16.3 (H) 11.6 - 14.8 seconds    INR 1.23 (H) 0.80 - 1.20   Hemoglobin    Collection Time: 03/28/24  5:00 AM   Result Value Ref Range    HGB 7.7 (L) 13.0 - 17.5 g/dL   PTT, Activated    Collection Time: 03/28/24  5:00 AM   Result Value Ref  Range    PTT 40.3 (H) 23.3 - 35.6 seconds   Iron And Tibc    Collection Time: 03/28/24  5:00 AM   Result Value Ref Range    Iron 42 (L) 65 - 175 ug/dL    Transferrin 128 (L) 215 - 365 mg/dL    Total Iron Binding Capacity 191 (L) 250 - 425 ug/dL    % Saturation 22 20 - 50 %   Ferritin    Collection Time: 03/28/24  5:00 AM   Result Value Ref Range    Ferritin 1,472.5 (H) 30.0 - 530.0 ng/mL   Hemoglobin    Collection Time: 03/28/24  2:20 PM   Result Value Ref Range    HGB 9.1 (L) 13.0 - 17.5 g/dL   Renal Function Panel    Collection Time: 03/28/24  4:40 PM   Result Value Ref Range    Glucose 145 (H) 70 - 99 mg/dL    Sodium 136 136 - 145 mmol/L    Potassium 4.7 3.5 - 5.1 mmol/L    Chloride 105 98 - 112 mmol/L    CO2 25.0 21.0 - 32.0 mmol/L    Anion Gap 6 0 - 18 mmol/L    BUN 36 (H) 9 - 23 mg/dL    Creatinine 2.81 (H) 0.70 - 1.30 mg/dL    BUN/CREA Ratio 12.8 10.0 - 20.0    Calcium, Total 8.5 (L) 8.7 - 10.4 mg/dL    Calculated Osmolality 293 275 - 295 mOsm/kg    eGFR-Cr 26 (L) >=60 mL/min/1.73m2    Albumin 3.1 (L) 3.2 - 4.8 g/dL    Phosphorus 2.8 2.4 - 5.1 mg/dL   Magnesium    Collection Time: 03/28/24  4:40 PM   Result Value Ref Range    Magnesium 1.8 1.6 - 2.6 mg/dL   Hemoglobin & Hematocrit    Collection Time: 03/28/24  5:33 PM   Result Value Ref Range    HGB 8.9 (L) 13.0 - 17.5 g/dL    HCT 26.9 (L) 39.0 - 53.0 %   POCT Glucose    Collection Time: 03/28/24  6:44 PM   Result Value Ref Range    POC Glucose  182 (H) 70 - 99 mg/dL   Prepare RBC Once    Collection Time: 03/29/24 12:40 AM   Result Value Ref Range    Blood Product [id number]     Unit Number [id number]     UNIT ABO B     UNIT RH POS     Product Status Presumed Transfused     Expiration Date [id number]     Blood Type Barcode 7300     Unit Volume 350 ml   Prepare RBC Once    Collection Time: 03/29/24 12:40 AM   Result Value Ref Range    Blood Product [id number]     Unit Number [id number]     UNIT ABO B     UNIT RH POS     Product Status Presumed  Transfused     Expiration Date [id number]     Blood Type Barcode 7300     Unit Volume 350 ml   Comp Metabolic Panel (14)    Collection Time: 03/29/24 12:45 AM   Result Value Ref Range    Glucose 169 (H) 70 - 99 mg/dL    Sodium 136 136 - 145 mmol/L    Potassium 5.4 (H) 3.5 - 5.1 mmol/L    Chloride 105 98 - 112 mmol/L    CO2 22.0 21.0 - 32.0 mmol/L    Anion Gap 9 0 - 18 mmol/L    BUN 43 (H) 9 - 23 mg/dL    Creatinine 3.62 (H) 0.70 - 1.30 mg/dL    BUN/CREA Ratio 11.9 10.0 - 20.0    Calcium, Total 8.6 (L) 8.7 - 10.4 mg/dL    Calculated Osmolality 297 (H) 275 - 295 mOsm/kg    eGFR-Cr 19 (L) >=60 mL/min/1.73m2    ALT 11 10 - 49 U/L    AST 27 <=34 U/L    Alkaline Phosphatase 80 45 - 117 U/L    Bilirubin, Total 1.0 0.3 - 1.2 mg/dL    Total Protein 5.6 (L) 5.7 - 8.2 g/dL    Albumin 3.2 3.2 - 4.8 g/dL    Globulin  2.4 (L) 2.8 - 4.4 g/dL    A/G Ratio 1.3 1.0 - 2.0   LDH    Collection Time: 03/29/24 12:45 AM   Result Value Ref Range     (H) 120 - 246 U/L   Bilirubin, Direct    Collection Time: 03/29/24 12:45 AM   Result Value Ref Range    Bilirubin, Direct 0.4 (H) <=0.3 mg/dL   Phosphorus    Collection Time: 03/29/24 12:45 AM   Result Value Ref Range    Phosphorus 4.5 2.4 - 5.1 mg/dL   Magnesium    Collection Time: 03/29/24 12:45 AM   Result Value Ref Range    Magnesium 1.9 1.6 - 2.6 mg/dL   CK (Creatine Kinase) (Not Creatinine)    Collection Time: 03/29/24 12:45 AM   Result Value Ref Range    CK 51 46 - 171 U/L   Prothrombin Time (PT)    Collection Time: 03/29/24 12:45 AM   Result Value Ref Range    PT 16.4 (H) 11.6 - 14.8 seconds    INR 1.25 (H) 0.80 - 1.20   PTT, Activated    Collection Time: 03/29/24 12:45 AM   Result Value Ref Range    PTT 33.6 23.3 - 35.6 seconds   Amylase    Collection Time: 03/29/24 12:45 AM   Result Value Ref Range    Amylase 61 30 - 118 U/L   Lipase    Collection Time: 03/29/24 12:45 AM   Result Value Ref Range    Lipase 30 12 - 53 U/L   Prothrombin Time (PT)    Collection Time: 03/29/24  12:45 AM   Result Value Ref Range    PT 16.1 (H) 11.6 - 14.8 seconds    INR 1.21 (H) 0.80 - 1.20   Platelet Count    Collection Time: 03/29/24 12:45 AM   Result Value Ref Range    .0 (L) 150.0 - 450.0 10(3)uL    Immature Platelet Fraction 11.4 (H) 0.0 - 7.0 %   PTT, Activated    Collection Time: 03/29/24 12:45 AM   Result Value Ref Range    PTT 34.6 23.3 - 35.6 seconds   Fibrinogen Activity    Collection Time: 03/29/24 12:45 AM   Result Value Ref Range    Fibrinogen 261 180 - 480 mg/dL   D-Dimer    Collection Time: 03/29/24 12:45 AM   Result Value Ref Range    D-Dimer >20.00 (H) <0.55 ug/mL FEU   CBC W/ DIFFERENTIAL    Collection Time: 03/29/24 12:45 AM   Result Value Ref Range    WBC 25.2 (H) 4.0 - 11.0 x10(3) uL    RBC 2.85 (L) 4.30 - 5.70 x10(6)uL    HGB 8.5 (L) 13.0 - 17.5 g/dL    HCT 25.8 (L) 39.0 - 53.0 %    MCV 90.5 80.0 - 100.0 fL    MCH 29.8 26.0 - 34.0 pg    MCHC 32.9 31.0 - 37.0 g/dL    RDW-SD 53.2 (H) 35.1 - 46.3 fL    RDW 16.3 (H) 11.0 - 15.0 %    .0 (L) 150.0 - 450.0 10(3)uL    Immature Platelet Fraction 11.4 (H) 0.0 - 7.0 %    Neutrophil Absolute Prelim 21.53 (H) 1.50 - 7.70 x10 (3) uL    Neutrophil Absolute 21.53 (H) 1.50 - 7.70 x10(3) uL    Lymphocyte Absolute 1.19 1.00 - 4.00 x10(3) uL    Monocyte Absolute 1.60 (H) 0.10 - 1.00 x10(3) uL    Eosinophil Absolute 0.31 0.00 - 0.70 x10(3) uL    Basophil Absolute 0.07 0.00 - 0.20 x10(3) uL    Immature Granulocyte Absolute 0.47 0.00 - 1.00 x10(3) uL    Neutrophil % 85.5 %    Lymphocyte % 4.7 %    Monocyte % 6.4 %    Eosinophil % 1.2 %    Basophil % 0.3 %    Immature Granulocyte % 1.9 %   Scan slide    Collection Time: 03/29/24 12:45 AM   Result Value Ref Range    Slide Review       Platelets reviewed on smear. No giant platelets or platelet clumps observed.   Arterial blood gas    Collection Time: 03/29/24 12:54 AM   Result Value Ref Range    Sample Site Arterial Line     ABG pH 7.45 7.35 - 7.45    ABG pCO2 32 (L) 35 - 45 mm Hg    ABG PO2 418  (H) 80 - 100 mm Hg    ABG HCO3 23.9 21.0 - 27.0 mEq/L    Blood Gas Base Excess -1.4 -2.0 - 2.0 mmol/L    ABG O2 Saturation >99.0 94.0 - 100.0 %    Oxygen Content 13.1 (L) 15.0 - 23.0 Vol%    Oxygen Delivery Device Vent     Blood Gas Vent Mode A/C     Blood Gas Respiration Rate 12 BPM    Tidal Volume 530.0 mL    FIO2 100.00     PEEP 5.0 cm H2O    Modified Allens Test Not Applicable     Puncture Charge No     Blood Gas Analyzer [id number] CCU    POCT Glucose    Collection Time: 03/29/24  1:57 AM   Result Value Ref Range    POC Glucose  207 (H) 70 - 99 mg/dL   Troponin I (High Sensitivity)    Collection Time: 03/29/24  3:38 AM   Result Value Ref Range    Troponin I (High Sensitivity) 116 (HH) <=53 ng/L   Lipid Panel    Collection Time: 03/29/24  3:38 AM   Result Value Ref Range    Cholesterol, Total 121 <200 mg/dL    HDL Cholesterol 32 (L) 40 - 59 mg/dL    Triglycerides 163 (H) 30 - 149 mg/dL    LDL Cholesterol 61 <100 mg/dL    VLDL 24 0 - 30 mg/dL    Non HDL Chol 89 <130 mg/dL   POCT Glucose    Collection Time: 03/29/24  6:08 AM   Result Value Ref Range    POC Glucose  161 (H) 70 - 99 mg/dL         Ottoniel Bennett MD     CALL [facility] AT [phone] IF QUESTIONS OR CONCERNS  THANKS

## 2024-03-29 NOTE — OCCUPATIONAL THERAPY NOTE
Occupational therapy orders received, chart review complete. Per EMR pt is not following commands, had RRT 3/28 evening and now with active herniation of ICH. Pt is not appropriate for skilled therapy services, will DC order - of change in status occurs please re-order services.    Loreto Ferreira OTR/L  [facility]

## 2024-03-29 NOTE — PLAN OF CARE
Patient seen and examined this a.m. with Dr. Llanes.  Status unchanged, s/p massive ICH with herniation last night, not surgical candidate per neurosurgery, transitioned to comfort care/DNAR select per family wishes  Appreciate all consultants involved with case  [facility] at bedside for DCD  [facility]S available if needed    Soumya Henderson, APRN  3/29/24  11:00

## 2024-03-30 ENCOUNTER — APPOINTMENT (OUTPATIENT)
Dept: CT IMAGING | Facility: HOSPITAL | Age: 54
End: 2024-03-30
Attending: NURSE PRACTITIONER
Payer: COMMERCIAL

## 2024-03-30 ENCOUNTER — APPOINTMENT (OUTPATIENT)
Dept: GENERAL RADIOLOGY | Facility: HOSPITAL | Age: 54
End: 2024-03-30
Payer: COMMERCIAL

## 2024-03-30 LAB
ALBUMIN SERPL-MCNC: 2.9 G/DL (ref 3.2–4.8)
ALBUMIN SERPL-MCNC: 3.1 G/DL (ref 3.2–4.8)
ALBUMIN SERPL-MCNC: 3.1 G/DL (ref 3.2–4.8)
ALBUMIN SERPL-MCNC: 3.2 G/DL (ref 3.2–4.8)
ALBUMIN/GLOB SERPL: 1.2 {RATIO} (ref 1–2)
ALBUMIN/GLOB SERPL: 1.3 {RATIO} (ref 1–2)
ALP LIVER SERPL-CCNC: 105 U/L
ALP LIVER SERPL-CCNC: 83 U/L
ALP LIVER SERPL-CCNC: 83 U/L
ALP LIVER SERPL-CCNC: 92 U/L
ALT SERPL-CCNC: 11 U/L
ALT SERPL-CCNC: 16 U/L
ALT SERPL-CCNC: 19 U/L
ALT SERPL-CCNC: 9 U/L
AMYLASE SERPL-CCNC: 44 U/L (ref 30–118)
AMYLASE SERPL-CCNC: 49 U/L (ref 30–118)
AMYLASE SERPL-CCNC: 49 U/L (ref 30–118)
AMYLASE SERPL-CCNC: 62 U/L (ref 30–118)
ANION GAP SERPL CALC-SCNC: 6 MMOL/L (ref 0–18)
ANION GAP SERPL CALC-SCNC: 8 MMOL/L (ref 0–18)
ANTIBODY SCREEN: NEGATIVE
APTT PPP: 39 SECONDS (ref 23.3–35.6)
APTT PPP: 43.6 SECONDS (ref 23.3–35.6)
APTT PPP: 47.2 SECONDS (ref 23.3–35.6)
APTT PPP: 49.1 SECONDS (ref 23.3–35.6)
AST SERPL-CCNC: 15 U/L (ref ?–34)
AST SERPL-CCNC: 21 U/L (ref ?–34)
AST SERPL-CCNC: 28 U/L (ref ?–34)
AST SERPL-CCNC: <8 U/L (ref ?–34)
BASE EXCESS BLD CALC-SCNC: -1.6 MMOL/L (ref ?–2)
BASE EXCESS BLD CALC-SCNC: -2.1 MMOL/L (ref ?–2)
BASE EXCESS BLD CALC-SCNC: -3.2 MMOL/L (ref ?–2)
BASE EXCESS BLD CALC-SCNC: -3.9 MMOL/L (ref ?–2)
BASE EXCESS BLD CALC-SCNC: 0.7 MMOL/L (ref ?–2)
BASE EXCESS BLD CALC-SCNC: 1.1 MMOL/L (ref ?–2)
BASE EXCESS BLD CALC-SCNC: 2.1 MMOL/L (ref ?–2)
BASOPHILS # BLD AUTO: 0.05 X10(3) UL (ref 0–0.2)
BASOPHILS # BLD AUTO: 0.07 X10(3) UL (ref 0–0.2)
BASOPHILS # BLD AUTO: 0.07 X10(3) UL (ref 0–0.2)
BASOPHILS # BLD: 0 X10(3) UL (ref 0–0.2)
BASOPHILS NFR BLD AUTO: 0.2 %
BASOPHILS NFR BLD AUTO: 0.2 %
BASOPHILS NFR BLD AUTO: 0.4 %
BASOPHILS NFR BLD: 0 %
BILIRUB DIRECT SERPL-MCNC: 0.8 MG/DL (ref ?–0.3)
BILIRUB DIRECT SERPL-MCNC: 0.9 MG/DL (ref ?–0.3)
BILIRUB DIRECT SERPL-MCNC: 1.1 MG/DL (ref ?–0.3)
BILIRUB DIRECT SERPL-MCNC: 1.3 MG/DL (ref ?–0.3)
BILIRUB SERPL-MCNC: 1.3 MG/DL (ref 0.3–1.2)
BILIRUB SERPL-MCNC: 1.7 MG/DL (ref 0.3–1.2)
BILIRUB SERPL-MCNC: 1.8 MG/DL (ref 0.3–1.2)
BILIRUB SERPL-MCNC: 2.4 MG/DL (ref 0.3–1.2)
BUN BLD-MCNC: 25 MG/DL (ref 9–23)
BUN BLD-MCNC: 27 MG/DL (ref 9–23)
BUN BLD-MCNC: 31 MG/DL (ref 9–23)
BUN/CREAT SERPL: 10.5 (ref 10–20)
BUN/CREAT SERPL: 10.6 (ref 10–20)
BUN/CREAT SERPL: 10.9 (ref 10–20)
BUN/CREAT SERPL: 12.7 (ref 10–20)
CALCIUM BLD-MCNC: 8.4 MG/DL (ref 8.7–10.4)
CALCIUM BLD-MCNC: 8.7 MG/DL (ref 8.7–10.4)
CALCIUM BLD-MCNC: 9.3 MG/DL (ref 8.7–10.4)
CALCIUM BLD-MCNC: 9.4 MG/DL (ref 8.7–10.4)
CHLORIDE SERPL-SCNC: 105 MMOL/L (ref 98–112)
CHLORIDE SERPL-SCNC: 105 MMOL/L (ref 98–112)
CHLORIDE SERPL-SCNC: 106 MMOL/L (ref 98–112)
CHLORIDE SERPL-SCNC: 106 MMOL/L (ref 98–112)
CK SERPL-CCNC: 20 U/L
CK SERPL-CCNC: 21 U/L
CK SERPL-CCNC: <15 U/L
CK SERPL-CCNC: <15 U/L
CO2 SERPL-SCNC: 23 MMOL/L (ref 21–32)
CO2 SERPL-SCNC: 24 MMOL/L (ref 21–32)
CO2 SERPL-SCNC: 24 MMOL/L (ref 21–32)
CO2 SERPL-SCNC: 26 MMOL/L (ref 21–32)
CREAT BLD-MCNC: 2.13 MG/DL
CREAT BLD-MCNC: 2.47 MG/DL
CREAT BLD-MCNC: 2.54 MG/DL
CREAT BLD-MCNC: 2.94 MG/DL
D DIMER PPP FEU-MCNC: 11.38 UG/ML FEU (ref ?–0.55)
D DIMER PPP FEU-MCNC: 13.66 UG/ML FEU (ref ?–0.55)
D DIMER PPP FEU-MCNC: 17.72 UG/ML FEU (ref ?–0.55)
D DIMER PPP FEU-MCNC: >20 UG/ML FEU (ref ?–0.55)
DEPRECATED RDW RBC AUTO: 54.9 FL (ref 35.1–46.3)
DEPRECATED RDW RBC AUTO: 57.5 FL (ref 35.1–46.3)
DEPRECATED RDW RBC AUTO: 60.9 FL (ref 35.1–46.3)
DEPRECATED RDW RBC AUTO: 62.7 FL (ref 35.1–46.3)
EGFRCR SERPLBLD CKD-EPI 2021: 24 ML/MIN/1.73M2 (ref 60–?)
EGFRCR SERPLBLD CKD-EPI 2021: 29 ML/MIN/1.73M2 (ref 60–?)
EGFRCR SERPLBLD CKD-EPI 2021: 30 ML/MIN/1.73M2 (ref 60–?)
EGFRCR SERPLBLD CKD-EPI 2021: 36 ML/MIN/1.73M2 (ref 60–?)
EOSINOPHIL # BLD AUTO: 0.01 X10(3) UL (ref 0–0.7)
EOSINOPHIL # BLD AUTO: 0.01 X10(3) UL (ref 0–0.7)
EOSINOPHIL # BLD AUTO: 0.07 X10(3) UL (ref 0–0.7)
EOSINOPHIL # BLD: 0.96 X10(3) UL (ref 0–0.7)
EOSINOPHIL NFR BLD AUTO: 0 %
EOSINOPHIL NFR BLD AUTO: 0 %
EOSINOPHIL NFR BLD AUTO: 0.4 %
EOSINOPHIL NFR BLD: 4 %
ERYTHROCYTE [DISTWIDTH] IN BLOOD BY AUTOMATED COUNT: 16.9 % (ref 11–15)
ERYTHROCYTE [DISTWIDTH] IN BLOOD BY AUTOMATED COUNT: 17.8 % (ref 11–15)
ERYTHROCYTE [DISTWIDTH] IN BLOOD BY AUTOMATED COUNT: 19.2 % (ref 11–15)
ERYTHROCYTE [DISTWIDTH] IN BLOOD BY AUTOMATED COUNT: 19.8 % (ref 11–15)
FIBRINOGEN PPP-MCNC: 262 MG/DL (ref 180–480)
FIBRINOGEN PPP-MCNC: 284 MG/DL (ref 180–480)
FIBRINOGEN PPP-MCNC: 401 MG/DL (ref 180–480)
FIBRINOGEN PPP-MCNC: 447 MG/DL (ref 180–480)
GLOBULIN PLAS-MCNC: 2.4 G/DL (ref 2.8–4.4)
GLOBULIN PLAS-MCNC: 2.4 G/DL (ref 2.8–4.4)
GLOBULIN PLAS-MCNC: 2.6 G/DL (ref 2.8–4.4)
GLOBULIN PLAS-MCNC: 2.6 G/DL (ref 2.8–4.4)
GLUCOSE BLD-MCNC: 111 MG/DL (ref 70–99)
GLUCOSE BLD-MCNC: 118 MG/DL (ref 70–99)
GLUCOSE BLD-MCNC: 147 MG/DL (ref 70–99)
GLUCOSE BLD-MCNC: 176 MG/DL (ref 70–99)
GLUCOSE BLDC GLUCOMTR-MCNC: 103 MG/DL (ref 70–99)
GLUCOSE BLDC GLUCOMTR-MCNC: 114 MG/DL (ref 70–99)
GLUCOSE BLDC GLUCOMTR-MCNC: 131 MG/DL (ref 70–99)
GLUCOSE BLDC GLUCOMTR-MCNC: 136 MG/DL (ref 70–99)
GLUCOSE BLDC GLUCOMTR-MCNC: 164 MG/DL (ref 70–99)
GLUCOSE BLDC GLUCOMTR-MCNC: 171 MG/DL (ref 70–99)
GLUCOSE BLDC GLUCOMTR-MCNC: 176 MG/DL (ref 70–99)
HCO3 BLDA-SCNC: 21.9 MEQ/L (ref 21–27)
HCO3 BLDA-SCNC: 22.4 MEQ/L (ref 21–27)
HCO3 BLDA-SCNC: 23.3 MEQ/L (ref 21–27)
HCO3 BLDA-SCNC: 23.7 MEQ/L (ref 21–27)
HCO3 BLDA-SCNC: 25.4 MEQ/L (ref 21–27)
HCO3 BLDA-SCNC: 25.8 MEQ/L (ref 21–27)
HCO3 BLDA-SCNC: 26.6 MEQ/L (ref 21–27)
HCT VFR BLD AUTO: 25.5 %
HCT VFR BLD AUTO: 26.1 %
HCT VFR BLD AUTO: 30.5 %
HCT VFR BLD AUTO: 32.5 %
HGB BLD-MCNC: 10.4 G/DL
HGB BLD-MCNC: 10.6 G/DL
HGB BLD-MCNC: 8.3 G/DL
HGB BLD-MCNC: 8.5 G/DL
IMM GRANULOCYTES # BLD AUTO: 0.32 X10(3) UL (ref 0–1)
IMM GRANULOCYTES # BLD AUTO: 0.32 X10(3) UL (ref 0–1)
IMM GRANULOCYTES # BLD AUTO: 0.35 X10(3) UL (ref 0–1)
IMM GRANULOCYTES NFR BLD: 1.2 %
IMM GRANULOCYTES NFR BLD: 1.4 %
IMM GRANULOCYTES NFR BLD: 1.6 %
INR BLD: 1.2 (ref 0.8–1.2)
INR BLD: 1.26 (ref 0.8–1.2)
INR BLD: 1.26 (ref 0.8–1.2)
INR BLD: 1.3 (ref 0.8–1.2)
LACTATE SERPL-SCNC: 0.9 MMOL/L (ref 0.5–2)
LACTATE SERPL-SCNC: 0.9 MMOL/L (ref 0.5–2)
LACTATE SERPL-SCNC: 1.2 MMOL/L (ref 0.5–2)
LACTATE SERPL-SCNC: 1.8 MMOL/L (ref 0.5–2)
LDH SERPL L TO P-CCNC: 339 U/L
LDH SERPL L TO P-CCNC: 384 U/L
LDH SERPL L TO P-CCNC: 412 U/L
LDH SERPL L TO P-CCNC: 423 U/L
LIPASE SERPL-CCNC: 21 U/L (ref 12–53)
LIPASE SERPL-CCNC: 27 U/L (ref 12–53)
LIPASE SERPL-CCNC: 27 U/L (ref 12–53)
LIPASE SERPL-CCNC: 30 U/L (ref 12–53)
LYMPHOCYTES # BLD AUTO: 0.38 X10(3) UL (ref 1–4)
LYMPHOCYTES # BLD AUTO: 0.39 X10(3) UL (ref 1–4)
LYMPHOCYTES # BLD AUTO: 0.56 X10(3) UL (ref 1–4)
LYMPHOCYTES NFR BLD AUTO: 1.6 %
LYMPHOCYTES NFR BLD AUTO: 2 %
LYMPHOCYTES NFR BLD AUTO: 2 %
LYMPHOCYTES NFR BLD: 0.48 X10(3) UL (ref 1–4)
LYMPHOCYTES NFR BLD: 2 %
MAGNESIUM SERPL-MCNC: 1.8 MG/DL (ref 1.6–2.6)
MAGNESIUM SERPL-MCNC: 2.1 MG/DL (ref 1.6–2.6)
MAGNESIUM SERPL-MCNC: 2.1 MG/DL (ref 1.6–2.6)
MAGNESIUM SERPL-MCNC: 2.2 MG/DL (ref 1.6–2.6)
MCH RBC QN AUTO: 28.8 PG (ref 26–34)
MCH RBC QN AUTO: 29.5 PG (ref 26–34)
MCH RBC QN AUTO: 29.9 PG (ref 26–34)
MCH RBC QN AUTO: 30.1 PG (ref 26–34)
MCHC RBC AUTO-ENTMCNC: 32.5 G/DL (ref 31–37)
MCHC RBC AUTO-ENTMCNC: 32.6 G/DL (ref 31–37)
MCHC RBC AUTO-ENTMCNC: 32.6 G/DL (ref 31–37)
MCHC RBC AUTO-ENTMCNC: 34.1 G/DL (ref 31–37)
MCV RBC AUTO: 88.2 FL
MCV RBC AUTO: 88.3 FL
MCV RBC AUTO: 90.7 FL
MCV RBC AUTO: 91.9 FL
MONOCYTES # BLD AUTO: 0.36 X10(3) UL (ref 0.1–1)
MONOCYTES # BLD AUTO: 0.46 X10(3) UL (ref 0.1–1)
MONOCYTES # BLD AUTO: 1.06 X10(3) UL (ref 0.1–1)
MONOCYTES # BLD: 0.24 X10(3) UL (ref 0.1–1)
MONOCYTES NFR BLD AUTO: 1.5 %
MONOCYTES NFR BLD AUTO: 2.4 %
MONOCYTES NFR BLD AUTO: 3.7 %
MONOCYTES NFR BLD: 1 %
NEUTROPHILS # BLD AUTO: 18.13 X10 (3) UL (ref 1.5–7.7)
NEUTROPHILS # BLD AUTO: 18.13 X10(3) UL (ref 1.5–7.7)
NEUTROPHILS # BLD AUTO: 20.09 X10 (3) UL (ref 1.5–7.7)
NEUTROPHILS # BLD AUTO: 22.11 X10 (3) UL (ref 1.5–7.7)
NEUTROPHILS # BLD AUTO: 22.11 X10(3) UL (ref 1.5–7.7)
NEUTROPHILS # BLD AUTO: 26.46 X10 (3) UL (ref 1.5–7.7)
NEUTROPHILS # BLD AUTO: 26.46 X10(3) UL (ref 1.5–7.7)
NEUTROPHILS NFR BLD AUTO: 92.9 %
NEUTROPHILS NFR BLD AUTO: 93.2 %
NEUTROPHILS NFR BLD AUTO: 95.3 %
NEUTROPHILS NFR BLD: 93 %
NEUTS HYPERSEG # BLD: 22.32 X10(3) UL (ref 1.5–7.7)
O2 CT BLD-SCNC: 11.2 VOL% (ref 15–23)
O2 CT BLD-SCNC: 14 VOL% (ref 15–23)
O2 CT BLD-SCNC: 14 VOL% (ref 15–23)
O2 CT BLD-SCNC: 14.5 VOL% (ref 15–23)
O2 CT BLD-SCNC: 15.3 VOL% (ref 15–23)
O2 CT BLD-SCNC: 15.9 VOL% (ref 15–23)
O2 CT BLD-SCNC: 16.4 VOL% (ref 15–23)
O2/TOTAL GAS SETTING VFR VENT: 100 %
O2/TOTAL GAS SETTING VFR VENT: 40 %
OSMOLALITY SERPL CALC.SUM OF ELEC: 290 MOSM/KG (ref 275–295)
OSMOLALITY SERPL CALC.SUM OF ELEC: 290 MOSM/KG (ref 275–295)
OSMOLALITY SERPL CALC.SUM OF ELEC: 292 MOSM/KG (ref 275–295)
OSMOLALITY SERPL CALC.SUM OF ELEC: 297 MOSM/KG (ref 275–295)
PCO2 BLDA: 29 MM HG (ref 35–45)
PCO2 BLDA: 33 MM HG (ref 35–45)
PCO2 BLDA: 34 MM HG (ref 35–45)
PCO2 BLDA: 41 MM HG (ref 35–45)
PCO2 BLDA: 45 MM HG (ref 35–45)
PCO2 BLDA: 49 MM HG (ref 35–45)
PCO2 BLDA: 54 MM HG (ref 35–45)
PEEP SETTING VENT: 5 CM H2O
PH BLDA: 7.26 [PH] (ref 7.35–7.45)
PH BLDA: 7.28 [PH] (ref 7.35–7.45)
PH BLDA: 7.34 [PH] (ref 7.35–7.45)
PH BLDA: 7.36 [PH] (ref 7.35–7.45)
PH BLDA: 7.47 [PH] (ref 7.35–7.45)
PH BLDA: 7.47 [PH] (ref 7.35–7.45)
PH BLDA: 7.53 [PH] (ref 7.35–7.45)
PHOSPHATE SERPL-MCNC: 3.4 MG/DL (ref 2.4–5.1)
PHOSPHATE SERPL-MCNC: 4.1 MG/DL (ref 2.4–5.1)
PHOSPHATE SERPL-MCNC: 4.9 MG/DL (ref 2.4–5.1)
PHOSPHATE SERPL-MCNC: 5 MG/DL (ref 2.4–5.1)
PLATELET # BLD AUTO: 107 10(3)UL (ref 150–450)
PLATELET # BLD AUTO: 83 10(3)UL (ref 150–450)
PLATELET # BLD AUTO: 92 10(3)UL (ref 150–450)
PLATELET # BLD AUTO: 95 10(3)UL (ref 150–450)
PLATELET MORPHOLOGY: NORMAL
PLATELETS.RETICULATED NFR BLD AUTO: 13.5 % (ref 0–7)
PLATELETS.RETICULATED NFR BLD AUTO: 13.9 % (ref 0–7)
PLATELETS.RETICULATED NFR BLD AUTO: 16.4 % (ref 0–7)
PLATELETS.RETICULATED NFR BLD AUTO: 16.7 % (ref 0–7)
PO2 BLDA: 158 MM HG (ref 80–100)
PO2 BLDA: 230 MM HG (ref 80–100)
PO2 BLDA: 297 MM HG (ref 80–100)
PO2 BLDA: 338 MM HG (ref 80–100)
PO2 BLDA: 372 MM HG (ref 80–100)
PO2 BLDA: 77 MM HG (ref 80–100)
PO2 BLDA: 94 MM HG (ref 80–100)
POTASSIUM SERPL-SCNC: 4.4 MMOL/L (ref 3.5–5.1)
POTASSIUM SERPL-SCNC: 5 MMOL/L (ref 3.5–5.1)
POTASSIUM SERPL-SCNC: 5.2 MMOL/L (ref 3.5–5.1)
POTASSIUM SERPL-SCNC: 5.4 MMOL/L (ref 3.5–5.1)
PROT SERPL-MCNC: 5.3 G/DL (ref 5.7–8.2)
PROT SERPL-MCNC: 5.5 G/DL (ref 5.7–8.2)
PROT SERPL-MCNC: 5.7 G/DL (ref 5.7–8.2)
PROT SERPL-MCNC: 5.8 G/DL (ref 5.7–8.2)
PROTHROMBIN TIME: 16 SECONDS (ref 11.6–14.8)
PROTHROMBIN TIME: 16.6 SECONDS (ref 11.6–14.8)
PROTHROMBIN TIME: 16.6 SECONDS (ref 11.6–14.8)
PROTHROMBIN TIME: 17 SECONDS (ref 11.6–14.8)
PUNCTURE CHARGE: NO
RBC # BLD AUTO: 2.81 X10(6)UL
RBC # BLD AUTO: 2.84 X10(6)UL
RBC # BLD AUTO: 3.46 X10(6)UL
RBC # BLD AUTO: 3.68 X10(6)UL
RESP RATE: 10 BPM
RESP RATE: 10 BPM
RESP RATE: 11 BPM
RESP RATE: 12 BPM
RESP RATE: 14 BPM
RESP RATE: 14 BPM
RESP RATE: 9 BPM
RH BLOOD TYPE: POSITIVE
SAO2 % BLDA: 97 % (ref 94–100)
SAO2 % BLDA: >99 % (ref 94–100)
SODIUM SERPL-SCNC: 137 MMOL/L (ref 136–145)
SODIUM SERPL-SCNC: 138 MMOL/L (ref 136–145)
SPECIMEN VOL 24H UR: 600 ML
TOTAL CELLS COUNTED BLD: 100
TROPONIN I SERPL HS-MCNC: 101 NG/L
TROPONIN I SERPL HS-MCNC: 115 NG/L
TROPONIN I SERPL HS-MCNC: 98 NG/L
TROPONIN I SERPL HS-MCNC: 98 NG/L
WBC # BLD AUTO: 19.4 X10(3) UL (ref 4–11)
WBC # BLD AUTO: 23.2 X10(3) UL (ref 4–11)
WBC # BLD AUTO: 24 X10(3) UL (ref 4–11)
WBC # BLD AUTO: 28.5 X10(3) UL (ref 4–11)

## 2024-03-30 PROCEDURE — 71045 X-RAY EXAM CHEST 1 VIEW: CPT

## 2024-03-30 PROCEDURE — 71250 CT THORAX DX C-: CPT | Performed by: NURSE PRACTITIONER

## 2024-03-30 PROCEDURE — 74176 CT ABD & PELVIS W/O CONTRAST: CPT | Performed by: NURSE PRACTITIONER

## 2024-03-30 RX ORDER — ACETYLCYSTEINE 200 MG/ML
2 SOLUTION ORAL; RESPIRATORY (INHALATION) EVERY 4 HOURS
Status: DISCONTINUED | OUTPATIENT
Start: 2024-03-30 | End: 2024-04-01

## 2024-03-30 RX ORDER — MAGNESIUM SULFATE HEPTAHYDRATE 40 MG/ML
2 INJECTION, SOLUTION INTRAVENOUS ONCE
Status: COMPLETED | OUTPATIENT
Start: 2024-03-30 | End: 2024-03-30

## 2024-03-30 RX ORDER — ACETYLCYSTEINE 200 MG/ML
2 SOLUTION ORAL; RESPIRATORY (INHALATION) EVERY 4 HOURS
Status: DISCONTINUED | OUTPATIENT
Start: 2024-03-30 | End: 2024-03-30

## 2024-03-30 RX ORDER — SODIUM CHLORIDE 9 MG/ML
INJECTION, SOLUTION INTRAVENOUS ONCE
Status: COMPLETED | OUTPATIENT
Start: 2024-03-30 | End: 2024-03-30

## 2024-03-30 RX ORDER — ACETYLCYSTEINE 200 MG/ML
2 SOLUTION ORAL; RESPIRATORY (INHALATION) EVERY 4 HOURS
Status: DISCONTINUED | OUTPATIENT
Start: 2024-03-31 | End: 2024-03-30

## 2024-03-30 NOTE — RESPIRATORY THERAPY NOTE
Pt received on full support, Vent settings managed by GOF. Current settings are AC/VC 10/600/100%/+5. Pt suctioned as needed, Scheduled Nebs given. Pt on scheduled ABG draws q6.

## 2024-03-30 NOTE — PROGRESS NOTES
NEPHROLOGY DAILY PROGRESS NOTE       SUBJECTIVE:     Events noted overnight.  ICH with acute mental status changes.  Transitioned to comfort measures select.    OBJECTIVE:    Total Intake/Output:    Intake/Output Summary (Last 24 hours) at 3/30/2024 1832  Last data filed at 3/30/2024 1800  Gross per 24 hour   Intake 3323.85 ml   Output 2973 ml   Net 350.85 ml       PHYSICAL EXAM:  /67   Pulse 91   Temp (!) 101.8 °F (38.8 °C)   Resp (!) 0   Ht 5' 7\" (1.702 m)   Wt 166 lb 3.6 oz (75.4 kg)   SpO2 97%   BMI 26.03 kg/m²   GEN: resting   HEENT: trached   CHEST: clear anteriorly      CARDIAC: S1S2 normal  ABD: soft, ND, PEG tube   EXT:  no LE edema   NEURO: resting sleeping  ACCESS: RIJ tunneled HD cath (3/22)       CURRENT MEDICATIONS:     piperacillin-tazobactam  4.5 g Intravenous Q8H    vancomycin  1,500 mg Intravenous Q12H    methylPREDNISolone  1,000 mg Intravenous Q12H    albuterol  2.5 mg Nebulization q4h    levofloxacin  750 mg Intravenous Q24H    azithromycin  500 mg Intravenous Q24H           LABS:  Patient Labs Reviewed in Detail. Pertinent Labs as follows:  Recent Labs   Lab 03/30/24  0020 03/30/24  0555 03/30/24  0732 03/30/24  1252   * 118*  --  147*   BUN 27* 27* 25* 27*   CREATSERUM 2.54* 2.13*  --  2.47*   EGFRCR 29* 36*  --  30*   CA 8.4* 9.4  --  9.3    137  --  137   K 4.4  --  5.0 5.2*    106  --  105   CO2 26.0 23.0  --  24.0     Recent Labs   Lab 03/30/24  0020 03/30/24  0555 03/30/24  1252   RBC 2.84* 2.81* 3.68*   HGB 8.5* 8.3* 10.6*   HCT 26.1* 25.5* 32.5*   MCV 91.9 90.7 88.3   MCH 29.9 29.5 28.8   MCHC 32.6 32.5 32.6   RDW 16.9* 17.8* 19.2*   NEPRELIM 20.09* 18.13* 22.11*   WBC 24.0* 19.4* 23.2*   PLT 95.0* 83.0* 92.0*         IMAGING:  XR CHEST AP PORTABLE  (CPT=71045)    Result Date: 3/30/2024  CONCLUSION:  1.  Pulmonary vascular congestion, slightly improved since prior exam. 2.  Bilateral pleural effusions, larger on the left, slightly improved since prior  exam. 3.  Bibasilar pulmonary opacities suggesting atelectasis. 4.  Post CABG.    Dictated by (CST): Dontae Kelley MD on 3/30/2024 at 2:24 PM     Finalized by (CST): Dontae Kelley MD on 3/30/2024 at 2:27 PM          CT CHEST+ABDOMEN+PELVIS(CPT=71250/19227)    Result Date: 3/30/2024  CONCLUSION:   Findings of volume overload including moderate bilateral pleural effusions and associated atelectasis, mild diffuse body wall edema, and trace ascites.  No other acute abnormality in the chest, abdomen, or pelvis. Additional chronic or incidental findings are described in the body of this report.    Dictated by (CST): Damon Griggs MD on 3/30/2024 at 1:12 PM     Finalized by (CST): Damon Griggs MD on 3/30/2024 at 1:19 PM          XR CHEST AP PORTABLE  (CPT=71045)    Result Date: 3/30/2024  CONCLUSION:  1.  Bilateral pleural effusions, larger on the left.  Bibasilar pulmonary opacities likely representing atelectasis, also larger on the left.  Moderate pulmonary vascular congestion. 2.  Post CABG.   Dictated by (CST): Dontae Kelley MD on 3/30/2024 at 8:13 AM     Finalized by (CST): Dontae Kelley MD on 3/30/2024 at 8:14 AM          XR CHEST AP PORTABLE  (CPT=71045)    Result Date: 3/30/2024  CONCLUSION: Small-moderate sized bilateral pleural effusions larger on the left.  Bibasilar pulmonary opacities compatible with atelectasis.  No significant interval change.   [facility] provided a preliminary report for this examination. This final report agrees with their preliminary findings.   Dictated by (CST): Dontae Kelley MD on 3/30/2024 at 7:27 AM     Finalized by (CST): Dontae Kelley MD on 3/30/2024 at 7:29 AM          XR CHEST AP PORTABLE  (CPT=71045)    Result Date: 3/29/2024  CONCLUSION:   No significant interval change since the prior examination.  Persistent mild central vascular congestion with stable small left pleural effusion and mild bibasilar opacity.    Dictated by (CST): Jcarlos Harmon MD on 3/29/2024 at 6:24 PM      Finalized by (CST): Jcarlos Harmon MD on 3/29/2024 at 6:26 PM          XR CHEST AP PORTABLE  (CPT=71045)    Result Date: 3/29/2024  CONCLUSION:  1. Redemonstration of normal heart size with mild pulmonary vascular congestive change and mild interstitial edema improved slightly since previous study.  Persistent bilateral pleural effusions and left basal opacification may suggest left basal atelectasis.  Underlying pneumonia not excluded.  Follow-up studies are advised.    Dictated by (CST): Moisés Delgadillo MD on 3/29/2024 at 2:29 PM     Finalized by (CST): Moisés Delgadillo MD on 3/29/2024 at 2:34 PM          XR CHEST AP PORTABLE  (CPT=71045)    Result Date: 3/29/2024  CONCLUSION:   Persistent pulmonary edema and bilateral pleural effusions.    Preliminary report was given by [facility].  There are no clinically significant discrepancies.    Dictated by (CST): Damon Griggs MD on 3/29/2024 at 8:15 AM     Finalized by (CST): Damon Griggs MD on 3/29/2024 at 8:22 AM          CT STROKE BRAIN (NO IV)(CPT=70450)    Result Date: 3/28/2024  CONCLUSION:  1. Enlarging left frontal intraparenchymal hemorrhage with substantial interval worsening and surrounding vasogenic edema. There is resultant uncal and subfalcine herniation.  2. Basilar cistern effacement with mass effect exerted on the midbrain/jeancarlos and suspected associated severe edema.   3. Worsening left-to-right midline shift.  4. Senescent changes of parenchymal volume loss with sequela of chronic microvascular ischemic disease. There is also large vessel atherosclerosis.   5. Scattered paranasal sinus disease.  6. Subtotal left mastoid effusion.  7. Lesser incidental findings as above.    Results of this examination were discussed with the patient's neurosurgeon physician via extension [id number] by Dr. Dean at 1920 on 03/28/2024.   Dictated by (CST): Zachery Dean MD on 3/28/2024 at 7:20 PM     Finalized by (CST): Zachery Dean MD on 3/28/2024 at 7:29 PM             ASSESSMENT AND PLAN:   This is a 53 year old male with PMH sig for HLD, tobacco use. Presented with chest pain and was admitted for NSTEMI.  Nephrology is consulted for NORMA      Anuric NORMA  - due to ATN from shock.   - creatinine 1.23 on admission, worsened to 7.19 (3/7)  - initiated on CVVH on 3/7 via RIJ temp HD cath  - RIJ tunneled HD catheter placed (3/22)   - resumed CRRT at this time as per [place] and GOC.  - keep net even  - renal panel and Mg level every 12 hours while on CRRT   - Maintain adequate perfusion pressure  - Dose medications for CRRT  - Avoid nephrotoxins.  - follow renal fxn and I/Os  - monitor for renal recovery.     Hyperkalemia   - 2K bath on CRRT    Metabolic acidosis  - resolved with CRRT     Hyponatremia  - resolved     Critical Care time >35 minutes    Juanpablo Jimenes MD  [facility]  Nephrology

## 2024-03-30 NOTE — PROGRESS NOTES
Cardiology Progress Note    Cristianojanelle Estebanadrien Patient Status:  Inpatient    1970 MRN [id number]   Location [facility] 2W/SW Attending Donna Llanes,    Hosp Day # 25 PCP Abilio Dorsey MD     Assessment   Comfort care due to hemorrhagic conversion of stroke, herniation and brain death.  [facility] involved.    Generalized bleeding/oozing: Anemia and thrombocytopenia, likely consumptive.  HIT and DIC panel negative.    STEMI, severe multivessel coronary disease status post CABG: Last echocardiogram 3/16 while Impella was in place, ejection fraction 45 to 50%.  Emergent redo sternotomy 3/16/2024 for tamponade    Cardiogenic shock requiring Impella-resolved    Ventilator dependent respiratory failure, status post tracheostomy 3/22/2024, has had generalized oozing and bleeding.  General surgery following.    Acute stroke: Left frontal lobe infarct associate with edema and mild hemorrhagic conversion with mild mass effect.  Neurology saw the patient appears [place] brain-dead.  CT consistent with irreversible brain damage    Acute renal failure, on CRRT based on neuro recommendations due to cerebral edema.    Atrial fibrillation, paroxysmal    Status post PEG tube placement, on TF        Plan  As above.    L3 will sign off.    Sagar Chahal MD  Interventional Cardiology  [facility]  ----------------------------------------------------------------------------------------------------------  ROS 12 systems reviewed, pertinent findings above.  ROS    History:  History reviewed. No pertinent past medical history.  Past Surgical History:   Procedure Laterality Date    COLONOSCOPY N/A 2017    Procedure: COLONOSCOPY, POSSIBLE BIOPSY, POSSIBLE POLYPECTOMY 84434;  Surgeon: Byron Plaza MD;  [place] [facility] [place] [facility]     History reviewed. No pertinent family history.   reports that he has been smoking cigarettes. He has never used smokeless tobacco. He  reports that he does not drink alcohol and does not use drugs.    Objective:   Temp: 94.8 °F (34.9 °C)  Pulse: 60  Resp: 11  BP: 122/89  AO: 129/68  FiO2 (%): 100 %    Intake/Output:     Intake/Output Summary (Last 24 hours) at 3/30/2024 0929  Last data filed at 3/30/2024 0900  Gross per 24 hour   Intake 3130.1 ml   Output 3264 ml   Net -133.9 ml       Physical Exam:    General: on Vent/trach, not responsive.  HEENT: Normocephalic, anicteric sclera, neck supple.  Neck: No JVD, carotids 2+, no bruits.  Cardiac: Regular rate and rhythm. S1, S2 normal. No murmur, pericardial rub, S3.  Lungs: Clear without wheezes, rales, rhonchi or dullness.  Normal excursions and effort.  Abdomen: Soft, non-tender. BS-present.  Extremities: Without clubbing, cyanosis or edema.  Peripheral pulses are 2+.  Neurologic: Limited exam does not follow commands  Skin: Warm and dry.

## 2024-03-30 NOTE — SPIRITUAL CARE NOTE
supported the family and provided prayer and active listening. Family was very grateful for all the support they have been given.    Chaplain Janeen Robledo.

## 2024-03-31 ENCOUNTER — ANESTHESIA (OUTPATIENT)
Dept: SURGERY | Facility: HOSPITAL | Age: 54
End: 2024-03-31
Payer: COMMERCIAL

## 2024-03-31 ENCOUNTER — ANESTHESIA EVENT (OUTPATIENT)
Dept: SURGERY | Facility: HOSPITAL | Age: 54
End: 2024-03-31
Payer: COMMERCIAL

## 2024-03-31 VITALS
WEIGHT: 162.69 LBS | BODY MASS INDEX: 25.53 KG/M2 | SYSTOLIC BLOOD PRESSURE: 144 MMHG | TEMPERATURE: 99 F | HEART RATE: 91 BPM | RESPIRATION RATE: 12 BRPM | DIASTOLIC BLOOD PRESSURE: 98 MMHG | OXYGEN SATURATION: 100 % | HEIGHT: 67 IN

## 2024-03-31 LAB
ALBUMIN SERPL-MCNC: 3.3 G/DL (ref 3.2–4.8)
ALBUMIN SERPL-MCNC: 3.3 G/DL (ref 3.2–4.8)
ALBUMIN SERPL-MCNC: 3.4 G/DL (ref 3.2–4.8)
ALBUMIN/GLOB SERPL: 1.2 {RATIO} (ref 1–2)
ALBUMIN/GLOB SERPL: 1.3 {RATIO} (ref 1–2)
ALBUMIN/GLOB SERPL: 1.3 {RATIO} (ref 1–2)
ALP LIVER SERPL-CCNC: 110 U/L
ALP LIVER SERPL-CCNC: 119 U/L
ALP LIVER SERPL-CCNC: 124 U/L
ALT SERPL-CCNC: 11 U/L
ALT SERPL-CCNC: 12 U/L
ALT SERPL-CCNC: 9 U/L
AMYLASE SERPL-CCNC: 61 U/L (ref 30–118)
AMYLASE SERPL-CCNC: 68 U/L (ref 30–118)
AMYLASE SERPL-CCNC: 75 U/L (ref 30–118)
ANION GAP SERPL CALC-SCNC: 12 MMOL/L (ref 0–18)
ANION GAP SERPL CALC-SCNC: 9 MMOL/L (ref 0–18)
ANION GAP SERPL CALC-SCNC: 9 MMOL/L (ref 0–18)
APTT PPP: 120 SECONDS (ref 23.3–35.6)
APTT PPP: 42.7 SECONDS (ref 23.3–35.6)
APTT PPP: 43.7 SECONDS (ref 23.3–35.6)
APTT PPP: 44.8 SECONDS (ref 23.3–35.6)
AST SERPL-CCNC: 13 U/L (ref ?–34)
AST SERPL-CCNC: 14 U/L (ref ?–34)
AST SERPL-CCNC: <8 U/L (ref ?–34)
BASE EXCESS BLD CALC-SCNC: -3 MMOL/L (ref ?–2)
BASE EXCESS BLD CALC-SCNC: 1.2 MMOL/L (ref ?–2)
BASE EXCESS BLD CALC-SCNC: 2 MMOL/L (ref ?–2)
BASOPHILS # BLD: 0 X10(3) UL (ref 0–0.2)
BASOPHILS NFR BLD: 0 %
BILIRUB DIRECT SERPL-MCNC: 0.7 MG/DL (ref ?–0.3)
BILIRUB DIRECT SERPL-MCNC: 0.8 MG/DL (ref ?–0.3)
BILIRUB DIRECT SERPL-MCNC: 0.9 MG/DL (ref ?–0.3)
BILIRUB SERPL-MCNC: 1.1 MG/DL (ref 0.3–1.2)
BILIRUB SERPL-MCNC: 1.3 MG/DL (ref 0.3–1.2)
BILIRUB SERPL-MCNC: 1.5 MG/DL (ref 0.3–1.2)
BLOOD TYPE BARCODE: 7300
BUN BLD-MCNC: 25 MG/DL (ref 9–23)
BUN BLD-MCNC: 26 MG/DL (ref 9–23)
BUN BLD-MCNC: 28 MG/DL (ref 9–23)
BUN/CREAT SERPL: 10.2 (ref 10–20)
BUN/CREAT SERPL: 10.4 (ref 10–20)
BUN/CREAT SERPL: 10.5 (ref 10–20)
CALCIUM BLD-MCNC: 8.8 MG/DL (ref 8.7–10.4)
CALCIUM BLD-MCNC: 8.9 MG/DL (ref 8.7–10.4)
CALCIUM BLD-MCNC: 8.9 MG/DL (ref 8.7–10.4)
CHLORIDE SERPL-SCNC: 104 MMOL/L (ref 98–112)
CK SERPL-CCNC: <15 U/L
CO2 SERPL-SCNC: 19 MMOL/L (ref 21–32)
CO2 SERPL-SCNC: 24 MMOL/L (ref 21–32)
CO2 SERPL-SCNC: 24 MMOL/L (ref 21–32)
CREAT BLD-MCNC: 2.38 MG/DL
CREAT BLD-MCNC: 2.54 MG/DL
CREAT BLD-MCNC: 2.7 MG/DL
D DIMER PPP FEU-MCNC: 11.66 UG/ML FEU (ref ?–0.55)
D DIMER PPP FEU-MCNC: 8.63 UG/ML FEU (ref ?–0.55)
D DIMER PPP FEU-MCNC: 9.13 UG/ML FEU (ref ?–0.55)
DEPRECATED RDW RBC AUTO: 62.4 FL (ref 35.1–46.3)
DEPRECATED RDW RBC AUTO: 63.6 FL (ref 35.1–46.3)
DEPRECATED RDW RBC AUTO: 64.6 FL (ref 35.1–46.3)
EGFRCR SERPLBLD CKD-EPI 2021: 27 ML/MIN/1.73M2 (ref 60–?)
EGFRCR SERPLBLD CKD-EPI 2021: 29 ML/MIN/1.73M2 (ref 60–?)
EGFRCR SERPLBLD CKD-EPI 2021: 31 ML/MIN/1.73M2 (ref 60–?)
EOSINOPHIL # BLD: 0 X10(3) UL (ref 0–0.7)
EOSINOPHIL NFR BLD: 0 %
ERYTHROCYTE [DISTWIDTH] IN BLOOD BY AUTOMATED COUNT: 19.4 % (ref 11–15)
ERYTHROCYTE [DISTWIDTH] IN BLOOD BY AUTOMATED COUNT: 19.8 % (ref 11–15)
ERYTHROCYTE [DISTWIDTH] IN BLOOD BY AUTOMATED COUNT: 19.9 % (ref 11–15)
FIBRINOGEN PPP-MCNC: 510 MG/DL (ref 180–480)
FIBRINOGEN PPP-MCNC: 512 MG/DL (ref 180–480)
FIBRINOGEN PPP-MCNC: 568 MG/DL (ref 180–480)
GLOBULIN PLAS-MCNC: 2.6 G/DL (ref 2.8–4.4)
GLOBULIN PLAS-MCNC: 2.7 G/DL (ref 2.8–4.4)
GLOBULIN PLAS-MCNC: 2.7 G/DL (ref 2.8–4.4)
GLUCOSE BLD-MCNC: 154 MG/DL (ref 70–99)
GLUCOSE BLD-MCNC: 184 MG/DL (ref 70–99)
GLUCOSE BLD-MCNC: 254 MG/DL (ref 70–99)
GLUCOSE BLDC GLUCOMTR-MCNC: 147 MG/DL (ref 70–99)
GLUCOSE BLDC GLUCOMTR-MCNC: 155 MG/DL (ref 70–99)
GLUCOSE BLDC GLUCOMTR-MCNC: 162 MG/DL (ref 70–99)
GLUCOSE BLDC GLUCOMTR-MCNC: 163 MG/DL (ref 70–99)
GLUCOSE BLDC GLUCOMTR-MCNC: 168 MG/DL (ref 70–99)
GLUCOSE BLDC GLUCOMTR-MCNC: 170 MG/DL (ref 70–99)
GLUCOSE BLDC GLUCOMTR-MCNC: 175 MG/DL (ref 70–99)
GLUCOSE BLDC GLUCOMTR-MCNC: 176 MG/DL (ref 70–99)
GLUCOSE BLDC GLUCOMTR-MCNC: 183 MG/DL (ref 70–99)
GLUCOSE BLDC GLUCOMTR-MCNC: 188 MG/DL (ref 70–99)
GLUCOSE BLDC GLUCOMTR-MCNC: 191 MG/DL (ref 70–99)
GLUCOSE BLDC GLUCOMTR-MCNC: 197 MG/DL (ref 70–99)
GLUCOSE BLDC GLUCOMTR-MCNC: 201 MG/DL (ref 70–99)
GLUCOSE BLDC GLUCOMTR-MCNC: 221 MG/DL (ref 70–99)
GLUCOSE BLDC GLUCOMTR-MCNC: 272 MG/DL (ref 70–99)
HCO3 BLDA-SCNC: 22.6 MEQ/L (ref 21–27)
HCO3 BLDA-SCNC: 25.9 MEQ/L (ref 21–27)
HCO3 BLDA-SCNC: 26.5 MEQ/L (ref 21–27)
HCT VFR BLD AUTO: 29.5 %
HCT VFR BLD AUTO: 29.6 %
HCT VFR BLD AUTO: 30.9 %
HGB BLD-MCNC: 10 G/DL
HGB BLD-MCNC: 9.6 G/DL
HGB BLD-MCNC: 9.8 G/DL
INR BLD: 1.24 (ref 0.8–1.2)
INR BLD: 1.24 (ref 0.8–1.2)
INR BLD: 1.28 (ref 0.8–1.2)
LACTATE SERPL-SCNC: 2 MMOL/L (ref 0.5–2)
LACTATE SERPL-SCNC: 2.6 MMOL/L (ref 0.5–2)
LACTATE SERPL-SCNC: 2.9 MMOL/L (ref 0.5–2)
LDH SERPL L TO P-CCNC: 331 U/L
LDH SERPL L TO P-CCNC: 333 U/L
LDH SERPL L TO P-CCNC: 341 U/L
LIPASE SERPL-CCNC: 27 U/L (ref 12–53)
LIPASE SERPL-CCNC: 30 U/L (ref 12–53)
LIPASE SERPL-CCNC: 50 U/L (ref 12–53)
LYMPHOCYTES NFR BLD: 0.35 X10(3) UL (ref 1–4)
LYMPHOCYTES NFR BLD: 0.37 X10(3) UL (ref 1–4)
LYMPHOCYTES NFR BLD: 0.72 X10(3) UL (ref 1–4)
LYMPHOCYTES NFR BLD: 1 %
LYMPHOCYTES NFR BLD: 1 %
LYMPHOCYTES NFR BLD: 2 %
MAGNESIUM SERPL-MCNC: 1.9 MG/DL (ref 1.6–2.6)
MAGNESIUM SERPL-MCNC: 2.2 MG/DL (ref 1.6–2.6)
MAGNESIUM SERPL-MCNC: 2.5 MG/DL (ref 1.6–2.6)
MCH RBC QN AUTO: 28.8 PG (ref 26–34)
MCH RBC QN AUTO: 29 PG (ref 26–34)
MCH RBC QN AUTO: 29.3 PG (ref 26–34)
MCHC RBC AUTO-ENTMCNC: 32.4 G/DL (ref 31–37)
MCHC RBC AUTO-ENTMCNC: 32.5 G/DL (ref 31–37)
MCHC RBC AUTO-ENTMCNC: 33.1 G/DL (ref 31–37)
MCV RBC AUTO: 88.4 FL
MCV RBC AUTO: 88.6 FL
MCV RBC AUTO: 89.6 FL
MONOCYTES # BLD: 0.35 X10(3) UL (ref 0.1–1)
MONOCYTES # BLD: 1.12 X10(3) UL (ref 0.1–1)
MONOCYTES # BLD: 1.45 X10(3) UL (ref 0.1–1)
MONOCYTES NFR BLD: 1 %
MONOCYTES NFR BLD: 3 %
MONOCYTES NFR BLD: 4 %
NEUTROPHILS # BLD AUTO: 31.77 X10 (3) UL (ref 1.5–7.7)
NEUTROPHILS # BLD AUTO: 31.94 X10 (3) UL (ref 1.5–7.7)
NEUTROPHILS # BLD AUTO: 32.69 X10 (3) UL (ref 1.5–7.7)
NEUTROPHILS NFR BLD: 91 %
NEUTROPHILS NFR BLD: 94 %
NEUTROPHILS NFR BLD: 98 %
NEUTS BAND NFR BLD: 2 %
NEUTS BAND NFR BLD: 3 %
NEUTS HYPERSEG # BLD: 34.03 X10(3) UL (ref 1.5–7.7)
NEUTS HYPERSEG # BLD: 34.2 X10(3) UL (ref 1.5–7.7)
NEUTS HYPERSEG # BLD: 35.11 X10(3) UL (ref 1.5–7.7)
O2 CT BLD-SCNC: 14.8 VOL% (ref 15–23)
O2 CT BLD-SCNC: 15.2 VOL% (ref 15–23)
O2 CT BLD-SCNC: 15.7 VOL% (ref 15–23)
O2/TOTAL GAS SETTING VFR VENT: 100 %
OSMOLALITY SERPL CALC.SUM OF ELEC: 292 MOSM/KG (ref 275–295)
OSMOLALITY SERPL CALC.SUM OF ELEC: 293 MOSM/KG (ref 275–295)
OSMOLALITY SERPL CALC.SUM OF ELEC: 294 MOSM/KG (ref 275–295)
PCO2 BLDA: 33 MM HG (ref 35–45)
PCO2 BLDA: 35 MM HG (ref 35–45)
PCO2 BLDA: 38 MM HG (ref 35–45)
PEEP SETTING VENT: 5 CM H2O
PH BLDA: 7.37 [PH] (ref 7.35–7.45)
PH BLDA: 7.46 [PH] (ref 7.35–7.45)
PH BLDA: 7.49 [PH] (ref 7.35–7.45)
PHOSPHATE SERPL-MCNC: 2.9 MG/DL (ref 2.4–5.1)
PHOSPHATE SERPL-MCNC: 3 MG/DL (ref 2.4–5.1)
PHOSPHATE SERPL-MCNC: 4.1 MG/DL (ref 2.4–5.1)
PLATELET # BLD AUTO: 114 10(3)UL (ref 150–450)
PLATELET # BLD AUTO: 130 10(3)UL (ref 150–450)
PLATELET # BLD AUTO: 132 10(3)UL (ref 150–450)
PLATELET MORPHOLOGY: NORMAL
PLATELET MORPHOLOGY: NORMAL
PLATELETS.RETICULATED NFR BLD AUTO: 17.2 % (ref 0–7)
PO2 BLDA: 347 MM HG (ref 80–100)
PO2 BLDA: 385 MM HG (ref 80–100)
PO2 BLDA: 394 MM HG (ref 80–100)
POTASSIUM SERPL-SCNC: 4.1 MMOL/L (ref 3.5–5.1)
POTASSIUM SERPL-SCNC: 4.4 MMOL/L (ref 3.5–5.1)
POTASSIUM SERPL-SCNC: 4.6 MMOL/L (ref 3.5–5.1)
PROT SERPL-MCNC: 5.9 G/DL (ref 5.7–8.2)
PROT SERPL-MCNC: 6 G/DL (ref 5.7–8.2)
PROT SERPL-MCNC: 6.1 G/DL (ref 5.7–8.2)
PROTHROMBIN TIME: 16.4 SECONDS (ref 11.6–14.8)
PROTHROMBIN TIME: 16.4 SECONDS (ref 11.6–14.8)
PROTHROMBIN TIME: 16.8 SECONDS (ref 11.6–14.8)
PUNCTURE CHARGE: NO
RBC # BLD AUTO: 3.33 X10(6)UL
RBC # BLD AUTO: 3.35 X10(6)UL
RBC # BLD AUTO: 3.45 X10(6)UL
RESP RATE: 14 BPM
SAO2 % BLDA: >99 % (ref 94–100)
SODIUM SERPL-SCNC: 135 MMOL/L (ref 136–145)
SODIUM SERPL-SCNC: 137 MMOL/L (ref 136–145)
SODIUM SERPL-SCNC: 137 MMOL/L (ref 136–145)
SPECIMEN VOL 24H UR: 600 ML
TOTAL CELLS COUNTED BLD: 100
TOTAL CELLS COUNTED BLD: 100
TOTAL CELLS COUNTED BLD: 98
UNIT VOLUME: 350 ML
WBC # BLD AUTO: 34.9 X10(3) UL (ref 4–11)
WBC # BLD AUTO: 36.2 X10(3) UL (ref 4–11)
WBC # BLD AUTO: 36.6 X10(3) UL (ref 4–11)

## 2024-03-31 RX ORDER — SODIUM CHLORIDE 9 MG/ML
INJECTION, SOLUTION INTRAVENOUS CONTINUOUS PRN
Status: DISCONTINUED | OUTPATIENT
Start: 2024-03-31 | End: 2024-03-31 | Stop reason: SURG

## 2024-03-31 RX ORDER — VECURONIUM BROMIDE 1 MG/ML
10 INJECTION, POWDER, LYOPHILIZED, FOR SOLUTION INTRAVENOUS ONCE
Status: COMPLETED | OUTPATIENT
Start: 2024-03-31 | End: 2024-03-31

## 2024-03-31 RX ORDER — ROCURONIUM BROMIDE 10 MG/ML
INJECTION, SOLUTION INTRAVENOUS AS NEEDED
Status: DISCONTINUED | OUTPATIENT
Start: 2024-03-31 | End: 2024-03-31 | Stop reason: SURG

## 2024-03-31 RX ORDER — MAGNESIUM SULFATE HEPTAHYDRATE 40 MG/ML
2 INJECTION, SOLUTION INTRAVENOUS ONCE
Status: COMPLETED | OUTPATIENT
Start: 2024-03-31 | End: 2024-03-31

## 2024-03-31 RX ORDER — PHENYLEPHRINE HCL 10 MG/ML
VIAL (ML) INJECTION AS NEEDED
Status: DISCONTINUED | OUTPATIENT
Start: 2024-03-31 | End: 2024-03-31 | Stop reason: SURG

## 2024-03-31 RX ORDER — HEPARIN SODIUM 1000 [USP'U]/ML
30000 INJECTION, SOLUTION INTRAVENOUS; SUBCUTANEOUS ONCE
Status: COMPLETED | OUTPATIENT
Start: 2024-03-31 | End: 2024-03-31

## 2024-03-31 RX ADMIN — HEPARIN SODIUM 30000 UNITS: 1000 INJECTION, SOLUTION INTRAVENOUS; SUBCUTANEOUS at 18:07:00

## 2024-03-31 RX ADMIN — PHENYLEPHRINE HCL 100 MCG: 10 MG/ML VIAL (ML) INJECTION at 17:25:00

## 2024-03-31 RX ADMIN — PHENYLEPHRINE HCL 100 MCG: 10 MG/ML VIAL (ML) INJECTION at 16:52:00

## 2024-03-31 RX ADMIN — PHENYLEPHRINE HCL 100 MCG: 10 MG/ML VIAL (ML) INJECTION at 16:31:00

## 2024-03-31 RX ADMIN — PHENYLEPHRINE HCL 100 MCG: 10 MG/ML VIAL (ML) INJECTION at 17:23:00

## 2024-03-31 RX ADMIN — SODIUM CHLORIDE: 9 INJECTION, SOLUTION INTRAVENOUS at 18:01:00

## 2024-03-31 RX ADMIN — ROCURONIUM BROMIDE 100 MG: 10 INJECTION, SOLUTION INTRAVENOUS at 16:01:00

## 2024-03-31 RX ADMIN — PHENYLEPHRINE HCL 100 MCG: 10 MG/ML VIAL (ML) INJECTION at 16:45:00

## 2024-03-31 RX ADMIN — SODIUM CHLORIDE: 9 INJECTION, SOLUTION INTRAVENOUS at 15:51:00

## 2024-03-31 RX ADMIN — PHENYLEPHRINE HCL 100 MCG: 10 MG/ML VIAL (ML) INJECTION at 16:11:00

## 2024-03-31 RX ADMIN — PHENYLEPHRINE HCL 100 MCG: 10 MG/ML VIAL (ML) INJECTION at 16:49:00

## 2024-03-31 NOTE — PLAN OF CARE
Problem: RESPIRATORY - ADULT  Goal: Achieves optimal ventilation and oxygenation  Description: INTERVENTIONS:  - Assess for changes in respiratory status  - Assess for changes in mentation and behavior  - Position to facilitate oxygenation and minimize respiratory effort  - Oxygen supplementation based on oxygen saturation or ABGs  - Provide Smoking Cessation handout, if applicable  - Encourage broncho-pulmonary hygiene including cough, deep breathe, Incentive Spirometry  - Assess the need for suctioning and perform as needed  - Assess and instruct to report SOB or any respiratory difficulty  - Respiratory Therapy support as indicated  - Manage/alleviate anxiety  - Monitor for signs/symptoms of CO2 retention  Outcome: Progressing Pt received intubated on full support AC 10/600/+5/100%. Portex 7.0 trach intact and secured. Bilateral BS auscultated. Suction provided as needed. Pt is now a [facility] admission and required frequent vent changes necessary to [facility]. Abgs be done as needed by [facility], in addition to nebulizer tx. Continuous CRRT in progress. No other events at this time.

## 2024-03-31 NOTE — ANESTHESIA POSTPROCEDURE EVALUATION
Patient: Cristiano Obregon    Procedure Summary       Date: 03/31/24 Room / Location: [facility] MAIN OR  / [facility] MAIN OR    Anesthesia Start: 1551 Anesthesia Stop:     Procedure: ORGAN DONATION (Abdomen) Diagnosis:       Brain death      (Brain death [G93.82])    Surgeons: Susan Salinas Of Anesthesiologist: Kathy Yuan MD    Anesthesia Type: general ASA Status: 6            Anesthesia Type: general    [facility]    [facility] AN Post Evaluation    Kathy Yuan MD  3/31/2024 6:21 PM

## 2024-03-31 NOTE — PLAN OF CARE
Pt is unresponsive [facility] pt.  Sinus r.I will be stopping CRRT trach to vent  fio2 40% repeat abg rerepeat labs, stat ptt 43.7 radha with [facility] directing care she is aware of all labs, family to arrive after 1430. Neosynephrine at 280 huey. Double concentration.  Surgery for harvesting organs planned for 1500.  Problem: Patient Centered Care  Goal: Patient preferences are identified and integrated in the patient's plan of care  Description: Interventions:  - What would you like us to know as we care for you?  - Provide timely, complete, and accurate information to patient/family  - Incorporate patient and family knowledge, values, beliefs, and cultural backgrounds into the planning and delivery of care  - Encourage patient/family to participate in care and decision-making at the level they choose  - Honor patient and family perspectives and choices  Outcome: Not Progressing     Problem: Patient/Family Goals  Goal: Patient/Family Long Term Goal  Description: Patient's Long Term Goal: Go home upon discharge    Interventions:  - Monitor labs  - Administer medications  - Pain management  - Patient centered care  - Keep patient and family informed on plan of care  - See additional Care Plan goals for specific interventions  Outcome: Not Progressing  Goal: Patient/Family Short Term Goal  Description: Patient's Short Term Goal: extubate    Interventions:   - SAT/SBT  - improve strength  - pulmonary hygeine  - See additional Care Plan goals for specific interventions  Outcome: Not Progressing     Problem: RESPIRATORY - ADULT  Goal: Achieves optimal ventilation and oxygenation  Description: INTERVENTIONS:  - Assess for changes in respiratory status  - Assess for changes in mentation and behavior  - Position to facilitate oxygenation and minimize respiratory effort  - Oxygen supplementation based on oxygen saturation or ABGs  - Provide Smoking Cessation handout, if applicable  - Encourage broncho-pulmonary  hygiene including cough, deep breathe, Incentive Spirometry  - Assess the need for suctioning and perform as needed  - Assess and instruct to report SOB or any respiratory difficulty  - Respiratory Therapy support as indicated  - Manage/alleviate anxiety  - Monitor for signs/symptoms of CO2 retention  Outcome: Not Progressing     Problem: CARDIOVASCULAR - ADULT  Goal: Maintains optimal cardiac output and hemodynamic stability  Description: INTERVENTIONS:  - Monitor vital signs, rhythm, and trends  - Monitor for bleeding, hypotension and signs of decreased cardiac output  - Evaluate effectiveness of vasoactive medications to optimize hemodynamic stability  - Monitor arterial and/or venous puncture sites for bleeding and/or hematoma  - Assess quality of pulses, skin color and temperature  - Assess for signs of decreased coronary artery perfusion - ex. Angina  - Evaluate fluid balance, assess for edema, trend weights  Outcome: Not Progressing  Goal: Absence of cardiac arrhythmias or at baseline  Description: INTERVENTIONS:  - Continuous cardiac monitoring, monitor vital signs, obtain 12 lead EKG if indicated  - Evaluate effectiveness of antiarrhythmic and heart rate control medications as ordered  - Initiate emergency measures for life threatening arrhythmias  - Monitor electrolytes and administer replacement therapy as ordered  Outcome: Not Progressing     Problem: Delirium  Goal: Minimize duration of delirium  Description: Interventions:  - Encourage use of hearing aids, eye glasses  - Promote highest level of mobility daily  - Provide frequent reorientation  - Promote wakefulness i.e. lights on, blinds open  - Promote sleep, encourage patient's normal rest cycle i.e. lights off, TV off, minimize noise and interruptions  - Encourage family to assist in orientation and promotion of home routines  Outcome: Not Progressing     Problem: METABOLIC/FLUID AND ELECTROLYTES - ADULT  Goal: Hemodynamic stability and optimal  renal function maintained  Description: INTERVENTIONS:  - Monitor labs and assess for signs and symptoms of volume excess or deficit  - Monitor intake, output and patient weight  - Monitor urine specific gravity, serum osmolarity and serum sodium as indicated or ordered  - Monitor response to interventions for patient's volume status, including labs, urine output, blood pressure (other measures as available)  - Encourage oral intake as appropriate  - Instruct patient on fluid and nutrition restrictions as appropriate  Outcome: Not Progressing     Problem: SKIN/TISSUE INTEGRITY - ADULT  Goal: Skin integrity remains intact  Description: INTERVENTIONS  - Assess and document risk factors for pressure ulcer development  - Assess and document skin integrity  - Monitor for areas of redness and/or skin breakdown  - Initiate interventions, skin care algorithm/standards of care as needed  Outcome: Not Progressing  Goal: Incision(s), wounds(s) or drain site(s) healing without S/S of infection  Description: INTERVENTIONS:  - Assess and document risk factors for pressure ulcer development  - Assess and document skin integrity  - Assess and document dressing/incision, wound bed, drain sites and surrounding tissue  - Implement wound care per orders  - Initiate isolation precautions as appropriate  - Initiate Pressure Ulcer prevention bundle as indicated  Outcome: Not Progressing  Goal: Oral mucous membranes remain intact  Description: INTERVENTIONS  - Assess oral mucosa and hygiene practices  - Implement preventative oral hygiene regimen  - Implement oral medicated treatments as ordered  Outcome: Not Progressing     Problem: NEUROLOGICAL - ADULT  Goal: Achieves stable or improved neurological status  Description: INTERVENTIONS  - Assess for and report changes in neurological status  - Initiate measures to prevent increased intracranial pressure  - Maintain blood pressure and fluid volume within ordered parameters to optimize  cerebral perfusion and minimize risk of hemorrhage  - Monitor temperature, glucose, and sodium. Initiate appropriate interventions as ordered  Outcome: Not Progressing     Problem: HEMATOLOGIC - ADULT  Goal: Free from bleeding injury  Description: (Example usage: patient with low platelets)  INTERVENTIONS:  - Avoid intramuscular injections, enemas and rectal medication administration  - Ensure safe mobilization of patient  - Hold pressure on venipuncture sites to achieve adequate hemostasis  - Assess for signs and symptoms of internal bleeding  - Monitor lab trends  - Patient is to report abnormal signs of bleeding to staff  - Avoid use of toothpicks and dental floss  - Use electric shaver for shaving  - Use soft bristle tooth brush  - Limit straining and forceful nose blowing  Outcome: Not Progressing

## 2024-03-31 NOTE — PROGRESS NOTES
Crrt stopped at 1400 per [facility] Orders. Machine cleaned and placed in dialysis room. Spoke to daryn at dialysis.

## 2024-03-31 NOTE — RESPIRATORY THERAPY NOTE
- around 3;40 pm [facility] patient was transported to OR on transport ventilator.SO2 was 100%. PATEL Salinas and [place] at the bedside.

## 2024-03-31 NOTE — ANESTHESIA PREPROCEDURE EVALUATION
Anesthesia PreOp Note    HPI:     Cristiano Obregon is a 53 year old male who presents for preoperative consultation requested by: Susan Salinas Of    Date of Surgery: 3/31/2024    Procedure(s):  ORGAN DONATION  Indication: Brain death [G93.82]    Relevant Problems   No relevant active problems       NPO:                         History Review:  Patient Active Problem List    Diagnosis Date Noted    Cerebral edema (HCC) 03/28/2024    Petechial hemorrhage 03/28/2024    Midline shift of brain 03/28/2024    Clonus 03/25/2024    Anemia 03/20/2024    Cerebrovascular accident (CVA) due to embolism of left middle cerebral artery (HCC) 03/19/2024    Chest pain 03/05/2024    NSTEMI (non-ST elevated myocardial infarction) (HCC) 03/05/2024    Paresthesia of left thumb 05/10/2017    Hypercholesteremia 07/11/2016    Floaters in visual field, bilateral 07/11/2016    Acne, unspecified acne type 07/11/2016    Eczema, unspecified type 07/11/2016       History reviewed. No pertinent past medical history.    Past Surgical History:   Procedure Laterality Date    COLONOSCOPY N/A 12/9/2017    Procedure: COLONOSCOPY, POSSIBLE BIOPSY, POSSIBLE POLYPECTOMY 66634;  Surgeon: Byron Plaza MD;  Location: [facility] [place]       Medications Prior to Admission   Medication Sig Dispense Refill Last Dose    ATORVASTATIN 20 MG Oral Tab TAKE 1 TABLET BY MOUTH EVERY DAY 90 tablet 1     FLUOCINONIDE 0.05 % External Cream APPLY SPARINGLY TO AFFECTED AREA TWICE DAILY 45 g 1     TRETINOIN 0.01 % External Gel APPLY 1 APPLICATION TOPICALLY NIGHTLY. 45 g 1     omega-3 fatty acids 1000 MG Oral Cap Take 1,000 mg by mouth daily.       Melatonin 10-10 MG Oral Tab CR Take by mouth.       Multiple Vitamins-Minerals (CENTRUM) Oral Tab Take 1 tablet by mouth daily.       Multiple Vitamin (MULTIVITAMIN TAB/CAP) Take 1 tablet by mouth daily.        Current Facility-Administered Medications Ordered in Epic   Medication Dose Route Frequency Provider Last Rate  Last Admin    heparin (Porcine) 1000 UNIT/ML injection 30,000 Units  30,000 Units Intravenous Once Hope, Gift Of        vecuronium (Norcuron) injection 10 mg  10 mg Intravenous Once [facility], Gift Of        phenylephrine (Kp-Synephrine) 100 mg in sodium chloride 0.9% 250 mL infusion   mcg/min Intravenous Continuous PRN Bud Camarena MD 30 mL/hr at 03/31/24 1400 200 mcg/min at 03/31/24 1400    NxStage Pure Flow 400 CRRT/PIRRT fluid 5000mL  5 L/hr CRRT Continuous Hope, Gift Of 5,000 mL/hr at 03/31/24 0426 5 L/hr at 03/31/24 0426    acetylcysteine (Mucomyst) 20 % nebulizer solution 2 mL  2 mL Nebulization Q4H Hope, Gift Of   2 mL at 03/31/24 1008    niCARdipine in sodium chloride 0.86% (carDENE) 20 mg/200mL infusion premix  5-15 mg/hr Intravenous Continuous Shae Watson APRN   Stopped at 03/29/24 1017    vasopressin (Vasostrict) 20 Units in sodium chloride 0.9% 100 mL infusion for septic shock  0.01-0.1 Units/min Intravenous Continuous PRN Hope, Gift Of        piperacillin-tazobactam (Zosyn) 4.5 g in dextrose 5% 100 mL IVPB-ADDV  4.5 g Intravenous Q8H Hope, Gift Of 25 mL/hr at 03/31/24 0903 4.5 g at 03/31/24 0903    vancomycin (Vancocin) 1.5 g in sodium chloride 0.9% 250mL IVPB premix  1,500 mg Intravenous Q12H Hope, Gift Of 166.7 mL/hr at 03/31/24 0607 1,500 mg at 03/31/24 0607    methylPREDNISolone sodium succinate (Solu-MEDROL) 1,000 mg in sodium chloride 0.9% 100 mL IVPB  1,000 mg Intravenous Q12H Hope, Gift Of 100 mL/hr at 03/31/24 0607 1,000 mg at 03/31/24 0607    albuterol (Ventolin) (2.5 MG/3ML) 0.083% nebulizer solution 2.5 mg  2.5 mg Nebulization q4h Hope, Gift Of   2.5 mg at 03/31/24 1008    insulin regular human (Novolin R, Humulin R) 100 UNIT/ML injection 10-40 Units  10-40 Units Intravenous Q2H PRN Rita, Gift Of   10 Units at 03/31/24 1414    norepinephrine (Levophed) 32 mg in dextrose 5% 250 mL infusion  0.5-30 mcg/min Intravenous Continuous Bud Camarena MD   Stopped at 03/31/24 0830    levoFLOXacin  in dextrose 5% (Levaquin) 750 mg/150mL IVPB premix 750 mg  750 mg Intravenous Q24H Hope, Gift Of 100 mL/hr at 03/30/24 2201 750 mg at 03/30/24 2201    azithromycin (Zithromax) 500 mg in sodium chloride 0.9% 250mL IVPB premix  500 mg Intravenous Q24H Hope, Gift Of 250 mL/hr at 03/30/24 2259 500 mg at 03/30/24 2259    glycerin-hypromellose- (Artifical Tears) 0.2-0.2-1 % ophthalmic solution 1 drop  1 drop Both Eyes QID PRN Hope, Gift Of   1 drop at 03/30/24 2030    sodium chloride 0.9% 0.9% flush injection 10 mL  10 mL Intravenous PRN Bud Camarena MD         No current [facility]-ordered outpatient medications on file.       No Known Allergies    History reviewed. No pertinent family history.  Social History     Socioeconomic History    Marital status:    Tobacco Use    Smoking status: Every Day     Types: Cigarettes    Smokeless tobacco: Never    Tobacco comments:     occ/5 cigs daily\   Vaping Use    Vaping Use: Never used   Substance and Sexual Activity    Alcohol use: No     Alcohol/week: 0.0 standard drinks of alcohol    Drug use: No       Available pre-op labs reviewed.  Lab Results   Component Value Date    WBC 34.9 (H) 03/31/2024    RBC 3.33 (L) 03/31/2024    HGB 9.6 (L) 03/31/2024    HCT 29.5 (L) 03/31/2024    MCV 88.6 03/31/2024    MCH 28.8 03/31/2024    MCHC 32.5 03/31/2024    RDW 19.4 (H) 03/31/2024    .0 (L) 03/31/2024     Lab Results   Component Value Date     03/31/2024    K 4.1 03/31/2024     03/31/2024    CO2 24.0 03/31/2024    BUN 25 (H) 03/31/2024    CREATSERUM 2.38 (H) 03/31/2024     (H) 03/31/2024    PGLU 168 (H) 03/31/2024    CA 8.8 03/31/2024     Lab Results   Component Value Date    INR 1.28 (H) 03/31/2024       Vital Signs:  Body mass index is 25.48 kg/m².   height is 1.702 m (5' 7\") and weight is 73.8 kg (162 lb 11.2 oz). His rectal temperature is 98.6 °F (37 °C). His blood pressure is 159/112 (abnormal) and his pulse is 97. His respiration is 12 and oxygen  saturation is 99%.   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 Organ procurement.      I have informed Cristiano CHOCO Obregon and/or legal guardian or family member of the nature of the anesthetic plan, benefits, risks including possible dental damage if relevant, major complications, and any alternative forms of anesthetic management.   All of the patient's questions were answered to the best of my ability. The patient desires the anesthetic management as planned.  Kathy Yuan MD  3/31/2024 2:33 PM  Present on Admission:  **None**

## 2024-03-31 NOTE — PROGRESS NOTES
NEPHROLOGY DAILY PROGRESS NOTE       SUBJECTIVE:     Events noted overnight.  ICH with acute mental status changes.  Transitioned to comfort measures select.  Crrt to be dc per leona this afternoon    OBJECTIVE:    Total Intake/Output:    Intake/Output Summary (Last 24 hours) at 3/31/2024 1847  Last data filed at 3/31/2024 1801  Gross per 24 hour   Intake 3781.8 ml   Output 5301 ml   Net -1519.2 ml       PHYSICAL EXAM:  BP (!) 144/98   Pulse 91   Temp 98.8 °F (37.1 °C) (Rectal)   Resp 12   Ht 5' 7\" (1.702 m)   Wt 162 lb 11.2 oz (73.8 kg)   SpO2 100%   BMI 25.48 kg/m²   GEN: resting   HEENT: trached   CHEST: clear anteriorly      CARDIAC: S1S2 normal  ABD: soft, ND, PEG tube   EXT:  no LE edema   NEURO: resting sleeping  ACCESS: RIJ tunneled HD cath (3/22)       CURRENT MEDICATIONS:     [MAR Hold] acetylcysteine  2 mL Nebulization Q4H    piperacillin-tazobactam  4.5 g Intravenous Q8H    vancomycin  1,500 mg Intravenous Q12H    [MAR Hold] methylPREDNISolone  1,000 mg Intravenous Q12H    [MAR Hold] albuterol  2.5 mg Nebulization q4h    levofloxacin  750 mg Intravenous Q24H    azithromycin  500 mg Intravenous Q24H           LABS:  Patient Labs Reviewed in Detail. Pertinent Labs as follows:  Recent Labs   Lab 03/30/24 2359 03/31/24 0602 03/31/24  1149   * 154* 184*   BUN 28* 26* 25*   CREATSERUM 2.70* 2.54* 2.38*   EGFRCR 27* 29* 31*   CA 8.9 8.9 8.8   * 137 137   K 4.6 4.4 4.1    104 104   CO2 19.0* 24.0 24.0     Recent Labs   Lab 03/30/24 2359 03/31/24  0602 03/31/24  1149   RBC 3.45* 3.35* 3.33*   HGB 10.0* 9.8* 9.6*   HCT 30.9* 29.6* 29.5*   MCV 89.6 88.4 88.6   MCH 29.0 29.3 28.8   MCHC 32.4 33.1 32.5   RDW 19.9* 19.8* 19.4*   NEPRELIM 32.69* 31.77* 31.94*   WBC 36.2* 36.6* 34.9*   .0* 132.0* 130.0*         IMAGING:  XR CHEST AP PORTABLE  (CPT=71045)    Result Date: 3/31/2024  CONCLUSION:   Small left pleural effusion.  Preliminary report was submitted by the [facility] teleradiologist  and there are no significant discrepancies.    Dictated by (CST): Henri Melgoza MD on 3/31/2024 at 9:26 AM     Finalized by (CST): Henri Melgoza MD on 3/31/2024 at 9:28 AM          XR CHEST AP PORTABLE  (CPT=71045)    Result Date: 3/30/2024  CONCLUSION:  1.  Pulmonary vascular congestion, slightly improved since prior exam. 2.  Bilateral pleural effusions, larger on the left, slightly improved since prior exam. 3.  Bibasilar pulmonary opacities suggesting atelectasis. 4.  Post CABG.    Dictated by (CST): Dontae eKlley MD on 3/30/2024 at 2:24 PM     Finalized by (CST): Dontae Kelley MD on 3/30/2024 at 2:27 PM          CT CHEST+ABDOMEN+PELVIS(CPT=71250/99758)    Result Date: 3/30/2024  CONCLUSION:   Findings of volume overload including moderate bilateral pleural effusions and associated atelectasis, mild diffuse body wall edema, and trace ascites.  No other acute abnormality in the chest, abdomen, or pelvis. Additional chronic or incidental findings are described in the body of this report.    Dictated by (CST): Damon Griggs MD on 3/30/2024 at 1:12 PM     Finalized by (CST): Damon Griggs MD on 3/30/2024 at 1:19 PM          XR CHEST AP PORTABLE  (CPT=71045)    Result Date: 3/30/2024  CONCLUSION:  1.  Bilateral pleural effusions, larger on the left.  Bibasilar pulmonary opacities likely representing atelectasis, also larger on the left.  Moderate pulmonary vascular congestion. 2.  Post CABG.   Dictated by (CST): Dontae Kelley MD on 3/30/2024 at 8:13 AM     Finalized by (CST): Dontae Kelley MD on 3/30/2024 at 8:14 AM          XR CHEST AP PORTABLE  (CPT=71045)    Result Date: 3/30/2024  CONCLUSION: Small-moderate sized bilateral pleural effusions larger on the left.  Bibasilar pulmonary opacities compatible with atelectasis.  No significant interval change.   [facility] provided a preliminary report for this examination. This final report agrees with their preliminary findings.   Dictated by (CST): Dontae Kelley MD on  3/30/2024 at 7:27 AM     Finalized by (CST): Dontae Kelley MD on 3/30/2024 at 7:29 AM            ASSESSMENT AND PLAN:   This is a 53 year old male with PMH sig for HLD, tobacco use. Presented with chest pain and was admitted for NSTEMI.  Nephrology is consulted for NORMA      Anuric NORMA  - due to ATN from shock.   - creatinine 1.23 on admission, worsened to 7.19 (3/7)  - initiated on CVVH on 3/7 via RIJ temp HD cath  - RIJ tunneled HD catheter placed (3/22)   - resumed CRRT at this time as per [place] and GOC. To be dc this afternoon per GOC as pt going to OR for organ harvestation  - keep net even  - renal panel and Mg level every 12 hours while on CRRT   - Maintain adequate perfusion pressure  - Dose medications for CRRT  - Avoid nephrotoxins.  - follow renal fxn and I/Os  - monitor for renal recovery.     Hyperkalemia   - 2K bath on CRRT    Metabolic acidosis  - resolved with CRRT     Hyponatremia  - resolved     Critical Care time >35 minutes    CRRT to be discontinued per [place] for organ harvestation. Renal will sign off. Call w/ ?s.     Juanpablo Jimenes MD  [facility]  Nephrology

## 2024-04-04 NOTE — CDS QUERY
Clinical Documentation Clarification  Dear Dr. Camarena,      The medical record contains documentation of both NSTEMI and STEMI. Please clarify the patient's presenting condition.  (   ) Non-ST-Elevation Myocardial Infarction (NSTEMI)  (   ) ST-Elevation Myocardial Infarction (STEMI)  (   ) NSTEMI evolved to STEMI  (  x ) Other - please specify: I dont know   ___________________________________________________________________________  Clinical Indicators:   3/5 Presented to ED with severe left sided chest pain. Impression: NSTEMI    Initial EKG: NSR, normal axis Anterior ST depressions V3-V5, TWI AVL, no clear inferior STEVO.     Troponin I HS: 219 -> >125,000 -> 1,140,512    3/5 Cardiology Consult: NSTEMI - Twelve-lead ECG with significant anterolateral ST depression     3/5 Procedure Note: Indication: STEMI    3//5 H&P: STEMI - taken to cardiac cath this AM  -Multivessel disease including chronically occluded large RCA, 95% sequential LAD stenosis    3/5 Pulmonologist: NSTEMI    3/5 Cardiothoracic Surgeon: STEMI status post balloon angioplasty and Impella placement     3/29 Cardiology: STEMI, severe multivessel coronary disease status post CABG: Last echocardiogram 3/16 while Impella was in place, ejection fraction 45 to 50%. Emergent redo sternotomy 3/16/2024 for tamponade     3/29 Discharge Summary:   Discharge Diagnoses: NSTEMI (non-ST elevated myocardial infarction)   … presented 3/5/2024 with chest pain. Found to have STEMI with cardiogenic shock requiring Impella placement.     Risk Factors: Smoker, Hyperlipidemia, Pre-diabetes    Treatment: Diuretics, blood pressure monitoring & control.      For questions regarding this query, please contact Lead Clinical :  Nanette Weinstein RN, CCDS     Cell# [phone]     Thank you!                                                       THIS FORM IS A PERMANENT PART OF THE MEDICAL RECORD

## (undated) DEVICE — 3M™ STERI-DRAPE™ INSTRUMENT POUCH 1018L: Brand: STERI-DRAPE™

## (undated) DEVICE — INTENDED FOR TISSUE SEPARATION, AND OTHER PROCEDURES THAT REQUIRE A SHARP SURGICAL BLADE TO PUNCTURE OR CUT.: Brand: BARD-PARKER ® STAINLESS STEEL BLADES

## (undated) DEVICE — COVER MAYOSTAND 23X54IN NWVN FAB

## (undated) DEVICE — HANDPIECE SUR ROTARY NRECHRG BTTRY PWR ZDRIVE

## (undated) DEVICE — KIT HEMSTAT MTRX 8ML PORCINE GEL HUM THROM

## (undated) DEVICE — CONNECTOR PERF 3/8X0.25IN REDUC STR

## (undated) DEVICE — ADAPTER CRDPLG ANTGRD RTRGD 3W

## (undated) DEVICE — CANNULA SUCT L15IN DIA32/40FR NVENT CONN

## (undated) DEVICE — GAMMEX® NON-LATEX PI ORTHO SIZE 7, STERILE POLYISOPRENE POWDER-FREE SURGICAL GLOVE: Brand: GAMMEX

## (undated) DEVICE — SET EXTN 2.7ML TBNG L20IN DIA0.100IN MACBOR

## (undated) DEVICE — MARKER SURG SKIN GENTIAN VLT INK REG TIP

## (undated) DEVICE — COUNTER NDL 10 CT HLD 20 FOAM BLK ADH

## (undated) DEVICE — ABSORBABLE HEMOSTAT (OXIDIZED REGENERATED CELLULOSE): Brand: SURGICEL

## (undated) DEVICE — PUNCH AORT 4MM SS THRMPLSTC SLF ALIGNING

## (undated) DEVICE — DRAIN SUR 24FR L5/16IN DIA8MM SIL RND HUBLESS

## (undated) DEVICE — SUTURE PROL 3-0 30IN NABSRB BLU 22MM SH-1 1/2

## (undated) DEVICE — MEDI-VAC NON-CONDUCTIVE SUCTION TUBING: Brand: CARDINAL HEALTH

## (undated) DEVICE — UNDYED BRAIDED (POLYGLACTIN 910), SYNTHETIC ABSORBABLE SUTURE: Brand: COATED VICRYL

## (undated) DEVICE — 3M™ BAIR HUGGER® UNDERBODY BLANKET, FULL ACCESS, 10 PER CASE 63500: Brand: BAIR HUGGER™

## (undated) DEVICE — STERILE TETRA-FLEX CF, ELASTIC BANDAGE LATEX FREE 6IN X5.5 YD: Brand: TETRA-FLEX™CF

## (undated) DEVICE — CLIP INT L ORNG TI TRNSVRS GRV CHEVRON SHP

## (undated) DEVICE — CONTAINER,SPECIMEN,OR STERILE,4OZ: Brand: MEDLINE

## (undated) DEVICE — CATHETER THOR 32FR L23IN R ANG CLR PVC HEP

## (undated) DEVICE — SOLUTION IRRIG 1000ML 0.9% NACL USP BTL

## (undated) DEVICE — SPONGE GZ 4XL4IN 100% COT 12 PLY TYP VII WVN

## (undated) DEVICE — DRESSING FOAM 4.25IN LEN 12.5IN W WND PD N

## (undated) DEVICE — SUT PROL 6-0 18IN C-1 NABSRB BLU 13MM 3/8 CIR

## (undated) DEVICE — [HIGH FLOW INSUFFLATOR,  DO NOT USE IF PACKAGE IS DAMAGED,  KEEP DRY,  KEEP AWAY FROM SUNLIGHT,  PROTECT FROM HEAT AND RADIOACTIVE SOURCES.]: Brand: PNEUMOSURE

## (undated) DEVICE — PACK PERFUSION CUSTOM W BCARE5

## (undated) DEVICE — CLAMP INSERTS: Brand: SOFT/TRACTION CLAMP INSERTS

## (undated) DEVICE — SUT ETHLN 2 20IN LR NABSRB BLK 75MM 3/8 CIR

## (undated) DEVICE — SUT PERMA- 2-0 30IN SH NABSRB BLK L26MM 1/

## (undated) DEVICE — SUTURE SILK 3-0 SA64H

## (undated) DEVICE — SOLUTION IV 1000ML 0.9% NACL PRESERVATIVE

## (undated) DEVICE — DRAPE DISC W112XL168CM DISP FOR HUSH SLUSH

## (undated) DEVICE — CS5/5+ FASTPACK, 225ML 150U RES: Brand: HAEMONETICS CELL SAVER 5/5+ SYSTEMS

## (undated) DEVICE — 3M™ IOBAN™ 2 ANTIMICROBIAL INCISE DRAPE 6650EZ: Brand: IOBAN™ 2

## (undated) DEVICE — SUTURE MCRYL SZ 3-0 L27IN ABSRB UD L24MM PS-1

## (undated) DEVICE — GAMMEX® PI HYBRID SIZE 8, STERILE POWDER-FREE SURGICAL GLOVE, POLYISOPRENE AND NEOPRENE BLEND: Brand: GAMMEX

## (undated) DEVICE — OPTISITE ARTERIAL PERFUSION CANNULA: Brand: OPTISITE ARTERIAL PERFUSION CANNULA

## (undated) DEVICE — SKIN PREP TRAY 4 COMPARTM TRAY: Brand: MEDLINE INDUSTRIES, INC.

## (undated) DEVICE — SCREW BNE 2.4X14MM CANC LOK SLF DRL
Type: IMPLANTABLE DEVICE | Site: CHEST | Status: NON-FUNCTIONAL
Removed: 2024-03-15

## (undated) DEVICE — HI-LO ORAL/NASAL TRACHEAL TUBE CUFFED,MURPHY EYE: Brand: SHILEY

## (undated) DEVICE — JELLY,LUBE,STERILE,FLIP TOP,TUBE,2-OZ: Brand: MEDLINE

## (undated) DEVICE — CATHETER THOR 32FR L23IN BLU RADPQ STRP THRM

## (undated) DEVICE — STERNUM BLADE, OFFSET (31.7 X 0.64 X 6.3MM)

## (undated) DEVICE — ADHESIVE LIQ 2/3ML VI MASTISOL

## (undated) DEVICE — DRAIN CHST SGL COLL 1 PT TB FOR ATS BG CMPTBL

## (undated) DEVICE — SUTURE SILK 4-0 SA63H

## (undated) DEVICE — DRAPE SHEET LAPAROTOMY

## (undated) DEVICE — ENCORE® LATEX MICRO SIZE 7.5, STERILE LATEX POWDER-FREE SURGICAL GLOVE: Brand: ENCORE

## (undated) DEVICE — SUT CHRM GUT 2-0 27IN SH ABSRB UD 26MM 1/2

## (undated) DEVICE — HANDLE SUR BLU PLAS LT FLX SLIP ON ST DISP

## (undated) DEVICE — A STERILE, SEMI-RIGID TUBE USED IN OPEN HEART SURGERY TO FACILITATE THE TRANSFER OF PRIMING FLUIDS DURING THE PRIMING OF THE EXTRACORPOREAL CIRCUIT OF A CARDIOPULMONARY BYPASS SYSTEM. IT IS A NONINVASIVE PRODUCT IN THE FORM OF A TUBE OR TUBING USED TO CHANNEL THE FLUIDS (I.E., PRIMER/BLOOD) TO FACILITATE PRIMING OF THE EXTRACORPOREAL CIRCUIT AND CONNECTION OF THE CARDIOPULMONARY BYPASS SYSTEM (HEART/LUNG MACHINE) TO THE PATIENT. IT IS TYPICALLY A MOULDED PLASTIC TUBE WITH HARD PLASTIC SPIKE AT THE DISTAL END THAT CONNECTS TO THE BAG OR CONTAINER OF PRIMER SOLUTION, A CONNECTOR AT THE PROXIMAL END, AND A CENTRAL PINCH CLAMP. THIS IS A SINGLE-USE DEVICE.: Brand: QUICKIE PRIME - 1/4" X 1/16"

## (undated) DEVICE — PERCUTANEOUS INSERTION KIT-ARTERIAL: Brand: PERCUTANEOUS INSERTION KIT-ARTERIAL

## (undated) DEVICE — SPONGE: SPECIALTY PEANUT XR 100/CS: Brand: MEDICAL ACTION INDUSTRIES

## (undated) DEVICE — SUCTION CANISTER, 3000CC,SAFELINER: Brand: DEROYAL

## (undated) DEVICE — STERILE TETRA-FLEX CF, ELASTIC BANDAGE, 4" X 5.5YD: Brand: TETRA-FLEX™CF

## (undated) DEVICE — X-RAY DETECTABLE SPONGES,16 PLY: Brand: VISTEC

## (undated) DEVICE — 3M™ TEGADERM™ TRANSPARENT FILM DRESSING, 1626W, 4 IN X 4-3/4 IN (10 CM X 12 CM), 50 EACH/CARTON, 4 CARTON/CASE: Brand: 3M™ TEGADERM™

## (undated) DEVICE — SUT SLK 0 30IN MULTPK BK

## (undated) DEVICE — SUT MCRYL 4-0 18IN PS-2 ABSRB UD 19MM 3/8 CIR

## (undated) DEVICE — FORESIGHT LARGE SENSOR: Brand: FORESIGHT

## (undated) DEVICE — PLATE BNE 100DEG 4 H L SHP STERNALOCK BLU PRI
Type: IMPLANTABLE DEVICE | Site: CHEST | Status: NON-FUNCTIONAL
Removed: 2024-03-15

## (undated) DEVICE — PETROLATUM GAUZE CISION DRESSING: Brand: VASELINE

## (undated) DEVICE — PROVE COVER: Brand: UNBRANDED

## (undated) DEVICE — 12 FOOT DISPOSABLE EXTENSION CABLE WITH SAFE CONNECT / SCREW-DOWN

## (undated) DEVICE — SUT VCRL 2-0 36IN CT-1 ABSRB UD L36MM 1/2 CIR

## (undated) DEVICE — CLIP INT USE SM TI LIG HORZ

## (undated) DEVICE — POWDER HEMSTAT 3GM OXIDIZED REGENERATED CELOS

## (undated) DEVICE — 12 ML SYRINGE LUER-LOCK TIP: Brand: MONOJECT

## (undated) DEVICE — GOWN SURG L DISP LEV 3 AERO BLU RAGLAN SL ST

## (undated) DEVICE — OR TOWEL, 17" X 26" STERILE, BLUE: Brand: PREMIERPRO

## (undated) DEVICE — HEART DRAPE AND SUPPLY: Brand: MEDLINE INDUSTRIES, INC.

## (undated) DEVICE — ENCORE® LATEX ACCLAIM SIZE 7, STERILE LATEX POWDER-FREE SURGICAL GLOVE: Brand: ENCORE

## (undated) DEVICE — DRAIN INCS 10MM 20CMX10MM RLVC

## (undated) DEVICE — SUTURE SILK 1-0 SA87G

## (undated) DEVICE — SUT PDS II 2-0 27IN CT-1 ABSRB VLT L36MM 1/2

## (undated) DEVICE — WAX BNE 2.5GM BEESWAX PAR AND ISO PALMITATE

## (undated) DEVICE — SCREW BNE 2.4X12MM G CANC TI SLF DRL
Type: IMPLANTABLE DEVICE | Site: CHEST | Status: NON-FUNCTIONAL
Removed: 2024-03-15

## (undated) DEVICE — LAPAROTOMY SPONGE - RF AND X-RAY DETECTABLE PRE-WASHED: Brand: SITUATE

## (undated) DEVICE — PACK ASSRY CUSTOM TUBING

## (undated) DEVICE — ELECTRODE ES RET 2 PATE W/ 10FT CRD MPLR DISP

## (undated) DEVICE — SUTURE PERMAHAND SZ 2-0 L30IN 10X30IN TIE

## (undated) DEVICE — SUT PROL 5-0 24IN C-1 NABSRB BLU 13MM 3/8 CIR

## (undated) DEVICE — SUT 6 SGL WIRE 14IN CCS-1 NABSRB L49MM 1/2 CI

## (undated) DEVICE — SOLUTION IRRIG 250ML 0.9% NACL

## (undated) DEVICE — SUT ETHBND XL 0 30IN CT-1 NABSRB GRN 36MM 1/2

## (undated) DEVICE — HEART A: Brand: MEDLINE INDUSTRIES, INC.

## (undated) DEVICE — PREMIERPRO LAP SPONGE 18"X18" STERILE, 5/PK: Brand: PREMIERPRO

## (undated) DEVICE — SUT CHRM GUT 3-0 27IN SH ABSRB UD 26MM 1/2

## (undated) DEVICE — EVACUATOR SUR 100CC SIL BLB WND

## (undated) DEVICE — SUT PROL 4-0 36IN RB-1 NABSRB BLU 17MM 1/2 CI

## (undated) DEVICE — SUT PROL 6-0 24IN C-1 NABSRB BLU 13MM 3/8 CIR

## (undated) DEVICE — SUT PROL 7-0 18IN BV-1 NABSRB BLU L9.3MM 3/8

## (undated) DEVICE — SUT VCRL 1-0 36IN CTX ABSRB UD L48MM 1/2 CIR

## (undated) DEVICE — YANKAUER,FLEXIBLE HANDLE,REGLR CAPACITY: Brand: MEDLINE INDUSTRIES, INC.

## (undated) DEVICE — CARTRIDGE HC HMS+ CRTDG SYR

## (undated) DEVICE — SUT SS 7 18IN CCS NABSRB SIL L48MM 1/2 CIR

## (undated) DEVICE — ENCORE® LATEX MICRO SIZE 8.5, STERILE LATEX POWDER-FREE SURGICAL GLOVE: Brand: ENCORE

## (undated) DEVICE — SUT PERMA- 2-0 18IN SH NABSRB BLK CR 26MM

## (undated) DEVICE — DEVICE BLWR/MSTR ACCUMIST ATCH

## (undated) DEVICE — PACK CDS HEAD

## (undated) DEVICE — SUT PERMA- 3-0 30IN SH NABSRB BLK 26MM 1/2

## (undated) NOTE — [place] [address]  Authorization for Invasive Procedures  Date: 3/19/24           Time: 0804    I hereby authorize Dr Boothe, my physician and his/her assistants (if applicable), which may include medical students, residents, and/or fellows, to perform the following surgical operation/ procedure and administer such anesthesia as may be determined necessary by my physician: Central line exchange on Cristiano Obregon  2.   I recognize that during the surgical operation/procedure, unforeseen conditions may necessitate additional or different procedures than those listed above.  I, therefore, further authorize and request that the above-named surgeon, assistants, or designees perform such procedures as are, in their judgment, necessary and desirable.    3.   My surgeon/physician has discussed prior to my surgery the potential benefits, risks and side effects of this procedure; the likelihood of achieving goals; and potential problems that might occur during recuperation.  They also discussed reasonable alternatives to the procedure, including risks, benefits, and side effects related to the alternatives and risks related to not receiving this procedure.  I have had all my questions answered and I acknowledge that no guarantee has been made as to the result that may be obtained.    4.   Should the need arise during my operation/procedure, which includes change of level of care prior to discharge, I also consent to the administration of blood and/or blood products.  Further, I understand that despite careful testing and screening of blood or blood products by collecting agencies, I may still be subject to ill effects as a result of receiving a blood transfusion and/or blood products.  The following are some, but not all, of the potential risks that can occur: fever and allergic reactions, hemolytic reactions, transmission of diseases such as Hepatitis, AIDS and Cytomegalovirus (CMV) and fluid  overload.  In the event that I wish to have an autologous transfusion of my own blood, or a directed donor transfusion, I will discuss this with my physician.   Check only if Refusing Blood or Blood Products  I understand refusal of blood or blood products as deemed necessary by my physician may have serious consequences to my condition to include possible death. I hereby assume responsibility for my refusal and release the hospital, its personnel, and my physicians from any responsibility for the consequences of my refusal.         o  Refuse         5.   I authorize the use of any specimen, organs, tissues, body parts or foreign objects that may be removed from my body during the operation/procedure for diagnosis, research or teaching purposes and their subsequent disposal by hospital authorities.  I also authorize the release of specimen test results and/or written reports to my treating physician on the hospital medical staff or other referring or consulting physicians involved in my care, at the discretion of the Pathologist or my treating physician.    6.   I consent to the photographing or videotaping of the operations or procedures to be performed, including appropriate portions of my body for medical, scientific, or educational purposes, provided my identity is not revealed by the pictures or by descriptive texts accompanying them.  If the procedure has been photographed/videotaped, the surgeon will obtain the original picture, image, videotape or CD.  The hospital will not be responsible for storage, release or maintenance of the picture, image, tape or CD.    7.   I consent to the presence of a  or observers in the operating room as deemed necessary by my physician or their designees.    8.   I recognize that in the event my procedure results in extended X-Ray/fluoroscopy time, I may develop a skin reaction.    9. If I have a Do Not Attempt Resuscitation (DNAR) order in place, that status  will be suspended while in the operating room, procedural suite, and during the recovery period unless otherwise explicitly stated by me (or a person authorized to consent on my behalf). The surgeon or my attending physician will determine when the applicable recovery period ends for purposes of reinstating the DNAR order.  10. Patients having a sterilization procedure: I understand that if the procedure is successful the results will be permanent and it will therefore be impossible for me to inseminate, conceive, or bear children.  I also understand that the procedure is intended to result in sterility, although the result has not been guaranteed.   11. I acknowledge that my physician has explained sedation/analgesia administration to me including the risk and benefits I consent to the administration of sedation/analgesia as may be necessary or desirable in the judgment of my physician.    I CERTIFY THAT I HAVE READ AND FULLY UNDERSTAND THE ABOVE CONSENT TO OPERATION and/or OTHER PROCEDURE.        ____________________________________       _________________________________      ______________________________  Signature of Patient         Signature of Responsible Person        Printed Name of Responsible Person        ____________________________________      _________________________________      ______________________________       Signature of Witness          Relationship to Patient                       Date                                       Time  Patient Name: Cristiano Estebanbeatrismesfin  : 1970    Reviewed: 2024   Printed: 2024  Medical Record #: [id number] Page 1 of 2             STATEMENT OF PHYSICIAN My signature below affirms that prior to the time of the procedure; I have explained to the patient and/or his/her legal representative, the risks and benefits involved in the proposed treatment and any reasonable alternative to the proposed treatment. I have also explained the risks and  benefits involved in refusal of the proposed treatment and alternatives to the proposed treatment and have answered the patient's questions. If I have a significant financial interest in a co-management agreement or a significant financial interest in any product or implant, or other significant relationship used in this procedure/surgery, I have disclosed this and had a discussion with my patient.     _______________________________________________________________ _____________________________  (Signature of Physician)                                                                                         (Date)                                   (Time)  Patient Name: Cristiano WILHELM Mindi  : 1970    Reviewed: 2024   Printed: 2024  Medical Record #: [id number] Page 2 of 2

## (undated) NOTE — LETTER
[address], [place], [place]  Authorization for Surgical Operation and Procedure                                                                                           I hereby authorize Richie Llanes MD, my physician and his/her assistants (if applicable), which may include medical students, residents, and/or fellows, to perform the following surgical operation/ procedure and administer such anesthesia as may be determined necessary by my physician: Operation/Procedure name (s) CORONARY ARTERY BYPASS GRAFT; POSSIBLE ENDOSCOPIC VEIN HARVEST/MAZE ABLATION/LEFT ATRIAL APPENDAGE CLIP on Cristiano WILHELM Mindi   2.   I recognize that during the surgical operation/procedure, unforeseen conditions may necessitate additional or different procedures than those listed above.  I, therefore, further authorize and request that the above-named surgeon, assistants, or designees perform such procedures as are, in their judgment, necessary and desirable.    3.   My surgeon/physician has discussed prior to my surgery the potential benefits, risks and side effects of this procedure; the likelihood of achieving goals; and potential problems that might occur during recuperation.  They also discussed reasonable alternatives to the procedure, including risks, benefits, and side effects related to the alternatives and risks related to not receiving this procedure.  I have had all my questions answered and I acknowledge that no guarantee has been made as to the result that may be obtained.    4.   Should the need arise during my operation/procedure, which includes change of level of care prior to discharge, I also consent to the administration of blood and/or blood products.  Further, I understand that despite careful testing and screening of blood or blood products by collecting agencies, I may still be subject to ill effects as a result of receiving a blood transfusion and/or blood products.  The following  are some, but not all, of the potential risks that can occur: fever and allergic reactions, hemolytic reactions, transmission of diseases such as Hepatitis, AIDS and Cytomegalovirus (CMV) and fluid overload.  In the event that I wish to have an autologous transfusion of my own blood, or a directed donor transfusion, I will discuss this with my physician.  Check only if Refusing Blood or Blood Products  I understand refusal of blood or blood products as deemed necessary by my physician may have serious consequences to my condition to include possible death. I hereby assume responsibility for my refusal and release the hospital, its personnel, and my physicians from any responsibility for the consequences of my refusal.    o  Refuse   5.   I authorize the use of any specimen, organs, tissues, body parts or foreign objects that may be removed from my body during the operation/procedure for diagnosis, research or teaching purposes and their subsequent disposal by hospital authorities.  I also authorize the release of specimen test results and/or written reports to my treating physician on the hospital medical staff or other referring or consulting physicians involved in my care, at the discretion of the Pathologist or my treating physician.    6.   I consent to the photographing or videotaping of the operations or procedures to be performed, including appropriate portions of my body for medical, scientific, or educational purposes, provided my identity is not revealed by the pictures or by descriptive texts accompanying them.  If the procedure has been photographed/videotaped, the surgeon will obtain the original picture, image, videotape or CD.  The hospital will not be responsible for storage, release or maintenance of the picture, image, tape or CD.    7.   I consent to the presence of a  or observers in the operating room as deemed necessary by my physician or their designees.    8.   I recognize  that in the event my procedure results in extended X-Ray/fluoroscopy time, I may develop a skin reaction.    9. If I have a Do Not Attempt Resuscitation (DNAR) order in place, that status will be suspended while in the operating room, procedural suite, and during the recovery period unless otherwise explicitly stated by me (or a person authorized to consent on my behalf). The surgeon or my attending physician will determine when the applicable recovery period ends for purposes of reinstating the DNAR order.  10. Patients having a sterilization procedure: I understand that if the procedure is successful the results will be permanent and it will therefore be impossible for me to inseminate, conceive, or bear children.  I also understand that the procedure is intended to result in sterility, although the result has not been guaranteed.   11. I acknowledge that my physician has explained sedation/analgesia administration to me including the risk and benefits I consent to the administration of sedation/analgesia as may be necessary or desirable in the judgment of my physician.    I CERTIFY THAT I HAVE READ AND FULLY UNDERSTAND THE ABOVE CONSENT TO OPERATION and/or OTHER PROCEDURE.     _________________________________________ _________________________________     ___________________________________  Signature of Patient     Signature of Responsible Person                   Printed Name of Responsible Person                              _________________________________________ ______________________________        ___________________________________  Signature of Witness         Date  Time         Relationship to Patient    STATEMENT OF PHYSICIAN My signature below affirms that prior to the time of the procedure; I have explained to the patient and/or his/her legal representative, the risks and benefits involved in the proposed treatment and any reasonable alternative to the proposed treatment. I have also explained the  risks and benefits involved in refusal of the proposed treatment and alternatives to the proposed treatment and have answered the patient's questions. If I have a significant financial interest in a co-management agreement or a significant financial interest in any product or implant, or other significant relationship used in this procedure/surgery, I have disclosed this and had a discussion with my patient.     _______________________________________________________________ _____________________________  (Signature of Physician)                                                                                         (Date)                                   (Time)  Patient Name: Cristiano WILHELM Mindi    : 1970   Printed: 3/13/2024      Medical Record #: [id number]                                              Page 1 of 1

## (undated) NOTE — LETTER
[facility] CV SURGERY  [address]  [facility] [address]  [phone]    Blood Transfusion Consent    In the course of your treatment, it may become necessary to administer a transfusion of blood or blood components. This form provides basic information concerning this procedure and, if signed by you, authorizes its administration. By signing this form, you agree that all of your questions about the administration of blood or blood products have been answered by the ordering medical professional or designee.    Description of Procedure  Blood is introduced into one of your veins, commonly in the arm, using a sterilized disposable needle. The amount of blood transfused, and whether the transfusion will be of blood or blood components is a judgement the physician will make based on your particular needs.    Risks  The transfusion is a common procedure of low risk.  MINOR AND TEMPORARY REACTIONS ARE NOT UNCOMMON, including a slight bruise, swelling or local reaction in the area where the needle pierces your skin, or a nonserious reaction to the transfused material itself, including headache, fever or mild skin reaction, such as rash.  Serious reactions are possible, though very unlikely, and include severe allergic reaction (shock) and destruction (hemolysis) of transfused blood cells.  Infectious diseases which are known to be transmitted by blood transfusion include certain types of viral Hepatitis(liver infection from a virus), Human Immunodeficiency Virus (HIV-1,2) infection, a viral infection known to cause Acquired Immunodeficiency Syndrome (AIDS), as well as certain other bacterial, viral, and parasitic diseases. While a minimal risk of acquiring an infectious disease from transfused blood exists, in accordance with the Federal and State law, all due care has been taken in donor selection and testing to avoid transmission of disease.    Alternatives  If loss of blood poses serious threats during  your treatment, THERE IS NO EFFECTIVE ALTERNATIVE TO BLOOD TRANSFUSION. However, if you have any further questions on this matter, your provider will fully explain the alternatives to you if it has not already been done.    I, ______________________________, have read/had read to me the above. I understand the matters bearing on the decision whether or not to authorize a transfusion of blood or blood components. I have no questions which have not been answered to my full satisfaction. I hereby consent to such transfusion as my physician may deem necessary or advisable in the course of my treatment.    ______________________________________________                    ___________________________  (Signature of Patient or Responsible party in case of minor,                 (Printed Name of Patient or incompetent, or unconscious patient)              Responsible Party)    ___________________________               _____________________  (Relationship to Patient if not self)                                    (Date and Time)    __________________________                                                           ______________________              (Signature of Witness)               (Printed Name of Witness)     Language line ()    Telephone/Verbal/Video Consent    __________________________                     ____________________  (Signature of 2nd Witness           (Printed Name of 2nd  Telephone/Verbal/Video Consent)           Witness)    Patient Name: Cristiano Obregon     : 1970                 Printed: 2024     Medical Record #: [id number]      Rev: 2023

## (undated) NOTE — LETTER
[place] [address]  Authorization for Invasive Procedures  Date: March 22, 2024        Time: 0900    I hereby authorize Carlitos Lemon , my physician and his/her assistants (if applicable), which may include medical students, residents, and/or fellows, to perform the following surgical operation/ procedure and administer such anesthesia as may be determined necessary by my physician: gastrostomy tube insertion  on Cristiano Obregon  2.   I recognize that during the surgical operation/procedure, unforeseen conditions may necessitate additional or different procedures than those listed above.  I, therefore, further authorize and request that the above-named surgeon, assistants, or designees perform such procedures as are, in their judgment, necessary and desirable.    3.   My surgeon/physician has discussed prior to my surgery the potential benefits, risks and side effects of this procedure; the likelihood of achieving goals; and potential problems that might occur during recuperation.  They also discussed reasonable alternatives to the procedure, including risks, benefits, and side effects related to the alternatives and risks related to not receiving this procedure.  I have had all my questions answered and I acknowledge that no guarantee has been made as to the result that may be obtained.    4.   Should the need arise during my operation/procedure, which includes change of level of care prior to discharge, I also consent to the administration of blood and/or blood products.  Further, I understand that despite careful testing and screening of blood or blood products by collecting agencies, I may still be subject to ill effects as a result of receiving a blood transfusion and/or blood products.  The following are some, but not all, of the potential risks that can occur: fever and allergic reactions, hemolytic reactions, transmission of diseases such as Hepatitis, AIDS and  Cytomegalovirus (CMV) and fluid overload.  In the event that I wish to have an autologous transfusion of my own blood, or a directed donor transfusion, I will discuss this with my physician.   Check only if Refusing Blood or Blood Products  I understand refusal of blood or blood products as deemed necessary by my physician may have serious consequences to my condition to include possible death. I hereby assume responsibility for my refusal and release the hospital, its personnel, and my physicians from any responsibility for the consequences of my refusal.         o  Refuse         5.   I authorize the use of any specimen, organs, tissues, body parts or foreign objects that may be removed from my body during the operation/procedure for diagnosis, research or teaching purposes and their subsequent disposal by hospital authorities.  I also authorize the release of specimen test results and/or written reports to my treating physician on the hospital medical staff or other referring or consulting physicians involved in my care, at the discretion of the Pathologist or my treating physician.    6.   I consent to the photographing or videotaping of the operations or procedures to be performed, including appropriate portions of my body for medical, scientific, or educational purposes, provided my identity is not revealed by the pictures or by descriptive texts accompanying them.  If the procedure has been photographed/videotaped, the surgeon will obtain the original picture, image, videotape or CD.  The hospital will not be responsible for storage, release or maintenance of the picture, image, tape or CD.    7.   I consent to the presence of a  or observers in the operating room as deemed necessary by my physician or their designees.    8.   I recognize that in the event my procedure results in extended X-Ray/fluoroscopy time, I may develop a skin reaction.    9. If I have a Do Not Attempt Resuscitation  (DNAR) order in place, that status will be suspended while in the operating room, procedural suite, and during the recovery period unless otherwise explicitly stated by me (or a person authorized to consent on my behalf). The surgeon or my attending physician will determine when the applicable recovery period ends for purposes of reinstating the DNAR order.  10. Patients having a sterilization procedure: I understand that if the procedure is successful the results will be permanent and it will therefore be impossible for me to inseminate, conceive, or bear children.  I also understand that the procedure is intended to result in sterility, although the result has not been guaranteed.   11. I acknowledge that my physician has explained sedation/analgesia administration to me including the risk and benefits I consent to the administration of sedation/analgesia as may be necessary or desirable in the judgment of my physician.    I CERTIFY THAT I HAVE READ AND FULLY UNDERSTAND THE ABOVE CONSENT TO OPERATION and/or OTHER PROCEDURE.        ____________________________________       _________________________________      ______________________________  Signature of Patient         Signature of Responsible Person        Printed Name of Responsible Person        ____________________________________      _________________________________      ______________________________       Signature of Witness          Relationship to Patient                       Date                                       Time  Patient Name: Cristiano Obregon  : 1970    Reviewed: 2024   Printed: 2024  Medical Record #: [id number] Page 1 of 2             STATEMENT OF PHYSICIAN My signature below affirms that prior to the time of the procedure; I have explained to the patient and/or his/her legal representative, the risks and benefits involved in the proposed treatment and any reasonable alternative to the proposed treatment. I  have also explained the risks and benefits involved in refusal of the proposed treatment and alternatives to the proposed treatment and have answered the patient's questions. If I have a significant financial interest in a co-management agreement or a significant financial interest in any product or implant, or other significant relationship used in this procedure/surgery, I have disclosed this and had a discussion with my patient.     _______________________________________________________________ _____________________________  (Signature of Physician)                                                                                         (Date)                                   (Time)  Patient Name: Cristiano WILHELM Mindi  : 1970    Reviewed: 2024   Printed: 2024  Medical Record #: [id number] Page 2 of 2

## (undated) NOTE — LETTER
[facility]  Administration of Anesthesia  I, Cristiano Obregon agree to be cared for by a physician anesthesiologist alone and/or with a nurse anesthetist, who is specially trained to monitor me and give me medicine to put me to sleep or keep me comfortable during my procedure    I understand that my anesthesiologist and/or anesthetist is not an employee or agent of [facility] or [facility] [employer]. He or she works for [facility]    As the patient asking for anesthesia services, I agree to:  Allow the anesthesiologist (anesthesia doctor) to give me medicine and do additional procedures as necessary. Some examples are: Starting or using an “IV” to give me medicine, fluids or blood during my procedure, and having a breathing tube placed to help me breathe when I’m asleep (intubation). In the event that my heart stops working properly, I understand that my anesthesiologist will make every effort to sustain my life, unless otherwise directed by [facility] Do Not Resuscitate documents.  Tell my anesthesia doctor before my procedure:  If I am pregnant.  The last time that I ate or drank.  iii. All of the medicines I take (including prescriptions, herbal supplements, and pills I can buy without a prescription (including street drugs/illegal medications). Failure to inform my anesthesiologist about these medicines may increase my risk of anesthetic complications.  iv.If I am allergic to anything or have had a reaction to anesthesia before.  I understand how the anesthesia medicine will help me (benefits).  I understand that with any type of anesthesia medicine there are risks:  The most common risks are: nausea, vomiting, sore throat, muscle soreness, damage to my eyes, mouth, or teeth (from breathing tube placement).  Rare risks include: remembering what happened during my procedure, allergic reactions to medications, injury to my airway, heart, lungs, vision, nerves, or  muscles and in extremely rare instances death.  My doctor has explained to me other choices available to me for my care (alternatives).  Pregnant Patients (“epidural”):  I understand that the risks of having an epidural (medicine given into my back to help control pain during labor), include itching, low blood pressure, difficulty urinating, headache or slowing of the baby’s heart. Very rare risks include infection, bleeding, seizure, irregular heart rhythms and nerve injury.  Regional Anesthesia (“spinal”, “epidural”, & “nerve blocks”):  I understand that rare but potential complications include headache, bleeding, infection, seizure, irregular heart rhythms, and nerve injury.    _____________________________________________________________________________  Patient (or Representative) Signature/Relationship to Patient  Date   Time    _____________________________________________________________________________   Name (if used)    Language/Organization   Time    _____________________________________________________________________________  Nurse Anesthetist Signature     Date   Time  _____________________________________________________________________________  Anesthesiologist Signature     Date   Time  I have discussed the procedure and information above with the patient (or patient’s representative) and answered their questions. The patient or their representative has agreed to have anesthesia services.    _____________________________________________________________________________  Witness        Date   Time  I have verified that the signature is that of the patient or patient’s representative, and that it was signed before the procedure  Patient Name: Cristiano Obregon     : 1970                 Printed: 3/22/2024 at 7:28 AM    Medical Record #: [id number]                                            Page 1 of 1  ----------ANESTHESIA CONSENT----------

## (undated) NOTE — LETTER
[place] [address]  Authorization for Invasive Procedures  Date: 3/21/24           Time: 1800    I hereby authorize Dr. Ammon Krishna, my physician and his/her assistants (if applicable), which may include medical students, residents, and/or fellows, to perform the following surgical operation/ procedure and administer such anesthesia as may be determined necessary by my physician: tracheostomy on Cristiano Obregon  2.   I recognize that during the surgical operation/procedure, unforeseen conditions may necessitate additional or different procedures than those listed above.  I, therefore, further authorize and request that the above-named surgeon, assistants, or designees perform such procedures as are, in their judgment, necessary and desirable.    3.   My surgeon/physician has discussed prior to my surgery the potential benefits, risks and side effects of this procedure; the likelihood of achieving goals; and potential problems that might occur during recuperation.  They also discussed reasonable alternatives to the procedure, including risks, benefits, and side effects related to the alternatives and risks related to not receiving this procedure.  I have had all my questions answered and I acknowledge that no guarantee has been made as to the result that may be obtained.    4.   Should the need arise during my operation/procedure, which includes change of level of care prior to discharge, I also consent to the administration of blood and/or blood products.  Further, I understand that despite careful testing and screening of blood or blood products by collecting agencies, I may still be subject to ill effects as a result of receiving a blood transfusion and/or blood products.  The following are some, but not all, of the potential risks that can occur: fever and allergic reactions, hemolytic reactions, transmission of diseases such as Hepatitis, AIDS and Cytomegalovirus (CMV) and fluid  overload.  In the event that I wish to have an autologous transfusion of my own blood, or a directed donor transfusion, I will discuss this with my physician.   Check only if Refusing Blood or Blood Products  I understand refusal of blood or blood products as deemed necessary by my physician may have serious consequences to my condition to include possible death. I hereby assume responsibility for my refusal and release the hospital, its personnel, and my physicians from any responsibility for the consequences of my refusal.         o  Refuse         5.   I authorize the use of any specimen, organs, tissues, body parts or foreign objects that may be removed from my body during the operation/procedure for diagnosis, research or teaching purposes and their subsequent disposal by hospital authorities.  I also authorize the release of specimen test results and/or written reports to my treating physician on the hospital medical staff or other referring or consulting physicians involved in my care, at the discretion of the Pathologist or my treating physician.    6.   I consent to the photographing or videotaping of the operations or procedures to be performed, including appropriate portions of my body for medical, scientific, or educational purposes, provided my identity is not revealed by the pictures or by descriptive texts accompanying them.  If the procedure has been photographed/videotaped, the surgeon will obtain the original picture, image, videotape or CD.  The hospital will not be responsible for storage, release or maintenance of the picture, image, tape or CD.    7.   I consent to the presence of a  or observers in the operating room as deemed necessary by my physician or their designees.    8.   I recognize that in the event my procedure results in extended X-Ray/fluoroscopy time, I may develop a skin reaction.    9. If I have a Do Not Attempt Resuscitation (DNAR) order in place, that status  will be suspended while in the operating room, procedural suite, and during the recovery period unless otherwise explicitly stated by me (or a person authorized to consent on my behalf). The surgeon or my attending physician will determine when the applicable recovery period ends for purposes of reinstating the DNAR order.  10. Patients having a sterilization procedure: I understand that if the procedure is successful the results will be permanent and it will therefore be impossible for me to inseminate, conceive, or bear children.  I also understand that the procedure is intended to result in sterility, although the result has not been guaranteed.   11. I acknowledge that my physician has explained sedation/analgesia administration to me including the risk and benefits I consent to the administration of sedation/analgesia as may be necessary or desirable in the judgment of my physician.    I CERTIFY THAT I HAVE READ AND FULLY UNDERSTAND THE ABOVE CONSENT TO OPERATION and/or OTHER PROCEDURE.        ____________________________________       _________________________________      ______________________________  Signature of Patient         Signature of Responsible Person        Printed Name of Responsible Person        ____________________________________      _________________________________      ______________________________       Signature of Witness          Relationship to Patient                       Date                                       Time  Patient Name: Cristiano Estebanbeatrismesfin  : 1970    Reviewed: 2024   Printed: 2024  Medical Record #: [id number] Page 1 of 2             STATEMENT OF PHYSICIAN My signature below affirms that prior to the time of the procedure; I have explained to the patient and/or his/her legal representative, the risks and benefits involved in the proposed treatment and any reasonable alternative to the proposed treatment. I have also explained the risks and  benefits involved in refusal of the proposed treatment and alternatives to the proposed treatment and have answered the patient's questions. If I have a significant financial interest in a co-management agreement or a significant financial interest in any product or implant, or other significant relationship used in this procedure/surgery, I have disclosed this and had a discussion with my patient.     _______________________________________________________________ _____________________________  (Signature of Physician)                                                                                         (Date)                                   (Time)  Patient Name: Cristiano WILHELM Mindi  : 1970    Reviewed: 2024   Printed: 2024  Medical Record #: [id number] Page 2 of 2

## (undated) NOTE — LETTER
[address], [place], [place]  Authorization for Surgical Operation and Procedure                                                                                           I hereby authorize Richie Llanes MD, my physician and his/her assistants (if applicable), which may include medical students, residents, and/or fellows, to perform the following surgical operation/ procedure and administer such anesthesia as may be determined necessary by my physician: Operation/Procedure name (s) CORONARY ARTERY BYPASS GRAFT; POSSIBLE ENDOSCOPIC VEIN HARVEST on Cristiano T Mindi   2.   I recognize that during the surgical operation/procedure, unforeseen conditions may necessitate additional or different procedures than those listed above.  I, therefore, further authorize and request that the above-named surgeon, assistants, or designees perform such procedures as are, in their judgment, necessary and desirable.    3.   My surgeon/physician has discussed prior to my surgery the potential benefits, risks and side effects of this procedure; the likelihood of achieving goals; and potential problems that might occur during recuperation.  They also discussed reasonable alternatives to the procedure, including risks, benefits, and side effects related to the alternatives and risks related to not receiving this procedure.  I have had all my questions answered and I acknowledge that no guarantee has been made as to the result that may be obtained.    4.   Should the need arise during my operation/procedure, which includes change of level of care prior to discharge, I also consent to the administration of blood and/or blood products.  Further, I understand that despite careful testing and screening of blood or blood products by collecting agencies, I may still be subject to ill effects as a result of receiving a blood transfusion and/or blood products.  The following are some, but not all, of the potential  risks that can occur: fever and allergic reactions, hemolytic reactions, transmission of diseases such as Hepatitis, AIDS and Cytomegalovirus (CMV) and fluid overload.  In the event that I wish to have an autologous transfusion of my own blood, or a directed donor transfusion, I will discuss this with my physician.  Check only if Refusing Blood or Blood Products  I understand refusal of blood or blood products as deemed necessary by my physician may have serious consequences to my condition to include possible death. I hereby assume responsibility for my refusal and release the hospital, its personnel, and my physicians from any responsibility for the consequences of my refusal.    o  Refuse   5.   I authorize the use of any specimen, organs, tissues, body parts or foreign objects that may be removed from my body during the operation/procedure for diagnosis, research or teaching purposes and their subsequent disposal by hospital authorities.  I also authorize the release of specimen test results and/or written reports to my treating physician on the hospital medical staff or other referring or consulting physicians involved in my care, at the discretion of the Pathologist or my treating physician.    6.   I consent to the photographing or videotaping of the operations or procedures to be performed, including appropriate portions of my body for medical, scientific, or educational purposes, provided my identity is not revealed by the pictures or by descriptive texts accompanying them.  If the procedure has been photographed/videotaped, the surgeon will obtain the original picture, image, videotape or CD.  The hospital will not be responsible for storage, release or maintenance of the picture, image, tape or CD.    7.   I consent to the presence of a  or observers in the operating room as deemed necessary by my physician or their designees.    8.   I recognize that in the event my procedure results in  extended X-Ray/fluoroscopy time, I may develop a skin reaction.    9. If I have a Do Not Attempt Resuscitation (DNAR) order in place, that status will be suspended while in the operating room, procedural suite, and during the recovery period unless otherwise explicitly stated by me (or a person authorized to consent on my behalf). The surgeon or my attending physician will determine when the applicable recovery period ends for purposes of reinstating the DNAR order.  10. Patients having a sterilization procedure: I understand that if the procedure is successful the results will be permanent and it will therefore be impossible for me to inseminate, conceive, or bear children.  I also understand that the procedure is intended to result in sterility, although the result has not been guaranteed.   11. I acknowledge that my physician has explained sedation/analgesia administration to me including the risk and benefits I consent to the administration of sedation/analgesia as may be necessary or desirable in the judgment of my physician.    I CERTIFY THAT I HAVE READ AND FULLY UNDERSTAND THE ABOVE CONSENT TO OPERATION and/or OTHER PROCEDURE.     _________________________________________ _________________________________     ___________________________________  Signature of Patient     Signature of Responsible Person                   Printed Name of Responsible Person                              _________________________________________ ______________________________        ___________________________________  Signature of Witness         Date  Time         Relationship to Patient    STATEMENT OF PHYSICIAN My signature below affirms that prior to the time of the procedure; I have explained to the patient and/or his/her legal representative, the risks and benefits involved in the proposed treatment and any reasonable alternative to the proposed treatment. I have also explained the risks and benefits involved in refusal of  the proposed treatment and alternatives to the proposed treatment and have answered the patient's questions. If I have a significant financial interest in a co-management agreement or a significant financial interest in any product or implant, or other significant relationship used in this procedure/surgery, I have disclosed this and had a discussion with my patient.     _______________________________________________________________ _____________________________  (Signature of Physician)                                                                                         (Date)                                   (Time)  Patient Name: Cristiano WILHELM Mindi    : 1970   Printed: 3/12/2024      Medical Record #: [id number]                                              Page 1 of 1

## (undated) NOTE — LETTER
[facility]  Administration of Anesthesia  I, Cristiano Obregon agree to be cared for by a physician anesthesiologist alone and/or with a nurse anesthetist, who is specially trained to monitor me and give me medicine to put me to sleep or keep me comfortable during my procedure    I understand that my anesthesiologist and/or anesthetist is not an employee or agent of [facility] or [employer]. He or she works for [facility]    As the patient asking for anesthesia services, I agree to:  Allow the anesthesiologist (anesthesia doctor) to give me medicine and do additional procedures as necessary. Some examples are: Starting or using an “IV” to give me medicine, fluids or blood during my procedure, and having a breathing tube placed to help me breathe when I’m asleep (intubation). In the event that my heart stops working properly, I understand that my anesthesiologist will make every effort to sustain my life, unless otherwise directed by [facility] Do Not Resuscitate documents.  Tell my anesthesia doctor before my procedure:  If I am pregnant.  The last time that I ate or drank.  iii. All of the medicines I take (including prescriptions, herbal supplements, and pills I can buy without a prescription (including street drugs/illegal medications). Failure to inform my anesthesiologist about these medicines may increase my risk of anesthetic complications.  iv.If I am allergic to anything or have had a reaction to anesthesia before.  I understand how the anesthesia medicine will help me (benefits).  I understand that with any type of anesthesia medicine there are risks:  The most common risks are: nausea, vomiting, sore throat, muscle soreness, damage to my eyes, mouth, or teeth (from breathing tube placement).  Rare risks include: remembering what happened during my procedure, allergic reactions to medications, injury to my airway, heart, lungs, vision, nerves, or  muscles and in extremely rare instances death.  My doctor has explained to me other choices available to me for my care (alternatives).  Pregnant Patients (“epidural”):  I understand that the risks of having an epidural (medicine given into my back to help control pain during labor), include itching, low blood pressure, difficulty urinating, headache or slowing of the baby’s heart. Very rare risks include infection, bleeding, seizure, irregular heart rhythms and nerve injury.  Regional Anesthesia (“spinal”, “epidural”, & “nerve blocks”):  I understand that rare but potential complications include headache, bleeding, infection, seizure, irregular heart rhythms, and nerve injury.    _____________________________________________________________________________  Patient (or Representative) Signature/Relationship to Patient  Date   Time    _____________________________________________________________________________   Name (if used)    Language/Organization   Time    _____________________________________________________________________________  Nurse Anesthetist Signature     Date   Time  _____________________________________________________________________________  Anesthesiologist Signature     Date   Time  I have discussed the procedure and information above with the patient (or patient’s representative) and answered their questions. The patient or their representative has agreed to have anesthesia services.    _____________________________________________________________________________  Witness        Date   Time  I have verified that the signature is that of the patient or patient’s representative, and that it was signed before the procedure  Patient Name: Cristiano Obregon     : 1970                 Printed: 3/12/2024 at 1:51 PM    Medical Record #: [id number]                                            Page 1 of 1  ----------ANESTHESIA CONSENT----------

## (undated) NOTE — LETTER
[facility]  [address], [place], [place]  Authorization for Surgical Operation and Procedure                                                                                           I hereby authorize DO Jerome, my physician and his/her assistants (if applicable), which may include medical students, residents, and/or fellows, to perform the following surgical operation/ procedure and administer such anesthesia as may be determined necessary by my physician: Operation/Procedure name (s) Right heart catheterization with swan-eliazar catheter insertion on Cristiano Obregon   2.   I recognize that during the surgical operation/procedure, unforeseen conditions may necessitate additional or different procedures than those listed above.  I, therefore, further authorize and request that the above-named surgeon, assistants, or designees perform such procedures as are, in their judgment, necessary and desirable.    3.   My surgeon/physician has discussed prior to my surgery the potential benefits, risks and side effects of this procedure; the likelihood of achieving goals; and potential problems that might occur during recuperation.  They also discussed reasonable alternatives to the procedure, including risks, benefits, and side effects related to the alternatives and risks related to not receiving this procedure.  I have had all my questions answered and I acknowledge that no guarantee has been made as to the result that may be obtained.    4.   Should the need arise during my operation/procedure, which includes change of level of care prior to discharge, I also consent to the administration of blood and/or blood products.  Further, I understand that despite careful testing and screening of blood or blood products by collecting agencies, I may still be subject to ill effects as a result of receiving a blood transfusion and/or blood products.  The following are some, but not all, of the potential risks  that can occur: fever and allergic reactions, hemolytic reactions, transmission of diseases such as Hepatitis, AIDS and Cytomegalovirus (CMV) and fluid overload.  In the event that I wish to have an autologous transfusion of my own blood, or a directed donor transfusion, I will discuss this with my physician.  Check only if Refusing Blood or Blood Products  I understand refusal of blood or blood products as deemed necessary by my physician may have serious consequences to my condition to include possible death. I hereby assume responsibility for my refusal and release the hospital, its personnel, and my physicians from any responsibility for the consequences of my refusal.    o  Refuse   5.   I authorize the use of any specimen, organs, tissues, body parts or foreign objects that may be removed from my body during the operation/procedure for diagnosis, research or teaching purposes and their subsequent disposal by hospital authorities.  I also authorize the release of specimen test results and/or written reports to my treating physician on the hospital medical staff or other referring or consulting physicians involved in my care, at the discretion of the Pathologist or my treating physician.    6.   I consent to the photographing or videotaping of the operations or procedures to be performed, including appropriate portions of my body for medical, scientific, or educational purposes, provided my identity is not revealed by the pictures or by descriptive texts accompanying them.  If the procedure has been photographed/videotaped, the surgeon will obtain the original picture, image, videotape or CD.  The hospital will not be responsible for storage, release or maintenance of the picture, image, tape or CD.    7.   I consent to the presence of a  or observers in the operating room as deemed necessary by my physician or their designees.    8.   I recognize that in the event my procedure results in  extended X-Ray/fluoroscopy time, I may develop a skin reaction.    9. If I have a Do Not Attempt Resuscitation (DNAR) order in place, that status will be suspended while in the operating room, procedural suite, and during the recovery period unless otherwise explicitly stated by me (or a person authorized to consent on my behalf). The surgeon or my attending physician will determine when the applicable recovery period ends for purposes of reinstating the DNAR order.  10. Patients having a sterilization procedure: I understand that if the procedure is successful the results will be permanent and it will therefore be impossible for me to inseminate, conceive, or bear children.  I also understand that the procedure is intended to result in sterility, although the result has not been guaranteed.   11. I acknowledge that my physician has explained sedation/analgesia administration to me including the risk and benefits I consent to the administration of sedation/analgesia as may be necessary or desirable in the judgment of my physician.    I CERTIFY THAT I HAVE READ AND FULLY UNDERSTAND THE ABOVE CONSENT TO OPERATION and/or OTHER PROCEDURE.     _________________________________________ _________________________________     ___________________________________  Signature of Patient     Signature of Responsible Person                   Printed Name of Responsible Person                              _________________________________________ ______________________________        ___________________________________  Signature of Witness         Date  Time         Relationship to Patient    STATEMENT OF PHYSICIAN My signature below affirms that prior to the time of the procedure; I have explained to the patient and/or his/her legal representative, the risks and benefits involved in the proposed treatment and any reasonable alternative to the proposed treatment. I have also explained the risks and benefits involved in refusal of  the proposed treatment and alternatives to the proposed treatment and have answered the patient's questions. If I have a significant financial interest in a co-management agreement or a significant financial interest in any product or implant, or other significant relationship used in this procedure/surgery, I have disclosed this and had a discussion with my patient.     _______________________________________________________________ _____________________________  (Signature of Physician)                                                                                         (Date)                                   (Time)  Patient Name: Cristiano WILHELM Mindi    : 1970   Printed: 3/6/2024      Medical Record #: [id number]                                              Page 1 of 1

## (undated) NOTE — LETTER
[facility]  part of [facility]     PICC INSERTION CONSENT     I agree to have a Peripherally Inserted Central Catheter (PICC) placed in my arm.   1. The PICC insertion procedure, care, maintenance, risks, benefits, and complications have been explained to me by my physician, ________________________, and I understand them.   2. I understand that this may not be the only way I can receive my medication. I understand that my physician has determined that the PICC would be the safest and most effective means of administering my medication at this time. If there are other options of giving medication into my veins those options have been explained to me by my physician and I have chosen this one.   3. I realize a nurse who has been specially trained and certified by the hospital and ’s representative to insert a PICC will perform this procedure. My catheter will be inserted by _____________________________.   4. I have been informed by my doctor of the nature and purpose of this procedure and the risks involved and the possibility of complications. I realize that this is an invasive procedure and has certain risks such as air embolism (air entering the catheter or my vein), arterial puncture (a tearing of one of my arteries), infection, irregular heartbeat and venous thrombosis (a blood clot in a vein) nerve injury and fracture of the catheter with or without migration.   5. In order to numb the area where the line will be placed, a small amount of anesthetic medication will be injected as ordered by my physician.   6. I understand that while the catheter will be placed in my upper arm the end of the catheter will come to rest in an area near my heart.     7. The person performing this procedure has discussed the potential benefits, risks, and side effects of the PICC; the likelihood of achieving goals; and potential problems that might occur during recuperation. They also discussed  reasonable alternatives to the PICC, including risks, benefits, and side effects related to the alternatives and risks related to not receiving this procedure.    8.  I have expressed any questions about this procedure to my physician or the PICC Proceduralist and he/she has answered them.  I certify that I have read and understand this consent to the insertion of a PICC.      _________________________________________________________   Date     Time     Patient/Guardian Signature       ____________________________________   Printed name of Patient/Guardian          ________________________________________________________________    Date        Time                   Witnessing RN Signature      Patient Name: Cristiano Obregon     : 1970                 Printed: 2024     Medical Record #: [id number]

## (undated) NOTE — LETTER
[place] [address]  Authorization for Invasive Procedures  Date: 3/7/24           Time: 0900    I hereby authorize Tl, my physician and his/her assistants (if applicable), which may include medical students, residents, and/or fellows, to perform the following surgical operation/ procedure and administer such anesthesia as may be determined necessary by my physician: temp HD cath  on Cristiano Obregon  2.   I recognize that during the surgical operation/procedure, unforeseen conditions may necessitate additional or different procedures than those listed above.  I, therefore, further authorize and request that the above-named surgeon, assistants, or designees perform such procedures as are, in their judgment, necessary and desirable.    3.   My surgeon/physician has discussed prior to my surgery the potential benefits, risks and side effects of this procedure; the likelihood of achieving goals; and potential problems that might occur during recuperation.  They also discussed reasonable alternatives to the procedure, including risks, benefits, and side effects related to the alternatives and risks related to not receiving this procedure.  I have had all my questions answered and I acknowledge that no guarantee has been made as to the result that may be obtained.    4.   Should the need arise during my operation/procedure, which includes change of level of care prior to discharge, I also consent to the administration of blood and/or blood products.  Further, I understand that despite careful testing and screening of blood or blood products by collecting agencies, I may still be subject to ill effects as a result of receiving a blood transfusion and/or blood products.  The following are some, but not all, of the potential risks that can occur: fever and allergic reactions, hemolytic reactions, transmission of diseases such as Hepatitis, AIDS and Cytomegalovirus (CMV) and fluid overload.  In  the event that I wish to have an autologous transfusion of my own blood, or a directed donor transfusion, I will discuss this with my physician.   Check only if Refusing Blood or Blood Products  I understand refusal of blood or blood products as deemed necessary by my physician may have serious consequences to my condition to include possible death. I hereby assume responsibility for my refusal and release the hospital, its personnel, and my physicians from any responsibility for the consequences of my refusal.         o  Refuse         5.   I authorize the use of any specimen, organs, tissues, body parts or foreign objects that may be removed from my body during the operation/procedure for diagnosis, research or teaching purposes and their subsequent disposal by hospital authorities.  I also authorize the release of specimen test results and/or written reports to my treating physician on the hospital medical staff or other referring or consulting physicians involved in my care, at the discretion of the Pathologist or my treating physician.    6.   I consent to the photographing or videotaping of the operations or procedures to be performed, including appropriate portions of my body for medical, scientific, or educational purposes, provided my identity is not revealed by the pictures or by descriptive texts accompanying them.  If the procedure has been photographed/videotaped, the surgeon will obtain the original picture, image, videotape or CD.  The hospital will not be responsible for storage, release or maintenance of the picture, image, tape or CD.    7.   I consent to the presence of a  or observers in the operating room as deemed necessary by my physician or their designees.    8.   I recognize that in the event my procedure results in extended X-Ray/fluoroscopy time, I may develop a skin reaction.    9. If I have a Do Not Attempt Resuscitation (DNAR) order in place, that status will be  suspended while in the operating room, procedural suite, and during the recovery period unless otherwise explicitly stated by me (or a person authorized to consent on my behalf). The surgeon or my attending physician will determine when the applicable recovery period ends for purposes of reinstating the DNAR order.  10. Patients having a sterilization procedure: I understand that if the procedure is successful the results will be permanent and it will therefore be impossible for me to inseminate, conceive, or bear children.  I also understand that the procedure is intended to result in sterility, although the result has not been guaranteed.   11. I acknowledge that my physician has explained sedation/analgesia administration to me including the risk and benefits I consent to the administration of sedation/analgesia as may be necessary or desirable in the judgment of my physician.    I CERTIFY THAT I HAVE READ AND FULLY UNDERSTAND THE ABOVE CONSENT TO OPERATION and/or OTHER PROCEDURE.        ____________________________________       _________________________________      ______________________________  Signature of Patient         Signature of Responsible Person        Printed Name of Responsible Person        ____________________________________      _________________________________      ______________________________       Signature of Witness          Relationship to Patient                       Date                                       Time  Patient Name: Cristiano Estebanbeatrismesfin  : 1970    Reviewed: 2024   Printed: 2024  Medical Record #: [id number] Page 1 of 2             STATEMENT OF PHYSICIAN My signature below affirms that prior to the time of the procedure; I have explained to the patient and/or his/her legal representative, the risks and benefits involved in the proposed treatment and any reasonable alternative to the proposed treatment. I have also explained the risks and benefits  involved in refusal of the proposed treatment and alternatives to the proposed treatment and have answered the patient's questions. If I have a significant financial interest in a co-management agreement or a significant financial interest in any product or implant, or other significant relationship used in this procedure/surgery, I have disclosed this and had a discussion with my patient.     _______________________________________________________________ _____________________________  (Signature of Physician)                                                                                         (Date)                                   (Time)  Patient Name: Cristiano WILHELM Mindi  : 1970    Reviewed: 2024   Printed: 2024  Medical Record #: [id number] Page 2 of 2

## (undated) NOTE — LETTER
To Whom It May Concern:  This certifies that Cristiano Obregon was admitted to [facility] in critical condition on 3/5/24. Unfortunately, there is no time frame on discharge from the hospital at this moment as he remains in critical condition.It is anticipated that FMLA will be needed either ongoing or intermittently (undetermined) for his spouse after discharge from the hospital, as well as continuous during his critical condition.  Do not hesitate to call with any questions or concerns.  Please ask employee to provide further details on length of critical condition as needed.       Sincerely,    Keiko DELEON  Pro[facility] Unit   [facility] 2W/SW  [address] [id number]  [phone]        Document generated by:  ADRIENNE Arnold

## (undated) NOTE — LETTER
[facility] 2W/SW  [address] [facility] [address]  [phone]    Blood Transfusion Consent    In the course of your treatment, it may become necessary to administer a transfusion of blood or blood components. This form provides basic information concerning this procedure and, if signed by you, authorizes its administration. By signing this form, you agree that all of your questions about the administration of blood or blood products have been answered by the ordering medical professional or designee.    Description of Procedure  Blood is introduced into one of your veins, commonly in the arm, using a sterilized disposable needle. The amount of blood transfused, and whether the transfusion will be of blood or blood components is a judgement the physician will make based on your particular needs.    Risks  The transfusion is a common procedure of low risk.  MINOR AND TEMPORARY REACTIONS ARE NOT UNCOMMON, including a slight bruise, swelling or local reaction in the area where the needle pierces your skin, or a nonserious reaction to the transfused material itself, including headache, fever or mild skin reaction, such as rash.  Serious reactions are possible, though very unlikely, and include severe allergic reaction (shock) and destruction (hemolysis) of transfused blood cells.  Infectious diseases which are known to be transmitted by blood transfusion include certain types of viral Hepatitis(liver infection from a virus), Human Immunodeficiency Virus (HIV-1,2) infection, a viral infection known to cause Acquired Immunodeficiency Syndrome (AIDS), as well as certain other bacterial, viral, and parasitic diseases. While a minimal risk of acquiring an infectious disease from transfused blood exists, in accordance with the Federal and State law, all due care has been taken in donor selection and testing to avoid transmission of disease.    Alternatives  If loss of blood poses serious threats during your  treatment, THERE IS NO EFFECTIVE ALTERNATIVE TO BLOOD TRANSFUSION. However, if you have any further questions on this matter, your provider will fully explain the alternatives to you if it has not already been done.    I, ______________________________, have read/had read to me the above. I understand the matters bearing on the decision whether or not to authorize a transfusion of blood or blood components. I have no questions which have not been answered to my full satisfaction. I hereby consent to such transfusion as my physician may deem necessary or advisable in the course of my treatment.    ______________________________________________                    ___________________________  (Signature of Patient or Responsible party in case of minor,                 (Printed Name of Patient or incompetent, or unconscious patient)              Responsible Party)    ___________________________               _____________________  (Relationship to Patient if not self)                                    (Date and Time)    __________________________                                                           ______________________              (Signature of Witness)               (Printed Name of Witness)     Language line ()    Telephone/Verbal/Video Consent    __________________________                     ____________________  (Signature of 2nd Witness           (Printed Name of 2nd  Telephone/Verbal/Video Consent)           Witness)    Patient Name: Cristiano Obregon     : 1970                 Printed: 2024     Medical Record #: [id number]      Rev: 2023

## (undated) NOTE — LETTER
[place] [address]  Authorization for Invasive Procedures  Date: 3/7/24           Time: 0920    I hereby authorize Dustin , my physician and his/her assistants (if applicable), which may include medical students, residents, and/or fellows, to perform the following surgical operation/ procedure and administer such anesthesia as may be determined necessary by my physician: central line - temporary dialysis catheter on Cristiano Obregon  2.   I recognize that during the surgical operation/procedure, unforeseen conditions may necessitate additional or different procedures than those listed above.  I, therefore, further authorize and request that the above-named surgeon, assistants, or designees perform such procedures as are, in their judgment, necessary and desirable.    3.   My surgeon/physician has discussed prior to my surgery the potential benefits, risks and side effects of this procedure; the likelihood of achieving goals; and potential problems that might occur during recuperation.  They also discussed reasonable alternatives to the procedure, including risks, benefits, and side effects related to the alternatives and risks related to not receiving this procedure.  I have had all my questions answered and I acknowledge that no guarantee has been made as to the result that may be obtained.    4.   Should the need arise during my operation/procedure, which includes change of level of care prior to discharge, I also consent to the administration of blood and/or blood products.  Further, I understand that despite careful testing and screening of blood or blood products by collecting agencies, I may still be subject to ill effects as a result of receiving a blood transfusion and/or blood products.  The following are some, but not all, of the potential risks that can occur: fever and allergic reactions, hemolytic reactions, transmission of diseases such as Hepatitis, AIDS and  Cytomegalovirus (CMV) and fluid overload.  In the event that I wish to have an autologous transfusion of my own blood, or a directed donor transfusion, I will discuss this with my physician.   Check only if Refusing Blood or Blood Products  I understand refusal of blood or blood products as deemed necessary by my physician may have serious consequences to my condition to include possible death. I hereby assume responsibility for my refusal and release the hospital, its personnel, and my physicians from any responsibility for the consequences of my refusal.         o  Refuse         5.   I authorize the use of any specimen, organs, tissues, body parts or foreign objects that may be removed from my body during the operation/procedure for diagnosis, research or teaching purposes and their subsequent disposal by hospital authorities.  I also authorize the release of specimen test results and/or written reports to my treating physician on the hospital medical staff or other referring or consulting physicians involved in my care, at the discretion of the Pathologist or my treating physician.    6.   I consent to the photographing or videotaping of the operations or procedures to be performed, including appropriate portions of my body for medical, scientific, or educational purposes, provided my identity is not revealed by the pictures or by descriptive texts accompanying them.  If the procedure has been photographed/videotaped, the surgeon will obtain the original picture, image, videotape or CD.  The hospital will not be responsible for storage, release or maintenance of the picture, image, tape or CD.    7.   I consent to the presence of a  or observers in the operating room as deemed necessary by my physician or their designees.    8.   I recognize that in the event my procedure results in extended X-Ray/fluoroscopy time, I may develop a skin reaction.    9. If I have a Do Not Attempt Resuscitation  (DNAR) order in place, that status will be suspended while in the operating room, procedural suite, and during the recovery period unless otherwise explicitly stated by me (or a person authorized to consent on my behalf). The surgeon or my attending physician will determine when the applicable recovery period ends for purposes of reinstating the DNAR order.  10. Patients having a sterilization procedure: I understand that if the procedure is successful the results will be permanent and it will therefore be impossible for me to inseminate, conceive, or bear children.  I also understand that the procedure is intended to result in sterility, although the result has not been guaranteed.   11. I acknowledge that my physician has explained sedation/analgesia administration to me including the risk and benefits I consent to the administration of sedation/analgesia as may be necessary or desirable in the judgment of my physician.    I CERTIFY THAT I HAVE READ AND FULLY UNDERSTAND THE ABOVE CONSENT TO OPERATION and/or OTHER PROCEDURE.        ____________________________________       _________________________________      ______________________________  Signature of Patient         Signature of Responsible Person        Printed Name of Responsible Person        ____________________________________      _________________________________      ______________________________       Signature of Witness          Relationship to Patient                       Date                                       Time  Patient Name: Cristiano Obregon  : 1970    Reviewed: 2024   Printed: 2024  Medical Record #: [id number] Page 1 of 2             STATEMENT OF PHYSICIAN My signature below affirms that prior to the time of the procedure; I have explained to the patient and/or his/her legal representative, the risks and benefits involved in the proposed treatment and any reasonable alternative to the proposed treatment. I  have also explained the risks and benefits involved in refusal of the proposed treatment and alternatives to the proposed treatment and have answered the patient's questions. If I have a significant financial interest in a co-management agreement or a significant financial interest in any product or implant, or other significant relationship used in this procedure/surgery, I have disclosed this and had a discussion with my patient.     _______________________________________________________________ _____________________________  (Signature of Physician)                                                                                         (Date)                                   (Time)  Patient Name: Cristiano WILHELM Mindi  : 1970    Reviewed: 2024   Printed: 2024  Medical Record #: [id number] Page 2 of 2